# Patient Record
Sex: MALE | Race: WHITE | NOT HISPANIC OR LATINO | Employment: OTHER | URBAN - METROPOLITAN AREA
[De-identification: names, ages, dates, MRNs, and addresses within clinical notes are randomized per-mention and may not be internally consistent; named-entity substitution may affect disease eponyms.]

---

## 2017-01-04 ENCOUNTER — TRANSCRIBE ORDERS (OUTPATIENT)
Dept: RADIOLOGY | Facility: CLINIC | Age: 77
End: 2017-01-04

## 2017-01-04 ENCOUNTER — HOSPITAL ENCOUNTER (OUTPATIENT)
Dept: RADIOLOGY | Facility: CLINIC | Age: 77
Discharge: HOME | End: 2017-01-04
Payer: MEDICARE

## 2017-01-04 ENCOUNTER — GENERIC CONVERSION - ENCOUNTER (OUTPATIENT)
Dept: OTHER | Facility: OTHER | Age: 77
End: 2017-01-04

## 2017-01-04 DIAGNOSIS — M40.204 KYPHOSIS OF THORACIC REGION: ICD-10-CM

## 2017-01-04 PROCEDURE — 72072 X-RAY EXAM THORAC SPINE 3VWS: CPT

## 2017-01-05 ENCOUNTER — APPOINTMENT (EMERGENCY)
Dept: RADIOLOGY | Facility: HOSPITAL | Age: 77
End: 2017-01-05
Payer: MEDICARE

## 2017-01-05 ENCOUNTER — HOSPITAL ENCOUNTER (EMERGENCY)
Facility: HOSPITAL | Age: 77
Discharge: HOME | End: 2017-01-05
Attending: EMERGENCY MEDICINE | Admitting: EMERGENCY MEDICINE
Payer: MEDICARE

## 2017-01-05 VITALS
DIASTOLIC BLOOD PRESSURE: 84 MMHG | RESPIRATION RATE: 20 BRPM | HEART RATE: 85 BPM | SYSTOLIC BLOOD PRESSURE: 153 MMHG | OXYGEN SATURATION: 95 % | TEMPERATURE: 97.5 F

## 2017-01-05 DIAGNOSIS — M25.552 LEFT HIP PAIN: Primary | ICD-10-CM

## 2017-01-05 PROCEDURE — 99283 EMERGENCY DEPT VISIT LOW MDM: CPT

## 2017-01-05 PROCEDURE — 73502 X-RAY EXAM HIP UNI 2-3 VIEWS: CPT

## 2017-01-05 RX ORDER — TRAMADOL HYDROCHLORIDE 50 MG/1
50 TABLET ORAL EVERY 6 HOURS PRN
Qty: 15 TABLET | Refills: 0 | Status: SHIPPED | OUTPATIENT
Start: 2017-01-05 | End: 2017-01-15

## 2017-01-18 ENCOUNTER — ALLSCRIPTS OFFICE VISIT (OUTPATIENT)
Dept: OTHER | Facility: OTHER | Age: 77
End: 2017-01-18

## 2017-01-18 PROCEDURE — 88305 TISSUE EXAM BY PATHOLOGIST: CPT | Performed by: PHYSICIAN ASSISTANT

## 2017-01-20 ENCOUNTER — LAB REQUISITION (OUTPATIENT)
Dept: LAB | Facility: HOSPITAL | Age: 77
End: 2017-01-20
Payer: MEDICARE

## 2017-01-20 DIAGNOSIS — H61.899 OTHER SPECIFIED DISORDERS OF EXTERNAL EAR, UNSPECIFIED EAR: ICD-10-CM

## 2017-02-24 ENCOUNTER — GENERIC CONVERSION - ENCOUNTER (OUTPATIENT)
Dept: OTHER | Facility: OTHER | Age: 77
End: 2017-02-24

## 2017-03-03 ENCOUNTER — TRANSCRIBE ORDERS (OUTPATIENT)
Dept: LAB | Facility: CLINIC | Age: 77
End: 2017-03-03

## 2017-03-03 ENCOUNTER — APPOINTMENT (OUTPATIENT)
Dept: LAB | Facility: CLINIC | Age: 77
End: 2017-03-03
Payer: MEDICARE

## 2017-03-03 DIAGNOSIS — E11.65 TYPE 2 DIABETES MELLITUS WITH HYPERGLYCEMIA (HCC): ICD-10-CM

## 2017-03-03 LAB
ALBUMIN SERPL BCP-MCNC: 3.4 G/DL (ref 3.5–5)
ALP SERPL-CCNC: 75 U/L (ref 46–116)
ALT SERPL W P-5'-P-CCNC: 34 U/L (ref 12–78)
ANION GAP SERPL CALCULATED.3IONS-SCNC: 5 MMOL/L (ref 4–13)
AST SERPL W P-5'-P-CCNC: 16 U/L (ref 5–45)
BILIRUB SERPL-MCNC: 0.6 MG/DL (ref 0.2–1)
BUN SERPL-MCNC: 19 MG/DL (ref 5–25)
CALCIUM SERPL-MCNC: 9.5 MG/DL (ref 8.3–10.1)
CHLORIDE SERPL-SCNC: 98 MMOL/L (ref 100–108)
CHOLEST SERPL-MCNC: 267 MG/DL (ref 50–200)
CO2 SERPL-SCNC: 32 MMOL/L (ref 21–32)
CREAT SERPL-MCNC: 1.24 MG/DL (ref 0.6–1.3)
CREAT UR-MCNC: 196 MG/DL
EST. AVERAGE GLUCOSE BLD GHB EST-MCNC: 203 MG/DL
GFR SERPL CREATININE-BSD FRML MDRD: 56.4 ML/MIN/1.73SQ M
GLUCOSE SERPL-MCNC: 195 MG/DL (ref 65–140)
HBA1C MFR BLD: 8.7 % (ref 4.2–6.3)
HDLC SERPL-MCNC: 39 MG/DL (ref 40–60)
LDLC SERPL CALC-MCNC: 183 MG/DL (ref 0–100)
MICROALBUMIN UR-MCNC: 294 MG/L (ref 0–20)
MICROALBUMIN/CREAT 24H UR: 150 MG/G CREATININE (ref 0–30)
POTASSIUM SERPL-SCNC: 4.8 MMOL/L (ref 3.5–5.3)
PROT SERPL-MCNC: 8.2 G/DL (ref 6.4–8.2)
SODIUM SERPL-SCNC: 135 MMOL/L (ref 136–145)
TRIGL SERPL-MCNC: 226 MG/DL

## 2017-03-03 PROCEDURE — 80061 LIPID PANEL: CPT

## 2017-03-03 PROCEDURE — 82570 ASSAY OF URINE CREATININE: CPT

## 2017-03-03 PROCEDURE — 82043 UR ALBUMIN QUANTITATIVE: CPT

## 2017-03-03 PROCEDURE — 36415 COLL VENOUS BLD VENIPUNCTURE: CPT

## 2017-03-03 PROCEDURE — 83036 HEMOGLOBIN GLYCOSYLATED A1C: CPT

## 2017-03-03 PROCEDURE — 80053 COMPREHEN METABOLIC PANEL: CPT

## 2017-03-06 ENCOUNTER — ALLSCRIPTS OFFICE VISIT (OUTPATIENT)
Dept: OTHER | Facility: OTHER | Age: 77
End: 2017-03-06

## 2017-03-06 RX ORDER — METOPROLOL SUCCINATE 25 MG/1
25 TABLET, EXTENDED RELEASE ORAL DAILY
COMMUNITY
End: 2017-07-06 | Stop reason: HOSPADM

## 2017-03-06 RX ORDER — LISINOPRIL 10 MG/1
10 TABLET ORAL DAILY
COMMUNITY
End: 2018-01-26 | Stop reason: ALTCHOICE

## 2017-03-06 RX ORDER — LORAZEPAM 1 MG/1
1 TABLET ORAL EVERY 6 HOURS PRN
COMMUNITY
End: 2017-07-06 | Stop reason: HOSPADM

## 2017-03-06 RX ORDER — RANOLAZINE 500 MG/1
500 TABLET, EXTENDED RELEASE ORAL 2 TIMES DAILY
COMMUNITY
End: 2017-07-06 | Stop reason: HOSPADM

## 2017-03-08 ENCOUNTER — GENERIC CONVERSION - ENCOUNTER (OUTPATIENT)
Dept: OTHER | Facility: OTHER | Age: 77
End: 2017-03-08

## 2017-03-09 ENCOUNTER — ANESTHESIA EVENT (OUTPATIENT)
Dept: PERIOP | Facility: HOSPITAL | Age: 77
End: 2017-03-09
Payer: MEDICARE

## 2017-03-10 ENCOUNTER — ANESTHESIA (OUTPATIENT)
Dept: PERIOP | Facility: HOSPITAL | Age: 77
End: 2017-03-10
Payer: MEDICARE

## 2017-03-10 ENCOUNTER — HOSPITAL ENCOUNTER (OUTPATIENT)
Facility: HOSPITAL | Age: 77
Setting detail: OUTPATIENT SURGERY
Discharge: HOME | End: 2017-03-10
Attending: SURGERY | Admitting: SURGERY
Payer: MEDICARE

## 2017-03-10 VITALS
WEIGHT: 230 LBS | RESPIRATION RATE: 20 BRPM | HEIGHT: 70 IN | OXYGEN SATURATION: 94 % | HEART RATE: 79 BPM | BODY MASS INDEX: 32.93 KG/M2 | TEMPERATURE: 100.4 F | SYSTOLIC BLOOD PRESSURE: 134 MMHG | DIASTOLIC BLOOD PRESSURE: 74 MMHG

## 2017-03-10 LAB
BASOPHILS # BLD MANUAL: 0 THOUSAND/UL (ref 0–0.1)
BASOPHILS NFR MAR MANUAL: 0 % (ref 0–1)
EOSINOPHIL # BLD MANUAL: 0.56 THOUSAND/UL (ref 0–0.4)
EOSINOPHIL NFR BLD MANUAL: 7 % (ref 0–6)
ERYTHROCYTE [DISTWIDTH] IN BLOOD BY AUTOMATED COUNT: 13.9 % (ref 11.6–15.1)
GLUCOSE SERPL-MCNC: 173 MG/DL (ref 65–140)
GLUCOSE SERPL-MCNC: 208 MG/DL (ref 65–140)
GLUCOSE SERPL-MCNC: 209 MG/DL (ref 65–140)
HCT VFR BLD AUTO: 39.3 % (ref 36.5–49.3)
HGB BLD-MCNC: 13.1 G/DL (ref 12–17)
LYMPHOCYTES # BLD AUTO: 0.48 THOUSAND/UL (ref 0.6–4.47)
LYMPHOCYTES # BLD AUTO: 6 % (ref 14–44)
MCH RBC QN AUTO: 30.2 PG (ref 26.8–34.3)
MCHC RBC AUTO-ENTMCNC: 33.3 G/DL (ref 31.4–37.4)
MCV RBC AUTO: 91 FL (ref 82–98)
METAMYELOCYTES NFR BLD MANUAL: 1 % (ref 0–1)
MONOCYTES # BLD AUTO: 0.88 THOUSAND/UL (ref 0–1.22)
MONOCYTES NFR BLD: 11 % (ref 4–12)
NEUTROPHILS # BLD MANUAL: 5.91 THOUSAND/UL (ref 1.85–7.62)
NEUTS SEG NFR BLD AUTO: 74 % (ref 43–75)
NRBC BLD AUTO-RTO: 1 /100 WBCS
PLATELET # BLD AUTO: 354 THOUSANDS/UL (ref 149–390)
PLATELET BLD QL SMEAR: ADEQUATE
PMV BLD AUTO: 10.1 FL (ref 8.9–12.7)
RBC # BLD AUTO: 4.34 MILLION/UL (ref 3.88–5.62)
RBC MORPH BLD: NORMAL
VARIANT LYMPHS # BLD AUTO: 1 %
WBC # BLD AUTO: 7.99 THOUSAND/UL (ref 4.31–10.16)

## 2017-03-10 PROCEDURE — 82948 REAGENT STRIP/BLOOD GLUCOSE: CPT

## 2017-03-10 PROCEDURE — 85007 BL SMEAR W/DIFF WBC COUNT: CPT | Performed by: SURGERY

## 2017-03-10 PROCEDURE — C1781 MESH (IMPLANTABLE): HCPCS | Performed by: SURGERY

## 2017-03-10 PROCEDURE — 85027 COMPLETE CBC AUTOMATED: CPT | Performed by: SURGERY

## 2017-03-10 DEVICE — BARD MESH PERFIX PLUG, MEDIUM
Type: IMPLANTABLE DEVICE | Site: ABDOMEN | Status: FUNCTIONAL
Brand: BARD MESH PERFIX PLUG

## 2017-03-10 RX ORDER — FENTANYL CITRATE/PF 50 MCG/ML
25 SYRINGE (ML) INJECTION
Status: COMPLETED | OUTPATIENT
Start: 2017-03-10 | End: 2017-03-10

## 2017-03-10 RX ORDER — ONDANSETRON 2 MG/ML
INJECTION INTRAMUSCULAR; INTRAVENOUS AS NEEDED
Status: DISCONTINUED | OUTPATIENT
Start: 2017-03-10 | End: 2017-03-10 | Stop reason: SURG

## 2017-03-10 RX ORDER — BUPIVACAINE HYDROCHLORIDE AND EPINEPHRINE 2.5; 5 MG/ML; UG/ML
INJECTION, SOLUTION EPIDURAL; INFILTRATION; INTRACAUDAL; PERINEURAL AS NEEDED
Status: DISCONTINUED | OUTPATIENT
Start: 2017-03-10 | End: 2017-03-10 | Stop reason: HOSPADM

## 2017-03-10 RX ORDER — MAGNESIUM HYDROXIDE 1200 MG/15ML
LIQUID ORAL AS NEEDED
Status: DISCONTINUED | OUTPATIENT
Start: 2017-03-10 | End: 2017-03-10 | Stop reason: HOSPADM

## 2017-03-10 RX ORDER — LIDOCAINE HYDROCHLORIDE 10 MG/ML
INJECTION, SOLUTION INFILTRATION; PERINEURAL AS NEEDED
Status: DISCONTINUED | OUTPATIENT
Start: 2017-03-10 | End: 2017-03-10 | Stop reason: SURG

## 2017-03-10 RX ORDER — ROCURONIUM BROMIDE 10 MG/ML
INJECTION, SOLUTION INTRAVENOUS AS NEEDED
Status: DISCONTINUED | OUTPATIENT
Start: 2017-03-10 | End: 2017-03-10 | Stop reason: SURG

## 2017-03-10 RX ORDER — SUCCINYLCHOLINE CHLORIDE 20 MG/ML
INJECTION INTRAMUSCULAR; INTRAVENOUS AS NEEDED
Status: DISCONTINUED | OUTPATIENT
Start: 2017-03-10 | End: 2017-03-10 | Stop reason: SURG

## 2017-03-10 RX ORDER — SODIUM CHLORIDE, SODIUM LACTATE, POTASSIUM CHLORIDE, CALCIUM CHLORIDE 600; 310; 30; 20 MG/100ML; MG/100ML; MG/100ML; MG/100ML
20 INJECTION, SOLUTION INTRAVENOUS CONTINUOUS
Status: DISCONTINUED | OUTPATIENT
Start: 2017-03-10 | End: 2017-03-10 | Stop reason: HOSPADM

## 2017-03-10 RX ORDER — ONDANSETRON 2 MG/ML
4 INJECTION INTRAMUSCULAR; INTRAVENOUS EVERY 4 HOURS PRN
Status: DISCONTINUED | OUTPATIENT
Start: 2017-03-10 | End: 2017-03-10 | Stop reason: HOSPADM

## 2017-03-10 RX ORDER — FENTANYL CITRATE 50 UG/ML
INJECTION, SOLUTION INTRAMUSCULAR; INTRAVENOUS AS NEEDED
Status: DISCONTINUED | OUTPATIENT
Start: 2017-03-10 | End: 2017-03-10 | Stop reason: SURG

## 2017-03-10 RX ORDER — GLYCOPYRROLATE 0.2 MG/ML
INJECTION INTRAMUSCULAR; INTRAVENOUS AS NEEDED
Status: DISCONTINUED | OUTPATIENT
Start: 2017-03-10 | End: 2017-03-10 | Stop reason: SURG

## 2017-03-10 RX ORDER — OXYCODONE HYDROCHLORIDE AND ACETAMINOPHEN 5; 325 MG/1; MG/1
1 TABLET ORAL EVERY 4 HOURS PRN
Status: DISCONTINUED | OUTPATIENT
Start: 2017-03-10 | End: 2017-03-10 | Stop reason: HOSPADM

## 2017-03-10 RX ORDER — SODIUM CHLORIDE, SODIUM LACTATE, POTASSIUM CHLORIDE, CALCIUM CHLORIDE 600; 310; 30; 20 MG/100ML; MG/100ML; MG/100ML; MG/100ML
75 INJECTION, SOLUTION INTRAVENOUS CONTINUOUS
Status: DISCONTINUED | OUTPATIENT
Start: 2017-03-10 | End: 2017-03-10 | Stop reason: HOSPADM

## 2017-03-10 RX ORDER — KETOROLAC TROMETHAMINE 30 MG/ML
INJECTION, SOLUTION INTRAMUSCULAR; INTRAVENOUS AS NEEDED
Status: DISCONTINUED | OUTPATIENT
Start: 2017-03-10 | End: 2017-03-10 | Stop reason: SURG

## 2017-03-10 RX ORDER — PROPOFOL 10 MG/ML
INJECTION, EMULSION INTRAVENOUS AS NEEDED
Status: DISCONTINUED | OUTPATIENT
Start: 2017-03-10 | End: 2017-03-10 | Stop reason: SURG

## 2017-03-10 RX ADMIN — INSULIN HUMAN 4 UNITS: 100 INJECTION, SOLUTION PARENTERAL at 11:27

## 2017-03-10 RX ADMIN — GLYCOPYRROLATE 0.6 MG: 0.2 INJECTION INTRAMUSCULAR; INTRAVENOUS at 10:25

## 2017-03-10 RX ADMIN — FENTANYL CITRATE 25 MCG: 50 INJECTION, SOLUTION INTRAMUSCULAR; INTRAVENOUS at 10:30

## 2017-03-10 RX ADMIN — FENTANYL CITRATE 25 MCG: 50 INJECTION INTRAMUSCULAR; INTRAVENOUS at 11:18

## 2017-03-10 RX ADMIN — CEFAZOLIN SODIUM 2000 MG: 2 SOLUTION INTRAVENOUS at 10:09

## 2017-03-10 RX ADMIN — SODIUM CHLORIDE, SODIUM LACTATE, POTASSIUM CHLORIDE, AND CALCIUM CHLORIDE 20 ML/HR: .6; .31; .03; .02 INJECTION, SOLUTION INTRAVENOUS at 09:20

## 2017-03-10 RX ADMIN — FENTANYL CITRATE 25 MCG: 50 INJECTION, SOLUTION INTRAMUSCULAR; INTRAVENOUS at 10:36

## 2017-03-10 RX ADMIN — NEOSTIGMINE METHYLSULFATE 3 MG: 1 INJECTION INTRAMUSCULAR; INTRAVENOUS; SUBCUTANEOUS at 10:25

## 2017-03-10 RX ADMIN — ROCURONIUM BROMIDE 15 MG: 10 INJECTION, SOLUTION INTRAVENOUS at 10:10

## 2017-03-10 RX ADMIN — KETOROLAC TROMETHAMINE 15 MG: 30 INJECTION, SOLUTION INTRAMUSCULAR at 10:27

## 2017-03-10 RX ADMIN — METOPROLOL TARTRATE 2 MG: 5 INJECTION, SOLUTION INTRAVENOUS at 10:49

## 2017-03-10 RX ADMIN — SUCCINYLCHOLINE CHLORIDE 100 MG: 20 INJECTION, SOLUTION INTRAMUSCULAR; INTRAVENOUS at 10:01

## 2017-03-10 RX ADMIN — FENTANYL CITRATE 25 MCG: 50 INJECTION INTRAMUSCULAR; INTRAVENOUS at 11:24

## 2017-03-10 RX ADMIN — ONDANSETRON 4 MG: 2 INJECTION INTRAMUSCULAR; INTRAVENOUS at 10:25

## 2017-03-10 RX ADMIN — LIDOCAINE HYDROCHLORIDE 50 MG: 10 INJECTION, SOLUTION INFILTRATION; PERINEURAL at 10:01

## 2017-03-10 RX ADMIN — PROPOFOL 170 MG: 10 INJECTION, EMULSION INTRAVENOUS at 10:01

## 2017-03-10 RX ADMIN — INSULIN HUMAN 8 UNITS: 100 INJECTION, SOLUTION PARENTERAL at 12:25

## 2017-03-10 RX ADMIN — METOPROLOL TARTRATE 2 MG: 5 INJECTION, SOLUTION INTRAVENOUS at 10:40

## 2017-03-10 RX ADMIN — FENTANYL CITRATE 25 MCG: 50 INJECTION INTRAMUSCULAR; INTRAVENOUS at 11:13

## 2017-03-10 RX ADMIN — ROCURONIUM BROMIDE 5 MG: 10 INJECTION, SOLUTION INTRAVENOUS at 10:01

## 2017-03-10 RX ADMIN — SODIUM CHLORIDE, SODIUM LACTATE, POTASSIUM CHLORIDE, AND CALCIUM CHLORIDE 75 ML/HR: .6; .31; .03; .02 INJECTION, SOLUTION INTRAVENOUS at 11:25

## 2017-03-10 RX ADMIN — SODIUM CHLORIDE, SODIUM LACTATE, POTASSIUM CHLORIDE, AND CALCIUM CHLORIDE: .6; .31; .03; .02 INJECTION, SOLUTION INTRAVENOUS at 09:55

## 2017-03-26 ENCOUNTER — HOSPITAL ENCOUNTER (EMERGENCY)
Facility: HOSPITAL | Age: 77
Discharge: HOME | End: 2017-03-27
Attending: EMERGENCY MEDICINE
Payer: MEDICARE

## 2017-03-26 ENCOUNTER — APPOINTMENT (EMERGENCY)
Dept: RADIOLOGY | Facility: HOSPITAL | Age: 77
End: 2017-03-26
Payer: MEDICARE

## 2017-03-26 ENCOUNTER — APPOINTMENT (EMERGENCY)
Dept: CT IMAGING | Facility: HOSPITAL | Age: 77
End: 2017-03-26
Payer: MEDICARE

## 2017-03-26 VITALS
RESPIRATION RATE: 18 BRPM | SYSTOLIC BLOOD PRESSURE: 165 MMHG | BODY MASS INDEX: 33.69 KG/M2 | DIASTOLIC BLOOD PRESSURE: 82 MMHG | TEMPERATURE: 98.3 F | WEIGHT: 234.79 LBS | HEART RATE: 93 BPM | OXYGEN SATURATION: 95 %

## 2017-03-26 DIAGNOSIS — S00.81XA FACIAL ABRASION, INITIAL ENCOUNTER: ICD-10-CM

## 2017-03-26 DIAGNOSIS — S63.055A: ICD-10-CM

## 2017-03-26 DIAGNOSIS — S09.90XA HEAD INJURY, INITIAL ENCOUNTER: ICD-10-CM
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
## 2017-03-26 DIAGNOSIS — S61.419A HAND LACERATION: ICD-10-CM

## 2017-03-26 PROCEDURE — 70486 CT MAXILLOFACIAL W/O DYE: CPT

## 2017-03-26 PROCEDURE — 70450 CT HEAD/BRAIN W/O DYE: CPT

## 2017-03-26 PROCEDURE — 73120 X-RAY EXAM OF HAND: CPT

## 2017-03-26 PROCEDURE — 73130 X-RAY EXAM OF HAND: CPT

## 2017-03-26 RX ORDER — ACETAMINOPHEN 325 MG/1
975 TABLET ORAL ONCE
Status: COMPLETED | OUTPATIENT
Start: 2017-03-26 | End: 2017-03-26

## 2017-03-26 RX ORDER — LIDOCAINE HYDROCHLORIDE 20 MG/ML
10 INJECTION, SOLUTION EPIDURAL; INFILTRATION; INTRACAUDAL; PERINEURAL ONCE
Status: DISCONTINUED | OUTPATIENT
Start: 2017-03-26 | End: 2017-03-27 | Stop reason: HOSPADM

## 2017-03-26 RX ORDER — OXYCODONE HYDROCHLORIDE AND ACETAMINOPHEN 5; 325 MG/1; MG/1
1 TABLET ORAL ONCE
Status: COMPLETED | OUTPATIENT
Start: 2017-03-26 | End: 2017-03-26

## 2017-03-26 RX ORDER — OXYCODONE HYDROCHLORIDE AND ACETAMINOPHEN 5; 325 MG/1; MG/1
1 TABLET ORAL EVERY 4 HOURS PRN
Qty: 15 TABLET | Refills: 0 | Status: SHIPPED | OUTPATIENT
Start: 2017-03-26 | End: 2017-04-05

## 2017-03-26 RX ADMIN — ACETAMINOPHEN 975 MG: 325 TABLET ORAL at 21:41

## 2017-03-26 RX ADMIN — OXYCODONE HYDROCHLORIDE AND ACETAMINOPHEN 1 TABLET: 5; 325 TABLET ORAL at 23:03

## 2017-03-27 PROCEDURE — 99284 EMERGENCY DEPT VISIT MOD MDM: CPT

## 2017-03-31 ENCOUNTER — HOSPITAL ENCOUNTER (OUTPATIENT)
Dept: RADIOLOGY | Facility: HOSPITAL | Age: 77
Discharge: HOME | End: 2017-03-31
Payer: MEDICARE

## 2017-03-31 ENCOUNTER — ALLSCRIPTS OFFICE VISIT (OUTPATIENT)
Dept: OTHER | Facility: OTHER | Age: 77
End: 2017-03-31

## 2017-03-31 ENCOUNTER — TRANSCRIBE ORDERS (OUTPATIENT)
Dept: ADMINISTRATIVE | Facility: HOSPITAL | Age: 77
End: 2017-03-31

## 2017-03-31 DIAGNOSIS — S63.257A: ICD-10-CM

## 2017-03-31 DIAGNOSIS — R07.9 CHEST PAIN: ICD-10-CM

## 2017-03-31 PROCEDURE — 73130 X-RAY EXAM OF HAND: CPT

## 2017-03-31 PROCEDURE — 71101 X-RAY EXAM UNILAT RIBS/CHEST: CPT

## 2017-04-04 ENCOUNTER — GENERIC CONVERSION - ENCOUNTER (OUTPATIENT)
Dept: OTHER | Facility: OTHER | Age: 77
End: 2017-04-04

## 2017-04-05 ENCOUNTER — ALLSCRIPTS OFFICE VISIT (OUTPATIENT)
Dept: OTHER | Facility: OTHER | Age: 77
End: 2017-04-05

## 2017-04-20 ENCOUNTER — GENERIC CONVERSION - ENCOUNTER (OUTPATIENT)
Dept: OTHER | Facility: OTHER | Age: 77
End: 2017-04-20

## 2017-05-02 DIAGNOSIS — S62.657D: ICD-10-CM

## 2017-05-02 DIAGNOSIS — S62.629A CLOSED DISPLACED FRACTURE OF MIDDLE PHALANX OF FINGER: ICD-10-CM

## 2017-05-03 ENCOUNTER — HOSPITAL ENCOUNTER (OUTPATIENT)
Dept: RADIOLOGY | Facility: CLINIC | Age: 77
Discharge: HOME | End: 2017-05-03
Payer: MEDICARE

## 2017-05-03 ENCOUNTER — ALLSCRIPTS OFFICE VISIT (OUTPATIENT)
Dept: OTHER | Facility: OTHER | Age: 77
End: 2017-05-03

## 2017-05-03 DIAGNOSIS — S62.629A CLOSED DISPLACED FRACTURE OF MIDDLE PHALANX OF FINGER: ICD-10-CM

## 2017-05-03 PROCEDURE — 73140 X-RAY EXAM OF FINGER(S): CPT

## 2017-05-05 DIAGNOSIS — I95.1 ORTHOSTATIC HYPOTENSION: ICD-10-CM

## 2017-05-05 DIAGNOSIS — R00.2 PALPITATIONS: ICD-10-CM

## 2017-05-05 DIAGNOSIS — E78.5 HYPERLIPIDEMIA: ICD-10-CM

## 2017-05-25 ENCOUNTER — ALLSCRIPTS OFFICE VISIT (OUTPATIENT)
Dept: OTHER | Facility: OTHER | Age: 77
End: 2017-05-25

## 2017-06-02 ENCOUNTER — APPOINTMENT (OUTPATIENT)
Dept: PHYSICAL THERAPY | Facility: CLINIC | Age: 77
End: 2017-06-02
Payer: MEDICARE

## 2017-06-02 DIAGNOSIS — S62.657D: ICD-10-CM

## 2017-06-02 PROCEDURE — G8991 OTHER PT/OT GOAL STATUS: HCPCS

## 2017-06-02 PROCEDURE — 97110 THERAPEUTIC EXERCISES: CPT

## 2017-06-02 PROCEDURE — G8990 OTHER PT/OT CURRENT STATUS: HCPCS

## 2017-06-02 PROCEDURE — 97161 PT EVAL LOW COMPLEX 20 MIN: CPT

## 2017-06-05 ENCOUNTER — APPOINTMENT (OUTPATIENT)
Dept: PHYSICAL THERAPY | Facility: CLINIC | Age: 77
End: 2017-06-05
Payer: MEDICARE

## 2017-06-05 PROCEDURE — 97110 THERAPEUTIC EXERCISES: CPT

## 2017-06-05 PROCEDURE — 97140 MANUAL THERAPY 1/> REGIONS: CPT

## 2017-06-06 DIAGNOSIS — E11.9 TYPE 2 DIABETES MELLITUS WITHOUT COMPLICATIONS (HCC): ICD-10-CM

## 2017-06-07 ENCOUNTER — GENERIC CONVERSION - ENCOUNTER (OUTPATIENT)
Dept: OTHER | Facility: OTHER | Age: 77
End: 2017-06-07

## 2017-06-08 ENCOUNTER — APPOINTMENT (OUTPATIENT)
Dept: PHYSICAL THERAPY | Facility: CLINIC | Age: 77
End: 2017-06-08
Payer: MEDICARE

## 2017-06-12 ENCOUNTER — APPOINTMENT (OUTPATIENT)
Dept: PHYSICAL THERAPY | Facility: CLINIC | Age: 77
End: 2017-06-12
Payer: MEDICARE

## 2017-06-12 PROCEDURE — 97110 THERAPEUTIC EXERCISES: CPT

## 2017-06-12 PROCEDURE — 97140 MANUAL THERAPY 1/> REGIONS: CPT

## 2017-06-15 ENCOUNTER — APPOINTMENT (OUTPATIENT)
Dept: PHYSICAL THERAPY | Facility: CLINIC | Age: 77
End: 2017-06-15
Payer: MEDICARE

## 2017-06-15 ENCOUNTER — ALLSCRIPTS OFFICE VISIT (OUTPATIENT)
Dept: OTHER | Facility: OTHER | Age: 77
End: 2017-06-15

## 2017-06-15 PROCEDURE — 97110 THERAPEUTIC EXERCISES: CPT

## 2017-06-15 PROCEDURE — 97112 NEUROMUSCULAR REEDUCATION: CPT

## 2017-06-15 PROCEDURE — 97140 MANUAL THERAPY 1/> REGIONS: CPT

## 2017-06-19 ENCOUNTER — APPOINTMENT (OUTPATIENT)
Dept: PHYSICAL THERAPY | Facility: CLINIC | Age: 77
End: 2017-06-19
Payer: MEDICARE

## 2017-06-19 PROCEDURE — 97140 MANUAL THERAPY 1/> REGIONS: CPT

## 2017-06-19 PROCEDURE — 97112 NEUROMUSCULAR REEDUCATION: CPT

## 2017-06-21 ENCOUNTER — ALLSCRIPTS OFFICE VISIT (OUTPATIENT)
Dept: OTHER | Facility: OTHER | Age: 77
End: 2017-06-21

## 2017-06-22 ENCOUNTER — APPOINTMENT (OUTPATIENT)
Dept: PHYSICAL THERAPY | Facility: CLINIC | Age: 77
End: 2017-06-22
Payer: MEDICARE

## 2017-06-22 PROCEDURE — 97140 MANUAL THERAPY 1/> REGIONS: CPT

## 2017-06-22 PROCEDURE — 97112 NEUROMUSCULAR REEDUCATION: CPT

## 2017-06-26 ENCOUNTER — APPOINTMENT (OUTPATIENT)
Dept: PHYSICAL THERAPY | Facility: CLINIC | Age: 77
End: 2017-06-26
Payer: MEDICARE

## 2017-06-26 PROCEDURE — 97140 MANUAL THERAPY 1/> REGIONS: CPT

## 2017-06-26 PROCEDURE — 97112 NEUROMUSCULAR REEDUCATION: CPT

## 2017-06-26 PROCEDURE — 97110 THERAPEUTIC EXERCISES: CPT

## 2017-06-29 ENCOUNTER — APPOINTMENT (OUTPATIENT)
Dept: PHYSICAL THERAPY | Facility: CLINIC | Age: 77
End: 2017-06-29
Payer: MEDICARE

## 2017-06-29 PROCEDURE — 97110 THERAPEUTIC EXERCISES: CPT

## 2017-06-29 PROCEDURE — 97140 MANUAL THERAPY 1/> REGIONS: CPT

## 2017-06-29 PROCEDURE — 97112 NEUROMUSCULAR REEDUCATION: CPT

## 2017-07-03 ENCOUNTER — HOSPITAL ENCOUNTER (INPATIENT)
Facility: HOSPITAL | Age: 77
LOS: 3 days | Discharge: HOME | DRG: 281 | End: 2017-07-06
Attending: EMERGENCY MEDICINE | Admitting: INTERNAL MEDICINE
Payer: MEDICARE

## 2017-07-03 ENCOUNTER — GENERIC CONVERSION - ENCOUNTER (OUTPATIENT)
Dept: OTHER | Facility: OTHER | Age: 77
End: 2017-07-03

## 2017-07-03 ENCOUNTER — APPOINTMENT (EMERGENCY)
Dept: CT IMAGING | Facility: HOSPITAL | Age: 77
DRG: 281 | End: 2017-07-03
Payer: MEDICARE

## 2017-07-03 ENCOUNTER — APPOINTMENT (EMERGENCY)
Dept: RADIOLOGY | Facility: HOSPITAL | Age: 77
DRG: 281 | End: 2017-07-03
Payer: MEDICARE

## 2017-07-03 DIAGNOSIS — I16.0 HYPERTENSIVE URGENCY: ICD-10-CM

## 2017-07-03 DIAGNOSIS — R77.8 ELEVATED TROPONIN: ICD-10-CM

## 2017-07-03 DIAGNOSIS — E11.9 DIABETES MELLITUS (HCC): ICD-10-CM

## 2017-07-03 DIAGNOSIS — R07.9 CHEST PAIN: ICD-10-CM

## 2017-07-03 DIAGNOSIS — F32.A ANXIETY AND DEPRESSION: ICD-10-CM

## 2017-07-03 DIAGNOSIS — F41.9 ANXIETY AND DEPRESSION: ICD-10-CM

## 2017-07-03 DIAGNOSIS — I21.4 NON-STEMI (NON-ST ELEVATED MYOCARDIAL INFARCTION) (HCC): Primary | ICD-10-CM

## 2017-07-03 PROBLEM — Z95.1 HX OF CABG: Chronic | Status: ACTIVE | Noted: 2017-07-03

## 2017-07-03 PROBLEM — I71.4 ABDOMINAL ANEURYSM (HCC): Chronic | Status: ACTIVE | Noted: 2017-07-03

## 2017-07-03 PROBLEM — E78.5 HYPERLIPIDEMIA: Status: ACTIVE | Noted: 2017-07-03

## 2017-07-03 LAB
ANION GAP SERPL CALCULATED.3IONS-SCNC: 8 MMOL/L (ref 4–13)
APTT PPP: 32 SECONDS (ref 23–35)
APTT PPP: 65 SECONDS (ref 23–35)
ATRIAL RATE: 85 BPM
BASOPHILS # BLD AUTO: 0.05 THOUSANDS/ΜL (ref 0–0.1)
BASOPHILS NFR BLD AUTO: 1 % (ref 0–1)
BUN SERPL-MCNC: 29 MG/DL (ref 5–25)
CALCIUM SERPL-MCNC: 8.9 MG/DL (ref 8.3–10.1)
CHLORIDE SERPL-SCNC: 99 MMOL/L (ref 100–108)
CO2 SERPL-SCNC: 30 MMOL/L (ref 21–32)
CREAT SERPL-MCNC: 1.14 MG/DL (ref 0.6–1.3)
EOSINOPHIL # BLD AUTO: 0.39 THOUSAND/ΜL (ref 0–0.61)
EOSINOPHIL NFR BLD AUTO: 5 % (ref 0–6)
ERYTHROCYTE [DISTWIDTH] IN BLOOD BY AUTOMATED COUNT: 13.6 % (ref 11.6–15.1)
GFR SERPL CREATININE-BSD FRML MDRD: >60 ML/MIN/1.73SQ M
GLUCOSE SERPL-MCNC: 213 MG/DL (ref 65–140)
GLUCOSE SERPL-MCNC: 224 MG/DL (ref 65–140)
HCT VFR BLD AUTO: 40 % (ref 36.5–49.3)
HGB BLD-MCNC: 13.2 G/DL (ref 12–17)
INR PPP: 0.95 (ref 0.86–1.16)
LYMPHOCYTES # BLD AUTO: 1.2 THOUSANDS/ΜL (ref 0.6–4.47)
LYMPHOCYTES NFR BLD AUTO: 15 % (ref 14–44)
MAGNESIUM SERPL-MCNC: 1.7 MG/DL (ref 1.6–2.6)
MCH RBC QN AUTO: 29.5 PG (ref 26.8–34.3)
MCHC RBC AUTO-ENTMCNC: 33 G/DL (ref 31.4–37.4)
MCV RBC AUTO: 90 FL (ref 82–98)
MONOCYTES # BLD AUTO: 0.72 THOUSAND/ΜL (ref 0.17–1.22)
MONOCYTES NFR BLD AUTO: 9 % (ref 4–12)
NEUTROPHILS # BLD AUTO: 5.84 THOUSANDS/ΜL (ref 1.85–7.62)
NEUTS SEG NFR BLD AUTO: 70 % (ref 43–75)
P AXIS: 72 DEGREES
PLATELET # BLD AUTO: 265 THOUSANDS/UL (ref 149–390)
PLATELET # BLD AUTO: 281 THOUSANDS/UL (ref 149–390)
PMV BLD AUTO: 10.3 FL (ref 8.9–12.7)
PMV BLD AUTO: 10.3 FL (ref 8.9–12.7)
POTASSIUM SERPL-SCNC: 4.3 MMOL/L (ref 3.5–5.3)
PR INTERVAL: 162 MS
PROTHROMBIN TIME: 13 SECONDS (ref 12.1–14.4)
QRS AXIS: -27 DEGREES
QRSD INTERVAL: 154 MS
QT INTERVAL: 426 MS
QTC INTERVAL: 506 MS
RBC # BLD AUTO: 4.47 MILLION/UL (ref 3.88–5.62)
SODIUM SERPL-SCNC: 137 MMOL/L (ref 136–145)
T WAVE AXIS: 76 DEGREES
TROPONIN I SERPL-MCNC: 1.05 NG/ML
TROPONIN I SERPL-MCNC: 2.63 NG/ML
TROPONIN I SERPL-MCNC: 5.76 NG/ML
TSH SERPL DL<=0.05 MIU/L-ACNC: 1.34 UIU/ML (ref 0.36–3.74)
VENTRICULAR RATE: 85 BPM
WBC # BLD AUTO: 8.2 THOUSAND/UL (ref 4.31–10.16)

## 2017-07-03 PROCEDURE — 84484 ASSAY OF TROPONIN QUANT: CPT | Performed by: PHYSICIAN ASSISTANT

## 2017-07-03 PROCEDURE — 74175 CTA ABDOMEN W/CONTRAST: CPT

## 2017-07-03 PROCEDURE — 84484 ASSAY OF TROPONIN QUANT: CPT | Performed by: INTERNAL MEDICINE

## 2017-07-03 PROCEDURE — 36415 COLL VENOUS BLD VENIPUNCTURE: CPT | Performed by: PHYSICIAN ASSISTANT

## 2017-07-03 PROCEDURE — 82948 REAGENT STRIP/BLOOD GLUCOSE: CPT

## 2017-07-03 PROCEDURE — 85730 THROMBOPLASTIN TIME PARTIAL: CPT | Performed by: PHYSICIAN ASSISTANT

## 2017-07-03 PROCEDURE — 85730 THROMBOPLASTIN TIME PARTIAL: CPT | Performed by: INTERNAL MEDICINE

## 2017-07-03 PROCEDURE — 85610 PROTHROMBIN TIME: CPT | Performed by: PHYSICIAN ASSISTANT

## 2017-07-03 PROCEDURE — 71275 CT ANGIOGRAPHY CHEST: CPT

## 2017-07-03 PROCEDURE — 84443 ASSAY THYROID STIM HORMONE: CPT | Performed by: PHYSICIAN ASSISTANT

## 2017-07-03 PROCEDURE — 93005 ELECTROCARDIOGRAM TRACING: CPT

## 2017-07-03 PROCEDURE — 99285 EMERGENCY DEPT VISIT HI MDM: CPT

## 2017-07-03 PROCEDURE — 85049 AUTOMATED PLATELET COUNT: CPT | Performed by: INTERNAL MEDICINE

## 2017-07-03 PROCEDURE — 85025 COMPLETE CBC W/AUTO DIFF WBC: CPT | Performed by: PHYSICIAN ASSISTANT

## 2017-07-03 PROCEDURE — 71020 HB CHEST X-RAY 2VW FRONTAL&LATL: CPT

## 2017-07-03 PROCEDURE — 80048 BASIC METABOLIC PNL TOTAL CA: CPT | Performed by: PHYSICIAN ASSISTANT

## 2017-07-03 PROCEDURE — 83735 ASSAY OF MAGNESIUM: CPT | Performed by: PHYSICIAN ASSISTANT

## 2017-07-03 RX ORDER — MAGNESIUM 30 MG
250 TABLET ORAL DAILY
COMMUNITY
End: 2017-07-14 | Stop reason: HOSPADM

## 2017-07-03 RX ORDER — LISINOPRIL 10 MG/1
10 TABLET ORAL DAILY
Status: DISCONTINUED | OUTPATIENT
Start: 2017-07-04 | End: 2017-07-06 | Stop reason: HOSPADM

## 2017-07-03 RX ORDER — VILAZODONE HYDROCHLORIDE 10 MG/1
10 TABLET ORAL DAILY
Status: DISCONTINUED | OUTPATIENT
Start: 2017-07-04 | End: 2017-07-06 | Stop reason: HOSPADM

## 2017-07-03 RX ORDER — SIMETHICONE 80 MG
80 TABLET,CHEWABLE ORAL 4 TIMES DAILY PRN
Status: DISCONTINUED | OUTPATIENT
Start: 2017-07-03 | End: 2017-07-06 | Stop reason: HOSPADM

## 2017-07-03 RX ORDER — HEPARIN SODIUM 1000 [USP'U]/ML
4000 INJECTION, SOLUTION INTRAVENOUS; SUBCUTANEOUS ONCE
Status: COMPLETED | OUTPATIENT
Start: 2017-07-03 | End: 2017-07-03

## 2017-07-03 RX ORDER — LISINOPRIL 5 MG/1
10 TABLET ORAL ONCE
Status: COMPLETED | OUTPATIENT
Start: 2017-07-03 | End: 2017-07-03

## 2017-07-03 RX ORDER — ROSUVASTATIN CALCIUM 20 MG/1
40 TABLET, COATED ORAL DAILY
COMMUNITY
End: 2018-02-05 | Stop reason: CLARIF

## 2017-07-03 RX ORDER — ONDANSETRON 2 MG/ML
4 INJECTION INTRAMUSCULAR; INTRAVENOUS EVERY 6 HOURS PRN
Status: DISCONTINUED | OUTPATIENT
Start: 2017-07-03 | End: 2017-07-06 | Stop reason: HOSPADM

## 2017-07-03 RX ORDER — ASPIRIN 325 MG
325 TABLET ORAL DAILY
Status: DISCONTINUED | OUTPATIENT
Start: 2017-07-04 | End: 2017-07-06 | Stop reason: HOSPADM

## 2017-07-03 RX ORDER — HEPARIN SODIUM 1000 [USP'U]/ML
4000 INJECTION, SOLUTION INTRAVENOUS; SUBCUTANEOUS AS NEEDED
Status: DISCONTINUED | OUTPATIENT
Start: 2017-07-03 | End: 2017-07-06 | Stop reason: HOSPADM

## 2017-07-03 RX ORDER — HEPARIN SODIUM 10000 [USP'U]/100ML
3-20 INJECTION, SOLUTION INTRAVENOUS
Status: DISCONTINUED | OUTPATIENT
Start: 2017-07-03 | End: 2017-07-06 | Stop reason: HOSPADM

## 2017-07-03 RX ORDER — MELATONIN
2000 DAILY
Status: DISCONTINUED | OUTPATIENT
Start: 2017-07-04 | End: 2017-07-06 | Stop reason: HOSPADM

## 2017-07-03 RX ORDER — HEPARIN SODIUM 1000 [USP'U]/ML
2000 INJECTION, SOLUTION INTRAVENOUS; SUBCUTANEOUS AS NEEDED
Status: DISCONTINUED | OUTPATIENT
Start: 2017-07-03 | End: 2017-07-06 | Stop reason: HOSPADM

## 2017-07-03 RX ORDER — CALCIUM CARBONATE 200(500)MG
1000 TABLET,CHEWABLE ORAL DAILY PRN
Status: DISCONTINUED | OUTPATIENT
Start: 2017-07-03 | End: 2017-07-06 | Stop reason: HOSPADM

## 2017-07-03 RX ORDER — HYDRALAZINE HYDROCHLORIDE 20 MG/ML
10 INJECTION INTRAMUSCULAR; INTRAVENOUS EVERY 6 HOURS PRN
Status: DISCONTINUED | OUTPATIENT
Start: 2017-07-03 | End: 2017-07-06 | Stop reason: HOSPADM

## 2017-07-03 RX ORDER — INSULIN GLARGINE 100 [IU]/ML
50 INJECTION, SOLUTION SUBCUTANEOUS
Status: DISCONTINUED | OUTPATIENT
Start: 2017-07-03 | End: 2017-07-06 | Stop reason: HOSPADM

## 2017-07-03 RX ORDER — ACETAMINOPHEN 325 MG/1
650 TABLET ORAL ONCE
Status: COMPLETED | OUTPATIENT
Start: 2017-07-03 | End: 2017-07-03

## 2017-07-03 RX ORDER — CHOLECALCIFEROL (VITAMIN D3) 125 MCG
1000 CAPSULE ORAL DAILY
Status: DISCONTINUED | OUTPATIENT
Start: 2017-07-04 | End: 2017-07-06 | Stop reason: HOSPADM

## 2017-07-03 RX ORDER — METOPROLOL SUCCINATE 25 MG/1
25 TABLET, EXTENDED RELEASE ORAL DAILY
Status: DISCONTINUED | OUTPATIENT
Start: 2017-07-04 | End: 2017-07-03

## 2017-07-03 RX ORDER — UREA 10 %
250 LOTION (ML) TOPICAL
Status: DISCONTINUED | OUTPATIENT
Start: 2017-07-04 | End: 2017-07-06 | Stop reason: HOSPADM

## 2017-07-03 RX ORDER — CHOLECALCIFEROL (VITAMIN D3) 125 MCG
5000 CAPSULE ORAL DAILY
COMMUNITY
End: 2018-02-06

## 2017-07-03 RX ORDER — SENNOSIDES 8.6 MG
1 TABLET ORAL DAILY
Status: DISCONTINUED | OUTPATIENT
Start: 2017-07-04 | End: 2017-07-06 | Stop reason: HOSPADM

## 2017-07-03 RX ORDER — MAGNESIUM HYDROXIDE/ALUMINUM HYDROXICE/SIMETHICONE 120; 1200; 1200 MG/30ML; MG/30ML; MG/30ML
30 SUSPENSION ORAL EVERY 6 HOURS PRN
Status: DISCONTINUED | OUTPATIENT
Start: 2017-07-03 | End: 2017-07-06 | Stop reason: HOSPADM

## 2017-07-03 RX ORDER — METOPROLOL SUCCINATE 25 MG/1
25 TABLET, EXTENDED RELEASE ORAL DAILY
Status: DISCONTINUED | OUTPATIENT
Start: 2017-07-04 | End: 2017-07-06 | Stop reason: HOSPADM

## 2017-07-03 RX ORDER — LORAZEPAM 1 MG/1
1 TABLET ORAL EVERY 6 HOURS PRN
Status: DISCONTINUED | OUTPATIENT
Start: 2017-07-03 | End: 2017-07-06 | Stop reason: HOSPADM

## 2017-07-03 RX ORDER — METOPROLOL SUCCINATE 50 MG/1
50 TABLET, EXTENDED RELEASE ORAL DAILY
Status: DISCONTINUED | OUTPATIENT
Start: 2017-07-04 | End: 2017-07-06 | Stop reason: HOSPADM

## 2017-07-03 RX ORDER — PANTOPRAZOLE SODIUM 40 MG/1
40 TABLET, DELAYED RELEASE ORAL 2 TIMES DAILY
Status: DISCONTINUED | OUTPATIENT
Start: 2017-07-03 | End: 2017-07-06 | Stop reason: HOSPADM

## 2017-07-03 RX ORDER — PRAVASTATIN SODIUM 40 MG
40 TABLET ORAL
Status: DISCONTINUED | OUTPATIENT
Start: 2017-07-03 | End: 2017-07-06 | Stop reason: HOSPADM

## 2017-07-03 RX ORDER — DOCUSATE SODIUM 100 MG/1
100 CAPSULE, LIQUID FILLED ORAL 2 TIMES DAILY
Status: DISCONTINUED | OUTPATIENT
Start: 2017-07-03 | End: 2017-07-05 | Stop reason: SDUPTHER

## 2017-07-03 RX ORDER — DOCUSATE SODIUM 100 MG/1
100 CAPSULE, LIQUID FILLED ORAL 2 TIMES DAILY
Status: DISCONTINUED | OUTPATIENT
Start: 2017-07-03 | End: 2017-07-06 | Stop reason: HOSPADM

## 2017-07-03 RX ORDER — RANOLAZINE 500 MG/1
500 TABLET, EXTENDED RELEASE ORAL 2 TIMES DAILY
Status: DISCONTINUED | OUTPATIENT
Start: 2017-07-03 | End: 2017-07-06 | Stop reason: HOSPADM

## 2017-07-03 RX ADMIN — PRAVASTATIN SODIUM 40 MG: 40 TABLET ORAL at 21:40

## 2017-07-03 RX ADMIN — INSULIN LISPRO 20 UNITS: 100 INJECTION, SOLUTION INTRAVENOUS; SUBCUTANEOUS at 21:38

## 2017-07-03 RX ADMIN — DOCUSATE SODIUM 100 MG: 100 CAPSULE, LIQUID FILLED ORAL at 21:36

## 2017-07-03 RX ADMIN — DOCUSATE SODIUM 100 MG: 100 CAPSULE, LIQUID FILLED ORAL at 21:37

## 2017-07-03 RX ADMIN — HEPARIN SODIUM 4000 UNITS: 1000 INJECTION INTRAVENOUS; SUBCUTANEOUS at 16:56

## 2017-07-03 RX ADMIN — INSULIN GLARGINE 50 UNITS: 100 INJECTION, SOLUTION SUBCUTANEOUS at 21:40

## 2017-07-03 RX ADMIN — HEPARIN SODIUM AND DEXTROSE 11.1 UNITS/KG/HR: 10000; 5 INJECTION INTRAVENOUS at 16:57

## 2017-07-03 RX ADMIN — ACETAMINOPHEN 650 MG: 325 TABLET, FILM COATED ORAL at 16:22

## 2017-07-03 RX ADMIN — IOHEXOL 100 ML: 350 INJECTION, SOLUTION INTRAVENOUS at 14:15

## 2017-07-03 RX ADMIN — PANTOPRAZOLE SODIUM 40 MG: 40 TABLET, DELAYED RELEASE ORAL at 21:37

## 2017-07-03 RX ADMIN — RANOLAZINE 500 MG: 500 TABLET, FILM COATED, EXTENDED RELEASE ORAL at 21:37

## 2017-07-03 RX ADMIN — LISINOPRIL 10 MG: 5 TABLET ORAL at 17:18

## 2017-07-04 LAB
ALBUMIN SERPL BCP-MCNC: 3 G/DL (ref 3.5–5)
ALP SERPL-CCNC: 62 U/L (ref 46–116)
ALT SERPL W P-5'-P-CCNC: 33 U/L (ref 12–78)
ANION GAP SERPL CALCULATED.3IONS-SCNC: 9 MMOL/L (ref 4–13)
APTT PPP: 55 SECONDS (ref 23–35)
APTT PPP: 76 SECONDS (ref 23–35)
APTT PPP: 78 SECONDS (ref 23–35)
AST SERPL W P-5'-P-CCNC: 151 U/L (ref 5–45)
ATRIAL RATE: 79 BPM
BILIRUB SERPL-MCNC: 0.4 MG/DL (ref 0.2–1)
BUN SERPL-MCNC: 22 MG/DL (ref 5–25)
CALCIUM SERPL-MCNC: 9 MG/DL (ref 8.3–10.1)
CHLORIDE SERPL-SCNC: 99 MMOL/L (ref 100–108)
CHOLEST SERPL-MCNC: 116 MG/DL (ref 50–200)
CO2 SERPL-SCNC: 29 MMOL/L (ref 21–32)
CREAT SERPL-MCNC: 1.08 MG/DL (ref 0.6–1.3)
ERYTHROCYTE [DISTWIDTH] IN BLOOD BY AUTOMATED COUNT: 13.9 % (ref 11.6–15.1)
GFR SERPL CREATININE-BSD FRML MDRD: >60 ML/MIN/1.73SQ M
GLUCOSE SERPL-MCNC: 107 MG/DL (ref 65–140)
GLUCOSE SERPL-MCNC: 108 MG/DL (ref 65–140)
GLUCOSE SERPL-MCNC: 218 MG/DL (ref 65–140)
GLUCOSE SERPL-MCNC: 240 MG/DL (ref 65–140)
GLUCOSE SERPL-MCNC: 82 MG/DL (ref 65–140)
GLUCOSE SERPL-MCNC: 97 MG/DL (ref 65–140)
HCT VFR BLD AUTO: 40.3 % (ref 36.5–49.3)
HDLC SERPL-MCNC: 40 MG/DL (ref 40–60)
HGB BLD-MCNC: 13.1 G/DL (ref 12–17)
INR PPP: 1 (ref 0.86–1.16)
LDLC SERPL CALC-MCNC: 57 MG/DL (ref 0–100)
MCH RBC QN AUTO: 29.2 PG (ref 26.8–34.3)
MCHC RBC AUTO-ENTMCNC: 32.5 G/DL (ref 31.4–37.4)
MCV RBC AUTO: 90 FL (ref 82–98)
P AXIS: 63 DEGREES
PLATELET # BLD AUTO: 284 THOUSANDS/UL (ref 149–390)
PMV BLD AUTO: 10.7 FL (ref 8.9–12.7)
POTASSIUM SERPL-SCNC: 4.2 MMOL/L (ref 3.5–5.3)
PR INTERVAL: 166 MS
PROT SERPL-MCNC: 7.5 G/DL (ref 6.4–8.2)
PROTHROMBIN TIME: 13.5 SECONDS (ref 12.1–14.4)
QRS AXIS: 48 DEGREES
QRSD INTERVAL: 164 MS
QT INTERVAL: 448 MS
QTC INTERVAL: 513 MS
RBC # BLD AUTO: 4.49 MILLION/UL (ref 3.88–5.62)
SODIUM SERPL-SCNC: 137 MMOL/L (ref 136–145)
T WAVE AXIS: 53 DEGREES
TRIGL SERPL-MCNC: 95 MG/DL
TROPONIN I SERPL-MCNC: 20.12 NG/ML
TROPONIN I SERPL-MCNC: >40 NG/ML
TROPONIN I SERPL-MCNC: >40 NG/ML
VENTRICULAR RATE: 79 BPM
WBC # BLD AUTO: 9.08 THOUSAND/UL (ref 4.31–10.16)

## 2017-07-04 PROCEDURE — 84484 ASSAY OF TROPONIN QUANT: CPT | Performed by: PHYSICIAN ASSISTANT

## 2017-07-04 PROCEDURE — 85027 COMPLETE CBC AUTOMATED: CPT | Performed by: INTERNAL MEDICINE

## 2017-07-04 PROCEDURE — 80061 LIPID PANEL: CPT | Performed by: INTERNAL MEDICINE

## 2017-07-04 PROCEDURE — 85730 THROMBOPLASTIN TIME PARTIAL: CPT | Performed by: PHYSICIAN ASSISTANT

## 2017-07-04 PROCEDURE — 93005 ELECTROCARDIOGRAM TRACING: CPT | Performed by: PHYSICIAN ASSISTANT

## 2017-07-04 PROCEDURE — 82948 REAGENT STRIP/BLOOD GLUCOSE: CPT

## 2017-07-04 PROCEDURE — 85610 PROTHROMBIN TIME: CPT | Performed by: INTERNAL MEDICINE

## 2017-07-04 PROCEDURE — 85730 THROMBOPLASTIN TIME PARTIAL: CPT | Performed by: INTERNAL MEDICINE

## 2017-07-04 PROCEDURE — 80053 COMPREHEN METABOLIC PANEL: CPT | Performed by: INTERNAL MEDICINE

## 2017-07-04 RX ORDER — SODIUM CHLORIDE 9 MG/ML
75 INJECTION, SOLUTION INTRAVENOUS CONTINUOUS
Status: DISCONTINUED | OUTPATIENT
Start: 2017-07-04 | End: 2017-07-05

## 2017-07-04 RX ORDER — CLOPIDOGREL BISULFATE 75 MG/1
75 TABLET ORAL SEE ADMIN INSTRUCTIONS
Status: DISCONTINUED | OUTPATIENT
Start: 2017-07-04 | End: 2017-07-06 | Stop reason: HOSPADM

## 2017-07-04 RX ORDER — CLOPIDOGREL BISULFATE 75 MG/1
300 TABLET ORAL DAILY
Status: DISCONTINUED | OUTPATIENT
Start: 2017-07-04 | End: 2017-07-04

## 2017-07-04 RX ORDER — CLOPIDOGREL BISULFATE 75 MG/1
300 TABLET ORAL ONCE
Status: COMPLETED | OUTPATIENT
Start: 2017-07-04 | End: 2017-07-04

## 2017-07-04 RX ORDER — ACETAMINOPHEN 325 MG/1
650 TABLET ORAL EVERY 6 HOURS PRN
Status: DISCONTINUED | OUTPATIENT
Start: 2017-07-04 | End: 2017-07-06 | Stop reason: HOSPADM

## 2017-07-04 RX ORDER — ASPIRIN 81 MG/1
324 TABLET, CHEWABLE ORAL ONCE
Status: DISCONTINUED | OUTPATIENT
Start: 2017-07-04 | End: 2017-07-04

## 2017-07-04 RX ADMIN — RANOLAZINE 500 MG: 500 TABLET, FILM COATED, EXTENDED RELEASE ORAL at 07:56

## 2017-07-04 RX ADMIN — INSULIN LISPRO 20 UNITS: 100 INJECTION, SOLUTION INTRAVENOUS; SUBCUTANEOUS at 08:05

## 2017-07-04 RX ADMIN — HEPARIN SODIUM 2000 UNITS: 1000 INJECTION, SOLUTION INTRAVENOUS; SUBCUTANEOUS at 07:59

## 2017-07-04 RX ADMIN — CYANOCOBALAMIN TAB 500 MCG 1000 MCG: 500 TAB at 08:04

## 2017-07-04 RX ADMIN — METOPROLOL SUCCINATE 25 MG: 50 TABLET, EXTENDED RELEASE ORAL at 08:06

## 2017-07-04 RX ADMIN — LISINOPRIL 10 MG: 10 TABLET ORAL at 08:04

## 2017-07-04 RX ADMIN — DOCUSATE SODIUM 100 MG: 100 CAPSULE, LIQUID FILLED ORAL at 08:04

## 2017-07-04 RX ADMIN — INSULIN GLARGINE 25 UNITS: 100 INJECTION, SOLUTION SUBCUTANEOUS at 21:26

## 2017-07-04 RX ADMIN — ACETAMINOPHEN 650 MG: 325 TABLET, FILM COATED ORAL at 22:36

## 2017-07-04 RX ADMIN — ACETAMINOPHEN 650 MG: 325 TABLET, FILM COATED ORAL at 17:39

## 2017-07-04 RX ADMIN — PANTOPRAZOLE SODIUM 40 MG: 40 TABLET, DELAYED RELEASE ORAL at 08:04

## 2017-07-04 RX ADMIN — PRAVASTATIN SODIUM 40 MG: 40 TABLET ORAL at 17:40

## 2017-07-04 RX ADMIN — Medication 250 MG: at 06:02

## 2017-07-04 RX ADMIN — CHOLECALCIFEROL TAB 25 MCG (1000 UNIT) 2000 UNITS: 25 TAB at 08:04

## 2017-07-04 RX ADMIN — Medication 250 MG: at 17:39

## 2017-07-04 RX ADMIN — RANOLAZINE 500 MG: 500 TABLET, FILM COATED, EXTENDED RELEASE ORAL at 17:40

## 2017-07-04 RX ADMIN — VILAZODONE HYDROCHLORIDE 10 MG: 10 TABLET ORAL at 08:03

## 2017-07-04 RX ADMIN — PANTOPRAZOLE SODIUM 40 MG: 40 TABLET, DELAYED RELEASE ORAL at 17:40

## 2017-07-04 RX ADMIN — METOPROLOL SUCCINATE 50 MG: 50 TABLET, EXTENDED RELEASE ORAL at 08:04

## 2017-07-04 RX ADMIN — LORAZEPAM 1 MG: 1 TABLET ORAL at 00:31

## 2017-07-04 RX ADMIN — SENNOSIDES 8.6 MG: 8.6 TABLET, FILM COATED ORAL at 08:04

## 2017-07-04 RX ADMIN — INSULIN LISPRO 20 UNITS: 100 INJECTION, SOLUTION INTRAVENOUS; SUBCUTANEOUS at 12:45

## 2017-07-04 RX ADMIN — ASPIRIN 325 MG: 325 TABLET ORAL at 08:04

## 2017-07-04 RX ADMIN — HEPARIN SODIUM AND DEXTROSE 13.1 UNITS/KG/HR: 10000; 5 INJECTION INTRAVENOUS at 16:38

## 2017-07-04 RX ADMIN — DOCUSATE SODIUM 100 MG: 100 CAPSULE, LIQUID FILLED ORAL at 17:41

## 2017-07-04 RX ADMIN — LORAZEPAM 1 MG: 1 TABLET ORAL at 17:41

## 2017-07-04 RX ADMIN — ACETAMINOPHEN 650 MG: 325 TABLET, FILM COATED ORAL at 01:15

## 2017-07-04 RX ADMIN — CLOPIDOGREL BISULFATE 300 MG: 75 TABLET ORAL at 01:15

## 2017-07-05 ENCOUNTER — APPOINTMENT (INPATIENT)
Dept: NON INVASIVE DIAGNOSTICS | Facility: HOSPITAL | Age: 77
DRG: 281 | End: 2017-07-05
Payer: MEDICARE

## 2017-07-05 LAB
ANION GAP SERPL CALCULATED.3IONS-SCNC: 5 MMOL/L (ref 4–13)
APTT PPP: 104 SECONDS (ref 23–35)
BASOPHILS # BLD AUTO: 0.05 THOUSANDS/ΜL (ref 0–0.1)
BASOPHILS NFR BLD AUTO: 1 % (ref 0–1)
BUN SERPL-MCNC: 25 MG/DL (ref 5–25)
CALCIUM SERPL-MCNC: 9.4 MG/DL (ref 8.3–10.1)
CHLORIDE SERPL-SCNC: 100 MMOL/L (ref 100–108)
CO2 SERPL-SCNC: 32 MMOL/L (ref 21–32)
CREAT SERPL-MCNC: 1.04 MG/DL (ref 0.6–1.3)
EOSINOPHIL # BLD AUTO: 0.41 THOUSAND/ΜL (ref 0–0.61)
EOSINOPHIL NFR BLD AUTO: 5 % (ref 0–6)
ERYTHROCYTE [DISTWIDTH] IN BLOOD BY AUTOMATED COUNT: 13.9 % (ref 11.6–15.1)
EST. AVERAGE GLUCOSE BLD GHB EST-MCNC: 189 MG/DL
GFR SERPL CREATININE-BSD FRML MDRD: >60 ML/MIN/1.73SQ M
GLUCOSE SERPL-MCNC: 105 MG/DL (ref 65–140)
GLUCOSE SERPL-MCNC: 121 MG/DL (ref 65–140)
GLUCOSE SERPL-MCNC: 128 MG/DL (ref 65–140)
GLUCOSE SERPL-MCNC: 129 MG/DL (ref 65–140)
HBA1C MFR BLD: 8.2 % (ref 4.2–6.3)
HCT VFR BLD AUTO: 42.1 % (ref 36.5–49.3)
HGB BLD-MCNC: 13.8 G/DL (ref 12–17)
LYMPHOCYTES # BLD AUTO: 1.09 THOUSANDS/ΜL (ref 0.6–4.47)
LYMPHOCYTES NFR BLD AUTO: 14 % (ref 14–44)
MAGNESIUM SERPL-MCNC: 1.8 MG/DL (ref 1.6–2.6)
MCH RBC QN AUTO: 29.2 PG (ref 26.8–34.3)
MCHC RBC AUTO-ENTMCNC: 32.8 G/DL (ref 31.4–37.4)
MCV RBC AUTO: 89 FL (ref 82–98)
MONOCYTES # BLD AUTO: 0.75 THOUSAND/ΜL (ref 0.17–1.22)
MONOCYTES NFR BLD AUTO: 10 % (ref 4–12)
NEUTROPHILS # BLD AUTO: 5.58 THOUSANDS/ΜL (ref 1.85–7.62)
NEUTS SEG NFR BLD AUTO: 70 % (ref 43–75)
PLATELET # BLD AUTO: 276 THOUSANDS/UL (ref 149–390)
PMV BLD AUTO: 10.3 FL (ref 8.9–12.7)
POTASSIUM SERPL-SCNC: 4.3 MMOL/L (ref 3.5–5.3)
RBC # BLD AUTO: 4.73 MILLION/UL (ref 3.88–5.62)
SODIUM SERPL-SCNC: 137 MMOL/L (ref 136–145)
WBC # BLD AUTO: 7.88 THOUSAND/UL (ref 4.31–10.16)

## 2017-07-05 PROCEDURE — B2181ZZ FLUOROSCOPY OF LEFT INTERNAL MAMMARY BYPASS GRAFT USING LOW OSMOLAR CONTRAST: ICD-10-PCS | Performed by: INTERNAL MEDICINE

## 2017-07-05 PROCEDURE — 82948 REAGENT STRIP/BLOOD GLUCOSE: CPT

## 2017-07-05 PROCEDURE — 93455 CORONARY ART/GRFT ANGIO S&I: CPT | Performed by: PHYSICIAN ASSISTANT

## 2017-07-05 PROCEDURE — C1760 CLOSURE DEV, VASC: HCPCS | Performed by: PHYSICIAN ASSISTANT

## 2017-07-05 PROCEDURE — C1769 GUIDE WIRE: HCPCS | Performed by: PHYSICIAN ASSISTANT

## 2017-07-05 PROCEDURE — 85025 COMPLETE CBC W/AUTO DIFF WBC: CPT | Performed by: PHYSICIAN ASSISTANT

## 2017-07-05 PROCEDURE — 83036 HEMOGLOBIN GLYCOSYLATED A1C: CPT | Performed by: INTERNAL MEDICINE

## 2017-07-05 PROCEDURE — C1894 INTRO/SHEATH, NON-LASER: HCPCS | Performed by: PHYSICIAN ASSISTANT

## 2017-07-05 PROCEDURE — 80048 BASIC METABOLIC PNL TOTAL CA: CPT | Performed by: PHYSICIAN ASSISTANT

## 2017-07-05 PROCEDURE — 83735 ASSAY OF MAGNESIUM: CPT | Performed by: PHYSICIAN ASSISTANT

## 2017-07-05 PROCEDURE — 99152 MOD SED SAME PHYS/QHP 5/>YRS: CPT | Performed by: PHYSICIAN ASSISTANT

## 2017-07-05 PROCEDURE — 99153 MOD SED SAME PHYS/QHP EA: CPT | Performed by: PHYSICIAN ASSISTANT

## 2017-07-05 PROCEDURE — 4A023N6 MEASUREMENT OF CARDIAC SAMPLING AND PRESSURE, RIGHT HEART, PERCUTANEOUS APPROACH: ICD-10-PCS | Performed by: INTERNAL MEDICINE

## 2017-07-05 PROCEDURE — B2111ZZ FLUOROSCOPY OF MULTIPLE CORONARY ARTERIES USING LOW OSMOLAR CONTRAST: ICD-10-PCS | Performed by: INTERNAL MEDICINE

## 2017-07-05 PROCEDURE — B21F1ZZ FLUOROSCOPY OF OTHER BYPASS GRAFT USING LOW OSMOLAR CONTRAST: ICD-10-PCS | Performed by: INTERNAL MEDICINE

## 2017-07-05 PROCEDURE — 85730 THROMBOPLASTIN TIME PARTIAL: CPT | Performed by: INTERNAL MEDICINE

## 2017-07-05 RX ORDER — ISOSORBIDE MONONITRATE 30 MG/1
30 TABLET, EXTENDED RELEASE ORAL DAILY
Status: DISCONTINUED | OUTPATIENT
Start: 2017-07-05 | End: 2017-07-06 | Stop reason: HOSPADM

## 2017-07-05 RX ORDER — MIDAZOLAM HYDROCHLORIDE 1 MG/ML
INJECTION INTRAMUSCULAR; INTRAVENOUS CODE/TRAUMA/SEDATION MEDICATION
Status: COMPLETED | OUTPATIENT
Start: 2017-07-05 | End: 2017-07-05

## 2017-07-05 RX ORDER — FENTANYL CITRATE 50 UG/ML
INJECTION, SOLUTION INTRAMUSCULAR; INTRAVENOUS CODE/TRAUMA/SEDATION MEDICATION
Status: COMPLETED | OUTPATIENT
Start: 2017-07-05 | End: 2017-07-05

## 2017-07-05 RX ORDER — LIDOCAINE HYDROCHLORIDE 10 MG/ML
INJECTION, SOLUTION INFILTRATION; PERINEURAL CODE/TRAUMA/SEDATION MEDICATION
Status: COMPLETED | OUTPATIENT
Start: 2017-07-05 | End: 2017-07-05

## 2017-07-05 RX ORDER — SODIUM CHLORIDE 9 MG/ML
100 INJECTION, SOLUTION INTRAVENOUS CONTINUOUS
Status: DISPENSED | OUTPATIENT
Start: 2017-07-05 | End: 2017-07-05

## 2017-07-05 RX ADMIN — MIDAZOLAM HYDROCHLORIDE 2 MG: 1 INJECTION, SOLUTION INTRAMUSCULAR; INTRAVENOUS at 09:59

## 2017-07-05 RX ADMIN — RANOLAZINE 500 MG: 500 TABLET, FILM COATED, EXTENDED RELEASE ORAL at 18:23

## 2017-07-05 RX ADMIN — LIDOCAINE HYDROCHLORIDE 10 ML: 10 INJECTION, SOLUTION INFILTRATION; PERINEURAL at 10:03

## 2017-07-05 RX ADMIN — RANOLAZINE 500 MG: 500 TABLET, FILM COATED, EXTENDED RELEASE ORAL at 10:55

## 2017-07-05 RX ADMIN — LISINOPRIL 10 MG: 10 TABLET ORAL at 10:54

## 2017-07-05 RX ADMIN — IOHEXOL 10 ML: 350 INJECTION, SOLUTION INTRAVENOUS at 10:33

## 2017-07-05 RX ADMIN — CHOLECALCIFEROL TAB 25 MCG (1000 UNIT) 2000 UNITS: 25 TAB at 10:54

## 2017-07-05 RX ADMIN — INSULIN GLARGINE 50 UNITS: 100 INJECTION, SOLUTION SUBCUTANEOUS at 21:44

## 2017-07-05 RX ADMIN — ACETAMINOPHEN 650 MG: 325 TABLET, FILM COATED ORAL at 14:31

## 2017-07-05 RX ADMIN — ASPIRIN 325 MG: 325 TABLET ORAL at 09:15

## 2017-07-05 RX ADMIN — PANTOPRAZOLE SODIUM 40 MG: 40 TABLET, DELAYED RELEASE ORAL at 10:55

## 2017-07-05 RX ADMIN — METOPROLOL SUCCINATE 50 MG: 50 TABLET, EXTENDED RELEASE ORAL at 11:03

## 2017-07-05 RX ADMIN — PRAVASTATIN SODIUM 40 MG: 40 TABLET ORAL at 15:50

## 2017-07-05 RX ADMIN — FENTANYL CITRATE 25 MCG: 50 INJECTION, SOLUTION INTRAMUSCULAR; INTRAVENOUS at 10:29

## 2017-07-05 RX ADMIN — PANTOPRAZOLE SODIUM 40 MG: 40 TABLET, DELAYED RELEASE ORAL at 18:24

## 2017-07-05 RX ADMIN — METOPROLOL SUCCINATE 25 MG: 50 TABLET, EXTENDED RELEASE ORAL at 10:54

## 2017-07-05 RX ADMIN — DOCUSATE SODIUM 100 MG: 100 CAPSULE, LIQUID FILLED ORAL at 10:55

## 2017-07-05 RX ADMIN — VILAZODONE HYDROCHLORIDE 10 MG: 10 TABLET ORAL at 10:57

## 2017-07-05 RX ADMIN — CYANOCOBALAMIN TAB 500 MCG 1000 MCG: 500 TAB at 10:55

## 2017-07-05 RX ADMIN — LORAZEPAM 1 MG: 1 TABLET ORAL at 08:45

## 2017-07-05 RX ADMIN — INSULIN LISPRO 20 UNITS: 100 INJECTION, SOLUTION INTRAVENOUS; SUBCUTANEOUS at 12:45

## 2017-07-05 RX ADMIN — IOHEXOL 140 ML: 350 INJECTION, SOLUTION INTRAVENOUS at 10:25

## 2017-07-05 RX ADMIN — SENNOSIDES 8.6 MG: 8.6 TABLET, FILM COATED ORAL at 10:54

## 2017-07-05 RX ADMIN — Medication 250 MG: at 15:50

## 2017-07-05 RX ADMIN — LORAZEPAM 1 MG: 1 TABLET ORAL at 22:41

## 2017-07-05 RX ADMIN — SODIUM CHLORIDE 100 ML/HR: 0.9 INJECTION, SOLUTION INTRAVENOUS at 11:56

## 2017-07-05 RX ADMIN — DOCUSATE SODIUM 100 MG: 100 CAPSULE, LIQUID FILLED ORAL at 18:24

## 2017-07-05 RX ADMIN — FENTANYL CITRATE 25 MCG: 50 INJECTION, SOLUTION INTRAMUSCULAR; INTRAVENOUS at 09:58

## 2017-07-05 RX ADMIN — INSULIN LISPRO 20 UNITS: 100 INJECTION, SOLUTION INTRAVENOUS; SUBCUTANEOUS at 16:49

## 2017-07-05 RX ADMIN — SODIUM CHLORIDE 100 ML/HR: 0.9 INJECTION, SOLUTION INTRAVENOUS at 18:49

## 2017-07-05 RX ADMIN — ISOSORBIDE MONONITRATE 30 MG: 30 TABLET, EXTENDED RELEASE ORAL at 15:50

## 2017-07-06 VITALS
TEMPERATURE: 97.5 F | BODY MASS INDEX: 33.11 KG/M2 | SYSTOLIC BLOOD PRESSURE: 116 MMHG | OXYGEN SATURATION: 94 % | HEIGHT: 70 IN | HEART RATE: 73 BPM | RESPIRATION RATE: 18 BRPM | WEIGHT: 231.26 LBS | DIASTOLIC BLOOD PRESSURE: 57 MMHG

## 2017-07-06 LAB
ANION GAP SERPL CALCULATED.3IONS-SCNC: 7 MMOL/L (ref 4–13)
BUN SERPL-MCNC: 20 MG/DL (ref 5–25)
CALCIUM SERPL-MCNC: 8.9 MG/DL (ref 8.3–10.1)
CHLORIDE SERPL-SCNC: 100 MMOL/L (ref 100–108)
CO2 SERPL-SCNC: 30 MMOL/L (ref 21–32)
CREAT SERPL-MCNC: 1.14 MG/DL (ref 0.6–1.3)
GFR SERPL CREATININE-BSD FRML MDRD: >60 ML/MIN/1.73SQ M
GLUCOSE SERPL-MCNC: 117 MG/DL (ref 65–140)
GLUCOSE SERPL-MCNC: 81 MG/DL (ref 65–140)
GLUCOSE SERPL-MCNC: 94 MG/DL (ref 65–140)
POTASSIUM SERPL-SCNC: 4.1 MMOL/L (ref 3.5–5.3)
SODIUM SERPL-SCNC: 137 MMOL/L (ref 136–145)

## 2017-07-06 PROCEDURE — 80048 BASIC METABOLIC PNL TOTAL CA: CPT | Performed by: INTERNAL MEDICINE

## 2017-07-06 PROCEDURE — G8989 SELF CARE D/C STATUS: HCPCS

## 2017-07-06 PROCEDURE — 82948 REAGENT STRIP/BLOOD GLUCOSE: CPT

## 2017-07-06 PROCEDURE — 97165 OT EVAL LOW COMPLEX 30 MIN: CPT

## 2017-07-06 PROCEDURE — G8988 SELF CARE GOAL STATUS: HCPCS

## 2017-07-06 PROCEDURE — G8987 SELF CARE CURRENT STATUS: HCPCS

## 2017-07-06 RX ORDER — LORAZEPAM 1 MG/1
0.5 TABLET ORAL EVERY 8 HOURS PRN
Qty: 30 TABLET | Refills: 0 | Status: SHIPPED | OUTPATIENT
Start: 2017-07-06 | End: 2018-02-06

## 2017-07-06 RX ORDER — METOPROLOL SUCCINATE 50 MG/1
50 TABLET, EXTENDED RELEASE ORAL DAILY
Qty: 30 TABLET | Refills: 0 | Status: SHIPPED | OUTPATIENT
Start: 2017-07-06 | End: 2017-07-14 | Stop reason: HOSPADM

## 2017-07-06 RX ORDER — SIMETHICONE 80 MG
80 TABLET,CHEWABLE ORAL 4 TIMES DAILY PRN
Qty: 30 TABLET | Refills: 0 | Status: SHIPPED | OUTPATIENT
Start: 2017-07-06 | End: 2018-02-06

## 2017-07-06 RX ORDER — RANOLAZINE 500 MG/1
500 TABLET, EXTENDED RELEASE ORAL 2 TIMES DAILY
Qty: 30 TABLET | Refills: 0 | Status: ON HOLD | OUTPATIENT
Start: 2017-07-06 | End: 2018-01-28

## 2017-07-06 RX ORDER — SENNOSIDES 8.6 MG
1 TABLET ORAL DAILY
Qty: 120 EACH | Refills: 0 | Status: SHIPPED | OUTPATIENT
Start: 2017-07-06 | End: 2018-01-28 | Stop reason: HOSPADM

## 2017-07-06 RX ORDER — CLOPIDOGREL BISULFATE 75 MG/1
75 TABLET ORAL SEE ADMIN INSTRUCTIONS
Qty: 30 TABLET | Refills: 0 | Status: SHIPPED | OUTPATIENT
Start: 2017-07-06 | End: 2018-05-30 | Stop reason: SDUPTHER

## 2017-07-06 RX ORDER — METOPROLOL SUCCINATE 25 MG/1
25 TABLET, EXTENDED RELEASE ORAL DAILY
Qty: 30 TABLET | Refills: 0 | Status: ON HOLD | OUTPATIENT
Start: 2017-07-06 | End: 2018-01-28

## 2017-07-06 RX ORDER — ISOSORBIDE MONONITRATE 30 MG/1
30 TABLET, EXTENDED RELEASE ORAL DAILY
Qty: 30 TABLET | Refills: 0 | Status: SHIPPED | OUTPATIENT
Start: 2017-07-06 | End: 2018-01-26 | Stop reason: ALTCHOICE

## 2017-07-06 RX ADMIN — ACETAMINOPHEN 650 MG: 325 TABLET, FILM COATED ORAL at 05:01

## 2017-07-06 RX ADMIN — PANTOPRAZOLE SODIUM 40 MG: 40 TABLET, DELAYED RELEASE ORAL at 08:11

## 2017-07-06 RX ADMIN — METOPROLOL SUCCINATE 50 MG: 50 TABLET, EXTENDED RELEASE ORAL at 08:12

## 2017-07-06 RX ADMIN — Medication 250 MG: at 08:12

## 2017-07-06 RX ADMIN — SENNOSIDES 8.6 MG: 8.6 TABLET, FILM COATED ORAL at 08:11

## 2017-07-06 RX ADMIN — VILAZODONE HYDROCHLORIDE 10 MG: 10 TABLET ORAL at 08:13

## 2017-07-06 RX ADMIN — RANOLAZINE 500 MG: 500 TABLET, FILM COATED, EXTENDED RELEASE ORAL at 08:11

## 2017-07-06 RX ADMIN — ASPIRIN 325 MG: 325 TABLET ORAL at 08:12

## 2017-07-06 RX ADMIN — METOPROLOL SUCCINATE 25 MG: 50 TABLET, EXTENDED RELEASE ORAL at 08:12

## 2017-07-06 RX ADMIN — CYANOCOBALAMIN TAB 500 MCG 1000 MCG: 500 TAB at 08:12

## 2017-07-06 RX ADMIN — LISINOPRIL 10 MG: 10 TABLET ORAL at 08:12

## 2017-07-06 RX ADMIN — CHOLECALCIFEROL TAB 25 MCG (1000 UNIT) 2000 UNITS: 25 TAB at 08:12

## 2017-07-06 RX ADMIN — ISOSORBIDE MONONITRATE 30 MG: 30 TABLET, EXTENDED RELEASE ORAL at 08:12

## 2017-07-06 RX ADMIN — DOCUSATE SODIUM 100 MG: 100 CAPSULE, LIQUID FILLED ORAL at 08:11

## 2017-07-06 RX ADMIN — LORAZEPAM 1 MG: 1 TABLET ORAL at 08:15

## 2017-07-10 ENCOUNTER — ALLSCRIPTS OFFICE VISIT (OUTPATIENT)
Dept: OTHER | Facility: OTHER | Age: 77
End: 2017-07-10

## 2017-07-13 ENCOUNTER — GENERIC CONVERSION - ENCOUNTER (OUTPATIENT)
Dept: OTHER | Facility: OTHER | Age: 77
End: 2017-07-13

## 2017-07-13 ENCOUNTER — APPOINTMENT (EMERGENCY)
Dept: RADIOLOGY | Facility: HOSPITAL | Age: 77
End: 2017-07-13
Payer: MEDICARE

## 2017-07-13 ENCOUNTER — HOSPITAL ENCOUNTER (OUTPATIENT)
Facility: HOSPITAL | Age: 77
Setting detail: OBSERVATION
Discharge: HOME | End: 2017-07-14
Attending: EMERGENCY MEDICINE | Admitting: HOSPITALIST
Payer: MEDICARE

## 2017-07-13 DIAGNOSIS — I95.1 ORTHOSTATIC HYPOTENSION: Primary | ICD-10-CM

## 2017-07-13 DIAGNOSIS — R79.89 PRERENAL AZOTEMIA: ICD-10-CM

## 2017-07-13 DIAGNOSIS — E87.1 HYPONATREMIA: ICD-10-CM

## 2017-07-13 PROBLEM — E78.5 HYPERLIPIDEMIA: Chronic | Status: ACTIVE | Noted: 2017-07-03

## 2017-07-13 PROBLEM — E11.9 DIABETES MELLITUS (HCC): Chronic | Status: ACTIVE | Noted: 2017-07-03

## 2017-07-13 PROBLEM — I10 HYPERTENSION: Status: ACTIVE | Noted: 2017-07-13

## 2017-07-13 PROBLEM — E66.9 OBESITY: Status: ACTIVE | Noted: 2017-07-13

## 2017-07-13 PROBLEM — R42 DIZZINESS: Status: ACTIVE | Noted: 2017-07-13

## 2017-07-13 PROBLEM — I10 HYPERTENSION: Chronic | Status: ACTIVE | Noted: 2017-07-13

## 2017-07-13 LAB
ALBUMIN SERPL BCP-MCNC: 2.9 G/DL (ref 3.5–5)
ALP SERPL-CCNC: 74 U/L (ref 46–116)
ALT SERPL W P-5'-P-CCNC: 30 U/L (ref 12–78)
ANION GAP SERPL CALCULATED.3IONS-SCNC: 4 MMOL/L (ref 4–13)
AST SERPL W P-5'-P-CCNC: 18 U/L (ref 5–45)
BASOPHILS # BLD AUTO: 0.05 THOUSANDS/ΜL (ref 0–0.1)
BASOPHILS NFR BLD AUTO: 1 % (ref 0–1)
BILIRUB SERPL-MCNC: 0.36 MG/DL (ref 0.2–1)
BUN SERPL-MCNC: 36 MG/DL (ref 5–25)
CALCIUM SERPL-MCNC: 8.5 MG/DL (ref 8.3–10.1)
CHLORIDE SERPL-SCNC: 101 MMOL/L (ref 100–108)
CO2 SERPL-SCNC: 24 MMOL/L (ref 21–32)
CREAT SERPL-MCNC: 1.38 MG/DL (ref 0.6–1.3)
EOSINOPHIL # BLD AUTO: 0.39 THOUSAND/ΜL (ref 0–0.61)
EOSINOPHIL NFR BLD AUTO: 4 % (ref 0–6)
ERYTHROCYTE [DISTWIDTH] IN BLOOD BY AUTOMATED COUNT: 13.7 % (ref 11.6–15.1)
GFR SERPL CREATININE-BSD FRML MDRD: 49.8 ML/MIN/1.73SQ M
GLUCOSE SERPL-MCNC: 173 MG/DL (ref 65–140)
HCT VFR BLD AUTO: 40.4 % (ref 36.5–49.3)
HGB BLD-MCNC: 13.5 G/DL (ref 12–17)
LYMPHOCYTES # BLD AUTO: 1.31 THOUSANDS/ΜL (ref 0.6–4.47)
LYMPHOCYTES NFR BLD AUTO: 13 % (ref 14–44)
MCH RBC QN AUTO: 30.3 PG (ref 26.8–34.3)
MCHC RBC AUTO-ENTMCNC: 33.4 G/DL (ref 31.4–37.4)
MCV RBC AUTO: 91 FL (ref 82–98)
MONOCYTES # BLD AUTO: 0.96 THOUSAND/ΜL (ref 0.17–1.22)
MONOCYTES NFR BLD AUTO: 9 % (ref 4–12)
NEUTROPHILS # BLD AUTO: 7.62 THOUSANDS/ΜL (ref 1.85–7.62)
NEUTS SEG NFR BLD AUTO: 73 % (ref 43–75)
NRBC BLD AUTO-RTO: 0 /100 WBCS
PLATELET # BLD AUTO: 354 THOUSANDS/UL (ref 149–390)
PMV BLD AUTO: 10.3 FL (ref 8.9–12.7)
POTASSIUM SERPL-SCNC: 4.7 MMOL/L (ref 3.5–5.3)
PROT SERPL-MCNC: 8 G/DL (ref 6.4–8.2)
RBC # BLD AUTO: 4.45 MILLION/UL (ref 3.88–5.62)
SODIUM SERPL-SCNC: 129 MMOL/L (ref 136–145)
SPECIMEN SOURCE: NORMAL
TROPONIN I BLD-MCNC: 0.08 NG/ML (ref 0–0.08)
WBC # BLD AUTO: 10.36 THOUSAND/UL (ref 4.31–10.16)

## 2017-07-13 PROCEDURE — 36415 COLL VENOUS BLD VENIPUNCTURE: CPT

## 2017-07-13 PROCEDURE — 84484 ASSAY OF TROPONIN QUANT: CPT

## 2017-07-13 PROCEDURE — 93005 ELECTROCARDIOGRAM TRACING: CPT

## 2017-07-13 PROCEDURE — 71020 HB CHEST X-RAY 2VW FRONTAL&LATL: CPT

## 2017-07-13 PROCEDURE — 96361 HYDRATE IV INFUSION ADD-ON: CPT

## 2017-07-13 PROCEDURE — 96360 HYDRATION IV INFUSION INIT: CPT

## 2017-07-13 PROCEDURE — 80053 COMPREHEN METABOLIC PANEL: CPT

## 2017-07-13 PROCEDURE — 85025 COMPLETE CBC W/AUTO DIFF WBC: CPT

## 2017-07-13 RX ORDER — LISINOPRIL 10 MG/1
10 TABLET ORAL DAILY
Status: DISCONTINUED | OUTPATIENT
Start: 2017-07-14 | End: 2017-07-14 | Stop reason: HOSPADM

## 2017-07-13 RX ORDER — ONDANSETRON 2 MG/ML
4 INJECTION INTRAMUSCULAR; INTRAVENOUS EVERY 6 HOURS PRN
Status: DISCONTINUED | OUTPATIENT
Start: 2017-07-13 | End: 2017-07-14 | Stop reason: HOSPADM

## 2017-07-13 RX ORDER — DOCUSATE SODIUM 100 MG/1
100 CAPSULE, LIQUID FILLED ORAL 2 TIMES DAILY
Status: DISCONTINUED | OUTPATIENT
Start: 2017-07-14 | End: 2017-07-14 | Stop reason: HOSPADM

## 2017-07-13 RX ORDER — MAGNESIUM 30 MG
30 TABLET ORAL DAILY
Status: DISCONTINUED | OUTPATIENT
Start: 2017-07-14 | End: 2017-07-14 | Stop reason: CLARIF

## 2017-07-13 RX ORDER — MELATONIN
2000 DAILY
Status: DISCONTINUED | OUTPATIENT
Start: 2017-07-14 | End: 2017-07-14 | Stop reason: HOSPADM

## 2017-07-13 RX ORDER — INSULIN GLARGINE 100 [IU]/ML
50 INJECTION, SOLUTION SUBCUTANEOUS
Status: DISCONTINUED | OUTPATIENT
Start: 2017-07-13 | End: 2017-07-14 | Stop reason: HOSPADM

## 2017-07-13 RX ORDER — CLOPIDOGREL BISULFATE 75 MG/1
75 TABLET ORAL SEE ADMIN INSTRUCTIONS
Status: DISCONTINUED | OUTPATIENT
Start: 2017-07-13 | End: 2017-07-14 | Stop reason: HOSPADM

## 2017-07-13 RX ORDER — SODIUM CHLORIDE 9 MG/ML
90 INJECTION, SOLUTION INTRAVENOUS CONTINUOUS
Status: DISPENSED | OUTPATIENT
Start: 2017-07-13 | End: 2017-07-14

## 2017-07-13 RX ORDER — ATORVASTATIN CALCIUM 40 MG/1
40 TABLET, FILM COATED ORAL
Status: DISCONTINUED | OUTPATIENT
Start: 2017-07-14 | End: 2017-07-14 | Stop reason: HOSPADM

## 2017-07-13 RX ORDER — ASPIRIN 325 MG
325 TABLET ORAL DAILY
Status: DISCONTINUED | OUTPATIENT
Start: 2017-07-14 | End: 2017-07-14 | Stop reason: HOSPADM

## 2017-07-13 RX ORDER — SIMETHICONE 80 MG
80 TABLET,CHEWABLE ORAL 4 TIMES DAILY PRN
Status: DISCONTINUED | OUTPATIENT
Start: 2017-07-13 | End: 2017-07-14 | Stop reason: HOSPADM

## 2017-07-13 RX ORDER — ISOSORBIDE MONONITRATE 30 MG/1
30 TABLET, EXTENDED RELEASE ORAL DAILY
Status: DISCONTINUED | OUTPATIENT
Start: 2017-07-14 | End: 2017-07-13

## 2017-07-13 RX ORDER — ACETAMINOPHEN 325 MG/1
650 TABLET ORAL EVERY 6 HOURS PRN
Status: DISCONTINUED | OUTPATIENT
Start: 2017-07-13 | End: 2017-07-14 | Stop reason: HOSPADM

## 2017-07-13 RX ORDER — RANOLAZINE 500 MG/1
500 TABLET, EXTENDED RELEASE ORAL 2 TIMES DAILY
Status: DISCONTINUED | OUTPATIENT
Start: 2017-07-14 | End: 2017-07-14 | Stop reason: HOSPADM

## 2017-07-13 RX ORDER — METOPROLOL SUCCINATE 25 MG/1
25 TABLET, EXTENDED RELEASE ORAL DAILY
Status: DISCONTINUED | OUTPATIENT
Start: 2017-07-14 | End: 2017-07-14 | Stop reason: HOSPADM

## 2017-07-13 RX ORDER — SENNOSIDES 8.6 MG
1 TABLET ORAL DAILY
Status: DISCONTINUED | OUTPATIENT
Start: 2017-07-14 | End: 2017-07-14 | Stop reason: HOSPADM

## 2017-07-13 RX ORDER — PANTOPRAZOLE SODIUM 40 MG/1
40 TABLET, DELAYED RELEASE ORAL 2 TIMES DAILY
Status: DISCONTINUED | OUTPATIENT
Start: 2017-07-14 | End: 2017-07-14 | Stop reason: HOSPADM

## 2017-07-13 RX ORDER — CHOLECALCIFEROL (VITAMIN D3) 125 MCG
1000 CAPSULE ORAL DAILY
Status: DISCONTINUED | OUTPATIENT
Start: 2017-07-14 | End: 2017-07-14 | Stop reason: HOSPADM

## 2017-07-13 RX ADMIN — SODIUM CHLORIDE 1000 ML: 0.9 INJECTION, SOLUTION INTRAVENOUS at 19:27

## 2017-07-13 RX ADMIN — SODIUM CHLORIDE 1000 ML: 0.9 INJECTION, SOLUTION INTRAVENOUS at 22:47

## 2017-07-14 ENCOUNTER — GENERIC CONVERSION - ENCOUNTER (OUTPATIENT)
Dept: OTHER | Facility: OTHER | Age: 77
End: 2017-07-14

## 2017-07-14 ENCOUNTER — APPOINTMENT (OUTPATIENT)
Dept: NON INVASIVE DIAGNOSTICS | Facility: HOSPITAL | Age: 77
End: 2017-07-14
Payer: MEDICARE

## 2017-07-14 VITALS
TEMPERATURE: 97.7 F | RESPIRATION RATE: 18 BRPM | BODY MASS INDEX: 32.1 KG/M2 | HEIGHT: 70 IN | OXYGEN SATURATION: 98 % | SYSTOLIC BLOOD PRESSURE: 158 MMHG | DIASTOLIC BLOOD PRESSURE: 72 MMHG | HEART RATE: 68 BPM | WEIGHT: 224.21 LBS

## 2017-07-14 LAB
ANION GAP SERPL CALCULATED.3IONS-SCNC: 5 MMOL/L (ref 4–13)
ATRIAL RATE: 64 BPM
ATRIAL RATE: 70 BPM
ATRIAL RATE: 70 BPM
BUN SERPL-MCNC: 28 MG/DL (ref 5–25)
CALCIUM SERPL-MCNC: 8.4 MG/DL (ref 8.3–10.1)
CHLORIDE SERPL-SCNC: 106 MMOL/L (ref 100–108)
CO2 SERPL-SCNC: 26 MMOL/L (ref 21–32)
CREAT SERPL-MCNC: 1.17 MG/DL (ref 0.6–1.3)
GFR SERPL CREATININE-BSD FRML MDRD: >60 ML/MIN/1.73SQ M
GLUCOSE SERPL-MCNC: 119 MG/DL (ref 65–140)
GLUCOSE SERPL-MCNC: 153 MG/DL (ref 65–140)
GLUCOSE SERPL-MCNC: 177 MG/DL (ref 65–140)
GLUCOSE SERPL-MCNC: 185 MG/DL (ref 65–140)
MAGNESIUM SERPL-MCNC: 2.2 MG/DL (ref 1.6–2.6)
P AXIS: 54 DEGREES
P AXIS: 66 DEGREES
P AXIS: 68 DEGREES
PLATELET # BLD AUTO: 325 THOUSANDS/UL (ref 149–390)
PMV BLD AUTO: 10.6 FL (ref 8.9–12.7)
POTASSIUM SERPL-SCNC: 4 MMOL/L (ref 3.5–5.3)
PR INTERVAL: 158 MS
PR INTERVAL: 164 MS
PR INTERVAL: 174 MS
QRS AXIS: 117 DEGREES
QRS AXIS: 129 DEGREES
QRS AXIS: 46 DEGREES
QRSD INTERVAL: 152 MS
QRSD INTERVAL: 156 MS
QRSD INTERVAL: 160 MS
QT INTERVAL: 412 MS
QT INTERVAL: 416 MS
QT INTERVAL: 458 MS
QTC INTERVAL: 444 MS
QTC INTERVAL: 449 MS
QTC INTERVAL: 472 MS
SODIUM SERPL-SCNC: 137 MMOL/L (ref 136–145)
T WAVE AXIS: 63 DEGREES
T WAVE AXIS: 70 DEGREES
T WAVE AXIS: 74 DEGREES
TROPONIN I SERPL-MCNC: 0.08 NG/ML
TROPONIN I SERPL-MCNC: 0.1 NG/ML
VENTRICULAR RATE: 64 BPM
VENTRICULAR RATE: 70 BPM
VENTRICULAR RATE: 70 BPM

## 2017-07-14 PROCEDURE — G8979 MOBILITY GOAL STATUS: HCPCS

## 2017-07-14 PROCEDURE — 97162 PT EVAL MOD COMPLEX 30 MIN: CPT

## 2017-07-14 PROCEDURE — G8978 MOBILITY CURRENT STATUS: HCPCS

## 2017-07-14 PROCEDURE — 83735 ASSAY OF MAGNESIUM: CPT | Performed by: HOSPITALIST

## 2017-07-14 PROCEDURE — 85049 AUTOMATED PLATELET COUNT: CPT | Performed by: HOSPITALIST

## 2017-07-14 PROCEDURE — 82948 REAGENT STRIP/BLOOD GLUCOSE: CPT

## 2017-07-14 PROCEDURE — 84484 ASSAY OF TROPONIN QUANT: CPT | Performed by: HOSPITALIST

## 2017-07-14 PROCEDURE — 80048 BASIC METABOLIC PNL TOTAL CA: CPT | Performed by: HOSPITALIST

## 2017-07-14 PROCEDURE — 99285 EMERGENCY DEPT VISIT HI MDM: CPT

## 2017-07-14 PROCEDURE — 93306 TTE W/DOPPLER COMPLETE: CPT

## 2017-07-14 RX ORDER — ISOSORBIDE MONONITRATE 30 MG/1
30 TABLET, EXTENDED RELEASE ORAL DAILY
Status: DISCONTINUED | OUTPATIENT
Start: 2017-07-14 | End: 2017-07-14 | Stop reason: HOSPADM

## 2017-07-14 RX ORDER — DIMENHYDRINATE 50 MG
100 TABLET ORAL DAILY
Qty: 1 CAPSULE | Refills: 0
Start: 2017-07-14 | End: 2018-08-17

## 2017-07-14 RX ADMIN — LISINOPRIL 10 MG: 10 TABLET ORAL at 08:12

## 2017-07-14 RX ADMIN — ACETAMINOPHEN 650 MG: 325 TABLET, FILM COATED ORAL at 05:50

## 2017-07-14 RX ADMIN — ASPIRIN 325 MG: 325 TABLET ORAL at 08:16

## 2017-07-14 RX ADMIN — INSULIN LISPRO 1 UNITS: 100 INJECTION, SOLUTION INTRAVENOUS; SUBCUTANEOUS at 06:00

## 2017-07-14 RX ADMIN — PANTOPRAZOLE SODIUM 40 MG: 40 TABLET, DELAYED RELEASE ORAL at 08:13

## 2017-07-14 RX ADMIN — VITAMIN D, TAB 1000IU (100/BT) 2000 UNITS: 25 TAB at 08:14

## 2017-07-14 RX ADMIN — SENNOSIDES 8.6 MG: 8.6 TABLET, FILM COATED ORAL at 08:13

## 2017-07-14 RX ADMIN — INSULIN LISPRO 2 UNITS: 100 INJECTION, SOLUTION INTRAVENOUS; SUBCUTANEOUS at 01:57

## 2017-07-14 RX ADMIN — CYANOCOBALAMIN TAB 500 MCG 1000 MCG: 500 TAB at 08:15

## 2017-07-14 RX ADMIN — ENOXAPARIN SODIUM 40 MG: 40 INJECTION SUBCUTANEOUS at 08:16

## 2017-07-14 RX ADMIN — RANOLAZINE 500 MG: 500 TABLET, FILM COATED, EXTENDED RELEASE ORAL at 08:15

## 2017-07-14 RX ADMIN — ISOSORBIDE MONONITRATE 30 MG: 30 TABLET, EXTENDED RELEASE ORAL at 14:52

## 2017-07-14 RX ADMIN — INSULIN GLARGINE 50 UNITS: 100 INJECTION, SOLUTION SUBCUTANEOUS at 01:58

## 2017-07-14 RX ADMIN — SODIUM CHLORIDE 90 ML/HR: 0.9 INJECTION, SOLUTION INTRAVENOUS at 02:10

## 2017-07-14 RX ADMIN — DOCUSATE SODIUM 100 MG: 100 CAPSULE, LIQUID FILLED ORAL at 08:14

## 2017-07-14 RX ADMIN — ACETAMINOPHEN 650 MG: 325 TABLET, FILM COATED ORAL at 00:27

## 2017-07-14 RX ADMIN — METOPROLOL SUCCINATE 25 MG: 25 TABLET, EXTENDED RELEASE ORAL at 08:12

## 2017-07-17 ENCOUNTER — ALLSCRIPTS OFFICE VISIT (OUTPATIENT)
Dept: OTHER | Facility: OTHER | Age: 77
End: 2017-07-17

## 2017-07-17 DIAGNOSIS — I25.10 ATHEROSCLEROTIC HEART DISEASE OF NATIVE CORONARY ARTERY WITHOUT ANGINA PECTORIS: ICD-10-CM
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
## 2017-07-25 DIAGNOSIS — E11.65 TYPE 2 DIABETES MELLITUS WITH HYPERGLYCEMIA (HCC): ICD-10-CM

## 2017-08-07 ENCOUNTER — APPOINTMENT (OUTPATIENT)
Dept: LAB | Facility: HOSPITAL | Age: 77
End: 2017-08-07
Payer: MEDICARE

## 2017-08-07 ENCOUNTER — TRANSCRIBE ORDERS (OUTPATIENT)
Dept: ADMINISTRATIVE | Facility: HOSPITAL | Age: 77
End: 2017-08-07

## 2017-08-07 DIAGNOSIS — E11.65 TYPE 2 DIABETES MELLITUS WITH HYPERGLYCEMIA (HCC): ICD-10-CM

## 2017-08-07 LAB
ALBUMIN SERPL BCP-MCNC: 3.4 G/DL (ref 3.5–5)
ALP SERPL-CCNC: 66 U/L (ref 46–116)
ALT SERPL W P-5'-P-CCNC: 21 U/L (ref 12–78)
ANION GAP SERPL CALCULATED.3IONS-SCNC: 7 MMOL/L (ref 4–13)
AST SERPL W P-5'-P-CCNC: 16 U/L (ref 5–45)
BILIRUB SERPL-MCNC: 0.4 MG/DL (ref 0.2–1)
BUN SERPL-MCNC: 26 MG/DL (ref 5–25)
CALCIUM SERPL-MCNC: 9.2 MG/DL (ref 8.3–10.1)
CHLORIDE SERPL-SCNC: 104 MMOL/L (ref 100–108)
CHOLEST SERPL-MCNC: 129 MG/DL (ref 50–200)
CO2 SERPL-SCNC: 30 MMOL/L (ref 21–32)
CREAT SERPL-MCNC: 1.11 MG/DL (ref 0.6–1.3)
CREAT UR-MCNC: 184 MG/DL
EST. AVERAGE GLUCOSE BLD GHB EST-MCNC: 203 MG/DL
GFR SERPL CREATININE-BSD FRML MDRD: 63 ML/MIN/1.73SQ M
GLUCOSE P FAST SERPL-MCNC: 138 MG/DL (ref 65–99)
HBA1C MFR BLD: 8.7 % (ref 4.2–6.3)
HDLC SERPL-MCNC: 30 MG/DL (ref 40–60)
LDLC SERPL CALC-MCNC: 76 MG/DL (ref 0–100)
MICROALBUMIN UR-MCNC: 126 MG/L (ref 0–20)
MICROALBUMIN/CREAT 24H UR: 68 MG/G CREATININE (ref 0–30)
POTASSIUM SERPL-SCNC: 4.7 MMOL/L (ref 3.5–5.3)
PROT SERPL-MCNC: 7.7 G/DL (ref 6.4–8.2)
SODIUM SERPL-SCNC: 141 MMOL/L (ref 136–145)
TRIGL SERPL-MCNC: 113 MG/DL

## 2017-08-07 PROCEDURE — 83036 HEMOGLOBIN GLYCOSYLATED A1C: CPT

## 2017-08-07 PROCEDURE — 82570 ASSAY OF URINE CREATININE: CPT

## 2017-08-07 PROCEDURE — 80053 COMPREHEN METABOLIC PANEL: CPT

## 2017-08-07 PROCEDURE — 36415 COLL VENOUS BLD VENIPUNCTURE: CPT

## 2017-08-07 PROCEDURE — 82043 UR ALBUMIN QUANTITATIVE: CPT

## 2017-08-07 PROCEDURE — 80061 LIPID PANEL: CPT

## 2017-08-08 ENCOUNTER — ALLSCRIPTS OFFICE VISIT (OUTPATIENT)
Dept: OTHER | Facility: OTHER | Age: 77
End: 2017-08-08

## 2017-08-09 ENCOUNTER — ALLSCRIPTS OFFICE VISIT (OUTPATIENT)
Dept: OTHER | Facility: OTHER | Age: 77
End: 2017-08-09

## 2017-08-09 ENCOUNTER — HOSPITAL ENCOUNTER (OUTPATIENT)
Dept: CT IMAGING | Facility: HOSPITAL | Age: 77
Discharge: HOME | End: 2017-08-09
Attending: INTERNAL MEDICINE
Payer: MEDICARE
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
## 2017-08-09 PROCEDURE — 70450 CT HEAD/BRAIN W/O DYE: CPT

## 2017-08-15 ENCOUNTER — HOSPITAL ENCOUNTER (OUTPATIENT)
Dept: NON INVASIVE DIAGNOSTICS | Facility: CLINIC | Age: 77
Discharge: HOME | End: 2017-08-15
Payer: MEDICARE

## 2017-08-15 DIAGNOSIS — I25.10 ATHEROSCLEROTIC HEART DISEASE OF NATIVE CORONARY ARTERY WITHOUT ANGINA PECTORIS: ICD-10-CM

## 2017-08-15 PROCEDURE — 78452 HT MUSCLE IMAGE SPECT MULT: CPT

## 2017-08-15 PROCEDURE — 93017 CV STRESS TEST TRACING ONLY: CPT

## 2017-08-15 PROCEDURE — A9502 TC99M TETROFOSMIN: HCPCS

## 2017-08-15 RX ADMIN — REGADENOSON 0.4 MG: 0.08 INJECTION, SOLUTION INTRAVENOUS at 13:32

## 2017-08-24 LAB
CHEST PAIN STATEMENT: NORMAL
CHEST PAIN STATEMENT: NORMAL
MAX DIASTOLIC BP: 60 MMHG
MAX DIASTOLIC BP: 60 MMHG
MAX HEART RATE: 93 BPM
MAX HEART RATE: 93 BPM
MAX PREDICTED HEART RATE: 142 BPM
MAX PREDICTED HEART RATE: 142 BPM
MAX. SYSTOLIC BP: 110 MMHG
MAX. SYSTOLIC BP: 110 MMHG
PROTOCOL NAME: NORMAL
PROTOCOL NAME: NORMAL
REASON FOR TERMINATION: NORMAL
REASON FOR TERMINATION: NORMAL
TARGET HR FORMULA: NORMAL
TARGET HR FORMULA: NORMAL
TEST INDICATION: NORMAL
TEST INDICATION: NORMAL
TIME IN EXERCISE PHASE: 180 S
TIME IN EXERCISE PHASE: 180 S

## 2017-09-27 ENCOUNTER — ALLSCRIPTS OFFICE VISIT (OUTPATIENT)
Dept: OTHER | Facility: OTHER | Age: 77
End: 2017-09-27

## 2017-09-27 DIAGNOSIS — I25.10 ATHEROSCLEROTIC HEART DISEASE OF NATIVE CORONARY ARTERY WITHOUT ANGINA PECTORIS: ICD-10-CM

## 2017-09-27 DIAGNOSIS — M25.511 PAIN IN RIGHT SHOULDER: ICD-10-CM

## 2017-09-27 DIAGNOSIS — E78.5 HYPERLIPIDEMIA: ICD-10-CM

## 2017-09-27 DIAGNOSIS — Z72.0 TOBACCO USE: ICD-10-CM

## 2017-09-27 DIAGNOSIS — M75.41 IMPINGEMENT SYNDROME OF RIGHT SHOULDER: ICD-10-CM

## 2017-09-27 DIAGNOSIS — R29.6 REPEATED FALLS: ICD-10-CM

## 2017-09-27 DIAGNOSIS — I71.4 ABDOMINAL AORTIC ANEURYSM WITHOUT RUPTURE (HCC): ICD-10-CM

## 2017-10-03 ENCOUNTER — APPOINTMENT (OUTPATIENT)
Dept: LAB | Facility: CLINIC | Age: 77
End: 2017-10-03
Payer: MEDICARE

## 2017-10-03 DIAGNOSIS — E78.5 HYPERLIPIDEMIA: ICD-10-CM

## 2017-10-03 DIAGNOSIS — I25.10 ATHEROSCLEROTIC HEART DISEASE OF NATIVE CORONARY ARTERY WITHOUT ANGINA PECTORIS: ICD-10-CM

## 2017-10-03 DIAGNOSIS — E11.65 TYPE 2 DIABETES MELLITUS WITH HYPERGLYCEMIA (HCC): ICD-10-CM

## 2017-10-03 LAB
ALBUMIN SERPL BCP-MCNC: 3.5 G/DL (ref 3.5–5)
ALP SERPL-CCNC: 66 U/L (ref 46–116)
ALT SERPL W P-5'-P-CCNC: 29 U/L (ref 12–78)
ANION GAP SERPL CALCULATED.3IONS-SCNC: 7 MMOL/L (ref 4–13)
AST SERPL W P-5'-P-CCNC: 21 U/L (ref 5–45)
BILIRUB SERPL-MCNC: 0.4 MG/DL (ref 0.2–1)
BUN SERPL-MCNC: 16 MG/DL (ref 5–25)
CALCIUM SERPL-MCNC: 9.2 MG/DL (ref 8.3–10.1)
CHLORIDE SERPL-SCNC: 100 MMOL/L (ref 100–108)
CHOLEST SERPL-MCNC: 120 MG/DL (ref 50–200)
CO2 SERPL-SCNC: 31 MMOL/L (ref 21–32)
CREAT SERPL-MCNC: 1.2 MG/DL (ref 0.6–1.3)
CREAT UR-MCNC: 162 MG/DL
EST. AVERAGE GLUCOSE BLD GHB EST-MCNC: 160 MG/DL
GFR SERPL CREATININE-BSD FRML MDRD: 58 ML/MIN/1.73SQ M
GLUCOSE P FAST SERPL-MCNC: 132 MG/DL (ref 65–99)
HBA1C MFR BLD: 7.2 % (ref 4.2–6.3)
HDLC SERPL-MCNC: 34 MG/DL (ref 40–60)
LDLC SERPL CALC-MCNC: 62 MG/DL (ref 0–100)
MICROALBUMIN UR-MCNC: 516 MG/L (ref 0–20)
MICROALBUMIN/CREAT 24H UR: 319 MG/G CREATININE (ref 0–30)
POTASSIUM SERPL-SCNC: 4.5 MMOL/L (ref 3.5–5.3)
PROT SERPL-MCNC: 8.1 G/DL (ref 6.4–8.2)
SODIUM SERPL-SCNC: 138 MMOL/L (ref 136–145)
TRIGL SERPL-MCNC: 118 MG/DL

## 2017-10-03 PROCEDURE — 80053 COMPREHEN METABOLIC PANEL: CPT

## 2017-10-03 PROCEDURE — 82043 UR ALBUMIN QUANTITATIVE: CPT

## 2017-10-03 PROCEDURE — 82570 ASSAY OF URINE CREATININE: CPT

## 2017-10-03 PROCEDURE — 80061 LIPID PANEL: CPT

## 2017-10-03 PROCEDURE — 36415 COLL VENOUS BLD VENIPUNCTURE: CPT

## 2017-10-03 PROCEDURE — 83036 HEMOGLOBIN GLYCOSYLATED A1C: CPT

## 2017-10-05 ENCOUNTER — ALLSCRIPTS OFFICE VISIT (OUTPATIENT)
Dept: OTHER | Facility: OTHER | Age: 77
End: 2017-10-05

## 2017-10-19 ENCOUNTER — ALLSCRIPTS OFFICE VISIT (OUTPATIENT)
Dept: OTHER | Facility: OTHER | Age: 77
End: 2017-10-19

## 2017-10-19 ENCOUNTER — APPOINTMENT (OUTPATIENT)
Dept: RADIOLOGY | Facility: OTHER | Age: 77
End: 2017-10-19
Payer: MEDICARE

## 2017-10-19 DIAGNOSIS — M25.511 PAIN IN RIGHT SHOULDER: ICD-10-CM

## 2017-10-19 PROCEDURE — 73030 X-RAY EXAM OF SHOULDER: CPT

## 2017-10-20 NOTE — PROGRESS NOTES
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  I have discussed with the patient the pathophysiology of this diagnosis and reviewed how the examination and imaging correlates with this diagnosis  Treatment options were discussed at length and after discussing these treatment options, the patient elected for and received a subacromial injection with referral to physical therapy  We will reevaluate in 6-8 weeks, failure to improve may indicate the need for further imaging in the form of an MRI scan patient does have diabetes and is on a blood thinner, he understands the injection will raises blood sugar and there is a small risk of bleeding from the injection, he was willing to accept these risks and the injection was performed as detailed above  Kyle Burrows He may have to delay physical therapy for his shoulder as he is to start cardiac rehabilitation soon  History of Present Illness  the patient presents with a chief complaint of right shoulder pain  He is been referred by his primary physician Dr Saavedra Even for treatment and evaluation  He injured the shoulder in a fall and has been unable to raise the arm overhead without pain since   He describes the pain as sharp, localized to the right proximal humerus, worse activity relieved by rest  no numbness or tingling or fevers and chills associated with this chief complaint      Review of Systems    Constitutional: No fever or chills, feels well, no tiredness, no recent weight loss or weight gain  Eyes: No complaints of red eyes, no eyesight problems  ENT: no complaints of loss of hearing, no nosebleeds, no sore throat  Cardiovascular: No complaints of chest pain, no palpitations, no leg claudication or lower extremity edema  Respiratory: No complaints of shortness of breath, no wheezing, no cough  Gastrointestinal: No complaints of abdominal pain, no constipation, no nausea or vomiting, no diarrhea or bloody stools  Genitourinary: No complaints of dysuria or incontinence, no hesitancy, no nocturia  Musculoskeletal: as noted in HPI  Integumentary: No complaints of skin rash or lesion, no itching or dry skin, no skin wounds  Neurological: No complaints of headache, no confusion, no numbness or tingling, no dizziness  Psychiatric: No suicidal thoughts, no anxiety, no depression  Endocrine: No muscle weakness, no frequent urination, no excessive thirst, no feelings of weakness  ROS reviewed  Active Problems  1  Abdominal pain (789 00) (R10 9)   2  Accidental fall, sequela (E929 3) (W19 XXXS)   3  Actinic keratosis (702 0) (L57 0)   4  Advanced directives, counseling/discussion (V65 49) (Z71 89)   5  Anemia (285 9) (D64 9)   6  Aneurysm of infrarenal abdominal aorta (441 4) (I71 4)   7  Anxiety (300 00) (F41 9)   8  Anxiety associated with depression (300 4) (F41 8)   9  Anxiety disorder (300 00) (F41 9)   10  Arteriosclerosis of coronary artery (414 00) (I25 10)   11  Arthritis (716 90) (M19 90)   12  Back pain, chronic (724 5,338 29) (M54 9,G89 29)   13  Basal cell carcinoma of skin (173 91) (C44 91)   14  Change in stool habits (787 99) (R19 4)   15   Chest pain (786 50) (R07 9)   16  Closed nondisplaced fracture of middle phalanx of left little finger with routine healing    (V54 19) (S62 657D)   17  Closed nondisplaced fracture of middle phalanx of left little finger, initial encounter    (816 01) (S62 657A)   18  Constipation (564 00) (K59 00)   19  CVD (cardiovascular disease) (429 2) (I25 10)   20  Dandruff (690 18) (L21 0)   21  Depression (311) (F32 9)   22  Diabetes type 2, uncontrolled (250 02) (E11 65)   23  Diabetic nephropathy (250 40,583 81) (E11 21)   24  Dislocation of left little finger (834 00) (S63 257A)   25  DM2 (diabetes mellitus, type 2) (250 00) (E11 9)   26  Dyslipidemia (272 4) (E78 5)   27  Dysphagia, unspecified type (787 20) (R13 10)   28  Encounter for prostate cancer screening (V76 44) (Z12 5)   29  Esophageal reflux (530 81) (K21 9)   30  Fall, accidental (E888 9) (W19 XXXA)   31  Finger pain, left (729 5) (M79 645)   32  Fracture of middle phalanx of finger (816 01) (S62 629A)   33  Generalized pain (780 96) (R52)   34  GERD without esophagitis (530 81) (K21 9)   35  Headache (784 0) (R51)   36  Hip pain, right (719 45) (M25 551)   37  Hip Sprain (843 9)   38  History of colon polyps (V12 72) (Z86 010)   39  Hypercholesteremia (272 0) (E78 00)   40  Hypertension (401 9) (I10)   41  Hyponatremia (276 1) (E87 1)   42  Ingrown nail (703 0) (L60 0)   43  Kyphosis of thoracic region (737 10) (M40 204)   44  Laceration of finger, initial encounter (883 0) (S61 219A)   45  Laceration of left hand with foreign body, sequela (906 1) (S61 422S)   46  Leg swelling (729 81) (M79 89)   47  Lesion of ear (380 89) (H61 899)   48  Lightheadedness (780 4) (R42)   49  Microalbuminuria (791 0) (R80 9)   50  Mild vitamin D deficiency (268 9) (E55 9)   51  Need for influenza vaccination (V04 81) (Z23)   52  Need for prophylactic vaccination and inoculation against influenza (V04 81) (Z23)   53  Need for Tdap vaccination (V06 1) (Z23)   54  Need for vaccination with 13-polyvalent pneumococcal conjugate vaccine (V03 82) (Z23)   55  NSTEMI (non-ST elevated myocardial infarction) (410 70) (I21 4)   56  Obesity (278 00) (E66 9)   57   Orthostatic hypotension (458 0) (I95 1) 58  Pain in joint of right shoulder (719 41) (M25 511)   59  Pain of left lower extremity (729 5) (M79 605)   60  Palpitations (785 1) (R00 2)   61  Papule (709 8) (R23 8)   62  Postural hypotension (458 0) (I95 1)   63  Prostate cancer (185) (C61)   64  Recurrent falls (V15 88) (R29 6)   65  Right bundle-branch block (426 4) (I45 10)   66  Right rotator cuff tendinitis (726 10) (M75 81)   67  Screening for genitourinary condition (V81 6) (Z13 89)   68  Screening for neurological condition (V80 09) (Z13 89)   69  Shoulder pain, right (719 41) (M25 511)   70  Tinea pedis (110 4) (B35 3)   71  Tinea pedis of right foot (110 4) (B35 3)   72  Tobacco abuse (305 1) (Z72 0)   73  Type 2 diabetes mellitus with hyperglycemia (250 00) (E11 65)   74  Umbilical hernia (561 1) (K42 9)   75  Viral URI with cough (465 9) (J06 9,B97 89)    Past Medical History   · History of Acute laryngitis (464 00) (J04 0)   · History of Atypical chest pain (786 59) (R07 89)   · History of abdominal pain (V13 89) (Z05 843)   · History of contact dermatitis (V13 3) (Z87 2)   · History of non-ST elevation myocardial infarction (NSTEMI) (412) (I25 2)   · History of sinusitis (V12 69) (Z87 09)   · Need for prophylactic vaccination and inoculation against influenza (V04 81) (Z23)   · History of Post-nasal drip (784 91) (R09 82)   · 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 1)    The active problems and past medical history were reviewed and updated today  Surgical History   · History of Ankle Repair   · History of CABG   · History of Prostate Surgery    The surgical history was reviewed and updated today         Family History  Mother    · Denied: Family history of Colon cancer   · Denied: Family history of Crohn's disease   · Denied: Family history of liver disease  Father    · Denied: Family history of Colon cancer   · Denied: Family history of Crohn's disease   · Denied: Family history of liver disease  Brother    · Family history of Colon cancer   · Family history of abdominal aortic aneurysm (V17 49) (Z82 49)  Family History    · Family history of colonic polyps (V18 51) (Z83 71)   · Family history of Gastrointestinal Cancer    The family history was reviewed and updated today  Social History   · Denied: History of Alcohol use   · Being A Social Drinker   · Denied: History of Drug Use   · Former smoker (V15 82) (Z22 165)   · Since 1967  The social history was reviewed and updated today  Current Meds   1  Accu-Chek Diana Plus In Vitro Strip; Check 4 times daily as directed; Therapy: 17Gtg2342 to (Evaluate:29Apr2017)  Requested for: 31Oct2016; Last   Rx:31Oct2016 Ordered   2  Accu-Chek FastClix Lancets Miscellaneous; Test 4 x a day; Therapy: 13TYZ2173 to (Florentin Issa)  Requested for: 16EVK7139; Last   Rx:36Hmg7945 Ordered   3  Aspirin 325 MG Oral Tablet; TAKE 1 TABLET DAILY; Therapy: 94QRH6660 to (Bard Gaxiola)  Requested for: 48YDD5192; Last   Rx:03Jan2014 Ordered   4  Centrum Silver Oral Tablet; TAKE 1 TABLET DAILY; Therapy: 95UUO0742 to Recorded   5  Clopidogrel Bisulfate 75 MG Oral Tablet; TAKE 1 TABLET DAILY; Therapy: 99IDG2888 to (Evaluate:31Oct2017)  Requested for: 12Dph1076; Last   Rx:79Yit1789 Ordered   6  Co Q 10 100 MG Oral Capsule; TAKE 1 CAPSULE  DAILY; Therapy: (Recorded:80Qtz9216) to Recorded   7  Colace 100 MG Oral Capsule; TAKE 1 CAPSULE TWICE DAILY; Therapy: (Recorded:17Ems1983) to Recorded   8  Econazole Nitrate 1 % External Cream; APPLY & GENTLY MASSAGE INTO AFFECTED   AREA(S) TWICE DAILY; Therapy: 71ZYJ8564 to (Evaluate:64Qmx8949)  Requested for: 43IPP9597; Last   Rx:87Vnb5531 Ordered   9  LORazepam 1 MG Oral Tablet; TAKE 1 TABLET Twice daily PRN; Therapy: 13KVF1736 to (Evaluate:33Pnx5469); Last Rx:39Jzp5163 Ordered   10  MetFORMIN HCl  MG Oral Tablet Extended Release 24 Hour; TAKE 2 TABLET    DAILY twice daily with meals;     Therapy: 63YOG6347 to [phone])  Requested for: 87YRC7193; Last    Rx:46Bmg7539 Ordered   11  Metoprolol Succinate ER 25 MG Oral Tablet Extended Release 24 Hour; take 1 tablet by    mouth every day; Therapy: 57CXD2049 to (Evaluate:23Mar2018)  Requested for: 16IHY2216; Last    Rx:00Nmk2110 Ordered   12  Nitroglycerin 0 4 MG Sublingual Tablet Sublingual (Nitrostat); DISSOLVE 1 TABLET    UNDER THE TONGUE AS NEEDED FOR CHEST PAIN;    Therapy: 85WBN2698 to (Evaluate:05Jun2017)  Requested for: 75JHV9676; Last    Rx:07Nov2016 Ordered   13  NovoFine Autocover 30G X 8 MM Miscellaneous; use as directed; Therapy: 68ZFP2969 to (Roland Lr)  Requested for: 31Oct2016; Last    Rx:31Oct2016 Ordered   14  NovoLOG FlexPen 100 UNIT/ML Subcutaneous Solution Pen-injector; INJECT 20 UNITS    BEFORE MEALS  (ICR 1:4, ISF 1:15); Therapy: 27PTS3312 to (Evaluate:42Eih5516)  Requested for: 67Hzn1270; Last    Rx:15Aug2017 Ordered   15  NovoLOG FlexPen 100 UNIT/ML Subcutaneous Solution Pen-injector; USE AS    DIRECTED; Therapy: 10BBT4317 to (Last Rx:11Aug2017)  Requested for: 11Aug2017 Ordered   16  Pantoprazole Sodium 40 MG Oral Tablet Delayed Release; TAKE 1 TABLET TWICE    DAILY 30 MINUTES BEFORE BREAKFAST AND DINNER; Therapy: 63WFQ1652 to (Evaluate:18Feb2018)  Requested for: 77Ojg4408; Last    Rx:23Jbi6173 Ordered   17  Ranexa 500 MG Oral Tablet Extended Release 12 Hour; TAKE 1 TABLET EVERY 12    HOURS; Therapy: 50IHT4074 to (454 5656)  Requested for: 320 2035; Last    Rx:72Hsw5267 Ordered   18  Rosuvastatin Calcium 40 MG Oral Tablet; TAKE 1 TABLET DAILY; Therapy: 64DRW5603 to (Evaluate:58Fzg6348)  Requested for: 57Mck7371; Last    Rx:04Bmd1430 Ordered   19  Senna 8 6 MG Oral Tablet; TAKE AS DIRECTED; Therapy: 15AYX5517 to Recorded   20  Simethicone 80 MG Oral Tablet Chewable; CHEW AND SWALLOW 1 TABLET 4 TIMES    DAILY, AFTER MEALS AND AT BEDTIME; Therapy: 99HJU6494 to Recorded   21   Toujeo SoloStar 300 UNIT/ML Subcutaneous Solution Pen-injector; 53 units SC at    bedtime; Therapy: 24HFK5358 to (Evaluate:03Hqs3101)  Requested for: 72Duz9519 Recorded   22  Viibryd 40 MG Oral Tablet; take 1 tablet by mouth every day; Therapy: 79RBH1271 to ([phone])  Requested for: 28Tuc8939; Last    Rx:51Ctq4451 Ordered   23  Vitamin B-12 500 MCG Oral Tablet; TAKE 1 TABLET DAILY; Therapy: 64UNE3024 to Recorded   24  Vitamin D3 2000 UNIT Oral Capsule; take 1 capsule daily; Therapy: (Recorded:74Jwi7533) to Recorded    The medication list was reviewed and updated today  Allergies  1  Sulfa Drugs   2  Adhesive Tape TAPE  3  No Known Environmental Allergies   4  No Known Food Allergies    Vitals   Recorded: 03KGB5013 01:09PM   Heart Rate 79   Systolic 657, Sitting   Diastolic 70, Sitting   Weight 215 lb 6 oz   BMI Calculated 30 04   BSA Calculated 2 18     Physical Exam    Right Shoulder: Appearance: no AC joint hypertrophy,-- no AC joint step-off,-- no deltoid atrophy,-- no trapezius atrophy-- and-- no belly of the biceps ehsan deformity  Tenderness: deltoid, but-- not the [facility] [place] joint,-- not the axillary,-- not the bicipital groove-- and-- not the coracoid  Motor: 3/5 abduction-- and-- 3/5 external rotation  Forward flexion: painless restricted AROM 100 degrees and restricted PROM 180 degrees which was painless  Special Tests: positive Painful Arc,-- positive Allred test,-- positive Neer test,-- positive Drop Arm test-- and-- positive Empty Can test, but-- negative Lift Off test,-- negative Belly Press test,-- negative Bear Hug test-- and-- negative Hornblowers' test    Constitutional - General appearance: Normal    Musculoskeletal - Gait and station: Normal    Skin - Skin and subcutaneous tissue: Normal    Neurologic - Sensation: Normal    Psychiatric - Orientation to person, place, and time: Normal       Results/Data  I personally reviewed the films/images/results in the office today  My interpretation follows     X-ray Review 5 views right shoulder show minimal glenohumeral degenerative change  Procedure  The patient was indicated for a subacromial corticosteroid injection of the right shoulder as detailed in the office note  This was performed at the same time as the office visit for the patient's convenience  A thorough discussion regarding the risks and benefits of the injection as well as alternatives to the injection was performed with the patient  Specific risks discussed include but are not limited to infection, flare reaction, continued symptoms, need for further intervention, temporary elevation of blood glucose, as well as skin color changes at the site of injection  After this discussion the patient provided verbal consent to proceed forward with the injection  right shoulder was anesthetized with ethyl chloride spray, prepped with alcohol in routine fashion and using a lateral approach, a 22-gauge needle was used to inject a combination of 1 mL of 6mg/mL betamethasone and 2 ml of 0 25% bupivicane into the subacromial space  The patient tolerated the procedure well without complication and a Band-Aid was placed  Avoidance of strenuous activity for 24-48 hours was recommended and a detailed handout regarding the medication utilized and the possible side effects as well as activity restriction was provided to the patient  Therapy  Rehabilitation Services Referral:   Patient Status: routine  Diagnosis: right subacromial impingement syndrome, possible rotator cuff tear  Rehabilitation Services: evaluate and treat patient as needed-- and-- initiate a home exercise program  Exercise/Treatment: AROM/PROM,-- stretching,-- shoulder,-- stabilization-- and-- strengthening/PRE  Program: home program    Frequency: 1-3 times per week, for 12 weeks  Please send progress report  I hereby certify that the services indicated above are medically necessary        Future Appointments    Date/Time Provider Specialty Site   12/06/2017 01:40 PM TEDDY Rushing   Endocrinology [facility]   02/15/2018 10:30 AM Elvis Pop DO Internal Medicine [facility]   12/05/2017 03:00 PM Charley Bonner DO Cardiology [facility]     Signatures   Electronically signed by : TEDDY Zuleta ; Oct 19 2017  1:42PM EST                       (Author)

## 2017-10-23 ENCOUNTER — GENERIC CONVERSION - ENCOUNTER (OUTPATIENT)
Dept: OTHER | Facility: OTHER | Age: 77
End: 2017-10-23

## 2017-10-23 ENCOUNTER — APPOINTMENT (OUTPATIENT)
Dept: PULMONOLOGY | Facility: CLINIC | Age: 77
End: 2017-10-23
Payer: MEDICARE

## 2017-10-23 DIAGNOSIS — I21.4 NON-STEMI (NON-ST ELEVATED MYOCARDIAL INFARCTION) (HCC): ICD-10-CM

## 2017-10-23 PROCEDURE — 93797 PHYS/QHP OP CAR RHAB WO ECG: CPT

## 2017-10-27 ENCOUNTER — GENERIC CONVERSION - ENCOUNTER (OUTPATIENT)
Dept: OTHER | Facility: OTHER | Age: 77
End: 2017-10-27

## 2017-10-27 ENCOUNTER — APPOINTMENT (OUTPATIENT)
Dept: PULMONOLOGY | Facility: CLINIC | Age: 77
End: 2017-10-27
Payer: MEDICARE

## 2017-10-27 DIAGNOSIS — I21.4 NON-STEMI (NON-ST ELEVATED MYOCARDIAL INFARCTION) (HCC): ICD-10-CM

## 2017-10-27 PROCEDURE — 93798 PHYS/QHP OP CAR RHAB W/ECG: CPT

## 2017-10-30 ENCOUNTER — APPOINTMENT (OUTPATIENT)
Dept: PULMONOLOGY | Facility: CLINIC | Age: 77
End: 2017-10-30
Payer: MEDICARE

## 2017-10-30 DIAGNOSIS — Z95.1 HX OF CABG: Chronic | ICD-10-CM

## 2017-10-30 PROCEDURE — 93798 PHYS/QHP OP CAR RHAB W/ECG: CPT

## 2017-10-31 ENCOUNTER — APPOINTMENT (OUTPATIENT)
Dept: PULMONOLOGY | Facility: CLINIC | Age: 77
End: 2017-10-31
Payer: MEDICARE

## 2017-11-02 ENCOUNTER — APPOINTMENT (OUTPATIENT)
Dept: PULMONOLOGY | Facility: CLINIC | Age: 77
End: 2017-11-02
Payer: MEDICARE

## 2017-11-02 DIAGNOSIS — Z95.1 HX OF CABG: Chronic | ICD-10-CM

## 2017-11-02 PROCEDURE — 93798 PHYS/QHP OP CAR RHAB W/ECG: CPT

## 2017-11-03 ENCOUNTER — APPOINTMENT (OUTPATIENT)
Dept: PULMONOLOGY | Facility: CLINIC | Age: 77
End: 2017-11-03
Payer: MEDICARE

## 2017-11-07 ENCOUNTER — APPOINTMENT (OUTPATIENT)
Dept: PULMONOLOGY | Facility: CLINIC | Age: 77
End: 2017-11-07
Payer: MEDICARE

## 2017-11-08 DIAGNOSIS — E11.65 TYPE 2 DIABETES MELLITUS WITH HYPERGLYCEMIA (HCC): ICD-10-CM

## 2017-11-09 ENCOUNTER — APPOINTMENT (OUTPATIENT)
Dept: PULMONOLOGY | Facility: CLINIC | Age: 77
End: 2017-11-09
Payer: MEDICARE

## 2017-11-09 DIAGNOSIS — I21.4 NON-STEMI (NON-ST ELEVATED MYOCARDIAL INFARCTION) (HCC): ICD-10-CM

## 2017-11-09 PROCEDURE — 93798 PHYS/QHP OP CAR RHAB W/ECG: CPT

## 2017-11-10 ENCOUNTER — APPOINTMENT (OUTPATIENT)
Dept: PULMONOLOGY | Facility: CLINIC | Age: 77
End: 2017-11-10
Payer: MEDICARE

## 2017-11-10 DIAGNOSIS — I21.4 NON-STEMI (NON-ST ELEVATED MYOCARDIAL INFARCTION) (HCC): ICD-10-CM

## 2017-11-10 PROCEDURE — 93798 PHYS/QHP OP CAR RHAB W/ECG: CPT

## 2017-11-14 ENCOUNTER — APPOINTMENT (OUTPATIENT)
Dept: PULMONOLOGY | Facility: CLINIC | Age: 77
End: 2017-11-14
Payer: MEDICARE

## 2017-11-16 ENCOUNTER — APPOINTMENT (OUTPATIENT)
Dept: PULMONOLOGY | Facility: CLINIC | Age: 77
End: 2017-11-16
Payer: MEDICARE

## 2017-11-16 DIAGNOSIS — I21.4 NON-STEMI (NON-ST ELEVATED MYOCARDIAL INFARCTION) (HCC): ICD-10-CM

## 2017-11-16 PROCEDURE — 93798 PHYS/QHP OP CAR RHAB W/ECG: CPT

## 2017-11-17 ENCOUNTER — APPOINTMENT (OUTPATIENT)
Dept: PULMONOLOGY | Facility: CLINIC | Age: 77
End: 2017-11-17
Payer: MEDICARE

## 2017-11-17 DIAGNOSIS — I21.4 NON-STEMI (NON-ST ELEVATED MYOCARDIAL INFARCTION) (HCC): ICD-10-CM

## 2017-11-17 PROCEDURE — 93798 PHYS/QHP OP CAR RHAB W/ECG: CPT

## 2017-11-20 ENCOUNTER — APPOINTMENT (OUTPATIENT)
Dept: PULMONOLOGY | Facility: CLINIC | Age: 77
End: 2017-11-20
Payer: MEDICARE

## 2017-11-20 DIAGNOSIS — I21.4 NON-STEMI (NON-ST ELEVATED MYOCARDIAL INFARCTION) (HCC): ICD-10-CM

## 2017-11-20 PROCEDURE — 93798 PHYS/QHP OP CAR RHAB W/ECG: CPT

## 2017-11-21 ENCOUNTER — APPOINTMENT (OUTPATIENT)
Dept: PULMONOLOGY | Facility: CLINIC | Age: 77
End: 2017-11-21
Payer: MEDICARE

## 2017-11-21 DIAGNOSIS — I21.4 NON-STEMI (NON-ST ELEVATED MYOCARDIAL INFARCTION) (HCC): ICD-10-CM

## 2017-11-21 PROCEDURE — 93798 PHYS/QHP OP CAR RHAB W/ECG: CPT

## 2017-11-22 ENCOUNTER — APPOINTMENT (OUTPATIENT)
Dept: PULMONOLOGY | Facility: CLINIC | Age: 77
End: 2017-11-22
Payer: MEDICARE

## 2017-11-22 DIAGNOSIS — I21.4 NON-STEMI (NON-ST ELEVATED MYOCARDIAL INFARCTION) (HCC): ICD-10-CM

## 2017-11-22 PROCEDURE — 93798 PHYS/QHP OP CAR RHAB W/ECG: CPT

## 2017-11-23 ENCOUNTER — APPOINTMENT (OUTPATIENT)
Dept: PULMONOLOGY | Facility: CLINIC | Age: 77
End: 2017-11-23
Payer: MEDICARE

## 2017-11-24 ENCOUNTER — APPOINTMENT (OUTPATIENT)
Dept: PULMONOLOGY | Facility: CLINIC | Age: 77
End: 2017-11-24
Payer: MEDICARE

## 2017-11-28 ENCOUNTER — APPOINTMENT (OUTPATIENT)
Dept: PULMONOLOGY | Facility: CLINIC | Age: 77
End: 2017-11-28
Payer: MEDICARE

## 2017-11-30 ENCOUNTER — APPOINTMENT (OUTPATIENT)
Dept: PULMONOLOGY | Facility: CLINIC | Age: 77
End: 2017-11-30
Payer: MEDICARE

## 2017-11-30 ENCOUNTER — GENERIC CONVERSION - ENCOUNTER (OUTPATIENT)
Dept: INTERNAL MEDICINE CLINIC | Facility: CLINIC | Age: 77
End: 2017-11-30

## 2017-11-30 DIAGNOSIS — I21.4 NON-STEMI (NON-ST ELEVATED MYOCARDIAL INFARCTION) (HCC): ICD-10-CM

## 2017-11-30 PROCEDURE — 93798 PHYS/QHP OP CAR RHAB W/ECG: CPT

## 2017-12-01 ENCOUNTER — APPOINTMENT (OUTPATIENT)
Dept: PULMONOLOGY | Facility: CLINIC | Age: 77
End: 2017-12-01
Payer: MEDICARE

## 2017-12-01 DIAGNOSIS — I21.4 NON-STEMI (NON-ST ELEVATED MYOCARDIAL INFARCTION) (HCC): ICD-10-CM

## 2017-12-01 PROCEDURE — 93798 PHYS/QHP OP CAR RHAB W/ECG: CPT

## 2017-12-04 ENCOUNTER — APPOINTMENT (OUTPATIENT)
Dept: LAB | Facility: CLINIC | Age: 77
End: 2017-12-04
Payer: MEDICARE

## 2017-12-04 ENCOUNTER — TRANSCRIBE ORDERS (OUTPATIENT)
Dept: LAB | Facility: CLINIC | Age: 77
End: 2017-12-04

## 2017-12-04 DIAGNOSIS — E11.65 TYPE 2 DIABETES MELLITUS WITH HYPERGLYCEMIA (HCC): ICD-10-CM

## 2017-12-04 LAB
ALBUMIN SERPL BCP-MCNC: 3.2 G/DL (ref 3.5–5)
ALP SERPL-CCNC: 62 U/L (ref 46–116)
ALT SERPL W P-5'-P-CCNC: 28 U/L (ref 12–78)
ANION GAP SERPL CALCULATED.3IONS-SCNC: 6 MMOL/L (ref 4–13)
AST SERPL W P-5'-P-CCNC: 17 U/L (ref 5–45)
BILIRUB SERPL-MCNC: 0.62 MG/DL (ref 0.2–1)
BUN SERPL-MCNC: 26 MG/DL (ref 5–25)
CALCIUM SERPL-MCNC: 9.1 MG/DL (ref 8.3–10.1)
CHLORIDE SERPL-SCNC: 101 MMOL/L (ref 100–108)
CHOLEST SERPL-MCNC: 124 MG/DL (ref 50–200)
CO2 SERPL-SCNC: 30 MMOL/L (ref 21–32)
CREAT SERPL-MCNC: 1.13 MG/DL (ref 0.6–1.3)
EST. AVERAGE GLUCOSE BLD GHB EST-MCNC: 160 MG/DL
GFR SERPL CREATININE-BSD FRML MDRD: 62 ML/MIN/1.73SQ M
GLUCOSE P FAST SERPL-MCNC: 127 MG/DL (ref 65–99)
HBA1C MFR BLD: 7.2 % (ref 4.2–6.3)
HDLC SERPL-MCNC: 36 MG/DL (ref 40–60)
LDLC SERPL CALC-MCNC: 61 MG/DL (ref 0–100)
POTASSIUM SERPL-SCNC: 4.2 MMOL/L (ref 3.5–5.3)
PROT SERPL-MCNC: 7.6 G/DL (ref 6.4–8.2)
SODIUM SERPL-SCNC: 137 MMOL/L (ref 136–145)
TRIGL SERPL-MCNC: 135 MG/DL

## 2017-12-04 PROCEDURE — 80053 COMPREHEN METABOLIC PANEL: CPT

## 2017-12-04 PROCEDURE — 36415 COLL VENOUS BLD VENIPUNCTURE: CPT

## 2017-12-04 PROCEDURE — 80061 LIPID PANEL: CPT

## 2017-12-04 PROCEDURE — 83036 HEMOGLOBIN GLYCOSYLATED A1C: CPT

## 2017-12-05 ENCOUNTER — APPOINTMENT (OUTPATIENT)
Dept: PULMONOLOGY | Facility: CLINIC | Age: 77
End: 2017-12-05
Payer: MEDICARE

## 2017-12-05 ENCOUNTER — APPOINTMENT (OUTPATIENT)
Dept: LAB | Facility: CLINIC | Age: 77
End: 2017-12-05
Payer: MEDICARE

## 2017-12-05 ENCOUNTER — GENERIC CONVERSION - ENCOUNTER (OUTPATIENT)
Dept: OTHER | Facility: OTHER | Age: 77
End: 2017-12-05

## 2017-12-05 ENCOUNTER — ALLSCRIPTS OFFICE VISIT (OUTPATIENT)
Dept: OTHER | Facility: OTHER | Age: 77
End: 2017-12-05

## 2017-12-05 DIAGNOSIS — I21.4 NON-STEMI (NON-ST ELEVATED MYOCARDIAL INFARCTION) (HCC): ICD-10-CM

## 2017-12-05 LAB
CREAT UR-MCNC: 122 MG/DL
MICROALBUMIN UR-MCNC: 122 MG/L (ref 0–20)
MICROALBUMIN/CREAT 24H UR: 100 MG/G CREATININE (ref 0–30)

## 2017-12-05 PROCEDURE — 93798 PHYS/QHP OP CAR RHAB W/ECG: CPT

## 2017-12-05 PROCEDURE — 82570 ASSAY OF URINE CREATININE: CPT

## 2017-12-05 PROCEDURE — 82043 UR ALBUMIN QUANTITATIVE: CPT

## 2017-12-06 ENCOUNTER — ALLSCRIPTS OFFICE VISIT (OUTPATIENT)
Dept: OTHER | Facility: OTHER | Age: 77
End: 2017-12-06

## 2017-12-06 NOTE — PROGRESS NOTES
Assessment  Assessed    1  Arteriosclerosis of coronary artery (414 00) (I25 10)   2  History of CABG   3  Dyslipidemia (272 4) (E78 5)   4  Aneurysm of infrarenal abdominal aorta (441 4) (I71 4)   5  Orthostatic hypotension (458 0) (I95 1)   6  Right bundle-branch block (426 4) (I45 10)    Plan  Aneurysm of infrarenal abdominal aorta, Arteriosclerosis of coronary artery, Dyslipidemia    · Follow-up visit in 4 Months Evaluation and Treatment  Follow-up  Status: Hold For -Scheduling  Requested for: 81ZYX8561   Ordered; Aneurysm of infrarenal abdominal aorta, Arteriosclerosis of coronary artery, Dyslipidemia; Ordered By: Abner Ritter Performed:  Due: 57STG4099    Discussion/Summary  Discussion Summary:   Mr Danya Langley is a pleasant 77-year-old gentleman who presents to the office today for routine follow-up  has been participating in cardiac rehabilitation  He denies any anginal-like chest discomfort or shortness of breath with exertion  Therefore medical management will be pursued  He will remain on his beta-blocker and Ranexa as prescribed  his last visit I did increase his rosuvastatin  His most recent lipids are acceptable with the exception of a slightly low HDL for which therapeutic lifestyle modifications were recommended  blood pressure is well controlled in the office today  His symptoms of orthostatic hypotension have resolved  will follow-up in the office in four months  History of Present Illness  HPI: Mr Danya Langley is a pleasant 77-year-old male who presents to the office for follow-up  his last visit he has been attending cardiac rehabilitation  He denies any exertional chest pain or shortness of breath when participating in his exercise regimen  He denies symptoms of heart failure  He denies symptoms of claudication  His lightheadedness with standing has resolved  He denies any further near syncope, syncope or falls  Review of Systems  Cardiology Male ROS:    Cardiac: as noted in HPI    Skin: No complaints of nonhealing sores or skin rash  Genitourinary: No complaints of recurrent urinary tract infections, frequent urination at night, difficult urination, blood in urine, kidney stones, loss of bladder control, no kidney or prostate problems, no erectile dysfunction  Psychological: No complaints of feeling depressed, anxiety, panic attacks, or difficulty concentrating  General: as noted in HPI  Respiratory: No complaints of shortness of breath, cough with sputum, or wheezing  HEENT: No complaints of serious problems, hearing problems, nose problems, throat problems, or snoring  Gastrointestinal: No complaints of liver problems, nausea, vomiting, heartburn, constipation, bloody stools, diarrhea, problems swallowing, adbominal pain, or rectal bleeding  Hematologic: No complaints of bleeding disorders, anemia, blood clots, or excessive brusing  Musculoskeletal: as noted in HPI      Active Problems  Problems    1  Abdominal pain (789 00) (R10 9)   2  Accidental fall, sequela (E929 3) (W19 XXXS)   3  Actinic keratosis (702 0) (L57 0)   4  Advanced directives, counseling/discussion (V65 49) (Z71 89)   5  Anemia (285 9) (D64 9)   6  Aneurysm of infrarenal abdominal aorta (441 4) (I71 4)   7  Anxiety (300 00) (F41 9)   8  Anxiety associated with depression (300 4) (F41 8)   9  Anxiety disorder (300 00) (F41 9)   10  Arteriosclerosis of coronary artery (414 00) (I25 10)   11  Arthritis (716 90) (M19 90)   12  Back pain, chronic (724 5,338 29) (M54 9,G89 29)   13  Basal cell carcinoma of skin (173 91) (C44 91)   14  Change in stool habits (787 99) (R19 4)   15  Chest pain (786 50) (R07 9)   16  Closed nondisplaced fracture of middle phalanx of left little finger with routine healing  (V54 19) (S62 377D)   17  Closed nondisplaced fracture of middle phalanx of left little finger, initial encounter  (816 01) (S62 597A)   18  Constipation (564 00) (K59 00)   19  CVD (cardiovascular disease) (429 2) (I25 10)   20  Dandruff (690 18) (L21 0)   21  Depression (311) (F32 9)   22  Diabetes type 2, uncontrolled (250 02) (E11 65)   23  Diabetic nephropathy (250 40,583 81) (E11 21)   24  Dislocation of left little finger (834 00) (S63 257A)   25  DM2 (diabetes mellitus, type 2) (250 00) (E11 9)   26  Dyslipidemia (272 4) (E78 5)   27  Dysphagia, unspecified type (787 20) (R13 10)   28  Encounter for prostate cancer screening (V76 44) (Z12 5)   29  Esophageal reflux (530 81) (K21 9)   30  Fall, accidental (E888 9) (W19 XXXA)   31  Finger pain, left (729 5) (M79 645)   32  Fracture of middle phalanx of finger (816 01) (S62 629A)   33  Generalized pain (780 96) (R52)   34  GERD without esophagitis (530 81) (K21 9)   35  Headache (784 0) (R51)   36  Hip pain, right (719 45) (M25 551)   37  Hip Sprain (843 9)   38  History of colon polyps (V12 72) (Z86 010)   39  Hypercholesteremia (272 0) (E78 00)   40  Hypertension (401 9) (I10)   41  Hyponatremia (276 1) (E87 1)   42  Ingrown nail (703 0) (L60 0)   43  Kyphosis of thoracic region (737 10) (M40 204)   44  Laceration of finger, initial encounter (883 0) (S61 219A)   45  Laceration of left hand with foreign body, sequela (906 1) (S61 422S)   46  Leg swelling (729 81) (M79 89)   47  Lesion of ear (380 89) (H61 899)   48  Lightheadedness (780 4) (R42)   49  Microalbuminuria (791 0) (R80 9)   50  Mild vitamin D deficiency (268 9) (E55 9)   51  Need for influenza vaccination (V04 81) (Z23)   52  Need for prophylactic vaccination and inoculation against influenza (V04 81) (Z23)   53  Need for Tdap vaccination (V06 1) (Z23)   54  Need for vaccination with 13-polyvalent pneumococcal conjugate vaccine (V03 82) (Z23)   55  NSTEMI (non-ST elevated myocardial infarction) (410 70) (I21 4)   56  Obesity (278 00) (E66 9)   57  Orthostatic hypotension (458 0) (I95 1)   58  Pain in joint of right shoulder (719 41) (M25 511)   59  Pain of left lower extremity (729 5) (M79 605)   60   Palpitations (785 1) (R00 2)   61  Papule (709 8) (R23 8)   62  Postural hypotension (458 0) (I95 1)   63  Prostate cancer (185) (C61)   64  Recurrent falls (V15 88) (R29 6)   65  Right bundle-branch block (426 4) (I45 10)   66  Right rotator cuff tendinitis (726 10) (M75 81)   67  Screening for genitourinary condition (V81 6) (Z13 89)   68  Screening for neurological condition (V80 09) (Z13 89)   69  Shoulder pain, right (719 41) (M25 511)   70  Subacromial impingement of right shoulder (726 19) (M75 41)   71  Tinea pedis (110 4) (B35 3)   72  Tinea pedis of right foot (110 4) (B35 3)   73  Tobacco abuse (305 1) (Z72 0)   74  Type 2 diabetes mellitus with hyperglycemia (250 00) (E11 65)   75  Umbilical hernia (967 2) (K42 9)   76  Viral URI with cough (465 9) (J06 9,B97 89)    Past Medical History  Problems    1  History of Acute laryngitis (464 00) (J04 0)   2  History of Atypical chest pain (786 59) (R07 89)   3  History of abdominal pain (V13 89) (Z87 898)   4  History of contact dermatitis (V13 3) (Z87 2)   5  History of non-ST elevation myocardial infarction (NSTEMI) (412) (I25 2)   6  History of sinusitis (V12 69) (Z87 09)   7  Need for prophylactic vaccination and inoculation against influenza (V04 81) (Z23)   8  History of Post-nasal drip (784 91) (R09 82)   9  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 1)  Active Problems And Past Medical History Reviewed: The active problems and past medical history were reviewed and updated today  Surgical History  Problems    1  History of Ankle Repair   2  History of CABG   3  History of Prostate Surgery  Surgical History Reviewed: The surgical history was reviewed and updated today  Family History  Mother    1  Denied: Family history of Colon cancer   2  Denied: Family history of Crohn's disease   3  Denied: Family history of liver disease  Father    4  Denied: Family history of Colon cancer   5  Denied: Family history of Crohn's disease   6   Denied: Family history of liver disease  Brother    7  Family history of Colon cancer   8  Family history of abdominal aortic aneurysm (V17 49) (Z82 49)  Family History    9  Family history of colonic polyps (V18 51) (Z83 71)   10  Family history of Gastrointestinal Cancer  Family History Reviewed: The family history was reviewed and updated today  Social History  Problems    · Denied: History of Alcohol use   · Being A Social Drinker   · Denied: History of Drug Use   · Former smoker (X58 18) (O95 262)  Social History Reviewed: The social history was reviewed and updated today  Current Meds   1  Accu-Chek Diana Plus In Vitro Strip; Check 4 times daily as directed; Therapy: 70Yop1614 to (Evaluate:29Apr2017)  Requested for: 31Oct2016; Last Rx:31Oct2016 Ordered   2  Accu-Chek FastClix Lancets Miscellaneous; Test 4 x a day; Therapy: 69WSC4530 to (Lauren Aguiar)  Requested for: 24YZW7131; Last Rx:40Lym6734 Ordered   3  Aspirin 325 MG Oral Tablet; TAKE 1 TABLET DAILY; Therapy: 06SXW4640 to ( [place])  Requested for: 91WYQ3901; Last Rx:03Jan2014 Ordered   4  Centrum Silver Oral Tablet; TAKE 1 TABLET DAILY; Therapy: 02OUM3006 to Recorded   5  Clopidogrel Bisulfate 75 MG Oral Tablet; take 1 tablet by mouth every day; Therapy: 65JLI2654 to (Evaluate:29Jan2018)  Requested for: 31Oct2017; Last Rx:31Oct2017 Ordered   6  Co Q 10 100 MG Oral Capsule; TAKE 1 CAPSULE  DAILY; Therapy: (Recorded:64Gud1069) to Recorded   7  Colace 100 MG Oral Capsule; TAKE 1 CAPSULE TWICE DAILY; Therapy: (Recorded:84Tfb1159) to Recorded   8  Econazole Nitrate 1 % External Cream; APPLY & GENTLY MASSAGE INTO AFFECTED AREA(S) TWICE DAILY; Therapy: 11LPZ8457 to (Evaluate:46Ehr1167)  Requested for: 96FXW7435; Last Rx:73Hyw6532 Ordered   9  LORazepam 1 MG Oral Tablet; TAKE 1 TABLET Twice daily PRN; Therapy: 30CKJ0962 to (Evaluate:12Jan2018); Last Rx:13Nov2017 Ordered   10   MetFORMIN HCl  MG Oral Tablet Extended Release 24 Hour; TAKE 2 TABLET  DAILY twice daily with meals; Therapy: 91WAZ3080 to (GZNKNHOH:16TDW5408)  Requested for: 82Lgw1469; Last  Rx:94Lld4821 Ordered   11  Metoprolol Succinate ER 25 MG Oral Tablet Extended Release 24 Hour; take 1 tablet by  mouth every day; Therapy: 35OGT2519 to (Evaluate:23Mar2018)  Requested for: 08YDX7294; Last  Rx:04Qnc9401 Ordered   12  Nitroglycerin 0 4 MG Sublingual Tablet Sublingual; DISSOLVE 1 TABLET UNDER THE  TONGUE AS NEEDED FOR CHEST PAIN;  Therapy: 60VCC9638 to (Evaluate:05Jun2017)  Requested for: 18NQF4334; Last  Rx:07Nov2016 Ordered   13  NovoFine Autocover 30G X 8 MM Miscellaneous; USE UP TO 4 TIMES DAILY; Therapy: 30CCJ8971 to (Ermelinda Fay)  Requested for: 99OYF4303; Last  Rx:08Nov2017 Ordered   14  NovoLOG FlexPen 100 UNIT/ML Subcutaneous Solution Pen-injector; INJECT 20 UNITS  BEFORE MEALS  (ICR 1:4, ISF 1:15); Therapy: 10AFR9656 to (Evaluate:51Wgx3447)  Requested for: 76Qwu5651; Last  Rx:94Rqt0029 Ordered   15  NovoLOG FlexPen 100 UNIT/ML Subcutaneous Solution Pen-injector; USE AS  DIRECTED; Therapy: 18GDC7175 to (Last Rx:11Aug2017)  Requested for: 11Aug2017 Ordered   16  Pantoprazole Sodium 40 MG Oral Tablet Delayed Release; TAKE 1 TABLET TWICE DAILY  30 MINUTES BEFORE BREAKFAST AND DINNER; Therapy: 13ZKS0942 to (Evaluate:18Feb2018)  Requested for: 44Ilm5964; Last  Rx:88Ava0390 Ordered   17  Ranexa 500 MG Oral Tablet Extended Release 12 Hour; TAKE 1 TABLET EVERY 12  HOURS; Therapy: 74ICD0602 to (Bala Carey)  Requested for: 320 2035; Last  Rx:05Apr2017 Ordered   18  Rosuvastatin Calcium 40 MG Oral Tablet; TAKE 1 TABLET DAILY; Therapy: 01SGG2965 to (Evaluate:27Agk5259)  Requested for: 49Fwd6294; Last  Rx:85Doc4947 Ordered   19  Senna 8 6 MG Oral Tablet; TAKE AS DIRECTED; Therapy: 41RXA3769 to Recorded   20  Simethicone 80 MG Oral Tablet Chewable; CHEW AND SWALLOW 1 TABLET 4 TIMES  DAILY, AFTER MEALS AND AT BEDTIME; Therapy: 06CYA5910 to Recorded   21   Toujeo SoloStar 300 UNIT/ML Subcutaneous Solution Pen-injector; INJECT 50 UNITS             SUBCUTANEOUSLY AT BEDTIME; Therapy: 00WFN0670 to (Parviz Stearns)  Requested for: 80HZY3031; Last  Rx:02Nov2017 Ordered   22  Viibryd 40 MG Oral Tablet; take 1 tablet by mouth every day; Therapy: 36LKA7529 to ([phone])  Requested for: 76Nvq1707; Last  Rx:20Xns8556 Ordered   23  Vitamin B-12 500 MCG Oral Tablet; TAKE 1 TABLET DAILY; Therapy: 12NYJ8296 to Recorded   24  Vitamin D3 2000 UNIT Oral Capsule; take 1 capsule daily; Therapy: (Recorded:81Vvh9288) to Recorded  Medication List Reviewed: The medication list was reviewed and updated today  Allergies  Medication    1  Sulfa Drugs   2  Adhesive Tape TAPE  Non-Medication    3  No Known Environmental Allergies   4  No Known Food Allergies    Vitals  Vital Signs    Recorded: 87UWC5168 03:26PM Recorded: 22TFE3459 03:16PM   Heart Rate  74, R Radial   Pulse Quality  Normal, R Radial   Systolic 254 291, RUE, Sitting   Diastolic 74 62, RUE, Sitting   Height  5 ft 11 in   Weight  217 lb    BMI Calculated  30 27   BSA Calculated  2 18       Physical Exam   Constitutional  General appearance: No acute distress, well appearing and well nourished  Eyes  Conjunctiva and Sclera examination: Conjunctiva pink, sclera anicteric  Ears, Nose, Mouth, and Throat - Oropharynx: Clear, nares are clear, mucous membranes are moist   Neck  Neck and thyroid: Normal, supple, trachea midline, no thyromegaly  Pulmonary  Respiratory effort: No increased work of breathing or signs of respiratory distress  Auscultation of lungs: Clear to auscultation, no rales, no rhonchi, no wheezing, good air movement  Cardiovascular  Auscultation of heart: Normal rate and rhythm, normal S1 and S2, no murmurs  Carotid pulses: Normal, 2+ bilaterally  Peripheral vascular exam: Normal pulses throughout, no tenderness, erythema or swelling  Pedal pulses: Normal, 2+ bilaterally     Examination of extremities for edema and/or varicosities: Abnormal   nonpitting edema present  left lower extremity - negative Homans sign  Abdomen  Abdomen: Non-tender and no distention  Liver and spleen: No hepatomegaly or splenomegaly  Musculoskeletal Gait and station: Normal gait  -- Digits and nails: Normal without clubbing or cyanosis  -- Inspection/palpation of joints, bones, and muscles: Normal, ROM normal    Skin - Skin and subcutaneous tissue: Normal without rashes or lesions  Skin is warm and well perfused, normal turgor  Neurologic - Cranial nerves: II - XII intact  Psychiatric - Orientation to person, place, and time: Normal -- Mood and affect: Normal       Results/Data  ECG Report:  Rhythm and rate:  normal sinus rhythm  P-waves: left atrial hypertrophy ([address])  QRS: right bundle branch block      Health Management  GERD without esophagitis   EGD; every 3 years; Last 43UAU0132; Next Due: 48TGW6105; Active  History of colon polyps   COLONOSCOPY; every 3 years; Last 51QDK1939; Next Due: 21QMH3390; Active    Future Appointments    Date/Time Provider Specialty Site   12/19/2017 03:00 PM TEDDY La  Orthopedic Surgery [facility]   12/06/2017 01:40 PM TEDDY Burgos   Endocrinology [facility]IN[facility]   02/15/2018 10:30 AM Laine Ragsdale DO Internal Medicine [facility]       Signatures   Electronically signed by : Ivana Galeas DO; Dec  5 2017  3:40PM EST                       (Author)

## 2017-12-07 ENCOUNTER — APPOINTMENT (OUTPATIENT)
Dept: PULMONOLOGY | Facility: CLINIC | Age: 77
End: 2017-12-07
Payer: MEDICARE

## 2017-12-07 ENCOUNTER — GENERIC CONVERSION - ENCOUNTER (OUTPATIENT)
Dept: CARDIAC SURGERY | Facility: CLINIC | Age: 77
End: 2017-12-07

## 2017-12-07 DIAGNOSIS — I21.4 NON-STEMI (NON-ST ELEVATED MYOCARDIAL INFARCTION) (HCC): ICD-10-CM

## 2017-12-07 PROCEDURE — 93798 PHYS/QHP OP CAR RHAB W/ECG: CPT

## 2017-12-08 ENCOUNTER — APPOINTMENT (OUTPATIENT)
Dept: PULMONOLOGY | Facility: CLINIC | Age: 77
End: 2017-12-08
Payer: MEDICARE

## 2017-12-08 DIAGNOSIS — I21.4 NON-STEMI (NON-ST ELEVATED MYOCARDIAL INFARCTION) (HCC): ICD-10-CM

## 2017-12-08 PROCEDURE — 93798 PHYS/QHP OP CAR RHAB W/ECG: CPT

## 2017-12-12 ENCOUNTER — APPOINTMENT (OUTPATIENT)
Dept: PULMONOLOGY | Facility: CLINIC | Age: 77
End: 2017-12-12
Payer: MEDICARE

## 2017-12-12 DIAGNOSIS — I21.4 NON-STEMI (NON-ST ELEVATED MYOCARDIAL INFARCTION) (HCC): ICD-10-CM

## 2017-12-12 PROCEDURE — 93798 PHYS/QHP OP CAR RHAB W/ECG: CPT

## 2017-12-14 ENCOUNTER — APPOINTMENT (OUTPATIENT)
Dept: PULMONOLOGY | Facility: CLINIC | Age: 77
End: 2017-12-14
Payer: MEDICARE

## 2017-12-15 ENCOUNTER — APPOINTMENT (OUTPATIENT)
Dept: PULMONOLOGY | Facility: CLINIC | Age: 77
End: 2017-12-15
Payer: MEDICARE

## 2017-12-19 ENCOUNTER — APPOINTMENT (OUTPATIENT)
Dept: PULMONOLOGY | Facility: CLINIC | Age: 77
End: 2017-12-19
Payer: MEDICARE

## 2017-12-19 ENCOUNTER — ALLSCRIPTS OFFICE VISIT (OUTPATIENT)
Dept: OTHER | Facility: OTHER | Age: 77
End: 2017-12-19

## 2017-12-19 DIAGNOSIS — M75.41 IMPINGEMENT SYNDROME OF RIGHT SHOULDER: ICD-10-CM

## 2017-12-19 DIAGNOSIS — I21.4 NON-STEMI (NON-ST ELEVATED MYOCARDIAL INFARCTION) (HCC): ICD-10-CM

## 2017-12-19 PROCEDURE — 93798 PHYS/QHP OP CAR RHAB W/ECG: CPT

## 2017-12-20 NOTE — PROGRESS NOTES
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)    Plan  Subacromial impingement of right shoulder    · Follow-up visit in 3 months Evaluation and Treatment  Follow-up  Status: Hold For -Scheduling  Requested for: 17QBE4635   · *1 - [facility]  *  Status: Active  Requested for: 39PGV8506  Care Summary provided  : Yes    Chief Complaint  1  Shoulder Problem    Discussion/Summary    The patient has made some improvement with his right shoulder symptoms but still demonstrates weakness concerning for rotator cuff pathology  We did discuss the possibility of obtaining an MRI scan but given the fact that he has not feel he is a good surgical candidate he wishes to instead try formal physical therapy so a referral to Physical therapy was provided today with follow-up in 2 to 3 months at which time we could repeat the injection  He had no complications from the previous injection but he is a diabetic on a blood thinner so has risk of increased blood sugar as well as bleeding from the injection, he is willing to accept that risk in the future if necessary  We will re-evaluate him at that time or sooner for any problems  History of Present Illness  Patient returns for follow-up of his right shoulder subacromial impingement syndrome  He states the injection worked very well, it has somewhat worn off and he did not attend formal physical therapy instead did attend cardiac therapy and they worked a little bit on his shoulder  He still has pain that is limiting him but does feel he is better than last visit first      Review of Systems   Constitutional: No fever or chills, feels well, no tiredness, no recent weight loss or weight gain  Eyes: No complaints of red eyes, no eyesight problems  ENT: no complaints of loss of hearing, no nosebleeds, no sore throat  Cardiovascular: No complaints of chest pain, no palpitations, no leg claudication or lower extremity edema  Respiratory: No complaints of shortness of breath, no wheezing, no cough  Gastrointestinal: No complaints of abdominal pain, no constipation, no nausea or vomiting, no diarrhea or bloody stools  Genitourinary: No complaints of dysuria or incontinence, no hesitancy, no nocturia  Musculoskeletal: as noted in HPI  Integumentary: No complaints of skin rash or lesion, no itching or dry skin, no skin wounds  Neurological: No complaints of headache, no confusion, no numbness or tingling, no dizziness  Psychiatric: No suicidal thoughts, no anxiety, no depression  Endocrine: No muscle weakness, no frequent urination, no excessive thirst, no feelings of weakness  ROS reviewed  Active Problems  1  Abdominal pain (789 00) (R10 9)   2  Accidental fall, sequela (E929 3) (W19 XXXS)   3  Actinic keratosis (702 0) (L57 0)   4  Advanced directives, counseling/discussion (V65 49) (Z71 89)   5  Anemia (285 9) (D64 9)   6  Aneurysm of infrarenal abdominal aorta (441 4) (I71 4)   7  Anxiety (300 00) (F41 9)   8  Anxiety associated with depression (300 4) (F41 8)   9  Anxiety disorder (300 00) (F41 9)   10  Arteriosclerosis of coronary artery (414 00) (I25 10)   11  Arthritis (716 90) (M19 90)   12  Back pain, chronic (724 5,338 29) (M54 9,G89 29)   13  Basal cell carcinoma of skin (173 91) (C44 91)   14  Change in stool habits (787 99) (R19 4)   15  Chest pain (786 50) (R07 9)   16  Closed nondisplaced fracture of middle phalanx of left little finger with routine healing  (V54 19) (S62 657D)   17  Closed nondisplaced fracture of middle phalanx of left little finger, initial encounter  (816 01) (S62 657A)   18  Constipation (564 00) (K59 00)   19  CVD (cardiovascular disease) (429 2) (I25 10)   20  Dandruff (690 18) (L21 0)   21  Depression (311) (F32 9)   22  Diabetes type 2, uncontrolled (250 02) (E11 65)   23  Diabetic nephropathy (250 40,583 81) (E11 21)   24  Dislocation of left little finger (834 00) (D63 340A)   25  DM2 (diabetes mellitus, type 2) (250 00) (E11 9)   26  Dyslipidemia (272 4) (E78 5)   27  Dysphagia, unspecified type (787 20) (R13 10)   28  Encounter for prostate cancer screening (V76 44) (Z12 5)   29  Esophageal reflux (530 81) (K21 9)   30  Fall, accidental (E888 9) (W19 XXXA)   31  Finger pain, left (729 5) (M79 645)   32  Fracture of middle phalanx of finger (816 01) (S62 629A)   33  Generalized pain (780 96) (R52)   34  GERD without esophagitis (530 81) (K21 9)   35  Headache (784 0) (R51)   36  Hip pain, right (719 45) (M25 551)   37  Hip Sprain (843 9)   38  History of colon polyps (V12 72) (Z86 010)   39  Hypercholesteremia (272 0) (E78 00)   40  Hypertension (401 9) (I10)   41  Hyponatremia (276 1) (E87 1)   42  Ingrown nail (703 0) (L60 0)   43  Kyphosis of thoracic region (737 10) (M40 204)   44  Laceration of finger, initial encounter (883 0) (S61 219A)   45  Laceration of left hand with foreign body, sequela (906 1) (S61 422S)   46  Leg swelling (729 81) (M79 89)   47  Lesion of ear (380 89) (H61 899)   48  Lightheadedness (780 4) (R42)   49  Microalbuminuria (791 0) (R80 9)   50  Mild vitamin D deficiency (268 9) (E55 9)   51  Need for influenza vaccination (V04 81) (Z23)   52  Need for prophylactic vaccination and inoculation against influenza (V04 81) (Z23)   53  Need for Tdap vaccination (V06 1) (Z23)   54  Need for vaccination with 13-polyvalent pneumococcal conjugate vaccine (V03 82) (Z23)   55  NSTEMI (non-ST elevated myocardial infarction) (410 70) (I21 4)   56  Obesity (278 00) (E66 9)   57  Orthostatic hypotension (458 0) (I95 1)   58  Pain in joint of right shoulder (719 41) (M25 511)   59  Pain of left lower extremity (729 5) (M79 605)   60  Palpitations (785 1) (R00 2)   61  Papule (709 8) (R23 8)   62  Postural hypotension (458 0) (I95 1)   63  Prostate cancer (185) (C61)   64  Recurrent falls (V15 88) (R29 6)   65  Right bundle-branch block (426 4) (I45 10)   66   Right rotator cuff tendinitis (726 10) (M75 81)   67  Screening for genitourinary condition (V81 6) (Z13 89)   68  Screening for neurological condition (V80 09) (Z13 89)   69  Shoulder pain, right (719 41) (M25 511)   70  Subacromial impingement of right shoulder (726 19) (M75 41)   71  Tinea pedis (110 4) (B35 3)   72  Tinea pedis of right foot (110 4) (B35 3)   73  Tobacco abuse (305 1) (Z72 0)   74  Type 2 diabetes mellitus with hyperglycemia (250 00) (E11 65)   75  Umbilical hernia (313 9) (K42 9)   76  Viral URI with cough (465 9) (J06 9,B97 89)    Past Medical History   · History of Acute laryngitis (464 00) (J04 0)   · History of Atypical chest pain (786 59) (R07 89)   · History of abdominal pain (V13 89) (Q89 144)   · History of contact dermatitis (V13 3) (Z87 2)   · History of non-ST elevation myocardial infarction (NSTEMI) (412) (I25 2)   · History of sinusitis (V12 69) (Z87 09)   · Need for prophylactic vaccination and inoculation against influenza (V04 81) (Z23)   · History of Post-nasal drip (784 91) (R09 82)   · 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 1)    The active problems and past medical history were reviewed and updated today  Surgical History   · History of Ankle Repair   · History of CABG   · History of Prostate Surgery    The surgical history was reviewed and updated today  Family History  Mother    · Denied: Family history of Colon cancer   · Denied: Family history of Crohn's disease   · Denied: Family history of liver disease  Father    · Denied: Family history of Colon cancer   · Denied: Family history of Crohn's disease   · Denied: Family history of liver disease  Brother    · Family history of Colon cancer   · Family history of abdominal aortic aneurysm (V17 49) (Z82 49)  Family History    · Family history of colonic polyps (V18 51) (Z83 71)   · Family history of Gastrointestinal Cancer    The family history was reviewed and updated today         Social History   · Denied: History of Alcohol use · Being A Social Drinker   · Denied: History of Drug Use   · Former smoker (V89 68) (S02 488)   · Since 1967  The social history was reviewed and updated today  Current Meds   1  Accu-Chek Diana Plus In Vitro Strip; Check 4 times daily as directed; Therapy: 82Xcw1674 to (Evaluate:29Apr2017)  Requested for: 31Oct2016; Last Rx:31Oct2016 Ordered   2  Accu-Chek FastClix Lancets Miscellaneous; Test 4 x a day; Therapy: 95NQK8704 to (Deedee Aguilar)  Requested for: 67Sls0411; Last Rx:75Nct2601 Ordered   3  Aspirin 325 MG Oral Tablet; TAKE 1 TABLET DAILY; Therapy: 69ZUI5107 to (Sherwin Pickvirgilio)  Requested for: 03KRK7399; Last Rx:03Jan2014 Ordered   4  Centrum Silver Oral Tablet; TAKE 1 TABLET DAILY; Therapy: 05AMX1571 to Recorded   5  Clopidogrel Bisulfate 75 MG Oral Tablet; take 1 tablet by mouth every day; Therapy: 89CRO0189 to (Evaluate:09Jun2018)  Requested for: 25LDS9357; Last Rx:13Tke0849 Ordered   6  Co Q 10 100 MG Oral Capsule; TAKE 1 CAPSULE  DAILY; Therapy: (Recorded:14Apr2015) to Recorded   7  Colace 100 MG Oral Capsule; TAKE 1 CAPSULE TWICE DAILY; Therapy: (Recorded:34Mfq0440) to Recorded   8  Econazole Nitrate 1 % External Cream; APPLY & GENTLY MASSAGE INTO AFFECTED AREA(S) TWICE DAILY; Therapy: 31NXT2001 to (Evaluate:56Lhd0016)  Requested for: 54GGK7693; Last Rx:32Bjq5444 Ordered   9  LORazepam 1 MG Oral Tablet; TAKE 1 TABLET Twice daily PRN; Therapy: 04QHB0361 to (Evaluate:12Jan2018); Last Rx:13Nov2017 Ordered   10  MetFORMIN HCl  MG Oral Tablet Extended Release 24 Hour; TAKE 2 TABLET  DAILY twice daily with meals; Therapy: 41VUI2979 to (BBFSXRMR:58OJZ3036)  Requested for: 79Fhj8036; Last  Rx:03Reo9691 Ordered   11  Metoprolol Succinate ER 25 MG Oral Tablet Extended Release 24 Hour; take 1 tablet by  mouth every day; Therapy: 28SNV8558 to (Evaluate:09Jun2018)  Requested for: 87AIT9585; Last  Rx:13Xqr0274 Ordered   12   Nitroglycerin 0 4 MG Sublingual Tablet Sublingual (Nitrostat); DISSOLVE 1 TABLET  UNDER THE TONGUE AS NEEDED FOR CHEST PAIN;  Therapy: 13HZG7490 to (Evaluate:05Jun2017)  Requested for: 24LIZ8677; Last  Rx:07Nov2016 Ordered   13  NovoFine Autocover 30G X 8 MM Miscellaneous; USE UP TO 4 TIMES DAILY; Therapy: 84QSX5536 to (Last Rx:46Rma2182)  Requested for: 42Zmp0115 Ordered   14  NovoLOG FlexPen 100 UNIT/ML Subcutaneous Solution Pen-injector; INJECT 20 UNITS  BEFORE MEALS  (ICR 1:4, ISF 1:15); Therapy: 24NKF8623 to (Evaluate:11Feb2018)  Requested for: 08Dsc4783; Last  Rx:15Aug2017 Ordered   15  NovoLOG FlexPen 100 UNIT/ML Subcutaneous Solution Pen-injector; USE AS  DIRECTED; Therapy: 39JBE4740 to (Last Rx:11Aug2017)  Requested for: 11Aug2017 Ordered   16  Pantoprazole Sodium 40 MG Oral Tablet Delayed Release; TAKE 1 TABLET TWICE  DAILY 30 MINUTES BEFORE BREAKFAST AND DINNER; Therapy: 88YAO4703 to (Evaluate:10Jun2018)  Requested for: 28Njr6488; Last  Rx:43Xrg8159 Ordered   17  Ranexa 500 MG Oral Tablet Extended Release 12 Hour; TAKE 1 TABLET EVERY 12  HOURS; Therapy: 18HTK6350 to (Evaluate:09Jun2018)  Requested for: 66Hxe7156; Last  Rx:21Asx7246; Status: ACTIVE - Transmit to [facility] Ordered   18  Rosuvastatin Calcium 40 MG Oral Tablet; TAKE 1 TABLET DAILY; Therapy: 14GZB5307 to (Evaluate:81Fym8302)  Requested for: 68Zja7721; Last  Rx:09Aug2017 Ordered   19  Senna 8 6 MG Oral Tablet; TAKE AS DIRECTED; Therapy: 83TSL3417 to Recorded   20  Simethicone 80 MG Oral Tablet Chewable; CHEW AND SWALLOW 1 TABLET 4 TIMES  DAILY, AFTER MEALS AND AT BEDTIME; Therapy: 44ZPG9139 to Recorded   21  Toujeo SoloStar 300 UNIT/ML Subcutaneous Solution Pen-injector; INJECT 50 UNITS             SUBCUTANEOUSLY AT BEDTIME; Therapy: 48SAK4919 to (Kleber Apple)  Requested for: 12BQX8829; Last  Rx:02Nov2017 Ordered   22  Viibryd 40 MG Oral Tablet; take 1 tablet by mouth every day;   Therapy: 33HDS7057 to ([phone])  Requested for: 96Ryl8368; Last  Rx:26Bmx1561 Ordered 23  Vitamin B-12 500 MCG Oral Tablet; TAKE 1 TABLET DAILY; Therapy: 65ZXJ4168 to Recorded   24  Vitamin D3 2000 UNIT Oral Capsule; take 1 capsule daily; Therapy: (Recorded:22Puc3099) to Recorded    The medication list was reviewed and updated today  Allergies  1  Sulfa Drugs   2  Adhesive Tape TAPE  3  No Known Environmental Allergies   4  No Known Food Allergies    Vitals   Recorded: 45PPV0728 02:07PM   Heart Rate 69   Systolic 109   Diastolic 72   Height 5 ft 10 98 in   Weight 222 lb 0 03 oz   BMI Calculated 30 98   BSA Calculated 2 2     Physical Exam    Right Shoulder: Appearance: no AC joint hypertrophy,-- no AC joint step-off,-- no deltoid atrophy,-- no trapezius atrophy-- and-- no belly of the biceps ehsan deformity  Tenderness: deltoid, but-- not the [facility] joint,-- not the axillary,-- not the bicipital groove-- and-- not the coracoid  Motor: 3/5 abduction-- and-- 3/5 external rotation  Forward flexion: painless restricted AROM 100 degrees and restricted PROM 180 degrees which was painless  Special Tests: positive Painful Arc,-- positive Allred test,-- positive Neer test,-- positive Drop Arm test-- and-- positive Empty Can test, but-- negative Lift Off test,-- negative Belly Press test,-- negative Bear Hug test-- and-- negative Hornblowers' test   Constitutional - General appearance: Normal   Musculoskeletal - Gait and station: Normal   Skin - Skin and subcutaneous tissue: Normal   Neurologic - Sensation: Normal   Psychiatric - Orientation to person, place, and time: Normal       Therapy  Rehabilitation Services Referral:  Patient Status: routine  Diagnosis: right shoulder subacromial impingement syndrome  Rehabilitation Services: evaluate and treat patient as needed-- and-- initiate a home exercise program  Exercise/Treatment: AROM/PROM,-- stretching,-- shoulder,-- stabilization-- and-- strengthening/PRE  Program: home program   Frequency: 1-2 times per week, for 12 weeks   Please send progress report  I hereby certify that the services indicated above are medically necessary        Future Appointments    Date/Time Provider Specialty Site   02/15/2018 10:30 AM Ame Morfin,  [facility]     Signatures   Electronically signed by : TEDDY Smith ; Dec 19 2017  2:23PM EST                       (Author)

## 2017-12-21 ENCOUNTER — APPOINTMENT (OUTPATIENT)
Dept: PULMONOLOGY | Facility: CLINIC | Age: 77
End: 2017-12-21
Payer: MEDICARE

## 2017-12-22 ENCOUNTER — APPOINTMENT (OUTPATIENT)
Dept: PULMONOLOGY | Facility: CLINIC | Age: 77
End: 2017-12-22
Payer: MEDICARE

## 2017-12-26 ENCOUNTER — APPOINTMENT (OUTPATIENT)
Dept: PULMONOLOGY | Facility: CLINIC | Age: 77
End: 2017-12-26
Payer: MEDICARE

## 2017-12-28 ENCOUNTER — APPOINTMENT (OUTPATIENT)
Dept: PHYSICAL THERAPY | Facility: CLINIC | Age: 77
End: 2017-12-28
Payer: MEDICARE

## 2017-12-28 ENCOUNTER — APPOINTMENT (OUTPATIENT)
Dept: PULMONOLOGY | Facility: CLINIC | Age: 77
End: 2017-12-28
Payer: MEDICARE

## 2017-12-28 ENCOUNTER — GENERIC CONVERSION - ENCOUNTER (OUTPATIENT)
Dept: OTHER | Facility: OTHER | Age: 77
End: 2017-12-28

## 2017-12-28 DIAGNOSIS — I21.4 NON-STEMI (NON-ST ELEVATED MYOCARDIAL INFARCTION) (HCC): ICD-10-CM

## 2017-12-28 DIAGNOSIS — M75.41 IMPINGEMENT SYNDROME OF RIGHT SHOULDER: ICD-10-CM

## 2017-12-28 PROCEDURE — 93798 PHYS/QHP OP CAR RHAB W/ECG: CPT

## 2017-12-28 PROCEDURE — G8991 OTHER PT/OT GOAL STATUS: HCPCS

## 2017-12-28 PROCEDURE — 97161 PT EVAL LOW COMPLEX 20 MIN: CPT

## 2017-12-28 PROCEDURE — G8990 OTHER PT/OT CURRENT STATUS: HCPCS

## 2017-12-29 ENCOUNTER — APPOINTMENT (OUTPATIENT)
Dept: PULMONOLOGY | Facility: CLINIC | Age: 77
End: 2017-12-29
Payer: MEDICARE

## 2017-12-29 ENCOUNTER — GENERIC CONVERSION - ENCOUNTER (OUTPATIENT)
Dept: INTERNAL MEDICINE CLINIC | Facility: CLINIC | Age: 77
End: 2017-12-29

## 2017-12-29 DIAGNOSIS — I21.4 NON-STEMI (NON-ST ELEVATED MYOCARDIAL INFARCTION) (HCC): ICD-10-CM

## 2017-12-29 PROCEDURE — 93798 PHYS/QHP OP CAR RHAB W/ECG: CPT

## 2018-01-02 ENCOUNTER — APPOINTMENT (OUTPATIENT)
Dept: CARDIAC REHAB | Facility: CLINIC | Age: 78
End: 2018-01-02
Payer: MEDICARE

## 2018-01-02 DIAGNOSIS — I21.4 NON-STEMI (NON-ST ELEVATED MYOCARDIAL INFARCTION) (HCC): ICD-10-CM

## 2018-01-02 PROCEDURE — 93798 PHYS/QHP OP CAR RHAB W/ECG: CPT

## 2018-01-04 ENCOUNTER — APPOINTMENT (OUTPATIENT)
Dept: CARDIAC REHAB | Facility: CLINIC | Age: 78
End: 2018-01-04
Payer: MEDICARE

## 2018-01-04 ENCOUNTER — APPOINTMENT (OUTPATIENT)
Dept: PHYSICAL THERAPY | Facility: CLINIC | Age: 78
End: 2018-01-04
Payer: MEDICARE

## 2018-01-04 ENCOUNTER — GENERIC CONVERSION - ENCOUNTER (OUTPATIENT)
Dept: CARDIAC SURGERY | Facility: CLINIC | Age: 78
End: 2018-01-04

## 2018-01-04 DIAGNOSIS — I21.4 NON-STEMI (NON-ST ELEVATED MYOCARDIAL INFARCTION) (HCC): ICD-10-CM

## 2018-01-04 PROCEDURE — 97112 NEUROMUSCULAR REEDUCATION: CPT

## 2018-01-04 PROCEDURE — 93798 PHYS/QHP OP CAR RHAB W/ECG: CPT

## 2018-01-04 PROCEDURE — 97110 THERAPEUTIC EXERCISES: CPT

## 2018-01-04 PROCEDURE — 97140 MANUAL THERAPY 1/> REGIONS: CPT

## 2018-01-09 ENCOUNTER — APPOINTMENT (OUTPATIENT)
Dept: CARDIAC REHAB | Facility: CLINIC | Age: 78
End: 2018-01-09
Payer: MEDICARE

## 2018-01-09 ENCOUNTER — APPOINTMENT (OUTPATIENT)
Dept: PHYSICAL THERAPY | Facility: CLINIC | Age: 78
End: 2018-01-09
Payer: MEDICARE

## 2018-01-09 DIAGNOSIS — I21.4 NON-STEMI (NON-ST ELEVATED MYOCARDIAL INFARCTION) (HCC): ICD-10-CM

## 2018-01-09 PROCEDURE — 93798 PHYS/QHP OP CAR RHAB W/ECG: CPT

## 2018-01-09 NOTE — MISCELLANEOUS
Message  GI Reminder Recall [facility]:   Date: 12/09/2016   Dear Franny Culp:     Review of our records shows you are due for the following: Follow Up Visit  Please call the following [facility] to schedule your appointment:   [facility] [address], [address] [place] [address]  [phone]  We look forward to hearing from you!      Sincerely,     Farshad Malone MA      Signatures   Electronically signed by : Farshad Malone, ; Dec  9 2016  9:31AM EST                       (Author)

## 2018-01-10 NOTE — PROGRESS NOTES
Assessment   1  Encounter for preventive health examination (V70 0) (Z00 00)  2  Aneurysm of infrarenal abdominal aorta (441 4) (I71 4)  3  Recurrent falls (V15 88) (R29 6)  4  Diabetic nephropathy (250 40,583 81) (E11 21)  5  Right rotator cuff tendinitis (726 10) (M75 81)  6  Tinea pedis of right foot (110 4) (B35 3)  7  Obesity (278 00) (E66 9)  8  Tobacco abuse (305 1) (Z72 0)    Plan  Aneurysm of infrarenal abdominal aorta    · US ABDOMINAL AORTA; Status:Hold For - Scheduling; Requested for:05Oct2017;   Diabetic nephropathy    · (1) COMPREHENSIVE METABOLIC PANEL; Status:Active; Requested for:30Tdn0677;    · (1) HEMOGLOBIN A1C; Status:Active; Requested for:95Vgj4690;    · (1) LIPID PANEL, FASTING; Status:Active; Requested for:15Xrk7265;    · (1) MICROALBUMIN CREATININE RATIO, RANDOM URINE; Status:Active; Requested  for:05Feb2018; Laceration of finger, initial encounter, Need for influenza vaccination    · Administered: Fluzone High-Dose 0 5 ML Intramuscular Suspension Prefilled Syringe  Recurrent falls    · *1 - [facility] Co-Management  *  Status: Active  Requested for: 57OIQ3968  () Care Summary provided  : Yes  Right rotator cuff tendinitis    · 1 - Michael [place], Jolly Saab  (Orthopedic Surgery) Co-Management  *  Status: Active   Requested for: 73NLJ8095  () Care Summary provided  : Yes  Screening for genitourinary condition    · *VB - Urinary Incontinence Screen (Dx Z13 89 Screen for UI);  Status:Complete -  Retrospective By Protocol Authorization;   Done: 97IYY2385 09:48AM  Tobacco abuse    · * CT LUNG SCREENING PROGRAM; Status:Hold For - Scheduling; Requested  for:05Oct2017;     Assessment and plan #1 annual wellness examination completed per patient overall the patient is clinically stable and doing well his diabetes has improved since last time he is following a healthier and more balanced diet he has been sedentary but will be starting cardiac rehabilitation in the near future I will be checking comprehensive metabolic panel, hemoglobin A1c, lipid panel and urine microalbumin  #2 history of infrarenal abdominal aorta aneurysm check ultrasound of the aorta and #3 patient to receive high-dose flu vaccine today #4 type 2 diabetes improving all healthy balanced diet or weight loss I'll be checking a comprehensive metabolic panel, hemoglobin A1c, lipid panel and urine microalbumin for examination today did reveal mild tinea pedis right foot fourth fifth interspace use econazole 1% cream apply to affected area twice a day Ã10 days I did ask his white monitor the area if it does not he'll please notify me #5 recurrent falls start physical therapy #6 rotator cuff tendinitis see orthopedics Dr Neris Ramos #7 history of smoking he does have a history of 40 pack years check CAT scan of the lung screening for lung cancer RTO in 4 months call if any problems  Chief Complaint  Patient is here for a Medicare Wellness exam       History of Present Illness  Welcome to Medicare and Wellness Visits: The patient is being seen for the subsequent annual wellness visit  Medicare Screening and Risk Factors   Hospitalizations: he has been previously hospitalizied  Medicare Screening Tests Risk Questions   Abdominal aortic aneurysm risk assessment: family history of AAA  Osteoporosis risk assessment: none indicated  HIV risk assessment: none indicated  (2 hospitalizqation)   Drug and Alcohol Use: The patient is a former cigarette smoker, quit smoking 1967 and 7y/o (40 pack yrs )  The patient reports rare alcohol use  He has never used illicit drugs  Diet and Physical Activity: Current diet includes low fat food choices, low carbohydrate food choices and low salt food choices  He exercises infrequently  Exercise: walking pt going for cardiac rehab  Mood Disorder and Cognitive Impairment Screening:   Depression screening was done using  He denies feeling down, depressed, or hopeless over the past two weeks   He denies feeling little interest or pleasure in doing things over the past two weeks  Cognitive impairment screening: denies difficulty learning/retaining new information, denies difficulty handling complex tasks, denies difficulty with reasoning, denies difficulty with spatial ability and orientation, denies difficulty with language and denies difficulty with behavior  Functional Ability/Level of Safety: Hearing is normal bilaterally  He denies hearing difficulties  He does not use a hearing aid  Activities of daily living details: does not need help using the phone, no transportation help needed, does not need help shopping, no meal preparation help needed, does not need help doing housework, does not need help doing laundry, does not need help managing medications and does not need help managing money  Fall risk factors: The patient fell 2-3 times in the past 12 months  3    Home safety risk factors:  no unfamiliar surroundings, no loose rugs, no poor household lighting, no uneven floors, no household clutter, grab bars in the bathroom and handrails on the stairs  Advance Directives: Advance directives: no living will, no durable power of  for health care directives and no advance directives  Co-Managers and Medical Equipment/Suppliers: See Patient Care Team   Preventive Quality Program 65 and Older: Falls Risk: The patient fell 2-3 times in the past 12 months  The patient is currently experiencing urinary symptoms  Urinary Incontinence Symptoms includes:        Patient Care Team    Care Team Member Role Specialty Office Number   Saida ENCARNACION  Cardiac Surgery [phone]   Nik Marlow DO Specialist Cardiology [phone]   Aure ENCARNACION  Endocrinology [phone]   Jordan ENCARNACION  Gastroenterology Adult [phone]   [place] ENCARNACION  Endocrinology [phone]   [place] ENCARNACION  Endocrinology [phone]   Jeremie ENCARNACION   Specialist Plastic Surgery [phone]     Active Problems   1  Abdominal pain (789 00) (R10 9)  2  Accidental fall, sequela (E929 3) (W19 XXXS)  3  Actinic keratosis (702 0) (L57 0)  4  Advanced directives, counseling/discussion (V65 49) (Z71 89)  5  Anemia (285 9) (D64 9)  6  Aneurysm of infrarenal abdominal aorta (441 4) (I71 4)  7  Anxiety (300 00) (F41 9)  8  Anxiety associated with depression (300 4) (F41 8)  9  Anxiety disorder (300 00) (F41 9)  10  Arteriosclerosis of coronary artery (414 00) (I25 10)  11  Arthritis (716 90) (M19 90)  12  Back pain, chronic (724 5,338 29) (M54 9,G89 29)  13  Basal cell carcinoma of skin (173 91) (C44 91)  14  Change in stool habits (787 99) (R19 4)  15  Chest pain (786 50) (R07 9)  16  Closed nondisplaced fracture of middle phalanx of left little finger with routine healing    (V54 19) (S62 657D)  17  Closed nondisplaced fracture of middle phalanx of left little finger, initial encounter    (816 01) (S62 657A)  18  Constipation (564 00) (K59 00)  19  CVD (cardiovascular disease) (429 2) (I25 10)  20  Dandruff (690 18) (L21 0)  21  Depression (311) (F32 9)  22  Diabetes type 2, uncontrolled (250 02) (E11 65)  23  Diabetic nephropathy (250 40,583 81) (E11 21)  24  Dislocation of left little finger (834 00) (S63 257A)  25  DM2 (diabetes mellitus, type 2) (250 00) (E11 9)  26  Dyslipidemia (272 4) (E78 5)  27  Dysphagia, unspecified type (787 20) (R13 10)  28  Encounter for prostate cancer screening (V76 44) (Z12 5)  29  Esophageal reflux (530 81) (K21 9)  30  Fall, accidental (E888 9) (W19 XXXA)  31  Finger pain, left (729 5) (M79 645)  32  Fracture of middle phalanx of finger (816 01) (S62 629A)  33  Generalized pain (780 96) (R52)  34  GERD without esophagitis (530 81) (K21 9)  35  Headache (784 0) (R51)  36  Hip pain, right (719 45) (M25 551)  37  Hip Sprain (843 9)  38  History of colon polyps (V12 72) (Z86 010)  39  Hypercholesteremia (272 0) (E78 00)  40  Hypertension (401 9) (I10)  41  Hyponatremia (276 1) (E87 1)  42  Ingrown nail (703 0) (L60 0)  43  Kyphosis of thoracic region (737 10) (M40 204)  44  Laceration of finger, initial encounter (883 0) (S61 219A)  45  Laceration of left hand with foreign body, sequela (906 1) (S61 422S)  46  Leg swelling (729 81) (M79 89)  47  Lesion of ear (380 89) (H61 899)  48  Lightheadedness (780 4) (R42)  49  Microalbuminuria (791 0) (R80 9)  50  Mild vitamin D deficiency (268 9) (E55 9)  51  Need for influenza vaccination (V04 81) (Z23)  52  Need for prophylactic vaccination and inoculation against influenza (V04 81) (Z23)  53  Need for Tdap vaccination (V06 1) (Z23)  54  Need for vaccination with 13-polyvalent pneumococcal conjugate vaccine (V03 82) (Z23)  55  NSTEMI (non-ST elevated myocardial infarction) (410 70) (I21 4)  56  Obesity (278 00) (E66 9)  57  Orthostatic hypotension (458 0) (I95 1)  58  Pain in joint of right shoulder (719 41) (M25 511)  59  Pain of left lower extremity (729 5) (M79 605)  60  Palpitations (785 1) (R00 2)  61  Papule (709 8) (R23 8)  62  Postural hypotension (458 0) (I95 1)  63  Prostate cancer (185) (C61)  64  Right bundle-branch block (426 4) (I45 10)  65  Screening for genitourinary condition (V81 6) (Z13 89)  66  Screening for neurological condition (V80 09) (Z13 89)  67  Tinea pedis (110 4) (B35 3)  68  Tinea pedis of right foot (110 4) (B35 3)  69  Type 2 diabetes mellitus with hyperglycemia (250 00) (E11 65)  70  Umbilical hernia (215 1) (K42 9)  71   Viral URI with cough (465 9) (J06 9,B97 89)    Past Medical History    · History of Acute laryngitis (464 00) (J04 0)   · History of Atypical chest pain (786 59) (R07 89)   · History of abdominal pain (V13 89) (R28 454)   · History of contact dermatitis (V13 3) (Z87 2)   · History of non-ST elevation myocardial infarction (NSTEMI) (412) (I25 2)   · History of sinusitis (V12 69) (Z87 09)   · Need for prophylactic vaccination and inoculation against influenza (V04 81) (Z23) · History of Post-nasal drip (784 91) (R09 82)   · 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 1)    The active problems and past medical history were reviewed and updated today  Surgical History    · History of Ankle Repair   · History of CABG   · History of Prostate Surgery    The surgical history was reviewed and updated today  Family History  Mother    · Denied: Family history of Colon cancer   · Denied: Family history of Crohn's disease   · Denied: Family history of liver disease  Father    · Denied: Family history of Colon cancer   · Denied: Family history of Crohn's disease   · Denied: Family history of liver disease  Brother    · Family history of Colon cancer   · Family history of abdominal aortic aneurysm (V17 49) (Z82 49)  Family History    · Family history of colonic polyps (V18 51) (Z83 71)   · Family history of Gastrointestinal Cancer    The family history was reviewed and updated today  Social History    · Denied: History of Alcohol use   · Being A Social Drinker   · Denied: History of Drug Use   · Former smoker (V15 82) (M52 440)   · Since 1967  The social history was reviewed and updated today  The social history was reviewed and is unchanged  Current Meds  1  Accu-Chek Diana Plus In Vitro Strip; Check 4 times daily as directed; Therapy: 45Ite5274 to (Evaluate:29Apr2017)  Requested for: 31Oct2016; Last   Rx:31Oct2016 Ordered  2  Accu-Chek FastClix Lancets Miscellaneous; Test 4 x a day; Therapy: 53ONM6907 to (Esteban Durant)  Requested for: 61UFQ0459; Last   Rx:96Yqy7290 Ordered  3  Aspirin 325 MG Oral Tablet; TAKE 1 TABLET DAILY; Therapy: 96BHR1087 to (Vertie Barthel)  Requested for: 59MRK6994; Last   Rx:03Jan2014 Ordered  4  Centrum Silver Oral Tablet; TAKE 1 TABLET DAILY; Therapy: 55NTS0882 to Recorded  5  Clopidogrel Bisulfate 75 MG Oral Tablet; TAKE 1 TABLET DAILY;    Therapy: 80QEY7293 to (Evaluate:31Oct2017)  Requested for: 41Yok8007; Last   Rx:00Gop0457 Ordered  6  Co Q 10 100 MG Oral Capsule; TAKE 1 CAPSULE  DAILY; Therapy: (Recorded:05Osc3456) to Recorded  7  Colace 100 MG Oral Capsule; TAKE 1 CAPSULE TWICE DAILY; Therapy: (Recorded:30Jsj2389) to Recorded  8  Econazole Nitrate 1 % External Cream; APPLY & GENTLY MASSAGE INTO AFFECTED   AREA(S) TWICE DAILY; Therapy: 35XFO3046 to (Evaluate:79Zwp4929)  Requested for: 38URZ9545; Last   Rx:96Dza2721 Ordered  9  LORazepam 1 MG Oral Tablet; TAKE 1 TABLET Twice daily PRN; Therapy: 15DHN0837 to (Evaluate:01Oii1715); Last Rx:72Gha1882 Ordered  10  MetFORMIN HCl  MG Oral Tablet Extended Release 24 Hour; TAKE 2 TABLET    DAILY twice daily with meals; Therapy: 90IJW4175 to (RNVGUNCM:34WSW9913)  Requested for: 10Teo8967; Last    Rx:41Chw6823 Ordered  11  Metoprolol Succinate ER 25 MG Oral Tablet Extended Release 24 Hour; take 1 tablet by    mouth every day; Therapy: 78ZBX1295 to (Evaluate:23Mar2018)  Requested for: 63HHB6657; Last    Rx:81Ihp1616 Ordered  12  Nitroglycerin 0 4 MG Sublingual Tablet Sublingual; DISSOLVE 1 TABLET UNDER THE    TONGUE AS NEEDED FOR CHEST PAIN;    Therapy: 65UJN9446 to (Evaluate:05Jun2017)  Requested for: 26IKV5144; Last    Rx:07Nov2016 Ordered  13  NovoFine Autocover 30G X 8 MM Miscellaneous; use as directed; Therapy: 78DTU0820 to ([phone])  Requested for: 31Oct2016; Last    Rx:31Oct2016 Ordered  14  NovoLOG FlexPen 100 UNIT/ML Subcutaneous Solution Pen-injector; INJECT 20 UNITS    BEFORE MEALS  (ICR 1:4, ISF 1:15); Therapy: 29INB8502 to (Evaluate:88Kzf7578)  Requested for: 03Ysw8870; Last    Rx:58Veu8629 Ordered  15  NovoLOG FlexPen 100 UNIT/ML Subcutaneous Solution Pen-injector; USE AS    DIRECTED; Therapy: 84EUQ5527 to (Last Rx:11Aug2017)  Requested for: 11Aug2017 Ordered  16  Pantoprazole Sodium 40 MG Oral Tablet Delayed Release; TAKE 1 TABLET TWICE    DAILY 30 MINUTES BEFORE BREAKFAST AND DINNER;     Therapy: 85NBW0739 to (Evaluate:18Feb2018)  Requested for: 46Dud1179; Last    Rx:66Caj2930 Ordered  17  Ranexa 500 MG Oral Tablet Extended Release 12 Hour; TAKE 1 TABLET EVERY 12    HOURS; Therapy: 34JKD4766 to (([phone])  Requested for: 320 2035; Last    Rx:79Trv3357 Ordered  18  Rosuvastatin Calcium 40 MG Oral Tablet; TAKE 1 TABLET DAILY; Therapy: 34IUT7722 to (Evaluate:82Fyg2310)  Requested for: 81Ajz1242; Last    Rx:80Erp0510 Ordered  19  Senna 8 6 MG Oral Tablet; TAKE AS DIRECTED; Therapy: 09HVP6367 to Recorded  20  Simethicone 80 MG Oral Tablet Chewable; CHEW AND SWALLOW 1 TABLET 4 TIMES    DAILY, AFTER MEALS AND AT BEDTIME; Therapy: 78QZG9270 to Recorded  21  Toujeo SoloStar 300 UNIT/ML Subcutaneous Solution Pen-injector; 53 units SC at    bedtime; Therapy: 75ERG2879 to (Evaluate:07Tkv6579)  Requested for: 71Irs1602 Recorded  22  Viibryd 40 MG Oral Tablet; take 1 tablet by mouth every day; Therapy: 69PBX9872 to (Lenny Collado)  Requested for: 54Deb0196; Last    Rx:31Oei3724 Ordered  23  Vitamin B-12 500 MCG Oral Tablet; TAKE 1 TABLET DAILY; Therapy: 96ROQ7360 to Recorded  24  Vitamin D3 2000 UNIT Oral Capsule; take 1 capsule daily; Therapy: (Recorded:51Seb8937) to Recorded    The medication list was reviewed and updated today  Allergies   1  Sulfa Drugs  2  Adhesive Tape TAPE   3  No Known Environmental Allergies  4  No Known Food Allergies    Immunizations   ** Printed in Appendix #1 below  Vitals  Signs    Heart Rate: 61  Respiration: 16  Systolic: 301, LUE, Sitting  Diastolic: 62, LUE, Sitting  Height: 5 ft 11 in  Weight: 219 lb 0 4 oz  BMI Calculated: 30 55  BSA Calculated: 2 19  O2 Saturation: 96    Physical Exam    Constitutional   General appearance: No acute distress, well appearing and well nourished  Head and Face   Head and face: Normal     Eyes   Conjunctiva and lids: No erythema, swelling or discharge  Pupils and irises: Equal, round, reactive to light      Ears, Nose, Mouth, and Throat External inspection of ears and nose: Normal     Otoscopic examination: Tympanic membranes translucent with normal light reflex  Canals patent without erythema  Hearing: Normal     Nasal mucosa, septum, and turbinates: Normal without edema or erythema  Lips, teeth, and gums: Normal, good dentition  Oropharynx: Normal with no erythema, edema, exudate or lesions  Neck   Neck: Supple, symmetric, trachea midline, no masses  Pulmonary   Respiratory effort: No increased work of breathing or signs of respiratory distress  Auscultation of lungs: Clear to auscultation  Cardiovascular   Auscultation of heart: Normal rate and rhythm, normal S1 and S2, no murmurs  Examination of extremities for edema and/or varicosities: Normal     Abdomen   Abdomen: Non-tender, no masses  Liver and spleen: No hepatomegaly or splenomegaly  Lymphatic   Palpation of lymph nodes in neck: No lymphadenopathy  Psychiatric   Mood and affect: Normal        Results/Data  PHQ-9 Adult Depression Screening 05Oct2017 09:49AM User, [employer]     Test Name Result Flag Reference   PHQ-9 Adult Depression Score 0     Over the last two weeks, how often have you been bothered by any of the following problems? Little interest or pleasure in doing things: Not at all - 0  Feeling down, depressed, or hopeless: Not at all - 0  Trouble falling or staying asleep, or sleeping too much: Not at all - 0  Feeling tired or having little energy: Not at all - 0  Poor appetite or over eating: Not at all - 0  Feeling bad about yourself - or that you are a failure or have let yourself or your family down: Not at all - 0  Trouble concentrating on things, such as reading the newspaper or watching television: Not at all - 0  Moving or speaking so slowly that other people could have noticed   Or the opposite -  being so fidgety or restless that you have been moving around a lot more than usual: Not at all - 0  Thoughts that you would be better off dead, or of hurting yourself in some way: Not at all - 0   PHQ-9 Adult Depression Screening Negative     PHQ-9 Difficulty Level Not difficult at all     PHQ-9 Severity No Depression       Falls Risk Assessment (Dx Z13 89 Screen for Neurologic Disorder) 95UIJ6187 09:49AM User, [place]     Test Name Result Flag Reference   Falls Risk      No falls in the past year     *VB - Urinary Incontinence Screen (Dx Z13 89 Screen for UI) 27ORA9108 09:48AM Gia Mora     Test Name Result Flag Reference   Urinary Incontinence Assessment 25ZTE1335       (1) COMPREHENSIVE METABOLIC PANEL 15VKA5149 08:53QS Gia Banco     Test Name Result Flag Reference   SODIUM 138 mmol/L  136-145   POTASSIUM 4 5 mmol/L  3 5-5 3   CHLORIDE 100 mmol/L  100-108   CARBON DIOXIDE 31 mmol/L  21-32   ANION GAP (CALC) 7 mmol/L  4-13   BLOOD UREA NITROGEN 16 mg/dL  5-25   CREATININE 1 20 mg/dL  0 60-1 30   Standardized to IDMS reference method   CALCIUM 9 2 mg/dL  8 3-10 1   BILI, TOTAL 0 40 mg/dL  0 20-1 00   ALK PHOSPHATAS 66 U/L     ALT (SGPT) 29 U/L  12-78   Specimen collection should occur prior to Sulfasalazine administration due to the potential for falsely depressed results  AST(SGOT) 21 U/L  5-45   Specimen collection should occur prior to Sulfasalazine administration due to the potential for falsely depressed results  ALBUMIN 3 5 g/dL  3 5-5 0   TOTAL PROTEIN 8 1 g/dL  6 4-8 2   eGFR 58 ml/min/1 73sq m     National Kidney Disease Education Program recommendations are as follows:  GFR calculation is accurate only with a steady state creatinine  Chronic Kidney disease less than 60 ml/min/1 73 sq  meters  Kidney failure less than 15 ml/min/1 73 sq  meters  GLUCOSE FASTING 132 mg/dL H 65-99   Specimen collection should occur prior to Sulfasalazine administration due to the potential for falsely depressed results  Specimen collection should occur prior to Sulfapyridine administration due to the potential for falsely elevated results  (1) HEMOGLOBIN A1C 03Oct2017 12:46PM David Ching     Test Name Result Flag Reference   HEMOGLOBIN A1C 7 2 % H 4 2-6 3   EST  AVG  GLUCOSE 160 mg/dl       (1) MICROALBUMIN CREATININE RATIO, RANDOM URINE 68VQC1213 12:46PM David Ching     Test Name Result Flag Reference   MICROALBUMIN/ CREAT R 319 mg/g creatinine H 0-30   MICROALBUMIN,URINE 516 0 mg/L H 0 0-20 0   CREATININE URINE 162 0 mg/dL       (1) LIPID PANEL FASTING W DIRECT LDL REFLEX 03Oct2017 12:46PM Joseph Kruse     Test Name Result Flag Reference   CHOLESTEROL 120 mg/dL     LDL CHOLESTEROL CALCULATED 62 mg/dL  0-100   Triglyceride:        Normal <150 mg/dl   Borderline High 150-199 mg/dl   High 200-499 mg/dl   Very High >499 mg/dl      Cholesterol:       Desirable <200 mg/dl    Borderline High 200-239 mg/dl    High >239 mg/dl      HDL Cholesterol:       High>59 mg/dL    Low <41 mg/dL      HDL Cholesterol:       High>59 mg/dL    Low <41 mg/dL      This screening LDL is a calculated result  It does not have the accuracy of the Direct Measured LDL in the monitoring of patients with hyperlipidemia and/or statin therapy  Direct Measure LDL (JIG658) must be ordered separately in these patients  TRIGLYCERIDES 118 mg/dL  <=150   Specimen collection should occur prior to N-Acetylcysteine or Metamizole administration due to the potential for falsely depressed results  HDL,DIRECT 34 mg/dL L 40-60   Specimen collection should occur prior to Metamizole administration due to the potential for falsley depressed results  Health Management  GERD without esophagitis   EGD; every 3 years; Last 47MNZ0431; Next Due: 12LAB7090; Active  History of colon polyps   COLONOSCOPY; every 3 years; Last 30EUN7914; Next Due: 58NTX4705; Active    Future Appointments    Date/Time Provider Specialty Site   12/06/2017 01:40 PM TEDDY Jensen   [address]     Signatures   Electronically signed by : Qi Pugh DO [place]; Oct  8 2017 12:14PM EST                       (Author)    Appendix #1     Patient: Emely Stone ; : 1940; MRN: [id number]      1 2 3 4 5    Influenza  approx 2011 23-Oct-2012 05-Oct-2014 13-Oct-2015 31-Oct-2016    PCV  05-Dec-2014        PPSV  2009        Tdap  06-Mar-2017        Tetanus  unknown-within 10 years

## 2018-01-10 NOTE — MISCELLANEOUS
Provider Comments  Provider Comments:    NNXH      [id number]   Electronically signed by : TEDDY Green ; Sep 13 2016  1:31PM EST                       (Review)

## 2018-01-11 ENCOUNTER — APPOINTMENT (OUTPATIENT)
Dept: PHYSICAL THERAPY | Facility: CLINIC | Age: 78
End: 2018-01-11
Payer: MEDICARE

## 2018-01-11 ENCOUNTER — APPOINTMENT (OUTPATIENT)
Dept: CARDIAC REHAB | Facility: CLINIC | Age: 78
End: 2018-01-11
Payer: MEDICARE

## 2018-01-11 DIAGNOSIS — I21.4 NON-STEMI (NON-ST ELEVATED MYOCARDIAL INFARCTION) (HCC): ICD-10-CM

## 2018-01-11 PROCEDURE — 97140 MANUAL THERAPY 1/> REGIONS: CPT

## 2018-01-11 PROCEDURE — 97110 THERAPEUTIC EXERCISES: CPT

## 2018-01-11 PROCEDURE — 93798 PHYS/QHP OP CAR RHAB W/ECG: CPT

## 2018-01-11 NOTE — RESULT NOTES
Verified Results  * XR RIBS LEFT W PA CHEST MIN 3 VIEWS 84RPM0994 02:03PM [employer]    Order Number: [id number]     Test Name Result Flag Reference   XR RIBS LEFT W PA CHEST MIN 3 VIEWS (Report)     LEFT RIBS AND CHEST     INDICATION: Patient states he fell saturday night and dislocated his left pinkie, and the left lower side of his chest is tender and it hurts to cough or sneeze  COMPARISON: None     VIEWS: Frontal chest and 3 views left hemithorax     IMAGES: 6     FINDINGS:     Median sternotomy wires  The cardiomediastinal silhouette is unremarkable  Lungs are clear  No pleural effusions  There is no pneumothorax  Nondisplaced fracture of the distal aspect of left 10th rib  IMPRESSION:     1  No active pulmonary disease  2  Nondisplaced fracture of the distal left 10th rib  The site of fracture is marked on the PACS  Workstation performed: [id number]     Signed by:   Neelam Panchal MD   4/3/17     * XR HAND 3+ VIEW LEFT 40OBU8625 02:03PM [employer]    Order Number: [id number]     Test Name Result Flag Reference   XR HAND 3+ VW LEFT (Report)     LEFT HAND     INDICATION:  Patient states he fell saturday night and dislocated his left pinkie, and the left lower side of his chest is tender and it hurts to cough or sneeze  COMPARISON: None     VIEWS: 3     IMAGES: 3     For the purposes of institution wide universal language the following terms will apply: (thumb=1st digit/finger, index finger=2nd digit/finger, long finger=3rd digit/finger, ring=4th digit/finger and small finger=5th digit/finger)     FINDINGS:     There is no acute fracture or dislocation  Moderate to severe degenerative changes at the 5th proximal interphalangeal joint space  No lytic or blastic lesions are seen  Soft tissues are unremarkable  IMPRESSION:     Moderate to severe degenerative changes at the 5th proximal interphalangeal joint space  No acute fracture  Workstation performed: [id number]     Signed by:   Tony Mancia MD   4/3/17

## 2018-01-11 NOTE — PSYCH
History of Present Illness  Psychotherapy Provided [facility]: Individual Psychotherapy 55 minutes minutes provided today  Goals addressed in session:   Met with patient and his wife to discuss ongoing issues with their adult daughter and her dual diagnosis issues  She is now incarcerated and they are hopeful dual diagnosis treatment will be mandated  While this is primary stressor, there are other family stressors they are dealing with  Patient verbal and cooperative  HPI - Psych: Patient continues to presents as anxious and overwhelmed  Feeling some hope now that daughter is off street and this has provided some reassurance  He has had conversation with her  who has been reassuring as well  Denies any depressive symptoms   Note   Note:   Provided supportive Therapy  Reviewed stress mgmt strategies to continue to utilize  Offered additional sessions if needed moving forward  Provided both my contact information  Assessment    1   Anxiety (300 00) (F41 9)    Signatures   Electronically signed by : Olivia Phillips LCSW; Jun 21 2017  6:47PM EST                       (Author)

## 2018-01-12 ENCOUNTER — APPOINTMENT (OUTPATIENT)
Dept: CARDIAC REHAB | Facility: CLINIC | Age: 78
End: 2018-01-12
Payer: MEDICARE

## 2018-01-12 DIAGNOSIS — I21.4 NON-STEMI (NON-ST ELEVATED MYOCARDIAL INFARCTION) (HCC): ICD-10-CM

## 2018-01-12 PROCEDURE — 93798 PHYS/QHP OP CAR RHAB W/ECG: CPT

## 2018-01-12 NOTE — PROGRESS NOTES
Plan    1  DSMT/MNT Time Record; Status:Complete;   Done: 74Jvd5950 04:28PM    Discussion/Summary    PATIENT EDUCATION RECORD   Indication for Services: hypertension, type 2 Diabetes Mellitus, Kidney problems, hyperlipidemia and obesity  He is ready to learn  He has no barriers to learning  Healthy Eating:   Discussed importance of meal timing/consistency: Method: Instruction and Handout  Response: Verbalizes Understanding   Discussed nutrient types ( Cho/Fat/Protein): Method: Instruction and Handout  Discussed portion sizes: Method: Instruction and Handout  Response: Verbalizes Understanding   Discussed alcohol consumption: Method: Instruction  Response: Verbalizes Understanding   Discussed Eating Out: Method: Instruction  Response: Verbalizes Understanding   Discussed food label reading: Method: Instruction and Handout  Response: Verbalizes Understanding   Provided meal planning: Method: Instruction and Handout  Response: Verbalizes Understanding His current weight is 235 6  His keal needs are   His CHO's per meal are 125 g day (30% carb)  He/She was provided a meal plan for: fixed carbohydrates and weight loss  Discussed weight management/weight loss: Method: Instruction and Handout  Response:   His current BMI 34  Discussed benefits of heart healthy meal choices: Method: Instruction  Response: Verbalizes Understanding   Discussed basic carbohydrate counting: Method: Instruction and Handout  Response: Verbalizes Understanding   Being Active:   Discussed proper exercise program: Method: Instruction  Response: Verbalizes Understanding   Healthy Coping Class:   Identified lifestyle behaviors that need to change: Method: Instruction  Response: Verbalizes Understanding   Discussed motivation to change: Method: Instruction  Response: Verbalizes Understanding   Identified goals for behavior change: Method: Instruction  Response: Verbalizes Understanding   Discussed strategies for change: Method: Instruction  Response: Verbalizes Understanding   He participated in goal setting  Will be available for follow up as needed He was given the following educational materials: portion book, Personal Meal Plan 1663 calories, Planning Healthy Meals and Calorie and Carbohydrate Tracking Books/Websites/Phone Apps   Chief Complaint  Patient is here today for Medical Nutrition Therapy for T2DM      History of Present Illness  Patient is a 65 y/o male with pmhx T2DM and Obesity  Last A1c was 9 3  Patient is SMBG primarily fasting, before meals and bedtime  Readings are variable and mostly above target  Patient is interested in flexible insulin so he can vary his diet  He has researched various diets and will be deciding on vegan diet  He was educated on meal planning (low carb 30%) and carb counting  Stressed the importance of balance and consistency of intake due to set doses of insulin  This is his initial assessment    Present at session: patient  and spouse    He has no special learning needs  His caloric needs are 1663  He has had no recent weight change  Spouse  shops for food  Spouse  cooks the food  Exercise routine:  None   He eats breakfast at  AM 1 c  oatmeal with nuts, coffee with creamer and splenda OR egg whites, cheese and rye toast    He eats lunch at  [employer] chopped salad, water   He snacks at PM Hummus with rye bread   He eats dinner at  [employer], salad, 4 oz wine   He snacks at PM Peanut or almond butter on rye bread or [place] soda bread     NUTRITION DIAGNOSES   Food And Nutrition Related Knowledge Defici   Food and nutrition related knowledge deficit related to  lack of prior exposure to accurate nutrition related information  As evidenced by  no prior knowledge of need for food and nutrition related recommendations      Medical Nutrition Therapy Intervention: Carbohydrate counting, Meal planning, Individualized meal plan, Strategies to monitor portion control, Label reading, Exercise Guidelines, Behavior modification strategies and Weight/BMI Goals  His comprehension was very good    His motivation was very good    His compliance was very good    Goals:  1  Follow meal plan/count carbs; balance and consistency  2  Start exercise program      Active Problems    1  Abdominal pain (789 00) (R10 9)   2  Anemia (285 9) (D64 9)   3  Aneurysm of infrarenal abdominal aorta (441 4) (I71 4)   4  Anxiety (300 00) (F41 9)   5  Anxiety associated with depression (300 4) (F41 8)   6  Anxiety disorder (300 00) (F41 9)   7  Arthritis (716 90) (M19 90)   8  Atypical chest pain (786 59) (R07 89)   9  Back pain, chronic (724 5,338 29) (M54 9,G89 29)   10  Basal cell carcinoma of skin (173 91) (C44 91)   11  CAD (coronary artery disease) (414 00) (I25 10)   12  Change in stool habits (787 99) (R19 4)   13  Constipation (564 00) (K59 00)   14  Dandruff (690 18) (L21 0)   15  Depression (311) (F32 9)   16  Diabetes type 2, uncontrolled (250 02) (E11 65)   17  Diabetic nephropathy (250 40,583 81) (E11 21)   18  Dizziness (780 4) (R42)   19  DM2 (diabetes mellitus, type 2) (250 00) (E11 9)   20  Dyslipidemia (272 4) (E78 5)   21  Esophageal reflux (530 81) (K21 9)   22  Generalized pain (780 96) (R52)   23  GERD without esophagitis (530 81) (K21 9)   24  Headache (784 0) (R51)   25  Hip pain, right (719 45) (M25 551)   26  Hip Sprain (843 9)   27  History of colon polyps (V12 72) (Z86 010)   28  Hypercholesteremia (272 0) (E78 0)   29  Hypertension (401 9) (I10)   30  Hyponatremia (276 1) (E87 1)   31  Leg swelling (729 81) (M79 89)   32  Microalbuminuria (791 0) (R80 9)   33  Need for prophylactic vaccination and inoculation against influenza (V04 81) (Z23)   34  Need for vaccination with 13-polyvalent pneumococcal conjugate vaccine (V03 82) (Z23)   35  Non-ST elevation myocardial infarction (NSTEMI) (410 70) (I21 4)   36  Obesity (278 00) (E66 9)   37   Pain of left lower extremity (729 5) (M79 605) 38  Palpitations (785 1) (R00 2)   39  Right bundle-branch block (426 4) (I45 10)   40  Screening for neurological condition (V80 09) (Z13 89)   41  Tinea pedis (110 4) (B35 3)   42  Type 2 diabetes mellitus with hyperglycemia (250 00) (E11 65)   43  Viral URI with cough (465 9) (J06 9,B97 89)    Past Medical History    1  History of Acute laryngitis (464 00) (J04 0)   2  History of abdominal pain (V13 89) (Z87 898)   3  History of contact dermatitis (V13 3) (Z87 2)   4  History of sinusitis (V12 69) (Z87 09)   5  Need for prophylactic vaccination and inoculation against influenza (V04 81) (Z23)   6  History of Post-nasal drip (784 91) (R09 82)   7  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 1)    Surgical History    1  History of Ankle Repair   2  History of CABG   3  History of Prostate Surgery    Family History  Mother    1  Denied: Family history of Colon cancer   2  Denied: Family history of Crohn's disease   3  Denied: Family history of liver disease  Father    4  Denied: Family history of Colon cancer   5  Denied: Family history of Crohn's disease   6  Denied: Family history of liver disease  Brother    7  Family history of Colon cancer   8  Family history of abdominal aortic aneurysm (V17 49) (Z82 49)  Family History    9  Family history of colonic polyps (V18 51) (Z83 71)   10  Family history of Gastrointestinal Cancer    Social History    · Being A Social Drinker   · Denied: History of Drug Use   · Former smoker (V15 82) (I04 979)    Current Meds   1  Accu-Chek FastClix Lancets Miscellaneous; Test 4 x a day; Therapy: 78JQI3509 to (Cathlene Niraj)  Requested for: 47EXQ6045; Last   Rx:80Ekw5929 Ordered   2  Accu-Chek Gina SmartView w/Device Kit; TEST four times  a day as directed; Therapy: 59RQS1838 to (Last Rx:39Zjn9463) Ordered   3  Accu-Chek SmartView In Vitro Strip; TEST FOUR TIMES DAILY; Therapy: 57JSH2323 to (Cathlene Niraj)  Requested for: 51YWK2704; Last   Rx:17Rtp1404 Ordered   4   Aspirin 325 MG Oral Tablet; TAKE 1 TABLET DAILY; Therapy: 25ASB0776 to (Bebeto Jacome)  Requested for: 18LWE1589; Last   Rx:03Jan2014 Ordered   5  Co Q 10 100 MG Oral Capsule; TAKE 1 CAPSULE  DAILY; Therapy: (Recorded:21Ywy1541) to Recorded   6  Colace 100 MG Oral Capsule; take 1 capsule once daily; Therapy: (Recorded:82Tag4899) to Recorded   7  Crestor 40 MG Oral Tablet; TAKE 1 TABLET DAILY; Therapy: 57HBD9844 to (Meredith Hull)  Requested for: 65MTD5248; Last   Rx:72Jsp8059 Ordered   8  Ketoconazole 2 % External Shampoo; APPLY AS DIRECTED; Therapy: 64KJK7874 to (Last Rx:97Wlg9630)  Requested for: 01KVN4795 Ordered   9  Lantus SoloStar 100 UNIT/ML Subcutaneous Solution Pen-injector; 55 UNITS SQ QHS; Therapy: 36SAK1614 to (Last Rx:69Zbf9786)  Requested for: 91YLO3492 Ordered   10  Lisinopril 10 MG Oral Tablet; TAKE 1 TABLET DAILY FOR BLOOD PRESSURE; Therapy: 44YPE1375 to (Evaluate:46Qgt4744)  Requested for: 40Luk7371; Last    Rx:70Eti3837 Ordered   11  LORazepam 1 MG Oral Tablet; TAKE 1 TABLET Twice daily PRN; Therapy: 87PZA0394 to (Evaluate:26Gqx1845); Last Rx:08Mar2016; Status: ACTIVE -    Renewal Voided Ordered   12  MetFORMIN HCl ER (OSM) 500 MG Oral Tablet Extended Release 24 Hour; take 2 tablets    twice a day; Therapy: 47KHN6136 to (Last YC:71BBD0078)  Requested for: 70UWR1346 Ordered   13  Metoprolol Succinate ER 25 MG Oral Tablet Extended Release 24 Hour; take 1 tablet by    mouth every day; Therapy: 83SIW0498 to (Evaluate:02Qzx6750)  Requested for: 86STB6632; Last    Rx:18Mar2016 Ordered   14  NovoFine Autocover 30G X 8 MM Miscellaneous; use as directed; Therapy: 35MIX7648 to (Evaluate:25Aot8774)  Requested for: 08DQO7031; Last    Rx:96Rfj1187 Ordered   15  NovoLOG FlexPen 100 UNIT/ML Subcutaneous Solution Pen-injector; INJECT 16 UNIT    Before meals; Therapy: 36VQV3997 to (Last Rx:17Unb9342)  Requested for: 05AQM7658 Ordered   16   Pantoprazole Sodium 40 MG Oral Tablet Delayed Release; take 1 tablet by mouth one    time daily; Therapy: 98XZH9774 to (Evaluate:25Jan2016)  Requested for: 51Nle3414; Last    Rx:03Ynt0483 Ordered   17  Viibryd 40 MG Oral Tablet; take 1 tablet by mouth every day; Therapy: 97GNB0491 to (Evaluate:17Xjn8022)  Requested for: 97DUN2007; Last    Rx:18Mar2016 Ordered   18  Vitamin D3 2000 UNIT Oral Capsule; take 1 capsule daily; Therapy: (Recorded:20Rif1039) to Recorded    Allergies    1  Sulfa Drugs    2  No Known Environmental Allergies   3  No Known Food Allergies    Vitals  Signs [Data Includes: Current Encounter]   Recorded: 20Apr2016 04:28PM   Height: 5 ft 10 in  Weight: 235 lb 6 oz  BMI Calculated: 33 77  BSA Calculated: 2 24    Future Appointments    Date/Time Provider Specialty Site   04/29/2016 11:30 AM TEDDY Flores   [facility]   05/12/2016 01:00 PM Gio Bell, [address] Diabetes Educator [facility] ENDOCRINOLOGY [facility]   05/12/2016 02:00 PM Ignacio Bustos DO Internal Medicine [facility]     Signatures   Electronically signed by : Girma Seaman RD; Apr 20 2016  4:54PM EST                       (Author)    Electronically signed by : Beryle Cross, M D ; Apr 21 2016  9:06AM EST

## 2018-01-12 NOTE — RESULT NOTES
Message   A1c 8 7, send in a log  TSH normal  Urine has improved  Vitamin D normal, continue D supplements  Labs were done too soon, date on labslip was for 9/7/16  Verified Results  (1) HEMOGLOBIN A1C 16Aug2016 01:45PM [facility] Order Number: [id number]     Test Name Result Flag Reference   HEMOGLOBIN A1C 8 7 % H 4 2-6 3   EST  AVG  GLUCOSE 203 mg/dl       (1) T4, FREE 16Aug2016 01:45PM [facility] Order Number: [id number]     Test Name Result Flag Reference   T4,FREE 0 99 ng/dL  0 76-1 46     (1) TSH 18FBZ4629 01:45PM [facility] Order Number: [id number]     Test Name Result Flag Reference   TSH 1 412 uIU/mL  0 358-3 740   Patients undergoing fluorescein dye angiography may retain small amounts of fluorescein in the body for 48-72 hours post procedure  Samples containing fluorescein can produce falsely depressed TSH values  If the patient had this procedure,a specimen should be resubmitted post fluorescein clearance  (1) MICROALBUMIN CREATININE RATIO, RANDOM URINE 16Aug2016 01:45PM [facility] Order Number: [id number]     Test Name Result Flag Reference   MICROALBUMIN/ CREAT R 81 mg/g creatinine H 0-30   MICROALBUMIN,URINE 199 0 mg/L H 0 0-20 0   CREATININE URINE 245 0 mg/dL       (1) PTH N-TERMINAL (INTACT) 22QXO3722 01:45PM [facility] Order Number: [id number]     Test Name Result Flag Reference   PARATHYROID HORMONE INTACT 44 2 pg/mL  14 0-72 0     (1) RENAL FUNCTION PANEL 16Aug2016 01:45PM Sampson Galdamez Order Number: [id number]     Test Name Result Flag Reference   ANION GAP (CALC) 7 mmol/L  4-13   ALBUMIN 3 3 g/dL L 3 5-5 0   BLOOD UREA NITROGEN 20 mg/dL  5-25   National Kidney Disease Education Program recommendations are as follows:  GFR calculation is accurate only with a steady state creatinine  Chronic Kidney disease less than 60 ml/min/1 73 sq  meters  Kidney failure less than 15 ml/min/1 73 sq  meters  CALCIUM 8 6 mg/dL  8 3-10 1   CHLORIDE 102 mmol/L  100-108   CARBON DIOXIDE 30 mmol/L  21-32   CREATININE 0 99 mg/dL  0 60-1 30   Standardized to IDMS reference method   GLUCOSE,RANDM 170 mg/dL H    POTASSIUM 4 4 mmol/L  3 5-5 3   eGFR Non-African American      >60 0 ml/min/1 73sq m   SODIUM 139 mmol/L  136-145   PHOSPHORUS 3 7 mg/dL  2 3-4 1     (1) VITAMIN D 25-HYDROXY 36Apa1296 01:45PM Linda Sims    Order Number: [id number]     Test Name Result Flag Reference   VIT D 25-HYDROX 38 8 ng/mL  30 0-100 0   This assay is a certified procedure of the CDC Vitamin D Standardization Certification Program (VDSCP)     Deficiency <20ng/ml   Insufficiency 20-30ng/ml   Sufficient  ng/ml     *Patients undergoing fluorescein dye angiography may retain small amounts of fluorescein in the body for 48-72 hours post procedure  Samples containing fluorescein can produce falsely elevated Vitamin D values  If the patient had this procedure, a specimen should be resubmitted post fluorescein clearance

## 2018-01-13 VITALS
RESPIRATION RATE: 16 BRPM | HEIGHT: 71 IN | WEIGHT: 227.05 LBS | SYSTOLIC BLOOD PRESSURE: 118 MMHG | BODY MASS INDEX: 31.79 KG/M2 | DIASTOLIC BLOOD PRESSURE: 64 MMHG | HEART RATE: 75 BPM

## 2018-01-13 VITALS
SYSTOLIC BLOOD PRESSURE: 142 MMHG | HEART RATE: 81 BPM | HEIGHT: 71 IN | BODY MASS INDEX: 32.53 KG/M2 | WEIGHT: 232.38 LBS | DIASTOLIC BLOOD PRESSURE: 58 MMHG

## 2018-01-13 VITALS
HEART RATE: 79 BPM | HEIGHT: 71 IN | BODY MASS INDEX: 32.48 KG/M2 | WEIGHT: 232 LBS | SYSTOLIC BLOOD PRESSURE: 108 MMHG | DIASTOLIC BLOOD PRESSURE: 70 MMHG

## 2018-01-13 VITALS
WEIGHT: 215.38 LBS | HEART RATE: 79 BPM | BODY MASS INDEX: 30.9 KG/M2 | DIASTOLIC BLOOD PRESSURE: 70 MMHG | SYSTOLIC BLOOD PRESSURE: 108 MMHG

## 2018-01-13 VITALS
OXYGEN SATURATION: 96 % | SYSTOLIC BLOOD PRESSURE: 122 MMHG | HEIGHT: 71 IN | RESPIRATION RATE: 18 BRPM | WEIGHT: 232 LBS | HEART RATE: 85 BPM | DIASTOLIC BLOOD PRESSURE: 60 MMHG | TEMPERATURE: 98.6 F | BODY MASS INDEX: 32.48 KG/M2

## 2018-01-13 VITALS
HEIGHT: 71 IN | WEIGHT: 219.03 LBS | DIASTOLIC BLOOD PRESSURE: 62 MMHG | HEART RATE: 61 BPM | SYSTOLIC BLOOD PRESSURE: 128 MMHG | OXYGEN SATURATION: 96 % | RESPIRATION RATE: 16 BRPM | BODY MASS INDEX: 30.66 KG/M2

## 2018-01-13 NOTE — CONSULTS
Therapy  Rehabilitation Services Referral:   Patient Status: routine  Diagnosis: right subacromial impingement syndrome, possible rotator cuff tear  Rehabilitation Services: evaluate and treat patient as needed and initiate a home exercise program  Exercise/Treatment: AROM/PROM, stretching, shoulder, stabilization and strengthening/PRE  Program: home program    Frequency: 1-3 times per week, for 12 weeks  Please send progress report  I hereby certify that the services indicated above are medically necessary        Future Appointments    Signatures   Electronically signed by : TEDDY Fabian ; Oct 19 2017  1:42PM EST                       (Author)

## 2018-01-13 NOTE — PROGRESS NOTES
Plan    1  DSMT/MNT Time Record; Status:Complete;   Done: 15XNO7311 10:45AM    Discussion/Summary    PATIENT EDUCATION RECORD   Indication for Services: type 2 Diabetes Mellitus and obesity  He is ready to learn  He has no barriers to learning  Healthy Eating:   Discussed advanced carb counting/flexible insulin: Method: Instruction and Handout  Response: Verbalizes Understanding Wll be available for F/U as needed He was given the following educational materials: The 15/15 Rule for Treating Low Blood Sugar, Flexible Insulin Workbook and Carbohydrate Counting   Chief Complaint  Patient is here today for education on carb counting/flexible insulin therapy for T2DM      History of Present Illness  Patient has uncontrolled T2DM  BG varies due to changes in daily dietary intake with set doses of bolus insulin  He stated he gives himself additional bolus insulin when he eats more but he does not count carbs  FIT will assist in BG control and allow for flexiblity with eating  Patient was given [employer] meter and educated on FIT and use of meter ( ICR: 1:4 and ISF 1:15)  Education was provided on carb counting, stressing the importance of putting total carb grams in meter  He was given a carb counting book and shown [facility] tracie on phone to use when eating out  Label reading was also reviewed  Patient was able to return demonstrate use of meter and accurately count counts  He was instructed to call next week so that adjustments can be made if necessary  Also reviewed hypoglycemia and treatment  Active Problems    1  Abdominal pain (789 00) (R10 9)   2  Anemia (285 9) (D64 9)   3  Aneurysm of infrarenal abdominal aorta (441 4) (I71 4)   4  Anxiety (300 00) (F41 9)   5  Anxiety associated with depression (300 4) (F41 8)   6  Anxiety disorder (300 00) (F41 9)   7  Arteriosclerosis of coronary artery (414 00) (I25 10)   8  Arthritis (716 90) (M19 90)   9  Back pain, chronic (724 5,338 29) (M54 9,G89 29)   10  Basal cell carcinoma of skin (173 91) (C44 91)   11  Change in stool habits (787 99) (R19 4)   12  Constipation (564 00) (K59 00)   13  CVD (cardiovascular disease) (429 2) (I25 10)   14  Dandruff (690 18) (L21 0)   15  Depression (311) (F32 9)   16  Diabetes type 2, uncontrolled (250 02) (E11 65)   17  Diabetic nephropathy (250 40,583 81) (E11 21)   18  DM2 (diabetes mellitus, type 2) (250 00) (E11 9)   19  Dyslipidemia (272 4) (E78 5)   20  Encounter for prostate cancer screening (V76 44) (Z12 5)   21  Esophageal reflux (530 81) (K21 9)   22  Generalized pain (780 96) (R52)   23  GERD without esophagitis (530 81) (K21 9)   24  Headache (784 0) (R51)   25  Hip pain, right (719 45) (M25 551)   26  Hip Sprain (843 9)   27  History of colon polyps (V12 72) (Z86 010)   28  Hypercholesteremia (272 0) (E78 0)   29  Hypertension (401 9) (I10)   30  Hyponatremia (276 1) (E87 1)   31  Ingrown nail (703 0) (L60 0)   32  Leg swelling (729 81) (M79 89)   33  Microalbuminuria (791 0) (R80 9)   34  Mild vitamin D deficiency (268 9) (E55 9)   35  Need for prophylactic vaccination and inoculation against influenza (V04 81) (Z23)   36  Need for vaccination with 13-polyvalent pneumococcal conjugate vaccine (V03 82) (Z23)   37  Obesity (278 00) (E66 9)   38  Pain of left lower extremity (729 5) (M79 605)   39  Palpitations (785 1) (R00 2)   40  Prostate cancer (185) (C61)   41  Right bundle-branch block (426 4) (I45 10)   42  Screening for neurological condition (V80 09) (Z13 89)   43  Tinea pedis (110 4) (B35 3)   44  Type 2 diabetes mellitus with hyperglycemia (250 00) (E11 65)   45  Viral URI with cough (465 9) (J06 9,B97 89)    Past Medical History    1  History of Acute laryngitis (464 00) (J04 0)   2  History of Atypical chest pain (786 59) (R07 89)   3  History of abdominal pain (V13 89) (Z87 898)   4  History of contact dermatitis (V13 3) (Z87 2)   5   History of non-ST elevation myocardial infarction (NSTEMI) (412) (I25 2) 6  History of sinusitis (V12 69) (Z87 09)   7  Need for prophylactic vaccination and inoculation against influenza (V04 81) (Z23)   8  History of Post-nasal drip (784 91) (R09 82)   9  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 1)    Surgical History    1  History of Ankle Repair   2  History of CABG   3  History of Prostate Surgery    Family History  Mother    1  Denied: Family history of Colon cancer   2  Denied: Family history of Crohn's disease   3  Denied: Family history of liver disease  Father    4  Denied: Family history of Colon cancer   5  Denied: Family history of Crohn's disease   6  Denied: Family history of liver disease  Brother    7  Family history of Colon cancer   8  Family history of abdominal aortic aneurysm (V17 49) (Z82 49)  Family History    9  Family history of colonic polyps (V18 51) (Z83 71)   10  Family history of Gastrointestinal Cancer    Social History    · Denied: History of Alcohol use   · Being A Social Drinker   · Denied: History of Drug Use   · Former smoker (V15 82) (E52 456)    Current Meds   1  Accu-Chek FastClix Lancets Miscellaneous; Test 4 x a day; Therapy: 94TEQ6416 to (Horace Cassidy)  Requested for: 22IEP2222; Last   Rx:50Srs5733 Ordered   2  Accu-Chek Gina SmartView w/Device Kit; TEST four times  a day as directed; Therapy: 72HOT9130 to (Last Rx:11Feb2016) Ordered   3  Accu-Chek SmartView In Vitro Strip; TEST FOUR TIMES DAILY; Therapy: 39XFU5966 to (Horace Cassidy)  Requested for: 78NKZ7810; Last   Rx:68Zcj6810 Ordered   4  Aspirin 325 MG Oral Tablet; TAKE 1 TABLET DAILY; Therapy: 50TRD3383 to (464 22 191)  Requested for: 97JNK1269; Last   Rx:03Jan2014 Ordered   5  Co Q 10 100 MG Oral Capsule; TAKE 1 CAPSULE  DAILY; Therapy: (Recorded:64Fmz1392) to Recorded   6  Colace 100 MG Oral Capsule; take 1 capsule once daily; Therapy: (Recorded:81Ukf2877) to Recorded   7  Ketoconazole 2 % External Shampoo; APPLY AS DIRECTED;    Therapy: 52FIW5138 to (Last Rx:15Atf7376)  Requested for: 45YKY2681 Ordered   8  Lisinopril 10 MG Oral Tablet; TAKE 1 TABLET BY MOUTH EVERY DAY FOR BLOOD   PRESSURE; Therapy: 87XNU2446 to (Evaluate:60Opz5289)  Requested for: 97Eui6922; Last   Rx:44Pxl7009 Ordered   9  LORazepam 1 MG Oral Tablet; TAKE 1 TABLET Twice daily PRN; Therapy: 09IED9358 to (Evaluate:95Cdl6907); Last Rx:44Pxe2767 Ordered   10  MetFORMIN HCl ER (OSM) 500 MG Oral Tablet Extended Release 24 Hour; TAKE 2    TABLETS BY MOUTH TWO TIMES DAILY; Therapy: 53Ona9568 to (Evaluate:19Nov2016)  Requested for: 00Qmv1048; Last    Rx:87Dvi7597 Ordered   11  MetFORMIN HCl  MG Oral Tablet Extended Release 24 Hour; TAKE 2 TABLET    DAILY twice daily with meals; Therapy: 72UZP2586 to (Evaluate:15Jan2017)  Requested for: 77Vbt5530; Last    Rx:38Twa3040 Ordered   12  Metoprolol Succinate ER 25 MG Oral Tablet Extended Release 24 Hour; take 1 tablet by    mouth every day; Therapy: 19FUH8576 to (Evaluate:63Zkq9876)  Requested for: 59JQB4615; Last    Rx:14Sif8052 Ordered   13  NovoFine Autocover 30G X 8 MM Miscellaneous; use as directed; Therapy: 47JBF0325 to (Evaluate:18Omw7092)  Requested for: 00EYY1842; Last    Rx:62Erj8394 Ordered   14  NovoLOG FlexPen 100 UNIT/ML Subcutaneous Solution Pen-injector; INJECT 21 UNIT    Before meals; Therapy: 81ICW5630 to (Last Rx:29Apr2016)  Requested for: 40Qvq0555 Ordered   15  Pantoprazole Sodium 40 MG Oral Tablet Delayed Release; take 1 tablet by mouth every    day; Therapy: 14MVW6702 to (Evaluate:10Yfv5359)  Requested for: 55Pff1020; Last    Rx:82Ciq0452 Ordered   16  Rosuvastatin Calcium 40 MG Oral Tablet (Crestor); take 1 tablet by mouth every day; Therapy: 75BPI2432 to (Evaluate:04Oct2016)  Requested for: 71IWC3959; Last    Rx:06Jun2016 Ordered   17  Toujeo SoloStar 300 UNIT/ML Subcutaneous Solution Pen-injector; 50 units SC at    bedtime; Therapy: 42EPP7480 to (Last Rx:13Jun2016) Ordered   18   Toujeo SoloStar 300 UNIT/ML Subcutaneous Solution Pen-injector; TAKE 50 UNITS AT    BEDTIME; Therapy: 62XUX3787 to (Last Rx:29Apr2016)  Requested for: 29Apr2016 Ordered   19  Viibryd 40 MG Oral Tablet; take 1 tablet by mouth every day; Therapy: 50VMU4940 to ([phone])  Requested for: 93Ktm1494; Last    Rx:51Ifg0063 Ordered   20  Vitamin B12 100 MCG Oral Tablet; Therapy: (Recorded:13Jun2016) to Recorded   21  Vitamin D3 2000 UNIT Oral Capsule; TAKE 2 CAPSULE Daily; Last Rx:13Jun2016    Ordered    Allergies    1  Sulfa Drugs    2  No Known Environmental Allergies   3  No Known Food Allergies    End of Encounter Meds    1  LORazepam 1 MG Oral Tablet; TAKE 1 TABLET Twice daily PRN; Therapy: 85AZN2943 to (Evaluate:57Dfc8241); Last Rx:64Qxq9227 Ordered    2  Viibryd 40 MG Oral Tablet; take 1 tablet by mouth every day; Therapy: 60PUW7332 to ([phone])  Requested for: 06Hph7505; Last   Rx:85Rng5675 Ordered    3  Colace 100 MG Oral Capsule; take 1 capsule once daily; Therapy: (Recorded:18Quo4608) to Recorded    4  Ketoconazole 2 % External Shampoo; APPLY AS DIRECTED; Therapy: 04JDJ6087 to (Last Rx:11Feb2016)  Requested for: 94RJM3915 Ordered    5  [employer] SmartView w/Device Kit; TEST four times  a day as directed; Therapy: 33EDC9529 to (Last Rx:67Yfu0460) Ordered   6  MetFORMIN HCl ER (OSM) 500 MG Oral Tablet Extended Release 24 Hour; TAKE 2   TABLETS BY MOUTH TWO TIMES DAILY; Therapy: 89Vnr2739 to (Evaluate:19Nov2016)  Requested for: 71Fxa9084; Last   Rx:22Mro9944 Ordered   7  Toujeo SoloStar 300 UNIT/ML Subcutaneous Solution Pen-injector; 50 units SC at   bedtime; Therapy: 86OKS6607 to (Last Rx:13Jun2016) Ordered    8  Lisinopril 10 MG Oral Tablet; TAKE 1 TABLET BY MOUTH EVERY DAY FOR BLOOD   PRESSURE; Therapy: 86EKV0700 to (Evaluate:75Hhl2724)  Requested for: 90Qzg1505; Last   Rx:55Mcf0314 Ordered    9  Accu-Chek FastClix Lancets Miscellaneous; Test 4 x a day;    Therapy: 00XUC0271 to ([facility])  Requested for: 31VAV5201; Last   Rx:50Syj0798 Ordered   10  Accu-Chek SmartView In Vitro Strip; TEST FOUR TIMES DAILY; Therapy: 34JHA5202 to ([facility])  Requested for: 15SZG0604; Last    Rx:13Utp1969 Ordered   11  MetFORMIN HCl  MG Oral Tablet Extended Release 24 Hour; TAKE 2 TABLET    DAILY twice daily with meals; Therapy: 33QIY1546 to (Evaluate:15Jan2017)  Requested for: 58Nfl0265; Last    Rx:73Ltr6925 Ordered   12  NovoFine Autocover 30G X 8 MM Miscellaneous; use as directed; Therapy: 93MDF7873 to (Evaluate:40Ciw0351)  Requested for: 62JWR6769; Last    Rx:13Oct2015 Ordered   13  NovoLOG FlexPen 100 UNIT/ML Subcutaneous Solution Pen-injector; INJECT 21 UNIT    Before meals; Therapy: 71MOW5451 to (Last Rx:29Apr2016)  Requested for: 42Hre5271 Ordered   14  Toujeo SoloStar 300 UNIT/ML Subcutaneous Solution Pen-injector; TAKE 50 UNITS AT    BEDTIME; Therapy: 94ABK5979 to (Last Rx:29Apr2016)  Requested for: 71Pcv7534 Ordered    15  Rosuvastatin Calcium 40 MG Oral Tablet (Crestor); take 1 tablet by mouth every day; Therapy: 84VHH1086 to (Evaluate:04Oct2016)  Requested for: 22GUJ8352; Last    Rx:43Bkk5316 Ordered    16  Pantoprazole Sodium 40 MG Oral Tablet Delayed Release; take 1 tablet by mouth every    day; Therapy: 35VSM6828 to (Evaluate:99Gtf0779)  Requested for: 05Uau9152; Last    Rx:00Bfk5626 Ordered    17  Co Q 10 100 MG Oral Capsule; TAKE 1 CAPSULE  DAILY; Therapy: (Recorded:79Kqm0803) to Recorded   18  Vitamin D3 2000 UNIT Oral Capsule; TAKE 2 CAPSULE Daily; Last Rx:26Efh3722    Ordered    19  Metoprolol Succinate ER 25 MG Oral Tablet Extended Release 24 Hour; take 1 tablet by    mouth every day; Therapy: 31OIO5705 to (Evaluate:62Xxw1991)  Requested for: 48IVT0936; Last    Rx:38Ggu9762 Ordered    20  Aspirin 325 MG Oral Tablet; TAKE 1 TABLET DAILY; Therapy: 44ILF6832 to (Milad Turner)  Requested for: 21YZH4694;  Last    KW:33CJB0120 Ordered    21  Vitamin B12 100 MCG Oral Tablet; Therapy: (Recorded:32Jlv1496) to Recorded    Future Appointments    Date/Time Provider Specialty Site   09/13/2016 08:50 AM TEDDY Sanchez   Endocrinology [facility] ENDO[facility]INOLOG[facility]   10/31/2016 02:30 PM Grazyna Mooney DO Internal Medicine [facility]     Signatures   Electronically signed by : Elvira Flores RD; Aug 24 2016 10:56AM EST                       (Author)    Electronically signed by : TEDDY Manning ; Aug 30 2016  7:37AM EST

## 2018-01-14 VITALS
BODY MASS INDEX: 31.64 KG/M2 | HEART RATE: 42 BPM | OXYGEN SATURATION: 97 % | DIASTOLIC BLOOD PRESSURE: 70 MMHG | SYSTOLIC BLOOD PRESSURE: 128 MMHG | RESPIRATION RATE: 16 BRPM | HEIGHT: 71 IN | WEIGHT: 226 LBS

## 2018-01-14 VITALS
WEIGHT: 227.56 LBS | BODY MASS INDEX: 31.86 KG/M2 | HEIGHT: 71 IN | SYSTOLIC BLOOD PRESSURE: 88 MMHG | OXYGEN SATURATION: 96 % | HEART RATE: 82 BPM | DIASTOLIC BLOOD PRESSURE: 55 MMHG

## 2018-01-14 NOTE — PROGRESS NOTES
Surgery Scheduling Form  Pre-Operative Risk Assessment:   Date Last Seen: 12/19/16   Date of Surgery: 3/10/17   Type of Surgery: umbilical hernia repair   Surgeon Name: Dr Ramírez Stain Number: [phone]  Fax Number: [phone]  Cardiac: No Cardiac Contraindication for planned surgical procedure   Further Testing Required: No   Anticoagulation: Patient can hold aspirin for 7 days prior to surgery if necessary     Anesthesia: General   Patient Approved for Surgery: Yes   Clearing Doctor: Sean Wallace DO      Signatures   Electronically signed by : Sean Wallace DO; Mar  9 2017  7:55AM EST                       (Author)

## 2018-01-14 NOTE — RESULT NOTES
Message   Notify the patient blood work does show elevation of the hemoglobin A1c 8 1 -diabetes is not controlled also elevation of the urine microalbumin; please have the patient reduce carbohydrates and sweets, follow up with endocrinology and follow-up with myself as scheduled        Verified Results  (1) COMPREHENSIVE METABOLIC PANEL 82DFR4557 57:78CG Faisal Thakur    Order Number: [id number]     Test Name Result Flag Reference   GLUCOSE,RANDM 183 mg/dL H    If the patient is fasting, the ADA then defines impaired fasting glucose as > 100 mg/dL and diabetes as > or equal to 123 mg/dL  SODIUM 137 mmol/L  136-145   POTASSIUM 4 6 mmol/L  3 5-5 3   CHLORIDE 102 mmol/L  100-108   CARBON DIOXIDE 29 mmol/L  21-32   ANION GAP (CALC) 6 mmol/L  4-13   BLOOD UREA NITROGEN 21 mg/dL  5-25   CREATININE 1 00 mg/dL  0 60-1 30   Standardized to IDMS reference method   CALCIUM 8 6 mg/dL  8 3-10 1   BILI, TOTAL 0 30 mg/dL  0 20-1 00   ALK PHOSPHATAS 65 U/L     ALT (SGPT) 22 U/L  12-78   AST(SGOT) 20 U/L  5-45   ALBUMIN 3 5 g/dL  3 5-5 0   TOTAL PROTEIN 7 4 g/dL  6 4-8 2   eGFR Non-African American      >60 0 ml/min/1 73[address] [facility] Disease Education Program recommendations are as follows:  GFR calculation is accurate only with a steady state creatinine  Chronic Kidney disease less than 60 ml/min/1 73 sq  meters  Kidney failure less than 15 ml/min/1 73 sq  meters  (1) LIPID PANEL, FASTING 06Jun2016 11:21AM Parish Benson Order Number: [id number]     Test Name Result Flag Reference   CHOLESTEROL 106 mg/dL     HDL,DIRECT 32 mg/dL L 40-60   Specimen collection should occur prior to Metamizole administration due to the potential for falsely depressed results     LDL CHOLESTEROL CALCULATED 50 mg/dL  0-100   Triglyceride:         Normal              <150 mg/dl       Borderline High    150-199 mg/dl       High               200-499 mg/dl       Very High          >499 mg/dl  Cholesterol:         Desirable        <200 mg/dl      Borderline High  200-239 mg/dl      High             >239 mg/dl  HDL Cholesterol:        High    >59 mg/dL      Low     <41 mg/dL  LDL CALCULATED:    This screening LDL is a calculated result  It does not have the accuracy of the Direct Measured LDL in the monitoring of patients with hyperlipidemia and/or statin therapy  Direct Measure LDL (ODT663) must be ordered separately in these patients  TRIGLYCERIDES 121 mg/dL  <=150   Specimen collection should occur prior to N-Acetylcysteine or Metamizole administration due to the potential for falsely depressed results  (1) HEMOGLOBIN A1C 15TLJ9478 11:21AM Sheliah Ham     Test Name Result Flag Reference   HEMOGLOBIN A1C 8 1 % H 4 2-6 3   EST  AVG   GLUCOSE 186 mg/dl       (1) MICROALBUMIN CREATININE RATIO, RANDOM URINE 14PHM2804 11:21AM Sheliah Ham     Test Name Result Flag Reference   MICROALBUMIN/ CREAT R 163 mg/g creatinine H 0-30   MICROALBUMIN,URINE 546 0 mg/L H 0 0-20 0   CREATININE URINE 334 0 mg/dL         Signatures   Electronically signed by : Trudi Prader, DO; Jun 8 2016  9:00PM EST                       (Author)

## 2018-01-14 NOTE — MISCELLANEOUS
Provider Comments  Provider Comments:   Patient did not show for 3/10/16 appointment        Signatures   Electronically signed by : TEDDY Simon ; Mar 10 2016  5:10PM EST                       (Author)

## 2018-01-15 VITALS
HEIGHT: 71 IN | WEIGHT: 232 LBS | HEART RATE: 80 BPM | SYSTOLIC BLOOD PRESSURE: 103 MMHG | DIASTOLIC BLOOD PRESSURE: 55 MMHG | BODY MASS INDEX: 32.48 KG/M2

## 2018-01-15 VITALS
DIASTOLIC BLOOD PRESSURE: 52 MMHG | SYSTOLIC BLOOD PRESSURE: 110 MMHG | HEART RATE: 80 BPM | WEIGHT: 219.44 LBS | BODY MASS INDEX: 30.72 KG/M2 | HEIGHT: 71 IN

## 2018-01-15 VITALS
WEIGHT: 230.03 LBS | DIASTOLIC BLOOD PRESSURE: 84 MMHG | BODY MASS INDEX: 32.2 KG/M2 | HEART RATE: 80 BPM | SYSTOLIC BLOOD PRESSURE: 170 MMHG | HEIGHT: 71 IN | RESPIRATION RATE: 18 BRPM

## 2018-01-15 VITALS
HEART RATE: 72 BPM | SYSTOLIC BLOOD PRESSURE: 90 MMHG | HEIGHT: 71 IN | OXYGEN SATURATION: 97 % | DIASTOLIC BLOOD PRESSURE: 54 MMHG

## 2018-01-15 NOTE — CONSULTS
I am referring Kerry Israel to you for further evaluation  My most recent evaluation follows:      History of Present Illness  Sudhir Oviedo is a 65 y/o M who presents today in referral from his primary care doctor, Dr Noemy Crawford, for evaluation of "flaking" of the ear  He also wanted me to take a look at lesions on the back and states he has an itchy scalp  Discussion/Summary    Please see history of present illness  2 x 2 mm punch biopsies were done, one of the left helical rim and one of the right helical rim, easily set off to the lab  These were closed with chromic sutures, I will review the pathology with the patient by phone to see if we need to address these  I did recommend that he follow the dermatologist, he states he is seen Dr Jasmin Jacobs in the past and I've asked him to contact this office to set up an appointment  All questions were answered  The patient was counseled regarding diagnostic results, prognosis, risks and benefits of treatment options  total time of encounter was 30 minutes and 20 minutes was spent counseling  Thank you very much for seeing this patient  If you have any questions, please do not hesitate to contact me        Signatures   Electronically signed by : Johnny Alcala, [facility]; Jan 18 2017  9:43AM EST                       (Author)    Electronically signed by : TEDDY Noble ; Jan 18 2017 11:56AM EST

## 2018-01-15 NOTE — MISCELLANEOUS
Assessment    1  Diabetes type 2, uncontrolled (250 02) (E11 65)   2  CVD (cardiovascular disease) (429 2) (I25 10)   3  NSTEMI (non-ST elevated myocardial infarction) (410 70) (I21 4)   4  Obesity (278 00) (E66 9)   5  Tinea pedis of right foot (110 4) (B35 3)   6  Anxiety (300 00) (F41 9)    Assessment and plan #1 transition care management visit Regarding recent myocardial infarction/non-ST elevation myocardial infarction patient presented with atypical chest pain he was found to have positive troponins catheterization did show multivessel disease please see catheterization report the patient was a high-risk patient for stenting and was treated medically the patient will be undergoing a stress test and follow-up with cardiology as not showing  We have already discussing with the cardiologist in the near future  Currently he is asymptomatic and doing well is going on stool platelet inhibition and also on a moderatestatin/beta-blockade #2 anxiety secondary to situation with daughter as described in the history of present illness I will have counsellor Gary Bachelor contact the patient, no SI he will continue with the current dose of SSRI and is currently on a standing dose of Ativan 0 5 mg 3 times a day no alcohol and no driving after taking the medication, this was recommended by psychiatry while he was seen in the hospital  #3 type 2 diabetes recommended healthy and balanced diet reducing carbohydrates and sweets is already started making improvements in his diet and his wife is very motivated to help with this changes we'll be checking comprehensive metabolic panel, hemoglobin A1c, lipid panel and urine microalbumin #4 Tinea pedis right foot will prescribe econazole A1 percent cream apply to affected area twice a day Ã10 days #5 obesity we do recommend the patient follow a healthy balanced diet weight loss RTO in 3 months call with any problems       Plan  Anxiety    · LORazepam 1 MG Oral Tablet; TAKE 1 TABLET Twice daily PRN   Rx By: Alex Rothman; Dispense: 30 Days ; #:60 Tablet; Refill: 1; For: Anxiety; BLOSSOM = N; Call Rx; Msg to Pharmacy: Fax to [phone]  DM2 (diabetes mellitus, type 2)    · NovoLOG FlexPen 100 UNIT/ML Subcutaneous Solution Pen-injector; INJECT  20 UNITS BEFORE MEALS  (ICR 1:4, ISF 1:15)   Rx By: Alex Rothman; Dispense: 0 Days ; #:3 X 3 ML Pen (5 Pens); Refill: 3; For: DM2 (diabetes mellitus, type 2); BLOSSOM = N; Record  Tinea pedis of right foot    · Econazole Nitrate 1 % External Cream; APPLY & GENTLY MASSAGE INTO  AFFECTED AREA(S) TWICE DAILY   Rx By: Alex Rothman; Dispense: 10 Days ; #:1 X 30 GM Tube; Refill: 0; For: Tinea pedis of right foot; BLOSSOM = N; Verified Transmission to [address] [place] [address]; Last Updated By: [employer]; 7/10/2017 9:14:48 AM  Type 2 diabetes mellitus with hyperglycemia    · (1) COMPREHENSIVE METABOLIC PANEL; Status:Active; Requested for:10Aug2017;    Perform:[facility]; Due:10Aug2018; Ordered; For:Type 2 diabetes mellitus with hyperglycemia; Ordered By:Jorge Melendez;   · (1) HEMOGLOBIN A1C; Status:Active; Requested for:10Aug2017;    Perform:[facility]; Due:10Aug2018; Ordered; For:Type 2 diabetes mellitus with hyperglycemia; Ordered By:Phyllis Melendez;   · (1) LIPID PANEL, FASTING; Status:Active; Requested for:10Aug2017;    Perform:[facility]; Due:10Aug2018; Ordered; For:Type 2 diabetes mellitus with hyperglycemia; Ordered By:Jorge Melendez;   · (1) MICROALBUMIN CREATININE RATIO, RANDOM URINE; Status:Active; Requested  for:10Aug2017;    Perform:[facility]; Due:10Aug2018; Ordered; For:Type 2 diabetes mellitus with hyperglycemia; Ordered By:Phyllis Melendez;    Chief Complaint  Chief Complaint Free Text Note Form: TCM      History of Present Illness  TCM Communication [facility]: The patient is being contacted for follow-up after hospitalization   Hospital records were reviewed  He was hospitalized at [facility]  The date of admission: 7/3/17, date of discharge: 7/6/17, ;burning in the chest and back and got better with ntg went on for 2 days went to the er The patient is reportedly on arrival to ER cardiac testing including increased troponins showed that he was having a myocardial infarction troponins peaked at greater than 40 within 24 hours of the admission  The patient reports me they started him on IV heparin drip and upon starting this medication his symptoms improved  The patient reports me he has been under a tremendous amount of stress prior to the heart attack  The patient's daughter was recently arrested for prostitution and also crack cocaine  She is currently in intermediate, this led the patient to becoming very stressed  The pt developed tape allergy during the hospitalization  Diagnosis: STEMI  He was discharged to home  Medications were not reviewed today  He scheduled a follow up appointment  Follow-up appointments with other specialists: jak Webster (pt to go for stress test and discuss cardiac rehab)  Symptoms: rash:, but no fever, no weakness, no dizziness, no headache, no fatigue, no cough, no shortness of breath, no chest pain, no upper abdominal pain, no middle abdominal pain, no lower abdominal pain, no anorexia, no nausea, no vomiting, no loose stools and no constipation  The patient is currently asymptomatic  Referrals Needed:  sees Dr Crystal Redd  Counseling was provided to the patient  Communication performed and completed by Aurelio Noriega   HPI: the blood sugars have been doing ok 140-126 blood sugars ,      Active Problems    1  Abdominal pain (789 00) (R10 9)   2  Accidental fall, sequela (E929 3) (W19 XXXS)   3  Actinic keratosis (702 0) (L57 0)   4  Advanced directives, counseling/discussion (V65 49) (Z71 89)   5  Anemia (285 9) (D64 9)   6  Aneurysm of infrarenal abdominal aorta (441 4) (I71 4)   7  Anxiety (300 00) (F41 9)   8   Anxiety associated with depression (300 4) (F41 8)   9  Anxiety disorder (300 00) (F41 9)   10  Arteriosclerosis of coronary artery (414 00) (I25 10)   11  Arthritis (716 90) (M19 90)   12  Back pain, chronic (724 5,338 29) (M54 9,G89 29)   13  Basal cell carcinoma of skin (173 91) (C44 91)   14  Change in stool habits (787 99) (R19 4)   15  Chest pain (786 50) (R07 9)   16  Closed nondisplaced fracture of middle phalanx of left little finger with routine healing    (V54 19) (S62 657D)   17  Closed nondisplaced fracture of middle phalanx of left little finger, initial encounter    (816 01) (S62 657A)   18  Constipation (564 00) (K59 00)   19  CVD (cardiovascular disease) (429 2) (I25 10)   20  Dandruff (690 18) (L21 0)   21  Depression (311) (F32 9)   22  Diabetes type 2, uncontrolled (250 02) (E11 65)   23  Diabetic nephropathy (250 40,583 81) (E11 21)   24  Dislocation of left little finger (834 00) (S63 257A)   25  DM2 (diabetes mellitus, type 2) (250 00) (E11 9)   26  Dyslipidemia (272 4) (E78 5)   27  Dysphagia, unspecified type (787 20) (R13 10)   28  Encounter for prostate cancer screening (V76 44) (Z12 5)   29  Esophageal reflux (530 81) (K21 9)   30  Finger pain, left (729 5) (M79 645)   31  Fracture of middle phalanx of finger (816 01) (S62 629A)   32  Generalized pain (780 96) (R52)   33  GERD without esophagitis (530 81) (K21 9)   34  Headache (784 0) (R51)   35  Hip pain, right (719 45) (M25 551)   36  Hip Sprain (843 9)   37  History of colon polyps (V12 72) (Z86 010)   38  Hypercholesteremia (272 0) (E78 00)   39  Hypertension (401 9) (I10)   40  Hyponatremia (276 1) (E87 1)   41  Ingrown nail (703 0) (L60 0)   42  Kyphosis of thoracic region (737 10) (M40 204)   43  Laceration of finger, initial encounter   44  Laceration of left hand with foreign body, sequela (882 1) (S61 422S)   45  Leg swelling (729 81) (M79 89)   46  Lesion of ear (380 89) (H61 899)   47  Microalbuminuria (791 0) (R80 9)   48   Mild vitamin D deficiency (268 9) (E55 9)   49  Need for influenza vaccination (V04 81) (Z23)   50  Need for prophylactic vaccination and inoculation against influenza (V04 81) (Z23)   51  Need for Tdap vaccination (V06 1) (Z23)   52  Need for vaccination with 13-polyvalent pneumococcal conjugate vaccine (V03 82) (Z23)   53  Obesity (278 00) (E66 9)   54  Pain of left lower extremity (729 5) (M79 605)   55  Palpitations (785 1) (R00 2)   56  Papule (709 8) (R23 8)   57  Postural hypotension (458 0) (I95 1)   58  Prostate cancer (185) (C61)   59  Right bundle-branch block (426 4) (I45 10)   60  Screening for genitourinary condition (V81 6) (Z13 89)   61  Screening for neurological condition (V80 09) (Z13 89)   62  Tinea pedis (110 4) (B35 3)   63  Type 2 diabetes mellitus with hyperglycemia (250 00) (E11 65)   64  Umbilical hernia (993 4) (K42 9)   65  Viral URI with cough (465 9) (J06 9,B97 89)    Past Medical History    1  History of Acute laryngitis (464 00) (J04 0)   2  History of Atypical chest pain (786 59) (R07 89)   3  History of abdominal pain (V13 89) (Z87 898)   4  History of contact dermatitis (V13 3) (Z87 2)   5  History of non-ST elevation myocardial infarction (NSTEMI) (412) (I25 2)   6  History of sinusitis (V12 69) (Z87 09)   7  Need for prophylactic vaccination and inoculation against influenza (V04 81) (Z23)   8  History of Post-nasal drip (784 91) (R09 82)   9  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 1)    Surgical History    1  History of Ankle Repair   2  History of CABG   3  History of Prostate Surgery  Surgical History Reviewed: The surgical history was reviewed and updated today  Family History  Mother    1  Denied: Family history of Colon cancer   2  Denied: Family history of Crohn's disease   3  Denied: Family history of liver disease  Father    4  Denied: Family history of Colon cancer   5  Denied: Family history of Crohn's disease   6  Denied: Family history of liver disease  Brother    7   Family history of Colon cancer   8  Family history of abdominal aortic aneurysm (V17 49) (Z82 49)  Family History    9  Family history of colonic polyps (V18 51) (Z83 71)   10  Family history of Gastrointestinal Cancer  Family History Reviewed: The family history was reviewed and updated today  Social History    · Denied: History of Alcohol use   · Being A Social Drinker   · Denied: History of Drug Use   · Former smoker (I42 92) (L46 962)  Social History Reviewed: The social history was reviewed and updated today  The social history was reviewed and is unchanged  Current Meds   1  Accu-Chek Diana Plus In Vitro Strip; Check 4 times daily as directed; Therapy: 19Lnm8508 to (Evaluate:29Apr2017)  Requested for: 31Oct2016; Last   Rx:31Oct2016 Ordered   2  Accu-Chek FastClix Lancets Miscellaneous; Test 4 x a day; Therapy: 28ZCU2701 to (67 [phone])  Requested for: 63QKM0544; Last   Rx:11Feb2016 Ordered   3  Aspirin 325 MG Oral Tablet; TAKE 1 TABLET DAILY; Therapy: 72HKA2773 to (Caleb Rhoades)  Requested for: 70NQI8962; Last   Rx:03Jan2014 Ordered   4  Co Q 10 100 MG Oral Capsule; TAKE 1 CAPSULE  DAILY; Therapy: (Recorded:14Apr2015) to Recorded   5  Colace 100 MG Oral Capsule; take 1 capsule once daily; Therapy: (Recorded:75Hvy2157) to Recorded   6  Fluorouracil 5 % External Cream; APPLY A THIN LAYER TO AFFECTED AREA(S) TWICE   DAILY; Therapy: 70HJQ7541 to (Evaluate:17Mar2017)  Requested for: 38HXI3507; Last   Rx:97Lyg7355 Ordered   7  Isosorbide Mononitrate ER 30 MG Oral Tablet Extended Release 24 Hour; TAKE 1   TABLET BY MOUTH ONCE DAILY; Therapy: 99EOD9625 to (Caleb Rhoades) Recorded   8  Ketoconazole 2 % External Shampoo; APPLY AS DIRECTED; Therapy: 70TLV0311 to (Last Rx:92Frg9342)  Requested for: 13IBE2431 Ordered   9  Lisinopril 10 MG Oral Tablet; Take 1 tablet daily; Therapy: 03KJO0308 to (Last Rx:15Jun2017)  Requested for: 23Jun2017 Ordered   10   Lisinopril 5 MG Oral Tablet; TAKE 1 TABLET DAILY; Therapy: 22AWG0993 to (Evaluate:39Zda1174)  Requested for: 19INS2707; Last    Rx:25May2017 Ordered   11  LORazepam 1 MG Oral Tablet; TAKE 1 TABLET Twice daily PRN; Therapy: 12XGS6822 to (Evaluate:90Svo4016); Last Rx:08Jun2017 Ordered   12  MetFORMIN HCl ER (OSM) 500 MG Oral Tablet Extended Release 24 Hour; TAKE 2    TABLETS BY MOUTH TWO TIMES DAILY; Therapy: 93Jjl0802 to ([phone])  Requested for: 07JWF9550; Last    Rx:06Mar2017 Ordered   13  Metoprolol Succinate ER 25 MG Oral Tablet Extended Release 24 Hour; take 1 tablet by    mouth every day; Therapy: 26BBZ5386 to (Evaluate:20Sep2017)  Requested for: 36DGA9524; Last    Rx:24Mar2017 Ordered   14  Nitroglycerin 0 4 MG Sublingual Tablet Sublingual; DISSOLVE 1 TABLET UNDER THE    TONGUE AS NEEDED FOR CHEST PAIN;    Therapy: 95MFQ3230 to (Evaluate:05Jun2017)  Requested for: 99GWB3455; Last    Rx:07Nov2016 Ordered   15  NovoFine Autocover 30G X 8 MM Miscellaneous; use as directed; Therapy: 75KQT6023 to (56 420203)  Requested for: 31Oct2016; Last    Rx:31Oct2016 Ordered   16  NovoLOG FlexPen 100 UNIT/ML Subcutaneous Solution Pen-injector; INJECT 21 UNITS    BEFORE MEALS  (ICR 1:4, ISF 1:15); Therapy: 98EBM0695 to (Last Rx:75Jqc8197)  Requested for: 15Kfw0473 Ordered   17  Pantoprazole Sodium 40 MG Oral Tablet Delayed Release; take 1 tablet by mouth every    day; Therapy: 52OZE7954 to (Evaluate:31May2017)  Requested for: 17FJF1356; Last    Rx:01May2017 Ordered   18  Ranexa 500 MG Oral Tablet Extended Release 12 Hour; TAKE 1 TABLET EVERY 12    HOURS; Therapy: 23SIM2085 to (454 5656)  Requested for: 320 2035; Last    Rx:05Apr2017 Ordered   19  Rosuvastatin Calcium 20 MG Oral Tablet; Take 1 tablet daily; Therapy: 07OOK8891 to (Evaluate:02Oct2017)  Requested for: 52KYN7163; Last    Rx:06Mar2017 Ordered   20  Rosuvastatin Calcium 20 MG Oral Tablet; TAKE 1 TABLET DAILY;     Therapy: 79ZXZ1607 to Requested for: 22NTG6784 Recorded   21  Rachid SoloStar 300 UNIT/ML Subcutaneous Solution Pen-injector; 50 units SC at    bedtime; Therapy: 88ZSK4352 to (Ressie Sagar)  Requested for: 34NEP5228; Last    Rx:87Rrw0288 Ordered   22  Viibryd 40 MG Oral Tablet; take 1 tablet by mouth every day; Therapy: 04ZDX9826 to (Evaluate:88Nlw0280)  Requested for: 49VNE5738; Last    Rx:49Qbx3918 Ordered   23  Vitamin B12 100 MCG Oral Tablet; Therapy: (Recorded:13Jun2016) to Recorded   24  Vitamin D3 2000 UNIT Oral Capsule; TAKE 2 CAPSULE Daily; Last Rx:13Jun2016    Ordered  Medication List Reviewed: The medication list was reviewed and updated today  Allergies    1  Sulfa Drugs    2  No Known Environmental Allergies   3  No Known Food Allergies    Physical Exam    Constitutional   General appearance: No acute distress, well appearing and well nourished  Eyes   Conjunctiva and lids: No swelling, erythema, or discharge  Pupils and irises: Equal, round and reactive to light  Ears, Nose, Mouth, and Throat   External inspection of ears and nose: Normal     Otoscopic examination: Tympanic membrance translucent with normal light reflex  Canals patent without erythema  Nasal mucosa, septum, and turbinates: Normal without edema or erythema  Oropharynx: Normal with no erythema, edema, exudate or lesions  Pulmonary   Respiratory effort: No increased work of breathing or signs of respiratory distress  Auscultation of lungs: Clear to auscultation, equal breath sounds bilaterally, no wheezes, no rales, no rhonci  Cardiovascular   Auscultation of heart: Normal rate and rhythm, normal S1 and S2, without murmurs  Examination of extremities for edema and/or varicosities: Normal     Carotid pulses: Normal     Abdomen   Abdomen: Non-tender, no masses  Liver and spleen: No hepatomegaly or splenomegaly  Lymphatic   Palpation of lymph nodes in neck: No lymphadenopathy      Neurologic   Cranial nerves: Cranial nerves 2-12 intact  Psychiatric   Mood and affect: Normal   Mood and Affect: appropriate mood, not anxious and not depressed  Diabetic Foot Screen: Normal   (right 4th/5th toe [employer] -maceration c/w tinea)   Socks and shoes removed, the Right Foot: the foot was normal, no swelling, no erythema, not swollen, not erythematous and not dry  The toes on the right were normal, not swollen and not erythematous  Socks and shoes removed, Left Foot: the foot was normal, no swelling, no erythema, not swollen, not erythematous and not dry  The toes on the left were normal, not swollen and not erythematous  Health Management  GERD without esophagitis   EGD; every 3 years; Last 73ROM2886; Next Due: 69KBB9412; Active  History of colon polyps   COLONOSCOPY; every 3 years; Last 14YIT8357; Next Due: 22BSH0808; Active    Future Appointments    Date/Time Provider Specialty Site   08/04/2017 09:00 AM TEDDY Jennings   Gastroenterology Adult [facility] GASTROENTEROLOG[facility]   08/09/2017 01:40 PM Andre Marcano DO Cardiology [address]     Signatures   Electronically signed by : Bart Alanis, ; Jul 7 2017  9:03AM EST                       (Author)    Electronically signed by : Reji Caro DO; Jul 10 2017 10:36PM EST                       (Author)

## 2018-01-15 NOTE — RESULT NOTES
Message   notify the patient venous Doppler was negative for acute deep vein thrombosis but it did show an old chronic nonocclusive  deep vein thrombosis; continue with aspirin 325 mg once a day; follow up as scheduled        Verified Results  VAS LOWER LIMB VENOUS DUPLEX STUDY, UNILATERAL/LIMITED [id number] 04:44PM Michaela Aponte Order Number: [id number]     Test Name Result Flag Reference   VAS LOWER LIMB VENOUS DUPLEX STUDY, UNILATERAL/LIMITED (Report)     [facility] REPORT   CLINICAL:   Indications: Per physician order, Left lower Limb Pain [M79 609]  Per patient,   he has had swelling and numbness of the left distal calf and ankle for about 2   months  Operative History:   Angioplasty   CABG   Cardiac stenting   Prostate removal   Risk Factors: The patient has history of CAD, obesity, DVT and malignancy  FINDINGS:      Segment  Right      Left           Impression    Impression                 CFV    Normal (Patent) Normal (Patent)              Peroneal          E1  Non Occlusive Thrombus (Chronic)             CONCLUSION:      Impression:   RIGHT LOWER LIMB LIMITED: NORMAL   Evaluation shows no evidence of thrombus in the common femoral vein  Doppler evaluation shows a normal response to augmentation maneuvers  LEFT LOWER LIMB: ABNORMAL   No evidence of acute deep vein thrombosis   Chronic non-occlusive deep vein thrombosis is noted in one of the paired   peroneal veins  No evidence of superficial thrombophlebitis noted  Doppler evaluation shows a normal response to augmentation maneuvers  Popliteal arterial Doppler waveforms are triphasic; the posterior tibial and   anterior tibial arteries appear biphasic  Tech Note: Preliminary report faxed to Dr Mely Brown office at 17:33        SIGNATURE:   Electronically Signed by: Rebeca Poon MD on 2016-02-11 10:37:35 PM       Signatures   Electronically signed by : Lawrence Ragsdale DO; Feb 12 2016  4:27PM EST (Author)

## 2018-01-16 ENCOUNTER — APPOINTMENT (OUTPATIENT)
Dept: PHYSICAL THERAPY | Facility: CLINIC | Age: 78
End: 2018-01-16
Payer: MEDICARE

## 2018-01-16 ENCOUNTER — APPOINTMENT (OUTPATIENT)
Dept: CARDIAC REHAB | Facility: CLINIC | Age: 78
End: 2018-01-16
Payer: MEDICARE

## 2018-01-16 DIAGNOSIS — I21.4 NON-STEMI (NON-ST ELEVATED MYOCARDIAL INFARCTION) (HCC): ICD-10-CM

## 2018-01-16 PROCEDURE — 93798 PHYS/QHP OP CAR RHAB W/ECG: CPT

## 2018-01-16 PROCEDURE — 97140 MANUAL THERAPY 1/> REGIONS: CPT

## 2018-01-16 PROCEDURE — 97112 NEUROMUSCULAR REEDUCATION: CPT

## 2018-01-16 PROCEDURE — 97014 ELECTRIC STIMULATION THERAPY: CPT

## 2018-01-16 NOTE — PROGRESS NOTES
Chief Complaint  Patient presents to office today for a BP check  Patient has been taking Lisinopril 10 mg daily  Today's BP was 138/68 Pulse 91  Active Problems    1  Abdominal pain (789 00) (R10 9)   2  Accidental fall, sequela (E929 3) (W19 XXXS)   3  Actinic keratosis (702 0) (L57 0)   4  Advanced directives, counseling/discussion (V65 49) (Z71 89)   5  Anemia (285 9) (D64 9)   6  Aneurysm of infrarenal abdominal aorta (441 4) (I71 4)   7  Anxiety (300 00) (F41 9)   8  Anxiety associated with depression (300 4) (F41 8)   9  Anxiety disorder (300 00) (F41 9)   10  Arteriosclerosis of coronary artery (414 00) (I25 10)   11  Arthritis (716 90) (M19 90)   12  Back pain, chronic (724 5,338 29) (M54 9,G89 29)   13  Basal cell carcinoma of skin (173 91) (C44 91)   14  Change in stool habits (787 99) (R19 4)   15  Chest pain (786 50) (R07 9)   16  Closed nondisplaced fracture of middle phalanx of left little finger with routine healing    (V54 19) (S62 657D)   17  Closed nondisplaced fracture of middle phalanx of left little finger, initial encounter    (816 01) (S62 657A)   18  Constipation (564 00) (K59 00)   19  CVD (cardiovascular disease) (429 2) (I25 10)   20  Dandruff (690 18) (L21 0)   21  Depression (311) (F32 9)   22  Diabetes type 2, uncontrolled (250 02) (E11 65)   23  Diabetic nephropathy (250 40,583 81) (E11 21)   24  Dislocation of left little finger (834 00) (S63 257A)   25  DM2 (diabetes mellitus, type 2) (250 00) (E11 9)   26  Dyslipidemia (272 4) (E78 5)   27  Dysphagia, unspecified type (787 20) (R13 10)   28  Encounter for prostate cancer screening (V76 44) (Z12 5)   29  Esophageal reflux (530 81) (K21 9)   30  Finger pain, left (729 5) (M79 645)   31  Fracture of middle phalanx of finger (816 01) (S65 869A)   32  Generalized pain (780 96) (R52)   33  GERD without esophagitis (530 81) (K21 9)   34  Headache (784 0) (R51)   35  Hip pain, right (719 45) (M25 551)   36  Hip Sprain (843 9)   37  History of colon polyps (V12 72) (Z86 010)   38  Hypercholesteremia (272 0) (E78 00)   39  Hypertension (401 9) (I10)   40  Hyponatremia (276 1) (E87 1)   41  Ingrown nail (703 0) (L60 0)   42  Kyphosis of thoracic region (737 10) (M40 204)   43  Laceration of finger, initial encounter (883 0) (S61 219A)   44  Laceration of left hand with foreign body, sequela (882 1) (S61 422S)   45  Leg swelling (729 81) (M79 89)   46  Lesion of ear (380 89) (H61 899)   47  Microalbuminuria (791 0) (R80 9)   48  Mild vitamin D deficiency (268 9) (E55 9)   49  Need for influenza vaccination (V04 81) (Z23)   50  Need for prophylactic vaccination and inoculation against influenza (V04 81) (Z23)   51  Need for Tdap vaccination (V06 1) (Z23)   52  Need for vaccination with 13-polyvalent pneumococcal conjugate vaccine (V03 82) (Z23)   53  Obesity (278 00) (E66 9)   54  Pain of left lower extremity (729 5) (M79 605)   55  Palpitations (785 1) (R00 2)   56  Papule (709 8) (R23 8)   57  Postural hypotension (458 0) (I95 1)   58  Prostate cancer (185) (C61)   59  Right bundle-branch block (426 4) (I45 10)   60  Screening for genitourinary condition (V81 6) (Z13 89)   61  Screening for neurological condition (V80 09) (Z13 89)   62  Tinea pedis (110 4) (B35 3)   63  Type 2 diabetes mellitus with hyperglycemia (250 00) (E11 65)   64  Umbilical hernia (032 8) (K42 9)   65  Viral URI with cough (465 9) (J06 9,B97 89)    Current Meds   1  Accu-Chek Diana Plus In Vitro Strip; Check 4 times daily as directed; Therapy: 75Jqg8963 to (Evaluate:29Apr2017)  Requested for: 31Oct2016; Last   Rx:31Oct2016 Ordered   2  Accu-Chek FastClix Lancets Miscellaneous; Test 4 x a day; Therapy: 03VII9059 to (Delon Huston)  Requested for: 33XNR8604; Last   Rx:58Gne0028 Ordered   3  Aspirin 325 MG Oral Tablet; TAKE 1 TABLET DAILY; Therapy: 84ELS5369 to (Ana Ramirez)  Requested for: 64JNK6480; Last   Rx:03Jan2014 Ordered   4   Co Q 10 100 MG Oral Capsule; TAKE 1 CAPSULE  DAILY; Therapy: (Recorded:46Tdz8322) to Recorded   5  Colace 100 MG Oral Capsule; take 1 capsule once daily; Therapy: (Recorded:30Krr2506) to Recorded   6  Fluorouracil 5 % External Cream; APPLY A THIN LAYER TO AFFECTED AREA(S)   TWICE DAILY; Therapy: 17LZX0077 to (Evaluate:17Mar2017)  Requested for: 11RFA0783; Last   Rx:02Pnp3586 Ordered   7  Ketoconazole 2 % External Shampoo; APPLY AS DIRECTED; Therapy: 87XSO9070 to (Last Rx:11Feb2016)  Requested for: 19NAM9172 Ordered   8  Lisinopril 5 MG Oral Tablet; TAKE 1 TABLET DAILY; Therapy: 98HIQ3947 to (Evaluate:21Dec2017)  Requested for: 97LRN5070; Last   Rx:92Rkm6915 Ordered   9  LORazepam 1 MG Oral Tablet; TAKE 1 TABLET Twice daily PRN; Therapy: 57YLJ0386 to (Evaluate:07Aug2017); Last Rx:08Jun2017 Ordered   10  MetFORMIN HCl ER (OSM) 500 MG Oral Tablet Extended Release 24 Hour; TAKE 2    TABLETS BY MOUTH TWO TIMES DAILY; Therapy: 20Gcd9082 to (Sidewalk)  Requested for: 38SRH5563; Last    Rx:06Mar2017 Ordered   11  Metoprolol Succinate ER 25 MG Oral Tablet Extended Release 24 Hour; take 1 tablet by    mouth every day; Therapy: 19GVH7179 to (Evaluate:20Sep2017)  Requested for: 98XCD1701; Last    Rx:24Mar2017 Ordered   12  Nitroglycerin 0 4 MG Sublingual Tablet Sublingual; DISSOLVE 1 TABLET UNDER THE    TONGUE AS NEEDED FOR CHEST PAIN;    Therapy: 69EFP1433 to (Evaluate:05Jun2017)  Requested for: 08WAZ3823; Last    Rx:07Nov2016 Ordered   13  NovoFine Autocover 30G X 8 MM Miscellaneous; use as directed; Therapy: 24PTC8153 to (Vane Blank)  Requested for: 31Oct2016; Last    Rx:31Oct2016 Ordered   14  NovoLOG FlexPen 100 UNIT/ML Subcutaneous Solution Pen-injector; INJECT 21 UNITS    BEFORE MEALS  (ICR 1:4, ISF 1:15); Therapy: 07OEN9607 to (Last  Moritz)  Requested for: 23Hae3239 Ordered   15  Pantoprazole Sodium 40 MG Oral Tablet Delayed Release; take 1 tablet by mouth every    day;     Therapy: 59QZY4743 to (Evaluate:19Kad1359)  Requested for: 11KNV0612; Last    Rx:78Vaa2995 Ordered   16  Ranexa 500 MG Oral Tablet Extended Release 12 Hour; TAKE 1 TABLET EVERY 12    HOURS; Therapy: 64DOL3859 to [phone])  Requested for: 320 2035; Last    Rx:83Tfg1910 Ordered   17  Rosuvastatin Calcium 20 MG Oral Tablet; Take 1 tablet daily; Therapy: 67RGC7515 to (Evaluate:02Oct2017)  Requested for: 47QAK9058; Last    Rx:06Mar2017 Ordered   18  Toujeo SoloStar 300 UNIT/ML Subcutaneous Solution Pen-injector; 50 units SC at    bedtime; Therapy: 72QMZ6369 to [phone])  Requested for: 32YZI5910; Last    Rx:07Oct2016 Ordered   19  Viibryd 40 MG Oral Tablet; take 1 tablet by mouth every day; Therapy: 51IEF7139 to (Evaluate:25Zwt1168)  Requested for: 28VNC9065; Last    Rx:70Rgv9289 Ordered   20  Vitamin B12 100 MCG Oral Tablet; Therapy: (Recorded:13Jun2016) to Recorded   21  Vitamin D3 2000 UNIT Oral Capsule; TAKE 2 CAPSULE Daily; Last Rx:13Jun2016    Ordered    Allergies    1  Sulfa Drugs    2  No Known Environmental Allergies   3  No Known Food Allergies    Plan  Hypertension    · Lisinopril 10 MG Oral Tablet; Take 1 tablet daily    Future Appointments    Date/Time Provider Specialty Site   [facility]2017 [facility] [address] [facility]   08/04/2017 09:00 AM TEDDY Huffman   Gastroenterology Adult [facility] [place]   07/10/2017 08:00 AM Cari Billings DO Internal Medicine [facility]     Signatures   Electronically signed by : Margareth Ward DO; Jun 20 2017  6:43PM EST                       (Author)

## 2018-01-16 NOTE — MISCELLANEOUS
Message  Reviewed path with Mr Naman Fontaine (actinic keratosis of helical rim)  We discussed the use of Efudex, the patient has used this in the past is comfortable with doing so  I'm sending a prescription into his [facility]  I have encouraged him to make an appointment with Dr Steven Hinton as well as it is important that he has dermatological care established  All questions were answered, he will call us after he concludes his treatment with Efudex and we will follow-up with him as needed at that time        Plan  Actinic keratosis    · Fluorouracil 5 % External Cream (Efudex); APPLY A THIN LAYER TO AFFECTED  AREA(S) TWICE DAILY    Signatures   Electronically signed by : FERNANDO Rodriguez; Feb 24 2017 11:15AM EST                       (Author)

## 2018-01-17 NOTE — RESULT NOTES
Message   Notify the patient the x-ray of the thoracic spine does show degenerative disc disease, Superior compression deformity of the T12 please have the patient see orthopedics Dr Cosmo Up follow up as scheduled        Verified Results  * XR SPINE THORACIC 3 VIEW 99PCS8379 02:39PM Allen Mckeon Order Number: [id number]     Test Name Result Flag Reference   XR SPINE THORACIC 3 VW (Report)     THORACIC SPINE     INDICATION: Kyphosis      COMPARISON: Chest radiograph 12/12/2013  VIEWS: AP, lateral and coned-down projections; 5 images     FINDINGS:     Median sternotomy wires and surgical clips are noted  Mild cardiomegaly is present  Thoracic vertebrae demonstrate normal stature and alignment  There is no fracture or pathologic bone lesion  There is no displacement of the paraspinal line  The pedicles are intact  There is moderate degenerative disc disease at T11-T12 with superior compression deformity at T12  And mild disease at T10-T11  IMPRESSION:     Mild degenerative disc disease at T10-T11 and moderate degenerative disc disease at T11-T12 with superior compression deformity at T12 likely related to degenerative disease         Workstation performed: [id number]     Signed by:   Amaya Pugh MD   1/4/17       Signatures   Electronically signed by : Libertad Galo DO; Jan 4 2017  8:01PM EST                       (Author)

## 2018-01-18 ENCOUNTER — APPOINTMENT (OUTPATIENT)
Dept: PHYSICAL THERAPY | Facility: CLINIC | Age: 78
End: 2018-01-18
Payer: MEDICARE

## 2018-01-18 ENCOUNTER — APPOINTMENT (OUTPATIENT)
Dept: CARDIAC REHAB | Facility: CLINIC | Age: 78
End: 2018-01-18
Payer: MEDICARE

## 2018-01-19 ENCOUNTER — APPOINTMENT (OUTPATIENT)
Dept: CARDIAC REHAB | Facility: CLINIC | Age: 78
End: 2018-01-19
Payer: MEDICARE

## 2018-01-22 VITALS
DIASTOLIC BLOOD PRESSURE: 62 MMHG | OXYGEN SATURATION: 96 % | TEMPERATURE: 98.5 F | WEIGHT: 226.03 LBS | HEART RATE: 100 BPM | SYSTOLIC BLOOD PRESSURE: 118 MMHG | RESPIRATION RATE: 16 BRPM | HEIGHT: 71 IN | BODY MASS INDEX: 31.65 KG/M2

## 2018-01-22 VITALS — HEIGHT: 71 IN | WEIGHT: 225 LBS | BODY MASS INDEX: 31.5 KG/M2

## 2018-01-23 ENCOUNTER — CLINICAL SUPPORT (OUTPATIENT)
Dept: CARDIAC REHAB | Facility: CLINIC | Age: 78
End: 2018-01-23
Payer: MEDICARE

## 2018-01-23 ENCOUNTER — APPOINTMENT (OUTPATIENT)
Dept: PHYSICAL THERAPY | Facility: CLINIC | Age: 78
End: 2018-01-23
Payer: MEDICARE

## 2018-01-23 VITALS
DIASTOLIC BLOOD PRESSURE: 72 MMHG | WEIGHT: 222 LBS | HEIGHT: 71 IN | SYSTOLIC BLOOD PRESSURE: 122 MMHG | BODY MASS INDEX: 31.08 KG/M2 | HEART RATE: 69 BPM

## 2018-01-23 VITALS
DIASTOLIC BLOOD PRESSURE: 74 MMHG | HEART RATE: 74 BPM | SYSTOLIC BLOOD PRESSURE: 118 MMHG | HEIGHT: 71 IN | BODY MASS INDEX: 30.38 KG/M2 | WEIGHT: 217 LBS

## 2018-01-23 DIAGNOSIS — I21.4 NON-STEMI (NON-ST ELEVATED MYOCARDIAL INFARCTION) (HCC): ICD-10-CM

## 2018-01-23 PROCEDURE — G8990 OTHER PT/OT CURRENT STATUS: HCPCS

## 2018-01-23 PROCEDURE — G8991 OTHER PT/OT GOAL STATUS: HCPCS

## 2018-01-23 PROCEDURE — 93798 PHYS/QHP OP CAR RHAB W/ECG: CPT

## 2018-01-23 PROCEDURE — 97112 NEUROMUSCULAR REEDUCATION: CPT

## 2018-01-23 PROCEDURE — 97140 MANUAL THERAPY 1/> REGIONS: CPT

## 2018-01-23 NOTE — RESULT NOTES
Message   Notify the patient normal comprehensive metabolic panel except a slightly elevated blood sugar 127 and slightly low albumin level; please have the patient reduce carbohydrates and sweets in the diet and please have the pt follow up with me to discuss as scheduled   Notify the patient mild elevation of the hemoglobin A1c 7 2 currently stable please reduce carbohydrates and sweets in the diet and please have the pt follow up with me to discuss as scheduled   Notify the patient normal lipid level except a slightly low HDL cholesterol please have the pt follow up with me to discuss as scheduled   Notify the patient mild elevation of the urine microalbumin to creatinine ratio which has improved compared to the prior please have the pt follow up with me to discuss as scheduled        Verified Results  (1) MICROALBUMIN CREATININE RATIO, RANDOM URINE 47Zxt9183 01:41PM [facility] Order Number: [id number]     Test Name Result Flag Reference   MICROALBUMIN/ CREAT R 100 mg/g creatinine H 0-30   MICROALBUMIN,URINE 122 0 mg/L H 0 0-20 0   CREATININE URINE 122 0 mg/dL       (1) COMPREHENSIVE METABOLIC PANEL 17HBM7369 18:31AP [facility] Order Number: [id number]     Test Name Result Flag Reference   SODIUM 137 mmol/L  136-145   POTASSIUM 4 2 mmol/L  3 5-5 3   CHLORIDE 101 mmol/L  100-108   CARBON DIOXIDE 30 mmol/L  21-32   ANION GAP (CALC) 6 mmol/L  4-13   BLOOD UREA NITROGEN 26 mg/dL H 5-25   CREATININE 1 13 mg/dL  0 60-1 30   Standardized to IDMS reference method   CALCIUM 9 1 mg/dL  8 3-10 1   BILI, TOTAL 0 62 mg/dL  0 20-1 00   ALK PHOSPHATAS 62 U/L     ALT (SGPT) 28 U/L  12-78   Specimen collection should occur prior to Sulfasalazine and/or Sulfapyridine administration due to the potential for falsely depressed results     AST(SGOT) 17 U/L  5-45   Specimen collection should occur prior to Sulfasalazine administration due to the potential for falsely depressed results  ALBUMIN 3 2 g/dL L 3 5-5 0   TOTAL PROTEIN 7 6 g/dL  6 4-8 2   eGFR 62 ml/min/1 73sq m     National Kidney Disease Education Program recommendations are as follows:  GFR calculation is accurate only with a steady state creatinine  Chronic Kidney disease less than 60 ml/min/1 73 sq  meters  Kidney failure less than 15 ml/min/1 73 sq  meters  GLUCOSE FASTING 127 mg/dL H 65-99   Specimen collection should occur prior to Sulfasalazine administration due to the potential for falsely depressed results  Specimen collection should occur prior to Sulfapyridine administration due to the potential for falsely elevated results  (1) HEMOGLOBIN A1C 68AHF7297 09:11AM Jaskaran Killer   TW Order Number: [id number]     Test Name Result Flag Reference   HEMOGLOBIN A1C 7 2 % H 4 2-6 3   EST  AVG  GLUCOSE 160 mg/dl       (1) LIPID PANEL, FASTING 55ZVJ5587 09:11AM Jaskaran Killer   TW Order Number: [id number]     Test Name Result Flag Reference   CHOLESTEROL 124 mg/dL     HDL,DIRECT 36 mg/dL L 40-60   Specimen collection should occur prior to Metamizole administration due to the potential for falsley depressed results  LDL CHOLESTEROL CALCULATED 61 mg/dL  0-100   Triglyceride:        Normal <150 mg/dl   Borderline High 150-199 mg/dl   High 200-499 mg/dl   Very High >499 mg/dl      Cholesterol:       Desirable <200 mg/dl    Borderline High 200-239 mg/dl    High >239 mg/dl      HDL Cholesterol:       High>59 mg/dL    Low <41 mg/dL      This screening LDL is a calculated result  It does not have the accuracy of the Direct Measured LDL in the monitoring of patients with hyperlipidemia and/or statin therapy  Direct Measure LDL (MFB121) must be ordered separately in these patients  TRIGLYCERIDES 135 mg/dL  <=150   Specimen collection should occur prior to N-Acetylcysteine or Metamizole administration due to the potential for falsely depressed results

## 2018-01-23 NOTE — CONSULTS
I had the pleasure of evaluating your patient, Cheryl Salas  My full evaluation follows:      History of Present Illness  Mr  Talat Gomez is a pleasant 66-year-old male who presents to the office for follow-up  Since his last visit he has been attending cardiac rehabilitation  He denies any exertional chest pain or shortness of breath when participating in his exercise regimen  He denies symptoms of heart failure  He denies symptoms of claudication  His lightheadedness with standing has resolved  He denies any further near syncope, syncope or falls  Review of Systems      Cardiac: as noted in HPI  Skin: No complaints of nonhealing sores or skin rash  Genitourinary: No complaints of recurrent urinary tract infections, frequent urination at night, difficult urination, blood in urine, kidney stones, loss of bladder control, no kidney or prostate problems, no erectile dysfunction  Psychological: No complaints of feeling depressed, anxiety, panic attacks, or difficulty concentrating  General: as noted in HPI  Respiratory: No complaints of shortness of breath, cough with sputum, or wheezing  HEENT: No complaints of serious problems, hearing problems, nose problems, throat problems, or snoring  Gastrointestinal: No complaints of liver problems, nausea, vomiting, heartburn, constipation, bloody stools, diarrhea, problems swallowing, adbominal pain, or rectal bleeding  Hematologic: No complaints of bleeding disorders, anemia, blood clots, or excessive brusing  Musculoskeletal: as noted in HPI      Active Problems    1  Abdominal pain (789 00) (R10 9)   2  Accidental fall, sequela (E929 3) (W19 XXXS)   3  Actinic keratosis (702 0) (L57 0)   4  Advanced directives, counseling/discussion (V65 49) (Z71 89)   5  Anemia (285 9) (D64 9)   6  Aneurysm of infrarenal abdominal aorta (441 4) (I71 4)   7  Anxiety (300 00) (F41 9)   8  Anxiety associated with depression (300 4) (F41 8)   9   Anxiety disorder (300 00) (F41 9)   10  Arteriosclerosis of coronary artery (414 00) (I25 10)   11  Arthritis (716 90) (M19 90)   12  Back pain, chronic (724 5,338 29) (M54 9,G89 29)   13  Basal cell carcinoma of skin (173 91) (C44 91)   14  Change in stool habits (787 99) (R19 4)   15  Chest pain (786 50) (R07 9)   16  Closed nondisplaced fracture of middle phalanx of left little finger with routine healing    (V54 19) (S62 657D)   17  Closed nondisplaced fracture of middle phalanx of left little finger, initial encounter    (816 01) (S62 657A)   18  Constipation (564 00) (K59 00)   19  CVD (cardiovascular disease) (429 2) (I25 10)   20  Dandruff (690 18) (L21 0)   21  Depression (311) (F32 9)   22  Diabetes type 2, uncontrolled (250 02) (E11 65)   23  Diabetic nephropathy (250 40,583 81) (E11 21)   24  Dislocation of left little finger (834 00) (S63 257A)   25  DM2 (diabetes mellitus, type 2) (250 00) (E11 9)   26  Dyslipidemia (272 4) (E78 5)   27  Dysphagia, unspecified type (787 20) (R13 10)   28  Encounter for prostate cancer screening (V76 44) (Z12 5)   29  Esophageal reflux (530 81) (K21 9)   30  Fall, accidental (E888 9) (W19 XXXA)   31  Finger pain, left (729 5) (M79 645)   32  Fracture of middle phalanx of finger (816 01) (S62 629A)   33  Generalized pain (780 96) (R52)   34  GERD without esophagitis (530 81) (K21 9)   35  Headache (784 0) (R51)   36  Hip pain, right (719 45) (M25 551)   37  Hip Sprain (843 9)   38  History of colon polyps (V12 72) (Z86 010)   39  Hypercholesteremia (272 0) (E78 00)   40  Hypertension (401 9) (I10)   41  Hyponatremia (276 1) (E87 1)   42  Ingrown nail (703 0) (L60 0)   43  Kyphosis of thoracic region (737 10) (M40 204)   44  Laceration of finger, initial encounter (883 0) (S61 219A)   45  Laceration of left hand with foreign body, sequela (906 1) (S61 422S)   46  Leg swelling (729 81) (M79 89)   47  Lesion of ear (380 89) (H61 899)   48  Lightheadedness (780 4) (R42)   49  Microalbuminuria (791 0) (R80 9)   50  Mild vitamin D deficiency (268 9) (E55 9)   51  Need for influenza vaccination (V04 81) (Z23)   52  Need for prophylactic vaccination and inoculation against influenza (V04 81) (Z23)   53  Need for Tdap vaccination (V06 1) (Z23)   54  Need for vaccination with 13-polyvalent pneumococcal conjugate vaccine (V03 82) (Z23)   55  NSTEMI (non-ST elevated myocardial infarction) (410 70) (I21 4)   56  Obesity (278 00) (E66 9)   57  Orthostatic hypotension (458 0) (I95 1)   58  Pain in joint of right shoulder (719 41) (M25 511)   59  Pain of left lower extremity (729 5) (M79 605)   60  Palpitations (785 1) (R00 2)   61  Papule (709 8) (R23 8)   62  Postural hypotension (458 0) (I95 1)   63  Prostate cancer (185) (C61)   64  Recurrent falls (V15 88) (R29 6)   65  Right bundle-branch block (426 4) (I45 10)   66  Right rotator cuff tendinitis (726 10) (M75 81)   67  Screening for genitourinary condition (V81 6) (Z13 89)   68  Screening for neurological condition (V80 09) (Z13 89)   69  Shoulder pain, right (719 41) (M25 511)   70  Subacromial impingement of right shoulder (726 19) (M75 41)   71  Tinea pedis (110 4) (B35 3)   72  Tinea pedis of right foot (110 4) (B35 3)   73  Tobacco abuse (305 1) (Z72 0)   74  Type 2 diabetes mellitus with hyperglycemia (250 00) (E11 65)   75  Umbilical hernia (568 5) (K42 9)   76   Viral URI with cough (465 9) (J06 9,B97 89)    Past Medical History    · History of Acute laryngitis (464 00) (J04 0)   · History of Atypical chest pain (786 59) (R07 89)   · History of abdominal pain (V13 89) (Q31 611)   · History of contact dermatitis (V13 3) (Z87 2)   · History of non-ST elevation myocardial infarction (NSTEMI) (412) (I25 2)   · History of sinusitis (V12 69) (Z87 09)   · Need for prophylactic vaccination and inoculation against influenza (V04 81) (Z23)   · History of Post-nasal drip (784 91) (R09 82)   · 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 1)    The active problems and past medical history were reviewed and updated today  Surgical History    · History of Ankle Repair   · History of CABG   · History of Prostate Surgery    The surgical history was reviewed and updated today  Family History    · Denied: Family history of Colon cancer   · Denied: Family history of Crohn's disease   · Denied: Family history of liver disease    · Denied: Family history of Colon cancer   · Denied: Family history of Crohn's disease   · Denied: Family history of liver disease    · Family history of Colon cancer   · Family history of abdominal aortic aneurysm (V17 49) (Z82 49)    · Family history of colonic polyps (V18 51) (Z83 71)   · Family history of Gastrointestinal Cancer    The family history was reviewed and updated today  Social History    · Denied: History of Alcohol use   · Being A Social Drinker   · Denied: History of Drug Use   · Former smoker (O46 93) (D37 839)  The social history was reviewed and updated today  Current Meds   1  Accu-Chek Diana Plus In Vitro Strip; Check 4 times daily as directed; Therapy: 92Keq6134 to (Evaluate:29Apr2017)  Requested for: 31Oct2016; Last   Rx:31Oct2016 Ordered   2  Accu-Chek FastClix Lancets Miscellaneous; Test 4 x a day; Therapy: 17WKW3122 to (Evaline [place])  Requested for: 78CUH2980; Last   Rx:31Fkd7905 Ordered   3  Aspirin 325 MG Oral Tablet; TAKE 1 TABLET DAILY; Therapy: 56YIP3550 to (Vonna Handsome)  Requested for: 04BHB2099; Last   Rx:03Jan2014 Ordered   4  Centrum Silver Oral Tablet; TAKE 1 TABLET DAILY; Therapy: 85GFN8648 to Recorded   5  Clopidogrel Bisulfate 75 MG Oral Tablet; take 1 tablet by mouth every day; Therapy: 98WAA8325 to (Evaluate:29Jan2018)  Requested for: 31Oct2017; Last   Rx:31Oct2017 Ordered   6  Co Q 10 100 MG Oral Capsule; TAKE 1 CAPSULE  DAILY; Therapy: (Recorded:90Wlz3929) to Recorded   7  Colace 100 MG Oral Capsule; TAKE 1 CAPSULE TWICE DAILY;    Therapy: (Recorded:79Oap5704) to Recorded   8  Econazole Nitrate 1 % External Cream; APPLY & GENTLY MASSAGE INTO AFFECTED   AREA(S) TWICE DAILY; Therapy: 60KGC7463 to (Evaluate:67Bug5006)  Requested for: 34MEY9902; Last   Rx:10Jul2017 Ordered   9  LORazepam 1 MG Oral Tablet; TAKE 1 TABLET Twice daily PRN; Therapy: 15VMX3702 to (Evaluate:12Jan2018); Last Rx:13Nov2017 Ordered   10  MetFORMIN HCl  MG Oral Tablet Extended Release 24 Hour; TAKE 2 TABLET    DAILY twice daily with meals; Therapy: 68HWM5005 to (SCJUAZRW:96ZQS7660)  Requested for: 51Kxp3294; Last    Rx:25Srk0531 Ordered   11  Metoprolol Succinate ER 25 MG Oral Tablet Extended Release 24 Hour; take 1 tablet by    mouth every day; Therapy: 48ZIK4713 to (Evaluate:23Mar2018)  Requested for: 06KHV0586; Last    Rx:50Suo6120 Ordered   12  Nitroglycerin 0 4 MG Sublingual Tablet Sublingual; DISSOLVE 1 TABLET UNDER THE    TONGUE AS NEEDED FOR CHEST PAIN;    Therapy: 26HJC3693 to (Evaluate:05Jun2017)  Requested for: 00MKJ1240; Last    Rx:07Nov2016 Ordered   13  NovoFine Autocover 30G X 8 MM Miscellaneous; USE UP TO 4 TIMES DAILY; Therapy: 37TGV3473 to (Minus Latus)  Requested for: 55DTP6968; Last    Rx:08Nov2017 Ordered   14  NovoLOG FlexPen 100 UNIT/ML Subcutaneous Solution Pen-injector; INJECT 20 UNITS    BEFORE MEALS  (ICR 1:4, ISF 1:15); Therapy: 93ZPO8402 to (Evaluate:50Tlx8745)  Requested for: 80Fii8804; Last    Rx:89Gns4455 Ordered   15  NovoLOG FlexPen 100 UNIT/ML Subcutaneous Solution Pen-injector; USE AS    DIRECTED; Therapy: 96ZPW0939 to (Last Rx:11Aug2017)  Requested for: 11Aug2017 Ordered   16  Pantoprazole Sodium 40 MG Oral Tablet Delayed Release; TAKE 1 TABLET TWICE DAILY    30 MINUTES BEFORE BREAKFAST AND DINNER; Therapy: 77SVH8457 to (Evaluate:18Feb2018)  Requested for: 39Zbp2087; Last    Rx:31Rhr2512 Ordered   17  Ranexa 500 MG Oral Tablet Extended Release 12 Hour; TAKE 1 TABLET EVERY 12    HOURS;     Therapy: 18LSY2072 to (Evaluate:31Mar2018) Requested for: 99EZR5314; Last    Rx:05Apr2017 Ordered   18  Rosuvastatin Calcium 40 MG Oral Tablet; TAKE 1 TABLET DAILY; Therapy: 48ABO1488 to (Evaluate:17Qot9957)  Requested for: 80Pnx3020; Last    Rx:56Vca0941 Ordered   19  Senna 8 6 MG Oral Tablet; TAKE AS DIRECTED; Therapy: 40RFV7507 to Recorded   20  Simethicone 80 MG Oral Tablet Chewable; CHEW AND SWALLOW 1 TABLET 4 TIMES    DAILY, AFTER MEALS AND AT BEDTIME; Therapy: 98MXV8634 to Recorded   21  Toujeo SoloStar 300 UNIT/ML Subcutaneous Solution Pen-injector; INJECT 50 UNITS               SUBCUTANEOUSLY AT BEDTIME; Therapy: 17SFT5379 to (Carson Fabry)  Requested for: 57EYV1714; Last    Rx:02Nov2017 Ordered   22  Viibryd 40 MG Oral Tablet; take 1 tablet by mouth every day; Therapy: 80YLC2657 to ()  Requested for: 26Qhi8443; Last    Rx:03Dpj8913 Ordered   23  Vitamin B-12 500 MCG Oral Tablet; TAKE 1 TABLET DAILY; Therapy: 92BMR2639 to Recorded   24  Vitamin D3 2000 UNIT Oral Capsule; take 1 capsule daily; Therapy: (Recorded:49Rds0842) to Recorded    The medication list was reviewed and updated today  Allergies    1  Sulfa Drugs   2  Adhesive Tape TAPE    3  No Known Environmental Allergies   4  No Known Food Allergies    Vitals   Recorded: 53JDH7820 03:26PM Recorded: 72CQV0408 03:16PM   Heart Rate  74, R Radial   Pulse Quality  Normal, R Radial   Systolic 131 175, RUE, Sitting   Diastolic 74 62, RUE, Sitting   Height  5 ft 11 in   Weight  217 lb    BMI Calculated  30 27   BSA Calculated  2 18     Physical Exam    Constitutional   General appearance: No acute distress, well appearing and well nourished  Eyes   Conjunctiva and Sclera examination: Conjunctiva pink, sclera anicteric  Ears, Nose, Mouth, and Throat - Oropharynx: Clear, nares are clear, mucous membranes are moist    Neck   Neck and thyroid: Normal, supple, trachea midline, no thyromegaly      Pulmonary   Respiratory effort: No increased work of breathing or signs of respiratory distress  Auscultation of lungs: Clear to auscultation, no rales, no rhonchi, no wheezing, good air movement  Cardiovascular   Auscultation of heart: Normal rate and rhythm, normal S1 and S2, no murmurs  Carotid pulses: Normal, 2+ bilaterally  Peripheral vascular exam: Normal pulses throughout, no tenderness, erythema or swelling  Pedal pulses: Normal, 2+ bilaterally  Examination of extremities for edema and/or varicosities: Abnormal   nonpitting edema present  left lower extremity - negative Homans sign  Abdomen   Abdomen: Non-tender and no distention  Liver and spleen: No hepatomegaly or splenomegaly  Musculoskeletal Gait and station: Normal gait  Digits and nails: Normal without clubbing or cyanosis  Inspection/palpation of joints, bones, and muscles: Normal, ROM normal     Skin - Skin and subcutaneous tissue: Normal without rashes or lesions  Skin is warm and well perfused, normal turgor  Neurologic - Cranial nerves: II - XII intact  Psychiatric - Orientation to person, place, and time: Normal  Mood and affect: Normal       Results/Data    Rhythm and rate:  normal sinus rhythm  P-waves: left atrial hypertrophy ([address])  QRS: right bundle branch block      Assessment    1  Arteriosclerosis of coronary artery (414 00) (I25 10)   · 50% distal left main, 75% proximal LAD, 90% 1st diagonal, 50% ostial circumflex, 99%      proximal circumflex, 90% 2nd OM, 30 mid RCA , 90% right posterolateral - left heart      catheterization December 2013  2  History of CABG   · CABG x 3 with LIMA to LAD, SVG to OM-1, SVG to posterior lateral, lysis of pericardial      adhesions   3  Dyslipidemia (272 4) (E78 5)   4  Aneurysm of infrarenal abdominal aorta (441 4) (I71 4)   5  Orthostatic hypotension (458 0) (I95 1)   6   Right bundle-branch block (426 4) (I45 10)    Plan  Aneurysm of infrarenal abdominal aorta, Arteriosclerosis of coronary artery, Dyslipidemia    · Follow-up visit in 4 Months Evaluation and Treatment  Follow-up  Status: Hold For -  Scheduling  Requested for: 58SXL3123   Ordered; For: Aneurysm of infrarenal abdominal aorta, Arteriosclerosis of coronary artery, Dyslipidemia; Ordered By: Marzena Pineda Performed:  Due: 49TWN6874    Discussion/Summary    Mr Rasheeda Glaser is a pleasant 57-year-old gentleman who presents to the office today for routine follow-up  He has been participating in cardiac rehabilitation  He denies any anginal-like chest discomfort or shortness of breath with exertion  Therefore medical management will be pursued  He will remain on his beta-blocker and Ranexa as prescribed  After his last visit I did increase his rosuvastatin  His most recent lipids are acceptable with the exception of a slightly low HDL for which therapeutic lifestyle modifications were recommended  His blood pressure is well controlled in the office today  His symptoms of orthostatic hypotension have resolved  He will follow-up in the office in four months  Thank you very much for allowing me to participate in the care of this patient  If you have any questions, please do not hesitate to contact me        Future Appointments    Signatures   Electronically signed by : Mariaelena Ivy DO; Dec  5 2017  3:40PM EST                       (Author)

## 2018-01-23 NOTE — MISCELLANEOUS
Provider Comments  Provider Comments:   Patient was lost, and was over 10 minutes late to appointment today  He will call back to reschedule        Signatures   Electronically signed by : TEDDY Lancaster ; Dec  6 2017  2:39PM EST                       (Author)

## 2018-01-23 NOTE — CONSULTS
Therapy  Rehabilitation Services Referral:   Patient Status: routine  Diagnosis: right shoulder subacromial impingement syndrome  Rehabilitation Services: evaluate and treat patient as needed and initiate a home exercise program  Exercise/Treatment: AROM/PROM, stretching, shoulder, stabilization and strengthening/PRE  Program: home program    Frequency: 1-2 times per week, for 12 weeks  Please send progress report  I hereby certify that the services indicated above are medically necessary        Future Appointments    Signatures   Electronically signed by : TEDDY Francis ; Dec 19 2017  2:23PM EST                       (Author)

## 2018-01-23 NOTE — CONSULTS
Assessment    1  Lesion of ear (073 28) (W84 [id number])    Discussion/Summary  Discussion Summary:   Please see history of present illness  2 x 2 mm punch biopsies were done, one of the left helical rim and one of the right helical rim, easily set off to the lab  These were closed with chromic sutures, I will review the pathology with the patient by phone to see if we need to address these  I did recommend that he follow the dermatologist, he states he is seen Dr Saskia Jimenes in the past and I've asked him to contact this office to set up an appointment  All questions were answered  Counseling Documentation With Imm: The patient was counseled regarding diagnostic results, prognosis, risks and benefits of treatment options  total time of encounter was 30 minutes and 20 minutes was spent counseling  History of Present Illness  HPI: Elva Weber is a 65 y/o M who presents today in referral from his primary care doctor, Dr Prince Simeon, for evaluation of "flaking" of the ear  He also wanted me to take a look at lesions on the back and states he has an itchy scalp  Review of Systems  Complete-Male:   Constitutional: no fever and no chills  Eyes: no eyesight problems  ENT: no hearing loss  Cardiovascular: no chest pain  Respiratory: no shortness of breath  Gastrointestinal: no nausea and no diarrhea  Genitourinary: no urinary hesitancy  Musculoskeletal: no joint swelling and no limb swelling  Integumentary: no skin wound  Neurological: no headache  Hematologic/Lymphatic: no tendency for easy bleeding and no tendency for easy bruising  ROS Reviewed:   ROS reviewed  Active Problems    1  Abdominal pain (789 00) (R10 9)   2  Anemia (285 9) (D64 9)   3  Aneurysm of infrarenal abdominal aorta (441 4) (I71 4)   4  Anxiety (300 00) (F41 9)   5  Anxiety associated with depression (300 4) (F41 8)   6  Anxiety disorder (300 00) (F41 9)   7  Arteriosclerosis of coronary artery (414 00) (I25 10)   8  Arthritis (716 90) (M19 90)   9  Back pain, chronic (724 5,338 29) (M54 9,G89 29)   10  Basal cell carcinoma of skin (173 91) (C44 91)   11  Change in stool habits (787 99) (R19 4)   12  Chest pain (786 50) (R07 9)   13  Constipation (564 00) (K59 00)   14  CVD (cardiovascular disease) (429 2) (I25 10)   15  Dandruff (690 18) (L21 0)   16  Depression (311) (F32 9)   17  Diabetes type 2, uncontrolled (250 02) (E11 65)   18  Diabetic nephropathy (250 40,583 81) (E11 21)   19  DM2 (diabetes mellitus, type 2) (250 00) (E11 9)   20  Dyslipidemia (272 4) (E78 5)   21  Dysphagia, unspecified type (787 20) (R13 10)   22  Encounter for prostate cancer screening (V76 44) (Z12 5)   23  Esophageal reflux (530 81) (K21 9)   24  Generalized pain (780 96) (R52)   25  GERD without esophagitis (530 81) (K21 9)   26  Headache (784 0) (R51)   27  Hip pain, right (719 45) (M25 551)   28  Hip Sprain (843 9)   29  History of colon polyps (V12 72) (Z86 010)   30  Hypercholesteremia (272 0) (E78 00)   31  Hypertension (401 9) (I10)   32  Hyponatremia (276 1) (E87 1)   33  Ingrown nail (703 0) (L60 0)   34  Kyphosis of thoracic region (737 10) (M40 204)   35  Leg swelling (729 81) (M79 89)   36  Microalbuminuria (791 0) (R80 9)   37  Mild vitamin D deficiency (268 9) (E55 9)   38  Need for influenza vaccination (V04 81) (Z23)   39  Need for prophylactic vaccination and inoculation against influenza (V04 81) (Z23)   40  Need for vaccination with 13-polyvalent pneumococcal conjugate vaccine (V03 82) (Z23)   41  Obesity (278 00) (E66 9)   42  Pain of left lower extremity (729 5) (M79 605)   43  Palpitations (785 1) (R00 2)   44  Papule (709 8) (R23 8)   45  Prostate cancer (185) (C61)   46  Right bundle-branch block (426 4) (I45 10)   47  Screening for genitourinary condition (V81 6) (Z13 89)   48  Screening for neurological condition (V80 09) (Z13 89)   49  Tinea pedis (110 4) (B35 3)   50   Type 2 diabetes mellitus with hyperglycemia (250 00) (E11 65)   51  Umbilical hernia (879 5) (K42 9)   52  Viral URI with cough (465 9) (J06 9,B97 89)    Past Medical History    1  History of Acute laryngitis (464 00) (J04 0)   2  History of Atypical chest pain (786 59) (R07 89)   3  History of abdominal pain (V13 89) (Z87 898)   4  History of contact dermatitis (V13 3) (Z87 2)   5  History of non-ST elevation myocardial infarction (NSTEMI) (412) (I25 2)   6  History of sinusitis (V12 69) (Z87 09)   7  Need for prophylactic vaccination and inoculation against influenza (V04 81) (Z23)   8  History of Post-nasal drip (784 91) (R09 82)   9  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 1)  Active Problems And Past Medical History Reviewed: The active problems and past medical history were reviewed and updated today  Surgical History    1  History of Ankle Repair   2  History of CABG   3  History of Prostate Surgery  Surgical History Reviewed: The surgical history was reviewed and updated today  Family History  Mother    1  Denied: Family history of Colon cancer   2  Denied: Family history of Crohn's disease   3  Denied: Family history of liver disease  Father    4  Denied: Family history of Colon cancer   5  Denied: Family history of Crohn's disease   6  Denied: Family history of liver disease  Brother    7  Family history of Colon cancer   8  Family history of abdominal aortic aneurysm (V17 49) (Z82 49)  Family History    9  Family history of colonic polyps (V18 51) (Z83 71)   10  Family history of Gastrointestinal Cancer  Family History Reviewed: The family history was reviewed and updated today  Social History    · Denied: History of Alcohol use   · Being A Social Drinker   · Denied: History of Drug Use   · Former smoker (H06 14) (K98 937)  Social History Reviewed: The social history was reviewed and updated today  The social history was reviewed and is unchanged  Current Meds   1  Accu-Chek Diana Plus In Vitro Strip;  Check 4 times daily as directed; Therapy: 24Ohu0613 to (Evaluate:29Apr2017)  Requested for: 31Oct2016; Last   Rx:53Bvr3865 Ordered   2  Accu-Chek FastClix Lancets Miscellaneous; Test 4 x a day; Therapy: 27MQD9146 to ([phone])  Requested for: 33SOA2332; Last   Rx:19Aul7100 Ordered   3  Aspirin 325 MG Oral Tablet; TAKE 1 TABLET DAILY; Therapy: 29IBY0050 to (24-20-52-61)  Requested for: 49CIE0218; Last   Rx:03Jan2014 Ordered   4  Co Q 10 100 MG Oral Capsule; TAKE 1 CAPSULE  DAILY; Therapy: (Recorded:16Gid4408) to Recorded   5  Colace 100 MG Oral Capsule; take 1 capsule once daily; Therapy: (Recorded:86Nfc6374) to Recorded   6  Ketoconazole 2 % External Shampoo; APPLY AS DIRECTED; Therapy: 08IHP1822 to (Last Rx:28Wrw9536)  Requested for: 41KQG2295 Ordered   7  Lisinopril 10 MG Oral Tablet; TAKE 1 TABLET BY MOUTH EVERY DAY FOR BLOOD   PRESSURE; Therapy: 02QLE0302 to (Jose Stearns)  Requested for: 30IBL2558; Last   Rx:53Kxy0109 Ordered   8  LORazepam 1 MG Oral Tablet; TAKE 1 TABLET Twice daily PRN; Therapy: 94PEU3278 to (Evaluate:84Dtn8645); Last Rx:11Oct2016 Ordered   9  MetFORMIN HCl ER (OSM) 500 MG Oral Tablet Extended Release 24 Hour; TAKE 2   TABLETS BY MOUTH TWO TIMES DAILY; Therapy: 18Eju5486 to (Evaluate:19Nov2016)  Requested for: 45Cgi5703; Last   Rx:29Thy5868 Ordered   10  MetFORMIN HCl  MG Oral Tablet Extended Release 24 Hour; TAKE 2 TABLET    DAILY twice daily with meals; Therapy: 03TFO0152 to (Evaluate:15Jan2017)  Requested for: 99Ntd9436; Last    Rx:42Hqc4875 Ordered   11  Metoprolol Succinate ER 25 MG Oral Tablet Extended Release 24 Hour; take 1 tablet by    mouth every day; Therapy: 51OZL1094 to (Evaluate:18Nov2016)  Requested for: 82Fgh7850; Last    Rx:66Dxt7183 Ordered   12   Nitroglycerin 0 4 MG Sublingual Tablet Sublingual; DISSOLVE 1 TABLET UNDER THE    TONGUE AS NEEDED FOR CHEST PAIN;    Therapy: 01ZBG9892 to (Evaluate:05Jun2017)  Requested for: 27IZC7917; Last Rx:07Nov2016 Ordered   13  NovoFine Autocover 30G X 8 MM Miscellaneous; use as directed; Therapy: 85PCF1965 to ([phone] 07)  Requested for: 31Oct2016; Last    Rx:43Uqd4668 Ordered   14  NovoLOG FlexPen 100 UNIT/ML Subcutaneous Solution Pen-injector; INJECT 21 UNITS    BEFORE MEALS  (ICR 1:4, ISF 1:15); Therapy: 53QSK1030 to (Last [facility])  Requested for: 37Jij8006 Ordered   15  Pantoprazole Sodium 40 MG Oral Tablet Delayed Release; take 1 tablet by mouth every    day; Therapy: 69XZT9751 to (Evaluate:21Jan2017)  Requested for: 23Oct2016; Last    Rx:94Mhi6801 Ordered   16  Toujeo SoloStar 300 UNIT/ML Subcutaneous Solution Pen-injector; 50 units SC at    bedtime; Therapy: 72LVH0394 to (Christel Tyler)  Requested for: 20BPG0749; Last    Rx:54Nku0777 Ordered   17  Toujeo SoloStar 300 UNIT/ML Subcutaneous Solution Pen-injector; TAKE 50 UNITS AT    BEDTIME; Therapy: 18QVF3958 to (Evaluate:18Apr2017)  Requested for: 19ITZ8352; Last    Rx:27Uaj7635 Ordered   18  Viibryd 40 MG Oral Tablet; take 1 tablet by mouth every day; Therapy: 26RRP5017 to (Madison Dietrich)  Requested for: 78Duf9205; Last    Rx:48Hnw2627 Ordered   19  Vitamin B12 100 MCG Oral Tablet; Therapy: (Recorded:13Jun2016) to Recorded   20  Vitamin D3 2000 UNIT Oral Capsule; TAKE 2 CAPSULE Daily; Last Rx:13Jun2016    Ordered  Medication List Reviewed: The medication list was reviewed and updated today  Allergies    1  Sulfa Drugs    2  No Known Environmental Allergies   3  No Known Food Allergies    Physical Exam  General Complete Exam (Brief) Male:   Constitutional   General appearance: No acute distress, well appearing and well nourished  Eyes   Conjunctiva and lids: No swelling, erythema, or discharge  Ears, Nose, Mouth, and Throat   External inspection of ears and nose: Normal     Pulmonary   Respiratory effort: No increased work of breathing or signs of respiratory distress      Cardiovascular Auscultation of heart: Normal rate and rhythm, normal S1 and S2, without murmurs  Skin Dry flakes of the left and right helical rim without any distinct lesion  On the back, there are multiple seborrheic keratoses  Procedure: skin biopsy  Indications for the procedure include the lesion has changed  Risks and infection risk were discussed with the patient  Procedure Note:   Anesthesia:  5 ml of lidocaine 1% with epinephrine  The lesion was located on the L helical rim, R helical rim  a 2 mm punch biopsy of the lesion was taken  The cutaneous layer was closed with 3 6-0 chromic suture(s)  Specimen: the excised lesion was place in buffered formalin and sent for pathology     Post-Procedure:      Future Appointments    Date/Time Provider Specialty Site   03/06/2017 02:30 PM Angel Alanis DO [facility]     Signatures   Electronically signed by : Kaitlynn Adame, Brittany Holloway; Jan 18 2017  9:43AM EST                       (Author)    Electronically signed by : TEDDY Zamudio ; Jan 18 2017 11:56AM EST

## 2018-01-24 NOTE — PROGRESS NOTES
Assessment    1  Diabetes type 2, uncontrolled (250 02) (E11 65)   2  DM2 (diabetes mellitus, type 2) (250 00) (E11 9)   3  Hypertension (401 9) (I10)   4  Microalbuminuria (791 0) (R80 9)   5  Type 2 diabetes mellitus with hyperglycemia (250 00) (E11 65)   6  Hypercholesteremia (272 0) (E78 0)   7  Mild vitamin D deficiency (268 9) (E55 9)   8  Obesity (278 00) (E66 9)   9  CVD (cardiovascular disease) (429 2) (I25 10)     1  Obese type 2 diabetes mellitus With CVD: Last A1c 9 3%   -his blood sugar logs are reassuring and are between 100-158, no hypoglycemia  -Continue Lantus 50 units at bedtime, NovoLog 21 units with each meal  -Continue to follow with diabetes educator, Hemoglobin A1c before next visit  -Continued metformin 1000 mg twice a day, renal function is normal  -Stay up to date on eye exams and podiatry visits  -Send a blood sugar logs every 2 weeks    #2 hypertension and hyperlipidemia coronary  -Well managed on current medication  -Check microalbumin with the next labs    #3 abdominal aortic aneurysm:  -He is Watchful waiting    4  vitamin D deficiency:  -Check and replace as indicated       Plan   Diabetes type 2, uncontrolled, DM2 (diabetes mellitus, type 2), Hypertension,  Microalbuminuria, Mild vitamin D deficiency, Type 2 diabetes mellitus with hyperglycemia    · (1) VITAMIN D 25-HYDROXY; Status:Active; Requested IWP:[id number];    Perform:[facility]; CHENG:[id number];[id number]; For:Diabetes type 2, uncontrolled, DM2 (diabetes mellitus, type 2), Hypertension, Microalbuminuria, Mild vitamin D deficiency, Type 2 diabetes mellitus with hyperglycemia; Ordered By:Lex Gutierrez;  Diabetes type 2, uncontrolled, DM2 (diabetes mellitus, type 2), Hypertension,  Microalbuminuria, Type 2 diabetes mellitus with hyperglycemia    · (1) PTH N-TERMINAL (INTACT); Status:Active; Requested ZVP:[id number];    Perform:[facility]; FNI:[id number];YFZCNJK;   For:Diabetes type 2, uncontrolled, DM2 (diabetes mellitus, type 2), Hypertension, Microalbuminuria, Type 2 diabetes mellitus with hyperglycemia; Ordered By:Lex Gutierrez;   · (1) RENAL FUNCTION PANEL; Status:Active; Requested RYB:[id number];    Perform:[facility]; DAREK:[id number];WDMKWAP; For:Diabetes type 2, uncontrolled, DM2 (diabetes mellitus, type 2), Hypertension, Microalbuminuria, Type 2 diabetes mellitus with hyperglycemia; Ordered By:Lex Gutierrez;  Diabetes type 2, uncontrolled, DM2 (diabetes mellitus, type 2), Hypertension, Mild vitamin  D deficiency, Type 2 diabetes mellitus with hyperglycemia    · Begin a limited exercise program ; Status:Complete;   Done: 29Apr2016   Ordered; For:Diabetes type 2, uncontrolled, DM2 (diabetes mellitus, type 2), Hypertension, Mild vitamin D deficiency, Type 2 diabetes mellitus with hyperglycemia; Ordered By:Lex Gutierrez;   · Eat a low fat and low cholesterol diet ; Status:Complete;   Done: 01RMY9987   Ordered; For:Diabetes type 2, uncontrolled, DM2 (diabetes mellitus, type 2), Hypertension, Mild vitamin D deficiency, Type 2 diabetes mellitus with hyperglycemia; Ordered By:Lex Gutierrez;   · (1) HEMOGLOBIN A1C; Status:Active; Requested NQR:[id number];    Perform:[facility]; TBY:[id number];QZKHJKI; For:Diabetes type 2, uncontrolled, DM2 (diabetes mellitus, type 2), Hypertension, Mild vitamin D deficiency, Type 2 diabetes mellitus with hyperglycemia; Ordered By:Lex Gutierrez;   · Follow-up visit in 6 weeks Evaluation and Treatment  Follow-up  Status: Hold For -  Scheduling  Requested for: 29Apr2016   Ordered; For: Diabetes type 2, uncontrolled, DM2 (diabetes mellitus, type 2), Hypertension, Mild vitamin D deficiency, Type 2 diabetes mellitus with hyperglycemia; Ordered By: Josefina Gonzáles Performed:  Due: 11RTH9891   · Follow-Up With Advanced Practitioner Evaluation and Treatment  Follow-up  Status: Hold  For - Scheduling  Requested for: 29Apr2016   Ordered;  For: Diabetes type 2, uncontrolled, DM2 (diabetes mellitus, type 2), Hypertension, Mild vitamin D deficiency, Type 2 diabetes mellitus with hyperglycemia; Ordered By: Maxwell Boykin Performed:  Due: 29Apr2017  DM2 (diabetes mellitus, type 2)    · NovoLOG FlexPen 100 UNIT/ML Subcutaneous Solution Pen-injector; INJECT  21 UNIT Before meals   Rx By: Maxwell Boykin; Dispense: 0 Days ; #:3 X 3 ML Pen (5 Pens); Refill: 3; For: DM2 (diabetes mellitus, type 2); BLOSSOM = N; Verified Transmission to [employer]); Last Updated By: System, [employer]; 4/29/2016 12:06:20 PM   · Toujeo SoloStar 300 UNIT/ML Subcutaneous Solution Pen-injector; TAKE 50  UNITS AT BEDTIME   Rx By: Maxwell Boykin; Dispense: 0 Days ; #:3 X 1 5 ML Pen (3 Pens); Refill: 1; For: DM2 (diabetes mellitus, type 2); BLOSSOM = N; Verified Transmission to [employer]); Last Updated By: System, [employer]; 4/29/2016 12:06:20 PM    Fingerstick - POC; Status:Resulted - Requires Verification,Retrospective By Protocol Authorization;   Done: 55XEM8949 12:00AM  CVN:[id number]; Last Updated By:Lamine Jacobson; 4/29/2016 12:33:16 PM;Ordered; For:DM2 (diabetes mellitus, type 2); Ordered By:Lex Gutierrez; Discussion/Summary  Discussion Summary:   Decrease lantus to 50 units and use novolog 21 units with meals  test in 6 weeks before next visit  Counseling Documentation With Imm: The patient was counseled regarding diagnostic results, instructions for management, risk factor reductions, risks and benefits of treatment options, importance of compliance with treatment  total time of encounter was 45 minutes and 34 minutes was spent counseling  Medication SE Review and Pt Understands Tx: Possible side effects of new medications were reviewed with the patient/guardian today  The treatment plan was reviewed with the patient/guardian   The patient/guardian understands and agrees with the treatment plan      Chief Complaint  Chief Complaint Free Text Note Form: follow up   Chief Complaint [address]: Patient is here today for follow up of chronic conditions described in HPI  History of Present Illness  HPI: 66 old obese male with a past medical history of Obesity and Type 2 diabetes mellitus diagnosed 2 years ago when he had CABG comes for follow-up visit  On March 16, 2016 he had an episode of hypotension and dizziness when he was sent to the ER  No hypoglycemia  The blood work did not reveal any new diagnoses  He was given IV fluids and he improved  His troponins were negative, CBC and CMB was normal  Thyroid function was normal  He is currently taking Lantus 55 units at bedtime and NovoLog 21 units with each meal  Denies any hypoglycemia  He is met T in the past for diabetes education  He checks his blood sugar once or twice a day  He has seen ophthalmologist and a podiatrist in the past    Diabetes: The patient is being seen for routine follow-up of Diabetes Mellitus 2  The HbA1c was   Current treatment includes Metformin HCl  See Medication List for current medication(s)  Source of information reported and indicates that the patient checks his blood sugar two times per day  No particular diet followed  His exercise regimen is composed of fewer than three times a week  Diabetes education performed   By report, there is fair compliance with treatment, fair tolerance of treatment and fair symptom control  Current pertinent lifestyle factors include obesity, disordered eating and inactivity, but no frequent corticosteroid use  The patient is currently asymptomatic   Disease Course and Complications:  there have been no previous episodes of diabetic ketoacidosis  there have been no previous hospitalizations  The last dilated fundus exam showed no evidence of retinopathy  Cardiovascular: coronary artery bypass graft and coronary artery disease  Renal: microalbuminuria, but no nephropathy  Neurologic: no peripheral neuropathy   (no)      Review of Systems  Endo Adult ROS Male Established v2 - [facility]:   Constitutional/General: no recent weight gain, recent weight loss, poor energy/fatigue, no increased energy level, no insomnia/sleep problems, no fever and no feeling weak  Heart: high blood pressure, but no chest pain/tightness, no rapid/racing heart rate and no palpitations  Genitourinary - Urinary frequent urination, excess urination and urinating during the night  Eyes: no blurred vision, no double vision, no bulging eyes, no gritty/scratchy eyes and no excessive tearing  Mouth / Throat: no hoarseness and no difficulty swallowing  Neck: no lumps, no swollen glands, neck pain, no neck stiffness and no enlarged thyroid  Respiratory: no wheezing, no asthma and no persistent cough  Musculoskeletal: no muscle aches/pain, no joint aches/pain and no muscle weakness  Skin & Hair: no dry skin, no acne, the hair texture was not oily, no hair loss and no excessive hair growth  Gastrointestinal: constipation, no diarrhea, wakes at night to drink and no stomach ache  Neurological: no blackouts, no weakness and no tremors  Genital: no testicular pain, no testicular lumps/bumps/mass and does not perform monthly testicular exam    Endocrine: feeling hot frequently, no feeling cold frequently, no shifts between feeling hot and cold, no cold hands or feet, no excessive sweating, no thyroid problems, blood sugar problems, excessive thirst, no excessive hunger, no change in shoe size, no nausea or vomiting and no shaky hands  ROS Reviewed:   ROS reviewed  Active Problems    1  Abdominal pain (789 00) (R10 9)   2  Anemia (285 9) (D64 9)   3  Aneurysm of infrarenal abdominal aorta (441 4) (I71 4)   4  Anxiety (300 00) (F41 9)   5  Anxiety associated with depression (300 4) (F41 8)   6  Anxiety disorder (300 00) (F41 9)   7  Arthritis (716 90) (M19 90)   8  Atypical chest pain (786 59) (R07 89)   9   Back pain, chronic (760 4,973 65) (M54 9,G89 29)   10  Basal cell carcinoma of skin (173 91) (C44 91)   11  CAD (coronary artery disease) (414 00) (I25 10)   12  Change in stool habits (787 99) (R19 4)   13  Constipation (564 00) (K59 00)   14  Dandruff (690 18) (L21 0)   15  Depression (311) (F32 9)   16  Diabetes type 2, uncontrolled (250 02) (E11 65)   17  Diabetic nephropathy (250 40,583 81) (E11 21)   18  Dizziness (780 4) (R42)   19  DM2 (diabetes mellitus, type 2) (250 00) (E11 9)   20  Dyslipidemia (272 4) (E78 5)   21  Esophageal reflux (530 81) (K21 9)   22  Generalized pain (780 96) (R52)   23  GERD without esophagitis (530 81) (K21 9)   24  Headache (784 0) (R51)   25  Hip pain, right (719 45) (M25 551)   26  Hip Sprain (843 9)   27  History of colon polyps (V12 72) (Z86 010)   28  Hypercholesteremia (272 0) (E78 0)   29  Hypertension (401 9) (I10)   30  Hyponatremia (276 1) (E87 1)   31  Leg swelling (729 81) (M79 89)   32  Microalbuminuria (791 0) (R80 9)   33  Need for prophylactic vaccination and inoculation against influenza (V04 81) (Z23)   34  Need for vaccination with 13-polyvalent pneumococcal conjugate vaccine (V03 82) (Z23)   35  Obesity (278 00) (E66 9)   36  Pain of left lower extremity (729 5) (M79 605)   37  Palpitations (785 1) (R00 2)   38  Right bundle-branch block (426 4) (I45 10)   39  Screening for neurological condition (V80 09) (Z13 89)   40  Tinea pedis (110 4) (B35 3)   41  Type 2 diabetes mellitus with hyperglycemia (250 00) (E11 65)   42  Viral URI with cough (465 9) (J06 9,B97 89)    Past Medical History    1  History of Acute laryngitis (464 00) (J04 0)   2  History of abdominal pain (V13 89) (Z87 898)   3  History of contact dermatitis (V13 3) (Z87 2)   4  History of non-ST elevation myocardial infarction (NSTEMI) (412) (I25 2)   5  History of sinusitis (V12 69) (Z87 09)   6  Need for prophylactic vaccination and inoculation against influenza (V04 81) (Z23)   7   History of Post-nasal drip (784 91) (R09 82) 8  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 1)  Active Problems And Past Medical History Reviewed: The active problems and past medical history were reviewed and updated today  Surgical History    1  History of Ankle Repair   2  History of CABG   3  History of Prostate Surgery  Surgical History Reviewed: The surgical history was reviewed and updated today  Family History  Mother    1  Denied: Family history of Colon cancer   2  Denied: Family history of Crohn's disease   3  Denied: Family history of liver disease  Father    4  Denied: Family history of Colon cancer   5  Denied: Family history of Crohn's disease   6  Denied: Family history of liver disease  Brother    7  Family history of Colon cancer   8  Family history of abdominal aortic aneurysm (V17 49) (Z82 49)  Family History    9  Family history of colonic polyps (V18 51) (Z83 71)   10  Family history of Gastrointestinal Cancer  Family History Reviewed: The family history was reviewed and updated today  Social History    · Being A Social Drinker   · Denied: History of Drug Use   · Former smoker (T41 30) (G59 604)  Social History Reviewed: The social history was reviewed and updated today  Current Meds   1  Accu-Chek FastClix Lancets Miscellaneous; Test 4 x a day; Therapy: 22IHN8284 to (Al Brain)  Requested for: 07TZY8841; Last   Rx:78Hhy8452 Ordered   2  Accu-Chek Gina SmartView w/Device Kit; TEST four times  a day as directed; Therapy: 67DGV4896 to (Last Rx:32Fuy0043) Ordered   3  Accu-Chek SmartView In Vitro Strip; TEST FOUR TIMES DAILY; Therapy: 56SPT2620 to (Al Brain)  Requested for: 32AMD2716; Last   Rx:32Okg2069 Ordered   4  Aspirin 325 MG Oral Tablet; TAKE 1 TABLET DAILY; Therapy: 06QKP5491 to (Fredrich Flash)  Requested for: 57ISS2993; Last   Rx:03Jan2014 Ordered   5  Co Q 10 100 MG Oral Capsule; TAKE 1 CAPSULE  DAILY; Therapy: (Recorded:14Apr2015) to Recorded   6   Colace 100 MG Oral Capsule; take 1 capsule once daily; Therapy: (Recorded:07Nhn5255) to Recorded   7  Crestor 40 MG Oral Tablet; TAKE 1 TABLET DAILY; Therapy: 25JKL7501 to ([facility])  Requested for: 79TFF1858; Last   Rx:09Cov3806 Ordered   8  Ketoconazole 2 % External Shampoo; APPLY AS DIRECTED; Therapy: 04LCN2219 to (Last Rx:40Qlq8493)  Requested for: 50JLK7870 Ordered   9  Lantus SoloStar 100 UNIT/ML Subcutaneous Solution Pen-injector; 55 UNITS SQ QHS; Therapy: 71RXE1683 to (Last Rx:11Feb2016)  Requested for: 84IOJ3057 Ordered   10  Lisinopril 10 MG Oral Tablet; TAKE 1 TABLET DAILY FOR BLOOD PRESSURE; Therapy: 80AAX2179 to (Evaluate:05Ozz4378)  Requested for: 80Zpy1549; Last    Rx:09Rys8035 Ordered   11  LORazepam 1 MG Oral Tablet; TAKE 1 TABLET Twice daily PRN; Therapy: 89NSP5203 to (Evaluate:07May2016); Last Rx:08Mar2016; Status: ACTIVE -    Renewal Voided Ordered   12  MetFORMIN HCl ER (OSM) 500 MG Oral Tablet Extended Release 24 Hour; take 2 tablets    twice a day; Therapy: 94ZFT0516 to (Last AK:69YEG0250)  Requested for: 83HYH1817 Ordered   13  Metoprolol Succinate ER 25 MG Oral Tablet Extended Release 24 Hour; take 1 tablet by    mouth every day; Therapy: 20EEA1413 to (Evaluate:11Hck8268)  Requested for: 80QQD6412; Last    Rx:18Mar2016 Ordered   14  NovoFine Autocover 30G X 8 MM Miscellaneous; use as directed; Therapy: 84GFX5595 to (Evaluate:45Zeg2121)  Requested for: 73JKZ9664; Last    Rx:69Yzr3341 Ordered   15  NovoLOG FlexPen 100 UNIT/ML Subcutaneous Solution Pen-injector; INJECT 16 UNIT    Before meals; Therapy: 44RNA9798 to (Last Rx:11Feb2016)  Requested for: 78YCX9684 Ordered   16  Pantoprazole Sodium 40 MG Oral Tablet Delayed Release; take 1 tablet by mouth one    time daily; Therapy: 49ZEA6230 to (Evaluate:25Jan2016)  Requested for: 25Jbx6521; Last    Rx:31Iws4560 Ordered   17  Viibryd 40 MG Oral Tablet; take 1 tablet by mouth every day;     Therapy: 97FWX3266 to (Evaluate:94Ble9187) Requested for: 19KTA7528; Last    Rx:18Mar2016 Ordered   18  Vitamin D3 2000 UNIT Oral Capsule; take 1 capsule daily; Therapy: (Recorded:95Xbv5230) to Recorded  Medication List Reviewed: The medication list was reviewed and updated today  Allergies    1  Sulfa Drugs    2  No Known Environmental Allergies   3  No Known Food Allergies    Vitals  Vital Signs [Data Includes: Current Encounter]    Recorded: 29Apr2016 11:18AM   Heart Rate 78   Systolic 195   Diastolic 68   Height 5 ft 10 in   Weight 232 lb 8 0 oz   BMI Calculated 33 36   BSA Calculated 2 23     Physical Exam    Constitutional   General appearance: No acute distress, well appearing and well nourished  Obese but not cushingoid  Eyes   Conjunctiva and lids: No swelling, erythema, or discharge  Pupils: Equal, round and reactive to light  The sclera are anicteric  Extraocular movements are intact  Ears, Nose, Mouth, and Throat   External inspection of ears, nose and lips: Normal     Oropharynx: Normal with no erythema, edema, exudate or lesions  Exam of Head: The head is atraumatic and normocephalic  Neck: The neck is supple  The thyroid is normal in size with no palpable nodules  Pulmonary   Auscultation of lungs: Clear to auscultation bilaterally with normal chest expansion  Cardiovascular   Auscultation of heart: Normal rate and rhythm with no murmurs, gallops or rubs  Examination of extremities for edema and/or varicosities: Abnormal     Abdomen   Abdomen: Abdomen is soft, non-tender with normal bowel sounds  Lymphatic   Palpation of lymph nodes: No supraclavicular or suboccipital lymphadenopathy  Musculoskeletal   Inspection/palpation of joints, bones, and muscles: Muscle bulk and tone is normal     Skin   Skin and subcutaneous tissue: Normal skin temperature and color  Neurologic   Reflexes: 2+ and symmetric  Motor Strength: Strength is 5/5 bilaterally      Psychiatric   Orientation to person, place and time: Normal     Mood and affect: Affect and attention span are normal        Results/Data  Diagnostic Studies Reviewed: I personally reviewed the films/images/results in the office today  My interpretation follows  Results   (1) HEMOGLOBIN A1C 27YMX1344 12:49PM William Zapien   5 7-6 4% impaired fasting glucose  >=6 5% diagnosis of diabetes    Falsely low levels are seen in conditions linked to short RBC life span-  hemolytic anemia, and splenomegaly  Falsely elevated levels are seen in situations where there is an increased production of RBC- receipt of erythropoietin or blood transfusions  Adopted from ADA-Clinical Practice Recommendations     Test Name Result Flag Reference   HEMOGLOBIN A1C 9 3 % H 4 0-5 6   EST  AVG  GLUCOSE 220 mg/dl       (1) LIPID PANEL, FASTING 58CPB6296 12:49PM William Zapien   Triglyceride:         Normal              <150 mg/dl       Borderline High    150-199 mg/dl       High               200-499 mg/dl       Very High          >499 mg/dl  Cholesterol:         Desirable        <200 mg/dl      Borderline High  200-239 mg/dl      High             >239 mg/dl  HDL Cholesterol:        High    >59 mg/dL      Low     <41 mg/dL  LDL CALCULATED:    This screening LDL is a calculated result  It does not have the accuracy of the Direct Measured LDL in the monitoring of patients with hyperlipidemia and/or statin therapy  Direct Measure LDL (CKC972) must be ordered separately in these patients  Test Name Result Flag Reference   CHOLESTEROL 131 mg/dL     HDL,DIRECT 32 mg/dL L 40-60   LDL CHOLESTEROL CALCULATED 62 mg/dL  0-100   TRIGLYCERIDES 187 mg/dL H <=150     (1) COMPREHENSIVE METABOLIC PANEL 38WMJ5519 72:14PE Milagro Melendez 296 Kidney Disease Education Program recommendations are as follows:  GFR calculation is accurate only with a steady state creatinine  Chronic Kidney disease less than 60 ml/min/1 73 sq  meters  Kidney failure less than 15 ml/min/1 73 sq  meters  Test Name Result Flag Reference   GLUCOSE,RANDM 175 mg/dL H    If the patient is fasting, the ADA then defines impaired fasting glucose as > 100 mg/dL and diabetes as > or equal to 123 mg/dL  SODIUM 138 mmol/L  136-145   Slightly hemolyzed   POTASSIUM 4 8 mmol/L  3 5-5 3   Slightly hemolyzed   CHLORIDE 103 mmol/L  100-108   Slightly hemolyzed   CARBON DIOXIDE 27 mmol/L  21-32   Slightly hemolyzed   ANION GAP (CALC) 8 mmol/L  4-13   BLOOD UREA NITROGEN 19 mg/dL  5-25   Slightly hemolyzed   CREATININE 0 95 mg/dL  0 60-1 30   Standardized to IDMS reference method   CALCIUM 8 8 mg/dL  8 3-10 1   Slightly hemolyzed   BILI, TOTAL 0 38 mg/dL  0 20-1 00   Slightly hemolyzed   ALK PHOSPHATAS 72 U/L     Slightly hemolyzed   ALT (SGPT) 24 U/L  12-78   Slightly hemolyzed   AST(SGOT) 20 U/L  5-45   Slightly hemolyzed   ALBUMIN 3 4 g/dL L 3 5-5 0   Slightly hemolyzed   TOTAL PROTEIN 7 7 g/dL  6 4-8 2   Slightly hemolyzed   eGFR Non-African American      >60 0 ml/min/1 73sq m     Health Management  GERD without esophagitis   EGD; every 3 years; Last 63BQL5167; Next Due: 94EGT1766; Active  History of colon polyps   COLONOSCOPY; every 3 years; Last 54TLA3097; Next Due: 00FEL4182;  Active    Future Appointments    Date/Time Provider Specialty Site   05/12/2016 01:00 PM Vera Jin, [address] [place] [address] Diabetes Educator [facility]   05/12/2016 02:00 PM Rubens Perla DO Internal Medicine [facility]   06/13/2016 08:15 AM Burton Nelson Endocrinology [facility] END[facility] [address] [place] [address]     Signatures   Electronically signed by : TEDDY Gates ; Apr 29 2016 12:43PM EST                       (Author)

## 2018-01-25 ENCOUNTER — APPOINTMENT (OUTPATIENT)
Dept: PHYSICAL THERAPY | Facility: CLINIC | Age: 78
End: 2018-01-25
Payer: MEDICARE

## 2018-01-25 ENCOUNTER — APPOINTMENT (OUTPATIENT)
Dept: CARDIAC REHAB | Facility: CLINIC | Age: 78
End: 2018-01-25
Payer: MEDICARE

## 2018-01-26 ENCOUNTER — APPOINTMENT (EMERGENCY)
Dept: RADIOLOGY | Facility: HOSPITAL | Age: 78
DRG: 282 | End: 2018-01-26
Payer: MEDICARE

## 2018-01-26 ENCOUNTER — CLINICAL SUPPORT (OUTPATIENT)
Dept: CARDIAC REHAB | Facility: CLINIC | Age: 78
End: 2018-01-26
Payer: MEDICARE

## 2018-01-26 ENCOUNTER — HOSPITAL ENCOUNTER (INPATIENT)
Facility: HOSPITAL | Age: 78
LOS: 1 days | Discharge: HOME | DRG: 282 | End: 2018-01-28
Attending: EMERGENCY MEDICINE | Admitting: INTERNAL MEDICINE
Payer: MEDICARE

## 2018-01-26 VITALS — BODY MASS INDEX: 30.89 KG/M2 | WEIGHT: 221.4 LBS

## 2018-01-26 DIAGNOSIS — I21.4 NSTEMI (NON-ST ELEVATED MYOCARDIAL INFARCTION) (HCC): Primary | ICD-10-CM

## 2018-01-26 DIAGNOSIS — I21.4 NON-STEMI (NON-ST ELEVATED MYOCARDIAL INFARCTION) (HCC): ICD-10-CM

## 2018-01-26 DIAGNOSIS — E11.59 TYPE 2 DIABETES MELLITUS WITH OTHER CIRCULATORY COMPLICATION, WITH LONG-TERM CURRENT USE OF INSULIN (HCC): Chronic | ICD-10-CM

## 2018-01-26 DIAGNOSIS — Z79.4 TYPE 2 DIABETES MELLITUS WITH OTHER CIRCULATORY COMPLICATION, WITH LONG-TERM CURRENT USE OF INSULIN (HCC): Chronic | ICD-10-CM

## 2018-01-26 LAB
ALBUMIN SERPL BCP-MCNC: 3.3 G/DL (ref 3.5–5)
ALP SERPL-CCNC: 99 U/L (ref 46–116)
ALT SERPL W P-5'-P-CCNC: 25 U/L (ref 12–78)
ANION GAP SERPL CALCULATED.3IONS-SCNC: 7 MMOL/L (ref 4–13)
AST SERPL W P-5'-P-CCNC: 19 U/L (ref 5–45)
BASOPHILS # BLD AUTO: 0.03 THOUSANDS/ΜL (ref 0–0.1)
BASOPHILS NFR BLD AUTO: 0 % (ref 0–1)
BILIRUB SERPL-MCNC: 0.29 MG/DL (ref 0.2–1)
BUN SERPL-MCNC: 33 MG/DL (ref 5–25)
CALCIUM SERPL-MCNC: 8.6 MG/DL (ref 8.3–10.1)
CHLORIDE SERPL-SCNC: 101 MMOL/L (ref 100–108)
CO2 SERPL-SCNC: 28 MMOL/L (ref 21–32)
CREAT SERPL-MCNC: 1.09 MG/DL (ref 0.6–1.3)
EOSINOPHIL # BLD AUTO: 0.39 THOUSAND/ΜL (ref 0–0.61)
EOSINOPHIL NFR BLD AUTO: 5 % (ref 0–6)
ERYTHROCYTE [DISTWIDTH] IN BLOOD BY AUTOMATED COUNT: 14.2 % (ref 11.6–15.1)
GFR SERPL CREATININE-BSD FRML MDRD: 64 ML/MIN/1.73SQ M
GLUCOSE SERPL-MCNC: 263 MG/DL (ref 65–140)
HCT VFR BLD AUTO: 38.6 % (ref 36.5–49.3)
HGB BLD-MCNC: 12.9 G/DL (ref 12–17)
LYMPHOCYTES # BLD AUTO: 1 THOUSANDS/ΜL (ref 0.6–4.47)
LYMPHOCYTES NFR BLD AUTO: 13 % (ref 14–44)
MCH RBC QN AUTO: 30.2 PG (ref 26.8–34.3)
MCHC RBC AUTO-ENTMCNC: 33.4 G/DL (ref 31.4–37.4)
MCV RBC AUTO: 90 FL (ref 82–98)
MONOCYTES # BLD AUTO: 0.46 THOUSAND/ΜL (ref 0.17–1.22)
MONOCYTES NFR BLD AUTO: 6 % (ref 4–12)
NEUTROPHILS # BLD AUTO: 5.63 THOUSANDS/ΜL (ref 1.85–7.62)
NEUTS SEG NFR BLD AUTO: 76 % (ref 43–75)
NRBC BLD AUTO-RTO: 0 /100 WBCS
PLATELET # BLD AUTO: 262 THOUSANDS/UL (ref 149–390)
PMV BLD AUTO: 10.5 FL (ref 8.9–12.7)
POTASSIUM SERPL-SCNC: 4.1 MMOL/L (ref 3.5–5.3)
PROT SERPL-MCNC: 7.7 G/DL (ref 6.4–8.2)
RBC # BLD AUTO: 4.27 MILLION/UL (ref 3.88–5.62)
SODIUM SERPL-SCNC: 136 MMOL/L (ref 136–145)
TROPONIN I SERPL-MCNC: 0.52 NG/ML
WBC # BLD AUTO: 7.54 THOUSAND/UL (ref 4.31–10.16)

## 2018-01-26 PROCEDURE — 93005 ELECTROCARDIOGRAM TRACING: CPT | Performed by: EMERGENCY MEDICINE

## 2018-01-26 PROCEDURE — 36415 COLL VENOUS BLD VENIPUNCTURE: CPT

## 2018-01-26 PROCEDURE — 84484 ASSAY OF TROPONIN QUANT: CPT | Performed by: EMERGENCY MEDICINE

## 2018-01-26 PROCEDURE — 85025 COMPLETE CBC W/AUTO DIFF WBC: CPT | Performed by: EMERGENCY MEDICINE

## 2018-01-26 PROCEDURE — 71046 X-RAY EXAM CHEST 2 VIEWS: CPT

## 2018-01-26 PROCEDURE — 93798 PHYS/QHP OP CAR RHAB W/ECG: CPT

## 2018-01-26 PROCEDURE — 96365 THER/PROPH/DIAG IV INF INIT: CPT

## 2018-01-26 PROCEDURE — 80053 COMPREHEN METABOLIC PANEL: CPT | Performed by: EMERGENCY MEDICINE

## 2018-01-26 PROCEDURE — 96375 TX/PRO/DX INJ NEW DRUG ADDON: CPT

## 2018-01-26 RX ORDER — HEPARIN SODIUM 1000 [USP'U]/ML
4000 INJECTION, SOLUTION INTRAVENOUS; SUBCUTANEOUS AS NEEDED
Status: DISCONTINUED | OUTPATIENT
Start: 2018-01-26 | End: 2018-01-28

## 2018-01-26 RX ORDER — HEPARIN SODIUM 1000 [USP'U]/ML
2000 INJECTION, SOLUTION INTRAVENOUS; SUBCUTANEOUS AS NEEDED
Status: DISCONTINUED | OUTPATIENT
Start: 2018-01-26 | End: 2018-01-28

## 2018-01-26 RX ORDER — HEPARIN SODIUM 10000 [USP'U]/100ML
3-20 INJECTION, SOLUTION INTRAVENOUS
Status: DISCONTINUED | OUTPATIENT
Start: 2018-01-26 | End: 2018-01-28

## 2018-01-26 RX ORDER — ASCORBIC ACID 500 MG
500 TABLET ORAL DAILY
COMMUNITY

## 2018-01-26 RX ORDER — HEPARIN SODIUM 1000 [USP'U]/ML
4000 INJECTION, SOLUTION INTRAVENOUS; SUBCUTANEOUS ONCE
Status: COMPLETED | OUTPATIENT
Start: 2018-01-26 | End: 2018-01-26

## 2018-01-26 RX ADMIN — HEPARIN SODIUM AND DEXTROSE 11.1 UNITS/KG/HR: 10000; 5 INJECTION INTRAVENOUS at 21:21

## 2018-01-26 RX ADMIN — HEPARIN SODIUM 4000 UNITS: 1000 INJECTION, SOLUTION INTRAVENOUS; SUBCUTANEOUS at 21:21

## 2018-01-27 ENCOUNTER — APPOINTMENT (OUTPATIENT)
Dept: RADIOLOGY | Facility: HOSPITAL | Age: 78
DRG: 282 | End: 2018-01-27
Payer: MEDICARE

## 2018-01-27 LAB
ANION GAP SERPL CALCULATED.3IONS-SCNC: 5 MMOL/L (ref 4–13)
APTT PPP: 48 SECONDS (ref 23–35)
APTT PPP: 70 SECONDS (ref 23–35)
APTT PPP: 84 SECONDS (ref 23–35)
BUN SERPL-MCNC: 25 MG/DL (ref 5–25)
CALCIUM SERPL-MCNC: 8.6 MG/DL (ref 8.3–10.1)
CHLORIDE SERPL-SCNC: 104 MMOL/L (ref 100–108)
CHOLEST SERPL-MCNC: 104 MG/DL (ref 50–200)
CO2 SERPL-SCNC: 31 MMOL/L (ref 21–32)
CREAT SERPL-MCNC: 0.92 MG/DL (ref 0.6–1.3)
ERYTHROCYTE [DISTWIDTH] IN BLOOD BY AUTOMATED COUNT: 14.1 % (ref 11.6–15.1)
EST. AVERAGE GLUCOSE BLD GHB EST-MCNC: 169 MG/DL
GFR SERPL CREATININE-BSD FRML MDRD: 79 ML/MIN/1.73SQ M
GLUCOSE SERPL-MCNC: 136 MG/DL (ref 65–140)
GLUCOSE SERPL-MCNC: 140 MG/DL (ref 65–140)
GLUCOSE SERPL-MCNC: 147 MG/DL (ref 65–140)
GLUCOSE SERPL-MCNC: 150 MG/DL (ref 65–140)
GLUCOSE SERPL-MCNC: 265 MG/DL (ref 65–140)
GLUCOSE SERPL-MCNC: 48 MG/DL (ref 65–140)
GLUCOSE SERPL-MCNC: 98 MG/DL (ref 65–140)
HBA1C MFR BLD: 7.5 % (ref 4.2–6.3)
HCT VFR BLD AUTO: 37.7 % (ref 36.5–49.3)
HDLC SERPL-MCNC: 32 MG/DL (ref 40–60)
HGB BLD-MCNC: 12.8 G/DL (ref 12–17)
LDLC SERPL CALC-MCNC: 47 MG/DL (ref 0–100)
MAGNESIUM SERPL-MCNC: 2.3 MG/DL (ref 1.6–2.6)
MCH RBC QN AUTO: 30.3 PG (ref 26.8–34.3)
MCHC RBC AUTO-ENTMCNC: 34 G/DL (ref 31.4–37.4)
MCV RBC AUTO: 89 FL (ref 82–98)
PHOSPHATE SERPL-MCNC: 3 MG/DL (ref 2.3–4.1)
PLATELET # BLD AUTO: 250 THOUSANDS/UL (ref 149–390)
PMV BLD AUTO: 10.4 FL (ref 8.9–12.7)
POTASSIUM SERPL-SCNC: 4 MMOL/L (ref 3.5–5.3)
RBC # BLD AUTO: 4.22 MILLION/UL (ref 3.88–5.62)
SODIUM SERPL-SCNC: 140 MMOL/L (ref 136–145)
TRIGL SERPL-MCNC: 124 MG/DL
TROPONIN I SERPL-MCNC: 3.37 NG/ML
TROPONIN I SERPL-MCNC: 3.92 NG/ML
TSH SERPL DL<=0.05 MIU/L-ACNC: 2.41 UIU/ML (ref 0.36–3.74)
WBC # BLD AUTO: 8.56 THOUSAND/UL (ref 4.31–10.16)

## 2018-01-27 PROCEDURE — 93005 ELECTROCARDIOGRAM TRACING: CPT | Performed by: INTERNAL MEDICINE

## 2018-01-27 PROCEDURE — 85730 THROMBOPLASTIN TIME PARTIAL: CPT

## 2018-01-27 PROCEDURE — 80048 BASIC METABOLIC PNL TOTAL CA: CPT | Performed by: INTERNAL MEDICINE

## 2018-01-27 PROCEDURE — 83735 ASSAY OF MAGNESIUM: CPT | Performed by: INTERNAL MEDICINE

## 2018-01-27 PROCEDURE — 76775 US EXAM ABDO BACK WALL LIM: CPT

## 2018-01-27 PROCEDURE — 84484 ASSAY OF TROPONIN QUANT: CPT | Performed by: INTERNAL MEDICINE

## 2018-01-27 PROCEDURE — 99223 1ST HOSP IP/OBS HIGH 75: CPT | Performed by: INTERNAL MEDICINE

## 2018-01-27 PROCEDURE — 85027 COMPLETE CBC AUTOMATED: CPT | Performed by: INTERNAL MEDICINE

## 2018-01-27 PROCEDURE — 99285 EMERGENCY DEPT VISIT HI MDM: CPT

## 2018-01-27 PROCEDURE — 36415 COLL VENOUS BLD VENIPUNCTURE: CPT

## 2018-01-27 PROCEDURE — 93005 ELECTROCARDIOGRAM TRACING: CPT

## 2018-01-27 PROCEDURE — 84443 ASSAY THYROID STIM HORMONE: CPT | Performed by: INTERNAL MEDICINE

## 2018-01-27 PROCEDURE — 84100 ASSAY OF PHOSPHORUS: CPT | Performed by: INTERNAL MEDICINE

## 2018-01-27 PROCEDURE — 83036 HEMOGLOBIN GLYCOSYLATED A1C: CPT | Performed by: INTERNAL MEDICINE

## 2018-01-27 PROCEDURE — 99222 1ST HOSP IP/OBS MODERATE 55: CPT | Performed by: INTERNAL MEDICINE

## 2018-01-27 PROCEDURE — 80061 LIPID PANEL: CPT | Performed by: INTERNAL MEDICINE

## 2018-01-27 PROCEDURE — 85730 THROMBOPLASTIN TIME PARTIAL: CPT | Performed by: INTERNAL MEDICINE

## 2018-01-27 PROCEDURE — 82948 REAGENT STRIP/BLOOD GLUCOSE: CPT

## 2018-01-27 RX ORDER — VILAZODONE HYDROCHLORIDE 40 MG/1
40 TABLET ORAL DAILY
Status: DISCONTINUED | OUTPATIENT
Start: 2018-01-27 | End: 2018-01-28 | Stop reason: HOSPADM

## 2018-01-27 RX ORDER — RANOLAZINE 500 MG/1
500 TABLET, EXTENDED RELEASE ORAL 2 TIMES DAILY
Status: DISCONTINUED | OUTPATIENT
Start: 2018-01-27 | End: 2018-01-28

## 2018-01-27 RX ORDER — ASCORBIC ACID 500 MG
500 TABLET ORAL DAILY
Status: DISCONTINUED | OUTPATIENT
Start: 2018-01-27 | End: 2018-01-28 | Stop reason: HOSPADM

## 2018-01-27 RX ORDER — CLOPIDOGREL BISULFATE 75 MG/1
75 TABLET ORAL DAILY
Status: DISCONTINUED | OUTPATIENT
Start: 2018-01-27 | End: 2018-01-28 | Stop reason: HOSPADM

## 2018-01-27 RX ORDER — ONDANSETRON 2 MG/ML
4 INJECTION INTRAMUSCULAR; INTRAVENOUS EVERY 6 HOURS PRN
Status: DISCONTINUED | OUTPATIENT
Start: 2018-01-27 | End: 2018-01-28 | Stop reason: HOSPADM

## 2018-01-27 RX ORDER — SIMETHICONE 80 MG
80 TABLET,CHEWABLE ORAL 4 TIMES DAILY PRN
Status: DISCONTINUED | OUTPATIENT
Start: 2018-01-27 | End: 2018-01-28 | Stop reason: HOSPADM

## 2018-01-27 RX ORDER — ACETAMINOPHEN 325 MG/1
650 TABLET ORAL EVERY 4 HOURS PRN
Status: DISCONTINUED | OUTPATIENT
Start: 2018-01-27 | End: 2018-01-28 | Stop reason: HOSPADM

## 2018-01-27 RX ORDER — DOCUSATE SODIUM 100 MG/1
100 CAPSULE, LIQUID FILLED ORAL 2 TIMES DAILY
Status: DISCONTINUED | OUTPATIENT
Start: 2018-01-27 | End: 2018-01-28 | Stop reason: HOSPADM

## 2018-01-27 RX ORDER — INSULIN GLARGINE 100 [IU]/ML
40 INJECTION, SOLUTION SUBCUTANEOUS
Status: DISCONTINUED | OUTPATIENT
Start: 2018-01-27 | End: 2018-01-28 | Stop reason: HOSPADM

## 2018-01-27 RX ORDER — PANTOPRAZOLE SODIUM 40 MG/1
40 TABLET, DELAYED RELEASE ORAL 2 TIMES DAILY
Status: DISCONTINUED | OUTPATIENT
Start: 2018-01-27 | End: 2018-01-28 | Stop reason: HOSPADM

## 2018-01-27 RX ORDER — ASPIRIN 325 MG
325 TABLET ORAL DAILY
Status: DISCONTINUED | OUTPATIENT
Start: 2018-01-27 | End: 2018-01-28 | Stop reason: HOSPADM

## 2018-01-27 RX ORDER — CHOLECALCIFEROL (VITAMIN D3) 125 MCG
500 CAPSULE ORAL DAILY
Status: DISCONTINUED | OUTPATIENT
Start: 2018-01-27 | End: 2018-01-28 | Stop reason: HOSPADM

## 2018-01-27 RX ORDER — METOPROLOL SUCCINATE 25 MG/1
25 TABLET, EXTENDED RELEASE ORAL DAILY
Status: DISCONTINUED | OUTPATIENT
Start: 2018-01-27 | End: 2018-01-28

## 2018-01-27 RX ORDER — MELATONIN
2000 DAILY
Status: DISCONTINUED | OUTPATIENT
Start: 2018-01-27 | End: 2018-01-28 | Stop reason: HOSPADM

## 2018-01-27 RX ORDER — ATORVASTATIN CALCIUM 80 MG/1
80 TABLET, FILM COATED ORAL
Status: DISCONTINUED | OUTPATIENT
Start: 2018-01-27 | End: 2018-01-28 | Stop reason: HOSPADM

## 2018-01-27 RX ORDER — LORAZEPAM 0.5 MG/1
0.5 TABLET ORAL EVERY 8 HOURS PRN
Status: DISCONTINUED | OUTPATIENT
Start: 2018-01-27 | End: 2018-01-28 | Stop reason: HOSPADM

## 2018-01-27 RX ORDER — MAGNESIUM HYDROXIDE/ALUMINUM HYDROXICE/SIMETHICONE 120; 1200; 1200 MG/30ML; MG/30ML; MG/30ML
15 SUSPENSION ORAL EVERY 6 HOURS PRN
Status: DISCONTINUED | OUTPATIENT
Start: 2018-01-27 | End: 2018-01-28 | Stop reason: HOSPADM

## 2018-01-27 RX ORDER — INSULIN GLARGINE 100 [IU]/ML
50 INJECTION, SOLUTION SUBCUTANEOUS
Status: DISCONTINUED | OUTPATIENT
Start: 2018-01-27 | End: 2018-01-27

## 2018-01-27 RX ORDER — CHOLECALCIFEROL (VITAMIN D3) 125 MCG
100 CAPSULE ORAL DAILY
Status: DISCONTINUED | OUTPATIENT
Start: 2018-01-27 | End: 2018-01-28 | Stop reason: HOSPADM

## 2018-01-27 RX ADMIN — OXYCODONE HYDROCHLORIDE AND ACETAMINOPHEN 500 MG: 500 TABLET ORAL at 08:23

## 2018-01-27 RX ADMIN — RANOLAZINE 500 MG: 500 TABLET, FILM COATED, EXTENDED RELEASE ORAL at 23:22

## 2018-01-27 RX ADMIN — INSULIN GLARGINE 40 UNITS: 100 INJECTION, SOLUTION SUBCUTANEOUS at 23:21

## 2018-01-27 RX ADMIN — ACETAMINOPHEN 650 MG: 325 TABLET, FILM COATED ORAL at 23:43

## 2018-01-27 RX ADMIN — PANTOPRAZOLE SODIUM 40 MG: 40 TABLET, DELAYED RELEASE ORAL at 17:05

## 2018-01-27 RX ADMIN — ASPIRIN 325 MG: 325 TABLET ORAL at 08:23

## 2018-01-27 RX ADMIN — ACETAMINOPHEN 650 MG: 325 TABLET, FILM COATED ORAL at 09:04

## 2018-01-27 RX ADMIN — ATORVASTATIN CALCIUM 80 MG: 80 TABLET, FILM COATED ORAL at 17:05

## 2018-01-27 RX ADMIN — Medication 1 TABLET: at 08:24

## 2018-01-27 RX ADMIN — HEPARIN SODIUM 2000 UNITS: 1000 INJECTION, SOLUTION INTRAVENOUS; SUBCUTANEOUS at 11:23

## 2018-01-27 RX ADMIN — RANOLAZINE 500 MG: 500 TABLET, FILM COATED, EXTENDED RELEASE ORAL at 08:24

## 2018-01-27 RX ADMIN — HEPARIN SODIUM AND DEXTROSE 13.1 UNITS/KG/HR: 10000; 5 INJECTION INTRAVENOUS at 13:25

## 2018-01-27 RX ADMIN — Medication 100 MG: at 08:24

## 2018-01-27 RX ADMIN — CYANOCOBALAMIN TAB 500 MCG 500 MCG: 500 TAB at 08:23

## 2018-01-27 RX ADMIN — DOCUSATE SODIUM 100 MG: 100 CAPSULE, LIQUID FILLED ORAL at 17:05

## 2018-01-27 RX ADMIN — CLOPIDOGREL BISULFATE 75 MG: 75 TABLET ORAL at 08:22

## 2018-01-27 RX ADMIN — LORAZEPAM 0.5 MG: 0.5 TABLET ORAL at 23:43

## 2018-01-27 RX ADMIN — PANTOPRAZOLE SODIUM 40 MG: 40 TABLET, DELAYED RELEASE ORAL at 08:23

## 2018-01-27 RX ADMIN — VILAZODONE HYDROCHLORIDE 40 MG: 40 TABLET ORAL at 08:26

## 2018-01-27 RX ADMIN — INSULIN LISPRO 1 UNITS: 100 INJECTION, SOLUTION INTRAVENOUS; SUBCUTANEOUS at 08:22

## 2018-01-27 RX ADMIN — DOCUSATE SODIUM 100 MG: 100 CAPSULE, LIQUID FILLED ORAL at 08:23

## 2018-01-27 RX ADMIN — INSULIN LISPRO 20 UNITS: 100 INJECTION, SOLUTION INTRAVENOUS; SUBCUTANEOUS at 08:23

## 2018-01-27 RX ADMIN — METOPROLOL SUCCINATE 25 MG: 25 TABLET, EXTENDED RELEASE ORAL at 08:24

## 2018-01-27 RX ADMIN — LORAZEPAM 0.5 MG: 0.5 TABLET ORAL at 08:23

## 2018-01-27 RX ADMIN — VITAMIN D, TAB 1000IU (100/BT) 2000 UNITS: 25 TAB at 08:24

## 2018-01-27 NOTE — CASE MANAGEMENT
Initial Clinical Review    Admission: Date/Time/Statement: 01/26/18 @ 2331 -- OBS -- changed to INPATIENT 1/27 @ 1556    Start   Ordered   01/27/18 1556  Inpatient Admission Once     Transfer Service: General Medicine       Question Answer Comment   Admitting Physician Shahla Niño    Level of Care Med Surg    Estimated length of stay More than 2 Midnights    Certification I certify that inpatient services are medically necessary for this patient for a duration of greater than two midnights  See H&P and MD Progress Notes for additional information about the patient's course of treatment  01/27/18 1556       Orders Placed This Encounter   Procedures    Place in Observation (expected length of stay for this patient is less than two midnights)     Standing Status:   Standing     Number of Occurrences:   1     Order Specific Question:   Admitting Physician     Answer:   Shahla Niño [[id number]]     Order Specific Question:   Level of Care     Answer:   Med Surg [16]       ED: Date/Time/Mode of Arrival:   ED Arrival Information     Expected Arrival Acuity Means of Arrival Escorted By Service Admission Type    - 1/26/2018 20:17 Emergent Ambulance [address] Emergency    Arrival Complaint    chest pain          Chief Complaint:   Chief Complaint   Patient presents with    Chest Pain     burning chest pain and pressure relieved with 324 ASA and 1 SL [facility]  Pt states his pain radiated to his left shoulder and left neck       History of Illness:  76 y o  male who has a past medical history significant for extensive CAD with history of CABG  According to patient, he had chest discomfort middle part of last year for which he was evaluated by Cardiology with elevation of troponins  However he was told that it is not possible to do any stenting or intervention  He is therefore placed on medical management  He was placed on cardiac rehabilitation without any problems   Approximately 630 the evening of his visit in [place] emergency room, the patient suddenly felt as if he was having an epigastric discomfort with radiation towards the mid chest and towards the jaw  The patient denies any shortness of breath  Likewise, the patient states that there is left arm numbness  It started on its own and subsequently relieved 15 minutes later  He took aspirin 325 mg x1      Currently, patient is on heparin drip  Noted is that the patient feels much better without any chest discomfort  Patient denies any shortness of breath  There is no note of any dizziness    No current history of palpitations        ED Vital Signs:   ED Triage Vitals   Temperature Pulse Respirations Blood Pressure SpO2   01/26/18 2018 01/26/18 2018 01/26/18 2018 01/26/18 2018 01/26/18 2018   98 1 °F (36 7 °C) 90 16 162/63 95 %      Temp src Heart Rate Source Patient Position - Orthostatic VS BP Location FiO2 (%)   -- 01/26/18 2018 01/26/18 2218 01/26/18 2218 --    Monitor Lying Right arm       Pain Score       01/26/18 2018       No Pain        Wt Readings from Last 1 Encounters:   01/26/18 99 8 kg (220 lb)       Vital Signs:  01/26 0701  01/27 0700 01/27 0701  01/27 0937  Most Recent     Temperature (°F) 98 1        Pulse 8190 82  82    Respirations 1618 18  18    Blood Pressure 146/77185/82 170/77  170/77    SpO2 (%) 9596 95  95        Abnormal Labs/Diagnostic Test Results:   Lab Units 01/26/18 2024   WBC Thousand/uL 7 54   HEMOGLOBIN g/dL 12 9   HEMATOCRIT % 38 6   PLATELETS Thousands/uL 262   NEUTROS PCT % 76*   LYMPHS PCT % 13*   MONOS PCT % 6   EOS PCT % 5      Lab Units 01/26/18 2024   SODIUM mmol/L 136   POTASSIUM mmol/L 4 1   CHLORIDE mmol/L 101   CO2 mmol/L 28   BUN mg/dL 33*   CREATININE mg/dL 1 09   CALCIUM mg/dL 8 6   TOTAL PROTEIN g/dL 7 7   BILIRUBIN TOTAL mg/dL 0 29   ALK PHOS U/L 99   ALT U/L 25   AST U/L 19   GLUCOSE RANDOM mg/dL 263*      Troponin -- 0 52    EKG:  Sinus rhythm with wide QRS, bundle branch block pattern      Chest x-ray -- No infiltrates seen      ED Treatment:   Medication Administration from 01/26/2018 2017 to 01/27/2018 0935       Date/Time Order Dose Route Action Action by Comments     01/26/2018 2121 heparin (porcine) injection 4,000 Units 4,000 Units Intravenous Given Flakita Soni RN      01/26/2018 2121 heparin (porcine) 25,000 units in 250 mL infusion (premix) 11 1 Units/kg/hr Intravenous Rita 37 Flakita Soni RN      01/27/2018 2701 ascorbic acid (VITAMIN C) tablet 500 mg 500 mg Oral Given [facility] [address] [place] [address]      01/27/2018 4545 aspirin tablet 325 mg 325 mg [facility] [address] [place] [address]      01/27/2018 7690 cholecalciferol (VITAMIN D3) tablet 2,000 Units 2,000 Units Oral Given Lidia Up RN      01/27/2018 1441 clopidogrel (PLAVIX) tablet 75 mg 75 mg Oral Given Lidia Up RN      01/27/2018 8866 co-enzyme Q-10 capsule 100 mg 100 mg Oral Given Lidia Up RN      01/27/2018 4185 cyanocobalamin (VITAMIN B-12) tablet 500 mcg 500 mcg Oral Given Lidia Up RN      01/27/2018 9738 docusate sodium (COLACE) capsule 100 mg 100 mg Oral Given [facility]anny [address] [place] [address]      01/27/2018 2804 insulin lispro (HumaLOG) 100 units/mL subcutaneous injection 20 Units 20 Units Subcutaneous Given Lidia Up RN      01/27/2018 2055 LORazepam (ATIVAN) tablet 0 5 mg 0 5 mg [facility] [address] [place] [address]      01/27/2018 7657 metoprolol succinate (TOPROL-XL) 24 hr tablet 25 mg 25 mg Oral Given Lidia Up RN      01/27/2018 6032 multivitamin-minerals (CENTRUM) tablet 1 tablet 1 tablet Oral Given Lidia Up RN      01/27/2018 3391 pantoprazole (PROTONIX) EC tablet 40 mg 40 mg Oral Given Lidia Up RN      01/27/2018 3291 ranolazine (RANEXA) 12 hr tablet 500 mg 500 mg Oral Given Lidia Up, RN      01/27/2018 2298 vilazodone (VIIBRYD) tablet 40 mg 40 mg Oral Given Lidia Up, [facility]      01/27/2018 0757 insulin lispro (HumaLOG) 100 units/mL subcutaneous injection 1-6 Units 1 Units Subcutaneous Given Dale Ellis, [address] [facility]      01/27/2018 4626 acetaminophen (TYLENOL) tablet 650 mg 650 mg Oral Given Dale Ellis RN           Past Medical/Surgical History: Active Ambulatory Problems     Diagnosis Date Noted    Elevated troponin 07/03/2017    [facility] 07/[facility]    Hypertensive urgency 07/03/2017    Chest pain 07/03/2017    [facility] 07/03/2017    Hyperlipidemia 07/03/2017    Abdominal aneurysm ([place] [address] ) 07/03/2017    Hx of CABG 07/03/2017    Orthostasis 07/13/2017    Hypertension 07/13/2017    Obesity 07/13/2017       Past Medical History:   Diagnosis Date    AAA [facility]) [facility])     Anxiety     [facility]     DVT, lower extremity ([facility])     Hyperlipidemia     Hypertension     Prostate cancer ([place] [address] )     Skin cancer        Admitting Diagnosis: Chest pain [R07 9]    Age/Sex: 76 y o  male    Assessment/Plan:   * Non-STEMI (non-ST elevated myocardial infarction) ([facility] )   Assessment & Plan     Will continue to trend troponins with EKG  Also, patient is already on heparin drip at low dose for ACS  Cardiology consult           Chest pain   Assessment & Plan     Will continue to monitor  See plans as per non ST wave MI and CAD           Hx of CABG   Assessment & Plan     Previous history of CABG in current T with chest discomfort  According to patient, he is on medical management as stenting is not possible  Patient placed on heparin drip  Will trend troponins and consult Cardiology           Abdominal aneurysm [facility])   Assessment & Plan     Patient is due to have aortic aneurysm reimaged  Therefore, will get abdominal ultrasound           Diabetes mellitus ([facility] )   Assessment & Plan     Patient is currently on insulin sliding scale and to continue NovoLog 20 units before meals as well as insulin glargine 50 units at bedtime    Check blood sugars before meals and before bedtime  Hemoglobin A1c           Hyperlipidemia   Assessment & Plan     Will continue Crestor (substituted as Lipitor due to formulary)           Hypertension   Assessment & Plan     Will continue with metoprolol                  VTE Prophylaxis: Heparin Drip  / sequential compression device   Code Status: Prior full code  POLST: There is no POLST form on file for this patient (pre-hospital)     Anticipated Length of Stay:  Patient will be admitted on an Observation basis with an anticipated length of stay of  less than 2 midnights  Justification for Hospital Stay: Please see detailed plans noted above  Admission Orders:  M/S/Tele unit  Telem  Serial troponins -- 3 92, 3 37  SCD's  Up with assistance  accuchecks qid w/ ssi  Cons carb diet  Monitor I&O's, daily weights  Consult cardiology    Scheduled Meds:   ascorbic acid 500 mg Oral Daily   aspirin 325 mg Oral Daily   atorvastatin 80 mg Oral Daily With Dinner   cholecalciferol 2,000 Units Oral Daily   clopidogrel 75 mg Oral Daily   co-enzyme Q-10 100 mg Oral Daily   cyanocobalamin 500 mcg Oral Daily   docusate sodium 100 mg Oral BID   insulin glargine 50 Units Subcutaneous HS   insulin lispro 1-5 Units Subcutaneous HS   insulin lispro 1-6 Units Subcutaneous TID AC   insulin lispro 20 Units Subcutaneous TID With Meals   metoprolol succinate 25 mg Oral Daily   multivitamin-minerals 1 tablet Oral Daily   pantoprazole 40 mg Oral BID   ranolazine 500 mg Oral BID   vilazodone 40 mg Oral Daily     Continuous Infusions:   heparin (porcine) 3-20 Units/kg/hr (Order-Specific) Last Rate: 11 1 Units/kg/hr (01/26/18 2121)     PRN Meds:   acetaminophen    aluminum-magnesium hydroxide-simethicone    heparin (porcine)    LORazepam    ondansetron    simethicone        Chest pain   Assessment & Plan     Will continue to monitor      See plans as per non ST wave MI and CAD           * Non-STEMI (non-ST elevated myocardial infarction) ([facility] )   Assessment & Plan     Pt with hx of cad sp cabg  troponins peaked at 3 92 with slight trend downward on last with accelerated hypertension, now improving  Patient previously reported dizziness  Metoprolol was increased from 25-75 mg in July of last year and lisinopril discontinued secondary to low blood pressures  Previous catheterizations per documentation per Cardiology recommending intervention patient reportedly not amenable to intervention  At this time cardiology with plan to optimize medical management  Continue aspirin, metoprolol XL 25 mg, Plavix, Crestor 40 mg  Patient has been on Imdur and Ranexa in the past again adjusted secondary to hypotension  If blood pressures increased would look to add or consider low-dose lisinopril          Abdominal aneurysm [facility])   Assessment & Plan     Patient is due to have aortic aneurysm reimaged  Therefore, will get abdominal ultrasound, changed from screen to order discussed with nursing           Diabetes mellitus ([facility] )   Assessment & Plan     insulin sliding scale and to continue NovoLog 20 units before meals as well as insulin glargine 50 units at bedtime reduced to 40units  due to lunch time level of 48  a1c 7 5  Will need to monitor   Ada diet           Obesity   Assessment & Plan     Diet control  Education  ADA diet  Diabetic management             VTE Pharmacologic Prophylaxis:   Pharmacologic: Heparin Drip  Mechanical VTE Prophylaxis in Place:  Yes      Current Patient Status: Observation   Certification Statement: The patient will continue to require additional inpatient hospital stay due to pending plan with cardiology on heparin drip at this time due to nstemi      Discharge Plan: discharge home when medically stable

## 2018-01-27 NOTE — ED ATTENDING ATTESTATION
Awais Prado DO, saw and evaluated the patient  I have discussed the patient with the resident/non-physician practitioner and agree with the resident's/non-physician practitioner's findings, Plan of Care, and MDM as documented in the resident's/non-physician practitioner's note, except where noted  All available labs and Radiology studies were reviewed  At this point I agree with the current assessment done in the Emergency Department  I have conducted an independent evaluation of this patient a history and physical is as follows:      75 yo male presents for evaluation of intermittent chest pain lasting for a few minutes at a time  Described as a burning sensation  Today had recurrence as he was walking up stairs  Radiates up his neck and L arm  He called EMS then received NTG at which point his CP resolved  Denies associated dyspnea, vomiting, diaphoresis, leg pain or swelling  Pt has hx of CABG, prior MI  Pt states symptoms feel similar to his prior MI  Pt is pain free at this time  Imp: chest pain, pt moderate to high risk for ACS given story  Has well established CAD  Plan: cardiac eval  Already given ASA  Serial ECG  Will require admission for further eval and tx  Critical Care Time  The patient presented with a condition in which there was a high probability of imminent or life-threatening deterioration, and critical care services (excluding separately billable procedures) totalled 30-74 minutes          Procedures

## 2018-01-27 NOTE — H&P
H&P- Tatiana Ramírez 1940, 76 y o  male MRN: [id number]    Unit/Bed#: ED 06 Encounter: [id number]    Primary Care Provider: Suzanne Rodriguez DO   Date and time admitted to hospital: 1/26/2018  8:17 PM        * Non-STEMI (non-ST elevated myocardial infarction) [facility])   Assessment & Plan    Will continue to trend troponins with EKG  Also, patient is already on heparin drip at low dose for ACS  Cardiology consult  Chest pain   Assessment & Plan    Will continue to monitor  See plans as per non ST wave MI and CAD  Hx of CABG   Assessment & Plan    Previous history of CABG in current T with chest discomfort  According to patient, he is on medical management as stenting is not possible  Patient placed on heparin drip  Will trend troponins and consult Cardiology  Abdominal aneurysm [facility])   Assessment & Plan    Patient is due to have aortic aneurysm reimaged  Therefore, will get abdominal ultrasound  Diabetes mellitus ([address] )   Assessment & Plan    Patient is currently on insulin sliding scale and to continue NovoLog 20 units before meals as well as insulin glargine 50 units at bedtime  Check blood sugars before meals and before bedtime  Hemoglobin A1c  Hyperlipidemia   Assessment & Plan    Will continue Crestor (substituted as Lipitor due to formulary)  Hypertension   Assessment & Plan    Will continue with metoprolol  VTE Prophylaxis: Heparin Drip  / sequential compression device   Code Status: Prior full code  POLST: There is no POLST form on file for this patient (pre-hospital)    Anticipated Length of Stay:  Patient will be admitted on an Observation basis with an anticipated length of stay of  less than 2 midnights  Justification for Hospital Stay: Please see detailed plans noted above      Chief Complaint:     Chest discomfort  History of Present Illness:  Tatiana Ramírez is a 76 y o  male who has a past medical history significant for extensive CAD with history of CABG  According to patient, he had chest discomfort middle part of last year for which he was evaluated by Cardiology with elevation of troponins  However he was told that it is not possible to do any stenting or intervention  He is therefore placed on medical management  He was placed on cardiac rehabilitation without any problems  Approximately 630 the evening of his visit in [place] emergency room, the patient suddenly felt as if he was having an epigastric discomfort with radiation towards the mid chest and towards the jaw  The patient denies any shortness of breath  Likewise, the patient states that there is left arm numbness  It started on its own and subsequently relieved 15 minutes later  He took aspirin 325 mg x1  Currently, patient is on heparin drip  Noted is that the patient feels much better without any chest discomfort  Patient denies any shortness of breath  There is no note of any dizziness  No current history of palpitations  Review of Systems:    Constitutional:  Denies fever or chills   Eyes:  Denies change in visual acuity   HENT:  Denies nasal congestion or sore throat   Respiratory:  Denies cough or shortness of breath   Cardiovascular:  Earlier with chest discomfort  However currently is comfortable  No note of any edema  No dyspnea on exertion  Patient claims that he was in cardiac rehabilitation without any problems      GI:  Denies abdominal pain, nausea, vomiting, bloody stools or diarrhea   :  Denies dysuria   Musculoskeletal:  Denies back pain or joint pain   Integument:  Denies rash   Neurologic:  Denies headache, focal weakness or sensory changes   Endocrine:  Denies polyuria or polydipsia   Lymphatic:  Denies swollen glands   Psychiatric:  Denies depression or anxiety     Past Medical and Surgical History:   Past Medical History:   Diagnosis Date    AAA (abdominal aortic aneurysm) ([facility] )     Anxiety     Diabetes mellitus ([facility] )  D[facility], [facility]     Hyperlipidemia     Hypertension     Prostate cancer ([facility] )     Skin cancer      Past Surgical History:   Procedure Laterality Date    CARDIAC SURGERY      CORONARY ARTERY BYPASS GRAFT      OH REPAIR UMBILICAL JOTU,7+L/E,LWDIT N/A 3/10/2017    Procedure: REPAIR HERNIA UMBILICAL;  Surgeon: Rik Rick MD;  Location: [facility];  Service: General    PROSTATECTOMY         Meds/Allergies:  ascorbic acid (VITAMIN C) 500 mg tablet Take 500 mg by mouth daily Sanket Eli MD Reordered   Ordered as: ascorbic acid (VITAMIN C) tablet 500 mg - 500 mg, Oral, Daily, First dose on Sat 1/27/18 at 0900   aspirin 325 mg tablet Take 325 mg by mouth daily  Sanket Eli MD Reordered   Ordered as: aspirin tablet 325 mg - 325 mg, Oral, Daily, First dose on Sat 1/27/18 at 0900   Cholecalciferol (VITAMIN D3 PO) Take 2,000 Units by mouth daily  Sanket Eli MD Reordered   Ordered as: cholecalciferol (VITAMIN D3) tablet 2,000 Units - 2,000 Units, Oral, Daily, First dose on Sat 1/27/18 at 0900   clopidogrel (PLAVIX) 75 mg tablet Take 1 tablet by mouth see administration instructions Sanket Eli MD Reordered   Ordered as: clopidogrel (PLAVIX) tablet 75 mg - 75 mg, Oral, See admin instructions, Starting Fri 1/26/18 at [phone] Do not take with grapefruit or grapefruit juice  LOOK ALIKE SOUND ALIKE MED    coenzyme Q-10 100 MG capsule Take 1 capsule by mouth daily Sanket Eli MD Reordered   Ordered as: coenzyme Q-10 capsule 100 mg - 100 mg, Oral, Daily, First dose on Sat 1/27/18 at 0900   cyanocobalamin (VITAMIN B-12) 500 mcg tablet Take 5,000 mcg by mouth daily Sanket Eli MD Reordered   Ordered as: cyanocobalamin (VITAMIN B-12) tablet 500 mcg - 500 mcg, Oral, Daily, First dose on Sat 1/27/18 at 0900   docusate sodium (COLACE) 100 mg capsule Take 100 mg by mouth 2 (two) times a day   Sanket Eli MD Reordered   Ordered as: docusate sodium (COLACE) capsule 100 mg - 100 mg, Oral, 2 times daily, First dose on Sat 1/27/18 at 0900   insulin aspart (NovoLOG) 100 units/mL injection Inject 20 Units under the skin 3 (three) times a day before meals   Blossom Moralez MD Reordered   Ordered as: insulin lispro (HumaLOG) 100 units/mL subcutaneous injection 20 Units - 20 Units, Subcutaneous, 3 times daily with meals, First dose on Sat 1/27/18 at [address]  **DISPOSE IN 8 GALLON BLACK CONTAINER**  LOOK ALIKE SOUND ALIKE MED    Insulin Glargine (TOUJEO SOLOSTAR SC) Inject 50 Units under the skin daily at bedtime Blossom Moralez MD Reordered   Ordered as: insulin glargine (LANTUS) subcutaneous injection 50 Units - 50 Units, Subcutaneous, Daily at bedtime, First dose on Fri 1/26/18 at [address]  Do not dilute/mix insulin glargine with any other insulin formulation/solution  **DISPOSE IN 8 GALLON BLACK CONTAINER** Do not hold medication without a physician order  LORazepam (ATIVAN) 1 mg tablet Take 0 5 tablets by mouth every 8 (eight) hours as needed for anxiety for up to 10 days Blossom Moralez MD Reordered   Ordered as: LORazepam (ATIVAN) tablet 0 5 mg - 0 5 mg, Oral, Every 8 hours PRN, anxiety, Starting Fri 1/26/18 at 2237 High alert medication  LOOK ALIKE SOUND ALIKE MED    metoprolol succinate (TOPROL-XL) 25 mg 24 hr tablet Take 1 tablet by mouth daily Blossom Moralez MD Reordered   Ordered as: metoprolol succinate (TOPROL-XL) 24 hr tablet 25 mg - 25 mg, Oral, Daily, First dose on Sat 1/27/18 at 0900 Hold for heart rate less than 50 beats per minute  Do not crush or chew   LOOK ALIKE SOUND ALIKE MED Hold for systolic blood pressure less than (mmHg): 110   Multiple Vitamins-Minerals (CENTRUM SILVER PO) Take 1 tablet by mouth daily Blossom Moralez MD Reordered   Ordered as: multivitamin-minerals (CENTRUM) tablet 1 tablet - 1 tablet, Oral, Daily, First dose on Sat 1/27/18 at 0900 **DISPOSE IN 8 GALLON BLACK CONTAINER**    pantoprazole (PROTONIX) 40 mg tablet Take 40 mg by mouth 2 (two) times a day  Sebastian Casiano MD Reordered   Ordered as: pantoprazole (PROTONIX) EC tablet 40 mg - 40 mg, Oral, 2 times daily, First dose on Sat 1/27/18 at 0900 Swallow whole; do not crush, chew or split  LOOK ALIKE SOUND ALIKE MED    ranolazine (RANEXA) 500 mg 12 hr tablet Take 1 tablet by mouth 2 (two) times a day Sebastian Casiano MD Reordered   Ordered as: ranolazine (RANEXA) 12 hr tablet 500 mg - 500 mg, Oral, 2 times daily, First dose on Sat 1/27/18 at 0900 Swallow whole; do not crush, chew or split    rosuvastatin (CRESTOR) 20 MG tablet Take 40 mg by mouth daily   Sebastian Casiano MD Reordered   Ordered as: atorvastatin (LIPITOR) tablet 80 mg - 80 mg, Oral, Daily with dinner, First dose on Sat 1/27/18 at 1630   senna (SENOKOT) 8 6 mg Take 1 tablet by mouth daily Sebastian Casiano MD Not Ordered   simethicone (MYLICON) 80 mg chewable tablet Chew 1 tablet 4 (four) times a day as needed for flatulence Sebastian Casiano MD Reordered   Ordered as: [facility] chewable tablet 80 mg - 80 mg, Oral, 4 times daily PRN, flatulence, Starting Fri 1/26/18 at [facility] before swallowing  Vilazodone HCl (VIIBRYD PO) Take 1 tablet by mouth daily Indications: unsure of dose  Sebastian Casiano MD Reordered   Ordered as: vilazodone [facility]) tablet 40 mg - 40 mg, Oral, Daily, First dose on Sat 1/27/18 at 0900 Administer with food  Allergies: Allergies   Allergen Reactions    Sulfa Antibiotics Other (See Comments)     Generalized redness     History:  Marital Status: /Civil Union   Occupation:  Currently retired  Patient Pre-hospital Living Situation:  Lives at home  Patient Pre-hospital Level of Mobility:  Ambulatory  Patient Pre-hospital Diet Restrictions:  Cardiac and diabetic  Substance Use History:   History   Alcohol Use No     History   Smoking Status    Former Smoker   Smokeless Tobacco    Never Used     History   Drug Use No       Family History:  History reviewed   No pertinent family history  Physical Exam:     Vitals:   Blood Pressure: 149/74 (01/27/18 0035)  Pulse: 84 (01/27/18 0035)  Temperature: 98 1 °F (36 7 °C) (01/26/18 2018)  Respirations: 16 (01/27/18 0035)  Weight - Scale: 99 8 kg (220 lb) (01/26/18 2018)  SpO2: 95 % (01/27/18 0035)    Constitutional:  Well developed, well nourished, no acute distress, non-toxic appearance with obese habitus  Eyes:  PERRL, conjunctiva normal   HENT:  Atraumatic, external ears normal, nose normal, oropharynx moist, no pharyngeal exudates  Neck- normal range of motion, no tenderness, supple   Respiratory:  No respiratory distress, normal breath sounds, no rales, no wheezing   Cardiovascular:  Normal rate, normal rhythm, no murmurs, no gallops, no rubs   GI:  Soft, nondistended, normal bowel sounds, nontender, no organomegaly, no mass, no rebound, no guarding   :  No costovertebral angle tenderness   Musculoskeletal:  No edema, no tenderness, no deformities  Back- no tenderness  Integument:  Well hydrated, no rash   Lymphatic:  No lymphadenopathy noted   Neurologic:  Alert &awake, communicative, CN 2-12 normal, normal motor function, normal sensory function, no focal deficits noted   Psychiatric:  Speech and behavior appropriate       Lab Results: I have personally reviewed pertinent reports  Results from last 7 days  Lab Units 01/26/18 2024   WBC Thousand/uL 7 54   HEMOGLOBIN g/dL 12 9   HEMATOCRIT % 38 6   PLATELETS Thousands/uL 262   NEUTROS PCT % 76*   LYMPHS PCT % 13*   MONOS PCT % 6   EOS PCT % 5       Results from last 7 days  Lab Units 01/26/18 2024   SODIUM mmol/L 136   POTASSIUM mmol/L 4 1   CHLORIDE mmol/L 101   CO2 mmol/L 28   BUN mg/dL 33*   CREATININE mg/dL 1 09   CALCIUM mg/dL 8 6   TOTAL PROTEIN g/dL 7 7   BILIRUBIN TOTAL mg/dL 0 29   ALK PHOS U/L 99   ALT U/L 25   AST U/L 19   GLUCOSE RANDOM mg/dL 263*           EKG:  Sinus rhythm with wide QRS, bundle branch block pattern      Imaging:  Chest x-ray personally reviewed and is clear  No infiltrates seen  No results found  ** Please Note: Dragon 360 Dictation voice to text software was used in the creation of this document   **

## 2018-01-27 NOTE — PROGRESS NOTES
Dr Eric Groves was in to see the patient and is going to adjust some medications and keep him on heparin overnight will continue to monitor   PT has no complaints at this time, insulin medication is not here from the pharmacy requested the dose and will give to the patient when I receive the medication

## 2018-01-27 NOTE — PROGRESS NOTES
Progress Note - Leland Reading 1940, 76 y o  male MRN: [id number]    Unit/Bed#: CW2 216-01 Encounter: [id number]    Primary Care Provider: Julian Glass DO   Date and time admitted to hospital: 1/26/2018  8:17 PM        Chest pain   Assessment & Plan    Will continue to monitor  See plans as per non ST wave MI and CAD  * Non-STEMI (non-ST elevated myocardial infarction) [facility])   Assessment & Plan    Pt with hx of cad sp cabg  troponins peaked at 3 92 with slight trend downward on last with accelerated hypertension, now improving  Patient previously reported dizziness  Metoprolol was increased from 25-75 mg in July of last year and lisinopril discontinued secondary to low blood pressures  Previous catheterizations per documentation per Cardiology recommending intervention patient reportedly not amenable to intervention  At this time cardiology with plan to optimize medical management  Continue aspirin, metoprolol XL 25 mg, Plavix, Crestor 40 mg  Patient has been on Imdur and Ranexa in the past again adjusted secondary to hypotension  If blood pressures increased would look to add or consider low-dose lisinopril        Abdominal aneurysm [facility])   Assessment & Plan    Patient is due to have aortic aneurysm reimaged  Therefore, will get abdominal ultrasound, changed from screen to order discussed with nursing         Diabetes mellitus ([facility] )   Assessment & Plan    insulin sliding scale and to continue NovoLog 20 units before meals as well as insulin glargine 50 units at bedtime reduced to 40units  due to lunch time level of 48  a1c 7 5  Will need to monitor   Ada diet         Obesity   Assessment & Plan    Diet control  Education  ADA diet  Diabetic management          VTE Pharmacologic Prophylaxis:   Pharmacologic: Heparin Drip  Mechanical VTE Prophylaxis in Place: Yes    Patient Centered Rounds: I have performed bedside rounds with nursing staff today      Discussions with Specialists or Other Care Team Provider: nursing     Education and Discussions with Family / Patient: patient     Time Spent for Care: 30 minutes  More than 50% of total time spent on counseling and coordination of care as described above  Current Length of Stay: 0 day(s)    Current Patient Status: Observation   Certification Statement: The patient will continue to require additional inpatient hospital stay due to pending plan with cardiology on heparin drip at this time due to nstemi     Discharge Plan: discharge home when medically stable     Code Status: Level 1 - Full Code      Subjective:   Patient reports ongoing shortness of breath with just sitting up at bedside  Also reports shortness of breath when in bathroom this morning short amount of activity  Patient denies chest pain at this time  States he did not really understand physician who spoke with him from Cardiology today  Discussed current plan of care explained that attending will be seeing patient shortly  Objective:     Vitals:   Temp (24hrs), Av °F (36 7 °C), Min:97 7 °F (36 5 °C), Max:98 2 °F (36 8 °C)    HR:  [75-90] 75  Resp:  [16-24] 20  BP: (122-185)/(59-82) 122/61  SpO2:  [95 %-97 %] 97 %  Body mass index is 31 57 kg/m²  Input and Output Summary (last 24 hours): Intake/Output Summary (Last 24 hours) at 18 1449  Last data filed at 18 1229   Gross per 24 hour   Intake              551 ml   Output                0 ml   Net              551 ml       Physical Exam:     Physical Exam   Constitutional: He is oriented to person, place, and time  He appears well-developed  No distress  HENT:   Head: Normocephalic and atraumatic  Eyes: Conjunctivae are normal  Pupils are equal, round, and reactive to light  Right eye exhibits no discharge  Left eye exhibits no discharge  No scleral icterus  Neck: No JVD present  No tracheal deviation present  No thyromegaly present  Cardiovascular: Normal rate    Exam reveals no gallop and no friction rub  Murmur heard  Pulmonary/Chest: No stridor  No respiratory distress  He has no wheezes  He has no rales  He exhibits no tenderness  Abdominal: Soft  Bowel sounds are normal  He exhibits no distension and no mass  There is no tenderness  There is no rebound and no guarding  Hernia    Musculoskeletal: He exhibits no edema, tenderness or deformity  Lymphadenopathy:     He has no cervical adenopathy  Neurological: He is oriented to person, place, and time  Skin: No rash noted  He is not diaphoretic  No erythema  No pallor  Psychiatric: He has a normal mood and affect  Additional Data:     Labs:      Results from last 7 days  Lab Units 01/27/18  0813 01/26/18 2024   WBC Thousand/uL 8 56 7 54   HEMOGLOBIN g/dL 12 8 12 9   HEMATOCRIT % 37 7 38 6   PLATELETS Thousands/uL 250 262   NEUTROS PCT %  --  76*   LYMPHS PCT %  --  13*   MONOS PCT %  --  6   EOS PCT %  --  5       Results from last 7 days  Lab Units 01/27/18  0813 01/26/18 2024   SODIUM mmol/L 140 136   POTASSIUM mmol/L 4 0 4 1   CHLORIDE mmol/L 104 101   CO2 mmol/L 31 28   BUN mg/dL 25 33*   CREATININE mg/dL 0 92 1 09   CALCIUM mg/dL 8 6 8 6   TOTAL PROTEIN g/dL  --  7 7   BILIRUBIN TOTAL mg/dL  --  0 29   ALK PHOS U/L  --  99   ALT U/L  --  25   AST U/L  --  19   GLUCOSE RANDOM mg/dL 140 263*           * I Have Reviewed All Lab Data Listed Above  * Additional Pertinent Lab Tests Reviewed:  All Labs Within Last 24 Hours Reviewed    Imaging:    Imaging Reports Reviewed Today Include: reviewed       Recent Cultures (last 7 days):           Last 24 Hours Medication List:     ascorbic acid 500 mg Oral Daily   aspirin 325 mg Oral Daily   atorvastatin 80 mg Oral Daily With Dinner   cholecalciferol 2,000 Units Oral Daily   clopidogrel 75 mg Oral Daily   co-enzyme Q-10 100 mg Oral Daily   cyanocobalamin 500 mcg Oral Daily   docusate sodium 100 mg Oral BID   insulin glargine 50 Units Subcutaneous HS   insulin lispro 1-5 Units Subcutaneous HS   insulin lispro 1-6 Units Subcutaneous TID AC   insulin lispro 20 Units Subcutaneous TID With Meals   metoprolol succinate 25 mg Oral Daily   multivitamin-minerals 1 tablet Oral Daily   pantoprazole 40 mg Oral BID   ranolazine 500 mg Oral BID   vilazodone 40 mg Oral Daily        Today, Patient Was Seen By: Tyler Soulier, CRNP    ** Please Note: Dictation voice to text software may have been used in the creation of this document   **

## 2018-01-27 NOTE — ED NOTES
EKG completed  Signed by Dr Cisco Webster and placed on chart        [address] [facility]  65/24/67 [id number]

## 2018-01-27 NOTE — ED NOTES
Upon entering room to check on pt, IV in left arm found to be infiltrated  New IV established in right AC and heparin switched to new line   Left AC line d/c'jacinda Mcfadden, RN  01/27/18 0242

## 2018-01-27 NOTE — ED PROVIDER NOTES
History  Chief Complaint   Patient presents with    Chest Pain     burning chest pain and pressure relieved with 324 ASA and 1 SL Nitro prehospital  Pt states his pain radiated to his left shoulder and left neck     HPI    This is a 66-year-old male with history of CABG ACS who presents today with chest pain  Patient states for the past 2-3 days he has been having burning in his chest that radiates to his arm neck along with numbness in his arm  Denies any shortness of breath  States it lasts for few minutes and goes away  Today it has lasted for 15 minutes he tried to get nitro but could not find it  He states he took 324 of aspirin when paramedics got there  His chest pain resolved after 1 sublingual nitro  He states his last CABG was a year ago  States he is chest pain-free right now  Denies any fevers or chills  No back pain no abdominal pain weakness numbness or tingling  66-year-old male with chest pain  Appears to be ACS  Will give him heparin admit him for NSTEMI    Prior to Admission Medications   Prescriptions Last Dose Informant Patient Reported? Taking? Cholecalciferol (VITAMIN D3 PO)   Yes Yes   Sig: Take 2,000 Units by mouth daily  Insulin Glargine (TOUJEO SOLOSTAR SC)   Yes Yes   Sig: Inject 50 Units under the skin daily at bedtime   LORazepam (ATIVAN) 1 mg tablet   No Yes   Sig: Take 0 5 tablets by mouth every 8 (eight) hours as needed for anxiety for up to 10 days   Multiple Vitamins-Minerals (CENTRUM SILVER PO)   Yes Yes   Sig: Take 1 tablet by mouth daily   Vilazodone HCl (VIIBRYD PO)   Yes Yes   Sig: Take 1 tablet by mouth daily Indications: unsure of dose  ascorbic acid (VITAMIN C) 500 mg tablet   Yes Yes   Sig: Take 500 mg by mouth daily   aspirin 325 mg tablet   Yes Yes   Sig: Take 325 mg by mouth daily     clopidogrel (PLAVIX) 75 mg tablet   No Yes   Sig: Take 1 tablet by mouth see administration instructions   coenzyme Q-10 100 MG capsule   No Yes   Sig: Take 1 capsule by mouth daily   cyanocobalamin (VITAMIN B-12) 500 mcg tablet   Yes Yes   Sig: Take 5,000 mcg by mouth daily   docusate sodium (COLACE) 100 mg capsule   Yes Yes   Sig: Take 100 mg by mouth 2 (two) times a day  insulin aspart (NovoLOG) 100 units/mL injection   Yes Yes   Sig: Inject 20 Units under the skin 3 (three) times a day before meals     metoprolol succinate (TOPROL-XL) 25 mg 24 hr tablet   No Yes   Sig: Take 1 tablet by mouth daily   pantoprazole (PROTONIX) 40 mg tablet   Yes Yes   Sig: Take 40 mg by mouth 2 (two) times a day  ranolazine (RANEXA) 500 mg 12 hr tablet   No Yes   Sig: Take 1 tablet by mouth 2 (two) times a day   rosuvastatin (CRESTOR) 20 MG tablet   Yes Yes   Sig: Take 40 mg by mouth daily     senna (SENOKOT) 8 6 mg   No Yes   Sig: Take 1 tablet by mouth daily   simethicone (MYLICON) 80 mg chewable tablet   No Yes   Sig: Chew 1 tablet 4 (four) times a day as needed for flatulence      Facility-Administered Medications: None       Past Medical History:   Diagnosis Date    AAA (abdominal aortic aneurysm) (HCC)     Anxiety     [facility] mellitus ([facility] )     DVT, lower extremity (HCC)     Hyperlipidemia     Hypertension     Prostate cancer ([facility] )     Skin cancer        Past Surgical History:   Procedure Laterality Date    CARDIAC SURGERY      CORONARY ARTERY BYPASS GRAFT      MN REPAIR UMBILICAL DXIZ,6+G/Q,YJGFA N/A 3/10/2017    Procedure: REPAIR HERNIA UMBILICAL;  Surgeon: Gertrude Bernard MD;  [facility];  Service: General    PROSTATECTOMY         History reviewed  No pertinent family history  I have reviewed and agree with the history as documented      Social History   Substance Use Topics    Smoking status: Former Smoker    Smokeless tobacco: Never Used    Alcohol use No        Review of Systems  REVIEW OF SYSTEMS  Constitutional:  Denies fever or chills   Eyes:  Denies change in visual acuity   HENT:  Denies nasal congestion or sore throat   Respiratory:  Denies cough or shortness of breath   Cardiovascular:  +chest pain   GI:  Denies abdominal pain, nausea, vomiting, bloody stools or diarrhea   :  Denies dysuria   Musculoskeletal:  Denies back pain or joint pain   Integument:  Denies rash   Neurologic:  Denies headache, focal weakness or sensory changes   Endocrine:  Denies polyuria or polydipsia   Lymphatic:  Denies swollen glands   Psychiatric:  Denies depression or anxiety     Physical Exam  ED Triage Vitals   Temperature Pulse Respirations Blood Pressure SpO2   01/26/18 2018 01/26/18 2018 01/26/18 2018 01/26/18 2018 01/26/18 2018   98 1 °F (36 7 °C) 90 16 162/63 95 %      Temp src Heart Rate Source Patient Position - Orthostatic VS BP Location FiO2 (%)   -- 01/26/18 2018 01/26/18 2218 01/26/18 2218 --    Monitor Lying Right arm       Pain Score       01/26/18 2018       No Pain           Orthostatic Vital Signs  Vitals:    01/26/18 2018 01/26/18 2126 01/26/18 2218   BP: 162/63 (!) 185/82 165/74   Pulse: 90 84 81   Patient Position - Orthostatic VS:   Lying       Physical Exam  PHYSICAL EXAM    Constitutional:  Well developed, well nourished, no acute distress, non-toxic appearance    HEENT:  Atraumatic, PERRL, conjunctiva normal  Oropharynx moist, no pharyngeal exudates  Neck- normal range of motion, no tenderness, supple   Respiratory:  No respiratory distress, normal breath sounds, no rales, no wheezing   Cardiovascular:  Normal rate, normal rhythm, no murmurs, no gallops, no rubs   GI:  Soft, nondistended, normal bowel sounds, nontender, no organomegaly, no mass, no rebound, no guarding   :  No costovertebral angle tenderness   Musculoskeletal:  No edema, no tenderness, no deformities   Back- no tenderness  Integument:  Well hydrated, no rash   Lymphatic:  No lymphadenopathy noted   Neurologic:  Alert & oriented x 3, CN 2-12 normal, normal motor function, normal sensory function, no focal deficits noted   Psychiatric:  Speech and behavior appropriate     ED Medications  Medications   heparin (porcine) 25,000 units in 250 mL infusion (premix) (11 1 Units/kg/hr × 90 kg (Order-Specific) Intravenous New Bag 1/26/18 2121)   heparin (porcine) injection 4,000 Units (not administered)   heparin (porcine) injection 2,000 Units (not administered)   heparin (porcine) injection 4,000 Units (4,000 Units Intravenous Given 1/26/18 2121)       Diagnostic Studies  Results Reviewed     Procedure Component Value Units Date/Time    APTT six (6) hours after Heparin bolus/drip initiation or dosing change [[id number]]     Lab Status:  No result Specimen:  Blood     Protime-INR [[id number]]     Lab Status:  No result Specimen:  Blood     Troponin I [[id number]]  (Abnormal) Collected:  01/26/18 2024    Lab Status:  Final result Specimen:  Blood from Arm, Left Updated:  01/26/18 2054     Troponin I 0 52 (H) ng/mL     Narrative:         [employer] Chemistry analyzer 99% cutoff is > 0 04 ng/mL in network labs    o cTnI 99% cutoff is useful only when applied to patients in the clinical setting of myocardial ischemia  o cTnI 99% cutoff should be interpreted in the context of clinical history, ECG findings and possibly cardiac imaging to establish correct diagnosis  o cTnI 99% cutoff may be suggestive but clearly not indicative of a coronary event without the clinical setting of myocardial ischemia      Comprehensive metabolic panel [[id number]]  (Abnormal) Collected:  01/26/18 2024    Lab Status:  Final result Specimen:  Blood from Arm, Left Updated:  01/26/18 2053     Sodium 136 mmol/L      Potassium 4 1 mmol/L      Chloride 101 mmol/L      CO2 28 mmol/L      Anion Gap 7 mmol/L      BUN 33 (H) mg/dL      Creatinine 1 09 mg/dL      Glucose 263 (H) mg/dL      Calcium 8 6 mg/dL      AST 19 U/L      ALT 25 U/L      Alkaline Phosphatase 99 U/L      Total Protein 7 7 g/dL      Albumin 3 3 (L) g/dL      Total Bilirubin 0 29 mg/dL      eGFR 64 ml/min/1 73sq m     Narrative:         National Kidney Disease Education Program recommendations are as follows:  GFR calculation is accurate only with a steady state creatinine  Chronic Kidney disease less than 60 ml/min/1 73 sq  meters  Kidney failure less than 15 ml/min/1 73 sq  meters  CBC and differential [[id number]]  (Abnormal) Collected:  01/26/18 2024    Lab Status:  Final result Specimen:  Blood from Arm, Left Updated:  01/26/18 2037     WBC 7 54 Thousand/uL      RBC 4 27 Million/uL      Hemoglobin 12 9 g/dL      Hematocrit 38 6 %      MCV 90 fL      MCH 30 2 pg      MCHC 33 4 g/dL      RDW 14 2 %      MPV 10 5 fL      Platelets 301 Thousands/uL      nRBC 0 /100 WBCs      Neutrophils Relative 76 (H) %      Lymphocytes Relative 13 (L) %      Monocytes Relative 6 %      Eosinophils Relative 5 %      Basophils Relative 0 %      Neutrophils Absolute 5 63 Thousands/µL      Lymphocytes Absolute 1 00 Thousands/µL      Monocytes Absolute 0 46 Thousand/µL      Eosinophils Absolute 0 39 Thousand/µL      Basophils Absolute 0 03 Thousands/µL                  X-ray chest 2 views    (Results Pending)         Procedures  ECG 12 Lead Documentation  Date/Time: 1/27/2018 1:37 AM  Performed by: Luis [place] by: Randi Ramos     Indications / Diagnosis:  Chest pain  Patient location:  ED  Previous ECG:     Previous ECG:  Compared to current    Similarity:  Changes noted  Interpretation:     Interpretation: abnormal    Rate:     ECG rate:  84    ECG rate assessment: normal    Rhythm:     Rhythm: sinus rhythm    Ectopy:     Ectopy: none    QRS:     QRS axis:  Normal  ST segments:     ST segments:  Normal  T waves:     T waves: peaked    Comments:      RBBB with peaked T waves, no st elevations or depressions           Phone Consults  ED Phone Contact    ED Course  ED Course as of Jan 26 2236 Fri Jan 26, 2018 2037 Ekg: RBBB with rate 91 with no st elevation   Hyperacute T waves             Identification of Seniors at [facility] Most Recent Value   (ISAR) Identification of Seniors at Risk   Before the illness or injury that brought you to the Emergency, did you need someone to help you on a regular basis? 0 Filed at: 01/26/2018 2020   In the last 24 hours, have you needed more help than usual?  0 Filed at: 01/26/2018 2020   Have you been hospitalized for one or more nights during the past 6 months? 1 Filed at: 01/26/2018 2020   In general, do you see well?  0 Filed at: 01/26/2018 2020   In general, do you have serious problems with your memory? 0 Filed at: 01/26/2018 2020   Do you take more than three different medications every day? 1 Filed at: 01/26/2018 2020   ISAR Score  2 Filed at: 01/26/2018 2020                          MDM  CritCare Time    Disposition  Final diagnoses:   NSTEMI (non-ST elevated myocardial infarction) [facility])     Time reflects when diagnosis was documented in both MDM as applicable and the Disposition within this note     Time User Action Codes Description Comment    1/26/2018 10:29 PM Wan Duncan U  8  [I21 4] NSTEMI (non-ST elevated myocardial infarction) [facility])       ED Disposition     ED Disposition Condition Comment    Admit  Case was discussed with terrence and the patient's admission status was agreed to be Admission Status: inpatient status to the service of Dr Camryn Acevedo     Follow-up Information    None       Patient's Medications   Discharge Prescriptions    No medications on file     No discharge procedures on file  ED Provider  Attending physically available and evaluated Alden Jackson I managed the patient along with the ED Attending      Electronically Signed by         Princess Danelle MD  01/27/18 5926

## 2018-01-27 NOTE — CONSULTS
Consultation - Cardiology   Alden Manuel 76 y o  male MRN: [id number]  Unit/Bed#: ED 06 Encounter: [id number]    Assessment/Plan     Assessment:  1  NSTEMI type 1  2  CAD s/p CABGx3 (12/2013)  · CABGx3 = LIMA to LAD, SVG to OM1, SVG to rPL  · LHC - 7/5/17 - LM 90%, oLAD 99%, oLCx 60%, OM1 100% , LIMA to LAD - patent, distal LAD was very small sized with diffuse disease, SVG to OM1, patent, SVG to dRCA - aneurysmal dilatation in distal portion, no disease  · Nuclear stress - 8/15/17 - LVEF 41%, medium sized, moderately severe defect of basal-mid anterior wall; small, moderately severe reversible defect of apical inferior wall  3  H/o Abdominal aneurysm  · Family h/o AAA, and death related to AAA rupture in brother  3  Diabetes Mellitus Type 2   5  Hyperlipidemia  6  Hypertension    Plan:  NSTEMI type 1  · Troponins peaked at 3 92, and then downtrending  · Did present with elevated BP in 160-190s at presentation, which has since improved  · He has previously had issues with dizziness, off his metoprolol was increased from 25 to 75 in July last year, and he was eventually taken off even his lisinopril as blood pressure of was noted to be subsequently low in the 80s to 100s outpatient visit  · Based on previous catheterization reports, it is does not seem to be amenable to intervention  But will discuss with interventional Cardiology regarding possibilities for any intervention   · EKG - NSR, RBBB, septal infarct   Appears unchanged  · Will continue optimizing medical therapy in the meantime  · Continue aspirin, Plavix, metoprolol XL 25, Crestor 40  · He was also originally on Imdur 30 and Ranexa, but was taken off Imdur late last year due to his low blood pressure  · HR in 70-90s, will reattempt to slowly uptitrate metoprolol XL  · Increase metoprolol XL 50  · Also add Imdur 30 for angina, and monitor BP  · Continue heparin drip for 48 hours per ACS protocol    History of Present Illness   Physician Requesting Consult: [address] [place] [address], DO  Reason for Consult / Principal Problem: NSTEMI  HPI: Hamilton Kang is a 76y o  year old male who presents with chest pain    73-year-old gentleman who has a history of cardiac bypass about 4 years ago, comes in with complains of chest pain  He has been having complaints of on and off chest pain since last July  He had a NSTEMI in July 2017 for which he underwent left heart catheterization  He did have disease in the native vessel distal to the LAD bypass graft, which was not amenable to any intervention  He was recommended medical management at that time, and has been optimizing his medications and diet since then  He was recommended to be started on cardiac rehab, and underwent a nuclear stress test prior to this rehab  On this nuclear stress he had small-moderate reversible abnormalities in the basal to mid anterior and apical inferior walls, but given his most recent catheterization, he was started on cardiac rehab  In cardiac rehab, he has been unable to do much on the treadmill, but has been using a hand cycle for about 30-40 minutes, and has not had any complaints with this same  He did the same yesterday  In the mean after dinner, he notice some central chest burning  He initially took antacids for this same without much relief  He could not find any nitroglycerin tablets at home, and has not eventually ended up calling EMS  He got a nitroglycerin tablet from EMS which relieved his pain  He was then brought to the ED for further evaluation, and has not had any further chest pain since that  Inpatient consult to Cardiology  Consult performed by: Evy Sanchez ordered by: Enrique Shabazz          Review of Systems   c/o chest pain+, No c/o palpitations, No c/o shortness of breath, No c/o dizziness or light-headedness   All other systems negative      Historical Information   Past Medical History:   Diagnosis Date    [facility]     Anxiety     Diabetes mellitus ([facility] )     DVT, lower extremity (HCC)     Hyperlipidemia     Hypertension     Prostate cancer ([place] [id number] )     Skin cancer      Past Surgical History:   Procedure Laterality Date    CARDIAC SURGERY      CORONARY ARTERY BYPASS GRAFT      MD REPAIR UMBILICAL IJBZ,1+U/S,AJZGW N/A 3/10/2017    Procedure: REPAIR HERNIA UMBILICAL;  Surgeon: Jose Luis Connell MD;  Location: [facility];  Service: General    PROSTATECTOMY       History   Alcohol Use No     History   Drug Use No     History   Smoking Status    Former Smoker   Smokeless Tobacco    Never Used     Family History: History reviewed  No pertinent family history      Meds/Allergies   current meds:   Current Facility-Administered Medications   Medication Dose Route Frequency    acetaminophen (TYLENOL) tablet 650 mg  650 mg Oral Q4H PRN    aluminum-magnesium hydroxide-simethicone (MYLANTA) 200-200-20 mg/5 mL oral suspension 15 mL  15 mL Oral Q6H PRN    ascorbic acid (VITAMIN C) tablet 500 mg  500 mg Oral Daily    aspirin tablet 325 mg  325 mg Oral Daily    atorvastatin (LIPITOR) tablet 80 mg  80 mg Oral Daily With Dinner    cholecalciferol (VITAMIN D3) tablet 2,000 Units  2,000 Units Oral Daily    clopidogrel (PLAVIX) tablet 75 mg  75 mg Oral Daily    co-enzyme Q-10 capsule 100 mg  100 mg Oral Daily    cyanocobalamin (VITAMIN B-12) tablet 500 mcg  500 mcg Oral Daily    docusate sodium (COLACE) capsule 100 mg  100 mg Oral BID    heparin (porcine) 25,000 units in 250 mL infusion (premix)  3-20 Units/kg/hr (Order-Specific) Intravenous Titrated    heparin (porcine) injection 2,000 Units  2,000 Units Intravenous PRN    heparin (porcine) injection 4,000 Units  4,000 Units Intravenous PRN    insulin glargine (LANTUS) subcutaneous injection 50 Units  50 Units Subcutaneous HS    insulin lispro (HumaLOG) 100 units/mL subcutaneous injection 1-5 Units  1-5 Units Subcutaneous HS    insulin lispro (HumaLOG) 100 units/mL subcutaneous injection 1-6 Units  1-6 Units Subcutaneous TID AC    insulin lispro (HumaLOG) 100 units/mL subcutaneous injection 20 Units  20 Units Subcutaneous TID With Meals    LORazepam (ATIVAN) tablet 0 5 mg  0 5 mg Oral Q8H PRN    metoprolol succinate (TOPROL-XL) 24 hr tablet 25 mg  25 mg Oral Daily    multivitamin-minerals (CENTRUM) tablet 1 tablet  1 tablet Oral Daily    ondansetron (ZOFRAN) injection 4 mg  4 mg Intravenous Q6H PRN    pantoprazole (PROTONIX) EC tablet 40 mg  40 mg Oral BID    ranolazine (RANEXA) 12 hr tablet 500 mg  500 mg Oral BID    simethicone (MYLICON) chewable tablet 80 mg  80 mg Oral 4x Daily PRN    vilazodone (VIIBRYD) tablet 40 mg  40 mg Oral Daily     Allergies   Allergen Reactions    Sulfa Antibiotics Other (See Comments)     Generalized redness       Objective   Vitals: Blood pressure 170/77, pulse 82, temperature 98 1 °F (36 7 °C), resp  rate 18, weight 99 8 kg (220 lb), SpO2 95 %  Orthostatic Blood Pressures    Flowsheet Row Most Recent Value   Blood Pressure  170/77 filed at 01/27/2018 0805   Patient Position - Orthostatic VS  Lying filed at 01/27/2018 0805          No intake or output data in the 24 hours ending 01/27/18 0956    Invasive Devices     Peripheral Intravenous Line            Peripheral IV 01/26/18 Left Antecubital less than 1 day                Physical Exam   Constitutional:  Alert, cooperative, no acute distress   HEENT:    Normocephalic, atraumatic   Neck:  No JVD, No stridor   Lungs:  Clear to auscultation bilaterally, respirations unlabored    Chest Wall:  No tenderness or deformity    Heart::  Regular rate, Regular rhythm, S1 and S2 normal, no murmur, rub or gallop    Abdomen:  Soft, non-tender, non-distended   Neurological:  Awake, alert, oriented x3   Extremities:  No pedal edema or calf tenderness         Lab Results:   I have personally reviewed pertinent lab results      CBC with diff:   Results from last 7 days  Lab Units 01/27/18  0813   WBC Thousand/uL 8 56   RBC Million/uL 4 22   HEMOGLOBIN g/dL 12 8   HEMATOCRIT % 37 7   MCV fL 89   MCH pg 30 3   MCHC g/dL 34 0   RDW % 14 1   MPV fL 10 4   PLATELETS Thousands/uL 250     CMP:   Results from last 7 days  Lab Units 01/27/18  0813 01/26/18  2024   SODIUM mmol/L 140 136   POTASSIUM mmol/L 4 0 4 1   CHLORIDE mmol/L 104 101   CO2 mmol/L 31 28   ANION GAP mmol/L 5 7   BUN mg/dL 25 33*   CREATININE mg/dL 0 92 1 09   GLUCOSE RANDOM mg/dL 140 263*   CALCIUM mg/dL 8 6 8 6   AST U/L  --  19   ALT U/L  --  25   ALK PHOS U/L  --  99   TOTAL PROTEIN g/dL  --  7 7   BILIRUBIN TOTAL mg/dL  --  0 29   EGFR ml/min/1 73sq m 79 64     Troponin:   0  Lab Value Date/Time   TROPONINI 3 37 (H) 01/27/2018 0813   TROPONINI 3 92 (H) 01/27/2018 0343   TROPONINI 0 52 (H) 01/26/2018 2024   TROPONINI 0 10 (H) 07/14/2017 0454   TROPONINI 0 08 (H) 07/14/2017 0158   TROPONINI >40 00 (H) 07/04/2017 0600   TROPONINI >40 00 (H) 07/04/2017 0316   TROPONINI 20 12 (H) 07/04/2017 0024   TROPONINI 5 76 (H) 07/03/2017 2124   TROPONINI 2 63 (H) 07/03/2017 1801   TROPONINI 1 05 (H) 07/03/2017 1311     BNP:   Results from last 7 days  Lab Units 01/27/18  0813   SODIUM mmol/L 140   POTASSIUM mmol/L 4 0   CHLORIDE mmol/L 104   CO2 mmol/L 31   ANION GAP mmol/L 5   BUN mg/dL 25   CREATININE mg/dL 0 92   GLUCOSE RANDOM mg/dL 140   CALCIUM mg/dL 8 6   EGFR ml/min/1 73sq m 79     Coags:   Results from last 7 days  Lab Units 01/27/18  0344   PTT seconds 70*     TSH:   Results from last 7 days  Lab Units 01/27/18  0813   TSH 3RD GENERATON uIU/mL 2 410     Magnesium:   Results from last 7 days  Lab Units 01/27/18  0813   MAGNESIUM mg/dL 2 3     Lipid Profile:   Results from last 7 days  Lab Units 01/27/18  0813   HDL mg/dL 32*   CHOLESTEROL mg/dL 104   LDL CALC mg/dL 47   TRIGLYCERIDES mg/dL 124     Imaging: I have personally reviewed pertinent films in PACS  EKG: NSR, RBBB, old septal infarct (unchanged)  VTE Prophylaxis: Sequential compression device (Venodyne)     Code Status: Level 1 - Full Code  Advance Directive and Living Will:      Power of :    POLST:      Counseling / Coordination of Care  Total floor / unit time spent today 40 minutes  Greater than 50% of total time was spent with the patient and / or family counseling and / or coordination of care  A description of the counseling / coordination of care: Obtained history, performed physical examination, discussed laboratory and imaging results, and explained further plan of care  Osiris Suazo

## 2018-01-27 NOTE — ED NOTES
Finger stick was a 48, ED tech gave the pt OJ and also his meal tray will recheck finger stick in about an hour     Terrance Muñiz, LINDA  01/27/18 7273

## 2018-01-28 VITALS
SYSTOLIC BLOOD PRESSURE: 131 MMHG | TEMPERATURE: 98.5 F | OXYGEN SATURATION: 98 % | HEIGHT: 70 IN | DIASTOLIC BLOOD PRESSURE: 60 MMHG | BODY MASS INDEX: 31.53 KG/M2 | RESPIRATION RATE: 18 BRPM | HEART RATE: 70 BPM | WEIGHT: 220.24 LBS

## 2018-01-28 PROBLEM — R07.9 CHEST PAIN: Status: RESOLVED | Noted: 2017-07-03 | Resolved: 2018-01-28

## 2018-01-28 LAB
APTT PPP: 65 SECONDS (ref 23–35)
APTT PPP: 68 SECONDS (ref 23–35)
GLUCOSE SERPL-MCNC: 122 MG/DL (ref 65–140)
GLUCOSE SERPL-MCNC: 155 MG/DL (ref 65–140)

## 2018-01-28 PROCEDURE — 85730 THROMBOPLASTIN TIME PARTIAL: CPT | Performed by: INTERNAL MEDICINE

## 2018-01-28 PROCEDURE — 99232 SBSQ HOSP IP/OBS MODERATE 35: CPT | Performed by: INTERNAL MEDICINE

## 2018-01-28 PROCEDURE — 99238 HOSP IP/OBS DSCHRG MGMT 30/<: CPT | Performed by: NURSE PRACTITIONER

## 2018-01-28 PROCEDURE — 82948 REAGENT STRIP/BLOOD GLUCOSE: CPT

## 2018-01-28 RX ORDER — METOPROLOL SUCCINATE 50 MG/1
50 TABLET, EXTENDED RELEASE ORAL DAILY
Status: DISCONTINUED | OUTPATIENT
Start: 2018-01-29 | End: 2018-01-28 | Stop reason: HOSPADM

## 2018-01-28 RX ORDER — RANOLAZINE 500 MG/1
1000 TABLET, EXTENDED RELEASE ORAL 2 TIMES DAILY
Qty: 30 TABLET | Refills: 0 | Status: SHIPPED | OUTPATIENT
Start: 2018-01-28 | End: 2018-04-10 | Stop reason: SDUPTHER

## 2018-01-28 RX ORDER — ISOSORBIDE MONONITRATE 30 MG/1
30 TABLET, EXTENDED RELEASE ORAL DAILY
Status: DISCONTINUED | OUTPATIENT
Start: 2018-01-28 | End: 2018-01-28 | Stop reason: HOSPADM

## 2018-01-28 RX ORDER — ISOSORBIDE MONONITRATE 30 MG/1
30 TABLET, EXTENDED RELEASE ORAL DAILY
Qty: 30 TABLET | Refills: 0 | Status: SHIPPED | OUTPATIENT
Start: 2018-01-29 | End: 2018-02-21 | Stop reason: CLARIF

## 2018-01-28 RX ORDER — METOPROLOL SUCCINATE 25 MG/1
50 TABLET, EXTENDED RELEASE ORAL DAILY
Qty: 30 TABLET | Refills: 0 | Status: SHIPPED | OUTPATIENT
Start: 2018-01-28 | End: 2018-04-10 | Stop reason: SDUPTHER

## 2018-01-28 RX ORDER — RANOLAZINE 500 MG/1
1000 TABLET, EXTENDED RELEASE ORAL 2 TIMES DAILY
Status: DISCONTINUED | OUTPATIENT
Start: 2018-01-28 | End: 2018-01-28 | Stop reason: HOSPADM

## 2018-01-28 RX ADMIN — OXYCODONE HYDROCHLORIDE AND ACETAMINOPHEN 500 MG: 500 TABLET ORAL at 08:26

## 2018-01-28 RX ADMIN — ACETAMINOPHEN 650 MG: 325 TABLET, FILM COATED ORAL at 13:25

## 2018-01-28 RX ADMIN — INSULIN LISPRO 1 UNITS: 100 INJECTION, SOLUTION INTRAVENOUS; SUBCUTANEOUS at 12:13

## 2018-01-28 RX ADMIN — VILAZODONE HYDROCHLORIDE 40 MG: 40 TABLET ORAL at 08:26

## 2018-01-28 RX ADMIN — VITAMIN D, TAB 1000IU (100/BT) 2000 UNITS: 25 TAB at 08:26

## 2018-01-28 RX ADMIN — Medication 1 TABLET: at 08:26

## 2018-01-28 RX ADMIN — RANOLAZINE 500 MG: 500 TABLET, FILM COATED, EXTENDED RELEASE ORAL at 08:26

## 2018-01-28 RX ADMIN — PANTOPRAZOLE SODIUM 40 MG: 40 TABLET, DELAYED RELEASE ORAL at 06:32

## 2018-01-28 RX ADMIN — METOPROLOL SUCCINATE 25 MG: 25 TABLET, EXTENDED RELEASE ORAL at 08:26

## 2018-01-28 RX ADMIN — DOCUSATE SODIUM 100 MG: 100 CAPSULE, LIQUID FILLED ORAL at 08:26

## 2018-01-28 RX ADMIN — ISOSORBIDE MONONITRATE 30 MG: 30 TABLET, EXTENDED RELEASE ORAL at 09:59

## 2018-01-28 RX ADMIN — CLOPIDOGREL BISULFATE 75 MG: 75 TABLET ORAL at 08:26

## 2018-01-28 RX ADMIN — HEPARIN SODIUM AND DEXTROSE 13.1 UNITS/KG/HR: 10000; 5 INJECTION INTRAVENOUS at 08:21

## 2018-01-28 RX ADMIN — Medication 100 MG: at 08:26

## 2018-01-28 RX ADMIN — ASPIRIN 325 MG: 325 TABLET ORAL at 08:25

## 2018-01-28 RX ADMIN — CYANOCOBALAMIN TAB 500 MCG 500 MCG: 500 TAB at 08:26

## 2018-01-28 NOTE — DISCHARGE INSTRUCTIONS
THERE IS A GLUCOMETER (BLOOD SUGAR CHECK MACHINE) CALLED [employer], SEE IF YOUR INSURANCE WILL COVER THAT , AS YOU REQUESTED THIS INFORMATION FROM ME     Please call to schedule your doctors appointment with the family doctor within one week        Basic Carbohydrate Counting   WHAT YOU NEED TO KNOW:   Carbohydrate counting is a way to plan your meals by counting the amount of carbohydrate in foods  Carbohydrates are the sugars, starches, and fiber found in fruit, grains, vegetables, and milk products  Carbohydrates increase your blood sugar levels  Carbohydrate counting can help you eat the right amount of carbohydrate to keep your blood sugar levels under control  DISCHARGE INSTRUCTIONS:   What you need to know about planning meals using carbohydrate counting:  · A dietitian or healthcare provider will help you develop a healthy meal plan that works best for you  You will be taught how much carbohydrate to eat or drink for each meal and snack  Your meal plan will be based on your age, weight, usual food intake, and physical activity level  If you have diabetes, it will also include your blood sugar levels and diabetes medicine  Once you know how much carbohydrate you should eat, you can decide what type of food you want to eat  · You will need to know what foods contain carbohydrate and how much they contain  Keep track of the amount of carbohydrate in meals and snacks in order to follow your meal plan  Do not avoid carbohydrates or skip meals  Your blood sugar may fall too low if you do not eat enough carbohydrate or you skip meals  Foods that contain carbohydrate:   · Breads:  Each serving of food listed below contains about 15 g of carbohydrate       ¨ 1 slice of bread (1 ounce) or 1 flour or corn tortilla (6 inch)    ¨ ½ of a hamburger bun or ¼ of a large bagel (about 1 ounce)    ¨ 1 pancake (about 4 inches across and ¼ inch thick)    · Cereals and grains:  Serving sizes of ready-to-eat cereals vary  Look at the serving size and the total carbohydrate amount listed on the food label  Each serving of food listed below contains about 15 g of carbohydrate   ¨ ¾ cup of dry, unsweetened, ready-to-eat cereal or ¼ cup of low-fat granola     ¨ ½ cup of oatmeal or other cooked cereal     ¨ ? cup of cooked rice or pasta    · Starchy vegetables and beans:  Each serving of food listed below contains about 15 g of carbohydrate   ¨ ½ cup of corn, green peas, sweet potatoes, or mashed potatoes    ¨ ¼ of a large baked potato    ¨ ½ cup of beans, lentils, and peas (garbanzo, llanes, kidney, white, split, black-eyed)    · Crackers and snacks:  Each serving of food listed below contains about 15 g of carbohydrate   ¨ 3 soledad cracker squares or 8 animal crackers     ¨ 6 saltine-type crackers    ¨ 3 cups of popcorn or ¾ ounce of pretzels, potato chips, or tortilla chips    · Fruit:  Each serving of food listed below contains about 15 g of carbohydrate   ¨ 1 small (4 ounce) piece of fresh fruit or ¾ to 1 cup of fresh fruit    ¨ ½ cup of canned or frozen fruit, packed in natural juice    ¨ ½ cup (4 ounces) of unsweetened fruit juice    ¨ 2 tablespoons of dried fruit    · Desserts or sugary foods:  Each serving of food listed below contains about 15 g of carbohydrate   ¨ 2-inch square unfrosted cake or brownie     ¨ 2 small cookies    ¨ ½ cup of ice cream, frozen yogurt, or nondairy frozen yogurt    ¨ ¼ cup of sherbet or sorbet    ¨ 1 tablespoon of regular syrup, jam, or jelly    ¨ 2 tablespoons of light syrup    · Milk and yogurt:  Foods from the milk group contain about 12 g of carbohydrate per serving  ¨ 1 cup of fat-free or low-fat milk    ¨ 1 cup of soy milk    ¨ ? cup of fat-free, yogurt sweetened with artificial sweetener    · Non-starchy vegetables:  Each serving contains about 5 g of carbohydrate   Three servings of non-starch vegetables count as 1 carbohydrate serving       ¨ ½ cup of cooked vegetables or 1 cup of raw vegetables  This includes beets, broccoli, cabbage, cauliflower, cucumber, mushrooms, tomatoes, and zucchini    ¨ ½ cup of vegetable juice  How to use carbohydrate counting to plan meals:   · Count carbohydrate amounts using serving sizes:      ¨ Pasta dinner example: You plan to have pasta, tossed salad, and an 8-ounce glass of milk  Your healthcare provider tells you that you may have 4 carbohydrate servings for dinner  One carbohydrate serving of pasta is ? cup  One cup of pasta will equal 3 carbohydrate servings  An 8-ounce glass of milk will count as 1 carbohydrate serving  These amounts of food would equal 4 carbohydrate servings  One cup of tossed salad does not count toward your carbohydrate servings  · Count carbohydrate amounts using food labels:  Find the total amount of carbohydrate in a packaged food by reading the food label  Food labels tell you the serving size of the food and the total carbohydrate amount in each serving  Find the serving size on the food label and then decide how many servings you will eat  Multiply the number of servings you plan to eat by the carbohydrate amount per serving  ¨ Granola bar snack example: Your meal plan allows you to have 2 carbohydrate servings (30 grams) of carbohydrate for a snack  You plan to eat 1 package of granola bars, which contains 2 bars  According to the food label, the serving size of food in this package is 1 bar  Each serving (1 bar) contains 25 grams of carbohydrate  The total amount of carbohydrate in this package of granola bars would be 50 g  Based on your meal plan, you should eat only 1 bar  Follow up with your healthcare provider as directed:  Write down your questions so you remember to ask them during your visits  © 2017 [employer] Information is for End User's use only and may not be sold, redistributed or otherwise used for commercial purposes   All illustrations and images included in CareNotes® are the copyrighted property of [employer] M [employer]  or Wilbur Blas  The above information is an  only  It is not intended as medical advice for individual conditions or treatments  Talk to your doctor, nurse or pharmacist before following any medical regimen to see if it is safe and effective for you  Angina   WHAT YOU NEED TO KNOW:   Angina is pain, pressure, or tightness that is usually felt in your chest  Chest pain may come on when you are stressed or do physical activities, such as walking or exercising  Angina is caused by decreased blood flow and oxygen to your heart  These are often caused by atherosclerosis (hardening of the arteries)  If left untreated, angina may get worse, increase your risk for a heart attack, or become life-threatening  DISCHARGE INSTRUCTIONS:   Call 911 if:   · You have any of the following signs of a heart attack:      ¨ Squeezing, pressure, or pain in your chest that lasts longer than 5 minutes or returns    ¨ Discomfort or pain in your back, neck, jaw, stomach, or arm     ¨ Trouble breathing    ¨ Nausea or vomiting    ¨ Lightheadedness or a sudden cold sweat, especially with chest pain or trouble breathing    · You have chest pain that does not go away after you take medicine as directed  · You lose feeling in your face, arms, or legs, or you suddenly feel weak  Seek care immediately if:   · Your angina is happening more frequently, lasting longer, or causing worse pain  Contact your healthcare provider if:   · You are dizzy or nauseated after you take your medicine  · You have shortness of breath at rest     · You have new or worse swelling in your feet or ankles  · You have questions or concerns about your condition or care  Medicines: You may need any of the following:  · Antiplatelets , such as aspirin, help prevent blood clots  Take your antiplatelet medicine exactly as directed   These medicines make it more likely for you to bleed or bruise  If you are told to take aspirin, do not take acetaminophen or ibuprofen instead  · Blood thinners    help prevent blood clots  Examples of blood thinners include heparin and warfarin  Clots can cause strokes, heart attacks, and death  The following are general safety guidelines to follow while you are taking a blood thinner:    ¨ Watch for bleeding and bruising while you take blood thinners  Watch for bleeding from your gums or nose  Watch for blood in your urine and bowel movements  Use a soft washcloth on your skin, and a soft toothbrush to brush your teeth  This can keep your skin and gums from bleeding  If you shave, use an electric shaver  Do not play contact sports  ¨ Tell your dentist and other healthcare providers that you take anticoagulants  Wear a bracelet or necklace that says you take this medicine  ¨ Do not start or stop any medicines unless your healthcare provider tells you to  Many medicines cannot be used with blood thinners  ¨ Tell your healthcare provider right away if you forget to take the medicine, or if you take too much  ¨ Warfarin  is a blood thinner that you may need to take  The following are things you should be aware of if you take warfarin  § Foods and medicines can affect the amount of warfarin in your blood  Do not make major changes to your diet while you take warfarin  Warfarin works best when you eat about the same amount of vitamin K every day  Vitamin K is found in green leafy vegetables and certain other foods  Ask for more information about what to eat when you are taking warfarin  § You will need to see your healthcare provider for follow-up visits when you are on warfarin  You will need regular blood tests  These tests are used to decide how much medicine you need  · Other medicines  may be given to open the arteries to your heart, slow your heartbeat, or decrease your blood pressure or cholesterol  · Do not take certain medicines without asking your healthcare provider first   These include NSAIDs, herbal or vitamin supplements, or hormones (estrogen or progestin)  · Take your medicine as directed  Contact your healthcare provider if you think your medicine is not helping or if you have side effects  Tell him or her if you are allergic to any medicine  Keep a list of the medicines, vitamins, and herbs you take  Include the amounts, and when and why you take them  Bring the list or the pill bottles to follow-up visits  Carry your medicine list with you in case of an emergency  Follow up with your healthcare provider as directed:  Keep a record or a calendar with details about your chest pain  Every time you have pain or symptoms, record what the pain is like, how long it lasts, and how severe it is  Also record what you are doing when the pain starts, and what makes it go away  Bring this with you every time you see your healthcare provider  Write down your questions so you remember to ask them during your visits  Cardiac rehabilitation:  Your healthcare provider may recommend that you attend cardiac rehabilitation (rehab)  This is a program run by specialists who will help you safely strengthen your heart and prevent more heart disease  The plan includes exercise, relaxation, stress management, and heart-healthy nutrition  Healthcare providers will also check to make sure any medicines you are taking are working  The plan may also include instructions for when you can drive, return to work, and do other normal daily activities  Manage angina:   · Do not smoke  Nicotine and other chemicals in cigarettes and cigars can cause heart and lung damage  Ask your healthcare provider for information if you currently smoke and need help to quit  E-cigarettes or smokeless tobacco still contain nicotine  Talk to your healthcare provider before you use these products  · Maintain a healthy weight    When you weigh more than is healthy for you, your heart must work harder  You are at higher risk for serious health problems if you are overweight  Ask your healthcare provider how much you should weigh  Ask him to help you create a weight loss plan if you are overweight  · Ask about activity  Your healthcare provider will tell you which activities to limit or avoid  Ask about the best exercise plan for you  · Eat heart-healthy foods  Include fresh fruits and vegetables in your meal plan  Choose low-fat foods, such as skim or 1% fat milk, low-fat cheese and yogurt, fish, chicken (without skin), and lean meats  Eat two 4-ounce servings of fish high in omega-3 fats each week, such as salmon, fresh tuna, and herring  Do not eat foods that are high in sodium, such as canned foods, potato chips, salty snacks, and cold cuts  Put less table salt on your food  · Avoid activities that cause angina  Pay attention to your symptoms and find out what seems to make your angina worse  · Ask if you should have a flu vaccine  The flu vaccine will decrease your risk for an infection  An infection can put more stress on your heart and worsen your angina  © 2017 [employer] Information is for End User's use only and may not be sold, redistributed or otherwise used for commercial purposes  All illustrations and images included in CareNotes® are the copyrighted property of [employer]  or Wilbur Blas  The above information is an  only  It is not intended as medical advice for individual conditions or treatments  Talk to your doctor, nurse or pharmacist before following any medical regimen to see if it is safe and effective for you  How to Check Your Blood Sugar   WHAT YOU NEED TO KNOW:   High blood sugar levels increase your risk for heart attack, stroke, eye problems, and kidney problems  You can decrease your risk by controlling your blood sugar levels   Low blood sugar levels can also lead to serious health problems and must be treated right away  Check your blood sugar to help you learn how food, exercise, stress, and medicines affect your levels  Keep a record of your blood sugar levels  It can be used to adjust your meal plan, exercise routine, insulin doses, or diabetes medicine if needed  DISCHARGE INSTRUCTIONS:   How to check your blood sugar level:   · Check your blood sugar level with a glucose meter  This device uses a small drop of blood to measure your blood sugar level  Some glucose meters measure a drop of blood taken from your finger using a special lancet device  Other meters will also measure a drop of blood taken from your thigh, forearm, or the palm of your hand  · Blood sugar levels change quickly after meals, after you take insulin, during exercise, and when you feel stressed or ill  It is best to use blood from your finger to check your blood sugar level during these times  Your healthcare provider will teach you how to use a glucose meter to check your blood sugar level  Ask your healthcare provider for more information about taking blood samples from areas other than your finger  When and how often to check your blood sugar level:  Ask your healthcare provider when and how often you should check your blood sugar levels  If you check your blood sugar level before a meal , it should be between 80 and 130 mg/dL  If you check your blood sugar level 2 hours after a meal , it should be less than 180 mg/dL  Ask your healthcare provider if these are good goals for you  Blood sugar levels need to be checked more often when you are sick, there is a change to your medicine, or if you change your daily routine  Test your blood sugar level if you feel like it may be too high (hyperglycemia) or too low (hypoglycemia)  Keep a record of your blood sugar levels:  Write down your blood sugar level each time you test it   Write down the date, the time of the test (including if it was before or after a meal), and the result  Write down the time you took your insulin or diabetes pills  Record the type and amount of insulin or diabetes medicine you took  Write comments about anything that may have made your blood sugar level go up or down  Your blood sugar level can be affected by exercise, eating more or less than usual, or stress  Bring this record with you to your follow-up visits  How to care for your glucose meter and test strips:  Ask your healthcare provider how and how often to check the accuracy of your meter  You may need to do the following:  · Store your equipment properly  Keep the test strips away from heat, cold, and moisture  Do not take a test strip out of the container until you are ready to use it  Put the lid back tightly on the container  Do not use test strips that are damaged, wet, or bent  · Check the expiration date  on the test strip container to be sure the test strips have not   Your blood sugar readings may be wrong if you use  test strips  Use only the type of glucose test strips that work with your glucose meter  Wear medical alert identification:  Wear medical alert jewelry or carry a card that says you have diabetes  Ask your healthcare provider where to get these items  Follow up with your healthcare provider as directed:  Write down your questions so you remember to ask them during your visits  ©  2600 [employer] Information is for End User's use only and may not be sold, redistributed or otherwise used for commercial purposes  All illustrations and images included in CareNotes® are the copyrighted property of [employer] M [employer]  or Wilbur Blas  The above information is an  only  It is not intended as medical advice for individual conditions or treatments   Talk to your doctor, nurse or pharmacist before following any medical regimen to see if it is safe and effective for you

## 2018-01-28 NOTE — SOCIAL WORK
Pt lives with his wife in a 2 story home; [address]  Pt was driving, independent PTA  Pt has a cane and bed rails  Pt has a hx of [facility] and a hx of rehab at [facility]  Pt has a prescription plan and uses [facility] in [place] for his prescriptions  Primary contact is pt's wife Wilberto Cuevas, contact [phone]  No needs noted  CM reviewed d/c planning process including the following: identifying help at home, patient preference for d/c planning needs, Discharge Lounge, [facility] Meds to Bed program, availability of treatment team to discuss questions or concerns patient and/or family may have regarding understanding medications and recognizing signs and symptoms once discharged  CM also encouraged patient to follow up with all recommended appointments after discharge  Patient advised of importance for patient and family to participate in managing patients medical well being

## 2018-01-28 NOTE — DISCHARGE SUMMARY
Discharge- Miladys [place] 1940, 76 y o  male MRN: [id number]    Unit/Bed#: CW2 216-01 Encounter: [id number]    Primary Care Provider: Tony Osuna DO   Date and time admitted to hospital: 1/26/2018  8:17 PM        Chest pain   Assessment & Plan    Medications adjusted will need close outpt follow up   See plans as per non ST wave MI and CAD  * Non-STEMI (non-ST elevated myocardial infarction) [facility])   Assessment & Plan    Pt with hx of cad sp cabg  troponins peaked at 3 92 with slight trend downward on last with accelerated hypertension, now improving  Patient previously reported dizziness  Metoprolol was increased from 25-75 mg in July of last year and lisinopril discontinued secondary to low blood pressures today metoprolol increased and imdur and ranexa increased   Previous catheterizations per documentation per Cardiology recommending intervention patient reportedly not amenable to intervention  At this time cardiology with plan to optimize medical management  Continue aspirin, metoprolol XL 25 mg increased, imdur added and ranexa increased , Plavix, Crestor 40 mg          Abdominal aneurysm [facility])   Assessment & Plan    Patient is due to have aortic aneurysm reimaged  Results demonstrate: Infrarenal abdominal aorta measures 3 5 x 3 3 cm, marginally increased in caliber when compared to CT of 7/3/2017  Will need to follow with vascular as an outpt             Diabetes mellitus ([address] )   Assessment & Plan    pts lantus was reduced to 40 units at hs as pt yesterday am was 48 prior to lunch     Reduced meal time coverage to  10 units   a1c 7 5, discussed with pt he has difficulty keep focus during discussion, he states his pcp has told him to drink gatorade equivalent but seems to mix up low sugar with low blood pressure when talking to him  Wife takes care of him and recommend wife administering medications as have concern with pt and his scattered focus and reasoning,   Will need to monitor   Ada diet         Obesity   Assessment & Plan    Diet control  Education  ADA diet, pt has been told he states to drink the g4 equivelant to gatorade  I have informed him he needs to monitor sodium and sugar in these drinks  Diabetic management, needs a lot of education as he is not chking his blood sugars         Hypertension   Assessment & Plan    Cards have increased bb and added imdur pt with concern as he has passed out in the past not sure if bs vs hypotension as pt is not checking his blood sugars at home         Hx of CABG   Assessment & Plan    Previous history of CABG  With on and off chest discomfort/angina   Elevated trops  Discussed with cards today some medications have been adjusted increased to help with the angina increased imdur and ranexa  Pt with prn nitro  Discussed with pt who seems to confuse his blood pressures with his blood sugars   Pt requested glucometer name that would calculate his carbs for him name placed on dc paper work         Hyperlipidemia   Assessment & Plan    Will continue Crestor (substituted as Lipitor due to formulary)  Consultations During Hospital Stay:  · Dr Cam Marlow cards     Procedures Performed:     · Abdominal aorta us: Atherosclerotic disease  Infrarenal abdominal aorta measures 3 5 x 3 3 cm, marginally increased in caliber when compared to CT of 7/3/2017  · Xray chest: No focal airspace consolidation  Significant Findings / Test Results:     · See above     Incidental Findings:   · None     Test Results Pending at Discharge (will require follow up):    · None      Outpatient Tests Requested:  · Call to schedule follow up with pcp in one week   · Follow up with cardiology in one week   · Call to schedule follow up with vascular doctor to monitor aaa    Complications:  None     Reason for Admission:  Chest pain    Hospital Course:     Scot Bethea is a 76 y o  male patient who originally presented to the hospital on 1/26/2018 due to chest discomfort  Patient has a known history of extensive CAD with history of CABG  Patient was previously evaluated by Cardiology for elevated troponins the middle part of last year  He was told that was not possible to do any stenting or intervention  Patient was placed on medical management  He has also recently been undergoing cardiac rehabilitation without any problems  At about 630 on the evening of admission patient suddenly felt that he was having epigastric discomfort with radiation towards the mid chest and towards the door  He denied any shortness of breath  But did report some left arm numbness  Stated that the symptoms went away alone relieved within 15 minutes  He had taken 1325 mg aspirin  Patient was admitted for further evaluation per our cardiology team in  Patient noted to be on dual antiplatelet therapy  As well as Toprol 25 mg daily, Ranexa 500 mg twice daily  Patient's blood pressures were noted to be elevated upon admission  Patient also reported that he did not have any sublingual nitro at home and this was his main reason why he came in as he was unable to get any relief  Troponins peaked to 3 92 this admission  EKG demonstrated sinus rhythm with right bundle branch block possible prior septal infarct but no significant changes from prior EKG readings  S non STEMI type 1 was documented from based on patient's prior cardiac catheterization from July cardiology did determine that there would continue medical management  Patient was taken off of heparin drip Toprol was increased Ranexa was increased and low-dose Imdur was added  Recommendations to continue dual antiplatelet therapy and continue to monitor blood pressures  Patient does report episodes of passing out  He seems to relate this to low blood pressure in regards to increased antihypertensives in the past   At this time patient is currently stable    Patient will take medications this morning and will monitor blood pressures and if stable by lunch time patient will be stable for discharge to return home  He will need to follow up with his PCP in 1 week as well as follow up with Cardiology at soonest available appointment  After extensive discussion with the patient it seems that he does get a little mixed up with blood sugars and hypotension  He does state that he is not taking his blood sugars at home  He reports having had an issue with his glucometer and sending it in for a replacement glue commented that is able to calculate his carbohydrates  He states that his primary care physician as instructed him to take G for an equivalent to Gatorade, when he has low blood pressures  I feel that this was more likely related to his blood sugars  I have informed the patient to only use in emergency situations  As some of these drinks have high sodium and high sugar concentrates  Patient seems to need extensive education on diabetic management  Upon entry to the hospital he was found to have blood sugar following day of 48, I question if sometimes patient is hypoglycemic on some of these events  I recommended that patient at least check his blood sugars once in the morning and once before bedtime  I have reduced his nighttime Lantus to 40 units instead of 50 units and decreased his mealtime coverage to 10 units with meals  Patient should documented blood sugars when checking and follow with his PCP for further recommendations  pt is medically stable  Please see above list of diagnoses and related plan for additional information  Condition at Discharge: fair     Discharge Day Visit / Exam:     Subjective:  Pt is doing well, he denies sob today a little "joaniekey" had long discussion in regards to his blood sugars and blood pressures  Pt is a little forgetful and unable to focus fully on the conversation     Vitals: Blood Pressure: 162/81 (01/28/18 0757)  Pulse: 80 (01/28/18 0757)  Temperature: 97 6 °F (36 4 °C) (01/28/18 0757)  Temp Source: Oral (01/28/18 0757)  Respirations: 18 (01/28/18 0757)  Height: 5' 10" (177 8 cm) (01/27/18 1300)  Weight - Scale: 99 9 kg (220 lb 3 8 oz) (01/28/18 0600)  SpO2: 94 % (01/28/18 0757)  Exam:   Physical Exam   Constitutional: He is oriented to person, place, and time  He appears well-developed and well-nourished  No distress  HENT:   Head: Normocephalic and atraumatic  Mouth/Throat: No oropharyngeal exudate  Eyes: Conjunctivae are normal  Pupils are equal, round, and reactive to light  Right eye exhibits no discharge  Left eye exhibits no discharge  No scleral icterus  Neck: No JVD present  No tracheal deviation present  No thyromegaly present  Pulmonary/Chest: Effort normal  No stridor  No respiratory distress  He has no wheezes  He has no rales  He exhibits no tenderness  Abdominal: Soft  He exhibits no distension and no mass  There is no tenderness  There is no rebound and no guarding  Musculoskeletal: He exhibits no edema, tenderness or deformity  Lymphadenopathy:     He has no cervical adenopathy  Neurological: He is oriented to person, place, and time  Forgetful    Skin: No rash noted  He is not diaphoretic  No erythema  No pallor  Discussion with Family: wife n phone and at bedside yesterday     Discharge instructions/Information to patient and family:   See after visit summary for information provided to patient and family  Provisions for Follow-Up Care:  See after visit summary for information related to follow-up care and any pertinent home health orders  Disposition:     Home    For Discharges to [facility] SNF:   · Not Applicable to this Patient - Not Applicable to this Patient    Planned Readmission: high risk      Discharge Statement:  I spent 50 minutes discharging the patient  This time was spent on the day of discharge  I had direct contact with the patient on the day of discharge   Greater than 50% of the total time was spent examining patient, answering all patient questions, arranging and discussing plan of care with patient as well as directly providing post-discharge instructions  Additional time then spent on discharge activities  Discharge Medications:  See after visit summary for reconciled discharge medications provided to patient and family        ** Please Note: This note has been constructed using a voice recognition system **

## 2018-01-28 NOTE — PROGRESS NOTES
Cardiology adjusted the patients medication but will monitor for a bit before discharge to make sure that the patients blood pressure remains stable

## 2018-01-28 NOTE — PROGRESS NOTES
Cardiology Progress Note - Kai Romero 76 y o  male MRN: [id number]    Unit/Bed#: CW2 216-01 Encounter: [id number]      Assessment:  Principal Problem:    Non-STEMI (non-ST elevated myocardial infarction) [facility])  Active Problems:    Chest pain    Diabetes mellitus ([facility] [address] )    Hyperlipidemia    Abdominal aneurysm ([facility] )    Hx of CABG    Hypertension    Obesity      Plan:    1  NSTEMI Type 1 - patient has known disease of the LAD diffusely distal to the LIMA  This was not amenable by intervention in 7/17  Other bypass grafts were patent  Discontinue heparin  At this point recommendation is to continue medical management  We increased metoprolol, added low-dose Imdur or and increased Ranexa to 1000 mg twice daily  We will see if he tolerates this from a blood pressure standpoint  He is without angina, and is okay for discharge later today  2  CAD - Prior CABG in 2013, with known diffuse LAD disease distal to the LIMA by cardiac catheterization in July  Medical management is all that is recommended as stated above  We will arrange close outpatient follow-up  3   Hypertensive urgency - Patient was hypertensive upon arrival   However, has a history of low blood pressures with antianginal therapy  We will follow blood pressures closely as we titrate therapy  Blood pressure is now under good control  Subjective:   Patient seen and examined  No significant events overnight  Patient without chest pain or shortness of breath overnight  Objective:     Vitals: Blood pressure 162/81, pulse 80, temperature 97 6 °F (36 4 °C), temperature source Oral, resp  rate 18, height 5' 10" (1 778 m), weight 99 9 kg (220 lb 3 8 oz), SpO2 94 %  , Body mass index is 31 6 kg/m² , Orthostatic Blood Pressures    Flowsheet Row Most Recent Value   Blood Pressure  162/81 filed at 01/28/2018 0757   Patient Position - Orthostatic VS  Sitting filed at 01/28/2018 0757            Intake/Output Summary (Last 24 hours) at 01/28/18 [address] filed at 01/28/18 5952   Gross per 24 hour   Intake             1091 ml   Output                0 ml   Net             1091 ml       No significant arrhythmias seen on telemetry review  Physical Exam:    GEN: Manuelito Ramos appears well, alert and oriented x 3, pleasant and cooperative   HEENT: pupils equal, round, and reactive to light; extraocular muscles intact  NECK: supple, no carotid bruits   HEART: regular rhythm, normal S1 and S2, no murmurs, clicks, gallops or rubs   LUNGS:  Diminished bilaterally; no wheezes, rales, or rhonchi   ABDOMEN: normal bowel sounds, soft, no tenderness, no distention  EXTREMITIES: peripheral pulses normal; no clubbing, cyanosis  Trace edema     NEURO: no focal findings   SKIN: normal without suspicious lesions on exposed skin    Medications:      Current Facility-Administered Medications:     acetaminophen (TYLENOL) tablet 650 mg, 650 mg, Oral, Q4H PRN, Vladimir Infante MD, 650 mg at 01/27/18 2343    aluminum-magnesium hydroxide-simethicone (MYLANTA) 200-200-20 mg/5 mL oral suspension 15 mL, 15 mL, Oral, Q6H PRN, Vladimir Infante MD    ascorbic acid (VITAMIN C) tablet 500 mg, 500 mg, Oral, Daily, Vladimir Infante MD, 500 mg at 01/28/18 8485    aspirin tablet 325 mg, 325 mg, Oral, Daily, Vladimir Infante MD, 325 mg at 01/28/18 0825    atorvastatin (LIPITOR) tablet 80 mg, 80 mg, Oral, Daily With Joseline Talbert MD, 80 mg at 01/27/18 1705    cholecalciferol (VITAMIN D3) tablet 2,000 Units, 2,000 Units, Oral, Daily, Vladimir Infante MD, 2,000 Units at 01/28/18 0956    clopidogrel (PLAVIX) tablet 75 mg, 75 mg, Oral, Daily, Vladimir Infante MD, 75 mg at 01/28/18 1944    co-enzyme Q-10 capsule 100 mg, 100 mg, Oral, Daily, Vladimir Infante MD, 100 mg at 01/28/18 5511    cyanocobalamin (VITAMIN B-12) tablet 500 mcg, 500 mcg, Oral, Daily, Vladimir Infante MD, 500 mcg at 01/28/18 0826    docusate sodium (COLACE) capsule 100 mg, 100 mg, Oral, BID, Tammy Drake MD, 100 mg at 01/28/18 4450    heparin (porcine) 25,000 units in 250 mL infusion (premix), 3-20 Units/kg/hr (Order-Specific), Intravenous, Titrated, Sonja Ram Pester, DO, Last Rate: 11 8 mL/hr at 01/28/18 0821, 13 1 Units/kg/hr at 01/28/18 0364    heparin (porcine) injection 2,000 Units, 2,000 Units, Intravenous, PRN, Sonja Moseleyer, DO, 2,000 Units at 01/27/18 1123    heparin (porcine) injection 4,000 Units, 4,000 Units, Intravenous, PRN, Sonja Ram Pester, DO    insulin glargine (LANTUS) subcutaneous injection 40 Units, 40 Units, Subcutaneous, HS, needmade, CRNP, 40 Units at 01/27/18 2321    insulin lispro (HumaLOG) 100 units/mL subcutaneous injection 1-5 Units, 1-5 Units, Subcutaneous, HS, Tammy Drake MD    insulin lispro (HumaLOG) 100 units/mL subcutaneous injection 1-6 Units, 1-6 Units, Subcutaneous, TID AC, 1 Units at 01/27/18 1768 **AND** Fingerstick Glucose (POCT), , , TID AC, Tammy Drake MD    insulin lispro (HumaLOG) 100 units/mL subcutaneous injection 10 Units, 10 Units, Subcutaneous, TID With Meals, needmade, CRNP, 10 Units at 01/28/18 1338    LORazepam (ATIVAN) tablet 0 5 mg, 0 5 mg, Oral, Q8H PRN, Tammy Drake MD, 0 5 mg at 01/27/18 2343    metoprolol succinate (TOPROL-XL) 24 hr tablet 25 mg, 25 mg, Oral, Daily, Tammy Drake MD, 25 mg at 01/28/18 5796    multivitamin-minerals (CENTRUM) tablet 1 tablet, 1 tablet, Oral, Daily, Tammy Drake MD, 1 tablet at 01/28/18 0826    ondansetron (ZOFRAN) injection 4 mg, 4 mg, Intravenous, Q6H PRN, Tammy Drake MD    pantoprazole (PROTONIX) EC tablet 40 mg, 40 mg, Oral, BID, Tammy Drake MD, 40 mg at 01/28/18 3643    ranolazine (RANEXA) 12 hr tablet 500 mg, 500 mg, Oral, BID, Tammy Drake MD, 500 mg at 01/28/18 2052    simethicone (MYLICON) chewable tablet 80 mg, 80 mg, Oral, 4x Daily PRN, Tammy Drake MD    vilazodone (VIIBRYD) tablet 40 mg, 40 mg, Oral, Daily, Tammy Drake MD, 40 mg at 01/28/18 0826     Labs & Results:      Results from last 7 days  Lab Units 01/27/18  0813 01/27/18  0343 01/26/18 2024   TROPONIN I ng/mL 3 37* 3 92* 0 52*       Results from last 7 days  Lab Units 01/27/18  0813 01/26/18 2024   WBC Thousand/uL 8 56 7 54   HEMOGLOBIN g/dL 12 8 12 9   HEMATOCRIT % 37 7 38 6   PLATELETS Thousands/uL 250 262       Results from last 7 days  Lab Units 01/27/18  0813   CHOLESTEROL mg/dL 104   TRIGLYCERIDES mg/dL 124   HDL mg/dL 32*       Results from last 7 days  Lab Units 01/27/18  0813 01/26/18 2024   SODIUM mmol/L 140 136   POTASSIUM mmol/L 4 0 4 1   CHLORIDE mmol/L 104 101   CO2 mmol/L 31 28   BUN mg/dL 25 33*   CREATININE mg/dL 0 92 1 09   CALCIUM mg/dL 8 6 8 6   TOTAL PROTEIN g/dL  --  7 7   BILIRUBIN TOTAL mg/dL  --  0 29   ALK PHOS U/L  --  99   ALT U/L  --  25   AST U/L  --  19   GLUCOSE RANDOM mg/dL 140 263*       Results from last 7 days  Lab Units 01/28/18  0655 01/27/18  2359 01/27/18  1754   PTT seconds 65* 68* 84*       Results from last 7 days  Lab Units 01/27/18  0813   MAGNESIUM mg/dL 2 3     EKG personally reviewed by Bennett Peters MD   Normal sinus rhythm and right bundle branch block  Possible septal infarct  Counseling / Coordination of Care  Total floor / unit time spent today 20 minutes  Greater than 50% of total time was spent with the patient and / or family counseling and / or coordination of care

## 2018-01-29 ENCOUNTER — TRANSITIONAL CARE MANAGEMENT (OUTPATIENT)
Dept: INTERNAL MEDICINE CLINIC | Facility: CLINIC | Age: 78
End: 2018-01-29

## 2018-01-29 LAB
ATRIAL RATE: 80 BPM
ATRIAL RATE: 82 BPM
ATRIAL RATE: 82 BPM
ATRIAL RATE: 91 BPM
P AXIS: 74 DEGREES
P AXIS: 75 DEGREES
P AXIS: 75 DEGREES
P AXIS: 78 DEGREES
PR INTERVAL: 164 MS
PR INTERVAL: 170 MS
PR INTERVAL: 170 MS
PR INTERVAL: 172 MS
QRS AXIS: -72 DEGREES
QRS AXIS: 72 DEGREES
QRS AXIS: 74 DEGREES
QRS AXIS: 79 DEGREES
QRSD INTERVAL: 152 MS
QRSD INTERVAL: 154 MS
QRSD INTERVAL: 158 MS
QRSD INTERVAL: 160 MS
QT INTERVAL: 448 MS
QT INTERVAL: 450 MS
QTC INTERVAL: 519 MS
QTC INTERVAL: 525 MS
QTC INTERVAL: 525 MS
QTC INTERVAL: 551 MS
T WAVE AXIS: 65 DEGREES
T WAVE AXIS: 70 DEGREES
T WAVE AXIS: 70 DEGREES
T WAVE AXIS: 93 DEGREES
VENTRICULAR RATE: 80 BPM
VENTRICULAR RATE: 82 BPM
VENTRICULAR RATE: 82 BPM
VENTRICULAR RATE: 91 BPM

## 2018-01-29 PROCEDURE — 93010 ELECTROCARDIOGRAM REPORT: CPT | Performed by: INTERNAL MEDICINE

## 2018-01-30 ENCOUNTER — APPOINTMENT (OUTPATIENT)
Dept: CARDIAC REHAB | Facility: CLINIC | Age: 78
End: 2018-01-30
Payer: MEDICARE

## 2018-01-30 ENCOUNTER — APPOINTMENT (OUTPATIENT)
Dept: PHYSICAL THERAPY | Facility: CLINIC | Age: 78
End: 2018-01-30
Payer: MEDICARE

## 2018-02-05 DIAGNOSIS — E11.21 TYPE 2 DIABETES MELLITUS WITH DIABETIC NEPHROPATHY (HCC): ICD-10-CM

## 2018-02-05 RX ORDER — LORAZEPAM 1 MG/1
1 TABLET ORAL 2 TIMES DAILY PRN
COMMUNITY
Start: 2015-03-12 | End: 2018-02-15 | Stop reason: SDUPTHER

## 2018-02-05 RX ORDER — RANOLAZINE 500 MG/1
1 TABLET, EXTENDED RELEASE ORAL EVERY 12 HOURS
COMMUNITY
Start: 2017-04-05 | End: 2018-02-06

## 2018-02-05 RX ORDER — VILAZODONE HYDROCHLORIDE 40 MG/1
1 TABLET ORAL DAILY
COMMUNITY
Start: 2014-07-30 | End: 2018-03-03 | Stop reason: SDUPTHER

## 2018-02-05 RX ORDER — ROSUVASTATIN CALCIUM 40 MG/1
1 TABLET, COATED ORAL DAILY
COMMUNITY
Start: 2014-06-02 | End: 2018-08-14 | Stop reason: SDUPTHER

## 2018-02-05 RX ORDER — MULTIVIT WITH MINERALS/LUTEIN
1 TABLET ORAL DAILY
COMMUNITY
Start: 2017-10-05

## 2018-02-05 RX ORDER — SENNOSIDES 8.6 MG
1 TABLET ORAL DAILY
COMMUNITY
Start: 2017-07-17 | End: 2019-04-15 | Stop reason: ALTCHOICE

## 2018-02-05 RX ORDER — LANCETS
EACH MISCELLANEOUS
COMMUNITY
Start: 2014-01-29 | End: 2018-12-06 | Stop reason: SDUPTHER

## 2018-02-05 RX ORDER — NITROGLYCERIN 0.4 MG/1
1 TABLET SUBLINGUAL
COMMUNITY
Start: 2016-11-07 | End: 2018-05-08 | Stop reason: SDUPTHER

## 2018-02-05 RX ORDER — METOPROLOL SUCCINATE 25 MG/1
1 TABLET, EXTENDED RELEASE ORAL DAILY
COMMUNITY
Start: 2015-03-12 | End: 2018-02-06

## 2018-02-05 RX ORDER — METFORMIN HYDROCHLORIDE 500 MG/1
TABLET, EXTENDED RELEASE ORAL
COMMUNITY
Start: 2015-01-22 | End: 2018-02-06

## 2018-02-05 RX ORDER — CHOLECALCIFEROL (VITAMIN D3) 125 MCG
1 CAPSULE ORAL DAILY
COMMUNITY
Start: 2017-07-10

## 2018-02-06 ENCOUNTER — DOCUMENTATION (OUTPATIENT)
Dept: INTERNAL MEDICINE CLINIC | Facility: CLINIC | Age: 78
End: 2018-02-06

## 2018-02-06 ENCOUNTER — OFFICE VISIT (OUTPATIENT)
Dept: INTERNAL MEDICINE CLINIC | Facility: CLINIC | Age: 78
End: 2018-02-06
Payer: MEDICARE

## 2018-02-06 ENCOUNTER — TELEPHONE (OUTPATIENT)
Dept: INTERNAL MEDICINE CLINIC | Facility: CLINIC | Age: 78
End: 2018-02-06

## 2018-02-06 VITALS
BODY MASS INDEX: 31.44 KG/M2 | HEART RATE: 74 BPM | SYSTOLIC BLOOD PRESSURE: 112 MMHG | RESPIRATION RATE: 18 BRPM | WEIGHT: 219.6 LBS | HEIGHT: 70 IN | DIASTOLIC BLOOD PRESSURE: 58 MMHG

## 2018-02-06 DIAGNOSIS — E78.5 HYPERLIPIDEMIA, UNSPECIFIED HYPERLIPIDEMIA TYPE: Chronic | ICD-10-CM

## 2018-02-06 DIAGNOSIS — Z95.1 HX OF CABG: Chronic | ICD-10-CM

## 2018-02-06 DIAGNOSIS — I71.4 ABDOMINAL AORTIC ANEURYSM (AAA) WITHOUT RUPTURE (HCC): ICD-10-CM

## 2018-02-06 DIAGNOSIS — E11.59 TYPE 2 DIABETES MELLITUS WITH OTHER CIRCULATORY COMPLICATION, WITH LONG-TERM CURRENT USE OF INSULIN (HCC): Chronic | ICD-10-CM

## 2018-02-06 DIAGNOSIS — I21.4 NON-STEMI (NON-ST ELEVATED MYOCARDIAL INFARCTION) (HCC): Primary | ICD-10-CM

## 2018-02-06 DIAGNOSIS — Z79.4 TYPE 2 DIABETES MELLITUS WITH OTHER CIRCULATORY COMPLICATION, WITH LONG-TERM CURRENT USE OF INSULIN (HCC): Chronic | ICD-10-CM

## 2018-02-06 DIAGNOSIS — F32.A DEPRESSION, UNSPECIFIED DEPRESSION TYPE: ICD-10-CM

## 2018-02-06 DIAGNOSIS — Z12.11 SCREENING FOR COLON CANCER: ICD-10-CM

## 2018-02-06 DIAGNOSIS — I10 ESSENTIAL HYPERTENSION: Chronic | ICD-10-CM

## 2018-02-06 PROCEDURE — 99495 TRANSJ CARE MGMT MOD F2F 14D: CPT | Performed by: INTERNAL MEDICINE

## 2018-02-06 NOTE — PROGRESS NOTES
Assessment/Plan:    No problem-specific Assessment & Plan notes found for this encounter  Date and time hospital follow up call was made:  1/29/2018  2:52 PM  Hospital care reviewed:  Records reviewed  Patient was hopsitalized at:  [facility]  Date of admission:  1/26/18  Date of discharge:  1/28/18  Diagnosis:  Non-STEMI Myocardial infarction  Were the patients medicaitons reviewed and updated:  No  Current symptoms:  (Comment: Itching where tape was; left arm bruised)  Post hospital issues:  None  Should patient be enrolled in anticoag monitoring?:  No  Scheduled for follow up?:  Yes  Patients specialists:  Cardiologist  Cardiologist's Name:  Dr Blnaca Manzano  I have advised the patient to call PCP with any new or worsening symptoms (please type in name along with any credentials):  Jeana Gu   Living Arrangements:  Spouse or Significiant other  Counseling:  Patient      Assessment and plan 1  Transition care management visit regarding recent hospitalization for non ST elevation myocardial infarction on the day of the admission the patient had developed chest pain he had recently had a major heart attack and was undergoing cardiac rehab  He had had a recent catheterization  The patient has had bypass  The patient was brought by ambulance the hospital he was found to have a mild elevation of troponin his symptoms resolved within 15 minutes of nitroglycerin  During the hospitalization the beta-blocker metoprolol was increased to 50 mg once daily, and or 30 mg was added, his blood sugars were controlled and at times he had hypoglycemia therefore his Lantus was reduced to 40 units  The patient will be following up with Cardiology and resume cardiac rehab after okay from Cardiology  The patient will follow up with Endocrinology regarding his type 2 diabetes which is currently controlled he has not had any further hypoglycemia  2   Wax impaction patient will use Debrox 3    Aortic aneurysm see vascular doctor Dr Hussein Kaye return to office 3  months  call if any problems     Problem List Items Addressed This Visit     None            Subjective:      Patient ID: Wilfredo Hamilton is a 76 y o  male  HPI 76year old male coming in for transition care management visit regarding recent hospitalization for non ST elevation myocardial infarction, hypertension, hyperlipidemia, status post CABG and type 2 diabetes; the patient reports me that he developed cp - the blood sugar 110-115 missed the endo appt The patient is planning to see Endocrinology  Patient reports me that he was hospitalized there was elevation of the troponin  Patient was heparinized during the hospitalization  Patient did have hypoglycemic event during the hospital therefore Lantus was reduced to 40 units he has not had any further hypoglycemia  Patient reports me wax blockage of the left ear some slight blood in the wax  The following portions of the patient's history were reviewed and updated as appropriate: allergies, current medications, past family history, past social history, past surgical history and problem list     Review of Systems   Constitutional: Positive for activity change  Negative for appetite change and fatigue  Respiratory: Positive for wheezing  Negative for chest tightness and shortness of breath  Cardiovascular: Negative for chest pain, palpitations and leg swelling  Gastrointestinal: Negative for abdominal distention, abdominal pain, blood in stool, diarrhea and nausea  Psychiatric/Behavioral: Negative for suicidal ideas (no anxiety)  No Follow-up on file        Allergies   Allergen Reactions    Other     Sulfa Antibiotics Other (See Comments)     Generalized redness       Past Medical History:   Diagnosis Date    [facility]     Anxiety     Diabetes mellitus ([facility]     DVT, lower extremity (HCC)     Hyperlipidemia     Hypertension     [facility]     Skin cancer      Past Surgical History:   Procedure Laterality Date    CARDIAC SURGERY      CORONARY ARTERY BYPASS GRAFT      DC REPAIR UMBILICAL GNSB,4+Y/F,MEMFE N/A 3/10/2017    Procedure: REPAIR HERNIA UMBILICAL;  Surgeon: Thelma Albert MD;  Location: [facility];  Service: General    PROSTATECTOMY       Current Outpatient Prescriptions on File Prior to Visit   Medication Sig Dispense Refill    ACCU-CHEK FASTCLIX LANCETS MISC by Does not apply route      ascorbic acid (VITAMIN C) 500 mg tablet Take 500 mg by mouth daily      aspirin 325 mg tablet Take 325 mg by mouth daily   aspirin 325 mg tablet Take 1 tablet by mouth daily      Cholecalciferol (VITAMIN D3 PO) Take 2,000 Units by mouth daily   clopidogrel (PLAVIX) 75 mg tablet Take 1 tablet by mouth see administration instructions 30 tablet 0    coenzyme Q-10 100 MG capsule Take 1 capsule by mouth daily 1 capsule 0    Coenzyme Q10 (CO Q 10) 100 MG CAPS Take 1 capsule by mouth daily      cyanocobalamin (VITAMIN B-12) 500 mcg tablet Take 5,000 mcg by mouth daily      cyanocobalamin (VITAMIN B-12) 500 mcg tablet Take 1 tablet by mouth daily      docusate sodium (COLACE) 100 mg capsule Take 100 mg by mouth 2 (two) times a day        econazole nitrate 1 % cream Apply topically 2 (two) times a day      glucose blood (ACCU-CHEK DENYS PLUS) test strip by In Vitro route      insulin aspart (NOVOLOG FLEXPEN) 100 Units/mL SOPN Inject under the skin      insulin aspart (NovoLOG) 100 units/mL injection Inject 10 Units under the skin 3 (three) times a day before meals  0    Insulin Glargine (TOUJEO SOLOSTAR) injection pen 300 units/mL Inject 40 Units under the skin daily at bedtime 3 pen 0    Insulin Glargine (TOUJEO SOLOSTAR) injection pen 300 units/mL Inject under the skin      Insulin Pen Needle (NOVOFINE AUTOCOVER) 30G X 8 MM MISC by Does not apply route      isosorbide mononitrate (IMDUR) 30 mg 24 hr tablet Take 1 tablet (30 mg total) by mouth daily 30 tablet 0    LORazepam (ATIVAN) 1 mg tablet Take 0 5 tablets by mouth every 8 (eight) hours as needed for anxiety for up to 10 days 30 tablet 0    LORazepam (ATIVAN) 1 mg tablet Take 1 tablet by mouth 2 (two) times a day as needed      metFORMIN (GLUCOPHAGE-XR) 500 mg 24 hr tablet Take by mouth      metoprolol succinate (TOPROL-XL) 25 mg 24 hr tablet Take 2 tablets (50 mg total) by mouth daily 30 tablet 0    metoprolol succinate (TOPROL-XL) 25 mg 24 hr tablet Take 1 tablet by mouth daily      Multiple Vitamins-Minerals (CENTRUM SILVER PO) Take 1 tablet by mouth daily      Multiple Vitamins-Minerals (CENTRUM SILVER) tablet Take 1 tablet by mouth daily      nitroglycerin (NITROSTAT) 0 4 mg SL tablet Place 1 tablet under the tongue      pantoprazole (PROTONIX) 40 mg tablet Take 40 mg by mouth 2 (two) times a day   ranolazine (RANEXA) 500 mg 12 hr tablet Take 2 tablets (1,000 mg total) by mouth 2 (two) times a day 30 tablet 0    ranolazine (RANEXA) 500 mg 12 hr tablet Take 1 tablet by mouth every 12 (twelve) hours      rosuvastatin (CRESTOR) 40 MG tablet Take 1 tablet by mouth daily      senna (SENOKOT) 8 6 mg Take by mouth      simethicone (MYLICON) 80 mg chewable tablet Chew 1 tablet 4 (four) times a day as needed for flatulence 30 tablet 0    vilazodone (VIIBRYD) 40 mg tablet Take 1 tablet by mouth daily       No current facility-administered medications on file prior to visit  No family history on file  Social History     Social History    Marital status: /Civil Union     Spouse name: N/A    Number of children: N/A    Years of education: N/A     Occupational History    Not on file       Social History Main Topics    Smoking status: Former Smoker    Smokeless tobacco: Never Used    Alcohol use No    Drug use: No    Sexual activity: Not on file     Other Topics Concern    Not on file     Social History Narrative    No narrative on file     Vitals: 02/06/18 1049   BP: 112/58   BP Location: Left arm   Patient Position: Sitting   Cuff Size: Large   Pulse: 74   Resp: 18   Weight: 99 6 kg (219 lb 9 6 oz)   Height: 5' 10" (1 778 m)     Results for orders placed or performed during the hospital encounter of 01/26/18   Comprehensive metabolic panel   Result Value Ref Range    Sodium 136 136 - 145 mmol/L    Potassium 4 1 3 5 - 5 3 mmol/L    Chloride 101 100 - 108 mmol/L    CO2 28 21 - 32 mmol/L    Anion Gap 7 4 - 13 mmol/L    BUN 33 (H) 5 - 25 mg/dL    Creatinine 1 09 0 60 - 1 30 mg/dL    Glucose 263 (H) 65 - 140 mg/dL    Calcium 8 6 8 3 - 10 1 mg/dL    AST 19 5 - 45 U/L    ALT 25 12 - 78 U/L    Alkaline Phosphatase 99 46 - 116 U/L    Total Protein 7 7 6 4 - 8 2 g/dL    Albumin 3 3 (L) 3 5 - 5 0 g/dL    Total Bilirubin 0 29 0 20 - 1 00 mg/dL    eGFR 64 ml/min/1 73sq m   CBC and differential   Result Value Ref Range    WBC 7 54 4 31 - 10 16 Thousand/uL    RBC 4 27 3 88 - 5 62 Million/uL    Hemoglobin 12 9 12 0 - 17 0 g/dL    Hematocrit 38 6 36 5 - 49 3 %    MCV 90 82 - 98 fL    MCH 30 2 26 8 - 34 3 pg    MCHC 33 4 31 4 - 37 4 g/dL    RDW 14 2 11 6 - 15 1 %    MPV 10 5 8 9 - 12 7 fL    Platelets 138 811 - 987 Thousands/uL    nRBC 0 /100 WBCs    Neutrophils Relative 76 (H) 43 - 75 %    Lymphocytes Relative 13 (L) 14 - 44 %    Monocytes Relative 6 4 - 12 %    Eosinophils Relative 5 0 - 6 %    Basophils Relative 0 0 - 1 %    Neutrophils Absolute 5 63 1 85 - 7 62 Thousands/µL    Lymphocytes Absolute 1 00 0 60 - 4 47 Thousands/µL    Monocytes Absolute 0 46 0 17 - 1 22 Thousand/µL    Eosinophils Absolute 0 39 0 00 - 0 61 Thousand/µL    Basophils Absolute 0 03 0 00 - 0 10 Thousands/µL   Troponin I   Result Value Ref Range    Troponin I 0 52 (H) <=0 04 ng/mL   APTT   Result Value Ref Range    PTT 70 (H) 23 - 35 seconds   Hemoglobin A1c (Orders if not completed within the last 90 days)   Result Value Ref Range    Hemoglobin A1C 7 5 (H) 4 2 - 6 3 %     mg/dl   Troponin I Result Value Ref Range    Troponin I 3 92 (H) <=0 04 ng/mL   TSH, 3rd generation   Result Value Ref Range    TSH 3RD GENERATON 2 410 0 358 - 3 740 uIU/mL   Basic metabolic panel   Result Value Ref Range    Sodium 140 136 - 145 mmol/L    Potassium 4 0 3 5 - 5 3 mmol/L    Chloride 104 100 - 108 mmol/L    CO2 31 21 - 32 mmol/L    Anion Gap 5 4 - 13 mmol/L    BUN 25 5 - 25 mg/dL    Creatinine 0 92 0 60 - 1 30 mg/dL    Glucose 140 65 - 140 mg/dL    Calcium 8 6 8 3 - 10 1 mg/dL    eGFR 79 ml/min/1 73sq m   Magnesium   Result Value Ref Range    Magnesium 2 3 1 6 - 2 6 mg/dL   Phosphorus   Result Value Ref Range    Phosphorus 3 0 2 3 - 4 1 mg/dL   Lipid panel   Result Value Ref Range    Cholesterol 104 50 - 200 mg/dL    Triglycerides 124 <=150 mg/dL    HDL, Direct 32 (L) 40 - 60 mg/dL    LDL Calculated 47 0 - 100 mg/dL   CBC (With Platelets)   Result Value Ref Range    WBC 8 56 4 31 - 10 16 Thousand/uL    RBC 4 22 3 88 - 5 62 Million/uL    Hemoglobin 12 8 12 0 - 17 0 g/dL    Hematocrit 37 7 36 5 - 49 3 %    MCV 89 82 - 98 fL    MCH 30 3 26 8 - 34 3 pg    MCHC 34 0 31 4 - 37 4 g/dL    RDW 14 1 11 6 - 15 1 %    Platelets 000 462 - 190 Thousands/uL    MPV 10 4 8 9 - 12 7 fL   Troponin I   Result Value Ref Range    Troponin I 3 37 (H) <=0 04 ng/mL   APTT   Result Value Ref Range    PTT 48 (H) 23 - 35 seconds   APTT   Result Value Ref Range    PTT 84 (H) 23 - 35 seconds   APTT   Result Value Ref Range    PTT 68 (H) 23 - 35 seconds   APTT   Result Value Ref Range    PTT 65 (H) 23 - 35 seconds   ECG 12 lead   Result Value Ref Range    Ventricular Rate 91 BPM    Atrial Rate 91 BPM    SD Interval 164 ms    QRSD Interval 160 ms    QT Interval 448 ms    QTC Interval 551 ms    P Axis 75 degrees    QRS Axis -72 degrees    T Wave Axis 93 degrees   ECG 12 lead with 2nd troponin   Result Value Ref Range    Ventricular Rate 82 BPM    Atrial Rate 82 BPM    SD Interval 172 ms    QRSD Interval 152 ms    QT Interval 450 ms    QTC Interval 525 ms    P Axis 75 degrees    QRS Axis 74 degrees    T Wave Axis 70 degrees   ECG 12 lead with 3rd troponin   Result Value Ref Range    Ventricular Rate 80 BPM    Atrial Rate 80 BPM    [place] Interval 170 ms    QRSD Interval 154 ms    QT Interval 450 ms    QTC Interval 519 ms    P Axis 74 degrees    QRS Axis 79 degrees    T Wave Axis 65 degrees   ECG 12 lead   Result Value Ref Range    Ventricular Rate 82 BPM    Atrial Rate 82 BPM    [place] Interval 170 ms    QRSD Interval 158 ms    QT Interval 450 ms    QTC Interval 525 ms    P Axis 78 degrees    QRS Axis 72 degrees    T Wave Axis 70 degrees   Fingerstick Glucose (POCT)   Result Value Ref Range    POC Glucose 150 (H) 65 - 140 mg/dl   Fingerstick Glucose (POCT)   Result Value Ref Range    POC Glucose 48 (L) 65 - 140 mg/dl   Fingerstick Glucose (POCT)   Result Value Ref Range    POC Glucose 147 (H) 65 - 140 mg/dl   Fingerstick Glucose (POCT)   Result Value Ref Range    POC Glucose 136 65 - 140 mg/dl   Fingerstick Glucose (POCT)   Result Value Ref Range    POC Glucose 265 (H) 65 - 140 mg/dl   Fingerstick Glucose (POCT)   Result Value Ref Range    POC Glucose 98 65 - 140 mg/dl   Fingerstick Glucose (POCT)   Result Value Ref Range    POC Glucose 122 65 - 140 mg/dl   Fingerstick Glucose (POCT)   Result Value Ref Range    POC Glucose 155 (H) 65 - 140 mg/dl     Weight (last 2 days)     Date/Time   Weight    02/06/18 1049  99 6 (219 6)            Body mass index is 31 51 kg/m²  /58 (02/06/18 1049)    Temp      Pulse 74 (02/06/18 1049)   Resp 18 (02/06/18 1049)    SpO2        Vitals:    02/06/18 1049   Weight: 99 6 kg (219 lb 9 6 oz)     Vitals:    02/06/18 1049   Weight: 99 6 kg (219 lb 9 6 oz)     Objective:     Physical Exam   Constitutional: He appears well-developed and well-nourished  No distress  HENT:   Head: Normocephalic and atraumatic     Right Ear: External ear normal    Left Ear: External ear normal    Mouth/Throat: Oropharynx is clear and moist    Eyes: Conjunctivae are normal  Pupils are equal, round, and reactive to light  Right eye exhibits no discharge  Left eye exhibits no discharge  No scleral icterus  Neck: Neck supple  Cardiovascular: Normal rate, regular rhythm and normal heart sounds  Exam reveals no gallop and no friction rub  No murmur heard  Pulmonary/Chest: No respiratory distress  He has no wheezes  He has no rales  Abdominal: Soft  Bowel sounds are normal  He exhibits no distension and no mass  There is no tenderness  There is no rebound and no guarding  Musculoskeletal: He exhibits no edema or deformity  Lymphadenopathy:     He has no cervical adenopathy  Neurological: He is alert  Skin: He is not diaphoretic  Psychiatric: He has a normal mood and affect

## 2018-02-14 ENCOUNTER — OFFICE VISIT (OUTPATIENT)
Dept: BEHAVIORAL/MENTAL HEALTH CLINIC | Facility: CLINIC | Age: 78
End: 2018-02-14
Payer: MEDICARE

## 2018-02-14 DIAGNOSIS — F41.9 ANXIETY: Primary | ICD-10-CM

## 2018-02-14 PROCEDURE — 90834 PSYTX W PT 45 MINUTES: CPT | Performed by: SOCIAL WORKER

## 2018-02-14 NOTE — PATIENT INSTRUCTIONS
Provided supportive therapy and normalized their concerns regarding their daughter  Provided provider listing for [facility] that includes inpatient and outpatient Bh, dual diagnosis treatment, supported living agencies, etc  They will review  In addition, provided them contact information for local [facility] [place], operated by [facility]  They will contact  Reviewed stress mgmt strategies to apply  Will continue to disseminate information to them  Offered additional sessions if needed

## 2018-02-15 ENCOUNTER — OFFICE VISIT (OUTPATIENT)
Dept: INTERNAL MEDICINE CLINIC | Facility: CLINIC | Age: 78
End: 2018-02-15
Payer: MEDICARE

## 2018-02-15 VITALS
SYSTOLIC BLOOD PRESSURE: 124 MMHG | DIASTOLIC BLOOD PRESSURE: 62 MMHG | HEIGHT: 70 IN | WEIGHT: 219 LBS | HEART RATE: 79 BPM | RESPIRATION RATE: 16 BRPM | OXYGEN SATURATION: 95 % | BODY MASS INDEX: 31.35 KG/M2

## 2018-02-15 DIAGNOSIS — H61.20 IMPACTED CERUMEN, UNSPECIFIED LATERALITY: ICD-10-CM

## 2018-02-15 DIAGNOSIS — E66.09 CLASS 1 OBESITY DUE TO EXCESS CALORIES WITH SERIOUS COMORBIDITY AND BODY MASS INDEX (BMI) OF 31.0 TO 31.9 IN ADULT: ICD-10-CM

## 2018-02-15 DIAGNOSIS — I21.4 NON-STEMI (NON-ST ELEVATED MYOCARDIAL INFARCTION) (HCC): ICD-10-CM

## 2018-02-15 DIAGNOSIS — E11.59 TYPE 2 DIABETES MELLITUS WITH OTHER CIRCULATORY COMPLICATION, WITH LONG-TERM CURRENT USE OF INSULIN (HCC): Chronic | ICD-10-CM

## 2018-02-15 DIAGNOSIS — Z79.4 TYPE 2 DIABETES MELLITUS WITH OTHER CIRCULATORY COMPLICATION, WITH LONG-TERM CURRENT USE OF INSULIN (HCC): Chronic | ICD-10-CM

## 2018-02-15 DIAGNOSIS — E78.5 HYPERLIPIDEMIA, UNSPECIFIED HYPERLIPIDEMIA TYPE: Chronic | ICD-10-CM

## 2018-02-15 DIAGNOSIS — I10 ESSENTIAL HYPERTENSION: Chronic | ICD-10-CM

## 2018-02-15 DIAGNOSIS — F41.9 ANXIETY: Primary | ICD-10-CM

## 2018-02-15 PROCEDURE — 69209 REMOVE IMPACTED EAR WAX UNI: CPT | Performed by: INTERNAL MEDICINE

## 2018-02-15 PROCEDURE — 99214 OFFICE O/P EST MOD 30 MIN: CPT | Performed by: INTERNAL MEDICINE

## 2018-02-15 RX ORDER — LORAZEPAM 1 MG/1
1 TABLET ORAL 2 TIMES DAILY PRN
Qty: 30 TABLET | Refills: 0 | Status: SHIPPED | OUTPATIENT
Start: 2018-02-15 | End: 2018-04-20 | Stop reason: SDUPTHER

## 2018-02-15 NOTE — PROGRESS NOTES
Assessment/Plan:    No problem-specific Assessment & Plan notes found for this encounter  Diagnoses and all orders for this visit:    Anxiety  Comments:  I have refilled the patient's Ativan 1 mg b i d  p r n  I have increased the dose to b i d  his daughter is being released from prison   Orders:  -     LORazepam (ATIVAN) 1 mg tablet; Take 1 tablet (1 mg total) by mouth 2 (two) times a day as needed (anxiety)    Type 2 diabetes mellitus with other circulatory complication, with long-term current use of insulin (HCC)  Comments:  Improving, no hypoglycemia continue with current medical regiment go for laboratories and follow up with Endocrinology    Essential hypertension  Comments:  Hypertension - controlled, I have counseled patient following healthy imbalance diet, I would like the patient reduce sodium, exercise routinely    Non-STEMI (non-ST elevated myocardial infarction) [facility])  Comments:  Stable patient has follow-up with Cardiology this Friday he does report intermittent atypical chest pain secondary to anxiety he will discuss further with Cardi    Hyperlipidemia, unspecified hyperlipidemia type  Comments:  Hyperlipidemia controlled continue with current medical regiment recommend a low-cholesterol diet and recommend routine exercise we will continue to monitor the    Class 1 obesity due to excess calories with serious comorbidity and body mass index (BMI) of 31 0 to 31 9 in adult  Comments:  Obesity -I have counseled patient following healthy and balanced diet, I would like the patient to lose weight, I would like the patient exercise routinely; we     Impacted cerumen, unspecified laterality  Comments:  Irritated and successfully removed the wax without complication        Assessment and plan 1  Anxiety the patient has met with Counsellor Teri Denis likely his anxiety levels will increase when his daughter is released from prison and will likely be coming home with the patient    I am concerned the correlation of his anxiety and his multiple myocardial infarctions  The patient does understand this concern and there will be a 0 tolerance policy at home  We will increase the patient's Ativan to 1 mg b i d  p r n  no alcohol and no driving after taking the medication he will continue with his selective serotonin inhibitor No suicidal ideation  we did check the pdmp  2  Type 2 diabetes I have counselled the pt to follow a healthy and balanced diet ,and recommend routine exercise  Patient will be going for  including comprehensive metabolic panel, hemoglobin A1c, urine microalbumin, lipid panel  Patient does have an appointment with Endocrinology coming up  3   Hypertension controlled continue with current medical regimen will continue monitor 4  Status post myocardial infarction the patient has been having mild atypical chest pain not at rest not similar to his usual angina secondary to anxiety currently asymptomatic he does have nitroglycerin he will discuss this further on Friday with his Cardiology specialist   5   Hyperlipidemia controlled continue with current medical regiment recommend a low-cholesterol diet and recommend routine exercise we will continue to monitor the progress  6   Obesity follow healthy imbalance diet number sign 7 wax impaction today we did remove the wax via irrigation patient tolerated well without complication return to office 3  months  call if any problems  Assessment and plan 1  Subjective:      Patient ID: Rasheed Duncan is a 76 y o  male  He gets minor cp ?  Anxiety not similar to when he had angina , diet improved ,  Met with Carloz Forrest ; -15- and has appt    HPI 68-year old male coming in for a follow up visit regarding anxiety, type 2 diabetes, hypertension, status post myocardial infarction, hyperlipidemia, obesity and ear-wax impaction; the patient reports me he has been doing well he is compliant taking his medications no further hospitalizations he has a follow-up with his cardiologist this Friday  He does report me some mild atypical chest pain when he is anxious but no exertional chest pain  His chest pain is when at rest and is not similar to when he has angina or has had a heart attack in the past   Currently he is asymptomatic  The patient does report me under a lot of stress with the anticipation of his daughter getting out of [employer] she is a drug abuser and will be released from [employer] in the next week, he reports me that she will likely be living back at home with him  In the past there have been arguments leading to him becoming very anxious and stressed  The patient has met with counselor Luz Davila and his plane to have a 0 tolerance policy in the house  The patient does request ear wax removal he has been trying Debrox without success taking meds taking meds  taking meds taking meds  taking meds taking meds off caffine , used the debrox for 5 days, no hypoglycemia  The following portions of the patient's history were reviewed and updated as appropriate: allergies, past family history, past medical history, past social history, past surgical history and problem list     Review of Systems   Constitutional: Negative for activity change, appetite change, chills, fever and unexpected weight change  HENT: Positive for hearing loss  Negative for postnasal drip  Eyes: Negative for pain  Respiratory: Negative for cough and shortness of breath  Cardiovascular: Negative for chest pain (Mild atypical chest pain at rest when anxious)  Gastrointestinal: Negative for abdominal distention, abdominal pain, diarrhea, nausea and vomiting  Neurological: Negative for light-headedness and headaches  Psychiatric/Behavioral: Negative for suicidal ideas  The patient is nervous/anxious  Objective:    Vitals:    02/15/18 1043   BP: 124/62   Pulse: 79   Resp: 16   SpO2: 95%                     No Follow-up on file        Allergies   Allergen Reactions    Other     Sulfa Antibiotics Other (See Comments)     Generalized redness       Past Medical History:   Diagnosis Date    AAA (abdominal aortic aneurysm) (HCC)     Anxiety     CVD (cardiovascular disease)     [facility] [facility]     DVT, lower extremity [facility] [facility]     Hyperlipidemia     Hypertension     NSTEMI (non-ST elevated myocardial infarction) [facility] [address] [facility]     Prostate cancer ([facility] )     Right bundle branch block (RBBB)     Skin cancer      Past Surgical History:   Procedure Laterality Date    ANKLE ARTHROSCOPY W/ OPEN REPAIR Left     CARDIAC SURGERY      CORONARY ARTERY BYPASS GRAFT  12/11/2013    CABG x3 with LIMA to LAD, SVG to OM-1, SVG to posterior lateral, LYSIS of pericardial adhesions   [place] REPAIR UMBILICAL KIHA,5+Q/J,GRDBS N/A 3/10/2017    Procedure: REPAIR HERNIA UMBILICAL;  Surgeon: Grover Serra MD;  [facility];  Service: General    PROSTATECTOMY       Current Outpatient Prescriptions on File Prior to Visit   Medication Sig Dispense Refill    ACCU-CHEK FASTCLIX LANCETS MISC by Does not apply route      ascorbic acid (VITAMIN C) 500 mg tablet Take 500 mg by mouth daily      aspirin 325 mg tablet Take 1 tablet by mouth daily      Cholecalciferol (VITAMIN D3 PO) Take 2,000 Units by mouth daily   clopidogrel (PLAVIX) 75 mg tablet Take 1 tablet by mouth see administration instructions 30 tablet 0    coenzyme Q-10 100 MG capsule Take 1 capsule by mouth daily 1 capsule 0    cyanocobalamin (VITAMIN B-12) 500 mcg tablet Take 1 tablet by mouth daily      docusate sodium (COLACE) 100 mg capsule Take 100 mg by mouth 2 (two) times a day        glucose blood (ACCU-CHEK DENYS PLUS) test strip by In Vitro route      insulin aspart (NOVOLOG FLEXPEN) 100 Units/mL SOPN Inject under the skin      Insulin Glargine (TOUJEO SOLOSTAR) injection pen 300 units/mL Inject under the skin      Insulin Pen Needle (NOVOFINE Moonshado) 30G X 8 MM MISC by Does not apply route      isosorbide mononitrate (IMDUR) 30 mg 24 hr tablet Take 1 tablet (30 mg total) by mouth daily 30 tablet 0    metoprolol succinate (TOPROL-XL) 25 mg 24 hr tablet Take 2 tablets (50 mg total) by mouth daily 30 tablet 0    Multiple Vitamins-Minerals (CENTRUM SILVER) tablet Take 1 tablet by mouth daily      nitroglycerin (NITROSTAT) 0 4 mg SL tablet Place 1 tablet under the tongue      pantoprazole (PROTONIX) 40 mg tablet Take 40 mg by mouth 2 (two) times a day   ranolazine (RANEXA) 500 mg 12 hr tablet Take 2 tablets (1,000 mg total) by mouth 2 (two) times a day 30 tablet 0    rosuvastatin (CRESTOR) 40 MG tablet Take 1 tablet by mouth daily      senna (SENOKOT) 8 6 mg Take by mouth      vilazodone (VIIBRYD) 40 mg tablet Take 1 tablet by mouth daily      [DISCONTINUED] LORazepam (ATIVAN) 1 mg tablet Take 1 tablet by mouth 2 (two) times a day as needed       No current facility-administered medications on file prior to visit  Family History   Problem Relation Age of Onset    Crohn's disease Mother     Liver cancer Mother     Crohn's disease Father     Liver cancer Father     Colon cancer Brother     Aortic aneurysm Brother      abdominal     Colon polyps Family     Stomach cancer Family      Social History     Social History    Marital status: /Civil Union     Spouse name: N/A    Number of children: N/A    Years of education: N/A     Occupational History    Not on file       Social History Main Topics    Smoking status: Former Smoker    Smokeless tobacco: Never Used      Comment: quit 1967    Alcohol use Yes      Comment: social    Drug use: No    Sexual activity: Not on file     Other Topics Concern    Not on file     Social History Narrative    No narrative on file     Vitals:    02/15/18 1043   BP: 124/62   BP Location: Left arm   Patient Position: Sitting   Cuff Size: Standard   Pulse: 79   Resp: 16   SpO2: 95%   Weight: 99 3 kg (219 lb)   Height: 5' 10" (1 778 m)     Results for orders placed or performed during the hospital encounter of 01/26/18   Comprehensive metabolic panel   Result Value Ref Range    Sodium 136 136 - 145 mmol/L    Potassium 4 1 3 5 - 5 3 mmol/L    Chloride 101 100 - 108 mmol/L    CO2 28 21 - 32 mmol/L    Anion Gap 7 4 - 13 mmol/L    BUN 33 (H) 5 - 25 mg/dL    Creatinine 1 09 0 60 - 1 30 mg/dL    Glucose 263 (H) 65 - 140 mg/dL    Calcium 8 6 8 3 - 10 1 mg/dL    AST 19 5 - 45 U/L    ALT 25 12 - 78 U/L    Alkaline Phosphatase 99 46 - 116 U/L    Total Protein 7 7 6 4 - 8 2 g/dL    Albumin 3 3 (L) 3 5 - 5 0 g/dL    Total Bilirubin 0 29 0 20 - 1 00 mg/dL    eGFR 64 ml/min/1 73sq m   CBC and differential   Result Value Ref Range    WBC 7 54 4 31 - 10 16 Thousand/uL    RBC 4 27 3 88 - 5 62 Million/uL    Hemoglobin 12 9 12 0 - 17 0 g/dL    Hematocrit 38 6 36 5 - 49 3 %    MCV 90 82 - 98 fL    MCH 30 2 26 8 - 34 3 pg    MCHC 33 4 31 4 - 37 4 g/dL    RDW 14 2 11 6 - 15 1 %    MPV 10 5 8 9 - 12 7 fL    Platelets 125 083 - 678 Thousands/uL    nRBC 0 /100 WBCs    Neutrophils Relative 76 (H) 43 - 75 %    Lymphocytes Relative 13 (L) 14 - 44 %    Monocytes Relative 6 4 - 12 %    Eosinophils Relative 5 0 - 6 %    Basophils Relative 0 0 - 1 %    Neutrophils Absolute 5 63 1 85 - 7 62 Thousands/µL    Lymphocytes Absolute 1 00 0 60 - 4 47 Thousands/µL    Monocytes Absolute 0 46 0 17 - 1 22 Thousand/µL    Eosinophils Absolute 0 39 0 00 - 0 61 Thousand/µL    Basophils Absolute 0 03 0 00 - 0 10 Thousands/µL   Troponin I   Result Value Ref Range    Troponin I 0 52 (H) <=0 04 ng/mL   APTT   Result Value Ref Range    PTT 70 (H) 23 - 35 seconds   Hemoglobin A1c (Orders if not completed within the last 90 days)   Result Value Ref Range    Hemoglobin A1C 7 5 (H) 4 2 - 6 3 %     mg/dl   Troponin I   Result Value Ref Range    Troponin I 3 92 (H) <=0 04 ng/mL   TSH, 3rd generation   Result Value Ref Range    TSH 3RD GENERATON 2 410 0 358 - 3 740 uIU/mL   Basic metabolic panel   Result Value Ref Range Sodium 140 136 - 145 mmol/L    Potassium 4 0 3 5 - 5 3 mmol/L    Chloride 104 100 - 108 mmol/L    CO2 31 21 - 32 mmol/L    Anion Gap 5 4 - 13 mmol/L    BUN 25 5 - 25 mg/dL    Creatinine 0 92 0 60 - 1 30 mg/dL    Glucose 140 65 - 140 mg/dL    Calcium 8 6 8 3 - 10 1 mg/dL    eGFR 79 ml/min/1 73sq m   Magnesium   Result Value Ref Range    Magnesium 2 3 1 6 - 2 6 mg/dL   Phosphorus   Result Value Ref Range    Phosphorus 3 0 2 3 - 4 1 mg/dL   Lipid panel   Result Value Ref Range    Cholesterol 104 50 - 200 mg/dL    Triglycerides 124 <=150 mg/dL    HDL, Direct 32 (L) 40 - 60 mg/dL    LDL Calculated 47 0 - 100 mg/dL   CBC (With Platelets)   Result Value Ref Range    WBC 8 56 4 31 - 10 16 Thousand/uL    RBC 4 22 3 88 - 5 62 Million/uL    Hemoglobin 12 8 12 0 - 17 0 g/dL    Hematocrit 37 7 36 5 - 49 3 %    MCV 89 82 - 98 fL    MCH 30 3 26 8 - 34 3 pg    MCHC 34 0 31 4 - 37 4 g/dL    RDW 14 1 11 6 - 15 1 %    Platelets 455 448 - 087 Thousands/uL    MPV 10 4 8 9 - 12 7 fL   Troponin I   Result Value Ref Range    Troponin I 3 37 (H) <=0 04 ng/mL   APTT   Result Value Ref Range    PTT 48 (H) 23 - 35 seconds   APTT   Result Value Ref Range    PTT 84 (H) 23 - 35 seconds   APTT   Result Value Ref Range    PTT 68 (H) 23 - 35 seconds   APTT   Result Value Ref Range    PTT 65 (H) 23 - 35 seconds   ECG 12 lead   Result Value Ref Range    Ventricular Rate 91 BPM    Atrial Rate 91 BPM    OR Interval 164 ms    QRSD Interval 160 ms    QT Interval 448 ms    QTC Interval 551 ms    P Axis 75 degrees    QRS Axis -72 degrees    T Wave Axis 93 degrees   ECG 12 lead with 2nd troponin   Result Value Ref Range    Ventricular Rate 82 BPM    Atrial Rate 82 BPM    OR Interval 172 ms    QRSD Interval 152 ms    QT Interval 450 ms    QTC Interval 525 ms    P Axis 75 degrees    QRS Axis 74 degrees    T Wave Axis 70 degrees   ECG 12 lead with 3rd troponin   Result Value Ref Range    Ventricular Rate 80 BPM    Atrial Rate 80 BPM    OR Interval 170 ms QRSD Interval 154 ms    QT Interval 450 ms    QTC Interval 519 ms    P Axis 74 degrees    QRS Axis 79 degrees    T Wave Axis 65 degrees   ECG 12 lead   Result Value Ref Range    Ventricular Rate 82 BPM    Atrial Rate 82 BPM    MO Interval 170 ms    QRSD Interval 158 ms    QT Interval 450 ms    QTC Interval 525 ms    P Axis 78 degrees    QRS Axis 72 degrees    T Wave Axis 70 degrees   Fingerstick Glucose (POCT)   Result Value Ref Range    POC Glucose 150 (H) 65 - 140 mg/dl   Fingerstick Glucose (POCT)   Result Value Ref Range    POC Glucose 48 (L) 65 - 140 mg/dl   Fingerstick Glucose (POCT)   Result Value Ref Range    POC Glucose 147 (H) 65 - 140 mg/dl   Fingerstick Glucose (POCT)   Result Value Ref Range    POC Glucose 136 65 - 140 mg/dl   Fingerstick Glucose (POCT)   Result Value Ref Range    POC Glucose 265 (H) 65 - 140 mg/dl   Fingerstick Glucose (POCT)   Result Value Ref Range    POC Glucose 98 65 - 140 mg/dl   Fingerstick Glucose (POCT)   Result Value Ref Range    POC Glucose 122 65 - 140 mg/dl   Fingerstick Glucose (POCT)   Result Value Ref Range    POC Glucose 155 (H) 65 - 140 mg/dl     Weight (last 2 days)     Date/Time   Weight    02/15/18 1043  99 3 (219)            Body mass index is 31 42 kg/m²    BP      Temp      Pulse     Resp      SpO2        Vitals:    02/15/18 1043   Weight: 99 3 kg (219 lb)     Vitals:    02/15/18 1043   Weight: 99 3 kg (219 lb)   Ear cerumen removal  Date/Time: 2/15/2018 4:18 PM  Performed by: Reginaldo Tanner by: Shayne Oneill     Patient location:  Clinic  Other Assisting Provider: Yes (comment)    Consent:     Consent obtained:  Verbal    Consent given by:  Parent    Alternatives discussed:  Alternative treatment  Procedure details:     Location:  R ear    Procedure type: irrigation      Approach:  Internal    Visualization (free text):  Using otoscope    Equipment used:  Using the nino forcep removed the wax from the external canal  Post-procedure details:     Complication:  None    Hearing quality:  Improved    Patient tolerance of procedure: Tolerated well, no immediate complications         Physical Exam   Constitutional: He appears well-developed and well-nourished  No distress  HENT:   Head: Normocephalic and atraumatic  Right Ear: External ear normal    Left Ear: External ear normal    Mouth/Throat: Oropharynx is clear and moist    r ear wax impaction   Eyes: Conjunctivae are normal  Pupils are equal, round, and reactive to light  Right eye exhibits no discharge  Left eye exhibits no discharge  No scleral icterus  Neck: Neck supple  Cardiovascular: Normal rate, regular rhythm and normal heart sounds  Exam reveals no gallop and no friction rub  No murmur heard  Pulmonary/Chest: No respiratory distress  He has no wheezes  He has no rales  Abdominal: Soft  Bowel sounds are normal  He exhibits no distension and no mass  There is no tenderness  There is no rebound and no guarding  Musculoskeletal: He exhibits no edema or deformity  Lymphadenopathy:     He has no cervical adenopathy  Neurological: He is alert  Skin: He is not diaphoretic  Psychiatric: He has a normal mood and affect

## 2018-02-16 ENCOUNTER — LAB (OUTPATIENT)
Dept: LAB | Facility: CLINIC | Age: 78
End: 2018-02-16
Payer: MEDICARE

## 2018-02-16 DIAGNOSIS — Z79.4 TYPE 2 DIABETES MELLITUS WITH OTHER CIRCULATORY COMPLICATION, WITH LONG-TERM CURRENT USE OF INSULIN (HCC): Chronic | ICD-10-CM

## 2018-02-16 DIAGNOSIS — E11.59 TYPE 2 DIABETES MELLITUS WITH OTHER CIRCULATORY COMPLICATION, WITH LONG-TERM CURRENT USE OF INSULIN (HCC): Chronic | ICD-10-CM

## 2018-02-16 LAB
ALBUMIN SERPL BCP-MCNC: 3.5 G/DL (ref 3.5–5)
ALP SERPL-CCNC: 64 U/L (ref 46–116)
ALT SERPL W P-5'-P-CCNC: 23 U/L (ref 12–78)
ANION GAP SERPL CALCULATED.3IONS-SCNC: 5 MMOL/L (ref 4–13)
AST SERPL W P-5'-P-CCNC: 15 U/L (ref 5–45)
BILIRUB SERPL-MCNC: 0.42 MG/DL (ref 0.2–1)
BUN SERPL-MCNC: 27 MG/DL (ref 5–25)
CALCIUM SERPL-MCNC: 8.8 MG/DL (ref 8.3–10.1)
CHLORIDE SERPL-SCNC: 103 MMOL/L (ref 100–108)
CHOLEST SERPL-MCNC: 111 MG/DL (ref 50–200)
CO2 SERPL-SCNC: 29 MMOL/L (ref 21–32)
CREAT SERPL-MCNC: 1.1 MG/DL (ref 0.6–1.3)
EST. AVERAGE GLUCOSE BLD GHB EST-MCNC: 157 MG/DL
GFR SERPL CREATININE-BSD FRML MDRD: 64 ML/MIN/1.73SQ M
GLUCOSE P FAST SERPL-MCNC: 128 MG/DL (ref 65–99)
HBA1C MFR BLD: 7.1 % (ref 4.2–6.3)
HDLC SERPL-MCNC: 38 MG/DL (ref 40–60)
LDLC SERPL CALC-MCNC: 55 MG/DL (ref 0–100)
POTASSIUM SERPL-SCNC: 4.4 MMOL/L (ref 3.5–5.3)
PROT SERPL-MCNC: 7.9 G/DL (ref 6.4–8.2)
SODIUM SERPL-SCNC: 137 MMOL/L (ref 136–145)
TRIGL SERPL-MCNC: 89 MG/DL

## 2018-02-16 PROCEDURE — 80053 COMPREHEN METABOLIC PANEL: CPT

## 2018-02-16 PROCEDURE — 80061 LIPID PANEL: CPT

## 2018-02-16 PROCEDURE — 36415 COLL VENOUS BLD VENIPUNCTURE: CPT

## 2018-02-16 PROCEDURE — 83036 HEMOGLOBIN GLYCOSYLATED A1C: CPT

## 2018-02-19 PROBLEM — E55.9 VITAMIN D DEFICIENCY: Status: ACTIVE | Noted: 2018-02-19

## 2018-02-19 PROBLEM — E78.2 MIXED HYPERLIPIDEMIA: Chronic | Status: ACTIVE | Noted: 2017-07-03

## 2018-02-19 PROBLEM — E02 SUBCLINICAL IODINE-DEFICIENCY HYPOTHYROIDISM: Status: ACTIVE | Noted: 2018-02-19

## 2018-02-19 NOTE — PROGRESS NOTES
Follow-up Progress Note      No chief complaint on file  Referring Provider  No referring provider defined for this encounter  History of Present Illness:  Patient here for follow-up on diabetes, hyperglycemia, history of triple CABG in 2013  Susannah Red is a 76 y o  male with a history of type *** diabetes {insulin:35388794} since ***  Diabetes course has been stable  Reports complications of ***  Denies recent illness or hospitalizations  Denies recent severe hypoglycemic or severe hyperglycemic episodes  Denies any issues with his current regimen  home glucose monitoring: {Home testin}    Home blood glucose readings:   Before breakfast: ***  Before lunch: ***  Before dinner: ***  Bedtime: ***    Current regimen: ***  {Desc; compliance:5303::"compliant most of the time"}{EFO Amb Denies/Reports Side Effects:06661}  Injects in: *** Rotates sites: {YES/NO:00981}  Hypoglycemic episodes: {YES/NO:99109} {Frequencies; rare to continuous:5330}  H/o of hypoglycemia causing hospitalization or Intervention such as glucagon injection  or ambulance call :  {YES/NO:71579}  Hypoglycemia symptoms: {symptoms; hypoglycemia:85009}  Treatment of hypoglycemia:   Glucagon:{YES/NO:81578}   Medic alert tag: recommended: {YES/NO:82441}    Diabetes education: {YES/NO:61715} Date -   Diet: *** meals per day, *** snacks per day  Timing of meals is predictable   {YES/NO:16473}  diabetic diet compliance:  {Desc; compliance:::"compliant most of the time"}  Activity: Daily activity is predictable {YES/NO:59343} {Exercise:52140}    Adjustments for activity include: ***                further diabetic ROS: {exam; ros diabetes:5304::"no polyuria or polydipsia","no chest pain, dyspnea or TIAs","no numbness, tingling or pain in extremities"}      acute symptoms are ***    Opthamology: sees ***; no retinopathy, no macular edema  Podiatry: ***  Infuenza vaccine: ***    Has hypertension: followed by PCP; on ACE inhibitor/ARB, {Desc; compliance:5303::"compliant most of the time"}  Has hyperlipidemia: followed by PCP; on statin - tolerating well, no myalgias  {Desc; compliance:5303::"compliant most of the time"}  {EFO Amb Denies/Reports Side Effects:05931}  Thyroid disorders: ***  History of pancreatitis: ***    Patient Active Problem List   Diagnosis    Non-STEMI (non-ST elevated myocardial infarction) ([place] [address] )    Hypertensive urgency    Diabetes mellitus ([place] [address] )    Mixed hyperlipidemia    Abdominal aneurysm ([place] [address] )    Hx of CABG    Orthostasis    Hypertension    Obesity    Vitamin D deficiency    Subclinical iodine-deficiency hypothyroidism      Past Medical History:   Diagnosis Date    AAA (abdominal aortic aneurysm) (HCC)     Anxiety     CVD (cardiovascular disease)     Diabetes mellitus ([place] [address] )     DVT, lower extremity ([place] [address] )     Hyperlipidemia     Hypertension     NSTEMI (non-ST elevated myocardial infarction) ([place] [address] )     Prostate cancer ([place] [address] )     Right bundle branch block (RBBB)     Skin cancer       Past Surgical History:   Procedure Laterality Date    ANKLE ARTHROSCOPY W/ OPEN REPAIR Left     CARDIAC SURGERY      CORONARY ARTERY BYPASS GRAFT  12/11/2013    CABG x3 with LIMA to LAD, SVG to OM-1, SVG to posterior lateral, LYSIS of pericardial adhesions      IN REPAIR UMBILICAL FKFM,4+S/U,UJOWV N/A 3/10/2017    Procedure: REPAIR HERNIA UMBILICAL;  Surgeon: Rasheed South MD;  [facility];  Service: General    PROSTATECTOMY        Family History   Problem Relation Age of Onset    Crohn's disease Mother     Liver cancer Mother     Crohn's disease Father     Liver cancer Father     Colon cancer Brother     Aortic aneurysm Brother      abdominal     Colon polyps Family     Stomach cancer Family      Social History   Substance Use Topics    Smoking status: Former Smoker    Smokeless tobacco: Never Used      Comment: quit 1967    Alcohol use Yes      Comment: social     Allergies   Allergen Reactions    Other     Sulfa Antibiotics Other (See Comments)     Generalized redness     [unfilled]    Review of Systems    Physical Exam:  There is no height or weight on file to calculate BMI  There were no vitals taken for this visit  [unfilled]     Physical Exam  Diabetic Foot Exam    Labs:   Component      Latest Ref Rng & Units 1/28/2018 2/16/2018   Sodium      136 - 145 mmol/L  137   Potassium      3 5 - 5 3 mmol/L  4 4   Chloride      100 - 108 mmol/L  103   CO2      21 - 32 mmol/L  29   Anion Gap      4 - 13 mmol/L  5   BUN      5 - 25 mg/dL  27 (H)   Creatinine      0 60 - 1 30 mg/dL  1 10   GLUCOSE FASTING      65 - 99 mg/dL  128 (H)   Calcium      8 3 - 10 1 mg/dL  8 8   AST      5 - 45 U/L  15   ALT      12 - 78 U/L  23   Alkaline Phosphatase      46 - 116 U/L  64   Total Protein      6 4 - 8 2 g/dL  7 9   Albumin      3 5 - 5 0 g/dL  3 5   Total Bilirubin      0 20 - 1 00 mg/dL  0 42   eGFR      ml/min/1 73sq m  64   Cholesterol      50 - 200 mg/dL  111   Triglycerides      <=150 mg/dL  89   HDL      40 - 60 mg/dL  38 (L)   LDL Calculated      0 - 100 mg/dL  55   Hemoglobin A1C      4 2 - 6 3 %  7 1 (H)   EAG      mg/dl  157   POC Glucose      65 - 140 mg/dl 155 (H)            Lab Results   Component Value Date    CREATININE 1 10 02/16/2018    CREATININE 0 92 01/27/2018    CREATININE 1 09 01/26/2018    BUN 27 (H) 02/16/2018     02/16/2018    K 4 4 02/16/2018     02/16/2018    CO2 29 02/16/2018     eGFR   Date Value Ref Range Status   02/16/2018 64 ml/min/1 73sq m Final     No components found for: [facility]    Lab Results   Component Value Date    CHOL 111 02/16/2018    HDL 38 (L) 02/16/2018    TRIG 89 02/16/2018       Lab Results   Component Value Date    ALT 23 02/16/2018    AST 15 02/16/2018    ALKPHOS 64 02/16/2018    BILITOT 0 42 02/16/2018       Lab Results   Component Value Date    FREET4 0 99 08/16/2016       Impression:  1   Diabetes mellitus due to underlying condition with hyperosmolarity without coma, without long-term current use of insulin ([address] )    2  Vitamin D deficiency    3  Subclinical iodine-deficiency hypothyroidism    4  Mixed hyperlipidemia           Plan:    Diagnoses and all orders for this visit:    Diabetes mellitus due to underlying condition with hyperosmolarity without coma, without long-term current use of insulin ([address] )    Vitamin D deficiency    Subclinical iodine-deficiency hypothyroidism    Mixed hyperlipidemia      There are no diagnoses linked to this encounter  Discussed with the patient and all questioned fully answered  He will call me if any problems arise      Counseled patient on diagnostic results, prognosis, risk and benefit of treatment options, instruction for management, importance of treatment compliance, Risk  factor reduction and impressions      TANISHA Duff

## 2018-02-20 ENCOUNTER — TELEPHONE (OUTPATIENT)
Dept: ENDOCRINOLOGY | Facility: CLINIC | Age: 78
End: 2018-02-20

## 2018-02-20 ENCOUNTER — OFFICE VISIT (OUTPATIENT)
Dept: ENDOCRINOLOGY | Facility: CLINIC | Age: 78
End: 2018-02-20
Payer: MEDICARE

## 2018-02-20 ENCOUNTER — OFFICE VISIT (OUTPATIENT)
Dept: DIABETES SERVICES | Facility: CLINIC | Age: 78
End: 2018-02-20
Payer: MEDICARE

## 2018-02-20 VITALS
DIASTOLIC BLOOD PRESSURE: 62 MMHG | WEIGHT: 220 LBS | HEIGHT: 70 IN | BODY MASS INDEX: 31.5 KG/M2 | SYSTOLIC BLOOD PRESSURE: 100 MMHG

## 2018-02-20 DIAGNOSIS — Z79.4 TYPE 2 DIABETES MELLITUS WITH HYPERGLYCEMIA, WITH LONG-TERM CURRENT USE OF INSULIN (HCC): Primary | ICD-10-CM

## 2018-02-20 DIAGNOSIS — I10 ESSENTIAL HYPERTENSION: Chronic | ICD-10-CM

## 2018-02-20 DIAGNOSIS — E78.2 MIXED HYPERLIPIDEMIA: ICD-10-CM

## 2018-02-20 DIAGNOSIS — E55.9 VITAMIN D DEFICIENCY: ICD-10-CM

## 2018-02-20 DIAGNOSIS — E02 SUBCLINICAL IODINE-DEFICIENCY HYPOTHYROIDISM: ICD-10-CM

## 2018-02-20 DIAGNOSIS — E08.00 DIABETES MELLITUS DUE TO UNDERLYING CONDITION WITH HYPEROSMOLARITY WITHOUT COMA, WITHOUT LONG-TERM CURRENT USE OF INSULIN (HCC): Primary | Chronic | ICD-10-CM

## 2018-02-20 DIAGNOSIS — E11.65 TYPE 2 DIABETES MELLITUS WITH HYPERGLYCEMIA, WITH LONG-TERM CURRENT USE OF INSULIN (HCC): Primary | ICD-10-CM

## 2018-02-20 PROCEDURE — G0108 DIAB MANAGE TRN  PER INDIV: HCPCS | Performed by: INTERNAL MEDICINE

## 2018-02-20 PROCEDURE — 99214 OFFICE O/P EST MOD 30 MIN: CPT | Performed by: NURSE PRACTITIONER

## 2018-02-20 NOTE — PATIENT INSTRUCTIONS
blood work and urine in 3 months  Continue all medications as directed  Follow up in 3 months  Follow up with diabetic educator, in regards to insulin carb ratio

## 2018-02-20 NOTE — PATIENT INSTRUCTIONS
1  Follow instructions for calculating bolus insulin doses for meals  2  Count carbs to accurately calculate insulin dose  3   Download tracie on smartphone to find carb counts when eating out

## 2018-02-20 NOTE — PROGRESS NOTES
Insulin Instruction  Met with Garry Emmanuel for education on manually calculating flexible insulin dosing  Noah Novoa was using Accu Chek Expert meter but it is no longer working and meter has been discontinued  ICR 1:4 and ISF 1:15  Patient instructed on: Calculating dosage based on above ICR and ISF  Using apps to count carbs  His wife was able to successfully calculate bolus insulin doses  Comments: Noah Novoa and his wife have a good understanding of FIT and carb counting and were able to demonstrate use of formula given  Patient instruction completed on Hypoglycemia: definition/risk, causes/symptoms, treatment, prevention of hypoglycemia, when to notify physician and EMS  Comments: Noah Novoa has good understanding of hypoglycemia and it's treatment  Patient instruction completed on Hyperglycemia: definition, causes/symptoms, treatment, prevention of hypoglycemia, when to notify physician and EMS  Comments: Noah Novoa has good understanding of hyperglycemia and treatment  Diabetes Education Record: Noah Novoa was given the following education material: Flexible Insulin Workbook, Fast 15 List, Hypoglycemia Fact Sheet, Hyperglycemia Fact Sheet  Patient response to instruction  Comprehension: good  Motivation: good  Expected Compliance: good  Readiness: action  Barriers to Learning: none known     Begin Time: 2:40  End Time: 3:10  Referring Provider: Delma Ontiveros    Thank you for referring your patient to [facility], it was a pleasure working with them today  Please feel free to call with any questions or concerns      [facility] [place] Hansen [phone]

## 2018-02-20 NOTE — PROGRESS NOTES
Follow-up Progress Note      No chief complaint on file  Referring Provider  Afshan Sy Do  [address]  [place], [facility] [place]      History of Present Illness:   Laly Garcia is a 76 y o  male with a history of type 2 diabetes with long term use of insulin  Diabetes course has been stable  Reports complications of broken meter, has replaced Accu-Chek with a [facility] meter-does not understand how to do the insulin to carb ratio  Denies recent illness or hospitalizations  Denies recent severe hypoglycemic or severe hyperglycemic episodes  Denies any issues with his current regimen  home glucose monitoring: are performed regularly    Home blood glucose readings:  Patient does not understand how pull up past blood sugars  But reports the following  Before breakfast: 130-145  Before lunch: 115-145  Before dinner: 130-165      Current regimen: Tujeo 50 units, novolog 21 units before each meal hold if not eating, metformin 500 milligrams twice daily   compliant most of the timedenies any side effects from medications  Injects in:   AbdomeYesn Rotates sites: Injects in:  Hypoglycemic episodes: No rare  H/o of hypoglycemia causing hospitalization or Intervention such as glucagon injection  or ambulance call :  No  Hypoglycemia symptoms: jitteriness and sweating  Treatment of hypoglycemia:   Glucagon:No   Medic alert tag: recommended: No    Diabetes education: Yes   Diet: 3 meals per day,1 snacks per day  Timing of meals is predictable  Yes  diabetic diet compliance:  compliant most of the time  Activity: Daily activity is predictable Yes The patient engages in little, if any physical activity             further diabetic ROS: no polyuria or polydipsia, no chest pain, dyspnea or TIAs, no numbness, tingling or pain in extremities          Opthamology: yaerly; no retinopathy, no macular edema  Podiatry: Yearly  Infuenza vaccine: yes    Has hypertension: followed by PCP; on ACE inhibitor/ARB, compliant most of the time  Has hyperlipidemia: followed by PCP; on statin - tolerating well, no myalgias  compliant most of the time  denies any side effects from medications  Patient Active Problem List   Diagnosis    Non-STEMI (non-ST elevated myocardial infarction) ([place] [address] )    Hypertensive urgency    Diabetes mellitus ([place] [address] )    Mixed hyperlipidemia    Abdominal aneurysm ([place] [address] )    Hx of CABG    Orthostasis    Hypertension    Obesity    Vitamin D deficiency    Subclinical iodine-deficiency hypothyroidism      Past Medical History:   Diagnosis Date    AAA (abdominal aortic aneurysm) (HCC)     Anxiety     CVD (cardiovascular disease)     Diabetes mellitus ([place] [address] )     DVT, lower extremity ([place] [address] )     Hyperlipidemia     Hypertension     NSTEMI (non-ST elevated myocardial infarction) ([place] [address] )     Prostate cancer ([place] [address] )     Right bundle branch block (RBBB)     Skin cancer       Past Surgical History:   Procedure Laterality Date    ANKLE ARTHROSCOPY W/ OPEN REPAIR Left     CARDIAC SURGERY      CORONARY ARTERY BYPASS GRAFT  12/11/2013    CABG x3 with LIMA to LAD, SVG to OM-1, SVG to posterior lateral, LYSIS of pericardial adhesions      [place] REPAIR UMBILICAL HHFZ,7+W/T,OHNRQ N/A 3/10/2017    Procedure: REPAIR HERNIA UMBILICAL;  Surgeon: Janey Amin MD;  Location: [facility];  Service: General    PROSTATECTOMY        Family History   Problem Relation Age of Onset    Crohn's disease Mother     Liver cancer Mother     Crohn's disease Father     Liver cancer Father     Colon cancer Brother     Aortic aneurysm Brother      abdominal     Colon polyps Family     Stomach cancer Family      Social History   Substance Use Topics    Smoking status: Former Smoker    Smokeless tobacco: Never Used      Comment: quit 1967    Alcohol use Yes      Comment: social     Allergies   Allergen Reactions    Other     Sulfa Antibiotics Other (See Comments)     Generalized redness     [unfilled]    Current Outpatient Prescriptions:     ACCU-CHEK FASTCLIX LANCETS [facility], by Does not apply route, Disp: , Rfl:     ascorbic acid (VITAMIN C) 500 mg tablet, Take 500 mg by mouth daily, Disp: , Rfl:     aspirin 325 mg tablet, Take 1 tablet by mouth daily, Disp: , Rfl:     Cholecalciferol (VITAMIN D3 PO), Take 2,000 Units by mouth daily  , Disp: , Rfl:     clopidogrel (PLAVIX) 75 mg tablet, Take 1 tablet by mouth see administration instructions, Disp: 30 tablet, Rfl: 0    coenzyme Q-10 100 MG capsule, Take 1 capsule by mouth daily, Disp: 1 capsule, Rfl: 0    cyanocobalamin (VITAMIN B-12) 500 mcg tablet, Take 1 tablet by mouth daily, Disp: , Rfl:     docusate sodium (COLACE) 100 mg capsule, Take 100 mg by mouth 2 (two) times a day , Disp: , Rfl:     insulin aspart (NOVOLOG FLEXPEN) 100 Units/mL SOPN, Inject under the skin, Disp: , Rfl:     Insulin Glargine (TOUJEO SOLOSTAR) injection pen 300 units/mL, Inject under the skin, Disp: , Rfl:     Insulin Pen Needle (Fartun Erwin) 30G X 8 MM MISC, by Does not apply route, Disp: , Rfl:     isosorbide mononitrate (IMDUR) 30 mg 24 hr tablet, Take 1 tablet (30 mg total) by mouth daily, Disp: 30 tablet, Rfl: 0    LORazepam (ATIVAN) 1 mg tablet, Take 1 tablet (1 mg total) by mouth 2 (two) times a day as needed (anxiety), Disp: 30 tablet, Rfl: 0    metoprolol succinate (TOPROL-XL) 25 mg 24 hr tablet, Take 2 tablets (50 mg total) by mouth daily, Disp: 30 tablet, Rfl: 0    Multiple Vitamins-Minerals (CENTRUM SILVER) tablet, Take 1 tablet by mouth daily, Disp: , Rfl:     nitroglycerin (NITROSTAT) 0 4 mg SL tablet, Place 1 tablet under the tongue, Disp: , Rfl:     pantoprazole (PROTONIX) 40 mg tablet, Take 40 mg by mouth 2 (two) times a day , Disp: , Rfl:     ranolazine (RANEXA) 500 mg 12 hr tablet, Take 2 tablets (1,000 mg total) by mouth 2 (two) times a day, Disp: 30 tablet, Rfl: 0    rosuvastatin (CRESTOR) 40 MG tablet, Take 1 tablet by mouth daily, Disp: , Rfl:     senna (SENOKOT) 8 6 mg, Take by mouth, Disp: , Rfl:     vilazodone (VIIBRYD) 40 mg tablet, Take 1 tablet by mouth daily, Disp: , Rfl:     glucose blood (ACCU-CHEK DENYS PLUS) test strip, by In Vitro route, Disp: , Rfl:     metFORMIN (GLUCOPHAGE) 500 mg tablet, Take 1 tablet (500 mg total) by mouth 2 (two) times a day with meals, Disp: 60 tablet, Rfl: 2  Review of Systems   Endocrine: Negative  All other systems reviewed and are negative  Physical Exam:  Body mass index is 31 57 kg/m²  /62   Ht 5' 10" (1 778 m)   Wt 99 8 kg (220 lb)   BMI 31 57 kg/m²    [unfilled]   Wt Readings from Last 3 Encounters:   02/20/18 99 8 kg (220 lb)   02/15/18 99 3 kg (219 lb)   02/06/18 99 6 kg (219 lb 9 6 oz)     Physical Exam   Constitutional: He appears well-developed  HENT:   Head: Normocephalic  Eyes: Pupils are equal, round, and reactive to light  Neck: Neck supple  Cardiovascular: Normal rate  Pulses:       Dorsalis pedis pulses are 1+ on the right side, and 1+ on the left side  Pulmonary/Chest: Effort normal    Feet:   Right Foot:   Skin Integrity: Negative for ulcer, skin breakdown, erythema, warmth, callus or dry skin  Left Foot:   Skin Integrity: Negative for ulcer, skin breakdown, erythema, warmth, callus or dry skin  Neurological: He is alert  Skin: Skin is warm and dry  Patient's shoes and socks removed  Right Foot/Ankle   Right Foot Inspection  Skin Exam: skin normal and skin intact no dry skin, no warmth, no callus, no erythema, no maceration, no abnormal color, no pre-ulcer, no ulcer and no callus                            Sensory   Vibration: intact    Monofilament testing: intact  Vascular  Capillary refills: < 3 seconds  The right DP pulse is 1+       Left Foot/Ankle  Left Foot Inspection  Skin Exam: skin normal and skin intactno dry skin, no warmth, no erythema, no maceration, normal color, no pre-ulcer, no ulcer and no callus                                         Sensory Vibration: intact    Monofilament: intact  Vascular  Capillary refills: < 3 seconds  The left DP pulse is 1+  Labs:   Component      Latest Ref Rng & Units 2/16/2018   Sodium      136 - 145 mmol/L 137   Potassium      3 5 - 5 3 mmol/L 4 4   Chloride      100 - 108 mmol/L 103   CO2      21 - 32 mmol/L 29   Anion Gap      4 - 13 mmol/L 5   BUN      5 - 25 mg/dL 27 (H)   Creatinine      0 60 - 1 30 mg/dL 1 10   GLUCOSE FASTING      65 - 99 mg/dL 128 (H)   Calcium      8 3 - 10 1 mg/dL 8 8   AST      5 - 45 U/L 15   ALT      12 - 78 U/L 23   Alkaline Phosphatase      46 - 116 U/L 64   Total Protein      6 4 - 8 2 g/dL 7 9   Albumin      3 5 - 5 0 g/dL 3 5   Total Bilirubin      0 20 - 1 00 mg/dL 0 42   eGFR      ml/min/1 73sq m 64   Cholesterol      50 - 200 mg/dL 111   Triglycerides      <=150 mg/dL 89   HDL      40 - 60 mg/dL 38 (L)   LDL Calculated      0 - 100 mg/dL 55   Hemoglobin A1C      4 2 - 6 3 % 7 1 (H)   EAG      mg/dl 157         Lab Results   Component Value Date    CREATININE 1 10 02/16/2018    CREATININE 0 92 01/27/2018    CREATININE 1 09 01/26/2018    BUN 27 (H) 02/16/2018     02/16/2018    K 4 4 02/16/2018     02/16/2018    CO2 29 02/16/2018     eGFR   Date Value Ref Range Status   02/16/2018 64 ml/min/1 73sq m Final     No components found for: [facility]    Lab Results   Component Value Date    CHOL 111 02/16/2018    HDL 38 (L) 02/16/2018    TRIG 89 02/16/2018       Lab Results   Component Value Date    ALT 23 02/16/2018    AST 15 02/16/2018    ALKPHOS 64 02/16/2018    BILITOT 0 42 02/16/2018       Lab Results   Component Value Date    FREET4 0 99 08/16/2016       Impression:  1  Diabetes mellitus due to underlying condition with hyperosmolarity without coma, without long-term current use of insulin ([address] )    2  Vitamin D deficiency    3  Essential hypertension   4  Mixed hyperlipidemia           Plan:    Diagnoses and all orders for this visit:    1   Diabetes mellitus due to underlying condition with hyperosmolarity without coma, without long-term current use of insulin (HCC)  - A1c more improved 7 1 versus 8 1 previously, the patient still not at goal continue Tujeo 50 units at 22:00, NovoLog 21 units before each meal, and if not eating hold it, continue metformin 500 milligrams twice daily with meals  Check blood sugars before meals at bedtime and if having any hypoglycemia  Always have glucose available for low blood sugars  Reviewed the 15 x 15 rule  Referred to diabetic educator to review insulin carb ratios  A1c and BMP ordered in 3 months, and reordered microalbumin which was not done a previous time at the lab  -    Hemoglobin A1c Lab Collect; Future  -     Basic metabolic panel Lab Collect; Future  -     Microalbumin / creatinine urine ratio Lab Collect; Future  -     Ambulatory referral to Diabetic Education; Future  -     metFORMIN (GLUCOPHAGE) 500 mg tablet; Take 1 tablet (500 mg total) by mouth 2 (two) times a day with meals  2  Vitamin D deficiency  -last level not at goal, patient to continue 5000 International Units daily  3   Essential hypertension  -BP is at goal, continue following PCP      4  Mixed hyperlipidemia  -LDL at goal, continue medications and working on dietary modifications  Discussed with the patient and all questioned fully answered  He will call me if any problems arise      Counseled patient on diagnostic results, prognosis, risk and benefit of treatment options, instruction for management, importance of treatment compliance, Risk  factor reduction and impressions      TANISHA Quijano

## 2018-02-21 ENCOUNTER — OFFICE VISIT (OUTPATIENT)
Dept: CARDIOLOGY CLINIC | Facility: CLINIC | Age: 78
End: 2018-02-21
Payer: MEDICARE

## 2018-02-21 VITALS
OXYGEN SATURATION: 95 % | BODY MASS INDEX: 31.78 KG/M2 | HEIGHT: 70 IN | HEART RATE: 72 BPM | WEIGHT: 222 LBS | DIASTOLIC BLOOD PRESSURE: 52 MMHG | SYSTOLIC BLOOD PRESSURE: 106 MMHG

## 2018-02-21 DIAGNOSIS — I25.10 CORONARY ARTERY DISEASE INVOLVING NATIVE CORONARY ARTERY OF NATIVE HEART WITHOUT ANGINA PECTORIS: Primary | ICD-10-CM

## 2018-02-21 DIAGNOSIS — I71.4 ANEURYSM OF INFRARENAL ABDOMINAL AORTA (HCC): ICD-10-CM

## 2018-02-21 DIAGNOSIS — E78.5 DYSLIPIDEMIA: ICD-10-CM

## 2018-02-21 DIAGNOSIS — I10 BENIGN ESSENTIAL HYPERTENSION: ICD-10-CM

## 2018-02-21 DIAGNOSIS — I95.1 ORTHOSTATIC HYPOTENSION: ICD-10-CM

## 2018-02-21 DIAGNOSIS — I45.10 RIGHT BUNDLE-BRANCH BLOCK: ICD-10-CM

## 2018-02-21 DIAGNOSIS — Z95.1 S/P CABG X 3: Chronic | ICD-10-CM

## 2018-02-21 DIAGNOSIS — E66.09 CLASS 1 OBESITY DUE TO EXCESS CALORIES WITH SERIOUS COMORBIDITY AND BODY MASS INDEX (BMI) OF 31.0 TO 31.9 IN ADULT: ICD-10-CM

## 2018-02-21 PROBLEM — E78.2 MIXED HYPERLIPIDEMIA: Chronic | Status: RESOLVED | Noted: 2017-07-03 | Resolved: 2018-02-21

## 2018-02-21 PROBLEM — I16.0 HYPERTENSIVE URGENCY: Status: RESOLVED | Noted: 2017-07-03 | Resolved: 2018-02-21

## 2018-02-21 PROCEDURE — 99214 OFFICE O/P EST MOD 30 MIN: CPT | Performed by: INTERNAL MEDICINE

## 2018-02-21 NOTE — PROGRESS NOTES
Cardiology Follow Up    Job Schmidt  1940  [id number]  Stacie Robb [facility] [place] [phone]    1  Coronary artery disease involving native coronary artery of native heart without angina pectoris     2  S/P CABG x 3     3  Benign essential hypertension     4  Orthostatic hypotension     5  Dyslipidemia     6  Aneurysm of infrarenal abdominal aorta (HCC)     7  Right bundle-branch block     8  Class 1 obesity due to excess calories with serious comorbidity and body mass index (BMI) of 31 0 to 31 9 in adult         Discussion/Summary:  Mr Leigh Fountain is a pleasant 70-year-old gentleman who presents to the office today for routine follow-up  Since his last visit he was hospitalized with a NSTEMI and his medical regimen was intensified  He has noted some recurrent lightheadedness with standing  I have asked him to discontinue his Imdur  He will remain on his other antianginal regimen as prescribed  He was asked to notify the office of any recurrent anginal like chest discomfort      His most recent lipids from earlier this month were reviewed  They are acceptable on current therapy with exception of a slightly low HDL for which therapeutic lifestyle modifications were recommended      His blood pressure is well controlled in the office today       He is due to see vascular surgery in the near future as his infrarenal abdominal aortic aneurysm was noted to enlarged on recent imaging  I will reassess his ejection fraction with an echocardiogram as it does not appear he had a repeat echo during his hospital stay  He will follow-up in the office in three months  Interval History:   Mr Leigh Fountain is a pleasant 70-year-old male who presents to the office for follow-up  After his last visit with me he was re hospitalized with a type 1 NSTEMI  He was seen by one of my associates    He had a recent heart catheterization which revealed patent bypass grafts but a diffusely disease and small LAD  Medical management was intensified including an increase in his beta-blocker and Ranexa doses with the addition of Imdur  Since his hospital stay he has been feeling well  He denies any exertional chest pain or shortness of breath  He has not been attending cardiac rehab since discharge  He denies symptoms of heart failure  He denies symptoms of claudication  His lightheadedness with standing has returned  He denies any further near syncope, syncope or falls  Problem List     Non-STEMI (non-ST elevated myocardial infarction) ([facility] )    Hypertensive urgency    Diabetes mellitus ([place] [address] ) (Chronic)    Mixed hyperlipidemia (Chronic)    Abdominal aneurysm (HCC) (Chronic)    Hx of CABG (Chronic)    Orthostasis    Hypertension (Chronic)    Obesity    Vitamin D deficiency    Subclinical iodine-deficiency hypothyroidism    Aneurysm of infrarenal abdominal aorta (HCC)    Arteriosclerosis of coronary artery    Overview Signed 2/21/2018  7:50 AM by Carmen Rosario DO     Description: 50% distal left main, 75% proximal LAD, 90% 1st diagonal, 50% ostial circumflex, 99% proximal circumflex, 90% 2nd OM, 30 mid RCA , 90% right posterolateral - left heart catheterization December 2013  Dyslipidemia    Right bundle-branch block        Past Medical History:   Diagnosis Date    AAA [facility]abdominal aortic [facility]) ([facility])     Anxiety     CVD (cardiovascular disease)     Diabetes mellitus ([place] [address] )     DVT, lower extremity ([facility] )     Hyperlipidemia     Hypertension     NSTEMI (non-ST elevated myocardial infarction) ([place] [address] )     Prostate cancer ([place] [address] )     Right bundle branch block (RBBB)     Skin cancer      Social History     Social History    Marital status: /Civil Union     Spouse name: N/A    Number of children: N/A    Years of education: N/A     Occupational History    Not on file       Social History Main Topics    Smoking status: Former Smoker    Smokeless tobacco: Never Used      Comment: quit 1967    Alcohol use Yes      Comment: social    Drug use: No    Sexual activity: Not on file     Other Topics Concern    Not on file     Social History Narrative    No narrative on file      Family History   Problem Relation Age of Onset    Crohn's disease Mother     Liver cancer Mother     Crohn's disease Father     Liver cancer Father     Colon cancer Brother     Aortic aneurysm Brother      abdominal     Colon polyps Family     Stomach cancer Family      Past Surgical History:   Procedure Laterality Date    ANKLE ARTHROSCOPY W/ OPEN REPAIR Left     CARDIAC SURGERY      CORONARY ARTERY BYPASS GRAFT  12/11/2013    CABG x3 with LIMA to LAD, SVG to OM-1, SVG to posterior lateral, LYSIS of pericardial adhesions   [facility] REPAIR UMBILICAL GPLL,7+S/G,CWHLY N/A 3/10/2017    Procedure: REPAIR HERNIA UMBILICAL;  Surgeon: Anthony Johnson MD;  Location: [facility];  Service: General    PROSTATECTOMY         Current Outpatient Prescriptions:     ACCU-CHEK FASTCLIX LANCETS MISC, by Does not apply route, Disp: , Rfl:     ascorbic acid (VITAMIN C) 500 mg tablet, Take 500 mg by mouth daily, Disp: , Rfl:     aspirin 325 mg tablet, Take 1 tablet by mouth daily, Disp: , Rfl:     Cholecalciferol (VITAMIN D3 PO), Take 2,000 Units by mouth daily  , Disp: , Rfl:     clopidogrel (PLAVIX) 75 mg tablet, Take 1 tablet by mouth see administration instructions, Disp: 30 tablet, Rfl: 0    coenzyme Q-10 100 MG capsule, Take 1 capsule by mouth daily, Disp: 1 capsule, Rfl: 0    cyanocobalamin (VITAMIN B-12) 500 mcg tablet, Take 1 tablet by mouth daily, Disp: , Rfl:     docusate sodium (COLACE) 100 mg capsule, Take 100 mg by mouth 2 (two) times a day , Disp: , Rfl:     glucose blood (ACCU-CHEK DENYS PLUS) test strip, by In Vitro route, Disp: , Rfl:     insulin aspart (NOVOLOG FLEXPEN) 100 Units/mL SOPN, Inject under the skin, Disp: , Rfl:    Insulin Glargine (TOUJEO SOLOSTAR) injection pen 300 units/mL, Inject under the skin, Disp: , Rfl:     Insulin Pen Needle (Dolph Dus) 30G X 8 MM MISC, by Does not apply route, Disp: , Rfl:     isosorbide mononitrate (IMDUR) 30 mg 24 hr tablet, Take 1 tablet (30 mg total) by mouth daily, Disp: 30 tablet, Rfl: 0    LORazepam (ATIVAN) 1 mg tablet, Take 1 tablet (1 mg total) by mouth 2 (two) times a day as needed (anxiety), Disp: 30 tablet, Rfl: 0    metFORMIN (GLUCOPHAGE) 500 mg tablet, Take 1 tablet (500 mg total) by mouth 2 (two) times a day with meals, Disp: 60 tablet, Rfl: 2    metoprolol succinate (TOPROL-XL) 25 mg 24 hr tablet, Take 2 tablets (50 mg total) by mouth daily, Disp: 30 tablet, Rfl: 0    Multiple Vitamins-Minerals (CENTRUM SILVER) tablet, Take 1 tablet by mouth daily, Disp: , Rfl:     nitroglycerin (NITROSTAT) 0 4 mg SL tablet, Place 1 tablet under the tongue, Disp: , Rfl:     pantoprazole (PROTONIX) 40 mg tablet, Take 40 mg by mouth 2 (two) times a day , Disp: , Rfl:     ranolazine (RANEXA) 500 mg 12 hr tablet, Take 2 tablets (1,000 mg total) by mouth 2 (two) times a day, Disp: 30 tablet, Rfl: 0    rosuvastatin (CRESTOR) 40 MG tablet, Take 1 tablet by mouth daily, Disp: , Rfl:     senna (SENOKOT) 8 6 mg, Take by mouth, Disp: , Rfl:     vilazodone (VIIBRYD) 40 mg tablet, Take 1 tablet by mouth daily, Disp: , Rfl:   Allergies   Allergen Reactions    Other     Sulfa Antibiotics Other (See Comments)     Generalized redness       Labs:     Chemistry        Component Value Date/Time     02/16/2018 0946     10/13/2015 0823    K 4 4 02/16/2018 0946    K 4 3 10/13/2015 0823     02/16/2018 0946     10/13/2015 0823    CO2 29 02/16/2018 0946    CO2 34 (H) 10/13/2015 0823    BUN 27 (H) 02/16/2018 0946    BUN 11 10/13/2015 0823    CREATININE 1 10 02/16/2018 0946    CREATININE 1 0 10/13/2015 0823        Component Value Date/Time    CALCIUM 8 8 02/16/2018 0946    CALCIUM 9 2 10/13/2015 0823    ALKPHOS 64 02/16/2018 0946    ALKPHOS 74 10/13/2015 0823    AST 15 02/16/2018 0946    AST 19 10/13/2015 0823    ALT 23 02/16/2018 0946    ALT 25 10/13/2015 0823    BILITOT 0 42 02/16/2018 0946    BILITOT 0 4 10/13/2015 0823            Lab Results   Component Value Date    CHOL 111 02/16/2018    CHOL 104 01/27/2018    CHOL 124 12/04/2017     Lab Results   Component Value Date    HDL 38 (L) 02/16/2018    HDL 32 (L) 01/27/2018    HDL 36 (L) 12/04/2017     Lab Results   Component Value Date    LDLCALC 55 02/16/2018    LDLCALC 47 01/27/2018    LDLCALC 61 12/04/2017     Lab Results   Component Value Date    TRIG 89 02/16/2018    TRIG 124 01/27/2018    TRIG 135 12/04/2017     No components found for: CHOLHDL    Imaging: X-ray Chest 2 Views    Result Date: 1/27/2018  Narrative: CHEST - DUAL ENERGY INDICATION:  chest pain  History taken directly from the electronic ordering system  COMPARISON:  Chest x-ray performed on 7/13/2017 VIEWS:  PA (including soft tissue/bone algorithms) and lateral projections IMAGES:  4 FINDINGS:     Cardiac silhouette is top normal in size and changes of prior intervention  No focal airspace consolidation  No pleural effusion or pneumothorax  Visualized osseous structures appear within normal limits for the patient's age  Impression: No focal airspace consolidation  Workstation performed: [id number]     Us Abdominal Aorta    Result Date: 1/27/2018  Narrative: ABDOMINAL AORTIC ULTRASOUND INDICATION: Evaluate for aortic aneurysm  COMPARISON: CT performed on 7/13/2017 FINDINGS: Ultrasound of the abdominal aorta was performed in longitudinal and transverse planes with a curvilinear transducer  Signs of atherosclerotic disease  Proximal aorta:  2 19 x 2 11 cm Mid aorta:    2 39 x 2 24 cm Distal aorta:    3 5 x 3 3 cm Right common iliac origin:  1 6 x 1 4 cm Left common iliac origin:  1 6 x 1 4 cm No periaortic collections or adenopathy detected       Impression: Atherosclerotic disease  Infrarenal abdominal aorta measures 3 5 x 3 3 cm, marginally increased in caliber when compared to CT of 7/3/2017  Workstation performed: [id number]       Review of Systems   Cardiovascular: Negative for chest pain, dyspnea on exertion and irregular heartbeat  Neurological: Positive for light-headedness  Negative for loss of balance  All other systems reviewed and are negative  Vitals:    02/21/18 1536   BP: 106/52   Pulse: 72   SpO2: 95%     Vitals:    02/21/18 1536   Weight: 101 kg (222 lb)     Height: 5' 10" (177 8 cm)   Body mass index is 31 85 kg/m²      Physical Exam:   General appearance:  Appears stated age, alert, well appearing and in no distress  HEENT:  PERRLA, EOMI, no scleral icterus, no conjunctival pallor  NECK:  Supple, No elevated JVP, no thyromegaly, no carotid bruits  HEART:  Regular rate and rhythm, normal S1/S2, soft TORRIE RUSB without radiation  LUNGS:  Clear to auscultation bilaterally  ABDOMEN:  Soft, non-tender, positive bowel sounds, no rebound or guarding, no organomegaly   EXTREMITIES:  No edema  VASCULAR:  Normal pedal pulses   SKIN: No lesions or rashes on exposed skin  NEURO:  CN II-XII intact, no focal deficits

## 2018-02-23 DIAGNOSIS — E11.8 TYPE 2 DIABETES MELLITUS WITH COMPLICATION, UNSPECIFIED LONG TERM INSULIN USE STATUS: Primary | ICD-10-CM

## 2018-03-01 ENCOUNTER — OFFICE VISIT (OUTPATIENT)
Dept: VASCULAR SURGERY | Facility: CLINIC | Age: 78
End: 2018-03-01
Payer: MEDICARE

## 2018-03-01 VITALS
HEART RATE: 80 BPM | WEIGHT: 220 LBS | RESPIRATION RATE: 21 BRPM | DIASTOLIC BLOOD PRESSURE: 66 MMHG | SYSTOLIC BLOOD PRESSURE: 112 MMHG | BODY MASS INDEX: 31.5 KG/M2 | HEIGHT: 70 IN

## 2018-03-01 DIAGNOSIS — I71.4 ABDOMINAL AORTIC ANEURYSM (AAA) WITHOUT RUPTURE (HCC): Primary | ICD-10-CM

## 2018-03-01 DIAGNOSIS — I71.4 ANEURYSM OF INFRARENAL ABDOMINAL AORTA (HCC): ICD-10-CM

## 2018-03-01 PROCEDURE — 99204 OFFICE O/P NEW MOD 45 MIN: CPT | Performed by: SURGERY

## 2018-03-01 NOTE — PROGRESS NOTES
Assessment/Plan:    Aneurysm of infrarenal abdominal aorta (HCC)  Small 3 5 cm abdominal aortic aneurysm with a strong family history of aneurysm disease  We did discuss the pathophysiology of aneurysmal disease and the indications for further evaluation and treatment  At this time no further intervention is necessary other than annual duplex follow-up which has been scheduled  We did discuss the incidence of aneurysmal disease throughout the family for which I have advised screening for first-degree relatives  At this time I have advise no heavy lifting but have encouraged him to resume cardiac rehab       Diagnoses and all orders for this visit:    Abdominal aortic aneurysm (AAA) without rupture ([facility] )  -     Ambulatory referral to Vascular Surgery  -     VAS abdominal aorta/iliacs; complete study; Future    Aneurysm of infrarenal abdominal aorta (HCC)          Subjective:      Patient ID: Moses Sandifer is a 76 y o  male  56-year-old presents for evaluation and treatment recommendations regarding an abdominal aortic aneurysm  He and 2 of his brothers have been found to have aneurysmal disease and in fact 1 of his brothers had a ruptured aneurysm  He recently underwent duplex evaluation which identifies a 3 4 cm abdominal aortic aneurysm  He denies any abdominal or back pain  He does have a significant history of coronary disease to include coronary stenting and cardiac surgery cool bypass  He is currently undergoing cardiac rehab which is on hold until after his aneurysm is evaluated  The following portions of the patient's history were reviewed and updated as appropriate: allergies, current medications, past family history, past medical history, past social history, past surgical history and problem list     Review of Systems   Constitutional: Negative  HENT: Positive for ear discharge  Eyes: Negative  Respiratory: Negative  Cardiovascular: Positive for chest pain  Gastrointestinal: Negative  Endocrine: Negative  Genitourinary: Negative  Musculoskeletal: Negative  Skin: Negative  Allergic/Immunologic: Negative  Neurological: Negative  Hematological: Negative  Psychiatric/Behavioral: Negative  Objective:      /66 (BP Location: Right arm, Patient Position: Sitting, Cuff Size: Standard)   Pulse 80   Resp 21   Ht 5' 10" (1 778 m)   Wt 99 8 kg (220 lb)   BMI 31 57 kg/m²          Physical Exam   Constitutional: He is oriented to person, place, and time  He appears well-developed and well-nourished  HENT:   Head: Normocephalic and atraumatic  Eyes: Conjunctivae and EOM are normal    Neck: Normal range of motion  Neck supple  Cardiovascular: Normal rate, regular rhythm, S1 normal, S2 normal and normal heart sounds  No murmur heard  Pulses:       Carotid pulses are 2+ on the right side, and 2+ on the left side  Radial pulses are 2+ on the right side, and 2+ on the left side  Femoral pulses are 2+ on the right side, and 2+ on the left side  Popliteal pulses are 2+ on the right side, and 2+ on the left side  Pulmonary/Chest: Effort normal and breath sounds normal    Abdominal: Soft  Normal appearance  There is no tenderness  No hernia  Abdomen is obese/protuberant  An aortic pulse could not be palpated  Musculoskeletal: Normal range of motion  He exhibits no edema, tenderness or deformity  Neurological: He is alert and oriented to person, place, and time  No cranial nerve deficit  Skin: Skin is warm, dry and intact  Psychiatric: He has a normal mood and affect

## 2018-03-01 NOTE — LETTER
March 1, 2018     Debbiemegan CarlsonAustin  [address]  [address] [place] [address]    Patient: Marilin Duran   YOB: 1940   Date of Visit: 3/1/2018       Dear Dr Jessica Tang: Thank you for referring Valentina Camacho to me for evaluation  Below are the relevant portions of my assessment and plan of care  Diagnoses and all orders for this visit:    Aneurysm of infrarenal abdominal aorta (HCC)  Small 3 5 cm abdominal aortic aneurysm with a strong family history of aneurysm disease  We did discuss the pathophysiology of aneurysmal disease and the indications for further evaluation and treatment  At this time no further intervention is necessary other than annual duplex follow-up which has been scheduled  We did discuss the incidence of aneurysmal disease throughout the family for which I have advised screening for first-degree relatives  At this time I have advise no heavy lifting but have encouraged him to resume cardiac rehab          If you have questions, please do not hesitate to call me  I look forward to following Jeana Irvin along with you  Sincerely,        Vincent Su MD        CC:  Cookie Schroeder DO

## 2018-03-01 NOTE — ASSESSMENT & PLAN NOTE
Small 3 5 cm abdominal aortic aneurysm with a strong family history of aneurysm disease  We did discuss the pathophysiology of aneurysmal disease and the indications for further evaluation and treatment  At this time no further intervention is necessary other than annual duplex follow-up which has been scheduled  We did discuss the incidence of aneurysmal disease throughout the family for which I have advised screening for first-degree relatives    At this time I have advise no heavy lifting but have encouraged him to resume cardiac rehab

## 2018-03-03 DIAGNOSIS — F41.9 ANXIETY: Primary | ICD-10-CM

## 2018-03-04 RX ORDER — VILAZODONE HYDROCHLORIDE 40 MG/1
TABLET ORAL
Qty: 30 TABLET | Refills: 5 | Status: SHIPPED | OUTPATIENT
Start: 2018-03-04 | End: 2018-09-28 | Stop reason: SDUPTHER

## 2018-03-06 ENCOUNTER — HOSPITAL ENCOUNTER (OUTPATIENT)
Dept: NON INVASIVE DIAGNOSTICS | Facility: CLINIC | Age: 78
Discharge: HOME | End: 2018-03-06
Payer: MEDICARE

## 2018-03-06 DIAGNOSIS — I25.10 CORONARY ARTERY DISEASE INVOLVING NATIVE CORONARY ARTERY OF NATIVE HEART WITHOUT ANGINA PECTORIS: ICD-10-CM

## 2018-03-06 PROCEDURE — 93306 TTE W/DOPPLER COMPLETE: CPT | Performed by: INTERNAL MEDICINE

## 2018-03-06 PROCEDURE — 93306 TTE W/DOPPLER COMPLETE: CPT

## 2018-04-05 DIAGNOSIS — E08.00 DIABETES MELLITUS DUE TO UNDERLYING CONDITION WITH HYPEROSMOLARITY WITHOUT COMA, WITHOUT LONG-TERM CURRENT USE OF INSULIN (HCC): Primary | ICD-10-CM

## 2018-04-06 ENCOUNTER — TELEPHONE (OUTPATIENT)
Dept: ENDOCRINOLOGY | Facility: CLINIC | Age: 78
End: 2018-04-06

## 2018-04-06 DIAGNOSIS — E08.00 DIABETES MELLITUS DUE TO UNDERLYING CONDITION WITH HYPEROSMOLARITY WITHOUT COMA, WITHOUT LONG-TERM CURRENT USE OF INSULIN (HCC): ICD-10-CM

## 2018-04-10 DIAGNOSIS — I21.4 NON-STEMI (NON-ST ELEVATED MYOCARDIAL INFARCTION) (HCC): ICD-10-CM

## 2018-04-10 RX ORDER — METOPROLOL SUCCINATE 25 MG/1
25 TABLET, EXTENDED RELEASE ORAL DAILY
Qty: 90 TABLET | Refills: 3 | Status: SHIPPED | OUTPATIENT
Start: 2018-04-10 | End: 2018-04-11 | Stop reason: SDUPTHER

## 2018-04-10 RX ORDER — RANOLAZINE 500 MG/1
500 TABLET, EXTENDED RELEASE ORAL 2 TIMES DAILY
Qty: 360 TABLET | Refills: 3 | Status: SHIPPED | OUTPATIENT
Start: 2018-04-10 | End: 2018-04-11 | Stop reason: SDUPTHER

## 2018-04-11 ENCOUNTER — HOSPITAL ENCOUNTER (OUTPATIENT)
Facility: HOSPITAL | Age: 78
Setting detail: OBSERVATION
Discharge: HOME | End: 2018-04-12
Attending: EMERGENCY MEDICINE | Admitting: HOSPITALIST
Payer: MEDICARE

## 2018-04-11 DIAGNOSIS — R77.8 ELEVATED TROPONIN: ICD-10-CM

## 2018-04-11 DIAGNOSIS — I95.9 HYPOTENSION: Primary | ICD-10-CM

## 2018-04-11 PROBLEM — Z79.4 TYPE 2 DIABETES MELLITUS WITH HYPERGLYCEMIA, WITH LONG-TERM CURRENT USE OF INSULIN (HCC): Status: ACTIVE | Noted: 2017-07-03

## 2018-04-11 PROBLEM — E11.65 TYPE 2 DIABETES MELLITUS WITH HYPERGLYCEMIA, WITH LONG-TERM CURRENT USE OF INSULIN (HCC): Status: ACTIVE | Noted: 2017-07-03

## 2018-04-11 PROBLEM — J96.01 ACUTE RESPIRATORY FAILURE WITH HYPOXIA (HCC): Status: ACTIVE | Noted: 2018-04-11

## 2018-04-11 PROBLEM — N18.2 CKD (CHRONIC KIDNEY DISEASE) STAGE 2, GFR 60-89 ML/MIN: Chronic | Status: ACTIVE | Noted: 2018-04-11

## 2018-04-11 LAB
ANION GAP SERPL CALCULATED.3IONS-SCNC: 5 MMOL/L (ref 4–13)
ATRIAL RATE: 77 BPM
ATRIAL RATE: 77 BPM
BASOPHILS # BLD AUTO: 0.02 THOUSANDS/ΜL (ref 0–0.1)
BASOPHILS NFR BLD AUTO: 0 % (ref 0–1)
BUN SERPL-MCNC: 26 MG/DL (ref 5–25)
CALCIUM SERPL-MCNC: 8.9 MG/DL
CHLORIDE SERPL-SCNC: 103 MMOL/L (ref 100–108)
CO2 SERPL-SCNC: 28 MMOL/L (ref 21–32)
CREAT SERPL-MCNC: 1.28 MG/DL (ref 0.6–1.3)
EOSINOPHIL # BLD AUTO: 0.4 THOUSAND/ΜL (ref 0–0.61)
EOSINOPHIL NFR BLD AUTO: 5 % (ref 0–6)
ERYTHROCYTE [DISTWIDTH] IN BLOOD BY AUTOMATED COUNT: 14.6 % (ref 11.6–15.1)
GFR SERPL CREATININE-BSD FRML MDRD: 53 ML/MIN/1.73SQ M
GLUCOSE SERPL-MCNC: 202 MG/DL (ref 65–140)
GLUCOSE SERPL-MCNC: 239 MG/DL (ref 65–140)
GLUCOSE SERPL-MCNC: 300 MG/DL (ref 65–140)
HCT VFR BLD AUTO: 38.7 % (ref 36.5–49.3)
HGB BLD-MCNC: 13 G/DL (ref 12–17)
LACTATE SERPL-SCNC: 1.8 MMOL/L (ref 0.5–2)
LYMPHOCYTES # BLD AUTO: 1.16 THOUSANDS/ΜL (ref 0.6–4.47)
LYMPHOCYTES NFR BLD AUTO: 14 % (ref 14–44)
MCH RBC QN AUTO: 30.2 PG (ref 26.8–34.3)
MCHC RBC AUTO-ENTMCNC: 33.6 G/DL (ref 31.4–37.4)
MCV RBC AUTO: 90 FL (ref 82–98)
MONOCYTES # BLD AUTO: 0.58 THOUSAND/ΜL (ref 0.17–1.22)
MONOCYTES NFR BLD AUTO: 7 % (ref 4–12)
NEUTROPHILS # BLD AUTO: 6.12 THOUSANDS/ΜL (ref 1.85–7.62)
NEUTS SEG NFR BLD AUTO: 74 % (ref 43–75)
NRBC BLD AUTO-RTO: 0 /100 WBCS
P AXIS: 59 DEGREES
P AXIS: 59 DEGREES
PLATELET # BLD AUTO: 276 THOUSANDS/UL (ref 149–390)
PMV BLD AUTO: 10.5 FL (ref 8.9–12.7)
POTASSIUM SERPL-SCNC: 4.4 MMOL/L (ref 3.5–5.3)
PR INTERVAL: 166 MS
PR INTERVAL: 166 MS
QRS AXIS: 75 DEGREES
QRS AXIS: 75 DEGREES
QRSD INTERVAL: 152 MS
QRSD INTERVAL: 152 MS
QT INTERVAL: 454 MS
QT INTERVAL: 454 MS
QTC INTERVAL: 513 MS
QTC INTERVAL: 513 MS
RBC # BLD AUTO: 4.3 MILLION/UL (ref 3.88–5.62)
SODIUM SERPL-SCNC: 136 MMOL/L (ref 136–145)
T WAVE AXIS: 59 DEGREES
T WAVE AXIS: 59 DEGREES
TROPONIN I SERPL-MCNC: 0.11 NG/ML
TROPONIN I SERPL-MCNC: 0.13 NG/ML
TROPONIN I SERPL-MCNC: 0.18 NG/ML
VENTRICULAR RATE: 77 BPM
VENTRICULAR RATE: 77 BPM
WBC # BLD AUTO: 8.31 THOUSAND/UL (ref 4.31–10.16)

## 2018-04-11 PROCEDURE — 85025 COMPLETE CBC W/AUTO DIFF WBC: CPT | Performed by: EMERGENCY MEDICINE

## 2018-04-11 PROCEDURE — 84484 ASSAY OF TROPONIN QUANT: CPT | Performed by: NURSE PRACTITIONER

## 2018-04-11 PROCEDURE — 84484 ASSAY OF TROPONIN QUANT: CPT | Performed by: EMERGENCY MEDICINE

## 2018-04-11 PROCEDURE — 83605 ASSAY OF LACTIC ACID: CPT | Performed by: EMERGENCY MEDICINE

## 2018-04-11 PROCEDURE — 82948 REAGENT STRIP/BLOOD GLUCOSE: CPT

## 2018-04-11 PROCEDURE — 93005 ELECTROCARDIOGRAM TRACING: CPT

## 2018-04-11 PROCEDURE — 99285 EMERGENCY DEPT VISIT HI MDM: CPT

## 2018-04-11 PROCEDURE — 80048 BASIC METABOLIC PNL TOTAL CA: CPT | Performed by: EMERGENCY MEDICINE

## 2018-04-11 PROCEDURE — 96360 HYDRATION IV INFUSION INIT: CPT

## 2018-04-11 PROCEDURE — 99220 PR INITIAL OBSERVATION CARE/DAY 70 MINUTES: CPT | Performed by: HOSPITALIST

## 2018-04-11 PROCEDURE — 93010 ELECTROCARDIOGRAM REPORT: CPT | Performed by: INTERNAL MEDICINE

## 2018-04-11 PROCEDURE — 36415 COLL VENOUS BLD VENIPUNCTURE: CPT | Performed by: EMERGENCY MEDICINE

## 2018-04-11 RX ORDER — DOCUSATE SODIUM 100 MG/1
100 CAPSULE, LIQUID FILLED ORAL 2 TIMES DAILY
Status: DISCONTINUED | OUTPATIENT
Start: 2018-04-11 | End: 2018-04-12 | Stop reason: HOSPADM

## 2018-04-11 RX ORDER — METOPROLOL SUCCINATE 25 MG/1
25 TABLET, EXTENDED RELEASE ORAL DAILY
Status: DISCONTINUED | OUTPATIENT
Start: 2018-04-12 | End: 2018-04-12 | Stop reason: HOSPADM

## 2018-04-11 RX ORDER — ASCORBIC ACID 500 MG
500 TABLET ORAL DAILY
Status: DISCONTINUED | OUTPATIENT
Start: 2018-04-12 | End: 2018-04-12 | Stop reason: HOSPADM

## 2018-04-11 RX ORDER — CLOPIDOGREL BISULFATE 75 MG/1
75 TABLET ORAL DAILY
Status: DISCONTINUED | OUTPATIENT
Start: 2018-04-12 | End: 2018-04-12 | Stop reason: HOSPADM

## 2018-04-11 RX ORDER — INSULIN GLARGINE 100 [IU]/ML
40 INJECTION, SOLUTION SUBCUTANEOUS
Status: DISCONTINUED | OUTPATIENT
Start: 2018-04-11 | End: 2018-04-12 | Stop reason: HOSPADM

## 2018-04-11 RX ORDER — ASPIRIN 325 MG
325 TABLET ORAL DAILY
Status: DISCONTINUED | OUTPATIENT
Start: 2018-04-12 | End: 2018-04-12 | Stop reason: HOSPADM

## 2018-04-11 RX ORDER — CHOLECALCIFEROL (VITAMIN D3) 125 MCG
100 CAPSULE ORAL DAILY
Status: DISCONTINUED | OUTPATIENT
Start: 2018-04-12 | End: 2018-04-12 | Stop reason: HOSPADM

## 2018-04-11 RX ORDER — VILAZODONE HYDROCHLORIDE 40 MG/1
40 TABLET ORAL DAILY
Status: DISCONTINUED | OUTPATIENT
Start: 2018-04-12 | End: 2018-04-12 | Stop reason: HOSPADM

## 2018-04-11 RX ORDER — ATORVASTATIN CALCIUM 80 MG/1
80 TABLET, FILM COATED ORAL
Status: DISCONTINUED | OUTPATIENT
Start: 2018-04-11 | End: 2018-04-12 | Stop reason: HOSPADM

## 2018-04-11 RX ORDER — RANOLAZINE 500 MG/1
500 TABLET, EXTENDED RELEASE ORAL EVERY 12 HOURS SCHEDULED
Status: DISCONTINUED | OUTPATIENT
Start: 2018-04-11 | End: 2018-04-12 | Stop reason: HOSPADM

## 2018-04-11 RX ORDER — CHOLECALCIFEROL (VITAMIN D3) 125 MCG
500 CAPSULE ORAL DAILY
Status: DISCONTINUED | OUTPATIENT
Start: 2018-04-12 | End: 2018-04-12 | Stop reason: HOSPADM

## 2018-04-11 RX ORDER — PANTOPRAZOLE SODIUM 40 MG/1
40 TABLET, DELAYED RELEASE ORAL 2 TIMES DAILY
Status: DISCONTINUED | OUTPATIENT
Start: 2018-04-11 | End: 2018-04-12 | Stop reason: HOSPADM

## 2018-04-11 RX ORDER — RANOLAZINE 500 MG/1
TABLET, EXTENDED RELEASE ORAL
Qty: 360 TABLET | Refills: 3 | Status: SHIPPED | OUTPATIENT
Start: 2018-04-11 | End: 2018-08-17

## 2018-04-11 RX ORDER — METOPROLOL SUCCINATE 25 MG/1
25 TABLET, EXTENDED RELEASE ORAL DAILY
Qty: 90 TABLET | Refills: 3 | Status: SHIPPED | OUTPATIENT
Start: 2018-04-11 | End: 2018-05-08 | Stop reason: SDUPTHER

## 2018-04-11 RX ORDER — LORAZEPAM 1 MG/1
1 TABLET ORAL 2 TIMES DAILY PRN
Status: DISCONTINUED | OUTPATIENT
Start: 2018-04-11 | End: 2018-04-12 | Stop reason: HOSPADM

## 2018-04-11 RX ADMIN — INSULIN GLARGINE 40 UNITS: 100 INJECTION, SOLUTION SUBCUTANEOUS at 21:14

## 2018-04-11 RX ADMIN — ATORVASTATIN CALCIUM 80 MG: 80 TABLET, FILM COATED ORAL at 20:26

## 2018-04-11 RX ADMIN — RANOLAZINE 500 MG: 500 TABLET, FILM COATED, EXTENDED RELEASE ORAL at 21:13

## 2018-04-11 RX ADMIN — SODIUM CHLORIDE 1000 ML: 0.9 INJECTION, SOLUTION INTRAVENOUS at 14:07

## 2018-04-11 RX ADMIN — PANTOPRAZOLE SODIUM 40 MG: 40 TABLET, DELAYED RELEASE ORAL at 20:27

## 2018-04-11 RX ADMIN — DOCUSATE SODIUM 100 MG: 100 CAPSULE, LIQUID FILLED ORAL at 20:27

## 2018-04-11 RX ADMIN — LORAZEPAM 1 MG: 1 TABLET ORAL at 22:30

## 2018-04-11 NOTE — H&P
H&P- Kayley Esparza 1940, 76 y o  male MRN: [id number]    Unit/Bed#: ED 02 Encounter: [id number]    Primary Care Provider: Mariza Jimenez DO   Date and time admitted to hospital: 4/11/2018  1:12 PM      Hypotension   Assessment & Plan    · BP 93/44, 87/49  · Improved after 1L NS  · Orthostatic VS  · Imdur discontinued by cardiologist Feb 2018 d/t recurrent lightheadedness with standing  · Hold Toprol SBP <110        Acute respiratory failure with hypoxia (HCC)   Assessment & Plan    · O2 sat 77% on admission  · Currently O2 sat 93-97% on room air  · Monitor O2 sat  · Check ambulate O2        Elevated troponin   Assessment & Plan    · Troponin 0 18, possibly NSTEMI II due to hypotn and hypoxia  · Will trend x3  · Telemetry monitoring         Coronary artery disease involving native coronary artery of native heart without angina pectoris   Assessment & Plan    · Continue ASA, Plavix, Toprol, Ranexa and Crestor        S/P CABG x 3   Assessment & Plan    · S/p CABG x3        Type 2 diabetes mellitus with hyperglycemia, with long-term current use of insulin (HCC)   Assessment & Plan    · A1c 7 1 on 2/2018  · On metformin, NovoLog 10-20 units t i d  with meals and Toujeo 45-50 units at bedtime; patient adjusts insulin according to fingerstick   · Hold metformin; continue home insulin  · Fingersticks ACHS and ISS        Aneurysm of infrarenal abdominal aorta (HCC)   Assessment & Plan    · U/S: Infrarenal abdominal aorta 3 5 x 3 3cm, increased when compared to CT of 7/3/2017   · Strong family history of aneurysm disease; follows outpatient with vascular, Dr Kayla Guaman, seen 3/1/18: no further intervention necessary other than annual duplex which was scheduled        Benign essential hypertension   Assessment & Plan    · Arrived hypotensive but BP has improved now 149/68  · Continue Toprol        Dyslipidemia   Assessment & Plan    · Lipid panel Chol 111, Trig 89, HDL 38, LDL 55  · Continue Crestor        Obesity Assessment & Plan    · BMI 30 85  · Nutrition mgmt and exercise as tolerated        CKD (chronic kidney disease) stage 2, GFR 60-89 ml/min   Assessment & Plan    · Chronic, stable            VTE Prophylaxis: Enoxaparin (Lovenox)  / reason for no mechanical VTE prophylaxis Ambulate patient   Code Status: Full code  POLST: POLST is not applicable to this patient  Discussion with family: wife at bedside    Anticipated Length of Stay:  Patient will be admitted on an Observation basis with an anticipated length of stay of  Less than 2 midnights  Justification for Hospital Stay:  Hypotension and acute hypoxic respiratory failure    Total Time for Visit, including Counseling / Coordination of Care: 45 minutes  Greater than 50% of this total time spent on direct patient counseling and coordination of care  Chief Complaint:   Felt like he was going to fall    History of Present Illness:    Mars Thao is a 76 y o  male who presents with c/o near syncope  Took his morning meds, ate breakfast, then took his daughter to rehab and came here for lunch  He ate soup and a fruit cup, when he got up he felt like his knees were going to buckle so he leaned on the counter and 3 people came over to assist him  He was due to take his insulin at lunchtime but did not have it on him  Denies dizziness or lightheadedness prior to this episode  Denies chest pain, palpitations, SOB, cough, abdominal pain, NVCD or dysuria  Reports over the last week or so he has taken NTG on 3 occassions due to chest pressure that radiates to his neck and shoulder, states when he feels these symptoms he takes his BP and it is elevated, he takes NTG and it helps  Saturday had gas and abdominal bloating then developed diarrhea which resolved immediately  Review of Systems:    Review of Systems   HENT: Negative  Eyes: Negative  Respiratory: Negative  Cardiovascular: Negative  Gastrointestinal: Negative  Endocrine: Negative  Genitourinary: Negative  Musculoskeletal:        Felt his knees were going to buckle   Neurological: Positive for weakness  Psychiatric/Behavioral: Negative  Past Medical and Surgical History:     Past Medical History:   Diagnosis Date    AAA (abdominal [facility]) [facility]     Anxiety     CVD (cardiovascular disease)     [facility]     DVT, lower extremity [facility]     Hyperlipidemia     Hypertension     [facility]STEM[facility]non-ST elevated [facility] infarction) ([facility]     Prostate cancer ([facility] )     Right bundle branch block (RBBB)     Skin cancer        Past Surgical History:   Procedure Laterality Date    ANKLE ARTHROSCOPY W/ OPEN REPAIR Left     CARDIAC SURGERY      CORONARY ARTERY BYPASS GRAFT  12/11/2013    CABG x3 with LIMA to LAD, SVG to OM-1, SVG to posterior lateral, LYSIS of pericardial adhesions   ID REPAIR UMBILICAL [id number] N/A 3/10/2017    Procedure: REPAIR HERNIA UMBILICAL;  Surgeon: Manuel Morillo MD;  Location: [facility];  Service: General    PROSTATECTOMY         Meds/Allergies:    Prior to Admission medications    Medication Sig Start Date End Date Taking? Authorizing Provider   ACCU-CHEK FASTCLIX LANCETS MISC by Does not apply route 1/29/14   Historical Provider, MD   ascorbic acid (VITAMIN C) 500 mg tablet Take 500 mg by mouth daily    Historical Provider, MD   aspirin 325 mg tablet Take 1 tablet by mouth daily 1/3/14   Historical Provider, MD   Cholecalciferol (VITAMIN D3 PO) Take 2,000 Units by mouth daily  Historical Provider, MD   clopidogrel (PLAVIX) 75 mg tablet Take 1 tablet by mouth see administration instructions 7/6/17   No Connell DO   coenzyme Q-10 100 MG capsule Take 1 capsule by mouth daily 7/14/17   TANISHA Flores   cyanocobalamin (VITAMIN B-12) 500 mcg tablet Take 1 tablet by mouth daily 7/10/17   Historical Provider, MD   docusate sodium (COLACE) 100 mg capsule Take 100 mg by mouth 2 (two) times a day      Historical Provider, MD glucose blood (ACCU-CHEK GUIDE) test strip Check blood sugar 4 times daily  2/23/18   TANISHA Gastelum   insulin aspart (NOVOLOG FLEXPEN) 100 Units/mL SOPN Inject 10-20 Units under the skin 3 (three) times a day with meals   1/22/15   Historical Provider, MD   Insulin Glargine (TOUJEO SOLOSTAR) injection pen 300 units/mL Inject 50 Units under the skin daily at bedtime for 90 days  Patient taking differently: Inject 45-50 Units under the skin daily at bedtime   4/5/18 7/4/18  TANISHA Gastelum   Insulin Pen Needle (Elwyn Blind) 30G X 8 MM MISC by Does not apply route 1/6/14   Historical Provider, MD   LORazepam (ATIVAN) 1 mg tablet Take 1 tablet (1 mg total) by mouth 2 (two) times a day as needed (anxiety) 2/15/18   Riana Myers DO   metFORMIN (GLUCOPHAGE) 500 mg tablet Take 1 tablet (500 mg total) by mouth 2 (two) times a day with meals 2/20/18   TANISHA Gastelum   metoprolol succinate (TOPROL-XL) 25 mg 24 hr tablet Take 1 tablet (25 mg total) by mouth daily 4/11/18   Elsa Alvarenga DO   Multiple Vitamins-Minerals (CENTRUM SILVER) tablet Take 1 tablet by mouth daily 10/5/17   Historical Provider, MD   nitroglycerin (NITROSTAT) 0 4 mg SL tablet Place 1 tablet under the tongue every 5 (five) minutes as needed   11/7/16   Historical Provider, MD   pantoprazole (PROTONIX) 40 mg tablet Take 40 mg by mouth 2 (two) times a day      Historical Provider, MD   ranolazine (RANEXA) 500 mg 12 hr tablet 2 tabs two time daily 4/11/18   Elsa Alvarenga DO   rosuvastatin (CRESTOR) 40 MG tablet Take 1 tablet by mouth daily 6/2/14   Historical Provider, MD   senna (SENOKOT) 8 6 mg Take by mouth 7/17/17   Historical Provider, MD   VIIBRYD 40 MG tablet TAKE 1 TABLET BY MOUTH EVERY DAY 3/4/18   Riana Myers DO   metoprolol succinate (TOPROL-XL) 25 mg 24 hr tablet Take 1 tablet (25 mg total) by mouth daily 4/10/18 4/11/18  Elsa Alvaernga DO   ranolazine (RANEXA) 500 mg 12 hr tablet Take 1 tablet (500 mg total) by mouth 2 (two) times a day 2 tabs two time daily 4/10/18 4/11/18  Fred Arrieta,      I have reviewed home medications with patient personally  Allergies: Allergies   Allergen Reactions    Other     Sulfa Antibiotics Other (See Comments)     Generalized redness       Social History:     Marital Status: /Civil Union   Occupation: retired  Patient Pre-hospital Living Situation:  Lives with spouse and daughter  Patient Pre-hospital Level of Mobility:  Ambulatory  Patient Pre-hospital Diet Restrictions:  Low salt, no caffeine  Substance Use History:   History   Alcohol Use    Yes     Comment: social     History   Smoking Status    Former Smoker   Smokeless Tobacco    Never Used     Comment: quit 1967     History   Drug Use No       Family History:    Family History   Problem Relation Age of Onset    Crohn's disease Mother     Liver cancer Mother     Hypertension Mother     Crohn's disease Father     Liver cancer Father     Heart disease Father     Hypertension Father     Hyperlipidemia Father     Colon cancer Brother     Aortic aneurysm Brother      abdominal     Heart disease Brother      abdominal aortic aneurysm    Hypertension Brother     Hyperlipidemia Brother     Colon polyps Family     Stomach cancer Family     Hypertension Sister        Physical Exam:     Vitals:   Blood Pressure: 153/67 (04/11/18 1606)  Pulse: 73 (04/11/18 1606)  Temperature: 97 9 °F (36 6 °C) (04/11/18 1327)  Temp Source: Oral (04/11/18 1327)  Respirations: 13 (04/11/18 1606)  Weight - Scale: 97 5 kg (215 lb) (04/11/18 1318)  SpO2: 93 % (04/11/18 1606)    Physical Exam   Constitutional: He is oriented to person, place, and time  He appears well-developed and well-nourished  No distress  Obese   HENT:   Head: Normocephalic and atraumatic  Eyes: Conjunctivae are normal  Right eye exhibits no discharge  Left eye exhibits no discharge  No scleral icterus     Cardiovascular: Normal rate, regular rhythm, normal heart sounds and intact distal pulses  No murmur heard  Pulmonary/Chest: Effort normal and breath sounds normal  No respiratory distress  He has no wheezes  He has no rales  He exhibits no tenderness  Abdominal: Soft  Bowel sounds are normal  He exhibits no distension and no mass  There is tenderness  There is no rebound and no guarding  LLQ tender   Musculoskeletal: He exhibits no edema or tenderness  Neurological: He is alert and oriented to person, place, and time  Skin: Skin is warm and dry  No rash noted  He is not diaphoretic  No erythema  No pallor  Psychiatric: He has a normal mood and affect  His behavior is normal  Judgment and thought content normal      Additional Data:     Lab Results: I have personally reviewed pertinent reports  Results from last 7 days  Lab Units 04/11/18  1403   WBC Thousand/uL 8 31   HEMOGLOBIN g/dL 13 0   HEMATOCRIT % 38 7   PLATELETS Thousands/uL 276   NEUTROS PCT % 74   LYMPHS PCT % 14   MONOS PCT % 7   EOS PCT % 5       Results from last 7 days  Lab Units 04/11/18  1403   SODIUM mmol/L 136   POTASSIUM mmol/L 4 4   CHLORIDE mmol/L 103   CO2 mmol/L 28   BUN mg/dL 26*   CREATININE mg/dL 1 28   CALCIUM mg/dL 8 9   GLUCOSE RANDOM mg/dL 239*           Imaging: I have personally reviewed pertinent reports  No orders to display       EKG, Pathology, and Other Studies Reviewed on Admission:   · EKG, u/s abd aorta,CXR, ECHO    Allscripts / Epic Records Reviewed: Yes     ** Please Note: This note has been constructed using a voice recognition system   **

## 2018-04-11 NOTE — ASSESSMENT & PLAN NOTE
· O2 sat 77% on admission  · Currently O2 sat 93-97% on room air  · Monitor O2 sat  · Check ambulate O2

## 2018-04-11 NOTE — ASSESSMENT & PLAN NOTE
· Troponin 0 18, possibly NSTEMI II due to hypotn and hypoxia  · Will trend x3  · Telemetry monitoring

## 2018-04-11 NOTE — ASSESSMENT & PLAN NOTE
· A1c 7 1 on 2/2018  · On metformin, NovoLog 10-20 units t i d  with meals and Toujeo 45-50 units at bedtime; patient adjusts insulin according to fingerstick   · Hold metformin; continue home insulin  · Fingersticks ACHS and ISS

## 2018-04-11 NOTE — ASSESSMENT & PLAN NOTE
· U/S: Infrarenal abdominal aorta 3 5 x 3 3cm, increased when compared to CT of 7/3/2017   · Strong family history of aneurysm disease; follows outpatient with vascular, Dr Guevara Urrutia, seen 3/1/18: no further intervention necessary other than annual duplex which was scheduled

## 2018-04-11 NOTE — ASSESSMENT & PLAN NOTE
· BP 93/44, 87/49  · Improved after 1L NS  · Orthostatic VS  · Imdur discontinued by cardiologist Feb 2018 d/t recurrent lightheadedness with standing  · Hold Toprol SBP <110

## 2018-04-11 NOTE — ED PROVIDER NOTES
History  Chief Complaint   Patient presents with    Hyperglycemia - Symptomatic     Pt states that he was in the cafeteria and ate but forgot to bring his insulin with him and felt like he was going to fall and got shaky     75 yo M with PMH CAD s/p CABG, DM, HTN, HLD presents to ED for episode of lightheadedness  Pt says he was in the cafeteria eating lunch (had mushroom soup and a fruit cup) and realized he forgot to bring his insulin  He stood up and became lightheaded and said he felt as if he was going to pass out/fall over  He says three people immediately came over to help him, and he was taken to the ED for further evaluation  He denies any chest pain, shortness of breath, heart palpitations, nausea/vomiting with this episode  Now that he is lying down, he says he feels better  Pt says his blood pressure is normally elevated in 150s  He admits that over the last week, he has had to use his nitro a few times for chest pressure with resolution, most recently a few nights ago  He says for the last few days, he also did have some diarrhea, but that is now resolved  Prior to Admission Medications   Prescriptions Last Dose Informant Patient Reported? Taking? ACCU-CHEK FASTCLIX LANCETS MISC  Self Yes No   Sig: by Does not apply route   Cholecalciferol (VITAMIN D3 PO)  Self Yes No   Sig: Take 2,000 Units by mouth daily     Insulin Glargine (TOUJEO SOLOSTAR) injection pen 300 units/mL   No No   Sig: Inject 50 Units under the skin daily at bedtime for 90 days   Insulin Pen Needle (NOVOFINE AUTOCOVER) 30G X 8 MM MISC  Self Yes No   Sig: by Does not apply route   LORazepam (ATIVAN) 1 mg tablet  Self No No   Sig: Take 1 tablet (1 mg total) by mouth 2 (two) times a day as needed (anxiety)   Multiple Vitamins-Minerals (CENTRUM SILVER) tablet  Self Yes No   Sig: Take 1 tablet by mouth daily   VIIBRYD 40 MG tablet   No No   Sig: TAKE 1 TABLET BY MOUTH EVERY DAY   ascorbic acid (VITAMIN C) 500 mg tablet  Self Yes No   Sig: Take 500 mg by mouth daily   aspirin 325 mg tablet  Self Yes No   Sig: Take 1 tablet by mouth daily   clopidogrel (PLAVIX) 75 mg tablet  Self No No   Sig: Take 1 tablet by mouth see administration instructions   coenzyme Q-10 100 MG capsule  Self No No   Sig: Take 1 capsule by mouth daily   cyanocobalamin (VITAMIN B-12) 500 mcg tablet  Self Yes No   Sig: Take 1 tablet by mouth daily   docusate sodium (COLACE) 100 mg capsule  Self Yes No   Sig: Take 100 mg by mouth 2 (two) times a day  glucose blood (ACCU-CHEK GUIDE) test strip   No No   Sig: Check blood sugar 4 times daily  insulin aspart (NOVOLOG FLEXPEN) 100 Units/mL SOPN  Self Yes No   Sig: Inject under the skin   metFORMIN (GLUCOPHAGE) 500 mg tablet  Self No No   Sig: Take 1 tablet (500 mg total) by mouth 2 (two) times a day with meals   metoprolol succinate (TOPROL-XL) 25 mg 24 hr tablet   No No   Sig: Take 1 tablet (25 mg total) by mouth daily   nitroglycerin (NITROSTAT) 0 4 mg SL tablet  Self Yes No   Sig: Place 1 tablet under the tongue   pantoprazole (PROTONIX) 40 mg tablet  Self Yes No   Sig: Take 40 mg by mouth 2 (two) times a day     ranolazine (RANEXA) 500 mg 12 hr tablet   No No   Si tabs two time daily   rosuvastatin (CRESTOR) 40 MG tablet  Self Yes No   Sig: Take 1 tablet by mouth daily   senna (SENOKOT) 8 6 mg  Self Yes No   Sig: Take by mouth      Facility-Administered Medications: None       Past Medical History:   Diagnosis Date    AAA (abdominal aortic aneurysm) (HCC)     Anxiety     CVD (cardiovascular disease)     Diabetes mellitus [facility]     DVT, lower extremity (HCC)     Hyperlipidemia     Hypertension     NSTEMI (non-ST elevated myocardial infarction) [facility]     [facility]     Right bundle branch block (RBBB)     Skin cancer        Past Surgical History:   Procedure Laterality Date    ANKLE ARTHROSCOPY W/ OPEN REPAIR Left     CARDIAC SURGERY      CORONARY ARTERY BYPASS GRAFT  2013    CABG x3 with LIMA to LAD, SVG to OM-1, SVG to posterior lateral, LYSIS of pericardial adhesions   [place] REPAIR UMBILICAL FTVZ,3+H/H,BCVFI N/A 3/10/2017    Procedure: REPAIR HERNIA UMBILICAL;  Surgeon: Chrissy Haq MD;  Location: [facility];  Service: General    PROSTATECTOMY         Family History   Problem Relation Age of Onset    Crohn's disease Mother     Liver cancer Mother     Hypertension Mother     Crohn's disease Father     Liver cancer Father     Heart disease Father     Hypertension Father     Hyperlipidemia Father     Colon cancer Brother     Aortic aneurysm Brother      abdominal     Heart disease Brother      abdominal aortic aneurysm    Hypertension Brother     Hyperlipidemia Brother     Colon polyps Family     Stomach cancer Family     Hypertension Sister      I have reviewed and agree with the history as documented  Social History   Substance Use Topics    Smoking status: Former Smoker    Smokeless tobacco: Never Used      Comment: quit 1967    Alcohol use Yes      Comment: social        Review of Systems   Constitutional: Negative for chills, fatigue and fever  HENT: Negative for congestion, rhinorrhea, sore throat and trouble swallowing  Eyes: Negative for pain, discharge and visual disturbance  Respiratory: Negative for cough, chest tightness and shortness of breath  Cardiovascular: Negative for chest pain, palpitations and leg swelling  Gastrointestinal: Negative for abdominal pain, nausea and vomiting  Genitourinary: Negative for decreased urine volume, dysuria, frequency and urgency  Musculoskeletal: Negative for gait problem, neck pain and neck stiffness  Skin: Negative for color change, rash and wound  Neurological: Positive for light-headedness  Negative for syncope and headaches         Physical Exam  ED Triage Vitals   Temperature Pulse Respirations Blood Pressure SpO2   04/11/18 1327 04/11/18 1327 04/11/18 1327 04/11/18 1327 04/11/18 1327   97 9 °F (36 6 °C) 77 18 (!) 93/44 (!) 77 %      Temp Source Heart Rate Source Patient Position - Orthostatic VS BP Location FiO2 (%)   04/11/18 1327 04/11/18 1327 04/11/18 1327 04/11/18 1327 --   Oral Monitor Lying Right arm       Pain Score       04/11/18 1339       No Pain           Orthostatic Vital Signs  Vitals:    04/11/18 1327 04/11/18 1339 04/11/18 1400 04/11/18 1458   BP: (!) 93/44 (!) 87/49 90/52 115/56   Pulse: 77 78 74 69   Patient Position - Orthostatic VS: Lying Lying Lying Lying       Physical Exam   Constitutional: He is oriented to person, place, and time  He appears well-developed and well-nourished  No distress  HENT:   Head: Normocephalic and atraumatic  Mouth/Throat: Oropharynx is clear and moist    Eyes: Conjunctivae and EOM are normal  Right eye exhibits no discharge  Left eye exhibits no discharge  Neck: Normal range of motion  Neck supple  Cardiovascular: Normal rate, regular rhythm and intact distal pulses  Pulmonary/Chest: Effort normal and breath sounds normal  No stridor  No respiratory distress  Abdominal: Soft  Bowel sounds are normal  There is no tenderness  There is no rebound and no guarding  Musculoskeletal: Normal range of motion  He exhibits no edema or tenderness  Neurological: He is alert and oriented to person, place, and time  No sensory deficit  He exhibits normal muscle tone  Skin: Skin is warm and dry  Nursing note and vitals reviewed  ED Medications  Medications   sodium chloride 0 9 % bolus 1,000 mL (1,000 mL Intravenous New Bag 4/11/18 1407)       Diagnostic Studies  Results Reviewed     Procedure Component Value Units Date/Time    Lactic acid, plasma [[id number]]  (Normal) Collected:  04/11/18 1403    Lab Status:  Final result Specimen:  Blood from Arm, Right Updated:  04/11/18 1429     LACTIC ACID 1 8 mmol/L     Narrative:         Result may be elevated if tourniquet was used during collection      Troponin I [[id number]]  (Abnormal) Collected:  04/11/18 1403 Lab Status:  Final result Specimen:  Blood from Arm, Right Updated:  04/11/18 1429     Troponin I 0 18 (H) ng/mL     Narrative:         [employer] Chemistry analyzer 99% cutoff is > 0 04 ng/mL in network labs    o cTnI 99% cutoff is useful only when applied to patients in the clinical setting of myocardial ischemia  o cTnI 99% cutoff should be interpreted in the context of clinical history, ECG findings and possibly cardiac imaging to establish correct diagnosis  o cTnI 99% cutoff may be suggestive but clearly not indicative of a coronary event without the clinical setting of myocardial ischemia  Basic metabolic panel [[id number]]  (Abnormal) Collected:  04/11/18 1403    Lab Status:  Final result Specimen:  Blood from Arm, Right Updated:  04/11/18 1425     Sodium 136 mmol/L      Potassium 4 4 mmol/L      Chloride 103 mmol/L      CO2 28 mmol/L      Anion Gap 5 mmol/L      BUN 26 (H) mg/dL      Creatinine 1 28 mg/dL      Glucose 239 (H) mg/dL      Calcium 8 9 mg/dL      eGFR 53 ml/min/1 73sq m     Narrative:         National Kidney Disease Education Program recommendations are as follows:  GFR calculation is accurate only with a steady state creatinine  Chronic Kidney disease less than 60 ml/min/1 73 sq  meters  Kidney failure less than 15 ml/min/1 73 sq  meters      CBC and differential [[id number]]  (Normal) Collected:  04/11/18 1403    Lab Status:  Final result Specimen:  Blood from Arm, Right Updated:  04/11/18 1412     WBC 8 31 Thousand/uL      RBC 4 30 Million/uL      Hemoglobin 13 0 g/dL      Hematocrit 38 7 %      MCV 90 fL      MCH 30 2 pg      MCHC 33 6 g/dL      RDW 14 6 %      MPV 10 5 fL      Platelets 279 Thousands/uL      nRBC 0 /100 WBCs      Neutrophils Relative 74 %      Lymphocytes Relative 14 %      Monocytes Relative 7 %      Eosinophils Relative 5 %      Basophils Relative 0 %      Neutrophils Absolute 6 12 Thousands/µL      Lymphocytes Absolute 1 16 Thousands/µL      Monocytes Absolute 0 58 Thousand/µL      Eosinophils Absolute 0 40 Thousand/µL      Basophils Absolute 0 02 Thousands/µL     Fingerstick Glucose (POCT) [[id number]]  (Abnormal) Collected:  04/11/18 1317    Lab Status:  Final result Updated:  04/11/18 1317     POC Glucose 202 (H) mg/dl                  No orders to display         Procedures  ECG 12 Lead Documentation  Date/Time: 4/11/2018 1:30 PM  Performed by: Cassandra Angel  Authorized by: Armen Hardy     Indications / Diagnosis:  Lightheadedness  ECG reviewed by me, the ED Provider: yes    Patient location:  ED  Previous ECG:     Previous ECG:  Compared to current    Similarity:  No change  Rate:     ECG rate:  77    ECG rate assessment: normal    Comments:      RBBB, unchanged from previous EKG 1/27/18          Phone Consults  ED Phone Contact    ED Course  ED Course            Identification of Seniors at [address] [place] [address] Most Recent Value   (ISAR) Identification of Seniors at Risk   Before the illness or injury that brought you to the Emergency, did you need someone to help you on a regular basis? 0 Filed at: 04/11/2018 1318   In the last 24 hours, have you needed more help than usual?  0 Filed at: 04/11/2018 1318   Have you been hospitalized for one or more nights during the past 6 months? 0 Filed at: 04/11/2018 1318   In general, do you see well?  0 Filed at: 04/11/2018 1318   In general, do you have serious problems with your memory? 0 Filed at: 04/11/2018 1318   Do you take more than three different medications every day? 1 Filed at: 04/11/2018 1318   ISAR Score  1 Filed at: 04/11/2018 1318                          MDM  Number of Diagnoses or Management Options  Elevated troponin:   Hypotension:   Diagnosis management comments: Episode of lightheadedness without chest pain  Now asymptomatic  Troponin 0 18  EKG with RBBB, unchanged from previous  Labs otherwise unremarkable  Hypotensive initially, improved after 1L NS   Pt with significant cardiac history, recent increased chest pressure and use of nitro at home over the last week  Will admit to [facility] for obs    CritCare Time    Disposition  Final diagnoses:   Hypotension   Elevated troponin     Time reflects when diagnosis was documented in both MDM as applicable and the Disposition within this note     Time User Action Codes Description Comment    4/11/2018  3:25 PM Princess Jade [I95 9] Hypotension     4/11/2018  3:25 PM Almita Lange, [address] [R74 8] Elevated troponin       ED Disposition     ED Disposition Condition Comment    Admit  Case was discussed with [facility] and the patient's admission status was agreed to be Admission Status: observation status to the service of Dr Flaquita Berrios   Follow-up Information    None       Patient's Medications   Discharge Prescriptions    No medications on file     No discharge procedures on file  ED Provider  Attending physically available and evaluated Jaquan Nguyen I managed the patient along with the ED Attending      Electronically Signed by         Yuli Resendiz MD  04/11/18 1245

## 2018-04-11 NOTE — ED NOTES
Dr Arabella Pham at bedside, made aware of patient's BP  Patient remains awake and alert, denies headache or blurry vision       Rissa Ro RN  04/11/18 4845

## 2018-04-12 VITALS
SYSTOLIC BLOOD PRESSURE: 153 MMHG | RESPIRATION RATE: 18 BRPM | HEART RATE: 73 BPM | OXYGEN SATURATION: 94 % | HEIGHT: 70 IN | BODY MASS INDEX: 30.06 KG/M2 | TEMPERATURE: 97.6 F | DIASTOLIC BLOOD PRESSURE: 71 MMHG | WEIGHT: 210 LBS

## 2018-04-12 PROBLEM — J96.01 ACUTE RESPIRATORY FAILURE WITH HYPOXIA (HCC): Status: RESOLVED | Noted: 2018-04-11 | Resolved: 2018-04-12

## 2018-04-12 PROBLEM — I95.9 HYPOTENSION: Status: RESOLVED | Noted: 2018-04-11 | Resolved: 2018-04-12

## 2018-04-12 LAB
ANION GAP SERPL CALCULATED.3IONS-SCNC: 5 MMOL/L (ref 4–13)
BUN SERPL-MCNC: 18 MG/DL (ref 5–25)
CALCIUM SERPL-MCNC: 8.7 MG/DL
CHLORIDE SERPL-SCNC: 102 MMOL/L (ref 100–108)
CO2 SERPL-SCNC: 29 MMOL/L (ref 21–32)
CREAT SERPL-MCNC: 0.88 MG/DL (ref 0.6–1.3)
ERYTHROCYTE [DISTWIDTH] IN BLOOD BY AUTOMATED COUNT: 14.4 % (ref 11.6–15.1)
GFR SERPL CREATININE-BSD FRML MDRD: 82 ML/MIN/1.73SQ M
GLUCOSE SERPL-MCNC: 116 MG/DL (ref 65–140)
GLUCOSE SERPL-MCNC: 141 MG/DL (ref 65–140)
GLUCOSE SERPL-MCNC: 172 MG/DL (ref 65–140)
GLUCOSE SERPL-MCNC: 196 MG/DL (ref 65–140)
HCT VFR BLD AUTO: 39.6 % (ref 36.5–49.3)
HGB BLD-MCNC: 12.7 G/DL (ref 12–17)
MCH RBC QN AUTO: 29.3 PG (ref 26.8–34.3)
MCHC RBC AUTO-ENTMCNC: 32.1 G/DL (ref 31.4–37.4)
MCV RBC AUTO: 91 FL (ref 82–98)
PLATELET # BLD AUTO: 298 THOUSANDS/UL (ref 149–390)
PMV BLD AUTO: 10.4 FL (ref 8.9–12.7)
POTASSIUM SERPL-SCNC: 4 MMOL/L (ref 3.5–5.3)
RBC # BLD AUTO: 4.34 MILLION/UL (ref 3.88–5.62)
SODIUM SERPL-SCNC: 136 MMOL/L (ref 136–145)
WBC # BLD AUTO: 8.16 THOUSAND/UL (ref 4.31–10.16)

## 2018-04-12 PROCEDURE — 82948 REAGENT STRIP/BLOOD GLUCOSE: CPT

## 2018-04-12 PROCEDURE — 99225 PR SBSQ OBSERVATION CARE/DAY 25 MINUTES: CPT | Performed by: NURSE PRACTITIONER

## 2018-04-12 PROCEDURE — 80048 BASIC METABOLIC PNL TOTAL CA: CPT | Performed by: NURSE PRACTITIONER

## 2018-04-12 PROCEDURE — 99217 PR OBSERVATION CARE DISCHARGE MANAGEMENT: CPT | Performed by: NURSE PRACTITIONER

## 2018-04-12 PROCEDURE — 85027 COMPLETE CBC AUTOMATED: CPT | Performed by: NURSE PRACTITIONER

## 2018-04-12 PROCEDURE — 99214 OFFICE O/P EST MOD 30 MIN: CPT | Performed by: INTERNAL MEDICINE

## 2018-04-12 RX ORDER — HEPARIN SODIUM 5000 [USP'U]/ML
5000 INJECTION, SOLUTION INTRAVENOUS; SUBCUTANEOUS EVERY 8 HOURS SCHEDULED
Status: DISCONTINUED | OUTPATIENT
Start: 2018-04-12 | End: 2018-04-12 | Stop reason: HOSPADM

## 2018-04-12 RX ORDER — ACETAMINOPHEN 325 MG/1
650 TABLET ORAL EVERY 4 HOURS PRN
Status: DISCONTINUED | OUTPATIENT
Start: 2018-04-12 | End: 2018-04-12 | Stop reason: HOSPADM

## 2018-04-12 RX ADMIN — METOPROLOL SUCCINATE 25 MG: 25 TABLET, EXTENDED RELEASE ORAL at 09:58

## 2018-04-12 RX ADMIN — HEPARIN SODIUM 5000 UNITS: 5000 INJECTION, SOLUTION INTRAVENOUS; SUBCUTANEOUS at 10:44

## 2018-04-12 RX ADMIN — OXYCODONE HYDROCHLORIDE AND ACETAMINOPHEN 500 MG: 500 TABLET ORAL at 09:58

## 2018-04-12 RX ADMIN — CYANOCOBALAMIN TAB 500 MCG 500 MCG: 500 TAB at 09:58

## 2018-04-12 RX ADMIN — Medication 100 MG: at 09:58

## 2018-04-12 RX ADMIN — Medication 1 TABLET: at 10:00

## 2018-04-12 RX ADMIN — VILAZODONE HYDROCHLORIDE 40 MG: 40 TABLET ORAL at 09:58

## 2018-04-12 RX ADMIN — DOCUSATE SODIUM 100 MG: 100 CAPSULE, LIQUID FILLED ORAL at 09:58

## 2018-04-12 RX ADMIN — INSULIN LISPRO 2 UNITS: 100 INJECTION, SOLUTION INTRAVENOUS; SUBCUTANEOUS at 12:31

## 2018-04-12 RX ADMIN — ACETAMINOPHEN 650 MG: 325 TABLET, FILM COATED ORAL at 09:58

## 2018-04-12 RX ADMIN — CLOPIDOGREL BISULFATE 75 MG: 75 TABLET ORAL at 09:58

## 2018-04-12 RX ADMIN — INSULIN LISPRO 1 UNITS: 100 INJECTION, SOLUTION INTRAVENOUS; SUBCUTANEOUS at 16:30

## 2018-04-12 RX ADMIN — PANTOPRAZOLE SODIUM 40 MG: 40 TABLET, DELAYED RELEASE ORAL at 09:58

## 2018-04-12 RX ADMIN — ASPIRIN 325 MG: 325 TABLET ORAL at 09:58

## 2018-04-12 RX ADMIN — RANOLAZINE 500 MG: 500 TABLET, FILM COATED, EXTENDED RELEASE ORAL at 09:58

## 2018-04-12 RX ADMIN — LORAZEPAM 1 MG: 1 TABLET ORAL at 10:04

## 2018-04-12 NOTE — CONSULTS
[address],  [address] [place] [address]  [phone]                                              Cardiology Consult  Roshan Greco 76 y o  male   YOB: 1940 MRN: [id number]  Unit/Bed#: CW2 216-01 Encounter: [id number]      Physician Requesting Consult: Estee Gamez MD  Reason for Consult / Principal Problem: Hypotension    Assessment and Plan  1  Presyncopal / hypotension  2  Elevated troponins  3  CAD s/p CABGx3  · Echo - 3/2018 - LVEF 50-55%, mild hypokinesis of basal to mid septal and basal inferior, mild LVH, mild to moderately dilated LA, mild MR, very mild AS, mean gradient 8  · Nuclear stress test - 8/2017 - medium size moderately severe reversible myocardial perfusion defect of basal to mid anterior wall  Small moderate to severe was would perfusion defect of apical inferior wall  LVEF 41%  · Cath - 7/2017 - left main discrete 90% stenosis in distal 1/3, medium-sized LAD with 98% ostial lesion  Ostial circumflex 60% lesion  When 100% at ostium  RCA moderate stenosis  LIMA to LAD - normal, but native vessel distal to graft diffusely diseased and small  SVG to OM1 medium to large sized, normal  SVG to distal RCA aneurysmal dilatation of distal portion, no disease  4  Infrarenal abdominal aortic aneurysm  5  Diabetes Mellitus Type 2  6  Orthostatic hypotension  7  CKD  8  Hyperlipidemia  9   H/o prostate cancer    Outpatient cardiologist: Amber Dennis  Presyncope/Hypotension  · 2/2 hypovolemia/dehydration  · Had positive orthostatic vital signs  · Also had DEZ, with Cr 1 28, which has improved with IV fluids --> all supporting dehydration as etiology  · BP after that has actually been in higher range in 120-160s on home meds  · Okay to continue on home meds - metoprolol  · Encourage oral fluid intake  · Okay to discharge from cardiac standpoint    Elevated troponins  · 0 18 --> 0 13 --> 0 11  · No complaints of chest pain  · EKG - NSR, RBBB, septal infarct (unchanged)    CAD s/p CABGx3  · Stable, no active chest pain  · Continue home meds - , Plavix 75, Atorvas 80, metoprolol, ranolazine    History of Present Illness   HPI: Lisa Herrmann is a 76y o  year old male who presents with hypotension and presyncopal symptoms  38-year-old gentleman was in the hospital yesterday, and and had brought his daughter here for counseling meeting  He had lunch with a definite area and then came back to see if there was any sugar free desserts  He could not find any, and then started to feel a bit dizzy  As a result started leaning against the wall  People around her her were concerned and he was taken to the ED for evaluation  He was found to have a low blood pressure in the 80s  As a result he was loaded with 1 L NS, with which his Bp improved  He has no had further episodes of dizziness since then  He was monitored overnight and his troponins were trended  He denie having had any nausea, vomiting , diarrhea had any nausea, vomitingshis troponins were trended  He is not on any diuretics  His not to complaints of chest pain shortness of breath, palpitations or dizziness  He remains off isosorbide  Past Medical History:   Diagnosis Date    AAA (abdominal aortic aneurysm) (HCC)     Anxiety     CVD (cardiovascular disease)     Diabetes mellitus [facility]     DVT, lower extremity [facility]     Hyperlipidemia     Hypertension     NSTEMI (non-ST elevated myocardial infarction) ([facility]     Prostate cancer ([facility]     Right bundle branch block (RBBB)     Skin cancer      Past Surgical History:   Procedure Laterality Date    ANKLE ARTHROSCOPY W/ OPEN REPAIR Left     CARDIAC SURGERY      CORONARY ARTERY BYPASS GRAFT  12/11/2013    CABG x3 with LIMA to LAD, SVG to OM-1, SVG to posterior lateral, LYSIS of pericardial adhesions      MN REPAIR UMBILICAL GJFB,3+V/U,OAEWC N/A 3/10/2017    Procedure: REPAIR HERNIA UMBILICAL;  Surgeon: Dillon Carpenter MD; Location: [facility];  Service: General    PROSTATECTOMY       Family History   Problem Relation Age of Onset    Crohn's disease Mother     Liver cancer Mother     Hypertension Mother     Crohn's disease Father     Liver cancer Father     Heart disease Father     Hypertension Father     Hyperlipidemia Father     Colon cancer Brother     Aortic aneurysm Brother      abdominal     Heart disease Brother      abdominal aortic aneurysm    Hypertension Brother     Hyperlipidemia Brother     Colon polyps Family     Stomach cancer Family     Hypertension Sister      Meds/Allergies   all current active meds have been reviewed and current meds: Current Facility-Administered Medications   Medication Dose Route Frequency    acetaminophen (TYLENOL) tablet 650 mg  650 mg Oral Q4H PRN    ascorbic acid (VITAMIN C) tablet 500 mg  500 mg Oral Daily    aspirin tablet 325 mg  325 mg Oral Daily    atorvastatin (LIPITOR) tablet 80 mg  80 mg Oral Daily With Dinner    clopidogrel (PLAVIX) tablet 75 mg  75 mg Oral Daily    co-enzyme Q-10 capsule 100 mg  100 mg Oral Daily    cyanocobalamin (VITAMIN B-12) tablet 500 mcg  500 mcg Oral Daily    docusate sodium (COLACE) capsule 100 mg  100 mg Oral BID    heparin (porcine) subcutaneous injection 5,000 Units  5,000 Units Subcutaneous Q8H [facility]    insulin glargine (LANTUS) subcutaneous injection 40 Units  40 Units Subcutaneous HS    insulin lispro (HumaLOG) 100 units/mL subcutaneous injection 1-6 Units  1-6 Units Subcutaneous TID AC    LORazepam (ATIVAN) tablet 1 mg  1 mg Oral BID PRN    metoprolol succinate (TOPROL-XL) 24 hr tablet 25 mg  25 mg Oral Daily    multivitamin-minerals (CENTRUM) tablet 1 tablet  1 tablet Oral Daily    pantoprazole (PROTONIX) EC tablet 40 mg  40 mg Oral BID    ranolazine (RANEXA) 12 hr tablet 500 mg  500 mg Oral Q12H CRISTINO    vilazodone (VIIBRYD) tablet 40 mg  40 mg Oral Daily     Prescriptions Prior to Admission   Medication    aspirin 325 mg tablet    Cholecalciferol (VITAMIN D3 PO)    clopidogrel (PLAVIX) 75 mg tablet    cyanocobalamin (VITAMIN B-12) 500 mcg tablet    docusate sodium (COLACE) 100 mg capsule    metFORMIN (GLUCOPHAGE) 500 mg tablet    metoprolol succinate (TOPROL-XL) 25 mg 24 hr tablet    pantoprazole (PROTONIX) 40 mg tablet    ranolazine (RANEXA) 500 mg 12 hr tablet    rosuvastatin (CRESTOR) 40 MG tablet    senna (SENOKOT) 8 6 mg    VIIBRYD 40 MG tablet    ACCU-CHEK FASTCLIX LANCETS MISC    ascorbic acid (VITAMIN C) 500 mg tablet    coenzyme Q-10 100 MG capsule    glucose blood (ACCU-CHEK GUIDE) test strip    insulin aspart (NOVOLOG FLEXPEN) 100 Units/mL SOPN    Insulin Glargine (TOUJEO SOLOSTAR) injection pen 300 units/mL    Insulin Pen Needle (NOVOFINE AUTOCOVER) 30G X 8 MM MISC    LORazepam (ATIVAN) 1 mg tablet    Multiple Vitamins-Minerals (CENTRUM SILVER) tablet    nitroglycerin (NITROSTAT) 0 4 mg SL tablet     Allergies   Allergen Reactions    Other     Sulfa Antibiotics Other (See Comments)     Generalized redness     Social History     Social History    Marital status: /Civil Union     Spouse name: N/A    Number of children: N/A    Years of education: N/A     Social History Main Topics    Smoking status: Former Smoker    Smokeless tobacco: Never Used      Comment: quit 1967    Alcohol use Yes      Comment: social    Drug use: No    Sexual activity: Not Asked     Other Topics Concern    None     Social History Narrative    None         Review of Systems  No c/o chest pain, No c/o palpitations, No c/o shortness of breath, c/o dizziness or light-headedness+, No c/o abdominal pain, no c/o nausea/vomitting  All other systems negative    Vitals:    04/12/18 0950 04/12/18 0952 04/12/18 0954 04/12/18 1055   BP: 142/68 142/72 128/57 147/98   BP Location: Right arm Right arm Right arm Right arm   Pulse: 78 78 86 76   Resp:    18   Temp:       TempSrc:       SpO2:    96%   Weight:       Height: Orthostatic Blood Pressures    Flowsheet Row Most Recent Value   Blood Pressure  147/98 filed at 04/12/2018 1055   Patient Position - Orthostatic VS  Sitting filed at 04/12/2018 1055        Body mass index is 30 13 kg/m²  Wt Readings from Last 5 Encounters:   04/11/18 95 3 kg (210 lb)   03/01/18 99 8 kg (220 lb)   02/21/18 101 kg (222 lb)   02/20/18 99 8 kg (220 lb)   02/15/18 99 3 kg (219 lb)     I/O last 3 completed shifts: In: 240 [P O :240]  Out: -       Physical Exam  Constitutional:  Jasmina Rosenthal appears well, alert and oriented x 3, pleasant and cooperative  No acute distress   HEENT:    Normocephalic, atraumatic   Neck:  Supple, No JVD   Lungs:  Clear to auscultation bilaterally, respirations unlabored    Chest Wall:  No tenderness or deformity    Heart::  Regular rate, Regular rhythm, S1 and S2 normal, no murmur, rub or gallop    Abdomen:  Soft, non-tender, non-distended   Neurological:  Awake, alert, oriented x3  Non-focal exam    Extremities:  No pedal edema, No calf tenderness         Labs:    Results from last 7 days  Lab Units 04/12/18  0515 04/11/18  1403   WBC Thousand/uL 8 16 8 31   HEMOGLOBIN g/dL 12 7 13 0   HEMATOCRIT % 39 6 38 7   RDW % 14 4 14 6   PLATELETS Thousands/uL 298 276       Results from last 7 days  Lab Units 04/12/18  0515 04/11/18  1403   SODIUM mmol/L 136 136   POTASSIUM mmol/L 4 0 4 4   CHLORIDE mmol/L 102 103   CO2 mmol/L 29 28   BUN mg/dL 18 26*   CREATININE mg/dL 0 88 1 28   CALCIUM mg/dL 8 7 8 9   GLUCOSE RANDOM mg/dL 116 239*       Results from last 7 days  Lab Units 04/11/18  2240 04/11/18  1935 04/11/18  1403   TROPONIN I ng/mL 0 11* 0 13* 0 18*                   EKG: NSR, RBBB, septal infarct  Imaging: No results found      Cardiac testing:   Results for orders placed during the hospital encounter of 03/06/18   Echo complete with contrast if indicated    [address]  [phone]    Transthoracic Echocardiogram  2D, M-mode, Doppler, and Color Doppler    Study date:  06-Mar-2018    Patient: Caroline Downs  MR number: [id number]  Account number: [de-identified]  : 1940  Age: 76 years  Gender: Male  Status: Outpatient  Location: [address] [place] [address]  Height: 70 in  Weight: 220 lb  BP: 110/ 70 mmHg    Indications: Evaluate for coronary artery disease  Diagnoses: I25 10 - Atherosclerotic heart disease of native coronary artery without angina pectoris    Sonographer:  FREDDY Singer  Primary Physician:  Aniyah Clemnet DO  Referring Physician:  Kenyon Padilla DO  Group:  [facility]  Interpreting Physician:  Alyssa Griffith MD    SUMMARY    LEFT VENTRICLE:  Size was normal   Systolic function was at the lower limits of normal  Ejection fraction was estimated in the range of 50 % to 55 %  There was mild hypokinesis of the basal-mid inferoseptal and basal inferior wall(s)  Wall thickness was increased  There was mild concentric hypertrophy  LEFT ATRIUM:  The atrium was mildly to moderately dilated  RIGHT ATRIUM:  The atrium was mildly dilated  MITRAL VALVE:  There was mild-moderate calcification  There was mild regurgitation  AORTIC VALVE:  The valve was trileaflet  Leaflets exhibited mild to moderate calcification and lower normal cuspal separation  There was very mild stenosis  There was mild regurgitation  Valve peak gradient was 12 92 mmHg  Valve mean gradient was 8 17 mmHg  TRICUSPID VALVE:  There was mild regurgitation  HISTORY: PRIOR HISTORY: Coronary artery disease; CABG x3; RBBB; NSTEMI: diabetes; Dyslipidemia; Abdominal aortic aneurysm    PROCEDURE: The study was performed in the [address] [place] [address]  This was a routine study  The transthoracic approach was used  The study included complete 2D imaging, M-mode, complete spectral Doppler, and color Doppler  The  heart rate was 76 bpm, at the start of the study  Images were obtained from the parasternal, apical, subcostal, and suprasternal notch acoustic windows  Echocardiographic views were limited due to poor acoustic window availability,  decreased penetration, and lung interference  Image quality was adequate  LEFT VENTRICLE: Size was normal  Systolic function was at the lower limits of normal  Ejection fraction was estimated in the range of 50 % to 55 %  There was mild hypokinesis of the basal-mid inferoseptal and basal inferior wall(s)  Wall  thickness was increased  There was mild concentric hypertrophy  RIGHT VENTRICLE: The size was normal  Systolic function was normal  Wall thickness was normal     LEFT ATRIUM: The atrium was mildly to moderately dilated  RIGHT ATRIUM: The atrium was mildly dilated  MITRAL VALVE: There was mild-moderate calcification  DOPPLER: There was mild regurgitation  AORTIC VALVE: The valve was trileaflet  Leaflets exhibited mild to moderate calcification and lower normal cuspal separation  DOPPLER: There was very mild stenosis  There was mild regurgitation  TRICUSPID VALVE: DOPPLER: There was mild regurgitation  PULMONIC VALVE: Leaflets exhibited normal thickness, no calcification, and normal cuspal separation  DOPPLER: The transpulmonic velocity was within the normal range  There was no regurgitation  PERICARDIUM: There was no pericardial effusion  The pericardium was normal in appearance  AORTA: The root exhibited normal size      MEASUREMENT TABLES    DOPPLER MEASUREMENTS  Aortic valve   (Reference normals)  Peak gradient   12 92 mmHg   (--)  Mean gradient   8 17 mmHg   (--)  Valve area, cont   1 5 cm squared   (--)    SYSTEM MEASUREMENT TABLES    2D  %FS: 31 9 %  AV Diam: 3 06 cm  EDV(Teich): 112 16 ml  EF(Cube): 68 41 %  EF(Teich): 59 82 %  ESV(Cube): 36 88 ml  ESV(Teich): 45 07 ml  IVSd: 1 53 cm  LA Area: 23 09 cm2  LA Diam: 4 05 cm  LVEDV MOD A4C: 191 55 ml  LVEF MOD A4C: 53 %  LVESV MOD A4C: 90 02 ml  LVIDd: 4 89 cm  LVIDs: 3 33 cm  LVLd A4C: 7 9 cm  LVLs A4C: 7 37 cm  LVOT Diam: 1 97 cm  LVPWd: 1 39 cm  RA Area: 22 09 cm2  RV Diam: 4 63 cm  SV MOD A4C: 101 53 ml  SV(Cube): 79 89 ml  SV(Teich): 67 09 ml    CW  AV Env  Ti: 243 99 ms  AV MaxP 92 mmHg  AV SV: 247 7 ml  AV VTI: 33 71 cm  AV Vmax: 1 79 m/s  AV Vmean: 1 38 m/s  AV meanP 17 mmHg    MM  TAPSE: 1 46 cm    PW  DEVON (VTI): 1 41 cm2  DEVON Vmax: 1 4 cm2  E': 0 06 m/s  E/E': 11 67  HR: 80 82 BPM  LVCO Dopp: 3 85 L/min  LVOT Env  Ti: 251 39 ms  LVOT VTI: 15 64 cm  LVOT Vmax: 0 82 m/s  LVOT Vmean: 0 62 m/s  LVOT maxP 71 mmHg  LVOT meanP 76 mmHg  LVSV Dopp: 47 68 ml  MV A Yovanny: 0 75 m/s  MV Dec Claiborne: 3 44 m/s2  MV DecT: 208 42 ms  MV E Yovanny: 0 72 m/s  MV E/A Ratio: 0 96    [facility] Accredited Echocardiography Laboratory    Prepared and electronically signed by    Gabrielle Ferreira MD  Signed 06-Mar-2018 12:18:34       No results found for this or any previous visit  No results found for this or any previous visit  Results for orders placed during the hospital encounter of 16   NM myocardial perfusion spect (stress and/or rest)    Narrative  [address] [place] [address]  [phone]    Rest/Stress Gated SPECT Myocardial Perfusion Imaging After Exercise    Patient: Monae Goldman  MR number: [id number]  Account number: [de-identified]  : 1940  Age: 66 years  Gender: Male  Status: Outpatient  Location: [address] [place] [address] and [facility]  Height: 74 in  Weight: 232 lb  BP: 120/ 72 mmHg    Allergies: SULFA ANTIBIOTICS    Diagnosis: R07 9 - Chest pain, unspecified    Primary Physician:  Florencia Chandra DO  RN:  Laurie Rivas RN  Referring Physician:  Jeffrey Robles DO  Technician:  Antonia Power  Group:  [facility]  Report Prepared By[de-identified]  Laurie Rivas RN  Interpreting Physician:  Ange Walsh MD    INDICATIONS: Evaluation of known coronary artery disease      HISTORY: The patient is a 66year old  male  Chest pain status: chest  pain  Other symptoms: decreased effort tolerance  Coronary artery disease risk  factors: dyslipidemia, hypertension, smoking, and diabetes mellitus  Cardiovascular history: coronary artery disease  Prior cardiovascular  procedures: coronary artery bypass grafting  AAA that is being monitored  Co-morbidity: obesity  Medications: a beta blocker, an ACE inhibitor/ARB,  aspirin, a lipid lowering agent, and diabetic medications  Previous test  results: abnormal ECG  PHYSICAL EXAM: Baseline physical exam screening: no wheezes audible  REST ECG: Normal sinus rhythm  The ECG showed occasional premature atrial  contractions and right bundle branch block  PROCEDURE: The study was performed at the [address] [place] [address] and Vascular Center  Treadmill exercise testing was performed, using the [place] protocol  Gated SPECT  myocardial perfusion imaging was performed after stress  Systolic blood  pressure was 120 mmHg, at the start of the study  Diastolic blood pressure was  72 mmHg, at the start of the study  The heart rate was 86 bpm, at the start of  the study  IV double checked  URBAN PROTOCOL:  HR bpm SBP mmHg DBP mmHg Symptoms Rhythm/conduct  Baseline 86 120 72 none occasional PAC's, rare PVC's  Stage 1 98 132 76 none occasional PAC's, occasional PVC's  Stage 2 123 158 80 mild dyspnea, moderate fatigue occasional PAC's, occasional  PVC's  Immediate 125 174 90 same as above occasional PAC's, occasional PVC's  Recovery 1 95 190 72 subsiding --  Recovery 2 81 168 74 subsiding --  Recovery 3 76 162 70 none --  No medications or fluids given  STRESS SUMMARY: Duration of exercise was 6 min and 16 sec  The patient  exercised to protocol stage 2  Maximal work rate was 7 2 METs  Functional  capacity was slightly decreased (20% to 30%)  Maximal heart rate during stress  was 125 bpm ( 87 % of maximal predicted heart rate)   The heart rate response to  stress was normal  There was normal resting blood pressure with an appropriate  response to stress  The rate-pressure product for the peak heart rate and blood  pressure was 80650  There was no chest pain during stress  The stress test was  terminated due to moderate fatigue  Pre oxygen saturation: 98 %  Peak oxygen  saturation: 97 %  The stress ECG was negative for ischemia  Arrhythmia during  stress: isolated atrial premature beats and isolated premature ventricular  beats  ISOTOPE ADMINISTRATION:  Resting isotope administration Stress isotope administration  Agent Tetrofosmin Tetrofosmin  Dose 11 mCi 32 5 mCi  Date 11/16/2016 11/16/2016  Injection time 12:52 15:06  Injection-image interval 40 min 35 min    The radiopharmaceutical was injected one minute before the end of exercise  MYOCARDIAL PERFUSION IMAGING:  The image quality was good  Rotating projection images reveal moderate  diaphragmatic attenuation  Left ventricular size was normal  The TID ratio was  1 13  PERFUSION DEFECTS:  -  There was a moderate-sized, moderately severe, paradoxical (reverse  redistribution) myocardial perfusion defect of the basal-mid inferior wall  likely due to diaphragm attenuation  GATED SPECT:  The calculated left ventricular ejection fraction was 42 %  Left ventricular  ejection fraction was mildly decreased by visual estimate  There was no left  ventricular regional abnormality  SUMMARY:  -  Stress results: Duration of exercise was 6 min and 16 sec  Functional  capacity was slightly decreased (20% to 30%)  Target heart rate was achieved  There was no chest pain during stress  -  ECG conclusions: The stress ECG was negative for ischemia  -  Perfusion imaging: There was a moderate-sized, moderately severe,  paradoxical (reverse redistribution) myocardial perfusion defect of the  basal-mid inferior wall likely due to diaphragm attenuation  -  Gated SPECT:  The calculated left ventricular ejection fraction was 42 %   Left ventricular  ejection fraction was mildly decreased by visual estimate  There was no left  ventricular regional abnormality  IMPRESSIONS: Abnormal study after maximal exercise due to reduced LV function  There was image artifact, without diagnostic evidence for perfusion  abnormality  Left ventricular systolic function was reduced, without distinct  regional wall motion abnormalities  Prepared and signed by    Jaycee Archer MD  Signed 11/16/2016 16:35:59         Counseling / Coordination of Care  Total floor / unit time spent today 35 minutes  Greater than 50% of total time was spent with the patient and / or family counseling and / or coordination of care  A description of the counseling / coordination of care: Obtained history, performed physical examination, discussed laboratory and imaging results, and explained further plan of care  Karla Hernandez

## 2018-04-12 NOTE — ASSESSMENT & PLAN NOTE
· Troponin 0 18/0 13/0  11  Denies any CP or SOB  Likely NSTEMI type 2 secondary to hypotension/hypoxia  · Will ask cardiology to evaluate patient  Sees Dr Coe Mention as outpatient  · EKG on admission without ischemic changes  · Continue telemetry, no events upon review     · Monitor

## 2018-04-12 NOTE — ASSESSMENT & PLAN NOTE
· O2 sat 77% on admission, improved today  CXR negative on admission  No respiratory symptoms today  Currently on room air  Will ask nursing to check ambulatory saturation     · Monitor

## 2018-04-12 NOTE — SOCIAL WORK
CM met with patient and explained cm role  Pt alert and oriented  Pt reports he lives in a 2 story home w/wife Citlali  Pt reports DME: 3 cane's, prev  [facility] w/SL, prev  Rehab w/SL  Pt reports being independent PTA, reports good support at home, he drives and has transport home with wife for d/c  Pts pharmacy is [facility] [address] [place]  No POA  Pt denies hx/admission for drugs/etoh and psych/mental health  CM discussed DASH, and meds to bed program, patient not interested in meds to bed, acknowledged understanding of DASH  CM reviewed d/c planning process including the following: identifying help at home, patient preference for d/c planning needs, Discharge Lounge, [facility] Meds to Bed program, availability of treatment team to discuss questions or concerns patient and/or family may have regarding understanding medications and recognizing signs and symptoms once discharged  CM also encouraged patient to follow up with all recommended appointments after discharge  Patient advised of importance for patient and family to participate in managing patients medical well being

## 2018-04-12 NOTE — DISCHARGE SUMMARY
Discharge Summary - [place] [address] Internal Medicine    Patient Information: Mars Thao 76 y o  male MRN: [id number]  Unit/Bed#: CW2 216-01 Encounter: [id number]    Discharging Physician / Practitioner: TANISHA Jason  PCP: Zhanna Todd DO  Admission Date: 4/11/2018  Discharge Date: 04/12/18    Reason for Admission: Near Syncopal Episode        Discharge Diagnoses:     Principal Problem (Resolved):    Hypotension  Active Problems:    Elevated troponin    Benign essential hypertension    CKD (chronic kidney disease) stage 2, GFR 60-89 ml/min    Coronary artery disease involving native coronary artery of native heart without angina pectoris    Dyslipidemia    S/P CABG x 3    Type 2 diabetes mellitus with hyperglycemia, with long-term current use of insulin ([facility])    Obesity    Aneurysm of infrarenal abdominal aorta ([facility] [address] )  Resolved Problems:    Acute respiratory failure with hypoxia ([facility] )      Consultations During Hospital Stay:  · Cardiology     Procedures Performed:     · None     Significant Findings / Test Results:     · EKG:  Normal sinus rhythm with right bundle branch block  · Troponin 0 18/0 13/0 11    Incidental Findings:   · None    Test Results Pending at Discharge (will require follow up): · None     Outpatient Tests Requested:  · Follow-up with vascular surgery, Cardiology and primary care physician  Complications:  None    Hospital Course:     Mars Thao is a 76 y o  male patient with a past medical history of type 2 diabetes, abdominal aorta aneurysm, hypertension, hyperlipidemia who originally presented to the hospital on 4/11/2018 due to a near syncopal episode  Patient was in the cafeteria here at the hospital when he began feeling like his knees were buckling  He thought that it might be an episode of hypoglycemia but this was not the case  He denied any lightheadedness or dizziness  He denied any chest pain, palpitations or shortness of breath    Patient was found to be hypotensive and hypoxic in the emergency department  This improved with intravenous fluids  He did have minimal troponin elevation which trended down  This is likely secondary to hypotension/hypoxia  Cardiology did evaluate the patient who felt that his troponin elevation was secondary to above  No changes in medications were made  He will be discharged home  He will be seen in the cardiology office for a follow-up  I discussed with Cardiology on day of discharge, who are recommending increased fluid intake, change position slowly  I also instructed the patient to wear Aroldo stockings  On day of discharge the patient is stable  He has had no further episodes of hypotension or hypoxia  He denies any dizziness, lightheadedness, chest pain or shortness of breath  The patient is stable for discharge  Condition at Discharge: stable     Discharge Day Visit / Exam:     * Please refer to separate progress note for these details *    Discussion with Family: Patient       Discharge instructions/Information to patient and family:   See after visit summary for information provided to patient and family  Provisions for Follow-Up Care:  See after visit summary for information related to follow-up care and any pertinent home health orders  Disposition:     Home    For Discharges to [facility] SNF:   · Not Applicable to this Patient - Not Applicable to this Patient    Planned Readmission: No    Discharge Statement:  I spent 35 minutes discharging the patient  This time was spent on the day of discharge  I had direct contact with the patient on the day of discharge  Greater than 50% of the total time was spent examining patient, answering all patient questions, arranging and discussing plan of care with patient as well as directly providing post-discharge instructions  Additional time then spent on discharge activities      Discharge Medications:  See after visit summary for reconciled discharge medications provided to patient and family  ** Please Note: This note has been constructed using a voice recognition system   **

## 2018-04-12 NOTE — PROGRESS NOTES
[address] Internal Medicine     Progress Note - Roshan Greco 1940, 76 y o  male MRN: [id number]    Unit/Bed#: CW2 216-01 Encounter: [id number]    Primary Care Provider: Eufemia Lund DO   Date and time admitted to hospital: 4/11/2018  1:12 PM    * Hypotension   Assessment & Plan    · BP 93/44, 87/49 on admission  Improved after 1L NS   · Overall improved today, last /90  · Orthostatic VS last evening were positive  Will repeat  · Apply PAMELLA stockings  · Imdur discontinued by cardiologist Feb 2018 d/t recurrent lightheadedness with standing  · Hold Toprol SBP <110  · Monitor         Elevated troponin   Assessment & Plan    · Troponin 0 18/0 13/0  11  Denies any CP or SOB  Likely NSTEMI type 2 secondary to hypotension/hypoxia  · Will ask cardiology to evaluate patient  Sees Dr Margarita Palm as outpatient  · EKG on admission without ischemic changes  · Continue telemetry, no events upon review  · Monitor          Acute respiratory failure with hypoxia (HCC)   Assessment & Plan    · O2 sat 77% on admission, improved today  CXR negative on admission  No respiratory symptoms today  Currently on room air  Will ask nursing to check ambulatory saturation  · Monitor        Benign essential hypertension   Assessment & Plan    · Arrived hypotensive but BP has improved  · Continue Toprol with holding parameters   · Monitor        CKD (chronic kidney disease) stage 2, GFR 60-89 ml/min   Assessment & Plan    · Chronic, stable  · Monitor        Coronary artery disease involving native coronary artery of native heart without angina pectoris   Assessment & Plan    · Continue ASA, Plavix, Toprol, Ranexa and Crestor     · Lipid panel from 2/18 showing LDL below goal          Dyslipidemia   Assessment & Plan    · Lipid panel Chol 111, Trig 89, HDL 38, LDL 55 from 2/18  · Continue Crestor        S/P CABG x 3   Assessment & Plan    · S/p CABG x3        Type 2 diabetes mellitus with hyperglycemia, with long-term current use of insulin (HCC)   Assessment & Plan    · A1c 7 1 on 2/2018  · On metformin, NovoLog 10-20 units t i d  with meals and Toujeo 45-50 units at bedtime; patient adjusts insulin according to fingerstick   · Hold metformin; continue home insulin  · Fingersticks ACHS and ISS  · Diabetic diet  · Monitor        Aneurysm of infrarenal abdominal aorta (HCC)   Assessment & Plan    · U/S: Infrarenal abdominal aorta 3 5 x 3 3cm, increased when compared to CT of 7/3/2017   · Strong family history of aneurysm disease; follows outpatient with vascular, Dr Guevara Urrutia, seen 3/1/18: no further intervention necessary other than annual duplex which was scheduled        Obesity   Assessment & Plan    · BMI 30 85  · Nutrition mgmt and exercise as tolerated          Pharmacologic: Heparin  Mechanical VTE Prophylaxis in Place: No    Patient Centered Rounds: I have performed bedside rounds with nursing staff today  Discussions with Specialists or Other Care Team Provider: nursing, case management     Education and Discussions with Family / Patient: Patient     Time Spent for Care: 30 minutes  More than 50% of total time spent on counseling and coordination of care as described above  Current Length of Stay: 0 day(s)    Current Patient Status: Observation   Certification Statement: The patient will continue to require additional inpatient hospital stay due to cardiology evaluation, symptomatic improvement  Discharge Plan / Estimated Discharge Date: Pending cardiology evaluation and symptomatic improvement  Still orthostatic  Code Status: Level 1 - Full Code      Subjective:   Patient offers no complaints  Denies any chest pain or shortness of breath  Denies any lightheadedness or dizziness  States that when he had his prior MIs he never had any symptoms such as chest pain or shortness of breath but more pain in his neck and upper back    He had known that the symptoms yesterday during this episode  Objective:     Vitals:   Temp (24hrs), Av 9 °F (36 6 °C), Min:97 8 °F (36 6 °C), Max:97 9 °F (36 6 °C)    HR:  [69-78] 78  Resp:  [13-20] 18  BP: ()/(44-90) 145/90  SpO2:  [77 %-98 %] 96 %  Body mass index is 30 13 kg/m²  Input and Output Summary (last 24 hours): Intake/Output Summary (Last 24 hours) at 18 0916  Last data filed at 18 0840   Gross per 24 hour   Intake              360 ml   Output                0 ml   Net              360 ml       Physical Exam:     Physical Exam   Constitutional: He is oriented to person, place, and time  No distress  HENT:   Head: Normocephalic  Eyes: Pupils are equal, round, and reactive to light  Neck: Normal range of motion  Cardiovascular: Normal rate, regular rhythm and intact distal pulses  No murmur heard  Pulmonary/Chest: Effort normal  He has decreased breath sounds  Abdominal: Soft  Bowel sounds are normal  He exhibits no distension  There is no tenderness  Musculoskeletal: Normal range of motion  He exhibits no edema  Neurological: He is alert and oriented to person, place, and time  Skin: Skin is warm and dry  Psychiatric: He has a normal mood and affect  Nursing note and vitals reviewed        Additional Data:     Labs:      Results from last 7 days  Lab Units 18  0515 18  1403   WBC Thousand/uL 8 16 8 31   HEMOGLOBIN g/dL 12 7 13 0   HEMATOCRIT % 39 6 38 7   PLATELETS Thousands/uL 298 276   NEUTROS PCT %  --  74   LYMPHS PCT %  --  14   MONOS PCT %  --  7   EOS PCT %  --  5       Results from last 7 days  Lab Units 18  0515   SODIUM mmol/L 136   POTASSIUM mmol/L 4 0   CHLORIDE mmol/L 102   CO2 mmol/L 29   BUN mg/dL 18   CREATININE mg/dL 0 88   CALCIUM mg/dL 8 7   GLUCOSE RANDOM mg/dL 116             Recent Cultures (last 7 days):           Last 24 Hours Medication List:     Current Facility-Administered Medications:  ascorbic acid 500 mg Oral Daily Alida Carlin MD   aspirin 325 mg Oral Daily Marimar Quigley MD   atorvastatin 80 mg Oral Daily With Bridget Saab MD   clopidogrel 75 mg Oral Daily Marimar Quigley MD   co-enzyme Q-10 100 mg Oral Daily Marimar Quigley MD   cyanocobalamin 500 mcg Oral Daily Marimar Quigley MD   docusate sodium 100 mg Oral BID Marimar Quigley MD   heparin (porcine) 5,000 Units Subcutaneous [facility] TANISHA Blakely   insulin glargine 40 Units Subcutaneous HS Marimar Quigley MD   insulin lispro 1-6 Units Subcutaneous TID AC Marimar Quigley MD   LORazepam 1 mg Oral BID PRN Marimar Quigley MD   metoprolol succinate 25 mg Oral Daily Marimar Quigley MD   multivitamin-minerals 1 tablet Oral Daily Marimar Quigley MD   pantoprazole 40 mg Oral BID Marimar Quigley MD   ranolazine 500 mg Oral Q12H Alejandra Turpin MD   vilazodone 40 mg Oral Daily Marimar Quigley MD        Today, Patient Was Seen By: TANISHA Loomis    ** Please Note: Dragon 360 Dictation voice to text software may have been used in the creation of this document   **

## 2018-04-12 NOTE — ASSESSMENT & PLAN NOTE
· U/S: Infrarenal abdominal aorta 3 5 x 3 3cm, increased when compared to CT of 7/3/2017   · Strong family history of aneurysm disease; follows outpatient with vascular, Dr Shiela Mcknight, seen 3/1/18: no further intervention necessary other than annual duplex which was scheduled

## 2018-04-12 NOTE — ASSESSMENT & PLAN NOTE
· A1c 7 1 on 2/2018  · On metformin, NovoLog 10-20 units t i d  with meals and Toujeo 45-50 units at bedtime; patient adjusts insulin according to fingerstick   · Hold metformin; continue home insulin  · Fingersticks ACHS and ISS  · Diabetic diet  · Monitor

## 2018-04-12 NOTE — PLAN OF CARE
Problem: Potential for Falls  Goal: Patient will remain free of falls  INTERVENTIONS:  - Assess patient frequently for physical needs  -  Identify cognitive and physical deficits and behaviors that affect risk of falls    -  [place] fall precautions as indicated by assessment   - Educate patient/family on patient safety including physical limitations  - Instruct patient to call for assistance with activity based on assessment  - Modify environment to reduce risk of injury  - Consider OT/PT consult to assist with strengthening/mobility   Outcome: Progressing

## 2018-04-12 NOTE — ASSESSMENT & PLAN NOTE
· BP 93/44, 87/49 on admission  Improved after 1L NS   · Overall improved today, last /90  · Orthostatic VS last evening were positive  Will repeat  · Apply PAMELLA stockings     · Imdur discontinued by cardiologist Feb 2018 d/t recurrent lightheadedness with standing  · Hold Toprol SBP <110  · Monitor

## 2018-04-12 NOTE — ASSESSMENT & PLAN NOTE
· Continue ASA, Plavix, Toprol, Ranexa and Crestor     · Lipid panel from 2/18 showing LDL below goal

## 2018-04-12 NOTE — CASE MANAGEMENT
Initial Clinical Review    Admission: Date/Time/Statement: OBSERVATION 4/11/18 @ 1527    Orders Placed This Encounter   Procedures    Place in Observation (expected length of stay for this patient is less than two midnights)     Standing Status:   Standing     Number of Occurrences:   1     Order Specific Question:   Admitting Physician     Answer:   Marialuisa Bettencourt LeylaJohnathan [address] [place] [address]     Order Specific Question:   Level of Care     Answer:   Med Surg [16]     Order Specific Question:   Bed request comments     Answer:   telemetry     ED: Date/Time/Mode of Arrival:   ED Arrival Information     Expected Arrival 70 Sarah Alaniz of Arrival Escorted By Service Admission Type    - 4/11/2018 13:11 Urgent Walk-In Self General Medicine Urgent    Arrival Complaint    CHEST PAIN        Chief Complaint:   Chief Complaint   Patient presents with    Hyperglycemia - Symptomatic     Pt states that he was in the cafeteria and ate but forgot to bring his insulin with him and felt like he was going to fall and got shaky     History of Illness: Jesse Ferreira is a 76 y o  male who presents with c/o near syncope  Took his morning meds, ate breakfast, then took his daughter to rehab and came here for lunch  He ate soup and a fruit cup, when he got up he felt like his knees were going to buckle so he leaned on the counter and 3 people came over to assist him  He was due to take his insulin at lunchtime but did not have it on him  Denies dizziness or lightheadedness prior to this episode  Denies chest pain, palpitations, SOB, cough, abdominal pain, NVCD or dysuria  Reports over the last week or so he has taken NTG on 3 occassions due to chest pressure that radiates to his neck and shoulder, states when he feels these symptoms he takes his BP and it is elevated, he takes NTG and it helps  Saturday had gas and abdominal bloating then developed diarrhea which resolved immediately      ED Vital Signs:   ED Triage Vitals   Temperature Pulse Respirations Blood Pressure SpO2   04/11/18 1327 04/11/18 1327 04/11/18 1327 04/11/18 1327 04/11/18 1327   97 9 °F (36 6 °C) 77 18 (!) 93/44 (!) 77 %      Temp Source Heart Rate Source Patient Position - Orthostatic VS BP Location FiO2 (%)   04/11/18 1327 04/11/18 1327 04/11/18 1327 04/11/18 1327 --   Oral Monitor Lying Right arm       Pain Score       04/11/18 1339       No Pain        Wt Readings from Last 1 Encounters:   04/11/18 95 3 kg (210 lb)     Vital Signs (abnormal):   04/11/18 1339  --  78  18   87/49  95 %  None (Room air)  Lying   04/11/18 1327  97 9 °F (36 6 °C)  77  18   93/44   77 %  None (Room air)  Lying     Abnormal Labs:    BUN 26  Glucose 239  Trop 0 18, 0 13, 0 11  POC glucose 202, 300, 141    Diagnostic Test Results:     4/11 EKG - Normal sinus rhythm  Right bundle branch block  Septal infarct (cited on or before 03-JUL-2017)  Abnormal ECG    ED Treatment:   Medication Administration from 04/11/2018 1311 to 04/11/2018 1720       Date/Time Order Dose Route Action     04/11/2018 1407 sodium chloride 0 9 % bolus 1,000 mL 1,000 mL Intravenous New Bag        Past Medical/Surgical History:    Active Ambulatory Problems     Diagnosis Date Noted    Non-STEMI (non-ST elevated myocardial infarction) [facility] ) 07/03/2017    Type 2 diabetes mellitus with hyperglycemia, with long-term current use of insulin ([facility] ) 07/03/2017    S/P CABG x 3 07/03/2017    Benign essential hypertension 07/13/2017    Obesity 07/13/2017    Vitamin D deficiency 02/19/2018    Subclinical iodine-deficiency hypothyroidism 02/19/2018    Aneurysm of infrarenal abdominal aorta ([facility] ) 10/23/2012    Coronary artery disease involving native coronary artery of native heart without angina pectoris 01/06/2014    Dyslipidemia 09/24/2012    Right bundle-branch block 01/16/2014    Orthostatic hypotension 02/21/2018     Resolved Ambulatory Problems     Diagnosis Date Noted    Hypertensive urgency 07/03/2017    Chest pain 07/03/2017    Mixed hyperlipidemia 07/03/2017    Abdominal aneurysm ([facility] ) 07/03/2017    Orthostasis 07/13/2017     Past Medical History:   Diagnosis Date    AAA [facility]abdominal aortic aneurysm) [facility]     Anxiety     CVD (cardiovascular disease)     Diabetes mellitus ([place] [address] )     DVT, lower extremity ([facility])     Hyperlipidemia     Hypertension     NSTEMI (non-ST elevated myocardial infarction) ([place] [address] )     Prostate cancer ([facility])     Right bundle branch block (RBBB)     Skin cancer      Admitting Diagnosis: Chest pain [R07 9]  Hypotension [I95 9]  Elevated troponin [R74 8]    Age/Sex: 76 y o  male    Assessment/Plan:   Hypotension   Assessment & Plan     · BP 93/44, 87/49  · Improved after 1L NS  · Orthostatic VS  · Imdur discontinued by cardiologist Feb 2018 d/t recurrent lightheadedness with standing  · Hold Toprol SBP <110          Acute respiratory failure with hypoxia ([facility])   Assessment & Plan     · O2 sat 77% on admission  · Currently O2 sat 93-97% on room air  · Monitor O2 sat  · Check ambulate O2          Elevated troponin   Assessment & Plan     · Troponin 0 18, possibly NSTEMI II due to hypotn and hypoxia  · Will trend x3  · Telemetry monitoring           Coronary artery disease involving native coronary artery of native heart without angina pectoris   Assessment & Plan     · Continue ASA, Plavix, Toprol, Ranexa and Crestor          S/P CABG x 3   Assessment & Plan     · S/p CABG x3          Type 2 diabetes mellitus with hyperglycemia, with long-term current use of insulin ([facility])   Assessment & Plan     · A1c 7 1 on 2/2018  · On metformin, NovoLog 10-20 units t i d  with meals and Toujeo 45-50 units at bedtime; patient adjusts insulin according to fingerstick   · Hold metformin; continue home insulin  · Fingersticks ACHS and ISS          Aneurysm of infrarenal abdominal aorta ([facility])   Assessment & Plan     · U/S: Infrarenal abdominal aorta 3 5 x 3 3cm, increased when compared to CT of 7/3/2017   · Strong family history of aneurysm disease; follows outpatient with vascular, Dr Toy Cameron, seen 3/1/18: no further intervention necessary other than annual duplex which was scheduled          Benign essential hypertension   Assessment & Plan     · Arrived hypotensive but BP has improved now 149/68  · Continue Toprol          Dyslipidemia   Assessment & Plan     · Lipid panel Chol 111, Trig 89, HDL 38, LDL 55  · Continue Crestor          Obesity   Assessment & Plan     · BMI 30 85  · Nutrition mgmt and exercise as tolerated          CKD (chronic kidney disease) stage 2, GFR 60-89 ml/min   Assessment & Plan     · Chronic, stable          VTE Prophylaxis: Enoxaparin (Lovenox)  / reason for no mechanical VTE prophylaxis Ambulate patient   Code Status: Full code  Anticipated Length of Stay:  Patient will be admitted on an Observation basis with an anticipated length of stay of  Less than 2 midnights     Justification for Hospital Stay:  Hypotension and acute hypoxic respiratory failure    Admission Orders:  Scheduled Meds:   Current Facility-Administered Medications:  acetaminophen 650 mg Oral Q4H PRN TANISHA Blakely   ascorbic acid 500 mg Oral Daily Tabitha Mcmullen MD   aspirin 325 mg Oral Daily Tabitha Mcmullen MD   atorvastatin 80 mg Oral Daily With Vy Mesa MD   clopidogrel 75 mg Oral Daily Tabitha Mcmullen MD   co-enzyme Q-10 100 mg Oral Daily Tabitha Mcmullen MD   cyanocobalamin 500 mcg Oral Daily Tabitha Mcmullen MD   docusate sodium 100 mg Oral BID Tabitha Mcmullen MD   heparin (porcine) 5,000 Units Subcutaneous [facility] TANISHA Blakely   insulin glargine 40 Units Subcutaneous HS Tabitha Mcmullen MD   insulin lispro 1-6 Units Subcutaneous TID AC Tabitha Mcmullen MD   LORazepam 1 mg Oral BID PRN Tabitha Mcmullen MD   metoprolol succinate 25 mg Oral Daily Tabitha Mcmullen MD   multivitamin-minerals 1 tablet Oral Daily Tabitha Mcmullen MD   pantoprazole 40 mg Oral BID Tabitha Mcmullen MD   ranolazine 500 mg Oral Q12H Emanuel Herrmann MD   vilazodone 40 mg Oral Daily [place] MD Aidan     PRN Meds:   Acetaminophen x1    LORazepam x2    Tele  SCDs  Orthostatics q shift + on admission   Pulse ox while ambulatory   POC glucose ac/hs   Up w/ assist   Diet [place]/CHO Controlled; Consistent Carbohydrate Diet Level 2 (5 carb servings/75 grams CHO/meal); Cardiac TLC 2 3 GM NA   Cons Cardiology   __________________________  4/12 Medicine Progress Note  * Hypotension   Assessment & Plan     · BP 93/44, 87/49 on admission  Improved after 1L NS   · Overall improved today, last /90  · Orthostatic VS last evening were positive  Will repeat  · Apply PAMELLA stockings  · Imdur discontinued by cardiologist Feb 2018 d/t recurrent lightheadedness with standing  · Hold Toprol SBP <110  · Monitor         Elevated troponin   Assessment & Plan     · Troponin 0 18/0 13/0  11  Denies any CP or SOB  Likely NSTEMI type 2 secondary to hypotension/hypoxia  · Will ask cardiology to evaluate patient  Sees Dr Aris Jin as outpatient  · EKG on admission without ischemic changes  · Continue telemetry, no events upon review  · Monitor           Acute respiratory failure with hypoxia (HCC)   Assessment & Plan     · O2 sat 77% on admission, improved today  CXR negative on admission  No respiratory symptoms today  Currently on room air  Will ask nursing to check ambulatory saturation  · Monitor        Benign essential hypertension   Assessment & Plan     · Arrived hypotensive but BP has improved  · Continue Toprol with holding parameters   · Monitor        CKD (chronic kidney disease) stage 2, GFR 60-89 ml/min   Assessment & Plan     · Chronic, stable  · Monitor        Coronary artery disease involving native coronary artery of native heart without angina pectoris   Assessment & Plan     · Continue ASA, Plavix, Toprol, Ranexa and Crestor     · Lipid panel from 2/18 showing LDL below goal          Dyslipidemia   Assessment & Plan     · Lipid panel Chol 111, Trig 89, HDL 38, LDL 55 from 2/18  · Continue Crestor        S/P CABG x 3   Assessment & Plan     · S/p CABG x3        Type 2 diabetes mellitus with hyperglycemia, with long-term current use of insulin (HCC)   Assessment & Plan     · A1c 7 1 on 2/2018  · On metformin, NovoLog 10-20 units t i d  with meals and Toujeo 45-50 units at bedtime; patient adjusts insulin according to fingerstick   · Hold metformin; continue home insulin  · Fingersticks ACHS and ISS  · Diabetic diet  · Monitor        Aneurysm of infrarenal abdominal aorta (HCC)   Assessment & Plan     · U/S: Infrarenal abdominal aorta 3 5 x 3 3cm, increased when compared to CT of 7/3/2017   · Strong family history of aneurysm disease; follows outpatient with vascular, Dr Parker Franz, seen 3/1/18: no further intervention necessary other than annual duplex which was scheduled        Obesity   Assessment & Plan     · BMI 30 85  Nutrition mgmt and exercise as tolerated           Pharmacologic: Heparin  Mechanical VTE Prophylaxis in Place: No  Certification Statement: The patient will continue to require additional inpatient hospital stay due to cardiology evaluation, symptomatic improvement

## 2018-04-16 ENCOUNTER — TELEPHONE (OUTPATIENT)
Dept: CARDIOLOGY CLINIC | Facility: CLINIC | Age: 78
End: 2018-04-16

## 2018-04-16 NOTE — TELEPHONE ENCOUNTER
Patient's Metoprolol and Ranexa sent to [facility] on 4/10 spoke with pharmacy and both scripts are there  Insurance is saying it is to early to   Pharmacy calling Insurance to get override   Will call if problems

## 2018-04-20 ENCOUNTER — TELEPHONE (OUTPATIENT)
Dept: INTERNAL MEDICINE CLINIC | Facility: CLINIC | Age: 78
End: 2018-04-20

## 2018-04-20 DIAGNOSIS — F41.9 ANXIETY: ICD-10-CM

## 2018-04-20 NOTE — TELEPHONE ENCOUNTER
Check the PDMP
Spoke to [employer]  The meds do not require prior authorization  The Lorazepam is due for a refill and the Metoprolol was filled on 2/22/18 by [facility] for a 90 day  The patient can not fill Metoprolol at [facility] until 5/1/18 and they already have a prescription on file for it   I submitted a request to Dr Tierra Licea for the Lorazepam 
aching

## 2018-04-21 RX ORDER — LORAZEPAM 1 MG/1
1 TABLET ORAL 2 TIMES DAILY PRN
Qty: 60 TABLET | Refills: 0 | Status: SHIPPED | OUTPATIENT
Start: 2018-04-21 | End: 2018-05-09 | Stop reason: SDUPTHER

## 2018-04-21 NOTE — ED ATTENDING ATTESTATION
Olivia Rodriguez MD, saw and evaluated the patient  I have discussed the patient with the resident/non-physician practitioner and agree with the resident's/non-physician practitioner's findings, Plan of Care, and MDM as documented in the resident's/non-physician practitioner's note, except where noted  All available labs and Radiology studies were reviewed  At this point I agree with the current assessment done in the Emergency Department  I have conducted an independent evaluation of this patient a history and physical is as follows:    Patient presents after having an episode of lightheadedness while eating lunch in the cafeteria  Patient states that he forgot to bring his insulin and felt lightheaded after eating  Patient denies having any chest pain, shortness of breath, palpitations, nausea, or vomiting  No additional complaints  Exam: AAOx3, NAD, RRR, CTA, S/NT/ND, no motor/sensory deficits  A/P:  Near-syncope  Will check cardiac labs, EKG, chest x-ray and evaluate      Critical Care Time  CritCare Time    Procedures

## 2018-05-07 ENCOUNTER — TELEPHONE (OUTPATIENT)
Dept: CARDIOLOGY CLINIC | Facility: CLINIC | Age: 78
End: 2018-05-07

## 2018-05-07 ENCOUNTER — APPOINTMENT (OUTPATIENT)
Dept: LAB | Facility: CLINIC | Age: 78
End: 2018-05-07
Payer: MEDICARE

## 2018-05-07 ENCOUNTER — TRANSCRIBE ORDERS (OUTPATIENT)
Dept: LAB | Facility: CLINIC | Age: 78
End: 2018-05-07

## 2018-05-07 DIAGNOSIS — E55.9 VITAMIN D DEFICIENCY: ICD-10-CM

## 2018-05-07 DIAGNOSIS — E08.00 DIABETES MELLITUS DUE TO UNDERLYING CONDITION WITH HYPEROSMOLARITY WITHOUT COMA, WITHOUT LONG-TERM CURRENT USE OF INSULIN (HCC): Chronic | ICD-10-CM

## 2018-05-07 LAB
25(OH)D3 SERPL-MCNC: 22.6 NG/ML (ref 30–100)
ANION GAP SERPL CALCULATED.3IONS-SCNC: 7 MMOL/L (ref 4–13)
BUN SERPL-MCNC: 27 MG/DL (ref 5–25)
CALCIUM SERPL-MCNC: 8.7 MG/DL (ref 8.3–10.1)
CHLORIDE SERPL-SCNC: 103 MMOL/L (ref 100–108)
CO2 SERPL-SCNC: 30 MMOL/L (ref 21–32)
CREAT SERPL-MCNC: 0.97 MG/DL (ref 0.6–1.3)
CREAT UR-MCNC: 103 MG/DL
EST. AVERAGE GLUCOSE BLD GHB EST-MCNC: 174 MG/DL
GFR SERPL CREATININE-BSD FRML MDRD: 74 ML/MIN/1.73SQ M
GLUCOSE P FAST SERPL-MCNC: 143 MG/DL (ref 65–99)
HBA1C MFR BLD: 7.7 % (ref 4.2–6.3)
MICROALBUMIN UR-MCNC: 89.7 MG/L (ref 0–20)
MICROALBUMIN/CREAT 24H UR: 87 MG/G CREATININE (ref 0–30)
POTASSIUM SERPL-SCNC: 4.5 MMOL/L (ref 3.5–5.3)
SODIUM SERPL-SCNC: 140 MMOL/L (ref 136–145)

## 2018-05-07 PROCEDURE — 36415 COLL VENOUS BLD VENIPUNCTURE: CPT

## 2018-05-07 PROCEDURE — 83036 HEMOGLOBIN GLYCOSYLATED A1C: CPT

## 2018-05-07 PROCEDURE — 80048 BASIC METABOLIC PNL TOTAL CA: CPT

## 2018-05-07 PROCEDURE — 82570 ASSAY OF URINE CREATININE: CPT

## 2018-05-07 PROCEDURE — 82306 VITAMIN D 25 HYDROXY: CPT

## 2018-05-07 PROCEDURE — 82043 UR ALBUMIN QUANTITATIVE: CPT

## 2018-05-07 NOTE — TELEPHONE ENCOUNTER
Pt wanted to inform Dr Fred Barcenas that he just had bloodwork for his endocrinologist today  Also he needs Refill on Metoprolol 25 mg Qd #30 sent to [facility]

## 2018-05-08 ENCOUNTER — OFFICE VISIT (OUTPATIENT)
Dept: CARDIOLOGY CLINIC | Facility: CLINIC | Age: 78
End: 2018-05-08
Payer: MEDICARE

## 2018-05-08 VITALS
DIASTOLIC BLOOD PRESSURE: 76 MMHG | HEART RATE: 80 BPM | OXYGEN SATURATION: 96 % | SYSTOLIC BLOOD PRESSURE: 132 MMHG | HEIGHT: 70 IN | BODY MASS INDEX: 31.98 KG/M2 | WEIGHT: 223.4 LBS

## 2018-05-08 DIAGNOSIS — I45.10 RIGHT BUNDLE-BRANCH BLOCK: ICD-10-CM

## 2018-05-08 DIAGNOSIS — E66.09 CLASS 1 OBESITY DUE TO EXCESS CALORIES WITH SERIOUS COMORBIDITY AND BODY MASS INDEX (BMI) OF 31.0 TO 31.9 IN ADULT: ICD-10-CM

## 2018-05-08 DIAGNOSIS — E78.5 DYSLIPIDEMIA: ICD-10-CM

## 2018-05-08 DIAGNOSIS — Z95.1 S/P CABG X 3: Chronic | ICD-10-CM

## 2018-05-08 DIAGNOSIS — I10 BENIGN ESSENTIAL HYPERTENSION: ICD-10-CM

## 2018-05-08 DIAGNOSIS — I25.10 CORONARY ARTERY DISEASE INVOLVING NATIVE CORONARY ARTERY OF NATIVE HEART WITHOUT ANGINA PECTORIS: Primary | ICD-10-CM

## 2018-05-08 DIAGNOSIS — I71.4 ANEURYSM OF INFRARENAL ABDOMINAL AORTA (HCC): ICD-10-CM

## 2018-05-08 DIAGNOSIS — I21.4 NON-STEMI (NON-ST ELEVATED MYOCARDIAL INFARCTION) (HCC): ICD-10-CM

## 2018-05-08 DIAGNOSIS — I95.1 ORTHOSTATIC HYPOTENSION: ICD-10-CM

## 2018-05-08 PROCEDURE — 99214 OFFICE O/P EST MOD 30 MIN: CPT | Performed by: INTERNAL MEDICINE

## 2018-05-08 RX ORDER — ISOSORBIDE MONONITRATE 30 MG/1
30 TABLET, EXTENDED RELEASE ORAL DAILY
Qty: 30 TABLET | Refills: 11 | Status: ON HOLD | OUTPATIENT
Start: 2018-05-08 | End: 2018-08-09 | Stop reason: ALTCHOICE

## 2018-05-08 RX ORDER — NITROGLYCERIN 0.4 MG/1
0.4 TABLET SUBLINGUAL
Qty: 90 TABLET | Refills: 3 | Status: SHIPPED | OUTPATIENT
Start: 2018-05-08

## 2018-05-08 RX ORDER — METOPROLOL SUCCINATE 25 MG/1
25 TABLET, EXTENDED RELEASE ORAL 2 TIMES DAILY
Qty: 60 TABLET | Refills: 11 | Status: SHIPPED | OUTPATIENT
Start: 2018-05-08 | End: 2018-05-30 | Stop reason: SDUPTHER

## 2018-05-08 NOTE — PROGRESS NOTES
Cardiology Follow Up    [facility]  1940  [id number]  [address] [facility] [address] [place] [phone]    1  Coronary artery disease involving native coronary artery of native heart without angina pectoris     2  S/P CABG x 3     3  Benign essential hypertension     4  Orthostatic hypotension     5  Dyslipidemia     6  Aneurysm of infrarenal abdominal aorta (HCC)     7  Right bundle-branch block     8  Class 1 obesity due to excess calories with serious comorbidity and body mass index (BMI) of 31 0 to 31 9 in adult         Discussion/Summary:  Mr Olivia Ellis is a pleasant 77-year-old gentleman who presents to the office today for routine follow-up  Since his last visit he was hospitalized with lightheadedness with standing  He was found to have hypotension and DEZ  Since his last visit he did have a few episodes of chest pain necessitating the use of nitroglycerin  I had discontinued his Imdur in the past due to hypotension  I have asked him to resume this in the evening  He was reminded to stay well hydrated and rise slowly from a seated position  He will remain on Ranexa as prescribed      His most recent lipids from earlier this year were reviewed  They are acceptable on current therapy with exception of a slightly low HDL for which therapeutic lifestyle modifications were recommended      His blood pressure is well controlled in the office today  Since his last visit he did see vascular surgery regarding his infrarenal abdominal aortic aneurysm and at this time no intervention is necessary besides surveillance      He will follow-up in the office in three months  Interval History:   Mr Olivia Ellis is a pleasant 77-year-old male who presents to the office for follow-up  After his last visit with me he was re hospitalized with lightheadedness    He was found to have acute kidney injury and he was hypotensive  He is given IV fluid with improvement of both  He was discharged for outpatient follow-up  Since his hospital stay he had one recurrent episode of lightheadedness with standing from a seated position  He has had a few episodes of chest tightness which occur mostly at night necessitating the use of nitroglycerin with resolution of the symptoms within a few minutes  He denies shortness of breath or chest pain with exertion  He denies symptoms of heart failure  He denies symptoms of claudication  He denies symptoms of palpitations  Problem List     Non-STEMI (non-ST elevated myocardial infarction) ([facility] )    Hypertensive urgency    Diabetes mellitus ([place] [address] ) (Chronic)    Mixed hyperlipidemia (Chronic)    Abdominal aneurysm (HCC) (Chronic)    Hx of CABG (Chronic)    Orthostasis    Hypertension (Chronic)    Obesity    Vitamin D deficiency    Subclinical iodine-deficiency hypothyroidism    Aneurysm of infrarenal abdominal aorta (HCC)    Arteriosclerosis of coronary artery    Overview Signed 2/21/2018  7:50 AM by Korin Mcconnell DO     Description: 50% distal left main, 75% proximal LAD, 90% 1st diagonal, 50% ostial circumflex, 99% proximal circumflex, 90% 2nd OM, 30 mid RCA , 90% right posterolateral - left heart catheterization December 2013  Dyslipidemia    Right bundle-branch block        Past Medical History:   Diagnosis Date    AAA (abdominal aortic aneurysm) (HCC)     Anxiety     CVD (cardiovascular disease)     Diabetes mellitus ([facility] )     DVT, lower extremity ([facility] )     Hyperlipidemia     Hypertension     NSTEMI (non-ST elevated myocardial infarction) ([facility] )     Prostate cancer ([place] [address] )     Right bundle branch block (RBBB)     Skin cancer      Social History     Social History    Marital status: /Civil Union     Spouse name: N/A    Number of children: N/A    Years of education: N/A     Occupational History    Not on file       Social History Main Topics    Smoking status: Former Smoker    Smokeless tobacco: Never Used      Comment: quit 1967    Alcohol use Yes      Comment: social    Drug use: No    Sexual activity: Not on file     Other Topics Concern    Not on file     Social History Narrative    No narrative on file      Family History   Problem Relation Age of Onset    Crohn's disease Mother     Liver cancer Mother     Hypertension Mother     Crohn's disease Father     Liver cancer Father     Heart disease Father     Hypertension Father     Hyperlipidemia Father     Colon cancer Brother     Aortic aneurysm Brother      abdominal     Heart disease Brother      abdominal aortic aneurysm    Hypertension Brother     Hyperlipidemia Brother     Colon polyps Family     Stomach cancer Family     Hypertension Sister      Past Surgical History:   Procedure Laterality Date    ANKLE ARTHROSCOPY W/ OPEN REPAIR Left     CARDIAC SURGERY      CORONARY ARTERY BYPASS GRAFT  12/11/2013    CABG x3 with LIMA to LAD, SVG to OM-1, SVG to posterior lateral, LYSIS of pericardial adhesions   OR REPAIR UMBILICAL QCRJ,6+M/L,CENOQ N/A 3/10/2017    Procedure: REPAIR HERNIA UMBILICAL;  Surgeon: Shelley Caceres MD;  Location: [facility];  Service: General    PROSTATECTOMY         Current Outpatient Prescriptions:     ACCU-CHEK FASTCLIX LANCETS MISC, by Does not apply route, Disp: , Rfl:     ascorbic acid (VITAMIN C) 500 mg tablet, Take 500 mg by mouth daily, Disp: , Rfl:     aspirin 325 mg tablet, Take 1 tablet by mouth daily, Disp: , Rfl:     Cholecalciferol (VITAMIN D3 PO), Take 2,000 Units by mouth daily  , Disp: , Rfl:     clopidogrel (PLAVIX) 75 mg tablet, Take 1 tablet by mouth see administration instructions, Disp: 30 tablet, Rfl: 0    coenzyme Q-10 100 MG capsule, Take 1 capsule by mouth daily, Disp: 1 capsule, Rfl: 0    cyanocobalamin (VITAMIN B-12) 500 mcg tablet, Take 1 tablet by mouth daily, Disp: , Rfl:     docusate sodium (COLACE) 100 mg capsule, Take 100 mg by mouth 2 (two) times a day , Disp: , Rfl:     glucose blood (ACCU-CHEK GUIDE) test strip, Check blood sugar 4 times daily  , Disp: 400 each, Rfl: 1    insulin aspart (NOVOLOG FLEXPEN) 100 Units/mL SOPN, Inject 10-20 Units under the skin 3 (three) times a day with meals  , Disp: , Rfl:     Insulin Glargine (TOUJEO SOLOSTAR) injection pen 300 units/mL, Inject 50 Units under the skin daily at bedtime for 90 days (Patient taking differently: Inject 45-50 Units under the skin daily at bedtime  ), Disp: 10 pen, Rfl: 1    Insulin Pen Needle (Chicomary Dias) 30G X 8 MM MISC, by Does not apply route, Disp: , Rfl:     LORazepam (ATIVAN) 1 mg tablet, Take 1 tablet (1 mg total) by mouth 2 (two) times a day as needed (anxiety), Disp: 60 tablet, Rfl: 0    metFORMIN (GLUCOPHAGE) 500 mg tablet, Take 1 tablet (500 mg total) by mouth 2 (two) times a day with meals, Disp: 60 tablet, Rfl: 2    metoprolol succinate (TOPROL-XL) 25 mg 24 hr tablet, Take 1 tablet (25 mg total) by mouth daily, Disp: 90 tablet, Rfl: 3    Multiple Vitamins-Minerals (CENTRUM SILVER) tablet, Take 1 tablet by mouth daily, Disp: , Rfl:     nitroglycerin (NITROSTAT) 0 4 mg SL tablet, Place 1 tablet under the tongue every 5 (five) minutes as needed  , Disp: , Rfl:     pantoprazole (PROTONIX) 40 mg tablet, Take 40 mg by mouth 2 (two) times a day , Disp: , Rfl:     ranolazine (RANEXA) 500 mg 12 hr tablet, 2 tabs two time daily (Patient taking differently: 1,000 mg 2 (two) times a day 2 tabs two time daily ), Disp: 360 tablet, Rfl: 3    rosuvastatin (CRESTOR) 40 MG tablet, Take 1 tablet by mouth daily, Disp: , Rfl:     senna (SENOKOT) 8 6 mg, Take by mouth, Disp: , Rfl:     VIIBRYD 40 MG tablet, TAKE 1 TABLET BY MOUTH EVERY DAY, Disp: 30 tablet, Rfl: 5  Allergies   Allergen Reactions    Other     Sulfa Antibiotics Other (See Comments)     Generalized redness       Labs:     Chemistry        Component Value Date/Time     05/07/2018 1225     10/13/2015 0823    K 4 5 05/07/2018 1225    K 4 3 10/13/2015 0823     05/07/2018 1225     10/13/2015 0823    CO2 30 05/07/2018 1225    CO2 34 (H) 10/13/2015 0823    BUN 27 (H) 05/07/2018 1225    BUN 11 10/13/2015 0823    CREATININE 0 97 05/07/2018 1225    CREATININE 1 0 10/13/2015 0823        Component Value Date/Time    CALCIUM 8 7 05/07/2018 1225    CALCIUM 9 2 10/13/2015 0823    ALKPHOS 64 02/16/2018 0946    ALKPHOS 74 10/13/2015 0823    AST 15 02/16/2018 0946    AST 19 10/13/2015 0823    ALT 23 02/16/2018 0946    ALT 25 10/13/2015 0823    BILITOT 0 42 02/16/2018 0946    BILITOT 0 4 10/13/2015 0823            Lab Results   Component Value Date    CHOL 111 02/16/2018    CHOL 104 01/27/2018    CHOL 124 12/04/2017     Lab Results   Component Value Date    HDL 38 (L) 02/16/2018    HDL 32 (L) 01/27/2018    HDL 36 (L) 12/04/2017     Lab Results   Component Value Date    LDLCALC 55 02/16/2018    LDLCALC 47 01/27/2018    LDLCALC 61 12/04/2017     Lab Results   Component Value Date    TRIG 89 02/16/2018    TRIG 124 01/27/2018    TRIG 135 12/04/2017     No components found for: CHOLHDL    Imaging: X-ray Chest 2 Views    Result Date: 1/27/2018  Narrative: CHEST - DUAL ENERGY INDICATION:  chest pain  History taken directly from the electronic ordering system  COMPARISON:  Chest x-ray performed on 7/13/2017 VIEWS:  PA (including soft tissue/bone algorithms) and lateral projections IMAGES:  4 FINDINGS:     Cardiac silhouette is top normal in size and changes of prior intervention  No focal airspace consolidation  No pleural effusion or pneumothorax  Visualized osseous structures appear within normal limits for the patient's age  Impression: No focal airspace consolidation  Workstation performed: [id number]     Us Abdominal Aorta    Result Date: 1/27/2018  Narrative: ABDOMINAL AORTIC ULTRASOUND INDICATION: Evaluate for aortic aneurysm   COMPARISON: CT performed on 7/13/2017 FINDINGS: Ultrasound of the abdominal aorta was performed in longitudinal and transverse planes with a curvilinear transducer  Signs of atherosclerotic disease  Proximal aorta:  2 19 x 2 11 cm Mid aorta:    2 39 x 2 24 cm Distal aorta:    3 5 x 3 3 cm Right common iliac origin:  1 6 x 1 4 cm Left common iliac origin:  1 6 x 1 4 cm No periaortic collections or adenopathy detected  Impression: Atherosclerotic disease  Infrarenal abdominal aorta measures 3 5 x 3 3 cm, marginally increased in caliber when compared to CT of 7/3/2017  Workstation performed: [id number]       Review of Systems   Cardiovascular: Negative for chest pain, dyspnea on exertion and irregular heartbeat  Neurological: Positive for light-headedness  Negative for loss of balance  There were no vitals filed for this visit  There were no vitals filed for this visit  There is no height or weight on file to calculate BMI      Physical Exam:   General appearance:  Appears stated age, alert, well appearing and in no distress  HEENT:  PERRLA, EOMI, no scleral icterus, no conjunctival pallor  NECK:  Supple, No elevated JVP, no thyromegaly, no carotid bruits  HEART:  Regular rate and rhythm, normal S1/S2, soft TORRIE RUSB without radiation  LUNGS:  Clear to auscultation bilaterally  ABDOMEN:  Soft, non-tender, positive bowel sounds, no rebound or guarding, no organomegaly   EXTREMITIES:  No edema  VASCULAR:  Normal pedal pulses   SKIN: No lesions or rashes on exposed skin  NEURO:  CN II-XII intact, no focal deficits

## 2018-05-09 ENCOUNTER — OFFICE VISIT (OUTPATIENT)
Dept: INTERNAL MEDICINE CLINIC | Facility: CLINIC | Age: 78
End: 2018-05-09
Payer: MEDICARE

## 2018-05-09 ENCOUNTER — TELEPHONE (OUTPATIENT)
Dept: ENDOCRINOLOGY | Facility: CLINIC | Age: 78
End: 2018-05-09

## 2018-05-09 VITALS
SYSTOLIC BLOOD PRESSURE: 102 MMHG | HEART RATE: 81 BPM | DIASTOLIC BLOOD PRESSURE: 44 MMHG | BODY MASS INDEX: 31.52 KG/M2 | HEIGHT: 70 IN | RESPIRATION RATE: 22 BRPM | OXYGEN SATURATION: 95 % | WEIGHT: 220.2 LBS

## 2018-05-09 DIAGNOSIS — F41.9 ANXIETY: ICD-10-CM

## 2018-05-09 DIAGNOSIS — T14.8XXA ABRASION: Primary | ICD-10-CM

## 2018-05-09 PROCEDURE — 99214 OFFICE O/P EST MOD 30 MIN: CPT | Performed by: NURSE PRACTITIONER

## 2018-05-09 RX ORDER — DOCUSATE SODIUM 100 MG/1
CAPSULE, LIQUID FILLED ORAL DAILY
COMMUNITY
End: 2018-08-17

## 2018-05-09 RX ORDER — PEG-3350, SODIUM SULFATE, SODIUM CHLORIDE, POTASSIUM CHLORIDE, SODIUM ASCORBATE AND ASCORBIC ACID 7.5-2.691G
KIT ORAL
Status: ON HOLD | COMMUNITY
End: 2018-08-09 | Stop reason: ALTCHOICE

## 2018-05-09 RX ORDER — LORAZEPAM 1 MG/1
1 TABLET ORAL 2 TIMES DAILY PRN
Qty: 60 TABLET | Refills: 0 | Status: SHIPPED | OUTPATIENT
Start: 2018-05-09 | End: 2018-06-20 | Stop reason: SDUPTHER

## 2018-05-09 RX ORDER — METFORMIN HYDROCHLORIDE 1000 MG/1
TABLET, FILM COATED, EXTENDED RELEASE ORAL DAILY
COMMUNITY
End: 2018-08-17

## 2018-05-09 NOTE — PROGRESS NOTES
Assessment/Plan:    No problem-specific Assessment & Plan notes found for this encounter  Diagnoses and all orders for this visit:    Abrasion  -     mupirocin (BACTROBAN) 2 % ointment; Apply topically 3 (three) times a day    Anxiety  Comments:  I have refilled the patient's Ativan 1 mg b i d  p r n  I have increased the dose to b i d  his daughter is being released from prison   Orders:  -     LORazepam (ATIVAN) 1 mg tablet; Take 1 tablet (1 mg total) by mouth 2 (two) times a day as needed (anxiety)    Other orders  -     docusate sodium (COLACE) 100 mg capsule; Daily  -     PEG 3350-KCl-NaCl-NaSulf-Na Asc-C (MOVIPREP) 100 g; MoviPrep 100 g-7 5 g-2 691 g-4 7 g oral powder packet  -     Insulin Glargine (TOUJEO SOLOSTAR) injection pen 300 units/mL; Toujeo SoloStar U-300 Insulin 300 unit/mL (1 5 mL) subcutaneous pen  -     metFORMIN (GLUMETZA) 1000 MG (MOD) 24 hr tablet; Daily  -     insulin glargine (LANTUS SOLOSTAR) injection pen 100 units/mL; Lantus Solostar U-100 Insulin 100 unit/mL (3 mL) subcutaneous pen          Subjective:      Patient ID: Korey Tariq is a 76 y o  male  Pt cut himself shaving this morning, on plavix, couldn't stop the bleeding until he used skin glue    His bp has been low since starting imdur, put in it yesterday by dr Rhea Patel refills of ativan for anxiety    Diabetes has been under control, sugar does not go over 200        The following portions of the patient's history were reviewed and updated as appropriate: allergies, current medications, past family history, past medical history, past social history, past surgical history and problem list     Review of Systems   Constitutional: Negative  HENT: Negative  Eyes: Negative  Respiratory: Negative  Cardiovascular: Negative  Gastrointestinal: Negative  Musculoskeletal: Negative  Neurological: Negative            Objective:      BP (!) 102/44 (BP Location: Left arm, Patient Position: Sitting, Cuff Size: Large)   Pulse 81   Resp 22   Ht 5' 10" (1 778 m)   Wt 99 9 kg (220 lb 3 2 oz)   SpO2 95%   BMI 31 60 kg/m²          Physical Exam   Constitutional: He is oriented to person, place, and time  He appears well-developed and well-nourished  HENT:   Head: Normocephalic and atraumatic  Eyes: Conjunctivae are normal  Pupils are equal, round, and reactive to light  Neck: Normal range of motion  Neck supple  Cardiovascular: Normal rate and regular rhythm  Pulmonary/Chest: Effort normal and breath sounds normal    Abdominal: Soft  Bowel sounds are normal    Musculoskeletal: Normal range of motion  Neurological: He is alert and oriented to person, place, and time  Skin: Skin is warm and dry

## 2018-05-09 NOTE — TELEPHONE ENCOUNTER
----- Message from Bryant Mcadams MD sent at 5/9/2018  8:42 AM EDT -----  Please call the patient regarding a1c result  7 7%, protein in urine improved, her vitamin D is low, she should take vitamin D3, 2000 IU daily with out missing

## 2018-05-22 DIAGNOSIS — E08.00 DIABETES MELLITUS DUE TO UNDERLYING CONDITION WITH HYPEROSMOLARITY WITHOUT COMA, WITHOUT LONG-TERM CURRENT USE OF INSULIN (HCC): Chronic | ICD-10-CM

## 2018-05-30 DIAGNOSIS — I21.4 NON-STEMI (NON-ST ELEVATED MYOCARDIAL INFARCTION) (HCC): ICD-10-CM

## 2018-05-31 NOTE — TELEPHONE ENCOUNTER
Patient called stating he is taking 1,000 mg in the morning and 1,000 mg in the evening of the Ranexa

## 2018-05-31 NOTE — TELEPHONE ENCOUNTER
The Ranexa the last time he was given the prescription it was for 2 tablets every 12 hours but the most recent one is a is 1 tablet every 12 please clarify

## 2018-06-01 DIAGNOSIS — I21.4 NON-STEMI (NON-ST ELEVATED MYOCARDIAL INFARCTION) (HCC): ICD-10-CM

## 2018-06-01 RX ORDER — METOPROLOL SUCCINATE 25 MG/1
TABLET, EXTENDED RELEASE ORAL
Qty: 90 TABLET | Refills: 1 | Status: SHIPPED | OUTPATIENT
Start: 2018-06-01 | End: 2019-04-03 | Stop reason: HOSPADM

## 2018-06-01 RX ORDER — CLOPIDOGREL BISULFATE 75 MG/1
TABLET ORAL
Qty: 90 TABLET | Refills: 1 | Status: SHIPPED | OUTPATIENT
Start: 2018-06-01 | End: 2018-11-26 | Stop reason: SDUPTHER

## 2018-06-01 RX ORDER — RANOLAZINE 500 MG/1
TABLET, FILM COATED, EXTENDED RELEASE ORAL
Qty: 180 TABLET | Refills: 1 | Status: CANCELLED | OUTPATIENT
Start: 2018-06-01

## 2018-06-01 RX ORDER — RANOLAZINE 1000 MG/1
1000 TABLET, EXTENDED RELEASE ORAL 2 TIMES DAILY
Qty: 180 TABLET | Refills: 1 | Status: SHIPPED | OUTPATIENT
Start: 2018-06-01 | End: 2018-06-08 | Stop reason: SDUPTHER

## 2018-06-01 NOTE — TELEPHONE ENCOUNTER
I corrected the Ranexa prescription to reflect what the patient states he is taking  I am not able to remove the Ranexa 500mg so you will need to deny that one separately  Please advise

## 2018-06-06 DIAGNOSIS — E13.9 DIABETES 1.5, MANAGED AS TYPE 2 (HCC): Primary | ICD-10-CM

## 2018-06-07 RX ORDER — INSULIN ASPART 100 [IU]/ML
INJECTION, SOLUTION INTRAVENOUS; SUBCUTANEOUS
Qty: 60 ML | Refills: 1 | Status: SHIPPED | OUTPATIENT
Start: 2018-06-07 | End: 2019-01-16 | Stop reason: SDUPTHER

## 2018-06-08 DIAGNOSIS — I21.4 NON-STEMI (NON-ST ELEVATED MYOCARDIAL INFARCTION) (HCC): ICD-10-CM

## 2018-06-08 RX ORDER — RANOLAZINE 1000 MG/1
1000 TABLET, EXTENDED RELEASE ORAL 2 TIMES DAILY
Qty: 180 TABLET | Refills: 3 | Status: SHIPPED | OUTPATIENT
Start: 2018-06-08 | End: 2019-04-08 | Stop reason: SDUPTHER

## 2018-06-20 DIAGNOSIS — F41.9 ANXIETY: ICD-10-CM

## 2018-06-20 RX ORDER — LORAZEPAM 1 MG/1
1 TABLET ORAL 2 TIMES DAILY PRN
Qty: 60 TABLET | Refills: 0 | Status: SHIPPED | OUTPATIENT
Start: 2018-06-20 | End: 2018-07-24 | Stop reason: SDUPTHER

## 2018-07-10 ENCOUNTER — TELEPHONE (OUTPATIENT)
Dept: CARDIOLOGY CLINIC | Facility: CLINIC | Age: 78
End: 2018-07-10

## 2018-07-10 NOTE — TELEPHONE ENCOUNTER
Received a fax from Dr [facility] asking how long pt may hold Plavix prior to a dental extraction?  Please advise  [phone]  #[phone]

## 2018-07-17 ENCOUNTER — OFFICE VISIT (OUTPATIENT)
Dept: GASTROENTEROLOGY | Facility: AMBULARY SURGERY CENTER | Age: 78
End: 2018-07-17
Payer: MEDICARE

## 2018-07-17 VITALS
HEART RATE: 86 BPM | DIASTOLIC BLOOD PRESSURE: 78 MMHG | HEIGHT: 70 IN | TEMPERATURE: 97.8 F | WEIGHT: 223 LBS | BODY MASS INDEX: 31.92 KG/M2 | SYSTOLIC BLOOD PRESSURE: 140 MMHG

## 2018-07-17 DIAGNOSIS — Z86.010 HISTORY OF COLON POLYPS: ICD-10-CM

## 2018-07-17 DIAGNOSIS — R19.4 CHANGE IN BOWEL HABITS: Primary | ICD-10-CM

## 2018-07-17 PROCEDURE — 99214 OFFICE O/P EST MOD 30 MIN: CPT | Performed by: INTERNAL MEDICINE

## 2018-07-17 NOTE — ASSESSMENT & PLAN NOTE
Personal history of colon polyps- patient is at increased risk for colon cancer screening  Rule out colorectal lesions including polyps or malignancy      -schedule for colonoscopy

## 2018-07-17 NOTE — ASSESSMENT & PLAN NOTE
Constipation and change in bowel habits-appear to be physiologic  Rule out any colorectal lesions including polyps or malignancy     -Schedule for colonoscopy  -High-fiber diet     -continue Colace and Senokot    -Patient was given instructions about the colonoscopy prep     -Patient was explained about  the risks and benefits of the procedure  Risks including but not limited to bleeding, infection, perforation were explained in detail  Also explained about less than 100% sensitivity with the exam and other alternatives

## 2018-07-17 NOTE — PROGRESS NOTES
Follow-up Note -  [facility]  Yuri Thomas 1940 male         Reason:  Follow-up    HPI:  Mr Emir Jones came in for follow-up because of change in bowel habits  He reports having issues with constipation for which he is taking Senokot and Colace with large bowel movements  Denies any blood or mucus in the stool  Good appetite, no recent weight loss  No abdominal pain, nausea or vomiting  Denies any heartburn acid reflux  Denies any difficulty swallowing  He has history of colon polyps and had a colonoscopy in 2015  REVIEW OF SYSTEMS: Review of Systems   Constitutional: Negative for activity change, appetite change, chills, diaphoresis, fatigue, fever and unexpected weight change  HENT: Negative for ear discharge, ear pain, facial swelling, hearing loss, nosebleeds, sore throat, tinnitus and voice change  Eyes: Negative for pain, discharge, redness, itching and visual disturbance  Respiratory: Negative for apnea, cough, chest tightness, shortness of breath and wheezing  Cardiovascular: Negative for chest pain and palpitations  Gastrointestinal:        As noted in HPI   Endocrine: Negative for cold intolerance, heat intolerance and polyuria  Genitourinary: Negative for difficulty urinating, dysuria, flank pain, hematuria and urgency  Musculoskeletal: Negative for arthralgias, back pain, gait problem, joint swelling and myalgias  Skin: Negative for rash and wound  Neurological: Negative for dizziness, tremors, seizures, speech difficulty, light-headedness, numbness and headaches  Hematological: Negative for adenopathy  Does not bruise/bleed easily  Psychiatric/Behavioral: Negative for agitation, behavioral problems and confusion  The patient is not nervous/anxious           Past Medical History:   Diagnosis Date    AAA (abdominal aortic aneurysm) (HCC)     Anxiety     CVD (cardiovascular disease)     [facility]     DVT, lower extremity ([facility] )     Hyperlipidemia     Hypertension     [facility]STEM[facility]non-ST elevated [facility]) [facility]     Prostate cancer ([facility] )     Right bundle branch block (RBBB)     Skin cancer       Past Surgical History:   Procedure Laterality Date    ANKLE ARTHROSCOPY W/ OPEN REPAIR Left     CARDIAC SURGERY      CORONARY ARTERY BYPASS GRAFT  12/11/2013    CABG x3 with LIMA to LAD, SVG to OM-1, SVG to posterior lateral, LYSIS of pericardial adhesions   WI REPAIR UMBILICAL EKEV,7+C/K,APVSJ N/A 3/10/2017    Procedure: REPAIR HERNIA UMBILICAL;  Surgeon: Sangeetha Borges MD;  [facility];  Service: General    PROSTATECTOMY       Social History     Social History    Marital status: /Civil Union     Spouse name: N/A    Number of children: N/A    Years of education: N/A     Occupational History    Not on file       Social History Main Topics    Smoking status: Former Smoker     Types: Cigarettes    Smokeless tobacco: Never Used      Comment: quit 1967    Alcohol use Yes      Comment: social    Drug use: No    Sexual activity: Not on file     Other Topics Concern    Not on file     Social History Narrative    No narrative on file     Family History   Problem Relation Age of Onset    Crohn's disease Mother     Liver cancer Mother     Hypertension Mother     Crohn's disease Father     Liver cancer Father     Heart disease Father     Hypertension Father     Hyperlipidemia Father     Colon cancer Brother     Aortic aneurysm Brother         abdominal     Heart disease Brother         abdominal aortic aneurysm    Hypertension Brother     Hyperlipidemia Brother     Colon polyps Family     Stomach cancer Family     Hypertension Sister      Sulfa antibiotics  Current Outpatient Prescriptions   Medication Sig Dispense Refill    ACCU-CHEK FASTCLIX LANCETS MISC by Does not apply route      ascorbic acid (VITAMIN C) 500 mg tablet Take 500 mg by mouth daily      aspirin 325 mg tablet Take 1 tablet by mouth daily      Cholecalciferol (VITAMIN D3 PO) Take 2,000 Units by mouth daily   clopidogrel (PLAVIX) 75 mg tablet TAKE 1 TABLET DAILY 90 tablet 1    coenzyme Q-10 100 MG capsule Take 1 capsule by mouth daily 1 capsule 0    cyanocobalamin (VITAMIN B-12) 500 mcg tablet Take 1 tablet by mouth daily      docusate sodium (COLACE) 100 mg capsule Take 100 mg by mouth 2 (two) times a day   glucose blood (ACCU-CHEK GUIDE) test strip Check blood sugar 4 times daily  400 each 1    insulin glargine (LANTUS SOLOSTAR) injection pen 100 units/mL Lantus Solostar U-100 Insulin 100 unit/mL (3 mL) subcutaneous pen      Insulin Glargine (TOUJEO SOLOSTAR) injection pen 300 units/mL Toujeo SoloStar U-300 Insulin 300 unit/mL (1 5 mL) subcutaneous pen      LORazepam (ATIVAN) 1 mg tablet Take 1 tablet (1 mg total) by mouth 2 (two) times a day as needed (anxiety) 60 tablet 0    metFORMIN (GLUCOPHAGE) 500 mg tablet TAKE 1 TABLET BY MOUTH TWO TIMES DAILY WITH MEALS 60 tablet 2    metoprolol succinate (TOPROL-XL) 25 mg 24 hr tablet TAKE 1 TABLET DAILY 90 tablet 1    Multiple Vitamins-Minerals (CENTRUM SILVER) tablet Take 1 tablet by mouth daily      mupirocin (BACTROBAN) 2 % ointment Apply topically 3 (three) times a day 22 g 0    nitroglycerin (NITROSTAT) 0 4 mg SL tablet Place 1 tablet (0 4 mg total) under the tongue every 5 (five) minutes as needed for chest pain 90 tablet 3    NOVOLOG FLEXPEN 100 units/mL injection pen INJECT 20 UNITS BEFORE     MEALS (ICR 1:4, ISF 1:15) 60 mL 1    pantoprazole (PROTONIX) 40 mg tablet Take 40 mg by mouth 2 (two) times a day        ranolazine (RANEXA) 1000 MG SR tablet Take 1 tablet (1,000 mg total) by mouth 2 (two) times a day 180 tablet 3    rosuvastatin (CRESTOR) 40 MG tablet Take 1 tablet by mouth daily      senna (SENOKOT) 8 6 mg Take by mouth      VIIBRYD 40 MG tablet TAKE 1 TABLET BY MOUTH EVERY DAY 30 tablet 5    docusate sodium (COLACE) 100 mg capsule Daily      Insulin Glargine (TOUJEO SOLOSTAR) injection pen 300 units/mL Inject 50 Units under the skin daily at bedtime for 90 days (Patient taking differently: Inject 45-50 Units under the skin daily at bedtime  ) 10 pen 1    Insulin Pen Needle (NOVOFINE AUTOCOVER) 30G X 8 MM MISC by Does not apply route      isosorbide mononitrate (IMDUR) 30 mg 24 hr tablet Take 1 tablet (30 mg total) by mouth daily 30 tablet 11    metFORMIN (GLUMETZA) 1000 MG (MOD) 24 hr tablet Daily      Na Sulfate-K Sulfate-Mg Sulf (SUPREP BOWEL PREP KIT) 17 5-3 13-1 6 GM/180ML SOLN Take 2 Bottles by mouth see administration instructions Please follow the instructions from the office 2 Bottle 0    PEG 3350-KCl-NaCl-NaSulf-Na Asc-C (MOVIPREP) 100 g MoviPrep 100 g-7 5 g-2 691 g-4 7 g oral powder packet      ranolazine (RANEXA) 500 mg 12 hr tablet 2 tabs two time daily (Patient taking differently: 1,000 mg 2 (two) times a day 2 tabs two time daily ) 360 tablet 3     No current facility-administered medications for this visit  Blood pressure 140/78, pulse 86, temperature 97 8 °F (36 6 °C), temperature source Tympanic, height 5' 10" (1 778 m), weight 101 kg (223 lb)  PHYSICAL EXAM: Physical Exam   Constitutional: He is oriented to person, place, and time  He appears well-developed  HENT:   Head: Normocephalic and atraumatic  Mouth/Throat: Oropharynx is clear and moist    Eyes: Conjunctivae are normal  Pupils are equal, round, and reactive to light  Right eye exhibits no discharge  Left eye exhibits no discharge  No scleral icterus  Neck: Neck supple  No JVD present  No tracheal deviation present  No thyromegaly present  Cardiovascular: Normal rate, regular rhythm, normal heart sounds and intact distal pulses  Exam reveals no gallop and no friction rub  No murmur heard  Pulmonary/Chest: Effort normal and breath sounds normal  No respiratory distress  He has no wheezes  He has no rales  He exhibits no tenderness  Abdominal: Soft  Bowel sounds are normal  He exhibits no distension and no mass  There is no tenderness  There is no rebound and no guarding  No hernia  Musculoskeletal: He exhibits no edema  Lymphadenopathy:     He has no cervical adenopathy  Neurological: He is alert and oriented to person, place, and time  Skin: Skin is warm and dry  No rash noted  No erythema  Psychiatric: He has a normal mood and affect  His behavior is normal  Thought content normal         Lab Results   Component Value Date    WBC 8 16 04/12/2018    HGB 12 7 04/12/2018    HCT 39 6 04/12/2018    MCV 91 04/12/2018     04/12/2018     Lab Results   Component Value Date    GLUCOSE 116 04/12/2018    CALCIUM 8 7 05/07/2018     05/07/2018    K 4 5 05/07/2018    CO2 30 05/07/2018     05/07/2018    BUN 27 (H) 05/07/2018    CREATININE 0 97 05/07/2018     Lab Results   Component Value Date    ALT 23 02/16/2018    AST 15 02/16/2018    ALKPHOS 64 02/16/2018    BILITOT 0 42 02/16/2018     Lab Results   Component Value Date    INR 1 00 07/04/2017    INR 0 95 07/03/2017    PROTIME 13 5 07/04/2017    PROTIME 13 0 07/03/2017       No results found  ASSESSMENT & PLAN:    Change in bowel habits  Constipation and change in bowel habits-appear to be physiologic  Rule out any colorectal lesions including polyps or malignancy     -Schedule for colonoscopy  -High-fiber diet     -continue Colace and Senokot    -Patient was given instructions about the colonoscopy prep     -Patient was explained about  the risks and benefits of the procedure  Risks including but not limited to bleeding, infection, perforation were explained in detail  Also explained about less than 100% sensitivity with the exam and other alternatives  History of colon polyps  Personal history of colon polyps- patient is at increased risk for colon cancer screening  Rule out colorectal lesions including polyps or malignancy      -schedule for colonoscopy

## 2018-07-19 ENCOUNTER — PREP FOR PROCEDURE (OUTPATIENT)
Dept: GASTROENTEROLOGY | Facility: AMBULARY SURGERY CENTER | Age: 78
End: 2018-07-19

## 2018-07-19 DIAGNOSIS — R19.4 CHANGE IN BOWEL HABITS: Primary | ICD-10-CM

## 2018-07-24 ENCOUNTER — OFFICE VISIT (OUTPATIENT)
Dept: INTERNAL MEDICINE CLINIC | Facility: CLINIC | Age: 78
End: 2018-07-24
Payer: MEDICARE

## 2018-07-24 VITALS
WEIGHT: 223 LBS | HEART RATE: 74 BPM | OXYGEN SATURATION: 93 % | BODY MASS INDEX: 32 KG/M2 | DIASTOLIC BLOOD PRESSURE: 64 MMHG | SYSTOLIC BLOOD PRESSURE: 130 MMHG

## 2018-07-24 DIAGNOSIS — I71.4 ANEURYSM OF INFRARENAL ABDOMINAL AORTA (HCC): ICD-10-CM

## 2018-07-24 DIAGNOSIS — I10 BENIGN ESSENTIAL HYPERTENSION: ICD-10-CM

## 2018-07-24 DIAGNOSIS — E66.09 CLASS 1 OBESITY DUE TO EXCESS CALORIES WITH SERIOUS COMORBIDITY AND BODY MASS INDEX (BMI) OF 31.0 TO 31.9 IN ADULT: ICD-10-CM

## 2018-07-24 DIAGNOSIS — I21.4 NON-STEMI (NON-ST ELEVATED MYOCARDIAL INFARCTION) (HCC): ICD-10-CM

## 2018-07-24 DIAGNOSIS — F41.9 ANXIETY: ICD-10-CM

## 2018-07-24 DIAGNOSIS — E11.65 TYPE 2 DIABETES MELLITUS WITH HYPERGLYCEMIA, WITH LONG-TERM CURRENT USE OF INSULIN (HCC): Primary | ICD-10-CM

## 2018-07-24 DIAGNOSIS — Z79.4 TYPE 2 DIABETES MELLITUS WITH HYPERGLYCEMIA, WITH LONG-TERM CURRENT USE OF INSULIN (HCC): Primary | ICD-10-CM

## 2018-07-24 DIAGNOSIS — E78.5 DYSLIPIDEMIA: ICD-10-CM

## 2018-07-24 PROCEDURE — 99214 OFFICE O/P EST MOD 30 MIN: CPT | Performed by: INTERNAL MEDICINE

## 2018-07-24 RX ORDER — LORAZEPAM 1 MG/1
1 TABLET ORAL 2 TIMES DAILY PRN
Qty: 60 TABLET | Refills: 1 | Status: SHIPPED | OUTPATIENT
Start: 2018-07-24 | End: 2018-09-21 | Stop reason: SDUPTHER

## 2018-07-24 NOTE — PROGRESS NOTES
Assessment/Plan:           Problem List Items Addressed This Visit        Endocrine    Type 2 diabetes mellitus with hyperglycemia, with long-term current use of insulin ([facility] ) - Primary     Lab Results   Component Value Date    HGBA1C 7 7 (H) 05/07/2018       No results for input(s): POCGLU in the last 72 hours  Blood Sugar Average: Last 72 hrs:   suboptimal controlled, I have counselled the pt to follow a healthy and balanced diet ,and recommend routine exercise  I will be ordering diabetic laboratories including comprehensive metabolic panel, hemoglobin A1c, urine microalbumin, lipid panel  Relevant Orders    Ambulatory referral to Ophthalmology    Comprehensive metabolic panel    Hemoglobin A1C    Lipid Panel with Direct LDL reflex    Microalbumin / creatinine urine ratio       Cardiovascular and Mediastinum    Non-STEMI (non-ST elevated myocardial infarction) ([facility] )     Clinically stable doing well continue with current medical regiment patient will follow up Cardiology, I did review the Cardiology report with the patient  Benign essential hypertension     Hypertension - controlled, I have counseled patient following healthy balance diet, I would like the patient reduce sodium, exercise routinely, I would like the patient continued the med current medical regiment and we will continue to monitor  Aneurysm of infrarenal abdominal aorta (HCC)     Currently stable currently being monitored through vascular  Other    Obesity     Obesity -I have counseled patient following healthy and balanced diet, I would like the patient to lose weight, I would like the patient exercise routinely; we will continue monitor the patient's progress  Dyslipidemia    Anxiety     Currently stable he does request a refill of lorazepam 0 5 mg b i d  p r n  we did check the PDMP patient does tolerate the medication without any side effects           Relevant Medications    LORazepam (ATIVAN) 1 mg tablet          Return to office 4-6  months  call if any problems  Subjective:      Patient ID: Peter Harvey is a 76 y o  male  HPI 68-year old male coming in for a follow up visit regarding type 2 diabetes, myocardial infarction, hypertension, abdominal aneurysm, hyperlipidemia, obesity, anxiety; The patient reports me compliant taking medications without untoward side effects the  The patient is here to review his medical condition, update me on the medical condition and the patient reports me no hospitalizations and no ER visits  He does report me ongoing anxiety and he needs a refill of Ativan  His primary anxiety stems from his daughter who has recently gotten out senior living and had been in a psychiatric hospital for bipolar disorder she is currently on lithium  She is living in the house again  He reports me that when she is on her medication she is doing a little bit better  No suicidal ideation, no homicidal ideation he reports me the Ativan is helpful  He reports me that sounds will make him very nervous  The had molar pulled; needs refill xanax, still with noises make me jump out my skin  No si no hi  The following portions of the patient's history were reviewed and updated as appropriate: allergies, current medications, past family history, past medical history, past social history, past surgical history and problem list     Review of Systems   Constitutional: Negative for activity change, appetite change and unexpected weight change  HENT: Negative for congestion and postnasal drip  Eyes: Negative for visual disturbance  Respiratory: Negative for cough and shortness of breath  Cardiovascular: Negative for chest pain  Gastrointestinal: Negative for abdominal pain, diarrhea, nausea and vomiting  Neurological: Negative for dizziness, light-headedness and headaches  Hematological: Negative for adenopathy  Psychiatric/Behavioral: Negative for suicidal ideas   The patient is nervous/anxious  Objective:      /64   Pulse 74   Wt 101 kg (223 lb)   SpO2 93%   BMI 32 00 kg/m²                       No Follow-up on file  Allergies   Allergen Reactions    Sulfa Antibiotics Other (See Comments)     Generalized redness       Past Medical History:   Diagnosis Date    AAA (abdominal aortic aneurysm) (HCC)     Anxiety     CVD (cardiovascular disease)     Diabetes mellitus ([facility] )     D[facility] lower extremity ([facility] )     Hyperlipidemia     Hypertension     NSTEMI (non-ST elevated myocardial infarction) ([facility] )     Prostate cancer ([place] [address] )     Right bundle branch block (RBBB)     Skin cancer      Past Surgical History:   Procedure Laterality Date    ANKLE ARTHROSCOPY W/ OPEN REPAIR Left     CARDIAC SURGERY      CORONARY ARTERY BYPASS GRAFT  12/11/2013    CABG x3 with LIMA to LAD, SVG to OM-1, SVG to posterior lateral, LYSIS of pericardial adhesions   [place] REPAIR [facility]ILICAL [id number]+U/S[facility] N/A 3/10/2017    Procedure: REPAIR HERNIA UMBILICAL;  Surgeon: Beth Boas, MD;  [facility];  Service: General    PROSTATECTOMY       Current Outpatient Prescriptions on File Prior to Visit   Medication Sig Dispense Refill    ACCU-CHEK FASTCLIX LANCETS MISC by Does not apply route      ascorbic acid (VITAMIN C) 500 mg tablet Take 500 mg by mouth daily      aspirin 325 mg tablet Take 1 tablet by mouth daily      Cholecalciferol (VITAMIN D3 PO) Take 2,000 Units by mouth daily   clopidogrel (PLAVIX) 75 mg tablet TAKE 1 TABLET DAILY 90 tablet 1    coenzyme Q-10 100 MG capsule Take 1 capsule by mouth daily 1 capsule 0    cyanocobalamin (VITAMIN B-12) 500 mcg tablet Take 1 tablet by mouth daily      docusate sodium (COLACE) 100 mg capsule Take 100 mg by mouth 2 (two) times a day   docusate sodium (COLACE) 100 mg capsule Daily      glucose blood (ACCU-CHEK GUIDE) test strip Check blood sugar 4 times daily   400 each 1    insulin glargine (LANTUS SOLOSTAR) injection pen 100 units/mL Lantus Solostar U-100 Insulin 100 unit/mL (3 mL) subcutaneous pen      Insulin Glargine (TOUJEO SOLOSTAR) injection pen 300 units/mL Toujeo SoloStar U-300 Insulin 300 unit/mL (1 5 mL) subcutaneous pen      Insulin Pen Needle (Yanet Arreguin) 30G X 8 MM MISC by Does not apply route      isosorbide mononitrate (IMDUR) 30 mg 24 hr tablet Take 1 tablet (30 mg total) by mouth daily 30 tablet 11    metFORMIN (GLUCOPHAGE) 500 mg tablet TAKE 1 TABLET BY MOUTH TWO TIMES DAILY WITH MEALS 60 tablet 2    metFORMIN (GLUMETZA) 1000 MG (MOD) 24 hr tablet Daily      metoprolol succinate (TOPROL-XL) 25 mg 24 hr tablet TAKE 1 TABLET DAILY 90 tablet 1    Multiple Vitamins-Minerals (CENTRUM SILVER) tablet Take 1 tablet by mouth daily      mupirocin (BACTROBAN) 2 % ointment Apply topically 3 (three) times a day 22 g 0    Na Sulfate-K Sulfate-Mg Sulf (SUPREP BOWEL PREP KIT) 17 5-3 13-1 6 GM/180ML SOLN Take 2 Bottles by mouth see administration instructions Please follow the instructions from the office 2 Bottle 0    nitroglycerin (NITROSTAT) 0 4 mg SL tablet Place 1 tablet (0 4 mg total) under the tongue every 5 (five) minutes as needed for chest pain 90 tablet 3    NOVOLOG FLEXPEN 100 units/mL injection pen INJECT 20 UNITS BEFORE     MEALS (ICR 1:4, ISF 1:15) 60 mL 1    pantoprazole (PROTONIX) 40 mg tablet Take 40 mg by mouth 2 (two) times a day        PEG 3350-KCl-NaCl-NaSulf-Na Asc-C (MOVIPREP) 100 g MoviPrep 100 g-7 5 g-2 691 g-4 7 g oral powder packet      ranolazine (RANEXA) 500 mg 12 hr tablet 2 tabs two time daily (Patient taking differently: 1,000 mg 2 (two) times a day 2 tabs two time daily ) 360 tablet 3    rosuvastatin (CRESTOR) 40 MG tablet Take 1 tablet by mouth daily      senna (SENOKOT) 8 6 mg Take by mouth      VIIBRYD 40 MG tablet TAKE 1 TABLET BY MOUTH EVERY DAY 30 tablet 5    [DISCONTINUED] LORazepam (ATIVAN) 1 mg tablet Take 1 tablet (1 mg total) by mouth 2 (two) times a day as needed (anxiety) 60 tablet 0    Insulin Glargine (TOUJEO SOLOSTAR) injection pen 300 units/mL Inject 50 Units under the skin daily at bedtime for 90 days (Patient taking differently: Inject 45-50 Units under the skin daily at bedtime  ) 10 pen 1    ranolazine (RANEXA) 1000 MG SR tablet Take 1 tablet (1,000 mg total) by mouth 2 (two) times a day 180 tablet 3     No current facility-administered medications on file prior to visit  Family History   Problem Relation Age of Onset   Tyra Miller Crohn's disease Mother     Liver cancer Mother     Hypertension Mother     Crohn's disease Father     Liver cancer Father     Heart disease Father     Hypertension Father     Hyperlipidemia Father     Colon cancer Brother     Aortic aneurysm Brother         abdominal     Heart disease Brother         abdominal aortic aneurysm    Hypertension Brother     Hyperlipidemia Brother     Colon polyps Family     Stomach cancer Family     Hypertension Sister      Social History     Social History    Marital status: /Civil Union     Spouse name: N/A    Number of children: N/A    Years of education: N/A     Occupational History    Not on file  Social History Main Topics    Smoking status: Former Smoker     Types: Cigarettes    Smokeless tobacco: Never Used      Comment: quit 1967    Alcohol use Yes      Comment: social    Drug use: No    Sexual activity: Not on file     Other Topics Concern    Not on file     Social History Narrative    No narrative on file     Vitals:    07/24/18 1615   BP: 130/64   Pulse: 74   SpO2: 93%   Weight: 101 kg (223 lb)     Results for orders placed or performed in visit on 05/07/18   Hemoglobin A1c Lab Collect   Result Value Ref Range    Hemoglobin A1C 7 7 (H) 4 2 - 6 3 %     mg/dl   Microalbumin / creatinine urine ratio Lab Collect   Result Value Ref Range    Creatinine, Ur 103 0 mg/dL    Microalbum  ,U,Random 89 7 (H) 0 0 - 20 0 mg/L    Microalb Creat Ratio 87 (H) 0 - 30 mg/g creatinine   Vitamin D 25 hydroxy Lab Collect   Result Value Ref Range    Vit D, 25-Hydroxy 22 6 (L) 30 0 - 100 0 ng/mL   Basic metabolic panel Lab Collect   Result Value Ref Range    Sodium 140 136 - 145 mmol/L    Potassium 4 5 3 5 - 5 3 mmol/L    Chloride 103 100 - 108 mmol/L    CO2 30 21 - 32 mmol/L    Anion Gap 7 4 - 13 mmol/L    BUN 27 (H) 5 - 25 mg/dL    Creatinine 0 97 0 60 - 1 30 mg/dL    Glucose, Fasting 143 (H) 65 - 99 mg/dL    Calcium 8 7 8 3 - 10 1 mg/dL    eGFR 74 ml/min/1 73sq m     Weight (last 2 days)     Date/Time   Weight    07/24/18 1615  101 (223)            Body mass index is 32 kg/m²  BP      Temp      Pulse     Resp      SpO2        Vitals:    07/24/18 1615   Weight: 101 kg (223 lb)     Vitals:    07/24/18 1615   Weight: 101 kg (223 lb)        Physical Exam   Constitutional: He appears well-developed and well-nourished  No distress  HENT:   Head: Normocephalic and atraumatic  Right Ear: External ear normal    Left Ear: External ear normal    Mouth/Throat: Oropharynx is clear and moist    Eyes: Conjunctivae are normal  Pupils are equal, round, and reactive to light  Right eye exhibits no discharge  Left eye exhibits no discharge  No scleral icterus  Neck: Neck supple  Cardiovascular: Normal rate, regular rhythm and normal heart sounds  Exam reveals no gallop and no friction rub  No murmur heard  Pulmonary/Chest: No respiratory distress  He has no wheezes  He has no rales  Abdominal: Soft  Bowel sounds are normal  He exhibits no distension and no mass  There is no tenderness  There is no rebound and no guarding  Musculoskeletal: He exhibits no edema or deformity  Lymphadenopathy:     He has no cervical adenopathy  Neurological: He is alert  Skin: He is not diaphoretic  Psychiatric: His mood appears anxious  He expresses no homicidal and no suicidal ideation

## 2018-07-25 NOTE — ASSESSMENT & PLAN NOTE
Currently stable he does request a refill of lorazepam 0 5 mg b i d  p r n  we did check the PDMP patient does tolerate the medication without any side effects

## 2018-07-25 NOTE — ASSESSMENT & PLAN NOTE
Clinically stable doing well continue with current medical regiment patient will follow up Cardiology, I did review the Cardiology report with the patient

## 2018-07-25 NOTE — ASSESSMENT & PLAN NOTE
Lab Results   Component Value Date    HGBA1C 7 7 (H) 05/07/2018       No results for input(s): POCGLU in the last 72 hours  Blood Sugar Average: Last 72 hrs:   suboptimal controlled, I have counselled the pt to follow a healthy and balanced diet ,and recommend routine exercise  I will be ordering diabetic laboratories including comprehensive metabolic panel, hemoglobin A1c, urine microalbumin, lipid panel

## 2018-08-02 ENCOUNTER — TELEPHONE (OUTPATIENT)
Dept: CARDIOLOGY CLINIC | Facility: CLINIC | Age: 78
End: 2018-08-02

## 2018-08-09 ENCOUNTER — ANESTHESIA (OUTPATIENT)
Dept: GASTROENTEROLOGY | Facility: HOSPITAL | Age: 78
End: 2018-08-09
Payer: MEDICARE

## 2018-08-09 ENCOUNTER — ANESTHESIA EVENT (OUTPATIENT)
Dept: GASTROENTEROLOGY | Facility: HOSPITAL | Age: 78
End: 2018-08-09
Payer: MEDICARE

## 2018-08-09 ENCOUNTER — HOSPITAL ENCOUNTER (OUTPATIENT)
Facility: HOSPITAL | Age: 78
Setting detail: OUTPATIENT SURGERY
Discharge: HOME | End: 2018-08-09
Attending: INTERNAL MEDICINE | Admitting: INTERNAL MEDICINE
Payer: MEDICARE

## 2018-08-09 VITALS
SYSTOLIC BLOOD PRESSURE: 149 MMHG | RESPIRATION RATE: 16 BRPM | DIASTOLIC BLOOD PRESSURE: 70 MMHG | OXYGEN SATURATION: 94 % | HEIGHT: 70 IN | BODY MASS INDEX: 30.64 KG/M2 | HEART RATE: 73 BPM | WEIGHT: 214 LBS | TEMPERATURE: 97.4 F

## 2018-08-09 DIAGNOSIS — R19.4 CHANGE IN BOWEL HABITS: ICD-10-CM

## 2018-08-09 LAB — GLUCOSE SERPL-MCNC: 138 MG/DL (ref 65–140)

## 2018-08-09 PROCEDURE — 45380 COLONOSCOPY AND BIOPSY: CPT | Performed by: INTERNAL MEDICINE

## 2018-08-09 PROCEDURE — 88305 TISSUE EXAM BY PATHOLOGIST: CPT | Performed by: PATHOLOGY

## 2018-08-09 PROCEDURE — 82948 REAGENT STRIP/BLOOD GLUCOSE: CPT

## 2018-08-09 PROCEDURE — 45385 COLONOSCOPY W/LESION REMOVAL: CPT | Performed by: INTERNAL MEDICINE

## 2018-08-09 RX ORDER — SODIUM CHLORIDE, SODIUM LACTATE, POTASSIUM CHLORIDE, CALCIUM CHLORIDE 600; 310; 30; 20 MG/100ML; MG/100ML; MG/100ML; MG/100ML
50 INJECTION, SOLUTION INTRAVENOUS CONTINUOUS
Status: CANCELLED | OUTPATIENT
Start: 2018-08-09

## 2018-08-09 RX ORDER — PROPOFOL 10 MG/ML
INJECTION, EMULSION INTRAVENOUS AS NEEDED
Status: DISCONTINUED | OUTPATIENT
Start: 2018-08-09 | End: 2018-08-09 | Stop reason: SURG

## 2018-08-09 RX ORDER — SODIUM CHLORIDE 9 MG/ML
INJECTION, SOLUTION INTRAVENOUS CONTINUOUS PRN
Status: DISCONTINUED | OUTPATIENT
Start: 2018-08-09 | End: 2018-08-09 | Stop reason: SURG

## 2018-08-09 RX ADMIN — PROPOFOL 50 MG: 10 INJECTION, EMULSION INTRAVENOUS at 12:31

## 2018-08-09 RX ADMIN — PROPOFOL 100 MG: 10 INJECTION, EMULSION INTRAVENOUS at 12:25

## 2018-08-09 RX ADMIN — SODIUM CHLORIDE: 0.9 INJECTION, SOLUTION INTRAVENOUS at 12:21

## 2018-08-09 NOTE — OP NOTE
COLONOSCOPY    PROCEDURE: Colonoscopy/ Polypectomy (Cold Snare)  Polypectomny (Cold Biopsy)    INDICATIONS: Constipation, History of Colon Polyps    POST-OP DIAGNOSIS: See the impression below    SEDATION: Monitored anesthesia care, check anesthesia records    PHYSICAL EXAM:    /59   Pulse 70   Temp (!) 97 4 °F (36 3 °C) (Temporal)   Resp 20   Ht 5' 10" (1 778 m)   Wt 97 1 kg (214 lb)   SpO2 97%   BMI 30 71 kg/m²   Body mass index is 30 71 kg/m²  General: NAD  Heart: S1 & S2 normal, RRR  Lungs: CTA, No rales or rhonchi  Abdomen: Soft, nontender, nondistended, good bowel sounds    CONSENT:  Informed consent was obtained for the procedure, including sedation after explaining the risks and benefits of the procedure  Risks including but not limited to bleeding, perforation, infection, aspiration were discussed in detail  Also explained about less than 100%$ sensitivity with the exam and other alternatives  PREPARATION:   EKG tracing, pulse oximetry, blood pressure were monitored throughout the procedure  Patient was identified by myself both verbally and by visual inspection of ID band  DESCRIPTION:   Patient was placed in the left lateral decubitus position and was sedated with the above medication  Digital rectal examination was performed  The colonoscope was introduced in to the anal canal and advanced up to cecum, which was identified by the appendiceal orifice and IC valve  A careful inspection was made as the colonoscope was withdrawn, including a retroflexed view of the rectum; findings and interventions are described below  Appropriate photodocumentation was obtained  The quality of the colonic preparation was adequate  FINDINGS:    1  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

## 2018-08-09 NOTE — ANESTHESIA PREPROCEDURE EVALUATION
Review of Systems/Medical History  Patient summary reviewed  Chart reviewed  No history of anesthetic complications     Cardiovascular  EKG reviewed, Exercise tolerance (METS): >4,  Hyperlipidemia, Hypertension on > 1 medication, Past MI , CAD , History of CABG, Dysrhythmias ,   Comment: Patient describes single episode of chest pain yesterday as deep and stabbing in the center of his chest that began while he was drinking his colon prep  He describes this pain as completely different from his history of angina  He took a NTG pill and the pain resolved and has not recurred  By description, this pain appears to be esophageal spasm ,  Pulmonary  Negative pulmonary ROS        GI/Hepatic  Negative GI/hepatic ROS   GERD ,        Negative  ROS        Endo/Other  Diabetes poorly controlled type 2 Insulin, History of thyroid disease , hypothyroidism,   Obesity    GYN  Negative gynecology ROS          Hematology  Negative hematology ROS      Musculoskeletal  Negative musculoskeletal ROS        Neurology  Negative neurology ROS      Psychology   Anxiety,              Physical Exam    Airway    Mallampati score: II  TM Distance: >3 FB  Neck ROM: full     Dental   No notable dental hx     Cardiovascular      Pulmonary      Other Findings        Anesthesia Plan  ASA Score- 3     Anesthesia Type- IV sedation with anesthesia with ASA Monitors  Additional Monitors:   Airway Plan:         Plan Factors- Patient instructed to abstain from smoking on day of procedure  Patient did not smoke on day of surgery  Induction-     Postoperative Plan-     Informed Consent- Anesthetic plan and risks discussed with patient  I personally reviewed this patient with the CRNA  Discussed and agreed on the Anesthesia Plan with the CRNA  Pratik Polk

## 2018-08-09 NOTE — ANESTHESIA POSTPROCEDURE EVALUATION
Post-Op Assessment Note      CV Status:  Stable    Mental Status:  Alert and awake    Hydration Status:  Stable and euvolemic    PONV Controlled:  Controlled    Airway Patency:  Patent and adequate    Post Op Vitals Reviewed: Yes          Staff: CRNA           /59 (08/09/18 1236)    Temp     Pulse 70 (08/09/18 1236)   Resp 20 (08/09/18 1236)    SpO2 97 % (08/09/18 1236)

## 2018-08-09 NOTE — H&P (VIEW-ONLY)
Follow-up Note -  [facility]  Lisa Herrmann 1940 male         Reason:  Follow-up    HPI:  Mr Huber Cheema came in for follow-up because of change in bowel habits  He reports having issues with constipation for which he is taking Senokot and Colace with large bowel movements  Denies any blood or mucus in the stool  Good appetite, no recent weight loss  No abdominal pain, nausea or vomiting  Denies any heartburn acid reflux  Denies any difficulty swallowing  He has history of colon polyps and had a colonoscopy in 2015  REVIEW OF SYSTEMS: Review of Systems   Constitutional: Negative for activity change, appetite change, chills, diaphoresis, fatigue, fever and unexpected weight change  HENT: Negative for ear discharge, ear pain, facial swelling, hearing loss, nosebleeds, sore throat, tinnitus and voice change  Eyes: Negative for pain, discharge, redness, itching and visual disturbance  Respiratory: Negative for apnea, cough, chest tightness, shortness of breath and wheezing  Cardiovascular: Negative for chest pain and palpitations  Gastrointestinal:        As noted in HPI   Endocrine: Negative for cold intolerance, heat intolerance and polyuria  Genitourinary: Negative for difficulty urinating, dysuria, flank pain, hematuria and urgency  Musculoskeletal: Negative for arthralgias, back pain, gait problem, joint swelling and myalgias  Skin: Negative for rash and wound  Neurological: Negative for dizziness, tremors, seizures, speech difficulty, light-headedness, numbness and headaches  Hematological: Negative for adenopathy  Does not bruise/bleed easily  Psychiatric/Behavioral: Negative for agitation, behavioral problems and confusion  The patient is not nervous/anxious           Past Medical History:   Diagnosis Date    AAA (abdominal aortic aneurysm) (HCC)     Anxiety     CVD (cardiovascular disease)     Diabetes mellitus ([facility] [address] )     D[facility], lower extremity [facility] )     Hyperlipidemia     Hypertension     NSTEMI (non-ST elevated myocardial infarction) ([facility] )     Prostate cancer ([facility] )     Right bundle branch block (RBBB)     Skin cancer       Past Surgical History:   Procedure Laterality Date    ANKLE ARTHROSCOPY W/ OPEN REPAIR Left     CARDIAC SURGERY      CORONARY ARTERY BYPASS GRAFT  12/11/2013    CABG x3 with LIMA to LAD, SVG to OM-1, SVG to posterior lateral, LYSIS of pericardial adhesions   TN REPAIR UMBILICAL BHWP,3+W/H,JFNLL N/A 3/10/2017    Procedure: REPAIR HERNIA UMBILICAL;  Surgeon: Shellie Lopez MD;  Location: [facility];  Service: General    PROSTATECTOMY       Social History     Social History    Marital status: /Civil Union     Spouse name: N/A    Number of children: N/A    Years of education: N/A     Occupational History    Not on file       Social History Main Topics    Smoking status: Former Smoker     Types: Cigarettes    Smokeless tobacco: Never Used      Comment: quit 1967    Alcohol use Yes      Comment: social    Drug use: No    Sexual activity: Not on file     Other Topics Concern    Not on file     Social History Narrative    No narrative on file     Family History   Problem Relation Age of Onset    Crohn's disease Mother     Liver cancer Mother     Hypertension Mother     Crohn's disease Father     Liver cancer Father     Heart disease Father     Hypertension Father     Hyperlipidemia Father     Colon cancer Brother     Aortic aneurysm Brother         abdominal     Heart disease Brother         abdominal aortic aneurysm    Hypertension Brother     Hyperlipidemia Brother     Colon polyps Family     Stomach cancer Family     Hypertension Sister      Sulfa antibiotics  Current Outpatient Prescriptions   Medication Sig Dispense Refill    ACCU-CHEK FASTCLIX LANCETS MISC by Does not apply route      ascorbic acid (VITAMIN C) 500 mg tablet Take 500 mg by mouth daily      aspirin 325 mg tablet Take 1 tablet by mouth daily      Cholecalciferol (VITAMIN D3 PO) Take 2,000 Units by mouth daily   clopidogrel (PLAVIX) 75 mg tablet TAKE 1 TABLET DAILY 90 tablet 1    coenzyme Q-10 100 MG capsule Take 1 capsule by mouth daily 1 capsule 0    cyanocobalamin (VITAMIN B-12) 500 mcg tablet Take 1 tablet by mouth daily      docusate sodium (COLACE) 100 mg capsule Take 100 mg by mouth 2 (two) times a day   glucose blood (ACCU-CHEK GUIDE) test strip Check blood sugar 4 times daily  400 each 1    insulin glargine (LANTUS SOLOSTAR) injection pen 100 units/mL Lantus Solostar U-100 Insulin 100 unit/mL (3 mL) subcutaneous pen      Insulin Glargine (TOUJEO SOLOSTAR) injection pen 300 units/mL Toujeo SoloStar U-300 Insulin 300 unit/mL (1 5 mL) subcutaneous pen      LORazepam (ATIVAN) 1 mg tablet Take 1 tablet (1 mg total) by mouth 2 (two) times a day as needed (anxiety) 60 tablet 0    metFORMIN (GLUCOPHAGE) 500 mg tablet TAKE 1 TABLET BY MOUTH TWO TIMES DAILY WITH MEALS 60 tablet 2    metoprolol succinate (TOPROL-XL) 25 mg 24 hr tablet TAKE 1 TABLET DAILY 90 tablet 1    Multiple Vitamins-Minerals (CENTRUM SILVER) tablet Take 1 tablet by mouth daily      mupirocin (BACTROBAN) 2 % ointment Apply topically 3 (three) times a day 22 g 0    nitroglycerin (NITROSTAT) 0 4 mg SL tablet Place 1 tablet (0 4 mg total) under the tongue every 5 (five) minutes as needed for chest pain 90 tablet 3    NOVOLOG FLEXPEN 100 units/mL injection pen INJECT 20 UNITS BEFORE     MEALS (ICR 1:4, ISF 1:15) 60 mL 1    pantoprazole (PROTONIX) 40 mg tablet Take 40 mg by mouth 2 (two) times a day        ranolazine (RANEXA) 1000 MG SR tablet Take 1 tablet (1,000 mg total) by mouth 2 (two) times a day 180 tablet 3    rosuvastatin (CRESTOR) 40 MG tablet Take 1 tablet by mouth daily      senna (SENOKOT) 8 6 mg Take by mouth      VIIBRYD 40 MG tablet TAKE 1 TABLET BY MOUTH EVERY DAY 30 tablet 5    docusate sodium (COLACE) 100 mg capsule Daily      Insulin Glargine (TOUJEO SOLOSTAR) injection pen 300 units/mL Inject 50 Units under the skin daily at bedtime for 90 days (Patient taking differently: Inject 45-50 Units under the skin daily at bedtime  ) 10 pen 1    Insulin Pen Needle (NOVOFINE AUTOCOVER) 30G X 8 MM MISC by Does not apply route      isosorbide mononitrate (IMDUR) 30 mg 24 hr tablet Take 1 tablet (30 mg total) by mouth daily 30 tablet 11    metFORMIN (GLUMETZA) 1000 MG (MOD) 24 hr tablet Daily      Na Sulfate-K Sulfate-Mg Sulf (SUPREP BOWEL PREP KIT) 17 5-3 13-1 6 GM/180ML SOLN Take 2 Bottles by mouth see administration instructions Please follow the instructions from the office 2 Bottle 0    PEG 3350-KCl-NaCl-NaSulf-Na Asc-C (MOVIPREP) 100 g MoviPrep 100 g-7 5 g-2 691 g-4 7 g oral powder packet      ranolazine (RANEXA) 500 mg 12 hr tablet 2 tabs two time daily (Patient taking differently: 1,000 mg 2 (two) times a day 2 tabs two time daily ) 360 tablet 3     No current facility-administered medications for this visit  Blood pressure 140/78, pulse 86, temperature 97 8 °F (36 6 °C), temperature source Tympanic, height 5' 10" (1 778 m), weight 101 kg (223 lb)  PHYSICAL EXAM: Physical Exam   Constitutional: He is oriented to person, place, and time  He appears well-developed  HENT:   Head: Normocephalic and atraumatic  Mouth/Throat: Oropharynx is clear and moist    Eyes: Conjunctivae are normal  Pupils are equal, round, and reactive to light  Right eye exhibits no discharge  Left eye exhibits no discharge  No scleral icterus  Neck: Neck supple  No JVD present  No tracheal deviation present  No thyromegaly present  Cardiovascular: Normal rate, regular rhythm, normal heart sounds and intact distal pulses  Exam reveals no gallop and no friction rub  No murmur heard  Pulmonary/Chest: Effort normal and breath sounds normal  No respiratory distress  He has no wheezes  He has no rales  He exhibits no tenderness  Abdominal: Soft  Bowel sounds are normal  He exhibits no distension and no mass  There is no tenderness  There is no rebound and no guarding  No hernia  Musculoskeletal: He exhibits no edema  Lymphadenopathy:     He has no cervical adenopathy  Neurological: He is alert and oriented to person, place, and time  Skin: Skin is warm and dry  No rash noted  No erythema  Psychiatric: He has a normal mood and affect  His behavior is normal  Thought content normal         Lab Results   Component Value Date    WBC 8 16 04/12/2018    HGB 12 7 04/12/2018    HCT 39 6 04/12/2018    MCV 91 04/12/2018     04/12/2018     Lab Results   Component Value Date    GLUCOSE 116 04/12/2018    CALCIUM 8 7 05/07/2018     05/07/2018    K 4 5 05/07/2018    CO2 30 05/07/2018     05/07/2018    BUN 27 (H) 05/07/2018    CREATININE 0 97 05/07/2018     Lab Results   Component Value Date    ALT 23 02/16/2018    AST 15 02/16/2018    ALKPHOS 64 02/16/2018    BILITOT 0 42 02/16/2018     Lab Results   Component Value Date    INR 1 00 07/04/2017    INR 0 95 07/03/2017    PROTIME 13 5 07/04/2017    PROTIME 13 0 07/03/2017       No results found  ASSESSMENT & PLAN:    Change in bowel habits  Constipation and change in bowel habits-appear to be physiologic  Rule out any colorectal lesions including polyps or malignancy     -Schedule for colonoscopy  -High-fiber diet     -continue Colace and Senokot    -Patient was given instructions about the colonoscopy prep     -Patient was explained about  the risks and benefits of the procedure  Risks including but not limited to bleeding, infection, perforation were explained in detail  Also explained about less than 100% sensitivity with the exam and other alternatives  History of colon polyps  Personal history of colon polyps- patient is at increased risk for colon cancer screening  Rule out colorectal lesions including polyps or malignancy      -schedule for colonoscopy

## 2018-08-14 DIAGNOSIS — E11.8 UNCONTROLLED TYPE 2 DIABETES MELLITUS WITH COMPLICATION, UNSPECIFIED WHETHER LONG TERM INSULIN USE: Primary | ICD-10-CM

## 2018-08-14 DIAGNOSIS — E11.65 UNCONTROLLED TYPE 2 DIABETES MELLITUS WITH COMPLICATION, UNSPECIFIED WHETHER LONG TERM INSULIN USE: Primary | ICD-10-CM

## 2018-08-14 DIAGNOSIS — E13.9 DIABETES 1.5, MANAGED AS TYPE 2 (HCC): Primary | ICD-10-CM

## 2018-08-14 DIAGNOSIS — E78.5 HYPERLIPIDEMIA, UNSPECIFIED HYPERLIPIDEMIA TYPE: ICD-10-CM

## 2018-08-14 DIAGNOSIS — E78.00 PURE HYPERCHOLESTEROLEMIA: Primary | ICD-10-CM

## 2018-08-14 RX ORDER — ROSUVASTATIN CALCIUM 40 MG/1
40 TABLET, COATED ORAL DAILY
Qty: 90 TABLET | Refills: 3 | Status: SHIPPED | OUTPATIENT
Start: 2018-08-14 | End: 2019-09-04 | Stop reason: SDUPTHER

## 2018-08-14 RX ORDER — ROSUVASTATIN CALCIUM 40 MG/1
40 TABLET, COATED ORAL DAILY
Qty: 90 TABLET | Refills: 1 | Status: SHIPPED | OUTPATIENT
Start: 2018-08-14 | End: 2018-08-17

## 2018-08-16 ENCOUNTER — TELEPHONE (OUTPATIENT)
Dept: GASTROENTEROLOGY | Facility: AMBULARY SURGERY CENTER | Age: 78
End: 2018-08-16

## 2018-08-16 NOTE — TELEPHONE ENCOUNTER
----- Message from John Cohen MD sent at 8/15/2018  5:37 PM EDT -----    Colon polyps removed came back as precancerous tubular adenoma  Next colonoscopy in 3 years  Patient to call our office for any GI symptoms

## 2018-08-17 ENCOUNTER — APPOINTMENT (EMERGENCY)
Dept: CT IMAGING | Facility: HOSPITAL | Age: 78
End: 2018-08-17
Payer: MEDICARE

## 2018-08-17 ENCOUNTER — HOSPITAL ENCOUNTER (EMERGENCY)
Facility: HOSPITAL | Age: 78
Discharge: HOME | End: 2018-08-17
Attending: EMERGENCY MEDICINE | Admitting: EMERGENCY MEDICINE
Payer: MEDICARE

## 2018-08-17 VITALS
RESPIRATION RATE: 18 BRPM | TEMPERATURE: 98.1 F | BODY MASS INDEX: 30.68 KG/M2 | SYSTOLIC BLOOD PRESSURE: 154 MMHG | DIASTOLIC BLOOD PRESSURE: 67 MMHG | OXYGEN SATURATION: 95 % | HEART RATE: 72 BPM | WEIGHT: 213.8 LBS

## 2018-08-17 DIAGNOSIS — S22.31XA CLOSED FRACTURE OF ONE RIB OF RIGHT SIDE, INITIAL ENCOUNTER: Primary | ICD-10-CM

## 2018-08-17 LAB
ALBUMIN SERPL BCP-MCNC: 2.9 G/DL (ref 3.5–5)
ALP SERPL-CCNC: 57 U/L (ref 46–116)
ALT SERPL W P-5'-P-CCNC: 27 U/L (ref 12–78)
ANION GAP SERPL CALCULATED.3IONS-SCNC: 4 MMOL/L (ref 4–13)
AST SERPL W P-5'-P-CCNC: 16 U/L (ref 5–45)
BASOPHILS # BLD AUTO: 0.03 THOUSANDS/ΜL (ref 0–0.1)
BASOPHILS NFR BLD AUTO: 0 % (ref 0–1)
BILIRUB SERPL-MCNC: 0.3 MG/DL (ref 0.2–1)
BUN SERPL-MCNC: 26 MG/DL (ref 5–25)
CALCIUM SERPL-MCNC: 8.7 MG/DL (ref 8.3–10.1)
CHLORIDE SERPL-SCNC: 103 MMOL/L (ref 100–108)
CO2 SERPL-SCNC: 30 MMOL/L (ref 21–32)
CREAT SERPL-MCNC: 1.01 MG/DL (ref 0.6–1.3)
EOSINOPHIL # BLD AUTO: 0.55 THOUSAND/ΜL (ref 0–0.61)
EOSINOPHIL NFR BLD AUTO: 7 % (ref 0–6)
ERYTHROCYTE [DISTWIDTH] IN BLOOD BY AUTOMATED COUNT: 13.9 % (ref 11.6–15.1)
GFR SERPL CREATININE-BSD FRML MDRD: 70 ML/MIN/1.73SQ M
GLUCOSE SERPL-MCNC: 155 MG/DL (ref 65–140)
HCT VFR BLD AUTO: 35.9 % (ref 36.5–49.3)
HGB BLD-MCNC: 12 G/DL (ref 12–17)
IMM GRANULOCYTES # BLD AUTO: 0.03 THOUSAND/UL (ref 0–0.2)
IMM GRANULOCYTES NFR BLD AUTO: 0 % (ref 0–2)
LYMPHOCYTES # BLD AUTO: 0.98 THOUSANDS/ΜL (ref 0.6–4.47)
LYMPHOCYTES NFR BLD AUTO: 13 % (ref 14–44)
MCH RBC QN AUTO: 30.5 PG (ref 26.8–34.3)
MCHC RBC AUTO-ENTMCNC: 33.4 G/DL (ref 31.4–37.4)
MCV RBC AUTO: 91 FL (ref 82–98)
MONOCYTES # BLD AUTO: 0.73 THOUSAND/ΜL (ref 0.17–1.22)
MONOCYTES NFR BLD AUTO: 10 % (ref 4–12)
NEUTROPHILS # BLD AUTO: 5.12 THOUSANDS/ΜL (ref 1.85–7.62)
NEUTS SEG NFR BLD AUTO: 70 % (ref 43–75)
NRBC BLD AUTO-RTO: 0 /100 WBCS
PLATELET # BLD AUTO: 268 THOUSANDS/UL (ref 149–390)
PMV BLD AUTO: 10 FL (ref 8.9–12.7)
POTASSIUM SERPL-SCNC: 4.4 MMOL/L (ref 3.5–5.3)
PROT SERPL-MCNC: 7.1 G/DL (ref 6.4–8.2)
RBC # BLD AUTO: 3.94 MILLION/UL (ref 3.88–5.62)
SODIUM SERPL-SCNC: 137 MMOL/L (ref 136–145)
WBC # BLD AUTO: 7.44 THOUSAND/UL (ref 4.31–10.16)

## 2018-08-17 PROCEDURE — 85025 COMPLETE CBC W/AUTO DIFF WBC: CPT | Performed by: EMERGENCY MEDICINE

## 2018-08-17 PROCEDURE — 71260 CT THORAX DX C+: CPT

## 2018-08-17 PROCEDURE — 36415 COLL VENOUS BLD VENIPUNCTURE: CPT | Performed by: EMERGENCY MEDICINE

## 2018-08-17 PROCEDURE — 99284 EMERGENCY DEPT VISIT MOD MDM: CPT

## 2018-08-17 PROCEDURE — 74177 CT ABD & PELVIS W/CONTRAST: CPT

## 2018-08-17 PROCEDURE — 80053 COMPREHEN METABOLIC PANEL: CPT | Performed by: EMERGENCY MEDICINE

## 2018-08-17 RX ORDER — HYDROCODONE BITARTRATE AND ACETAMINOPHEN 5; 325 MG/1; MG/1
1 TABLET ORAL EVERY 6 HOURS PRN
Qty: 20 TABLET | Refills: 0 | Status: SHIPPED | OUTPATIENT
Start: 2018-08-17 | End: 2018-08-24

## 2018-08-17 RX ORDER — HYDROCODONE BITARTRATE AND ACETAMINOPHEN 5; 325 MG/1; MG/1
1 TABLET ORAL ONCE
Status: COMPLETED | OUTPATIENT
Start: 2018-08-17 | End: 2018-08-17

## 2018-08-17 RX ADMIN — HYDROCODONE BITARTRATE AND ACETAMINOPHEN 1 TABLET: 5; 325 TABLET ORAL at 11:04

## 2018-08-17 RX ADMIN — IOHEXOL 100 ML: 350 INJECTION, SOLUTION INTRAVENOUS at 12:18

## 2018-08-17 NOTE — ED NOTES
Dr Braden Pae ok to let pt  Drive after taking vicodin earlier in this visit        Janis Amin RN  08/17/18 5636

## 2018-08-17 NOTE — DISCHARGE INSTRUCTIONS
Rib Fracture   WHAT YOU NEED TO KNOW:   A rib fracture is a crack or break in a rib bone  Rib fractures usually heal within 6 weeks  You should be able to return to normal activities before that time  Do not wrap anything around your body to try to splint your ribs  This can prevent you from taking deep breaths and increases your risk for pneumonia  DISCHARGE INSTRUCTIONS:   Call 911 for any of the following:   · You have trouble breathing  · You have new or increased pain  Seek care immediately if:   · Your pain does not get better, even after treatment  · You have a fever or a cough  Contact your healthcare provider if:   · You have questions or concerns about your condition or care  Medicines:   · NSAIDs  help decrease swelling and pain  NSAIDs are available without a doctor's order  Ask your healthcare provider which medicine is right for you  Ask how much to take and when to take it  Take as directed  NSAIDs can cause stomach bleeding and kidney problems if not taken correctly  · Prescription pain medicine  may be given  Ask your healthcare provider how to take this medicine safely  Some prescription pain medicines contain acetaminophen  Do not take other medicines that contain acetaminophen without talking to your healthcare provider  Too much acetaminophen may cause liver damage  Prescription pain medicine may cause constipation  Ask your healthcare provider how to prevent or treat constipation  · Take your medicine as directed  Contact your healthcare provider if you think your medicine is not helping or if you have side effects  Tell him or her if you are allergic to any medicine  Keep a list of the medicines, vitamins, and herbs you take  Include the amounts, and when and why you take them  Bring the list or the pill bottles to follow-up visits  Carry your medicine list with you in case of an emergency    Follow up with your healthcare provider as directed:  Write down your questions so you remember to ask them during your visits  Deep breathing:  Deep breathing will decrease your risk for pneumonia  Hug a pillow on the injured side while doing this exercise, to decrease pain  Take a deep breath and hold it for as long as possible  You should let the air out and then cough strongly  Deep breaths help open your airway  You may be given an incentive spirometer to help take deep breaths  Put the plastic piece in your mouth  Take a slow, deep breath  You should then let the air out and cough  Repeat these steps 10 times every hour  Rest:  Rest and limit activity to decrease swelling and pain, and allow your injury to heal  Avoid activities that may cause more pain or damage to your ribs such as, pulling, pushing, and lifting  As your pain decreases, begin movements slowly  Take short walks between rest periods  Ice:  Apply ice on the fractured area for 15 to 20 minutes every hour or as directed  Use an ice pack or put crushed ice in a plastic bag  Cover it with a towel  Ice helps prevent tissue damage and decreases swelling and pain  © 2017 [facility] Information is for End User's use only and may not be sold, redistributed or otherwise used for commercial purposes  All illustrations and images included in CareNotes® are the copyrighted property of A [employer]  or [employer]  The above information is an  only  It is not intended as medical advice for individual conditions or treatments  Talk to your doctor, nurse or pharmacist before following any medical regimen to see if it is safe and effective for you

## 2018-08-17 NOTE — ED PROVIDER NOTES
History  Chief Complaint   Patient presents with    Fall     Pt tripped over computer wires on Wednesday night  pt broke his fall with a round piano table  Since then has been having pain on right side  HPI   75-year-old male with history of CAD status post CABG x3, on Plavix and 325 mg of aspirin daily, HLD, DM2, who presents for evaluation of right-sided pain after a fall 2 days ago  Patient states that he tripped over a computer wire, and broke his fall on a piano bench  Did not hit his head or lose consciousness  Was able to get up immediately after the fall  Reports that he decided to come in today when the pain persisted  Pain is described as beneath my rib cage    It is worse with certain movements and with palpation  Described as sharp  Not worse with deep inspiration  He denies chest pain or shortness of breath  No nausea, vomiting, or diarrhea  Denies fevers or chills  No other aggravating or alleviating factors or associated symptoms  Denies headache, neck pain, or pain in his back  No pain in his extremities  Prior to Admission Medications   Prescriptions Last Dose Informant Patient Reported? Taking? ACCU-CHEK FASTCLIX LANCETS MISC  Self Yes Yes   Sig: by Does not apply route   Cholecalciferol (VITAMIN D3 PO)  Self Yes Yes   Sig: Take 2,000 Units by mouth daily     Insulin Glargine (TOUJEO SOLOSTAR) injection pen 300 units/mL  Self No Yes   Sig: Inject 50 Units under the skin daily at bedtime for 90 days   Patient taking differently: Inject 45-50 Units under the skin daily at bedtime     Insulin Pen Needle (NOVOFINE AUTOCOVER) 30G X 8 MM MISC  Self Yes Yes   Sig: by Does not apply route   LORazepam (ATIVAN) 1 mg tablet   No Yes   Sig: Take 1 tablet (1 mg total) by mouth 2 (two) times a day as needed (anxiety)   Patient taking differently: Take 0 5 mg by mouth 2 (two) times a day     Multiple Vitamins-Minerals (CENTRUM SILVER) tablet  Self Yes Yes   Sig: Take 1 tablet by mouth daily   NOVOLOG FLEXPEN 100 units/mL injection pen   No Yes   Sig: INJECT 20 UNITS BEFORE     MEALS (ICR 1:4, ISF 1:15)   VIIBRYD 40 MG tablet  Self No Yes   Sig: TAKE 1 TABLET BY MOUTH EVERY DAY   ascorbic acid (VITAMIN C) 500 mg tablet  Self Yes Yes   Sig: Take 500 mg by mouth daily   aspirin 325 mg tablet  Self Yes Yes   Sig: Take 1 tablet by mouth daily   clopidogrel (PLAVIX) 75 mg tablet   No Yes   Sig: TAKE 1 TABLET DAILY   cyanocobalamin (VITAMIN B-12) 500 mcg tablet  Self Yes Yes   Sig: Take 1 tablet by mouth daily   docusate sodium (COLACE) 100 mg capsule  Self Yes Yes   Sig: Take 100 mg by mouth 2 (two) times a day  glucose blood (ACCU-CHEK GUIDE) test strip   No Yes   Sig: Check blood sugar 4 times daily  metFORMIN (GLUCOPHAGE) 500 mg tablet   No Yes   Sig: TAKE 1 TABLET BY MOUTH TWO TIMES DAILY WITH MEALS   metoprolol succinate (TOPROL-XL) 25 mg 24 hr tablet   No Yes   Sig: TAKE 1 TABLET DAILY   Patient taking differently: TAKE 1 TABLET BID   nitroglycerin (NITROSTAT) 0 4 mg SL tablet   No Yes   Sig: Place 1 tablet (0 4 mg total) under the tongue every 5 (five) minutes as needed for chest pain   pantoprazole (PROTONIX) 40 mg tablet  Self Yes Yes   Sig: Take 40 mg by mouth 2 (two) times a day     ranolazine (RANEXA) 1000 MG SR tablet   No Yes   Sig: Take 1 tablet (1,000 mg total) by mouth 2 (two) times a day   rosuvastatin (CRESTOR) 40 MG tablet   No Yes   Sig: Take 1 tablet (40 mg total) by mouth daily   Patient taking differently: Take 20 mg by mouth daily     senna (SENOKOT) 8 6 mg  Self Yes Yes   Sig: Take 1 tablet by mouth daily        Facility-Administered Medications: None       Past Medical History:   Diagnosis Date    AAA (abdominal aortic aneurysm) (HCC)     Anxiety     CVD (cardiovascular disease)     [facility]     DVT, lower extremity (HCC)     Hyperlipidemia     Hypertension     NSTEMI (non-ST elevated myocardial infarction) ([facility] )     Prostate cancer ([facility] )     Right bundle branch block (RBBB)     Skin cancer        Past Surgical History:   Procedure Laterality Date    ANKLE ARTHROSCOPY W/ OPEN REPAIR Left     CARDIAC SURGERY      CORONARY ARTERY BYPASS GRAFT  12/11/2013    CABG x3 with LIMA to LAD, SVG to OM-1, SVG to posterior lateral, LYSIS of pericardial adhesions   FL COLONOSCOPY FLX DX W/COLLJ SPEC WHEN PFRMD N/A 8/9/2018    Procedure: COLONOSCOPY;  Surgeon: Compa Garcia MD;  Location: [facility]; Service: Gastroenterology    FL REPAIR UMBILICAL QRRI,9+Z/P,HGLJZ N/A 3/10/2017    Procedure: REPAIR HERNIA UMBILICAL;  Surgeon: Joseph Hidalgo MD;  Location: [facility];  Service: General    PROSTATECTOMY         Family History   Problem Relation Age of Onset    Crohn's disease Mother     Liver cancer Mother     Hypertension Mother     Crohn's disease Father     Liver cancer Father     Heart disease Father     Hypertension Father     Hyperlipidemia Father     Colon cancer Brother     Aortic aneurysm Brother         abdominal     Heart disease Brother         abdominal aortic aneurysm    Hypertension Brother     Hyperlipidemia Brother     Colon polyps Family     Stomach cancer Family     Hypertension Sister      I have reviewed and agree with the history as documented  Social History   Substance Use Topics    Smoking status: Former Smoker     Types: Cigarettes    Smokeless tobacco: Never Used      Comment: quit 1967    Alcohol use Yes      Comment: social        Review of Systems   Constitutional: Negative for chills and fever  HENT: Negative for congestion and facial swelling  Eyes: Negative for visual disturbance  Respiratory: Negative for cough and shortness of breath  Cardiovascular: Negative for chest pain and leg swelling  Gastrointestinal: Positive for abdominal pain (R side)  Negative for diarrhea, nausea and vomiting  Genitourinary: Negative for dysuria and frequency     Musculoskeletal: Negative for arthralgias, back pain, joint swelling, neck pain and neck stiffness  Skin: Negative for rash  Neurological: Negative for weakness, numbness and headaches  Psychiatric/Behavioral: Negative for agitation, behavioral problems and confusion  Physical Exam  Physical Exam   Constitutional: He is oriented to person, place, and time  He appears well-developed and well-nourished  No distress  HENT:   Head: Normocephalic and atraumatic  Right Ear: External ear normal    Left Ear: External ear normal    Nose: Nose normal    Mouth/Throat: Oropharynx is clear and moist    Eyes: Conjunctivae are normal    Neck: Normal range of motion  Neck supple  Cardiovascular: Normal rate, regular rhythm and normal heart sounds  Exam reveals no gallop and no friction rub  No murmur heard  Pulmonary/Chest: Effort normal and breath sounds normal  No respiratory distress  He has no wheezes  He has no rales  He exhibits tenderness (TTP over the R lateral rib cage)  Abdominal: Soft  Bowel sounds are normal  He exhibits no distension  There is tenderness (R flank TTP)  There is no guarding  Musculoskeletal: Normal range of motion  He exhibits no edema, tenderness (No midline TTP over the C, T, or L spine) or deformity  Neurological: He is alert and oriented to person, place, and time  He exhibits normal muscle tone  Skin: Skin is warm and dry  He is not diaphoretic  Psychiatric: He has a normal mood and affect         Vital Signs  ED Triage Vitals [08/17/18 1030]   Temperature Pulse Respirations Blood Pressure SpO2   98 1 °F (36 7 °C) 79 16 149/70 94 %      Temp Source Heart Rate Source Patient Position - Orthostatic VS BP Location FiO2 (%)   Oral Monitor Sitting Right arm --      Pain Score       No Pain           Vitals:    08/17/18 1030 08/17/18 1219 08/17/18 1300 08/17/18 1357   BP: 149/70 (!) 178/77  154/67   Pulse: 79 69 66 72   Patient Position - Orthostatic VS: Sitting Lying  Lying       Visual Acuity      ED Medications  Medications   HYDROcodone-acetaminophen (NORCO) 5-325 mg per tablet 1 tablet (1 tablet Oral Given 8/17/18 1104)   iohexol (OMNIPAQUE) 350 MG/ML injection (MULTI-DOSE) 100 mL (100 mL Intravenous Given 8/17/18 1218)       Diagnostic Studies  Results Reviewed     Procedure Component Value Units Date/Time    Comprehensive metabolic panel [[id number]]  (Abnormal) Collected:  08/17/18 1112    Lab Status:  Final result Specimen:  Blood from Arm, Right Updated:  08/17/18 1140     Sodium 137 mmol/L      Potassium 4 4 mmol/L      Chloride 103 mmol/L      CO2 30 mmol/L      Anion Gap 4 mmol/L      BUN 26 (H) mg/dL      Creatinine 1 01 mg/dL      Glucose 155 (H) mg/dL      Calcium 8 7 mg/dL      AST 16 U/L      ALT 27 U/L      Alkaline Phosphatase 57 U/L      Total Protein 7 1 g/dL      Albumin 2 9 (L) g/dL      Total Bilirubin 0 30 mg/dL      eGFR 70 ml/min/1 73sq m     Narrative:         National Kidney Disease Education Program recommendations are as follows:  GFR calculation is accurate only with a steady state creatinine  Chronic Kidney disease less than 60 ml/min/1 73 sq  meters  Kidney failure less than 15 ml/min/1 73 sq  meters      CBC and differential [[id number]]  (Abnormal) Collected:  08/17/18 1112    Lab Status:  Final result Specimen:  Blood from Arm, Right Updated:  08/17/18 1122     WBC 7 44 Thousand/uL      RBC 3 94 Million/uL      Hemoglobin 12 0 g/dL      Hematocrit 35 9 (L) %      MCV 91 fL      MCH 30 5 pg      MCHC 33 4 g/dL      RDW 13 9 %      MPV 10 0 fL      Platelets 854 Thousands/uL      nRBC 0 /100 WBCs      Neutrophils Relative 70 %      Immat GRANS % 0 %      Lymphocytes Relative 13 (L) %      Monocytes Relative 10 %      Eosinophils Relative 7 (H) %      Basophils Relative 0 %      Neutrophils Absolute 5 12 Thousands/µL      Immature Grans Absolute 0 03 Thousand/uL      Lymphocytes Absolute 0 98 Thousands/µL      Monocytes Absolute 0 73 Thousand/µL      Eosinophils Absolute 0 55 Thousand/µL      Basophils Absolute 0 03 Thousands/µL                  CT chest abdomen pelvis w contrast   Final Result by Treva Ivory MD (08/17 1242)      Nondisplaced right 7th rib fracture and minimal soft tissue contusion in the right chest wall with trace right pleural fluid  No significant pulmonary parenchymal abnormality or evidence of pneumothorax  Bilateral renal cortical cysts and cortical scarring similar to prior studies  Stable infrarenal abdominal aortic aneurysm  Workstation performed: [id number]                    Procedures  Procedures       Phone Contacts  ED Phone Contact    ED Course                               MDM  Number of Diagnoses or Management Options  Closed fracture of one rib of right side, initial encounter: new and requires workup  Diagnosis management comments: Generally well appearing  Afebrile and hemodynamically stable  Patient denies shortness of breath and is satting well on room air  He has tenderness to palpation over the right lateral chest wall, along the course of the ribs  CBC and CMP are unremarkable  CT of the abdomen pelvis was obtained that shows a nondisplaced right 7th rib fracture, with small underlying trace pleural fluid, with no pneumothorax  I discussed the findings with the patient at bedside  He denies any shortness of breath or difficulty breathing, and pain is well controlled after Vicodin in the emergency department  He would like to get discharged home, and follow up with his doctor in 1 week  We discussed that he should return to the emergency department immediately for sudden worsening in his pain or shortness of breath  I feel that this is reasonable  Will discharge with a small amount of Norco for pain relief, as well as incentive spirometry  Patient discharged in good condition      Patient Progress  Patient progress: stable        CritCare Time    Disposition  Final diagnoses:   Closed fracture of one rib of right side, initial encounter     Time reflects when diagnosis was documented in both MDM as applicable and the Disposition within this note     Time User Action Codes Description Comment    8/17/2018  1:48 PM Courtney Carrasco Add [[id number]] Closed fracture of one rib of right side, initial encounter       ED Disposition     ED Disposition Condition Comment    Discharge  Linda Ladd discharge to home/self care  Condition at discharge: Good        Follow-up Information     Follow up With Specialties Details Why Contact Info Additional [address] , DO Internal Medicine In 1 week For further management of your rib fracture  [facility] Emergency Department Emergency Medicine  For sudden worsening of your pain, or shortness of breath  [address]  [phone] [facility], [address], [place], [place], [address]          Discharge Medication List as of 8/17/2018  1:50 PM      START taking these medications    Details   HYDROcodone-acetaminophen (NORCO) 5-325 mg per tablet Take 1 tablet by mouth every 6 (six) hours as needed for pain for up to 7 days Do not take with tylenol  Max Daily Amount: 4 tablets, Starting Fri 8/17/2018, Until Fri 8/24/2018, Print         CONTINUE these medications which have NOT CHANGED    Details   ACCU-CHEK FASTCLIX LANCETS MISC by Does not apply route, Starting Wed 1/29/2014, Historical Med      ascorbic acid (VITAMIN C) 500 mg tablet Take 500 mg by mouth daily, Historical Med      aspirin 325 mg tablet Take 1 tablet by mouth daily, Starting Fri 1/3/2014, Historical Med      Cholecalciferol (VITAMIN D3 PO) Take 2,000 Units by mouth daily  , Historical Med      clopidogrel (PLAVIX) 75 mg tablet TAKE 1 TABLET DAILY, Normal      cyanocobalamin (VITAMIN B-12) 500 mcg tablet Take 1 tablet by mouth daily, Starting Mon 7/10/2017, Historical Med      docusate sodium (COLACE) 100 mg capsule Take 100 mg by mouth 2 (two) times a day , Historical Med      glucose blood (ACCU-CHEK GUIDE) test strip Check blood sugar 4 times daily  , Normal      Insulin Glargine (TOUJEO SOLOSTAR) injection pen 300 units/mL Inject 50 Units under the skin daily at bedtime for 90 days, Starting Thu 4/5/2018, Until Fri 8/17/2018, Normal      Insulin Pen Needle (Earnstine Bumps) 30G X 8 MM MISC by Does not apply route, Starting Mon 1/6/2014, Historical Med      LORazepam (ATIVAN) 1 mg tablet Take 1 tablet (1 mg total) by mouth 2 (two) times a day as needed (anxiety), Starting Tue 7/24/2018, Print      metFORMIN (GLUCOPHAGE) 500 mg tablet TAKE 1 TABLET BY MOUTH TWO TIMES DAILY WITH MEALS, Normal      metoprolol succinate (TOPROL-XL) 25 mg 24 hr tablet TAKE 1 TABLET DAILY, Normal      Multiple Vitamins-Minerals (CENTRUM SILVER) tablet Take 1 tablet by mouth daily, Starting Thu 10/5/2017, Historical Med      nitroglycerin (NITROSTAT) 0 4 mg SL tablet Place 1 tablet (0 4 mg total) under the tongue every 5 (five) minutes as needed for chest pain, Starting Tue 5/8/2018, Normal      NOVOLOG FLEXPEN 100 units/mL injection pen INJECT 20 UNITS BEFORE     MEALS (ICR 1:4, ISF 1:15), Normal      pantoprazole (PROTONIX) 40 mg tablet Take 40 mg by mouth 2 (two) times a day , Historical Med      ranolazine (RANEXA) 1000 MG SR tablet Take 1 tablet (1,000 mg total) by mouth 2 (two) times a day, Starting Fri 6/8/2018, Normal      rosuvastatin (CRESTOR) 40 MG tablet Take 1 tablet (40 mg total) by mouth daily, Starting Tue 8/14/2018, Normal      senna (SENOKOT) 8 6 mg Take 1 tablet by mouth daily  , Starting Mon 7/17/2017, Historical Med      VIIBRYD 40 MG tablet TAKE 1 TABLET BY MOUTH EVERY DAY, Normal           No discharge procedures on file      ED Provider  Electronically Signed by           John Paul Booker MD  08/17/18 6663

## 2018-08-17 NOTE — ED NOTES
Pt  Awake and alert, no distress  Pt  With no questions at time of dispo  Pt  Ambulated out of dept  At this time   Pt  Gait steady     Taylor Rodriguez RN  08/17/18 3956

## 2018-08-28 NOTE — PROGRESS NOTES
Cardiology Follow Up    Fanta Ellsworth  1940  [id number]  Stacie [facility] [place] [address] [address]    1  Coronary artery disease involving native coronary artery of native heart without angina pectoris     2  S/P CABG x 3     3  Benign essential hypertension     4  Orthostatic hypotension     5  Dyslipidemia     6  Right bundle-branch block         Discussion/Summary:  Mr Krishan Osorio is a pleasant 73-year-old gentleman who presents to the office today for routine follow-up  His most recent lipids from earlier this year were reviewed  They are acceptable on current therapy with exception of a slightly low HDL for which therapeutic lifestyle modifications were recommended  He is due to have the reassessed in the near future be his primary care physician and I will await those results       His blood pressure is well controlled in the office today  He has not had any recurrent lightheadedness with standing  He is now under the care of vascular surgery regarding his infrarenal abdominal aortic aneurysm and at this time no intervention is necessary besides surveillance  He also underwent a CT scan of chest abdomen pelvis for unrelated reasons which noted it was stable and unchanged in size  He will follow-up in the office in six months  Interval History:   Mr Krishan Osorio is a pleasant 73-year-old male who presents to the office for follow-up  He has not had any recurrent lightheadedness with standing  He had one episode of chest tightness with exertion when he was walking at a fast pace that necessitated the use of nitroglycerin with resolution of the symptoms within a few minutes  He denies shortness of breath with exertion  He denies symptoms of heart failure  He denies symptoms of claudication  He denies symptoms of palpitations         Problem List     Non-STEMI (non-ST elevated myocardial infarction) ([facility] )    Hypertensive urgency    Diabetes mellitus ([place] [address] ) (Chronic)    Mixed hyperlipidemia (Chronic)    Abdominal aneurysm (HCC) (Chronic)    Hx of CABG (Chronic)    Orthostasis    Hypertension (Chronic)    Obesity    Vitamin D deficiency    Subclinical iodine-deficiency hypothyroidism    Aneurysm of infrarenal abdominal aorta (HCC)    Arteriosclerosis of coronary artery    Overview Signed 2/21/2018  7:50 AM by Shruthi Rene DO     Description: 50% distal left main, 75% proximal LAD, 90% 1st diagonal, 50% ostial circumflex, 99% proximal circumflex, 90% 2nd OM, 30 mid RCA , 90% right posterolateral - left heart catheterization December 2013  Dyslipidemia    Right bundle-branch block        Past Medical History:   Diagnosis Date    AAA (abdominal aortic aneurysm) (HCC)     Anxiety     CVD (cardiovascular disease)     Diabetes mellitus ([place] [address] )     DVT, lower extremity ([place] [address] )     Hyperlipidemia     Hypertension     NSTEMI (non-ST elevated myocardial infarction) ([place] [address] )     Prostate cancer ([place] [address] )     Right bundle branch block (RBBB)     Skin cancer      Social History     Social History    Marital status: /Civil Union     Spouse name: N/A    Number of children: N/A    Years of education: N/A     Occupational History    Not on file       Social History Main Topics    Smoking status: Former Smoker     Types: Cigarettes    Smokeless tobacco: Never Used      Comment: quit 1967    Alcohol use Yes      Comment: social    Drug use: No    Sexual activity: Not on file     Other Topics Concern    Not on file     Social History Narrative    No narrative on file      Family History   Problem Relation Age of Onset    Crohn's disease Mother     Liver cancer Mother     Hypertension Mother     Crohn's disease Father     Liver cancer Father     Heart disease Father     Hypertension Father     Hyperlipidemia Father     Colon cancer Brother     Aortic aneurysm Brother abdominal     Heart disease Brother         abdominal aortic aneurysm    Hypertension Brother     Hyperlipidemia Brother     Colon polyps Family     Stomach cancer Family     Hypertension Sister      Past Surgical History:   Procedure Laterality Date    ANKLE ARTHROSCOPY W/ OPEN REPAIR Left     CARDIAC SURGERY      CORONARY ARTERY BYPASS GRAFT  12/11/2013    CABG x3 with LIMA to LAD, SVG to OM-1, SVG to posterior lateral, LYSIS of pericardial adhesions   HI COLONOSCOPY FLX DX W/COLLJ SPEC WHEN PFRMD N/A 8/9/2018    Procedure: COLONOSCOPY;  Surgeon: Anna Gaytan MD;  [facility]; Service: Gastroenterology    HI REPAIR UMBILICAL HEGA,9+I/O,WSDEK N/A 3/10/2017    Procedure: REPAIR HERNIA UMBILICAL;  Surgeon: Hernesto May MD;  [facility];  Service: General    PROSTATECTOMY         Current Outpatient Prescriptions:     ACCU-CHEK FASTCLIX LANCETS MISC, by Does not apply route, Disp: , Rfl:     ascorbic acid (VITAMIN C) 500 mg tablet, Take 500 mg by mouth daily, Disp: , Rfl:     aspirin 325 mg tablet, Take 1 tablet by mouth daily, Disp: , Rfl:     Cholecalciferol (VITAMIN D3 PO), Take 2,000 Units by mouth daily  , Disp: , Rfl:     clopidogrel (PLAVIX) 75 mg tablet, TAKE 1 TABLET DAILY, Disp: 90 tablet, Rfl: 1    cyanocobalamin (VITAMIN B-12) 500 mcg tablet, Take 1 tablet by mouth daily, Disp: , Rfl:     docusate sodium (COLACE) 100 mg capsule, Take 100 mg by mouth 2 (two) times a day , Disp: , Rfl:     glucose blood (ACCU-CHEK GUIDE) test strip, Check blood sugar 4 times daily  , Disp: 400 each, Rfl: 1    Insulin Glargine (TOUJEO SOLOSTAR) injection pen 300 units/mL, Inject 50 Units under the skin daily at bedtime for 90 days (Patient taking differently: Inject 45-50 Units under the skin daily at bedtime  ), Disp: 10 pen, Rfl: 1    Insulin Pen Needle (NOVOFINE AUTOCOVER) 30G X 8 MM MISC, by Does not apply route, Disp: , Rfl:     LORazepam (ATIVAN) 1 mg tablet, Take 1 tablet (1 mg total) by mouth 2 (two) times a day as needed (anxiety) (Patient taking differently: Take 0 5 mg by mouth 2 (two) times a day  ), Disp: 60 tablet, Rfl: 1    metFORMIN (GLUCOPHAGE) 500 mg tablet, TAKE 1 TABLET BY MOUTH TWO TIMES DAILY WITH MEALS, Disp: 60 tablet, Rfl: 2    metoprolol succinate (TOPROL-XL) 25 mg 24 hr tablet, TAKE 1 TABLET DAILY (Patient taking differently: TAKE 1 TABLET BID), Disp: 90 tablet, Rfl: 1    Multiple Vitamins-Minerals (CENTRUM SILVER) tablet, Take 1 tablet by mouth daily, Disp: , Rfl:     nitroglycerin (NITROSTAT) 0 4 mg SL tablet, Place 1 tablet (0 4 mg total) under the tongue every 5 (five) minutes as needed for chest pain, Disp: 90 tablet, Rfl: 3    NOVOLOG FLEXPEN 100 units/mL injection pen, INJECT 20 UNITS BEFORE     MEALS (ICR 1:4, ISF 1:15), Disp: 60 mL, Rfl: 1    pantoprazole (PROTONIX) 40 mg tablet, Take 40 mg by mouth 2 (two) times a day , Disp: , Rfl:     ranolazine (RANEXA) 1000 MG SR tablet, Take 1 tablet (1,000 mg total) by mouth 2 (two) times a day, Disp: 180 tablet, Rfl: 3    rosuvastatin (CRESTOR) 40 MG tablet, Take 1 tablet (40 mg total) by mouth daily (Patient taking differently: Take 20 mg by mouth daily  ), Disp: 90 tablet, Rfl: 3    senna (SENOKOT) 8 6 mg, Take 1 tablet by mouth daily  , Disp: , Rfl:     VIIBRYD 40 MG tablet, TAKE 1 TABLET BY MOUTH EVERY DAY, Disp: 30 tablet, Rfl: 5  Allergies   Allergen Reactions    Sulfa Antibiotics Other (See Comments)     Generalized redness       Labs:     Chemistry        Component Value Date/Time     08/17/2018 1112     10/13/2015 0823    K 4 4 08/17/2018 1112    K 4 3 10/13/2015 0823     08/17/2018 1112     10/13/2015 0823    CO2 30 08/17/2018 1112    CO2 34 (H) 10/13/2015 0823    BUN 26 (H) 08/17/2018 1112    BUN 11 10/13/2015 0823    CREATININE 1 01 08/17/2018 1112    CREATININE 1 0 10/13/2015 0823        Component Value Date/Time    CALCIUM 8 7 08/17/2018 1112    CALCIUM 9 2 10/13/2015 0823    ALKPHOS 57 08/17/2018 1112    ALKPHOS 74 10/13/2015 0823    AST 16 08/17/2018 1112    AST 19 10/13/2015 0823    ALT 27 08/17/2018 1112    ALT 25 10/13/2015 0823    BILITOT 0 4 10/13/2015 0823            Lab Results   Component Value Date    CHOL 117 (L) 10/13/2015    CHOL 121 04/11/2015    CHOL 156 04/24/2014     Lab Results   Component Value Date    HDL 38 (L) 02/16/2018    HDL 32 (L) 01/27/2018    HDL 36 (L) 12/04/2017     Lab Results   Component Value Date    LDLCALC 55 02/16/2018    LDLCALC 47 01/27/2018    LDLCALC 61 12/04/2017     Lab Results   Component Value Date    TRIG 89 02/16/2018    TRIG 124 01/27/2018    TRIG 135 12/04/2017     No components found for: CHOLHDL    Imaging: X-ray Chest 2 Views    Result Date: 1/27/2018  Narrative: CHEST - DUAL ENERGY INDICATION:  chest pain  History taken directly from the electronic ordering system  COMPARISON:  Chest x-ray performed on 7/13/2017 VIEWS:  PA (including soft tissue/bone algorithms) and lateral projections IMAGES:  4 FINDINGS:     Cardiac silhouette is top normal in size and changes of prior intervention  No focal airspace consolidation  No pleural effusion or pneumothorax  Visualized osseous structures appear within normal limits for the patient's age  Impression: No focal airspace consolidation  Workstation performed: [id number]     Us Abdominal Aorta    Result Date: 1/27/2018  Narrative: ABDOMINAL AORTIC ULTRASOUND INDICATION: Evaluate for aortic aneurysm  COMPARISON: CT performed on 7/13/2017 FINDINGS: Ultrasound of the abdominal aorta was performed in longitudinal and transverse planes with a curvilinear transducer  Signs of atherosclerotic disease  Proximal aorta:  2 19 x 2 11 cm Mid aorta:    2 39 x 2 24 cm Distal aorta:    3 5 x 3 3 cm Right common iliac origin:  1 6 x 1 4 cm Left common iliac origin:  1 6 x 1 4 cm No periaortic collections or adenopathy detected  Impression: Atherosclerotic disease   Infrarenal abdominal aorta measures 3 5 x 3 3 cm, marginally increased in caliber when compared to CT of 7/3/2017  Workstation performed: [id number]       Review of Systems   Cardiovascular: Negative for chest pain, dyspnea on exertion and irregular heartbeat  Neurological: Positive for light-headedness  Negative for loss of balance  There were no vitals filed for this visit  There were no vitals filed for this visit  There is no height or weight on file to calculate BMI      Physical Exam:   General appearance:  Appears stated age, alert, well appearing and in no distress  HEENT:  PERRLA, EOMI, no scleral icterus, no conjunctival pallor  NECK:  Supple, No elevated JVP, no thyromegaly, no carotid bruits  HEART:  Regular rate and rhythm, normal S1/S2, soft TORRIE RUSB without radiation  LUNGS:  Clear to auscultation bilaterally  ABDOMEN:  Soft, non-tender, positive bowel sounds, no rebound or guarding, no organomegaly   EXTREMITIES:  No edema  VASCULAR:  Normal pedal pulses   SKIN: No lesions or rashes on exposed skin  NEURO:  CN II-XII intact, no focal deficits

## 2018-08-29 ENCOUNTER — OFFICE VISIT (OUTPATIENT)
Dept: CARDIOLOGY CLINIC | Facility: CLINIC | Age: 78
End: 2018-08-29
Payer: MEDICARE

## 2018-08-29 VITALS
OXYGEN SATURATION: 96 % | HEIGHT: 70 IN | SYSTOLIC BLOOD PRESSURE: 106 MMHG | DIASTOLIC BLOOD PRESSURE: 52 MMHG | BODY MASS INDEX: 32.07 KG/M2 | HEART RATE: 64 BPM | WEIGHT: 224 LBS

## 2018-08-29 DIAGNOSIS — I45.10 RIGHT BUNDLE-BRANCH BLOCK: ICD-10-CM

## 2018-08-29 DIAGNOSIS — I25.10 CORONARY ARTERY DISEASE INVOLVING NATIVE CORONARY ARTERY OF NATIVE HEART WITHOUT ANGINA PECTORIS: Primary | ICD-10-CM

## 2018-08-29 DIAGNOSIS — I95.1 ORTHOSTATIC HYPOTENSION: ICD-10-CM

## 2018-08-29 DIAGNOSIS — E78.5 DYSLIPIDEMIA: ICD-10-CM

## 2018-08-29 DIAGNOSIS — Z95.1 S/P CABG X 3: Chronic | ICD-10-CM

## 2018-08-29 DIAGNOSIS — I10 BENIGN ESSENTIAL HYPERTENSION: ICD-10-CM

## 2018-08-29 PROCEDURE — 99214 OFFICE O/P EST MOD 30 MIN: CPT | Performed by: INTERNAL MEDICINE

## 2018-09-01 DIAGNOSIS — K21.9 GASTROESOPHAGEAL REFLUX DISEASE WITHOUT ESOPHAGITIS: Primary | ICD-10-CM

## 2018-09-02 RX ORDER — PANTOPRAZOLE SODIUM 40 MG/1
TABLET, DELAYED RELEASE ORAL
Qty: 180 TABLET | Refills: 1 | Status: SHIPPED | OUTPATIENT
Start: 2018-09-02 | End: 2019-03-25 | Stop reason: SDUPTHER

## 2018-09-21 DIAGNOSIS — F41.9 ANXIETY: ICD-10-CM

## 2018-09-21 DIAGNOSIS — E13.9 DIABETES 1.5, MANAGED AS TYPE 2 (HCC): Primary | ICD-10-CM

## 2018-09-21 NOTE — TELEPHONE ENCOUNTER
PDMP CHECKED  LAST FILL- 8/18/18        Prescriptions   Filled  Written  Drug  QTY  Days  Prescriber  Rx #  Pharmacy *  Refills  Pymt Type      08/18/2018 07/25/2018  LORAZEPAM 1 MG TABLET  60 0  30  [facility]  [id number]  WEGMA (2508)  1  Comm Ins  [place]    08/17/2018 08/17/2018  HYDROCODONE-ACETAMIN 5-325 MG  20 0  5  [facility]  [id number]  WEGMA (2508)  0  [place] Ins  [place]    07/25/2018 07/25/2018  LORAZEPAM 1 MG TABLET  60 0  30  [facility]  [id number]  WEGMA (2508)  0  [place] Ins  [place]    06/21/2018 06/20/2018  LORAZEPAM 1 MG TABLET  60 0  30  [facility]  [id number]  WEGMA (2508)  0  Comm Ins  [place]

## 2018-09-24 RX ORDER — LORAZEPAM 1 MG/1
1 TABLET ORAL 2 TIMES DAILY PRN
Qty: 60 TABLET | Refills: 0 | Status: SHIPPED | OUTPATIENT
Start: 2018-09-24 | End: 2018-10-22 | Stop reason: SDUPTHER

## 2018-09-28 DIAGNOSIS — F41.9 ANXIETY: ICD-10-CM

## 2018-09-28 RX ORDER — VILAZODONE HYDROCHLORIDE 40 MG/1
TABLET ORAL
Qty: 30 TABLET | Refills: 5 | Status: SHIPPED | OUTPATIENT
Start: 2018-09-28 | End: 2019-03-20 | Stop reason: SDUPTHER

## 2018-10-18 ENCOUNTER — TELEPHONE (OUTPATIENT)
Dept: ENDOCRINOLOGY | Facility: CLINIC | Age: 78
End: 2018-10-18

## 2018-10-18 DIAGNOSIS — E08.00 DIABETES MELLITUS DUE TO UNDERLYING CONDITION WITH HYPEROSMOLARITY WITHOUT COMA, WITHOUT LONG-TERM CURRENT USE OF INSULIN (HCC): Primary | ICD-10-CM

## 2018-10-18 DIAGNOSIS — E55.9 VITAMIN D DEFICIENCY: ICD-10-CM

## 2018-10-18 DIAGNOSIS — E78.5 HYPERLIPIDEMIA, UNSPECIFIED HYPERLIPIDEMIA TYPE: ICD-10-CM

## 2018-10-18 DIAGNOSIS — I10 ESSENTIAL HYPERTENSION: ICD-10-CM

## 2018-10-22 DIAGNOSIS — F41.9 ANXIETY: ICD-10-CM

## 2018-10-22 RX ORDER — LORAZEPAM 1 MG/1
1 TABLET ORAL 2 TIMES DAILY PRN
Qty: 60 TABLET | Refills: 0 | Status: SHIPPED | OUTPATIENT
Start: 2018-10-22 | End: 2018-11-24 | Stop reason: SDUPTHER

## 2018-10-25 LAB
LEFT EYE DIABETIC RETINOPATHY: NORMAL
RIGHT EYE DIABETIC RETINOPATHY: NORMAL
SEVERITY (EYE EXAM): NORMAL

## 2018-10-31 ENCOUNTER — APPOINTMENT (OUTPATIENT)
Dept: LAB | Facility: CLINIC | Age: 78
End: 2018-10-31
Payer: MEDICARE

## 2018-10-31 DIAGNOSIS — I10 ESSENTIAL HYPERTENSION: ICD-10-CM

## 2018-10-31 DIAGNOSIS — E11.65 TYPE 2 DIABETES MELLITUS WITH HYPERGLYCEMIA, WITH LONG-TERM CURRENT USE OF INSULIN (HCC): ICD-10-CM

## 2018-10-31 DIAGNOSIS — E78.5 HYPERLIPIDEMIA, UNSPECIFIED HYPERLIPIDEMIA TYPE: ICD-10-CM

## 2018-10-31 DIAGNOSIS — E55.9 VITAMIN D DEFICIENCY: ICD-10-CM

## 2018-10-31 DIAGNOSIS — E08.00 DIABETES MELLITUS DUE TO UNDERLYING CONDITION WITH HYPEROSMOLARITY WITHOUT COMA, WITHOUT LONG-TERM CURRENT USE OF INSULIN (HCC): ICD-10-CM

## 2018-10-31 DIAGNOSIS — Z79.4 TYPE 2 DIABETES MELLITUS WITH HYPERGLYCEMIA, WITH LONG-TERM CURRENT USE OF INSULIN (HCC): ICD-10-CM

## 2018-10-31 LAB
25(OH)D3 SERPL-MCNC: 28.2 NG/ML (ref 30–100)
ALBUMIN SERPL BCP-MCNC: 3.3 G/DL (ref 3.5–5)
ALP SERPL-CCNC: 68 U/L (ref 46–116)
ALT SERPL W P-5'-P-CCNC: 31 U/L (ref 12–78)
ANION GAP SERPL CALCULATED.3IONS-SCNC: 5 MMOL/L (ref 4–13)
AST SERPL W P-5'-P-CCNC: 19 U/L (ref 5–45)
BILIRUB SERPL-MCNC: 0.55 MG/DL (ref 0.2–1)
BUN SERPL-MCNC: 23 MG/DL (ref 5–25)
CALCIUM SERPL-MCNC: 9 MG/DL (ref 8.3–10.1)
CHLORIDE SERPL-SCNC: 100 MMOL/L (ref 100–108)
CHOLEST SERPL-MCNC: 122 MG/DL (ref 50–200)
CO2 SERPL-SCNC: 30 MMOL/L (ref 21–32)
CREAT SERPL-MCNC: 1.08 MG/DL (ref 0.6–1.3)
CREAT UR-MCNC: 187 MG/DL
EST. AVERAGE GLUCOSE BLD GHB EST-MCNC: 166 MG/DL
GFR SERPL CREATININE-BSD FRML MDRD: 65 ML/MIN/1.73SQ M
GLUCOSE P FAST SERPL-MCNC: 127 MG/DL (ref 65–99)
HBA1C MFR BLD: 7.4 % (ref 4.2–6.3)
HDLC SERPL-MCNC: 34 MG/DL (ref 40–60)
LDLC SERPL CALC-MCNC: 60 MG/DL (ref 0–100)
MICROALBUMIN UR-MCNC: 305 MG/L (ref 0–20)
MICROALBUMIN/CREAT 24H UR: 163 MG/G CREATININE (ref 0–30)
POTASSIUM SERPL-SCNC: 4.2 MMOL/L (ref 3.5–5.3)
PROT SERPL-MCNC: 8 G/DL (ref 6.4–8.2)
SODIUM SERPL-SCNC: 135 MMOL/L (ref 136–145)
TRIGL SERPL-MCNC: 141 MG/DL

## 2018-10-31 PROCEDURE — 82570 ASSAY OF URINE CREATININE: CPT | Performed by: INTERNAL MEDICINE

## 2018-10-31 PROCEDURE — 80053 COMPREHEN METABOLIC PANEL: CPT

## 2018-10-31 PROCEDURE — 83036 HEMOGLOBIN GLYCOSYLATED A1C: CPT

## 2018-10-31 PROCEDURE — 82043 UR ALBUMIN QUANTITATIVE: CPT | Performed by: INTERNAL MEDICINE

## 2018-10-31 PROCEDURE — 80061 LIPID PANEL: CPT

## 2018-10-31 PROCEDURE — 36415 COLL VENOUS BLD VENIPUNCTURE: CPT

## 2018-10-31 PROCEDURE — 82306 VITAMIN D 25 HYDROXY: CPT

## 2018-11-21 PROBLEM — N18.2 TYPE 2 DIABETES MELLITUS WITH STAGE 2 CHRONIC KIDNEY DISEASE, WITH LONG-TERM CURRENT USE OF INSULIN (HCC): Status: ACTIVE | Noted: 2018-11-21

## 2018-11-21 PROBLEM — Z79.4 TYPE 2 DIABETES MELLITUS WITH STAGE 2 CHRONIC KIDNEY DISEASE, WITH LONG-TERM CURRENT USE OF INSULIN (HCC): Status: ACTIVE | Noted: 2018-11-21

## 2018-11-21 PROBLEM — E11.22 TYPE 2 DIABETES MELLITUS WITH STAGE 2 CHRONIC KIDNEY DISEASE, WITH LONG-TERM CURRENT USE OF INSULIN (HCC): Status: ACTIVE | Noted: 2018-11-21

## 2018-11-24 DIAGNOSIS — F41.9 ANXIETY: ICD-10-CM

## 2018-11-26 ENCOUNTER — OFFICE VISIT (OUTPATIENT)
Dept: INTERNAL MEDICINE CLINIC | Facility: CLINIC | Age: 78
End: 2018-11-26
Payer: MEDICARE

## 2018-11-26 VITALS
BODY MASS INDEX: 32.3 KG/M2 | OXYGEN SATURATION: 97 % | SYSTOLIC BLOOD PRESSURE: 122 MMHG | DIASTOLIC BLOOD PRESSURE: 68 MMHG | RESPIRATION RATE: 16 BRPM | HEART RATE: 73 BPM | HEIGHT: 70 IN | WEIGHT: 225.6 LBS

## 2018-11-26 DIAGNOSIS — Z79.4 TYPE 2 DIABETES MELLITUS WITH STAGE 2 CHRONIC KIDNEY DISEASE, WITH LONG-TERM CURRENT USE OF INSULIN (HCC): ICD-10-CM

## 2018-11-26 DIAGNOSIS — Z23 NEED FOR INFLUENZA VACCINATION: Primary | ICD-10-CM

## 2018-11-26 DIAGNOSIS — E66.09 CLASS 1 OBESITY DUE TO EXCESS CALORIES WITH SERIOUS COMORBIDITY AND BODY MASS INDEX (BMI) OF 31.0 TO 31.9 IN ADULT: ICD-10-CM

## 2018-11-26 DIAGNOSIS — I21.4 NON-STEMI (NON-ST ELEVATED MYOCARDIAL INFARCTION) (HCC): ICD-10-CM

## 2018-11-26 DIAGNOSIS — E11.29 TYPE 2 DIABETES MELLITUS WITH MICROALBUMINURIA, WITH LONG-TERM CURRENT USE OF INSULIN (HCC): ICD-10-CM

## 2018-11-26 DIAGNOSIS — L98.9 SKIN LESION OF FACE: ICD-10-CM

## 2018-11-26 DIAGNOSIS — E11.22 TYPE 2 DIABETES MELLITUS WITH STAGE 2 CHRONIC KIDNEY DISEASE, WITH LONG-TERM CURRENT USE OF INSULIN (HCC): ICD-10-CM

## 2018-11-26 DIAGNOSIS — B35.3 TINEA PEDIS OF BOTH FEET: ICD-10-CM

## 2018-11-26 DIAGNOSIS — Z79.4 TYPE 2 DIABETES MELLITUS WITH MICROALBUMINURIA, WITH LONG-TERM CURRENT USE OF INSULIN (HCC): ICD-10-CM

## 2018-11-26 DIAGNOSIS — R80.9 TYPE 2 DIABETES MELLITUS WITH MICROALBUMINURIA, WITH LONG-TERM CURRENT USE OF INSULIN (HCC): ICD-10-CM

## 2018-11-26 DIAGNOSIS — T78.40XA ALLERGIC REACTION, INITIAL ENCOUNTER: ICD-10-CM

## 2018-11-26 DIAGNOSIS — N18.2 TYPE 2 DIABETES MELLITUS WITH STAGE 2 CHRONIC KIDNEY DISEASE, WITH LONG-TERM CURRENT USE OF INSULIN (HCC): ICD-10-CM

## 2018-11-26 PROCEDURE — 90662 IIV NO PRSV INCREASED AG IM: CPT

## 2018-11-26 PROCEDURE — G0008 ADMIN INFLUENZA VIRUS VAC: HCPCS

## 2018-11-26 PROCEDURE — 99214 OFFICE O/P EST MOD 30 MIN: CPT | Performed by: INTERNAL MEDICINE

## 2018-11-26 RX ORDER — LISINOPRIL 2.5 MG/1
2.5 TABLET ORAL DAILY
Qty: 30 TABLET | Refills: 6 | Status: CANCELLED | OUTPATIENT
Start: 2018-11-26

## 2018-11-26 RX ORDER — CLOPIDOGREL BISULFATE 75 MG/1
TABLET ORAL
Qty: 90 TABLET | Refills: 1 | Status: SHIPPED | OUTPATIENT
Start: 2018-11-26 | End: 2019-05-26 | Stop reason: SDUPTHER

## 2018-11-26 RX ORDER — LORAZEPAM 1 MG/1
TABLET ORAL
Qty: 60 TABLET | Refills: 0 | Status: SHIPPED | OUTPATIENT
Start: 2018-11-26 | End: 2018-12-31 | Stop reason: SDUPTHER

## 2018-11-26 NOTE — PROGRESS NOTES
Assessment/Plan:           Problem List Items Addressed This Visit        Endocrine    Type 2 diabetes mellitus with microalbuminuria, with long-term current use of insulin ([facility] )     Lab Results   Component Value Date    HGBA1C 7 4 (H) 10/31/2018       No results for input(s): POCGLU in the last 72 hours  Blood Sugar Average: Last 72 hrs:   I have counselled the pt to follow a healthy and balanced diet ,and recommend routine exercise  I have explained to the patient to try to avoid hypoglycemia he is seeing Endocrinology I explained to me he is at his goal for hemoglobin A1c for over age 70 although he can continue fine tuning his hemoglobin A1c is almost we do not run into hypoglycemia  Relevant Orders    Comprehensive metabolic panel    Lipid Panel with Direct LDL reflex    CBC (Includes Diff/Plt) (Refl)    Hemoglobin A1C       Musculoskeletal and Integument    Skin lesion of face      solar lentigo will have the patient see Dermatology         Relevant Medications    econazole nitrate 1 % cream    Other Relevant Orders    Ambulatory referral to Dermatology    Tinea pedis of both feet     I have prescribed econazole cream apply to affected area once a day times 14 days recommend diabetic foot care         Relevant Medications    econazole nitrate 1 % cream       Other    Obesity     Obesity -I have counseled patient following healthy and balanced diet, I would like the patient to lose weight, I would like the patient exercise routinely; we will continue monitor the patient's progress           Need for influenza vaccination - Primary     Counseled, patient received the flu vaccine         Relevant Orders    influenza vaccine, [id number], high-dose, PF 0 5 mL, for patients 65 yr+ (FLUZONE HIGH-DOSE) (Completed)    Allergic reaction     Secondary to food African American the patient reports me he will not eat this food in the future he would like to hold off on allergy testing if he has any further symptoms he will let me know                Return to office 6  months  call if any problems proved unable to give the patient a patient her secondary to low blood pressure he will increase his water intake, he will change positions slowly currently is asymptomatic he does need the beta-blocker from a cardiac standpoint  Subjective:      Patient ID: Scot Bethea is a 76 y o  male  HPI 68-year old male coming in for a follow up visit regarding accompanied with his wife type 2 diabetes with microalbuminuria, skin lesions of the face, tinea pedis, obesity; The patient reports me compliant taking medications without untoward side effects the  The patient is here to review his medical condition, update me on the medical condition and the patient reports me no hospitalizations and no ER visits  the pt reports rxn to african food , more sedentary,     The following portions of the patient's history were reviewed and updated as appropriate: allergies, current medications, past family history, past medical history, past social history, past surgical history and problem list     Review of Systems   Constitutional: Negative for activity change, appetite change and unexpected weight change  HENT: Negative for congestion and postnasal drip  Eyes: Negative for visual disturbance  Respiratory: Negative for cough and shortness of breath  Cardiovascular: Negative for chest pain  Gastrointestinal: Negative for abdominal pain, diarrhea, nausea and vomiting  Neurological: Negative for dizziness, light-headedness and headaches  Hematological: Negative for adenopathy  Psychiatric/Behavioral: Negative for suicidal ideas  The patient is nervous/anxious  Objective:                  No Follow-up on file        Allergies   Allergen Reactions    Sulfa Antibiotics Other (See Comments)     Generalized redness       Past Medical History:   Diagnosis Date    AAA (abdominal aortic aneurysm) (HCC)     Anxiety  CVD (cardiovascular disease)     Diabetes mellitus ([facility] )     DVT, lower extremity ([place] [address] )     Hyperlipidemia     Hypertension     NSTEMI (non-ST elevated myocardial infarction) ([place] [address] )     Prostate cancer ([place] [address] )     Right bundle branch block (RBBB)     Skin cancer      Past Surgical History:   Procedure Laterality Date    ANKLE ARTHROSCOPY W/ OPEN REPAIR Left     CARDIAC SURGERY      CORONARY ARTERY BYPASS GRAFT  12/11/2013    CABG x3 with LIMA to LAD, SVG to OM-1, SVG to posterior lateral, LYSIS of pericardial adhesions   MI COLONOSCOPY FLX DX W/COLLJ SPEC WHEN PFRMD N/A 8/9/2018    Procedure: COLONOSCOPY;  Surgeon: Nora Haynes MD;  [facility]; Service: Gastroenterology    MI REPAIR UMBILICAL ORTY,2+L/B,OHHZG N/A 3/10/2017    Procedure: REPAIR HERNIA UMBILICAL;  Surgeon: Nitish Weston MD;  [facility];  Service: General    PROSTATECTOMY       Current Outpatient Prescriptions on File Prior to Visit   Medication Sig Dispense Refill    ACCU-CHEK FASTCLIX LANCETS MISC by Does not apply route      ascorbic acid (VITAMIN C) 500 mg tablet Take 500 mg by mouth daily      aspirin 325 mg tablet Take 1 tablet by mouth daily      Cholecalciferol (VITAMIN D3 PO) Take 2,000 Units by mouth daily   cyanocobalamin (VITAMIN B-12) 500 mcg tablet Take 1 tablet by mouth daily      docusate sodium (COLACE) 100 mg capsule Take 100 mg by mouth 2 (two) times a day   glucose blood (ACCU-CHEK GUIDE) test strip Check blood sugar 4 times daily  400 each 1    Insulin Pen Needle (NOVOFINE OpDemandOVER) 30G X 8 MM MISC The patient test his blood sugars 4 times daily   100 each 4    metFORMIN (GLUCOPHAGE) 500 mg tablet TAKE 1 TABLET BY MOUTH TWO TIMES DAILY WITH MEALS 60 tablet 2    metoprolol succinate (TOPROL-XL) 25 mg 24 hr tablet TAKE 1 TABLET DAILY (Patient taking differently: TAKE 1 TABLET BID) 90 tablet 1    Multiple Vitamins-Minerals (CENTRUM SILVER) tablet Take 1 tablet by mouth daily  nitroglycerin (NITROSTAT) 0 4 mg SL tablet Place 1 tablet (0 4 mg total) under the tongue every 5 (five) minutes as needed for chest pain 90 tablet 3    NOVOLOG FLEXPEN 100 units/mL injection pen INJECT 20 UNITS BEFORE     MEALS (ICR 1:4, ISF 1:15) 60 mL 1    pantoprazole (PROTONIX) 40 mg tablet TAKE 1 TABLET TWICE A DAY  30 MINUTES BEFORE BREAKFASTAND DINNER 180 tablet 1    ranolazine (RANEXA) 1000 MG SR tablet Take 1 tablet (1,000 mg total) by mouth 2 (two) times a day 180 tablet 3    rosuvastatin (CRESTOR) 40 MG tablet Take 1 tablet (40 mg total) by mouth daily (Patient taking differently: Take 20 mg by mouth daily  ) 90 tablet 3    senna (SENOKOT) 8 6 mg Take 1 tablet by mouth daily        VIIBRYD 40 MG tablet TAKE 1 TABLET BY MOUTH EVERY DAY 30 tablet 5    Insulin Glargine (TOUJEO SOLOSTAR) injection pen 300 units/mL Inject 50 Units under the skin daily at bedtime for 90 days (Patient taking differently: Inject 45-50 Units under the skin daily at bedtime  ) 10 pen 1    LORazepam (ATIVAN) 1 mg tablet TAKE 1 TABLET BY MOUTH TWO TIMES DAILY AS NEEDED FOR ANXIETY 60 tablet 0    [DISCONTINUED] clopidogrel (PLAVIX) 75 mg tablet TAKE 1 TABLET DAILY 90 tablet 1     No current facility-administered medications on file prior to visit        Family History   Problem Relation Age of Onset   24 \[facility] [place] [facility]\"" Crohn's disease Mother     Liver cancer Mother     Hypertension Mother     Crohn's disease Father     Liver cancer Father     Heart disease Father     Hypertension Father     Hyperlipidemia Father     Colon cancer Brother     Aortic aneurysm Brother         abdominal     Heart disease Brother         abdominal aortic aneurysm    Hypertension Brother     Hyperlipidemia Brother     Colon polyps Family     Stomach cancer Family     Hypertension Sister      Social History     Social History    Marital status: /Civil Union     Spouse name: N/A    Number of children: N/A    Years of education: N/A Occupational History    Not on file  Social History Main Topics    Smoking status: Former Smoker     Types: Cigarettes    Smokeless tobacco: Never Used      Comment: quit 1967    Alcohol use Yes      Comment: social    Drug use: No    Sexual activity: Not on file     Other Topics Concern    Not on file     Social History Narrative    No narrative on file     Vitals:    11/26/18 1427   BP: 122/68   BP Location: Right arm   Patient Position: Sitting   Cuff Size: Standard   Pulse: 73   Resp: 16   SpO2: 97%   Weight: 102 kg (225 lb 9 6 oz)   Height: 5' 10" (1 778 m)     Results for orders placed or performed in visit on 10/31/18   Comprehensive metabolic panel   Result Value Ref Range    Sodium 135 (L) 136 - 145 mmol/L    Potassium 4 2 3 5 - 5 3 mmol/L    Chloride 100 100 - 108 mmol/L    CO2 30 21 - 32 mmol/L    ANION GAP 5 4 - 13 mmol/L    BUN 23 5 - 25 mg/dL    Creatinine 1 08 0 60 - 1 30 mg/dL    Glucose, Fasting 127 (H) 65 - 99 mg/dL    Calcium 9 0 8 3 - 10 1 mg/dL    AST 19 5 - 45 U/L    ALT 31 12 - 78 U/L    Alkaline Phosphatase 68 46 - 116 U/L    Total Protein 8 0 6 4 - 8 2 g/dL    Albumin 3 3 (L) 3 5 - 5 0 g/dL    Total Bilirubin 0 55 0 20 - 1 00 mg/dL    eGFR 65 ml/min/1 73sq m   Hemoglobin A1C   Result Value Ref Range    Hemoglobin A1C 7 4 (H) 4 2 - 6 3 %     mg/dl   Lipid Panel with Direct LDL reflex   Result Value Ref Range    Cholesterol 122 50 - 200 mg/dL    Triglycerides 141 <=150 mg/dL    HDL, Direct 34 (L) 40 - 60 mg/dL    LDL Calculated 60 0 - 100 mg/dL   Vitamin D 25 hydroxy Lab Collect   Result Value Ref Range    Vit D, 25-Hydroxy 28 2 (L) 30 0 - 100 0 ng/mL     Weight (last 2 days)     Date/Time   Weight    11/26/18 1427  102 (225 6)            Body mass index is 32 37 kg/m²    BP      Temp      Pulse     Resp      SpO2        Vitals:    11/26/18 1427   Weight: 102 kg (225 lb 9 6 oz)     Vitals:    11/26/18 1427   Weight: 102 kg (225 lb 9 6 oz)       /68 (BP Location: Right arm, Patient Position: Sitting, Cuff Size: Standard)   Pulse 73   Resp 16   Ht 5' 10" (1 778 m)   Wt 102 kg (225 lb 9 6 oz)   SpO2 97%   BMI 32 37 kg/m²       Bilateral tinea pedis; mild erythematous solar lentigo bilateral face   Physical Exam   Constitutional: He appears well-developed and well-nourished  No distress  HENT:   Head: Normocephalic and atraumatic  Right Ear: External ear normal    Left Ear: External ear normal    Mouth/Throat: Oropharynx is clear and moist    Eyes: Pupils are equal, round, and reactive to light  Conjunctivae are normal  Right eye exhibits no discharge  Left eye exhibits no discharge  No scleral icterus  Neck: Neck supple  Cardiovascular: Normal rate, regular rhythm and normal heart sounds  Exam reveals no gallop and no friction rub  No murmur heard  Pulmonary/Chest: No respiratory distress  He has no wheezes  He has no rales  Abdominal: Soft  Bowel sounds are normal  He exhibits no distension and no mass  There is tenderness (Right lower quadrant abdominal tenderness mild for may years unchanged)  There is no rebound and no guarding  Musculoskeletal: He exhibits no edema or deformity  Lymphadenopathy:     He has no cervical adenopathy  Neurological: He is alert  Skin: He is not diaphoretic  Psychiatric: He has a normal mood and affect   Judgment and thought content normal

## 2018-11-27 NOTE — ASSESSMENT & PLAN NOTE
Secondary to food African American the patient reports me he will not eat this food in the future he would like to hold off on allergy testing if he has any further symptoms he will let me know

## 2018-11-27 NOTE — ASSESSMENT & PLAN NOTE
I have prescribed econazole cream apply to affected area once a day times 14 days recommend diabetic foot care

## 2018-11-27 NOTE — ASSESSMENT & PLAN NOTE
Lab Results   Component Value Date    HGBA1C 7 4 (H) 10/31/2018       No results for input(s): POCGLU in the last 72 hours  Blood Sugar Average: Last 72 hrs:   I have counselled the pt to follow a healthy and balanced diet ,and recommend routine exercise  I have explained to the patient to try to avoid hypoglycemia he is seeing Endocrinology I explained to me he is at his goal for hemoglobin A1c for over age 70 although he can continue fine tuning his hemoglobin A1c is almost we do not run into hypoglycemia

## 2018-12-06 DIAGNOSIS — Z79.4 TYPE 2 DIABETES MELLITUS WITH COMPLICATION, WITH LONG-TERM CURRENT USE OF INSULIN (HCC): Primary | ICD-10-CM

## 2018-12-06 DIAGNOSIS — E11.8 TYPE 2 DIABETES MELLITUS WITH COMPLICATION, WITH LONG-TERM CURRENT USE OF INSULIN (HCC): Primary | ICD-10-CM

## 2018-12-06 RX ORDER — LANCETS
EACH MISCELLANEOUS
Qty: 306 EACH | Refills: 1 | Status: SHIPPED | OUTPATIENT
Start: 2018-12-06 | End: 2019-04-19 | Stop reason: SDUPTHER

## 2018-12-11 NOTE — PROGRESS NOTES
Patient Progress Note      CC: follow-up of DM2     Referring Provider  S[place], [place]  [address]  [place], [facility] Jose Roberto Sánchez     History of Present Illness:   Kai Romero is a 76 y o  male with a history of type 2 diabetes with long term use of insulin  Diabetes course has been stable  Complications of DM: CAD  Denies recent illness or hospitalizations  Denies recent severe hypoglycemic or severe hyperglycemic episodes  Denies any issues with his current regimen  Home glucose monitoring: are performed sporadically, typically once a week     Did not bring blood glucose log or glucometer  Today morning blood glucose was 170 mg/dl    Current regimen: Toujeo 50 units QHS, Novolog 20 units TID with meals and takes more if glucose is elevated, he just estimates, does not use a scale  Not using ICR and ISF (ICR 1:4 and ISF 1:15), metformin 500 mg BID  Reports he takes an extra 10 units of Novolog if over 300  Compliant most of the time, denies any side effects from medications  Injects in: abdomen  Rotates sites: Yes  Hypoglycemic episodes: Not recently, infrequent  H/o of hypoglycemia causing hospitalization or Intervention such as glucagon injection  or ambulance call :  No  Hypoglycemia symptoms: jitteriness and weakness  Treatment of hypoglycemia: juice    Medic alert tag: recommended: Yes    Diabetes education: yes   Diet: 3 meals per day, 1-2 snacks per day  Timing of meals is predictable  Diabetic diet compliance:  noncompliant some of the time  Activity: Daily activity is predictable: Yes, activity is limited    Further diabetic ROS: no polyuria or polydipsia, no chest pain, dyspnea or TIAs, no numbness, tingling or pain in extremities        Ophthamology: sees yearly; no retinopathy per patient  Podiatry: last visit was yesterday  Infuenza vaccine: up-to-date    Has hypertension: on metoprolol, compliant most of the time  Has hyperlipidemia: on rosuvastatin - tolerating well, no myalgias  compliant most of the time, denies any side effects from medications  Thyroid disorders: No  History of pancreatitis: No    Vitamin D deficiency: taking vitamin D3 3000 IU daily  Recently increased by PCP as vitamin D was slightly low  Patient Active Problem List   Diagnosis    Non-STEMI (non-ST elevated myocardial infarction) ([place] [address] )    Type 2 diabetes mellitus with hyperglycemia, with long-term current use of insulin ([facility])    S/P CABG x 3    Benign essential hypertension    Obesity    Vitamin D deficiency    Subclinical iodine-deficiency hypothyroidism    Aneurysm of infrarenal abdominal aorta ([place] [address] )    Coronary artery disease involving native coronary artery of native heart without angina pectoris    Dyslipidemia    Right bundle-branch block    Orthostatic hypotension    CKD (chronic kidney disease) stage 2, GFR 60-89 ml/min    Elevated troponin    Change in bowel habits    History of colon polyps    Anxiety    Type 2 diabetes mellitus with microalbuminuria, with long-term current use of insulin ([facility])    Need for influenza vaccination    Skin lesion of face    Tinea pedis of both feet    Allergic reaction      Past Medical History:   Diagnosis Date    AAA (abdominal aortic aneurysm) ([place] [address] )     Anxiety     CVD (cardiovascular disease)     Diabetes mellitus ([place] [address] )     DVT, lower extremity ([place] [address] )     Hyperlipidemia     Hypertension     NSTEMI (non-ST elevated myocardial infarction) ([place] [address] )     Prostate cancer ([place] [address] )     Right bundle branch block (RBBB)     Skin cancer       Past Surgical History:   Procedure Laterality Date    ANKLE ARTHROSCOPY W/ OPEN REPAIR Left     CARDIAC SURGERY      CORONARY ARTERY BYPASS GRAFT  12/11/2013    CABG x3 with LIMA to LAD, SVG to OM-1, SVG to posterior lateral, LYSIS of pericardial adhesions   CA COLONOSCOPY FLX DX W/COLLJ SPEC WHEN PFRMD N/A 8/9/2018    Procedure: COLONOSCOPY;  Surgeon: Kei Perry MD;  [facility];   Service: Gastroenterology    WA REPAIR UMBILICAL NYCE,7+Z/Z,ARH N/A 3/10/2017    Procedure: REPAIR HERNIA UMBILICAL;  Surgeon: Thelma Albert MD;  [facility];  Service: General    PROSTATECTOMY        Family History   Problem Relation Age of Onset    Crohn's disease Mother     Liver cancer Mother     Hypertension Mother     Crohn's disease Father     Liver cancer Father     Heart disease Father    [facility] Hypertension Father     Hyperlipidemia Father     Colon cancer Brother     Aortic aneurysm Brother         abdominal     Heart disease Brother         abdominal aortic aneurysm    Hypertension Brother     Hyperlipidemia Brother     Colon polyps Family     Stomach cancer Family     Hypertension Sister      Social History   Substance Use Topics    Smoking status: Former Smoker     Types: Cigarettes    Smokeless tobacco: Never Used      Comment: quit 1967    Alcohol use Yes      Comment: social     Allergies   Allergen Reactions    Sulfa Antibiotics Other (See Comments)     Generalized redness         Current Outpatient Prescriptions:     ACCU-CHEK FASTCLIX LANCETS MISC, Check blood sugar 4 times daily  , Disp: 306 each, Rfl: 1    ascorbic acid (VITAMIN C) 500 mg tablet, Take 500 mg by mouth daily, Disp: , Rfl:     aspirin 325 mg tablet, Take 1 tablet by mouth daily, Disp: , Rfl:     Cholecalciferol (VITAMIN D3 PO), Take 3,000 Units by mouth daily  , Disp: , Rfl:     clopidogrel (PLAVIX) 75 mg tablet, TAKE 1 TABLET DAILY, Disp: 90 tablet, Rfl: 1    cyanocobalamin (VITAMIN B-12) 500 mcg tablet, Take 1 tablet by mouth daily, Disp: , Rfl:     docusate sodium (COLACE) 100 mg capsule, Take 100 mg by mouth 2 (two) times a day , Disp: , Rfl:     glucose blood (ACCU-CHEK GUIDE) test strip, Check blood sugar 4 times daily  , Disp: 400 each, Rfl: 1    Insulin Glargine (TOUJEO SOLOSTAR) injection pen 300 units/mL, Inject 50 Units under the skin daily at bedtime for 90 days (Patient taking differently: Inject 60 Units under the skin daily at bedtime  ), Disp: 10 pen, Rfl: 1    Insulin Pen Needle (NOVOFINE AUTOCOVER) 30G X 8 MM MISC, The patient test his blood sugars 4 times daily  , Disp: 100 each, Rfl: 4    LORazepam (ATIVAN) 1 mg tablet, TAKE 1 TABLET BY MOUTH TWO TIMES DAILY AS NEEDED FOR ANXIETY, Disp: 60 tablet, Rfl: 0    metFORMIN (GLUCOPHAGE) 500 mg tablet, TAKE 1 TABLET BY MOUTH TWO TIMES DAILY WITH MEALS, Disp: 60 tablet, Rfl: 2    metoprolol succinate (TOPROL-XL) 25 mg 24 hr tablet, TAKE 1 TABLET DAILY (Patient taking differently: TAKE 1 TABLET BID), Disp: 90 tablet, Rfl: 1    Multiple Vitamins-Minerals (CENTRUM SILVER) tablet, Take 1 tablet by mouth daily, Disp: , Rfl:     nitroglycerin (NITROSTAT) 0 4 mg SL tablet, Place 1 tablet (0 4 mg total) under the tongue every 5 (five) minutes as needed for chest pain, Disp: 90 tablet, Rfl: 3    NOVOLOG FLEXPEN 100 units/mL injection pen, INJECT 20 UNITS BEFORE     MEALS (ICR 1:4, ISF 1:15), Disp: 60 mL, Rfl: 1    pantoprazole (PROTONIX) 40 mg tablet, TAKE 1 TABLET TWICE A DAY  30 MINUTES BEFORE BREAKFASTAND DINNER, Disp: 180 tablet, Rfl: 1    polyethylene glycol (MIRALAX) 17 g packet, Take 17 g by mouth daily, Disp: , Rfl:     ranolazine (RANEXA) 1000 MG SR tablet, Take 1 tablet (1,000 mg total) by mouth 2 (two) times a day, Disp: 180 tablet, Rfl: 3    rosuvastatin (CRESTOR) 40 MG tablet, Take 1 tablet (40 mg total) by mouth daily (Patient taking differently: Take 20 mg by mouth daily  ), Disp: 90 tablet, Rfl: 3    VIIBRYD 40 MG tablet, TAKE 1 TABLET BY MOUTH EVERY DAY, Disp: 30 tablet, Rfl: 5    econazole nitrate 1 % cream, Apply topically daily for 14 days, Disp: 15 g, Rfl: 0    senna (SENOKOT) 8 6 mg, Take 1 tablet by mouth daily  , Disp: , Rfl:   Review of Systems   Constitutional: Negative for activity change, appetite change, fatigue and unexpected weight change  HENT: Negative for trouble swallowing      Eyes: Negative for visual disturbance  Respiratory: Negative for shortness of breath  Cardiovascular: Negative for chest pain and palpitations  Gastrointestinal: Negative for constipation and diarrhea  Endocrine: Negative for cold intolerance, heat intolerance, polydipsia and polyuria  Musculoskeletal: Positive for arthralgias  Neurological: Negative for numbness  Psychiatric/Behavioral: Negative  Physical Exam:  Body mass index is 32 28 kg/m²  /80   Ht 5' 10" (1 778 m)   Wt 102 kg (225 lb)   BMI 32 28 kg/m²    Wt Readings from Last 3 Encounters:   12/12/18 102 kg (225 lb)   11/26/18 102 kg (225 lb 9 6 oz)   08/29/18 102 kg (224 lb)       Physical Exam   Constitutional: He appears well-developed and well-nourished  HENT:   Head: Normocephalic  Eyes: Pupils are equal, round, and reactive to light  EOM are normal  No scleral icterus  Neck: Neck supple  No thyromegaly present  Cardiovascular: Normal rate and regular rhythm  Murmur heard  Pulses:       Radial pulses are 2+ on the right side, and 2+ on the left side  Pulmonary/Chest: Effort normal and breath sounds normal  No respiratory distress  He has no wheezes  Neurological: He is alert  Skin: Skin is warm and dry  Psychiatric: He has a normal mood and affect  Nursing note and vitals reviewed  Patient's shoes and socks were not removed  Labs:      Ref   Range 10/31/2018 10:54   Sodium Latest Ref Range: 136 - 145 mmol/L 135 (L)   Potassium Latest Ref Range: 3 5 - 5 3 mmol/L 4 2   Chloride Latest Ref Range: 100 - 108 mmol/L 100   CO2 Latest Ref Range: 21 - 32 mmol/L 30   Anion Gap Latest Ref Range: 4 - 13 mmol/L 5   BUN Latest Ref Range: 5 - 25 mg/dL 23   Creatinine Latest Ref Range: 0 60 - 1 30 mg/dL 1 08   GLUCOSE FASTING Latest Ref Range: 65 - 99 mg/dL 127 (H)   Calcium Latest Ref Range: 8 3 - 10 1 mg/dL 9 0   AST Latest Ref Range: 5 - 45 U/L 19   ALT Latest Ref Range: 12 - 78 U/L 31   Alkaline Phosphatase Latest Ref Range: 46 - 116 U/L 68   Total Protein Latest Ref Range: 6 4 - 8 2 g/dL 8 0   Albumin Latest Ref Range: 3 5 - 5 0 g/dL 3 3 (L)   TOTAL BILIRUBIN Latest Ref Range: 0 20 - 1 00 mg/dL 0 55   eGFR Latest Units: ml/min/1 73sq m 65   Cholesterol Latest Ref Range: 50 - 200 mg/dL 122   Triglycerides Latest Ref Range: <=150 mg/dL 141   HDL Latest Ref Range: 40 - 60 mg/dL 34 (L)   LDL Direct Latest Ref Range: 0 - 100 mg/dL 60   Vit D, 25-Hydroxy Latest Ref Range: 30 0 - 100 0 ng/mL 28 2 (L)      Ref  Range 5/7/2018 12:25 10/31/2018 10:54   Hemoglobin A1C Latest Ref Range: 4 2 - 6 3 % 7 7 (H) 7 4 (H)   EAG Latest Units: mg/dl 174 166      Ref  Range 10/31/2018 12:41   EXT Creatinine Urine Latest Units: mg/dL 187 0   MICROALBUMIN/CREATININE RATIO Latest Ref Range: 0 - 30 mg/g creatinine 163 (H)   MICROALBUM ,U,RANDOM Latest Ref Range: 0 0 - 20 0 mg/L 305 0 (H)      Ref  Range 8/16/2016 13:45 7/3/2017 13:11 1/27/2018 08:13   TSH 3RD GENERATON Latest Ref Range: 0 358 - 3 740 uIU/mL 1 412 1 336 2 410   Free T4 Latest Ref Range: 0 76 - 1 46 ng/dL 0 99           Plan:    Diagnoses and all orders for this visit:    Type 2 diabetes mellitus with hyperglycemia, with long-term current use of insulin ([facility])  HGA1C 7 4%  Improved from 7 7%  Treatment regimen: Continue current treatment, discussed to use insulin as instructed  Unable to make changes as patient did not bring blood glucose log for review  He does not check his blood sugars as instructed  Prescribed Freestyle Anthony  Discussed intensive insulin regimen does increase risk for hypoglycemia  Episodes of hypoglycemia can lead to permanent disability and death  Discussed risks/complications associated with uncontrolled diabetes  Advised to adhere to diabetic diet, and recommended staying active/exercising routinely  Keep carbohydrates consistent to limit blood glucose fluctuations  Advised to call if blood sugars less than 70 mg/dl or over 300 mg/dl     Check blood glucose 4 times a day  Discussed symptoms and treatment of hypoglycemia  Discussed use of CGM to collect additional blood glucose data to reveal trends and patterns that can be used to optimize treatment plan  Recommended routine follow-up with podiatry and ophthalmology  Send log in 2 weeks  Ordered blood work to complete prior to next visit  -     Hemoglobin A1C; Future  -     Basic metabolic panel; Future    Benign essential hypertension  Blood pressure adequately controlled, continue current treatment  -     Basic metabolic panel; Future    Vitamin D deficiency  Vitamin D low at 28 2  Dose of vitamin D3 was recently increased to 3000 IU  Advised to continue current supplementation   -     Vitamin D 25 hydroxy; Future    Dyslipidemia  LDL 60, at goal  Continue statin therapy     Discussed with the patient diagnosis and treatment and all questions fully answered  He will call me if any problems arise  Counseled patient on diagnostic results, prognosis, risk and benefit of treatment options, instruction for management, importance of treatment compliance, risk factor reduction and impressions        Liliana Mcadams PA-C

## 2018-12-12 ENCOUNTER — OFFICE VISIT (OUTPATIENT)
Dept: ENDOCRINOLOGY | Facility: CLINIC | Age: 78
End: 2018-12-12
Payer: MEDICARE

## 2018-12-12 VITALS
BODY MASS INDEX: 32.21 KG/M2 | DIASTOLIC BLOOD PRESSURE: 80 MMHG | SYSTOLIC BLOOD PRESSURE: 138 MMHG | WEIGHT: 225 LBS | HEIGHT: 70 IN

## 2018-12-12 DIAGNOSIS — I10 BENIGN ESSENTIAL HYPERTENSION: ICD-10-CM

## 2018-12-12 DIAGNOSIS — E55.9 VITAMIN D DEFICIENCY: ICD-10-CM

## 2018-12-12 DIAGNOSIS — E78.5 DYSLIPIDEMIA: ICD-10-CM

## 2018-12-12 DIAGNOSIS — Z79.4 TYPE 2 DIABETES MELLITUS WITH HYPERGLYCEMIA, WITH LONG-TERM CURRENT USE OF INSULIN (HCC): Primary | ICD-10-CM

## 2018-12-12 DIAGNOSIS — E11.65 TYPE 2 DIABETES MELLITUS WITH HYPERGLYCEMIA, WITH LONG-TERM CURRENT USE OF INSULIN (HCC): Primary | ICD-10-CM

## 2018-12-12 PROCEDURE — 99214 OFFICE O/P EST MOD 30 MIN: CPT | Performed by: PHYSICIAN ASSISTANT

## 2018-12-12 RX ORDER — FLASH GLUCOSE SENSOR
1 KIT MISCELLANEOUS CONTINUOUS
Qty: 2 EACH | Refills: 2 | Status: SHIPPED | OUTPATIENT
Start: 2018-12-12 | End: 2020-01-01

## 2018-12-12 RX ORDER — FLASH GLUCOSE SCANNING READER
1 EACH MISCELLANEOUS CONTINUOUS
Qty: 1 DEVICE | Refills: 0 | Status: SHIPPED | OUTPATIENT
Start: 2018-12-12 | End: 2019-04-04 | Stop reason: SDUPTHER

## 2018-12-12 RX ORDER — POLYETHYLENE GLYCOL 3350 17 G/17G
17 POWDER, FOR SOLUTION ORAL AS NEEDED
COMMUNITY
End: 2019-11-29 | Stop reason: HOSPADM

## 2018-12-12 NOTE — PATIENT INSTRUCTIONS
INSULIN DOSAGE INSTRUCTIONS    Name: Alden Jackson                        : 1940  MRN #: [id number]    Your Current Insulin  and dose is: Before Breakfast Before Lunch Before Evening Meal Bedtime     Novolog Insulin   20 units     20 units   20 units    Regular, Apidra, Humalog orNovolog Sliding Scale:   <80              151-200 +2 +2 +2    201-250 +4 +4 +4 +   251-300 +6 +6 +6 +   301-350 +8 +8 +8 +   >350 +10 +10 +10 +     Novolog 70/30 Insulin  Humalog Mix 75/25 Insulin  Novolin 70/30 Insulin         Toujeo    50 units     Additional Instructions:   Please test your blood sugar:  _4_ Times per day  X_ Before Breakfast                _ Alternate Testing  X_ Before Lunch                _ 2 Hours After  Meal  X_ Before Evening Meal               _ 3 a m   x_ Before Bedtime Snack     Target Blood sugar range _100_to _180__  Call if your blood sugar is less than _70_ or greater than _400__  Today's Date: 2018      Hypoglycemia instructions   Alden Jackson  2018  [id number]    Low Blood Sugar    Steps to treat low blood sugar  1  Test blood sugar if you have symptoms of low blood sugar:   Low Blood Sugar Symptoms:  o Sweaty  o Dizzy  o Rapid heartbeat  o Shaky  o Bad mood  o Hungry      2  Treat blood sugar less than 70 with 15 grams of fast-acting carbohydrate:   Examples of 15 grams Fast-Acting Carbohydrate:  o 4 oz juice  o 4 oz regular soda  o 3-4 glucose tablets (chew)  o 3-4 hard candies (chew)          3  Wait 15 minutes and test your blood sugar again     4   If blood sugar is less than 100, repeat steps 2-3     5  When your blood sugar is 100 or more, eat a snack if it will be longer than one hour until your next meal  The snack should be 15 grams of carbohydrate and a protein:   Examples of snacks:  o ½ sandwich  o 6 crackers with cheese  o Piece of fruit with cheese or peanut butter  o 6 crackers with peanut butter

## 2018-12-19 ENCOUNTER — HOSPITAL ENCOUNTER (EMERGENCY)
Facility: HOSPITAL | Age: 78
Discharge: HOME | End: 2018-12-20
Attending: EMERGENCY MEDICINE | Admitting: EMERGENCY MEDICINE
Payer: MEDICARE

## 2018-12-19 ENCOUNTER — APPOINTMENT (EMERGENCY)
Dept: CT IMAGING | Facility: HOSPITAL | Age: 78
End: 2018-12-19
Payer: MEDICARE

## 2018-12-19 DIAGNOSIS — S22.080A COMPRESSION FRACTURE OF TWELFTH THORACIC VERTEBRA (HCC): Primary | ICD-10-CM

## 2018-12-19 LAB
ANION GAP SERPL CALCULATED.3IONS-SCNC: 4 MMOL/L (ref 4–13)
APTT PPP: 33 SECONDS (ref 26–38)
BASOPHILS # BLD AUTO: 0.03 THOUSANDS/ΜL (ref 0–0.1)
BASOPHILS NFR BLD AUTO: 0 % (ref 0–1)
BUN SERPL-MCNC: 25 MG/DL (ref 5–25)
CALCIUM SERPL-MCNC: 8.5 MG/DL (ref 8.3–10.1)
CHLORIDE SERPL-SCNC: 102 MMOL/L (ref 100–108)
CO2 SERPL-SCNC: 31 MMOL/L (ref 21–32)
CREAT SERPL-MCNC: 1.12 MG/DL (ref 0.6–1.3)
EOSINOPHIL # BLD AUTO: 0.69 THOUSAND/ΜL (ref 0–0.61)
EOSINOPHIL NFR BLD AUTO: 9 % (ref 0–6)
ERYTHROCYTE [DISTWIDTH] IN BLOOD BY AUTOMATED COUNT: 13.9 % (ref 11.6–15.1)
GFR SERPL CREATININE-BSD FRML MDRD: 62 ML/MIN/1.73SQ M
GLUCOSE SERPL-MCNC: 228 MG/DL (ref 65–140)
HCT VFR BLD AUTO: 38.9 % (ref 36.5–49.3)
HGB BLD-MCNC: 12.6 G/DL (ref 12–17)
HOLD SPECIMEN: NORMAL
IMM GRANULOCYTES # BLD AUTO: 0.03 THOUSAND/UL (ref 0–0.2)
IMM GRANULOCYTES NFR BLD AUTO: 0 % (ref 0–2)
INR PPP: 1.02 (ref 0.86–1.17)
LYMPHOCYTES # BLD AUTO: 1.08 THOUSANDS/ΜL (ref 0.6–4.47)
LYMPHOCYTES NFR BLD AUTO: 14 % (ref 14–44)
MCH RBC QN AUTO: 30.7 PG (ref 26.8–34.3)
MCHC RBC AUTO-ENTMCNC: 32.4 G/DL (ref 31.4–37.4)
MCV RBC AUTO: 95 FL (ref 82–98)
MONOCYTES # BLD AUTO: 0.65 THOUSAND/ΜL (ref 0.17–1.22)
MONOCYTES NFR BLD AUTO: 9 % (ref 4–12)
NEUTROPHILS # BLD AUTO: 5.16 THOUSANDS/ΜL (ref 1.85–7.62)
NEUTS SEG NFR BLD AUTO: 68 % (ref 43–75)
NRBC BLD AUTO-RTO: 0 /100 WBCS
PLATELET # BLD AUTO: 266 THOUSANDS/UL (ref 149–390)
PMV BLD AUTO: 10.2 FL (ref 8.9–12.7)
POTASSIUM SERPL-SCNC: 4.3 MMOL/L (ref 3.5–5.3)
PROTHROMBIN TIME: 13.1 SECONDS (ref 11.8–14.2)
RBC # BLD AUTO: 4.11 MILLION/UL (ref 3.88–5.62)
SODIUM SERPL-SCNC: 137 MMOL/L (ref 136–145)
WBC # BLD AUTO: 7.64 THOUSAND/UL (ref 4.31–10.16)

## 2018-12-19 PROCEDURE — 85610 PROTHROMBIN TIME: CPT | Performed by: EMERGENCY MEDICINE

## 2018-12-19 PROCEDURE — 96374 THER/PROPH/DIAG INJ IV PUSH: CPT

## 2018-12-19 PROCEDURE — 85730 THROMBOPLASTIN TIME PARTIAL: CPT | Performed by: EMERGENCY MEDICINE

## 2018-12-19 PROCEDURE — 36415 COLL VENOUS BLD VENIPUNCTURE: CPT | Performed by: EMERGENCY MEDICINE

## 2018-12-19 PROCEDURE — 93005 ELECTROCARDIOGRAM TRACING: CPT

## 2018-12-19 PROCEDURE — 85025 COMPLETE CBC W/AUTO DIFF WBC: CPT | Performed by: EMERGENCY MEDICINE

## 2018-12-19 PROCEDURE — 80048 BASIC METABOLIC PNL TOTAL CA: CPT | Performed by: EMERGENCY MEDICINE

## 2018-12-19 PROCEDURE — 99284 EMERGENCY DEPT VISIT MOD MDM: CPT

## 2018-12-19 PROCEDURE — 74177 CT ABD & PELVIS W/CONTRAST: CPT

## 2018-12-19 RX ORDER — HYDROMORPHONE HCL/PF 1 MG/ML
0.5 SYRINGE (ML) INJECTION ONCE
Status: COMPLETED | OUTPATIENT
Start: 2018-12-19 | End: 2018-12-19

## 2018-12-19 RX ADMIN — HYDROMORPHONE HYDROCHLORIDE 0.5 MG: 1 INJECTION, SOLUTION INTRAMUSCULAR; INTRAVENOUS; SUBCUTANEOUS at 21:58

## 2018-12-19 RX ADMIN — IOHEXOL 100 ML: 350 INJECTION, SOLUTION INTRAVENOUS at 22:46

## 2018-12-20 VITALS
BODY MASS INDEX: 31.5 KG/M2 | HEIGHT: 70 IN | DIASTOLIC BLOOD PRESSURE: 67 MMHG | RESPIRATION RATE: 18 BRPM | OXYGEN SATURATION: 94 % | WEIGHT: 220 LBS | SYSTOLIC BLOOD PRESSURE: 146 MMHG | HEART RATE: 78 BPM | TEMPERATURE: 99 F

## 2018-12-20 LAB
ATRIAL RATE: 70 BPM
P AXIS: 49 DEGREES
PR INTERVAL: 160 MS
QRS AXIS: 65 DEGREES
QRSD INTERVAL: 154 MS
QT INTERVAL: 452 MS
QTC INTERVAL: 488 MS
T WAVE AXIS: 58 DEGREES
VENTRICULAR RATE: 70 BPM

## 2018-12-20 PROCEDURE — 93010 ELECTROCARDIOGRAM REPORT: CPT | Performed by: INTERNAL MEDICINE

## 2018-12-20 RX ORDER — ACETAMINOPHEN AND CODEINE PHOSPHATE 300; 30 MG/1; MG/1
1 TABLET ORAL EVERY 6 HOURS PRN
Qty: 20 TABLET | Refills: 0 | Status: SHIPPED | OUTPATIENT
Start: 2018-12-20 | End: 2019-01-02 | Stop reason: SDUPTHER

## 2018-12-20 NOTE — ED NOTES
Dr brown aware of patient's history and presentation  Patient describes back pain to be "in front of my spine in my back"   Last AAA evaluation with MD was 6 months ago "they told me it is at 2 5ish and when it gets to 3 they will do something about itEast Margi Garcia, RN  12/19/18 [id number]

## 2018-12-20 NOTE — ED PROVIDER NOTES
History  Chief Complaint   Patient presents with    Fall     Fall two days ago, at home, tripped at home, fell foward  Took tylenol for pain, HX of AAA  2 1/2cm, pain when laying down or standing up  History provided by:  Patient   used: No     [de-identified] y/o male presented for eval of mid to low back pain with some radiation into the abdomen after a fall 2 days ago  Akash Mattschner forwards  No head strike  Takes ASA, plavix  Reports hx of AAA and is concerned due to location of his pain  Pain moderate, constant, worse with movement, deep breathing  Abdomen soft, nontender  No ecchymosis  Has mid spine tenderness  No neuro deficits  Plan CT A/P to r/o injury/AAA rupture  Prior to Admission Medications   Prescriptions Last Dose Informant Patient Reported? Taking? ACCU-CHEK FASTCLIX LANCETS MISC   No Yes   Sig: Check blood sugar 4 times daily  Cholecalciferol (VITAMIN D3 PO)  Self Yes Yes   Sig: Take 3,000 Units by mouth daily     Continuous Blood Gluc  (FREESTYLE POLINA 14 DAY READER) DMITRI   No Yes   Si Device by Does not apply route continuous for 30 days Scan at least 4 times a day to monitor blood glucose levels   Continuous Blood Gluc Sensor (ZamzeeSTYLE POLINA 14 DAY SENSOR) MISC   No Yes   Si each by Does not apply route continuous   Insulin Glargine (TOUJEO SOLOSTAR) injection pen 300 units/mL  Self No Yes   Sig: Inject 50 Units under the skin daily at bedtime for 90 days   Patient taking differently: Inject 60 Units under the skin daily at bedtime     Insulin Pen Needle (NOVOFINE AUTOCOVER) 30G X 8 MM MISC   No Yes   Sig: The patient test his blood sugars 4 times daily     LORazepam (ATIVAN) 1 mg tablet   No Yes   Sig: TAKE 1 TABLET BY MOUTH TWO TIMES DAILY AS NEEDED FOR ANXIETY   Multiple Vitamins-Minerals (CENTRUM SILVER) tablet  Self Yes Yes   Sig: Take 1 tablet by mouth daily   NOVOLOG FLEXPEN 100 units/mL injection pen  Self No Yes   Sig: INJECT 20 UNITS BEFORE     MEALS (ICR 1:4, ISF 1:15)   VIIBRYD 40 MG tablet   No Yes   Sig: TAKE 1 TABLET BY MOUTH EVERY DAY   ascorbic acid (VITAMIN C) 500 mg tablet  Self Yes Yes   Sig: Take 500 mg by mouth daily   aspirin 325 mg tablet  Self Yes Yes   Sig: Take 1 tablet by mouth daily   clopidogrel (PLAVIX) 75 mg tablet   No Yes   Sig: TAKE 1 TABLET DAILY   cyanocobalamin (VITAMIN B-12) 500 mcg tablet  Self Yes Yes   Sig: Take 1 tablet by mouth daily   docusate sodium (COLACE) 100 mg capsule  Self Yes Yes   Sig: Take 100 mg by mouth 2 (two) times a day    econazole nitrate 1 % cream   No Yes   Sig: Apply topically daily for 14 days   glucose blood (ACCU-CHEK GUIDE) test strip  Self No Yes   Sig: Check blood sugar 4 times daily     metFORMIN (GLUCOPHAGE) 500 mg tablet  Self No Yes   Sig: TAKE 1 TABLET BY MOUTH TWO TIMES DAILY WITH MEALS   metoprolol succinate (TOPROL-XL) 25 mg 24 hr tablet  Self No Yes   Sig: TAKE 1 TABLET DAILY   Patient taking differently: TAKE 1 TABLET BID   nitroglycerin (NITROSTAT) 0 4 mg SL tablet  Self No Yes   Sig: Place 1 tablet (0 4 mg total) under the tongue every 5 (five) minutes as needed for chest pain   pantoprazole (PROTONIX) 40 mg tablet   No Yes   Sig: TAKE 1 TABLET TWICE A DAY  30 MINUTES BEFORE BREAKFASTAND DINNER   polyethylene glycol (MIRALAX) 17 g packet  Self Yes Yes   Sig: Take 17 g by mouth daily   ranolazine (RANEXA) 1000 MG SR tablet  Self No Yes   Sig: Take 1 tablet (1,000 mg total) by mouth 2 (two) times a day   rosuvastatin (CRESTOR) 40 MG tablet  Self No Yes   Sig: Take 1 tablet (40 mg total) by mouth daily   Patient taking differently: Take 20 mg by mouth daily     senna (SENOKOT) 8 6 mg  Self Yes Yes   Sig: Take 1 tablet by mouth daily        Facility-Administered Medications: None       Past Medical History:   Diagnosis Date    AAA (abdominal aortic aneurysm) (HCC)     Anxiety     CVD (cardiovascular disease)     Diabetes mellitus (HCC)     DVT, lower extremity (HCC)     Hyperlipidemia     Hypertension     [facility]non-ST elevated [facility] infarction) [facility]     Prostate cancer ([facility] )     Right bundle branch block (RBBB)     Skin cancer        Past Surgical History:   Procedure Laterality Date    ANKLE ARTHROSCOPY W/ OPEN REPAIR Left     CARDIAC SURGERY      CORONARY ARTERY BYPASS GRAFT  12/11/2013    CABG x3 with LIMA to LAD, SVG to OM-1, SVG to posterior lateral, LYSIS of pericardial adhesions   PA COLONOSCOPY FLX DX W/COLLJ SPEC WHEN PFRMD N/A 8/9/2018    Procedure: COLONOSCOPY;  Surgeon: Rosenda Fontaine MD;  Location: [facility]; Service: Gastroenterology    PA REPAIR UMBILICAL GYKM,8+Y/V,XANZA N/A 3/10/2017    Procedure: REPAIR HERNIA UMBILICAL;  Surgeon: Regina Montero MD;  [facility]: [facility];  Service: General    PROSTATECTOMY         Family History   Problem Relation Age of Onset    Crohn's disease Mother     Liver cancer Mother     Hypertension Mother     Crohn's disease Father     Liver cancer Father     Heart disease Father     Hypertension Father     Hyperlipidemia Father     Colon cancer Brother     Aortic aneurysm Brother         abdominal     Heart disease Brother         abdominal aortic aneurysm    Hypertension Brother     Hyperlipidemia Brother     Colon polyps Family     Stomach cancer Family     Hypertension Sister      I have reviewed and agree with the history as documented  Social History   Substance Use Topics    Smoking status: Former Smoker     Types: Cigarettes    Smokeless tobacco: Never Used      Comment: quit 1967    Alcohol use Yes      Comment: social        Review of Systems   Constitutional: Negative for activity change, appetite change and fever  HENT: Negative for congestion and dental problem  Respiratory: Negative for cough, chest tightness and shortness of breath  Cardiovascular: Negative for chest pain and leg swelling  Gastrointestinal: Positive for abdominal pain  Negative for nausea and vomiting     Musculoskeletal: Positive for back pain  Negative for gait problem and neck pain  Neurological: Negative for dizziness, weakness and headaches  All other systems reviewed and are negative  Physical Exam  Physical Exam   Constitutional: He is oriented to person, place, and time  He appears well-developed and well-nourished  No distress  HENT:   Head: Normocephalic and atraumatic  Mouth/Throat: Oropharynx is clear and moist    Neck: Normal range of motion  Neck supple  Cardiovascular: Normal rate, regular rhythm and intact distal pulses  Abdominal: Soft  He exhibits no distension  There is no tenderness  Musculoskeletal: Normal range of motion  He exhibits no edema  Tenderness of the mid spine  No stepoff  Neurological: He is alert and oriented to person, place, and time  No sensory deficit  He exhibits normal muscle tone  Skin: Skin is warm and dry  Psychiatric: He has a normal mood and affect  His behavior is normal    Nursing note and vitals reviewed        Vital Signs  ED Triage Vitals   Temperature Pulse Respirations Blood Pressure SpO2   12/19/18 1922 12/19/18 1922 12/19/18 1922 12/19/18 1923 12/19/18 1922   99 °F (37 2 °C) 73 20 151/65 95 %      Temp Source Heart Rate Source Patient Position - Orthostatic VS BP Location FiO2 (%)   12/19/18 1922 12/19/18 1922 12/19/18 1922 12/19/18 1922 --   Oral Monitor Sitting Right arm       Pain Score       12/19/18 1922       No Pain           Vitals:    12/19/18 2252 12/19/18 2300 12/20/18 0012 12/20/18 0030   BP: (!) 187/63 (!) 182/81 162/62 146/67   Pulse:  82 80 78   Patient Position - Orthostatic VS: Lying Lying Lying Lying       Visual Acuity      ED Medications  Medications   HYDROmorphone (DILAUDID) injection 0 5 mg (0 5 mg Intravenous Given 12/19/18 2158)   iohexol (OMNIPAQUE) 350 MG/ML injection (MULTI-DOSE) 100 mL (100 mL Intravenous Given 12/19/18 2246)       Diagnostic Studies  Results Reviewed     Procedure Component Value Units Date/Time    [place] draw [[id number]] Collected:  12/19/18 2146    Lab Status:  Final result Specimen:  Blood Updated:  12/19/18 2301    Narrative: The following orders were created for panel order [place] draw  Procedure                               Abnormality         Status                     ---------                               -----------         ------                     Neida Josiah Top on ZLZU[[id number]]                           Final result               Green / Yellow tube on CWNI[[id number]]                      Final result               Green / Black tube on KYAS[[id number]]                       Final result               Lavender Top 3 ml on SXOC[[id number]]                        Final result               Lavender Top 7ml on DVBR[[id number]]                         Final result                 Please view results for these tests on the individual orders  Basic metabolic panel [[id number]]  (Abnormal) Collected:  12/19/18 2146    Lab Status:  Final result Specimen:  Blood from Arm, Right Updated:  12/19/18 2210     Sodium 137 mmol/L      Potassium 4 3 mmol/L      Chloride 102 mmol/L      CO2 31 mmol/L      ANION GAP 4 mmol/L      BUN 25 mg/dL      Creatinine 1 12 mg/dL      Glucose 228 (H) mg/dL      Calcium 8 5 mg/dL      eGFR 62 ml/min/1 73sq m     Narrative:         National Kidney Disease Education Program recommendations are as follows:  GFR calculation is accurate only with a steady state creatinine  Chronic Kidney disease less than 60 ml/min/1 73 sq  meters  Kidney failure less than 15 ml/min/1 73 sq  meters      Cloretta Reddish [[id number]]  (Normal) Collected:  12/19/18 2146    Lab Status:  Final result Specimen:  Blood from Arm, Right Updated:  12/19/18 2204     Protime 13 1 seconds      INR 1 02    APTT [[id number]]  (Normal) Collected:  12/19/18 2146    Lab Status:  Final result Specimen:  Blood from Arm, Right Updated:  12/19/18 2204     PTT 33 seconds     CBC and differential [[id number]]  (Abnormal) Collected:  12/19/18 2146    Lab Status:  Final result Specimen:  Blood from Arm, Right Updated:  12/19/18 2201     WBC 7 64 Thousand/uL      RBC 4 11 Million/uL      Hemoglobin 12 6 g/dL      Hematocrit 38 9 %      MCV 95 fL      MCH 30 7 pg      MCHC 32 4 g/dL      RDW 13 9 %      MPV 10 2 fL      Platelets 169 Thousands/uL      nRBC 0 /100 WBCs      Neutrophils Relative 68 %      Immat GRANS % 0 %      Lymphocytes Relative 14 %      Monocytes Relative 9 %      Eosinophils Relative 9 (H) %      Basophils Relative 0 %      Neutrophils Absolute 5 16 Thousands/µL      Immature Grans Absolute 0 03 Thousand/uL      Lymphocytes Absolute 1 08 Thousands/µL      Monocytes Absolute 0 65 Thousand/µL      Eosinophils Absolute 0 69 (H) Thousand/µL      Basophils Absolute 0 03 Thousands/µL                  CT abdomen pelvis with contrast   Final Result by Eve Briggs MD (12/19 2309)      No evidence of solid organ injury  Minimally displaced acute appearing superior endplate fracture of U40  No acute intra-abdominal abnormality  No free air or free fluid  Stable ectatic/mildly aneurysmal infrarenal abdominal aorta  Workstation performed: [id number]                    Procedures  Procedures       Phone Contacts  ED Phone Contact    ED Course                               MDM  Number of Diagnoses or Management Options  Compression fracture of twelfth thoracic vertebra [facility]): new and requires workup  Diagnosis management comments: [de-identified] y/o male with trip and fall forwards landing on abdomen 2 days ago with back pain radiating to abdomen  CT notable for minimal compression fracture of T12 which is consistent with his symptoms  Pain controlled in ED  Given Rx for analgesics  Discussed using stool softener  Patient declined TLSO brace  Given f/u info for ortho  Advised return for any worsening sx         Amount and/or Complexity of Data Reviewed  Clinical lab tests: ordered and reviewed  Tests in the radiology section of CPT®: ordered and reviewed    Patient Progress  Patient progress: improved    CritCare Time    Disposition  Final diagnoses:   Compression fracture of twelfth thoracic vertebra [facility])     Time reflects when diagnosis was documented in both MDM as applicable and the Disposition within this note     Time User Action Codes Description Comment    12/20/2018 12:31 AM Jesse Hanna Add [O16 331N] Compression fracture of twelfth thoracic vertebra [facility])       ED Disposition     ED Disposition Condition Comment    Discharge  Ebb Sharp discharge to home/self care  Condition at discharge: Stable        Follow-up Information     Follow up With Specialties Details Why Contact Info Additional Information    [facility] Physical [facility]   [address] [address] [phone] [facility], [place], [place], [address]          Discharge Medication List as of 12/20/2018 12:34 AM      START taking these medications    Details   acetaminophen-codeine (TYLENOL/CODEINE #3) 300-30 MG per tablet Take 1 tablet by mouth every 6 (six) hours as needed for moderate pain, Starting Thu 12/20/2018, Print         CONTINUE these medications which have NOT CHANGED    Details   ACCU-CHEK FASTCLIX LANCETS MISC Check blood sugar 4 times daily  , Normal      ascorbic acid (VITAMIN C) 500 mg tablet Take 500 mg by mouth daily, Historical Med      aspirin 325 mg tablet Take 1 tablet by mouth daily, Starting Fri 1/3/2014, Historical Med      Cholecalciferol (VITAMIN D3 PO) Take 3,000 Units by mouth daily  , Historical Med      clopidogrel (PLAVIX) 75 mg tablet TAKE 1 TABLET DAILY, Normal      Continuous Blood Gluc  (FREESTYLE POLINA 14 DAY READER) DMITRI 1 Device by Does not apply route continuous for 30 days Scan at least 4 times a day to monitor blood glucose levels, Starting Wed 12/12/2018, Until Fri 1/11/2019, Normal      Continuous Blood Gluc Sensor (FREESTYLE POLINA 14 DAY SENSOR) MISC 1 each by Does not apply route continuous, Starting Wed 12/12/2018, Normal      cyanocobalamin (VITAMIN B-12) 500 mcg tablet Take 1 tablet by mouth daily, Starting Mon 7/10/2017, Historical Med      docusate sodium (COLACE) 100 mg capsule Take 100 mg by mouth 2 (two) times a day , Historical Med      econazole nitrate 1 % cream Apply topically daily for 14 days, Starting Mon 11/26/2018, Until Wed 12/19/2018, Normal      glucose blood (ACCU-CHEK GUIDE) test strip Check blood sugar 4 times daily  , Normal      Insulin Glargine (TOUJEO SOLOSTAR) injection pen 300 units/mL Inject 50 Units under the skin daily at bedtime for 90 days, Starting Thu 4/5/2018, Until Wed 12/19/2018, Normal      Insulin Pen Needle (NOVOFINE AUTOCOVER) 30G X 8 MM MISC The patient test his blood sugars 4 times daily  , Normal      LORazepam (ATIVAN) 1 mg tablet TAKE 1 TABLET BY MOUTH TWO TIMES DAILY AS NEEDED FOR ANXIETY, Normal      metFORMIN (GLUCOPHAGE) 500 mg tablet TAKE 1 TABLET BY MOUTH TWO TIMES DAILY WITH MEALS, Normal      metoprolol succinate (TOPROL-XL) 25 mg 24 hr tablet TAKE 1 TABLET DAILY, Normal      Multiple Vitamins-Minerals (CENTRUM SILVER) tablet Take 1 tablet by mouth daily, Starting Thu 10/5/2017, Historical Med      nitroglycerin (NITROSTAT) 0 4 mg SL tablet Place 1 tablet (0 4 mg total) under the tongue every 5 (five) minutes as needed for chest pain, Starting Tue 5/8/2018, Normal      NOVOLOG FLEXPEN 100 units/mL injection pen INJECT 20 UNITS BEFORE     MEALS (ICR 1:4, ISF 1:15), Normal      pantoprazole (PROTONIX) 40 mg tablet TAKE 1 TABLET TWICE A DAY  30 MINUTES BEFORE BREAKFASTAND DINNER, Normal      polyethylene glycol (MIRALAX) 17 g packet Take 17 g by mouth daily, Historical Med      ranolazine (RANEXA) 1000 MG SR tablet Take 1 tablet (1,000 mg total) by mouth 2 (two) times a day, Starting Fri 6/8/2018, Normal      rosuvastatin (CRESTOR) 40 MG tablet Take 1 tablet (40 mg total) by mouth daily, Starting Tue 8/14/2018, Normal      senna (SENOKOT) 8 6 mg Take 1 tablet by mouth daily  , Starting Mon 7/17/2017, Historical Med      VIIBRYD 40 MG tablet TAKE 1 TABLET BY MOUTH EVERY DAY, Normal           No discharge procedures on file      ED Provider  Electronically Signed by           Olga Bashir MD  12/26/18 5150

## 2018-12-20 NOTE — DISCHARGE INSTRUCTIONS
Vertebral Compression Fracture   WHAT YOU NEED TO KNOW:   A vertebral compression fracture (VCF) is a break in a part of the vertebra  Vertebrae are the round, strong bones that form your spine  VCFs most often occur in the thoracic (middle) and lumbar (lower) areas of your spine  Fractures may be mild to severe  DISCHARGE INSTRUCTIONS:   Medicines: You may need any of the following:  · NSAIDs , such as ibuprofen, help decrease swelling, pain, and fever  This medicine is available with or without a doctor's order  NSAIDs can cause stomach bleeding or kidney problems in certain people  If you take blood thinner medicine, always ask if NSAIDs are safe for you  Always read the medicine label and follow directions  Do not give these medicines to children under 10months of age without direction from your child's healthcare provider  · Acetaminophen  decreases pain and fever  It is available without a doctor's order  Ask how much to take and how often to take it  Follow directions  Acetaminophen can cause liver damage if not taken correctly  · Prescription pain medicine  may be given  Ask your healthcare provider how to take this medicine safely  · Bisphosphonates and calcitonin  may be recommended to help your bones get stronger  They can decrease the pain of a VCF caused by osteoporosis, and decrease your risk for another fracture  · Take your medicine as directed  Contact your healthcare provider if you think your medicine is not helping or if you have side effects  Tell him or her if you are allergic to any medicine  Keep a list of the medicines, vitamins, and herbs you take  Include the amounts, and when and why you take them  Bring the list or the pill bottles to follow-up visits  Carry your medicine list with you in case of an emergency  Follow up with your healthcare provider as directed: You may need to return for x-rays or other tests   Write down your questions so you remember to ask them during your visits  Heat and ice:   · Apply ice  on your back for 15 to 20 minutes every hour or as directed  Use an ice pack, or put crushed ice in a plastic bag  Cover it with a towel  Ice helps prevent tissue damage and decreases swelling and pain  · Apply heat  on your back for 20 to 30 minutes every 2 hours for as many days as directed  Heat helps decrease pain and muscle spasms  Activity:   · Avoid activities that may make the pain worse, such as picking up heavy objects  When the pain decreases, begin normal, slow movements as directed by your healthcare provider  Your healthcare provider may have you do weight-bearing exercises such as walking  You may also do non-weight-bearing exercises such as swimming and bicycling  · You may need to use a walker or cane  Ask your healthcare provider for more information about how to use a cane or a walker  · When you  objects, bend at the hips and knees  Never bend from the waist only  Use bent knees and your leg muscles as you lift the object  While you lift the object, keep it close to your chest  Try not to twist or lift anything above your waist   Physical and occupational therapy:  Your healthcare provider may recommend physical and occupational therapy  A physical therapist teaches you exercises to help improve movement and strength, and to decrease pain  An occupational therapist teaches you skills to help with your daily activities  Manage pain during sleep:   · Do not sleep on a waterbed  Waterbeds do not provide good back support  · Sleep on a firm mattress  You may also put a ½ to 1-inch piece of plywood between the mattress and box spring  · Sleep on your back with a pillow under your knees  This will decrease pressure on your back  You may also sleep on your side with 1 or both of your knees bent and a pillow between them  It may also be helpful to sleep on your stomach with a pillow under you at waist level    Contact your healthcare provider if:   · You are not hungry, and you are losing weight  · You cannot sleep or rest because of back pain  · You have pain or swelling in your back that is getting worse, or does not go away  · You have questions or concerns about your condition or care  Return to the emergency department if:   · You feel lightheaded, short of breath, and have chest pain  · You cough up blood  · Your arm or leg feels warm, tender, and painful  It may look swollen and red  · You have new problems urinating or having bowel movements  · You have severe pain in your back after falling, bending forward, sneezing, or coughing strongly  · You suddenly cannot feel your legs  · You suddenly have trouble moving your arms or legs  © 2017 [address] Information is for End User's use only and may not be sold, redistributed or otherwise used for commercial purposes  All illustrations and images included in CareNotes® are the copyrighted property of A D A M , Inc  or [employer]  The above information is an  only  It is not intended as medical advice for individual conditions or treatments  Talk to your doctor, nurse or pharmacist before following any medical regimen to see if it is safe and effective for you

## 2018-12-28 ENCOUNTER — TELEPHONE (OUTPATIENT)
Dept: PHYSICAL THERAPY | Facility: OTHER | Age: 78
End: 2018-12-28

## 2018-12-28 NOTE — TELEPHONE ENCOUNTER
Pt's wife Lavell Hinojosa), called the [facility] to schedule her  for physical therapy  He was seen in the E R  On 12/19/2018, Pt  was seen for s/p fall and had a CT scan which did show an acute compression fx of 12th thoracic vertebrae, as reported by Pt's wife  Pt's wife called us due to the after visit summary from the E R  Pt  has not been seen by his PCP or any other doctor after being told of the compression fracture  This R N  explained to Pt's wife that he will need clearance from a doctor prior to starting physical therapy, since this is a new fracture  , and Pt's wife agreed  This R N  did review with Pt's wife what we can offer to Pt  after he receives clearance from Dr Pedro Pablo Cameron  Our phone # and hours reviewed with Pt's wife  She did ask if there are any "close by" physical therapy locations, and location were given to her for future consideration  Pt was transferred to orthopedic call center to schedule appointment with Dr Pedro Pablo Cameron   Pt  has an appointment for 1/2/2019 @9am

## 2018-12-31 DIAGNOSIS — F41.9 ANXIETY: ICD-10-CM

## 2018-12-31 NOTE — TELEPHONE ENCOUNTER
PDMP CHECKED  LAST FILL: 11/26  QUANT: 60      Prescriptions   Filled  ID  Written  Drug  QTY  Days  Prescriber  Rx #  Pharmacy *  Refills  Daily Dose  Pymt Type      12/20/2018  1  12/20/2018  ACETAMINOPHEN-COD #3 TABLET  20 0  5  BR TAG  [id number]  WEGMA ([id number])  0  18  0 MME  Comm Ins  [place]    11/26/2018  1  11/26/2018  LORAZEPAM 1 MG TABLET  60 0  30  [facility] MAT  [id number]  WEGMA ([id number])  0   Comm Ins  [place]

## 2019-01-01 ENCOUNTER — TELEPHONE (OUTPATIENT)
Dept: INTERNAL MEDICINE CLINIC | Facility: CLINIC | Age: 79
End: 2019-01-01

## 2019-01-01 ENCOUNTER — PATIENT OUTREACH (OUTPATIENT)
Dept: INTERNAL MEDICINE CLINIC | Facility: CLINIC | Age: 79
End: 2019-01-01

## 2019-01-01 ENCOUNTER — NURSING HOME VISIT (OUTPATIENT)
Dept: GERIATRICS | Facility: OTHER | Age: 79
End: 2019-01-01
Payer: MEDICARE

## 2019-01-01 ENCOUNTER — OFFICE VISIT (OUTPATIENT)
Dept: INTERNAL MEDICINE CLINIC | Facility: CLINIC | Age: 79
End: 2019-01-01
Payer: MEDICARE

## 2019-01-01 VITALS
OXYGEN SATURATION: 90 % | HEART RATE: 90 BPM | SYSTOLIC BLOOD PRESSURE: 100 MMHG | DIASTOLIC BLOOD PRESSURE: 60 MMHG | WEIGHT: 218.6 LBS | BODY MASS INDEX: 31.3 KG/M2 | HEIGHT: 70 IN | TEMPERATURE: 98.1 F

## 2019-01-01 DIAGNOSIS — Z79.4 TYPE 2 DIABETES MELLITUS WITH HYPOGLYCEMIA WITHOUT COMA, WITH LONG-TERM CURRENT USE OF INSULIN (HCC): Primary | ICD-10-CM

## 2019-01-01 DIAGNOSIS — E11.649 TYPE 2 DIABETES MELLITUS WITH HYPOGLYCEMIA WITHOUT COMA, WITH LONG-TERM CURRENT USE OF INSULIN (HCC): ICD-10-CM

## 2019-01-01 DIAGNOSIS — R32 URINARY INCONTINENCE, UNSPECIFIED TYPE: ICD-10-CM

## 2019-01-01 DIAGNOSIS — E55.9 VITAMIN D DEFICIENCY: ICD-10-CM

## 2019-01-01 DIAGNOSIS — F41.9 ANXIETY: ICD-10-CM

## 2019-01-01 DIAGNOSIS — N18.9 ACUTE KIDNEY INJURY SUPERIMPOSED ON CKD (HCC): Primary | ICD-10-CM

## 2019-01-01 DIAGNOSIS — R55 SYNCOPE AND COLLAPSE: Primary | ICD-10-CM

## 2019-01-01 DIAGNOSIS — S10.91XA ABRASION OF NECK, INITIAL ENCOUNTER: Primary | ICD-10-CM

## 2019-01-01 DIAGNOSIS — A04.72 C. DIFFICILE DIARRHEA: ICD-10-CM

## 2019-01-01 DIAGNOSIS — I71.4 ANEURYSM OF INFRARENAL ABDOMINAL AORTA (HCC): ICD-10-CM

## 2019-01-01 DIAGNOSIS — R55 SYNCOPE AND COLLAPSE: ICD-10-CM

## 2019-01-01 DIAGNOSIS — E11.649 TYPE 2 DIABETES MELLITUS WITH HYPOGLYCEMIA WITHOUT COMA, WITH LONG-TERM CURRENT USE OF INSULIN (HCC): Primary | ICD-10-CM

## 2019-01-01 DIAGNOSIS — Z79.4 TYPE 2 DIABETES MELLITUS WITH HYPOGLYCEMIA WITHOUT COMA, WITH LONG-TERM CURRENT USE OF INSULIN (HCC): ICD-10-CM

## 2019-01-01 DIAGNOSIS — I95.1 ORTHOSTATIC HYPOTENSION: ICD-10-CM

## 2019-01-01 DIAGNOSIS — R53.81 PHYSICAL DECONDITIONING: ICD-10-CM

## 2019-01-01 DIAGNOSIS — E78.5 DYSLIPIDEMIA: ICD-10-CM

## 2019-01-01 DIAGNOSIS — I25.10 CORONARY ARTERY DISEASE INVOLVING NATIVE CORONARY ARTERY OF NATIVE HEART WITHOUT ANGINA PECTORIS: ICD-10-CM

## 2019-01-01 DIAGNOSIS — E66.09 CLASS 1 OBESITY DUE TO EXCESS CALORIES WITH SERIOUS COMORBIDITY AND BODY MASS INDEX (BMI) OF 31.0 TO 31.9 IN ADULT: ICD-10-CM

## 2019-01-01 DIAGNOSIS — J06.9 UPPER RESPIRATORY TRACT INFECTION, UNSPECIFIED TYPE: Primary | ICD-10-CM

## 2019-01-01 DIAGNOSIS — G25.0 ESSENTIAL TREMOR: ICD-10-CM

## 2019-01-01 DIAGNOSIS — N17.9 ACUTE KIDNEY INJURY SUPERIMPOSED ON CKD (HCC): Primary | ICD-10-CM

## 2019-01-01 PROCEDURE — 99309 SBSQ NF CARE MODERATE MDM 30: CPT | Performed by: NURSE PRACTITIONER

## 2019-01-01 PROCEDURE — 99316 NF DSCHRG MGMT 30 MIN+: CPT | Performed by: NURSE PRACTITIONER

## 2019-01-01 PROCEDURE — 99213 OFFICE O/P EST LOW 20 MIN: CPT | Performed by: NURSE PRACTITIONER

## 2019-01-01 PROCEDURE — 99308 SBSQ NF CARE LOW MDM 20: CPT | Performed by: NURSE PRACTITIONER

## 2019-01-01 RX ORDER — AMOXICILLIN 500 MG/1
500 CAPSULE ORAL EVERY 8 HOURS SCHEDULED
Qty: 21 CAPSULE | Refills: 0 | Status: SHIPPED | OUTPATIENT
Start: 2019-01-01 | End: 2019-01-01

## 2019-01-01 RX ORDER — LORAZEPAM 1 MG/1
1 TABLET ORAL 2 TIMES DAILY PRN
Qty: 60 TABLET | Refills: 0 | Status: SHIPPED | OUTPATIENT
Start: 2019-01-01 | End: 2019-01-31 | Stop reason: SDUPTHER

## 2019-01-01 RX ORDER — ALBUTEROL SULFATE 90 UG/1
2 AEROSOL, METERED RESPIRATORY (INHALATION) EVERY 6 HOURS PRN
Qty: 1 INHALER | Refills: 1 | Status: SHIPPED | OUTPATIENT
Start: 2019-01-01 | End: 2020-01-01

## 2019-01-01 RX ORDER — INSULIN GLARGINE 100 [IU]/ML
INJECTION, SOLUTION SUBCUTANEOUS
Refills: 0
Start: 2019-01-01 | End: 2020-01-01 | Stop reason: DRUGHIGH

## 2019-01-01 RX ORDER — LORAZEPAM 1 MG/1
1 TABLET ORAL 2 TIMES DAILY
Qty: 4 TABLET | Refills: 0 | Status: SHIPPED | OUTPATIENT
Start: 2019-01-01 | End: 2020-01-01 | Stop reason: SDUPTHER

## 2019-01-02 ENCOUNTER — OFFICE VISIT (OUTPATIENT)
Dept: OBGYN CLINIC | Facility: CLINIC | Age: 79
End: 2019-01-02
Payer: MEDICARE

## 2019-01-02 VITALS
WEIGHT: 220 LBS | SYSTOLIC BLOOD PRESSURE: 112 MMHG | BODY MASS INDEX: 31.5 KG/M2 | HEART RATE: 86 BPM | HEIGHT: 70 IN | DIASTOLIC BLOOD PRESSURE: 66 MMHG

## 2019-01-02 DIAGNOSIS — S22.080A COMPRESSION FRACTURE OF TWELFTH THORACIC VERTEBRA (HCC): ICD-10-CM

## 2019-01-02 PROCEDURE — 99213 OFFICE O/P EST LOW 20 MIN: CPT | Performed by: ORTHOPAEDIC SURGERY

## 2019-01-02 RX ORDER — ACETAMINOPHEN AND CODEINE PHOSPHATE 300; 30 MG/1; MG/1
1 TABLET ORAL EVERY 6 HOURS PRN
Qty: 20 TABLET | Refills: 0 | Status: ON HOLD | OUTPATIENT
Start: 2019-01-02 | End: 2019-04-01 | Stop reason: ALTCHOICE

## 2019-01-02 NOTE — ASSESSMENT & PLAN NOTE
Patient presents for evaluation of activity related lower back pain  He reports history of frequent falls as well as history of previous compression deformity at L3 and T11  Recently has been diagnosed with a T12 compression deformity  He has been taking Tylenol with codeine for relief sparingly    On physical exam he appears stated age in well-developed in no acute distress  He is awake alert oriented x3  Affect is appropriate  He has essential tremors bilateral upper extremities  He demonstrates no significant reproducible tenderness to palpation over the lumbosacral spine  No crepitus  Skin is intact  Lower extremities demonstrate good strength L2-S1 bilaterally  Sensation is grossly intact to light touch L2-S1 bilaterally  Negative modified straight leg raise bilaterally  CT imaging demonstrates T12 compression deformity which appears acute compared to imaging from 4 months ago  He also has chronic T11 and L3 compression deformities    Assessment and plan  Subacute T12 compression deformity/fracture  Conservative management is recommended including pain medicine and LSO brace with avoidance of heavy lifting or repetitive bending  The alternative to this is discussed which would include kyphoplasty  LSO brace will be arranged      Follow-up in 4 weeks for re-evaluation and new x-rays

## 2019-01-02 NOTE — PROGRESS NOTES
Assessment/Plan:    Compression fracture of twelfth thoracic vertebra [facility])  Patient presents for evaluation of activity related lower back pain  He reports history of frequent falls as well as history of previous compression deformity at L3 and T11  Recently has been diagnosed with a T12 compression deformity  He has been taking Tylenol with codeine for relief sparingly    On physical exam he appears stated age in well-developed in no acute distress  He is awake alert oriented x3  Affect is appropriate  He has essential tremors bilateral upper extremities  He demonstrates no significant reproducible tenderness to palpation over the lumbosacral spine  No crepitus  Skin is intact  Lower extremities demonstrate good strength L2-S1 bilaterally  Sensation is grossly intact to light touch L2-S1 bilaterally  Negative modified straight leg raise bilaterally  CT imaging demonstrates T12 compression deformity which appears acute compared to imaging from 4 months ago  He also has chronic T11 and L3 compression deformities    Assessment and plan  Subacute T12 compression deformity/fracture  Conservative management is recommended including pain medicine and LSO brace with avoidance of heavy lifting or repetitive bending  The alternative to this is discussed which would include kyphoplasty  LSO brace will be arranged  Follow-up in 4 weeks for re-evaluation and new x-rays    · Acute T12 fracture  · Injury date 12/17/18  · Tylenol #3 Refilled today  · LSO brace provided  · Patient to wear at all times with exceptions of lying down  · Follow up in one month       Problem List Items Addressed This Visit     Compression fracture of twelfth thoracic vertebra [facility])     Patient presents for evaluation of activity related lower back pain  He reports history of frequent falls as well as history of previous compression deformity at L3 and T11  Recently has been diagnosed with a T12 compression deformity    He has been taking Tylenol with codeine for relief sparingly    On physical exam he appears stated age in well-developed in no acute distress  He is awake alert oriented x3  Affect is appropriate  He has essential tremors bilateral upper extremities  He demonstrates no significant reproducible tenderness to palpation over the lumbosacral spine  No crepitus  Skin is intact  Lower extremities demonstrate good strength L2-S1 bilaterally  Sensation is grossly intact to light touch L2-S1 bilaterally  Negative modified straight leg raise bilaterally  CT imaging demonstrates T12 compression deformity which appears acute compared to imaging from 4 months ago  He also has chronic T11 and L3 compression deformities    Assessment and plan  Subacute T12 compression deformity/fracture  Conservative management is recommended including pain medicine and LSO brace with avoidance of heavy lifting or repetitive bending  The alternative to this is discussed which would include kyphoplasty  LSO brace will be arranged  Follow-up in 4 weeks for re-evaluation and new x-rays         Relevant Medications    acetaminophen-codeine (TYLENOL/CODEINE #3) 300-30 MG per tablet            Subjective:      Patient ID: Luis Enrique Black is a [de-identified] y o  male  HPI   The patient is here for initial evaluation of T12 fracture sustained on 12/17/18 after a fall on his abdomin  Today he complains of thoracic pain  Mornings are the most painful  Getting in and out of bed aggravates  Sitting alleviates  He was seen at the ED 12/19/18 including a CT scan showing a T12 fracture  He has been using tylenol #3 with benefit  He had past history of compression fractures and had been treated at [facility]  He has had no acute bowel or bladder changes  He does have history of multiple falls         The following portions of the patient's history were reviewed and updated as appropriate: current medications, past family history, past medical history, past social history, past surgical history and problem list     Review of Systems   Constitutional: Negative for chills, fever and unexpected weight change  HENT: Negative for hearing loss, nosebleeds and sore throat  Eyes: Negative for pain, redness and visual disturbance  Respiratory: Negative for cough, shortness of breath and wheezing  Cardiovascular: Negative for chest pain, palpitations and leg swelling  Gastrointestinal: Negative for abdominal pain, nausea and vomiting  Endocrine: Negative for polydipsia and polyuria  Genitourinary: Negative for difficulty urinating and hematuria  Skin: Negative for rash and wound  Psychiatric/Behavioral: Negative for decreased concentration and suicidal ideas  The patient is not nervous/anxious  Objective:      /66   Pulse 86   Ht 5' 10" (1 778 m)   Wt 99 8 kg (220 lb)   BMI 31 57 kg/m²          Physical Exam   Constitutional: He is oriented to person, place, and time  He appears well-developed and well-nourished  HENT:   Right Ear: External ear normal    Left Ear: External ear normal    Nose: Nose normal    Eyes: Pupils are equal, round, and reactive to light  Conjunctivae and EOM are normal    Neck: Normal range of motion  Pulmonary/Chest: Effort normal    Neurological: He is alert and oriented to person, place, and time  Skin: Skin is warm and dry  Psychiatric: He has a normal mood and affect   His behavior is normal  Judgment and thought content normal        Patient ambulates without ambulatation  Tender to palpation: none  Modified straight leg raise negative bilaterally  Strength L2-S1 5/5 bilaterally  Sensation L2-S1 intact bilaterally      [id number] Abdomen CT reviewed with patient      Scribe Attestation    I,:   Daphne Alvarez am acting as a scribe while in the presence of the attending physician :        I,:   Alvaro Kerr MD personally performed the services described in this documentation    as scribed in my presence :

## 2019-01-07 DIAGNOSIS — E11.65 UNCONTROLLED TYPE 2 DIABETES MELLITUS WITH HYPERGLYCEMIA (HCC): Primary | ICD-10-CM

## 2019-01-16 DIAGNOSIS — E13.9 DIABETES 1.5, MANAGED AS TYPE 2 (HCC): ICD-10-CM

## 2019-01-16 RX ORDER — INSULIN ASPART 100 [IU]/ML
INJECTION, SOLUTION INTRAVENOUS; SUBCUTANEOUS
Qty: 60 ML | Refills: 1 | Status: SHIPPED | OUTPATIENT
Start: 2019-01-16 | End: 2019-07-09 | Stop reason: SDUPTHER

## 2019-01-31 DIAGNOSIS — F41.9 ANXIETY: ICD-10-CM

## 2019-01-31 RX ORDER — LORAZEPAM 1 MG/1
1 TABLET ORAL 2 TIMES DAILY PRN
Qty: 60 TABLET | Refills: 0 | Status: SHIPPED | OUTPATIENT
Start: 2019-01-31 | End: 2019-03-04 | Stop reason: SDUPTHER

## 2019-02-04 ENCOUNTER — HOSPITAL ENCOUNTER (EMERGENCY)
Facility: HOSPITAL | Age: 79
Discharge: HOME | End: 2019-02-04
Attending: EMERGENCY MEDICINE | Admitting: EMERGENCY MEDICINE
Payer: MEDICARE

## 2019-02-04 ENCOUNTER — APPOINTMENT (EMERGENCY)
Dept: RADIOLOGY | Facility: HOSPITAL | Age: 79
End: 2019-02-04
Payer: MEDICARE

## 2019-02-04 VITALS
DIASTOLIC BLOOD PRESSURE: 64 MMHG | OXYGEN SATURATION: 98 % | HEART RATE: 76 BPM | RESPIRATION RATE: 18 BRPM | SYSTOLIC BLOOD PRESSURE: 137 MMHG | TEMPERATURE: 97.6 F
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
## 2019-02-04 DIAGNOSIS — R53.1 GENERALIZED WEAKNESS: ICD-10-CM

## 2019-02-04 DIAGNOSIS — N39.0 UTI (URINARY TRACT INFECTION): Primary | ICD-10-CM

## 2019-02-04 LAB
ALBUMIN SERPL BCP-MCNC: 3.3 G/DL (ref 3.5–5)
ALP SERPL-CCNC: 93 U/L (ref 46–116)
ALT SERPL W P-5'-P-CCNC: 29 U/L (ref 12–78)
ANION GAP SERPL CALCULATED.3IONS-SCNC: 8 MMOL/L (ref 4–13)
AST SERPL W P-5'-P-CCNC: 20 U/L (ref 5–45)
ATRIAL RATE: 83 BPM
BACTERIA UR QL AUTO: ABNORMAL /HPF
BASOPHILS # BLD AUTO: 0.03 THOUSANDS/ΜL (ref 0–0.1)
BASOPHILS NFR BLD AUTO: 0 % (ref 0–1)
BILIRUB SERPL-MCNC: 0.41 MG/DL (ref 0.2–1)
BILIRUB UR QL STRIP: ABNORMAL
BUN SERPL-MCNC: 27 MG/DL (ref 5–25)
CALCIUM SERPL-MCNC: 8.9 MG/DL (ref 8.3–10.1)
CHLORIDE SERPL-SCNC: 102 MMOL/L (ref 100–108)
CLARITY UR: CLEAR
CO2 SERPL-SCNC: 25 MMOL/L (ref 21–32)
COLOR UR: ABNORMAL
COLOR, POC: NORMAL
CREAT SERPL-MCNC: 1.38 MG/DL (ref 0.6–1.3)
EOSINOPHIL # BLD AUTO: 0.5 THOUSAND/ΜL (ref 0–0.61)
EOSINOPHIL NFR BLD AUTO: 7 % (ref 0–6)
ERYTHROCYTE [DISTWIDTH] IN BLOOD BY AUTOMATED COUNT: 14 % (ref 11.6–15.1)
GFR SERPL CREATININE-BSD FRML MDRD: 48 ML/MIN/1.73SQ M
GLUCOSE SERPL-MCNC: 192 MG/DL (ref 65–140)
GLUCOSE SERPL-MCNC: 204 MG/DL (ref 65–140)
GLUCOSE UR STRIP-MCNC: NEGATIVE MG/DL
HCT VFR BLD AUTO: 39 % (ref 36.5–49.3)
HGB BLD-MCNC: 12.9 G/DL (ref 12–17)
HGB UR QL STRIP.AUTO: NEGATIVE
HYALINE CASTS #/AREA URNS LPF: ABNORMAL /LPF
IMM GRANULOCYTES # BLD AUTO: 0.03 THOUSAND/UL (ref 0–0.2)
IMM GRANULOCYTES NFR BLD AUTO: 0 % (ref 0–2)
KETONES UR STRIP-MCNC: ABNORMAL MG/DL
LEUKOCYTE ESTERASE UR QL STRIP: NEGATIVE
LYMPHOCYTES # BLD AUTO: 1 THOUSANDS/ΜL (ref 0.6–4.47)
LYMPHOCYTES NFR BLD AUTO: 14 % (ref 14–44)
MCH RBC QN AUTO: 31.4 PG (ref 26.8–34.3)
MCHC RBC AUTO-ENTMCNC: 33.1 G/DL (ref 31.4–37.4)
MCV RBC AUTO: 95 FL (ref 82–98)
MONOCYTES # BLD AUTO: 0.6 THOUSAND/ΜL (ref 0.17–1.22)
MONOCYTES NFR BLD AUTO: 9 % (ref 4–12)
MUCOUS THREADS UR QL AUTO: ABNORMAL
NEUTROPHILS # BLD AUTO: 4.91 THOUSANDS/ΜL (ref 1.85–7.62)
NEUTS SEG NFR BLD AUTO: 70 % (ref 43–75)
NITRITE UR QL STRIP: POSITIVE
NON-SQ EPI CELLS URNS QL MICRO: ABNORMAL /HPF
NRBC BLD AUTO-RTO: 0 /100 WBCS
P AXIS: 34 DEGREES
PH UR STRIP.AUTO: 5.5 [PH] (ref 4.5–8)
PLATELET # BLD AUTO: 285 THOUSANDS/UL (ref 149–390)
PMV BLD AUTO: 10.5 FL (ref 8.9–12.7)
POTASSIUM SERPL-SCNC: 4.4 MMOL/L (ref 3.5–5.3)
PR INTERVAL: 172 MS
PROT SERPL-MCNC: 7.8 G/DL (ref 6.4–8.2)
PROT UR STRIP-MCNC: ABNORMAL MG/DL
QRS AXIS: -66 DEGREES
QRSD INTERVAL: 168 MS
QT INTERVAL: 444 MS
QTC INTERVAL: 521 MS
RBC # BLD AUTO: 4.11 MILLION/UL (ref 3.88–5.62)
RBC #/AREA URNS AUTO: ABNORMAL /HPF
SODIUM SERPL-SCNC: 135 MMOL/L (ref 136–145)
SP GR UR STRIP.AUTO: >=1.03 (ref 1–1.03)
T WAVE AXIS: 34 DEGREES
TROPONIN I SERPL-MCNC: 0.04 NG/ML
TROPONIN I SERPL-MCNC: 0.05 NG/ML
UROBILINOGEN UR QL STRIP.AUTO: 1 E.U./DL
VENTRICULAR RATE: 83 BPM
WBC # BLD AUTO: 7.07 THOUSAND/UL (ref 4.31–10.16)
WBC #/AREA URNS AUTO: ABNORMAL /HPF

## 2019-02-04 PROCEDURE — 93005 ELECTROCARDIOGRAM TRACING: CPT

## 2019-02-04 PROCEDURE — 84484 ASSAY OF TROPONIN QUANT: CPT | Performed by: EMERGENCY MEDICINE

## 2019-02-04 PROCEDURE — 71046 X-RAY EXAM CHEST 2 VIEWS: CPT

## 2019-02-04 PROCEDURE — 82948 REAGENT STRIP/BLOOD GLUCOSE: CPT

## 2019-02-04 PROCEDURE — 80053 COMPREHEN METABOLIC PANEL: CPT | Performed by: EMERGENCY MEDICINE

## 2019-02-04 PROCEDURE — 70450 CT HEAD/BRAIN W/O DYE: CPT

## 2019-02-04 PROCEDURE — 85025 COMPLETE CBC W/AUTO DIFF WBC: CPT | Performed by: EMERGENCY MEDICINE

## 2019-02-04 PROCEDURE — 81001 URINALYSIS AUTO W/SCOPE: CPT

## 2019-02-04 PROCEDURE — 93010 ELECTROCARDIOGRAM REPORT: CPT | Performed by: INTERNAL MEDICINE

## 2019-02-04 PROCEDURE — 99284 EMERGENCY DEPT VISIT MOD MDM: CPT

## 2019-02-04 PROCEDURE — 36415 COLL VENOUS BLD VENIPUNCTURE: CPT | Performed by: EMERGENCY MEDICINE

## 2019-02-04 PROCEDURE — 96360 HYDRATION IV INFUSION INIT: CPT

## 2019-02-04 RX ORDER — CEPHALEXIN 500 MG/1
500 CAPSULE ORAL EVERY 12 HOURS SCHEDULED
Qty: 14 CAPSULE | Refills: 0 | Status: SHIPPED | OUTPATIENT
Start: 2019-02-04 | End: 2019-02-11

## 2019-02-04 RX ORDER — CEPHALEXIN 250 MG/1
500 CAPSULE ORAL ONCE
Status: COMPLETED | OUTPATIENT
Start: 2019-02-04 | End: 2019-02-04

## 2019-02-04 RX ADMIN — SODIUM CHLORIDE 1000 ML: 0.9 INJECTION, SOLUTION INTRAVENOUS at 17:06

## 2019-02-04 RX ADMIN — CEPHALEXIN 500 MG: 250 CAPSULE ORAL at 18:43

## 2019-02-04 NOTE — ED PROVIDER NOTES
History  Chief Complaint   Patient presents with    Fall     Pt was visiting a patient in the hospital and became dizzy and weak and fell onto his right side  No head strike  No LOC  Pt is a diabetic      [de-identified] yo M presents to ED for evaluation after he became weak/shaky and fell  He had just eaten lunch in the cafeteria and took 24 units of novolog  He was walking with his cane and noticed his knees felt shaky and says he knew he was going to fall  He says he tipped himself to the R side so that he could avoid hitting his head  He landed on his R arm  Did not hit his head  No LOC  Pt says multiple people helped him up, but he thinks he would have been able to get up on his own  He has been ambulatory since then  Pt says he now feels at his baseline  Denies any pain or injuries from the fall  Pt reports he has had multiple similar episodes in the past  Sometimes mechanical falls, sometimes similar episodes of feeling weak/shaky and falling  Sometimes has LOC with these falls, but not today  He says they are usually due to either his blood sugar or his blood pressure  Pt says he has had workup for these episodes and has another appointment with his cardiologist tomorrow for follow up  Pt says his last fall resulted in compression fracture T12  No longer causing him symptoms  Per chart review, he does have known AAA, last seen on CT at 3 4 cm a few months ago  Prior to Admission Medications   Prescriptions Last Dose Informant Patient Reported? Taking? ACCU-CHEK FASTCLIX LANCETS MISC  Self No Yes   Sig: Check blood sugar 4 times daily     Cholecalciferol (VITAMIN D3 PO)  Self Yes Yes   Sig: Take 3,000 Units by mouth daily     Continuous Blood Gluc  (FREESTYLE POLINA 14 DAY READER) DMITRI  Self No No   Si Device by Does not apply route continuous for 30 days Scan at least 4 times a day to monitor blood glucose levels   Continuous Blood Gluc Sensor (FREESTYLE POLINA 14 DAY SENSOR) MISC  Self No No Si each by Does not apply route continuous   Insulin Glargine (TOUJEO SOLOSTAR) injection pen 300 units/mL  Self No Yes   Sig: Inject 50 Units under the skin daily at bedtime for 90 days   Patient taking differently: Inject 60 Units under the skin daily at bedtime     Insulin Pen Needle (NOVOFINE AUTOCOVER) 30G X 8 MM MISC  Self No No   Sig: The patient test his blood sugars 4 times daily  LORazepam (ATIVAN) 1 mg tablet   No Yes   Sig: Take 1 tablet (1 mg total) by mouth 2 (two) times a day as needed for anxiety   Patient taking differently: Take 0 5 mg by mouth 2 (two) times a day as needed for anxiety     Multiple Vitamins-Minerals (CENTRUM SILVER) tablet  Self Yes Yes   Sig: Take 1 tablet by mouth daily   NOVOLOG FLEXPEN 100 units/mL injection pen   No Yes   Sig: FOR DIRECTIONS ON HOW TO   TAKE THIS MEDICINE, READ   THE ENCLOSED MEDICATION    INFORMATION FORM   VIIBRYD 40 MG tablet  Self No Yes   Sig: TAKE 1 TABLET BY MOUTH EVERY DAY   acetaminophen-codeine (TYLENOL/CODEINE #3) 300-30 MG per tablet   No No   Sig: Take 1 tablet by mouth every 6 (six) hours as needed for moderate pain   ascorbic acid (VITAMIN C) 500 mg tablet  Self Yes Yes   Sig: Take 500 mg by mouth daily   aspirin 325 mg tablet  Self Yes Yes   Sig: Take 1 tablet by mouth daily   clopidogrel (PLAVIX) 75 mg tablet  Self No Yes   Sig: TAKE 1 TABLET DAILY   cyanocobalamin (VITAMIN B-12) 500 mcg tablet  Self Yes Yes   Sig: Take 1 tablet by mouth daily   docusate sodium (COLACE) 100 mg capsule  Self Yes Yes   Sig: Take 100 mg by mouth 2 (two) times a day  glucose blood (ACCU-CHEK GUIDE) test strip   No Yes   Sig: Check blood sugar 4 times daily     metFORMIN (GLUCOPHAGE) 500 mg tablet  Self No Yes   Sig: TAKE 1 TABLET BY MOUTH TWO TIMES DAILY WITH MEALS   metoprolol succinate (TOPROL-XL) 25 mg 24 hr tablet  Self No Yes   Sig: TAKE 1 TABLET DAILY   Patient taking differently: TAKE 1 TABLET BID   nitroglycerin (NITROSTAT) 0 4 mg SL tablet  Self No No   Sig: Place 1 tablet (0 4 mg total) under the tongue every 5 (five) minutes as needed for chest pain   pantoprazole (PROTONIX) 40 mg tablet  Self No Yes   Sig: TAKE 1 TABLET TWICE A DAY  30 MINUTES BEFORE BREAKFASTAND DINNER   polyethylene glycol (MIRALAX) 17 g packet  Self Yes No   Sig: Take 17 g by mouth daily   ranolazine (RANEXA) 1000 MG SR tablet  Self No Yes   Sig: Take 1 tablet (1,000 mg total) by mouth 2 (two) times a day   rosuvastatin (CRESTOR) 40 MG tablet  Self No Yes   Sig: Take 1 tablet (40 mg total) by mouth daily   Patient taking differently: Take 20 mg by mouth daily     senna (SENOKOT) 8 6 mg  Self Yes Yes   Sig: Take 1 tablet by mouth daily        Facility-Administered Medications: None       Past Medical History:   Diagnosis Date    AAA (abdominal aortic aneurysm) (HCC)     Anxiety     CVD (cardiovascular disease)     Diabetes mellitus ([address] )     DVT, lower extremity (HCC)     Hyperlipidemia     Hypertension     [facility]non-ST elevated [facility]     Prostate cancer ([facility] )     Right bundle branch block (RBBB)     Skin cancer        Past Surgical History:   Procedure Laterality Date    ANKLE ARTHROSCOPY W/ OPEN REPAIR Left     CARDIAC SURGERY      CORONARY ARTERY BYPASS GRAFT  12/11/2013    CABG x3 with LIMA to LAD, SVG to OM-1, SVG to posterior lateral, LYSIS of pericardial adhesions   DC COLONOSCOPY FLX DX W/COLLJ SPEC WHEN PFRMD N/A 8/9/2018    Procedure: COLONOSCOPY;  Surgeon: Liot Puente MD;  Location: [facility];   Service: Gastroenterology    DC REPAIR UMBILICAL AWWO,3+M/J,COCNP N/A 3/10/2017    Procedure: REPAIR HERNIA UMBILICAL;  Surgeon: Cherry Allen MD;  Location: [facility];  Service: General    PROSTATECTOMY         Family History   Problem Relation Age of Onset    Crohn's disease Mother     Liver cancer Mother     Hypertension Mother     Crohn's disease Father     Liver cancer Father     Heart disease Father     Hypertension Father  Hyperlipidemia Father     Colon cancer Brother     Aortic aneurysm Brother         abdominal     Heart disease Brother         abdominal aortic aneurysm    Hypertension Brother     Hyperlipidemia Brother     Colon polyps Family     Stomach cancer Family     Hypertension Sister      I have reviewed and agree with the history as documented  Social History   Substance Use Topics    Smoking status: Former Smoker     Types: Cigarettes    Smokeless tobacco: Never Used      Comment: quit 1967    Alcohol use Yes      Comment: seldom        Review of Systems   Constitutional: Negative for chills, fatigue and fever  HENT: Negative for congestion, rhinorrhea, sore throat and trouble swallowing  Eyes: Negative for pain, discharge and visual disturbance  Respiratory: Negative for cough, chest tightness and shortness of breath  Cardiovascular: Negative for chest pain, palpitations and leg swelling  Gastrointestinal: Negative for abdominal pain, nausea and vomiting  Genitourinary: Negative for decreased urine volume, dysuria, frequency and urgency  Musculoskeletal: Negative for joint swelling, neck pain and neck stiffness  Skin: Negative for color change, rash and wound  Neurological: Positive for weakness  Negative for dizziness, syncope, light-headedness and headaches  Physical Exam  ED Triage Vitals [02/04/19 1356]   Temperature Pulse Respirations Blood Pressure SpO2   97 6 °F (36 4 °C) 82 18 119/58 94 %      Temp Source Heart Rate Source Patient Position - Orthostatic VS BP Location FiO2 (%)   Oral Monitor Sitting Right arm --      Pain Score       No Pain           Orthostatic Vital Signs  Vitals:    02/04/19 1715 02/04/19 1730 02/04/19 1800 02/04/19 1830   BP: 149/70 155/67 137/65 137/64   Pulse: 75 74 74 76   Patient Position - Orthostatic VS: Sitting Sitting Sitting Sitting       Physical Exam   Constitutional: He is oriented to person, place, and time   He appears well-developed and well-nourished  No distress  HENT:   Head: Normocephalic and atraumatic  Mouth/Throat: Oropharynx is clear and moist    Eyes: Pupils are equal, round, and reactive to light  Conjunctivae and EOM are normal  Right eye exhibits no discharge  Left eye exhibits no discharge  Neck: Normal range of motion  Neck supple  nontender to palpation   Cardiovascular: Normal rate, regular rhythm and intact distal pulses  Pulmonary/Chest: Effort normal and breath sounds normal  No stridor  He exhibits no tenderness  Abdominal: Soft  Bowel sounds are normal  He exhibits distension  There is no tenderness  There is no rebound and no guarding  Musculoskeletal: Normal range of motion  He exhibits no edema, tenderness or deformity  nontender extremities, normal ROM without pain  Back nontender to palpation   Neurological: He is alert and oriented to person, place, and time  No sensory deficit  He exhibits normal muscle tone  Nursing note and vitals reviewed        Bedside ultrasound known AAA- 3 3cm at most dilated    ED Medications  Medications   sodium chloride 0 9 % bolus 1,000 mL (0 mL Intravenous Stopped 2/4/19 1833)   cephalexin (KEFLEX) capsule 500 mg (500 mg Oral Given 2/4/19 1843)       Diagnostic Studies  Results Reviewed     Procedure Component Value Units Date/Time    Urine Microscopic [[id number]]  (Abnormal) Collected:  02/04/19 1717    Lab Status:  Final result Specimen:  Urine from Urine, Clean Catch Updated:  02/04/19 1834     RBC, UA None Seen /hpf      WBC, UA 2-4 (A) /hpf      Epithelial Cells Occasional /hpf      Bacteria, UA None Seen /hpf      Hyaline Casts, UA 0-3 (A) /lpf      MUCUS THREADS Occasional (A)    Troponin I [[id number]]  (Normal) Collected:  02/04/19 1658    Lab Status:  Final result Specimen:  Blood from Arm, Left Updated:  02/04/19 1833     Troponin I 0 04 ng/mL     POCT urinalysis dipstick [[id number]]  (Normal) Resulted:  02/04/19 1721    Lab Status:  Final result Updated: 02/04/19 1722     Color, UA see results    ED Urine Macroscopic [[id number]]  (Abnormal) Collected:  02/04/19 1717    Lab Status:  Final result Specimen:  Urine Updated:  02/04/19 1715     Color, UA Olga     Clarity, UA Clear     pH, UA 5 5     Leukocytes, UA Negative     Nitrite, UA Positive (A)     Protein,  (2+) (A) mg/dl      Glucose, UA Negative mg/dl      Ketones, UA 15 (1+) (A) mg/dl      Urobilinogen, UA 1 0 E U /dl      Bilirubin, UA Interference- unable to analyze (A)     Blood, UA Negative     Specific Gravity, UA >=1 030    Narrative:       CLINITEK RESULT    Comprehensive metabolic panel [[id number]]  (Abnormal) Collected:  02/04/19 1401    Lab Status:  Final result Specimen:  Blood from Arm, Right Updated:  02/04/19 1457     Sodium 135 (L) mmol/L      Potassium 4 4 mmol/L      Chloride 102 mmol/L      CO2 25 mmol/L      ANION GAP 8 mmol/L      BUN 27 (H) mg/dL      Creatinine 1 38 (H) mg/dL      Glucose 192 (H) mg/dL      Calcium 8 9 mg/dL      AST 20 U/L      ALT 29 U/L      Alkaline Phosphatase 93 U/L      Total Protein 7 8 g/dL      Albumin 3 3 (L) g/dL      Total Bilirubin 0 41 mg/dL      eGFR 48 ml/min/1 73sq m     Narrative:         National Kidney Disease Education Program recommendations are as follows:  GFR calculation is accurate only with a steady state creatinine  Chronic Kidney disease less than 60 ml/min/1 73 sq  meters  Kidney failure less than 15 ml/min/1 73 sq  meters      Troponin I [[id number]]  (Abnormal) Collected:  02/04/19 1401    Lab Status:  Final result Specimen:  Blood from Arm, Right Updated:  02/04/19 1434     Troponin I 0 05 (H) ng/mL     CBC and differential [[id number]]  (Abnormal) Collected:  02/04/19 1401    Lab Status:  Final result Specimen:  Blood from Arm, Right Updated:  02/04/19 1415     WBC 7 07 Thousand/uL      RBC 4 11 Million/uL      Hemoglobin 12 9 g/dL      Hematocrit 39 0 %      MCV 95 fL      MCH 31 4 pg      MCHC 33 1 g/dL      RDW 14 0 %      MPV 10 5 fL      Platelets 064 Thousands/uL      nRBC 0 /100 WBCs      Neutrophils Relative 70 %      Immat GRANS % 0 %      Lymphocytes Relative 14 %      Monocytes Relative 9 %      Eosinophils Relative 7 (H) %      Basophils Relative 0 %      Neutrophils Absolute 4 91 Thousands/µL      Immature Grans Absolute 0 03 Thousand/uL      Lymphocytes Absolute 1 00 Thousands/µL      Monocytes Absolute 0 60 Thousand/µL      Eosinophils Absolute 0 50 Thousand/µL      Basophils Absolute 0 03 Thousands/µL     Fingerstick Glucose (POCT) [[id number]]  (Abnormal) Collected:  02/04/19 1350    Lab Status:  Final result Updated:  02/04/19 1351     POC Glucose 204 (H) mg/dl                  XR chest 2 views   Final Result by Yashira Rodríguez MD (02/04 7227)      1  No active pulmonary disease  2   Moderate anterior wedging at T12, new from CT of 12/19/2018  The study was marked in EPIC for significant notification  Workstation performed: [id number]         CT head without contrast   Final Result by Melva Parker MD (02/04 1743)      No acute intracranial abnormality  Microangiopathic changes  Workstation performed: [id number]               Procedures  ECG 12 Lead Documentation  Date/Time: 2/4/2019 2:28 PM  Performed by: Vincent Meza by: Maia Villafana     Indications / Diagnosis:  Weakness, fall  ECG reviewed by me, the ED Provider: yes    Patient location:  ED  Previous ECG:     Previous ECG:  Compared to current    Similarity:  No change  Rate:     ECG rate:  83    ECG rate assessment: normal    Rhythm:     Rhythm: sinus rhythm    QRS:     QRS intervals:   Wide  Conduction:     Conduction: abnormal    ST segments:     ST segments:  Normal  T waves:     T waves: non-specific    Comments:      Bundle branch block, unchanged from previous EKG          Phone Consults  ED Phone Contact    ED Course           Identification of Seniors at Risk      Most Recent Value   (ISAR) Identification of Seniors at Risk   Before the illness or injury that brought you to the Emergency, did you need someone to help you on a regular basis? 0 Filed at: 02/04/2019 1350   In the last 24 hours, have you needed more help than usual?  0 Filed at: 02/04/2019 1350   Have you been hospitalized for one or more nights during the past 6 months? 1 Filed at: 02/04/2019 1350   In general, do you see well?  0 Filed at: 02/04/2019 1350   In general, do you have serious problems with your memory? 0 Filed at: 02/04/2019 1350   Do you take more than three different medications every day? 1 Filed at: 02/04/2019 1350   ISAR Score  2 Filed at: 02/04/2019 1350                          MDM  Number of Diagnoses or Management Options  Fall, initial encounter:   Generalized weakness:   UTI (urinary tract infection):   Diagnosis management comments: Pt now feeling at baseline  Normal vital signs  Not hypoglycemic  No concerning EKG changes  Troponin mildly elevated, but appears to have been elevated in the past as well at baseline  Delta troponin negative  Pt found to have UTI  May be what is causing his shakiness  Will treat with keflex  First dose given in ED  Return precautions discussed  Follow up with PCP      Disposition  Final diagnoses:   UTI (urinary tract infection)   Generalized weakness   Fall, initial encounter     Time reflects when diagnosis was documented in both MDM as applicable and the Disposition within this note     Time User Action Codes Description Comment    2/4/2019  6:35 PM Tay George Add [N39 0] UTI (urinary tract infection)     2/4/2019  6:36 PM Tay George Add [R53 1] Generalized weakness     2/4/2019  6:36 PM Jason Quinonez [W19  Kit Flirt, initial encounter       ED Disposition     ED Disposition Condition Date/Time Comment    Discharge  Mon Feb 4, 2019  6:36 PM Wilfredo Hamilton discharge to home/self care      Condition at discharge: Stable        Follow-up Information     Follow up With Specialties Details Why Contact Info Additional [address] [facility] Emergency Medicine  As needed, If symptoms worsen [address]  [phone]  ED, [address] [place] [address], [address] [facility], DO Internal Medicine  As needed [address]  [phone]             Discharge Medication List as of 2/4/2019  6:37 PM      START taking these medications    Details   cephalexin (KEFLEX) 500 mg capsule Take 1 capsule (500 mg total) by mouth every 12 (twelve) hours for 7 days, Starting Mon 2/4/2019, Until Mon 2/11/2019, Print         CONTINUE these medications which have NOT CHANGED    Details   ACCU-CHEK FASTCLIX LANCETS MISC Check blood sugar 4 times daily  , Normal      ascorbic acid (VITAMIN C) 500 mg tablet Take 500 mg by mouth daily, Historical Med      aspirin 325 mg tablet Take 1 tablet by mouth daily, Starting Fri 1/3/2014, Historical Med      Cholecalciferol (VITAMIN D3 PO) Take 3,000 Units by mouth daily  , Historical Med      clopidogrel (PLAVIX) 75 mg tablet TAKE 1 TABLET DAILY, Normal      cyanocobalamin (VITAMIN B-12) 500 mcg tablet Take 1 tablet by mouth daily, Starting Mon 7/10/2017, Historical Med      docusate sodium (COLACE) 100 mg capsule Take 100 mg by mouth 2 (two) times a day , Historical Med      glucose blood (ACCU-CHEK GUIDE) test strip Check blood sugar 4 times daily  , Normal      Insulin Glargine (TOUJEO SOLOSTAR) injection pen 300 units/mL Inject 50 Units under the skin daily at bedtime for 90 days, Starting Thu 4/5/2018, Until Mon 2/4/2019, Normal      LORazepam (ATIVAN) 1 mg tablet Take 1 tablet (1 mg total) by mouth 2 (two) times a day as needed for anxiety, Starting Thu 1/31/2019, Normal      metFORMIN (GLUCOPHAGE) 500 mg tablet TAKE 1 TABLET BY MOUTH TWO TIMES DAILY WITH MEALS, Normal      metoprolol succinate (TOPROL-XL) 25 mg 24 hr tablet TAKE 1 TABLET DAILY, Normal      Multiple Vitamins-Minerals (CENTRUM SILVER) tablet Take 1 tablet by mouth daily, Starting Thu 10/5/2017, Historical Med      NOVOLOG FLEXPEN 100 units/mL injection pen FOR DIRECTIONS ON HOW TO   TAKE THIS MEDICINE, READ   THE ENCLOSED MEDICATION    INFORMATION FORM, Normal      pantoprazole (PROTONIX) 40 mg tablet TAKE 1 TABLET TWICE A DAY  30 MINUTES BEFORE BREAKFASTAND DINNER, Normal      ranolazine (RANEXA) 1000 MG SR tablet Take 1 tablet (1,000 mg total) by mouth 2 (two) times a day, Starting Fri 6/8/2018, Normal      rosuvastatin (CRESTOR) 40 MG tablet Take 1 tablet (40 mg total) by mouth daily, Starting Tue 8/14/2018, Normal      senna (SENOKOT) 8 6 mg Take 1 tablet by mouth daily  , Starting Mon 7/17/2017, Historical Med      VIIBRYD 40 MG tablet TAKE 1 TABLET BY MOUTH EVERY DAY, Normal      acetaminophen-codeine (TYLENOL/CODEINE #3) 300-30 MG per tablet Take 1 tablet by mouth every 6 (six) hours as needed for moderate pain, Starting Wed 1/2/2019, Print      Continuous Blood Gluc  (FREESTYLE POLINA 14 DAY READER) DMITRI 1 Device by Does not apply route continuous for 30 days Scan at least 4 times a day to monitor blood glucose levels, Starting Wed 12/12/2018, Until Fri 1/11/2019, Normal      Continuous Blood Gluc Sensor (FREESTYLE POLINA 14 DAY SENSOR) MISC 1 each by Does not apply route continuous, Starting Wed 12/12/2018, Normal      Insulin Pen Needle (NOVOFINE AUTOCOVER) 30G X 8 MM MISC The patient test his blood sugars 4 times daily  , Normal      nitroglycerin (NITROSTAT) 0 4 mg SL tablet Place 1 tablet (0 4 mg total) under the tongue every 5 (five) minutes as needed for chest pain, Starting Tue 5/8/2018, Normal      polyethylene glycol (MIRALAX) 17 g packet Take 17 g by mouth daily, Historical Med           No discharge procedures on file  ED Provider  Attending physically available and evaluated Matilda Darryl DOTSON managed the patient along with the ED Attending      Electronically Signed by         Jodi Evans Montserrat Maier MD  02/04/19 8300

## 2019-02-04 NOTE — DISCHARGE INSTRUCTIONS
Urinary Tract Infection in Men   WHAT YOU NEED TO KNOW:   A urinary tract infection (UTI) is caused by bacteria that get inside your urinary tract  Most bacteria that enter your urinary tract come out when you urinate  If the bacteria stay in your urinary tract, you may get an infection  Your urinary tract includes your kidneys, ureters, bladder, and urethra  Urine is made in your kidneys, and it flows from the ureters to the bladder  Urine leaves the bladder through the urethra  A UTI is more common in your lower urinary tract, which includes your bladder and urethra  DISCHARGE INSTRUCTIONS:   Return to the emergency department if:   · You are urinating very little or not at all  · You have a high fever with shaking chills  · You have side or back pain that gets worse  Contact your healthcare provider if:   · You have a mild fever  · You do not feel better after 2 days of taking antibiotics  · You are vomiting  · You have new symptoms, such as blood or pus in your urine  · You have questions or concerns about your condition or care  Medicines:   · Antibiotics  help fight a bacterial infection  · Medicines  may be given to decrease pain and burning when you urinate  They will also help decrease the feeling that you need to urinate often  These medicines will make your urine orange or red  · Take your medicine as directed  Contact your healthcare provider if you think your medicine is not helping or if you have side effects  Tell him of her if you are allergic to any medicine  Keep a list of the medicines, vitamins, and herbs you take  Include the amounts, and when and why you take them  Bring the list or the pill bottles to follow-up visits  Carry your medicine list with you in case of an emergency  Prevent another UTI:   · Empty your bladder often  Urinate and empty your bladder as soon as you feel the need  Do not hold your urine for long periods of time      · Drink liquids as directed  Ask how much liquid to drink each day and which liquids are best for you  You may need to drink more liquids than usual to help flush out the bacteria  Do not drink alcohol, caffeine, or citrus juices  These can irritate your bladder and increase your symptoms  Your healthcare provider may recommend cranberry juice to help prevent a UTI  · Urinate after you have sex  This can help flush out bacteria passed during sex  · Do pelvic muscle exercises often  Pelvic muscle exercises may help you start and stop urinating  Strong pelvic muscles may help you empty your bladder easier  Squeeze these muscles tightly for 5 seconds like you are trying to hold back urine  Then relax for 5 seconds  Gradually work up to squeezing for 10 seconds  Do 3 sets of 15 repetitions a day, or as directed  Follow up with your healthcare provider as directed:  Write down your questions so you remember to ask them during your visits  © 2017 [address] [employer] Information is for End User's use only and may not be sold, redistributed or otherwise used for commercial purposes  All illustrations and images included in CareNotes® are the copyrighted property of [employer]  or [employer]  The above information is an  only  It is not intended as medical advice for individual conditions or treatments  Talk to your doctor, nurse or pharmacist before following any medical regimen to see if it is safe and effective for you

## 2019-02-04 NOTE — ED NOTES
Lab called at this time stating there is 9 minutes left before the troponin is resulted     Manjula Almonte RN  02/04/19 7378

## 2019-02-04 NOTE — ED ATTENDING ATTESTATION
Eliza Rapp MD, saw and evaluated the patient  I have discussed the patient with the resident/non-physician practitioner and agree with the resident's/non-physician practitioner's findings, Plan of Care, and MDM as documented in the resident's/non-physician practitioner's note, except where noted  All available labs and Radiology studies were reviewed  At this point I agree with the current assessment done in the Emergency Department  I have conducted an independent evaluation of this patient a history and physical is as follows:      Critical Care Time  Procedures     [de-identified] yo male while visiting someone in hospital, feeling weak and shaky and while walking with cane from cafeteria and knees buckled and landed on right side  No loc, no head trauma  Pt with hx of dm on insulin  Pt feels back to normal and currently no complaints  pmh dm, htn, hld, cad  Pt with hx of multiple falls  No recent illness, no fever, no chills  Cardiac workup,  Ct head  Bedside u/s evaluate aaa

## 2019-02-05 LAB
ATRIAL RATE: 75 BPM
ATRIAL RATE: 79 BPM
P AXIS: 61 DEGREES
P AXIS: 65 DEGREES
PR INTERVAL: 164 MS
PR INTERVAL: 168 MS
QRS AXIS: 60 DEGREES
QRS AXIS: 82 DEGREES
QRSD INTERVAL: 162 MS
QRSD INTERVAL: 164 MS
QT INTERVAL: 444 MS
QT INTERVAL: 462 MS
QTC INTERVAL: 509 MS
QTC INTERVAL: 515 MS
T WAVE AXIS: 51 DEGREES
T WAVE AXIS: 69 DEGREES
VENTRICULAR RATE: 75 BPM
VENTRICULAR RATE: 79 BPM

## 2019-02-05 PROCEDURE — 93010 ELECTROCARDIOGRAM REPORT: CPT | Performed by: INTERNAL MEDICINE

## 2019-03-04 DIAGNOSIS — F41.9 ANXIETY: ICD-10-CM

## 2019-03-04 RX ORDER — LORAZEPAM 1 MG/1
1 TABLET ORAL 2 TIMES DAILY PRN
Qty: 60 TABLET | Refills: 0 | Status: SHIPPED | OUTPATIENT
Start: 2019-03-04 | End: 2019-04-04 | Stop reason: SDUPTHER

## 2019-03-20 DIAGNOSIS — F41.9 ANXIETY: ICD-10-CM

## 2019-03-20 RX ORDER — VILAZODONE HYDROCHLORIDE 40 MG/1
TABLET ORAL
Qty: 30 TABLET | Refills: 5 | Status: SHIPPED | OUTPATIENT
Start: 2019-03-20 | End: 2019-04-04 | Stop reason: SDUPTHER

## 2019-03-25 DIAGNOSIS — K21.9 GASTROESOPHAGEAL REFLUX DISEASE WITHOUT ESOPHAGITIS: ICD-10-CM

## 2019-03-25 RX ORDER — PANTOPRAZOLE SODIUM 40 MG/1
TABLET, DELAYED RELEASE ORAL
Qty: 180 TABLET | Refills: 1 | Status: SHIPPED | OUTPATIENT
Start: 2019-03-25 | End: 2019-09-20 | Stop reason: SDUPTHER

## 2019-03-27 ENCOUNTER — OFFICE VISIT (OUTPATIENT)
Dept: CARDIOLOGY CLINIC | Facility: CLINIC | Age: 79
End: 2019-03-27
Payer: MEDICARE

## 2019-03-27 VITALS
WEIGHT: 220.2 LBS | DIASTOLIC BLOOD PRESSURE: 44 MMHG | HEIGHT: 70 IN | OXYGEN SATURATION: 96 % | HEART RATE: 80 BPM | BODY MASS INDEX: 31.52 KG/M2 | SYSTOLIC BLOOD PRESSURE: 88 MMHG

## 2019-03-27 DIAGNOSIS — I71.4 ANEURYSM OF INFRARENAL ABDOMINAL AORTA (HCC): ICD-10-CM

## 2019-03-27 DIAGNOSIS — I25.10 CORONARY ARTERY DISEASE INVOLVING NATIVE CORONARY ARTERY OF NATIVE HEART WITHOUT ANGINA PECTORIS: Primary | ICD-10-CM

## 2019-03-27 DIAGNOSIS — I45.10 RIGHT BUNDLE-BRANCH BLOCK: ICD-10-CM

## 2019-03-27 DIAGNOSIS — Z95.1 S/P CABG X 3: Chronic | ICD-10-CM

## 2019-03-27 DIAGNOSIS — I95.1 ORTHOSTATIC HYPOTENSION: ICD-10-CM

## 2019-03-27 DIAGNOSIS — I10 BENIGN ESSENTIAL HYPERTENSION: ICD-10-CM

## 2019-03-27 DIAGNOSIS — E78.5 DYSLIPIDEMIA: ICD-10-CM

## 2019-03-27 PROCEDURE — 99214 OFFICE O/P EST MOD 30 MIN: CPT | Performed by: INTERNAL MEDICINE

## 2019-03-27 NOTE — PROGRESS NOTES
Cardiology Follow Up    Delia Ya  1940  [id number]  [address] [facility] [place] [phone]    1  Coronary artery disease involving native coronary artery of native heart without angina pectoris     2  S/P CABG x 3     3  Benign essential hypertension     4  Orthostatic hypotension     5  Dyslipidemia     6  Right bundle-branch block     7  Aneurysm of infrarenal abdominal aorta [facility])         Discussion/Summary:  Mr Micah Haynes is a pleasant 24-year-old gentleman who presents to the office today for routine follow-up  His blood pressure is on the low side today  He continues with lightheadedness with standing and multiple falls  It is unclear if these falls are related to orthostatic hypotension  Nonetheless I have asked him to eliminate the morning dose of his metoprolol succinate  I have also encouraged him to attempt to drink water with electrolytes  His wife has the capability to monitor his blood pressure at home and I have asked her to do so  His most recent lipids from 2018 were reviewed  They are acceptable on current therapy with the exception of a low HDL for which therapeutic lifestyle modifications were recommended  He is due to have them reassessed in the near future and I will await those results      He is now under the care of vascular surgery regarding his infrarenal abdominal aortic aneurysm and at this time no intervention is necessary besides surveillance  He also underwent a CT scan of chest abdomen pelvis for unrelated reasons in December of 2018 which noted it was stable and unchanged in size  He will follow-up in the office in three months or sooner if deemed necessary  Interval History:   Mr Micah Haynes is a pleasant 24-year-old male who presents to the office for follow-up       He has had some recurrent lightheadedness with standing and continues to have recurrent falls  He has not had any recurrent chest pain with exertion  He occasionally notes some shortness of breath with activity  He denies symptoms of heart failure including increasing lower extremity edema, paroxysmal nocturnal dyspnea, orthopnea, acute weight gain or increasing abdominal girth  He denies palpitations or symptoms of claudication  Problem List     Non-STEMI (non-ST elevated myocardial infarction) ([facility] )    Hypertensive urgency    Diabetes mellitus ([facility] ) (Chronic)    Mixed hyperlipidemia (Chronic)    Abdominal aneurysm (HCC) (Chronic)    Hx of CABG (Chronic)    Orthostasis    Hypertension (Chronic)    Obesity    Vitamin D deficiency    Subclinical iodine-deficiency hypothyroidism    Aneurysm of infrarenal abdominal aorta (HCC)    Arteriosclerosis of coronary artery    Overview Signed 2/21/2018  7:50 AM by Carmen Rosario DO     Description: 50% distal left main, 75% proximal LAD, 90% 1st diagonal, 50% ostial circumflex, 99% proximal circumflex, 90% 2nd OM, 30 mid RCA , 90% right posterolateral - left heart catheterization December 2013           Dyslipidemia    Right bundle-branch block        Past Medical History:   Diagnosis Date    AAA [facility]abdominal aortic [facility]) ([facility])     Anxiety     CVD (cardiovascular disease)     Diabetes mellitus ([facility] )     DVT, lower extremity ([facility] )     Hyperlipidemia     Hypertension     NSTEMI (non-ST elevated myocardial infarction) ([place] [address] )     Prostate cancer (HCC)     Right bundle branch block (RBBB)     Skin cancer      Social History     Socioeconomic History    Marital status: /Civil Union     Spouse name: Not on file    Number of children: Not on file    Years of education: Not on file    Highest education level: Not on file   Occupational History    Not on file   Social Needs    Financial resource strain: Not on file    Food insecurity:     Worry: Not on file     Inability: Not on file    Transportation needs:     Medical: Not on file     Non-medical: Not on file   Tobacco Use    Smoking status: Former Smoker     Types: Cigarettes    Smokeless tobacco: Never Used    Tobacco comment: quit 1967   Substance and Sexual Activity    Alcohol use: Yes     Comment: seldom    Drug use: No    Sexual activity: Never   Lifestyle    Physical activity:     Days per week: Not on file     Minutes per session: Not on file    Stress: Not on file   Relationships    Social connections:     Talks on phone: Not on file     Gets together: Not on file     Attends Islam service: Not on file     Active member of club or organization: Not on file     Attends meetings of clubs or organizations: Not on file     Relationship status: Not on file    Intimate partner violence:     Fear of current or ex partner: Not on file     Emotionally abused: Not on file     Physically abused: Not on file     Forced sexual activity: Not on file   Other Topics Concern    Not on file   Social History Narrative    Not on file      Family History   Problem Relation Age of Onset    Crohn's disease Mother     Liver cancer Mother     Hypertension Mother     Crohn's disease Father     Liver cancer Father     Heart disease Father     Hypertension Father     Hyperlipidemia Father     Colon cancer Brother     Aortic aneurysm Brother         abdominal     Heart disease Brother         abdominal aortic aneurysm    Hypertension Brother     Hyperlipidemia Brother     Colon polyps Family     Stomach cancer Family     Hypertension Sister      Past Surgical History:   Procedure Laterality Date    ANKLE ARTHROSCOPY W/ OPEN REPAIR Left     CARDIAC SURGERY      CORONARY ARTERY BYPASS GRAFT  12/11/2013    CABG x3 with LIMA to LAD, SVG to OM-1, SVG to posterior lateral, LYSIS of pericardial adhesions   ID COLONOSCOPY FLX DX W/COLLJ SPEC WHEN PFRMD N/A 8/9/2018    Procedure: COLONOSCOPY;  Surgeon: Joy Moreno MD;  [facility]: [facility];   Service: Gastroenterology    ID REPAIR UMBILICAL KCDZ,3+E/O,QVMEG N/A 3/10/2017    Procedure: REPAIR HERNIA UMBILICAL;  Surgeon: Chitra Roman MD;  Location: [facility];  Service: General    PROSTATECTOMY         Current Outpatient Medications:     ACCU-CHEK FASTCLIX LANCETS MISC, Check blood sugar 4 times daily  , Disp: 306 each, Rfl: 1    ascorbic acid (VITAMIN C) 500 mg tablet, Take 500 mg by mouth daily, Disp: , Rfl:     aspirin 325 mg tablet, Take 1 tablet by mouth daily, Disp: , Rfl:     Cholecalciferol (VITAMIN D3 PO), Take 3,000 Units by mouth daily  , Disp: , Rfl:     clopidogrel (PLAVIX) 75 mg tablet, TAKE 1 TABLET DAILY, Disp: 90 tablet, Rfl: 1    Continuous Blood Gluc  (FREESTYLE POLINA 14 DAY READER) DMITRI, 1 Device by Does not apply route continuous for 30 days Scan at least 4 times a day to monitor blood glucose levels, Disp: 1 Device, Rfl: 0    Continuous Blood Gluc Sensor (FREESTYLE POLINA 14 DAY SENSOR) MISC, 1 each by Does not apply route continuous, Disp: 2 each, Rfl: 2    cyanocobalamin (VITAMIN B-12) 500 mcg tablet, Take 1 tablet by mouth daily, Disp: , Rfl:     docusate sodium (COLACE) 100 mg capsule, Take 100 mg by mouth 2 (two) times a day , Disp: , Rfl:     glucose blood (ACCU-CHEK GUIDE) test strip, Check blood sugar 4 times daily  , Disp: 400 each, Rfl: 1    Insulin Glargine (TOUJEO SOLOSTAR) injection pen 300 units/mL, Inject 50 Units under the skin daily at bedtime for 90 days (Patient taking differently: Inject 60 Units under the skin daily at bedtime  ), Disp: 10 pen, Rfl: 1    Insulin Pen Needle (NOVOFINE AUTOCOVER) 30G X 8 MM MISC, The patient test his blood sugars 4 times daily  , Disp: 100 each, Rfl: 4    LORazepam (ATIVAN) 1 mg tablet, Take 1 tablet (1 mg total) by mouth 2 (two) times a day as needed for anxiety, Disp: 60 tablet, Rfl: 0    metFORMIN (GLUCOPHAGE) 500 mg tablet, TAKE 1 TABLET BY MOUTH TWO TIMES DAILY WITH MEALS, Disp: 60 tablet, Rfl: 2    metoprolol succinate (TOPROL-XL) 25 mg 24 hr tablet, TAKE 1 TABLET DAILY (Patient taking differently: TAKE 1 TABLET BID), Disp: 90 tablet, Rfl: 1    Multiple Vitamins-Minerals (CENTRUM SILVER) tablet, Take 1 tablet by mouth daily, Disp: , Rfl:     NOVOLOG FLEXPEN 100 units/mL injection pen, FOR DIRECTIONS ON HOW TO   TAKE THIS MEDICINE, READ   THE ENCLOSED MEDICATION    INFORMATION FORM, Disp: 60 mL, Rfl: 1    pantoprazole (PROTONIX) 40 mg tablet, TAKE 1 TABLET TWICE A DAY  30 MINUTES BEFORE BREAKFASTAND DINNER, Disp: 180 tablet, Rfl: 1    ranolazine (RANEXA) 1000 MG SR tablet, Take 1 tablet (1,000 mg total) by mouth 2 (two) times a day, Disp: 180 tablet, Rfl: 3    rosuvastatin (CRESTOR) 40 MG tablet, Take 1 tablet (40 mg total) by mouth daily (Patient taking differently: Take 20 mg by mouth daily  ), Disp: 90 tablet, Rfl: 3    senna (SENOKOT) 8 6 mg, Take 1 tablet by mouth daily  , Disp: , Rfl:     VIIBRYD 40 MG tablet, TAKE 1 TABLET BY MOUTH EVERY DAY, Disp: 30 tablet, Rfl: 5    acetaminophen-codeine (TYLENOL/CODEINE #3) 300-30 MG per tablet, Take 1 tablet by mouth every 6 (six) hours as needed for moderate pain, Disp: 20 tablet, Rfl: 0    nitroglycerin (NITROSTAT) 0 4 mg SL tablet, Place 1 tablet (0 4 mg total) under the tongue every 5 (five) minutes as needed for chest pain (Patient not taking: Reported on 3/27/2019), Disp: 90 tablet, Rfl: 3    polyethylene glycol (MIRALAX) 17 g packet, Take 17 g by mouth daily, Disp: , Rfl:   Allergies   Allergen Reactions    Sulfa Antibiotics Other (See Comments)     Generalized redness    Adhesive [Medical Tape] Rash       Labs:     Chemistry        Component Value Date/Time     10/13/2015 0823    K 4 4 02/04/2019 1401    K 4 3 10/13/2015 0823     02/04/2019 1401     10/13/2015 0823    CO2 25 02/04/2019 1401    CO2 34 (H) 10/13/2015 0823    BUN 27 (H) 02/04/2019 1401    BUN 11 10/13/2015 0823    CREATININE 1 38 (H) 02/04/2019 1401    CREATININE 1 0 10/13/2015 0823        Component Value Date/Time    CALCIUM 8 9 02/04/2019 1401    CALCIUM 9 2 10/13/2015 0823    ALKPHOS 93 02/04/2019 1401    ALKPHOS 74 10/13/2015 0823    AST 20 02/04/2019 1401    AST 19 10/13/2015 0823    ALT 29 02/04/2019 1401    ALT 25 10/13/2015 0823    BILITOT 0 4 10/13/2015 0823            Lab Results   Component Value Date    CHOL 117 (L) 10/13/2015    CHOL 121 04/11/2015    CHOL 156 04/24/2014     Lab Results   Component Value Date    HDL 34 (L) 10/31/2018    HDL 38 (L) 02/16/2018    HDL 32 (L) 01/27/2018     Lab Results   Component Value Date    LDLCALC 60 10/31/2018    LDLCALC 55 02/16/2018    LDLCALC 47 01/27/2018     Lab Results   Component Value Date    TRIG 141 10/31/2018    TRIG 89 02/16/2018    TRIG 124 01/27/2018     No results found for: CHOLHDL    Imaging: X-ray Chest 2 Views    Result Date: 1/27/2018  Narrative: CHEST - DUAL ENERGY INDICATION:  chest pain  History taken directly from the electronic ordering system  COMPARISON:  Chest x-ray performed on 7/13/2017 VIEWS:  PA (including soft tissue/bone algorithms) and lateral projections IMAGES:  4 FINDINGS:     Cardiac silhouette is top normal in size and changes of prior intervention  No focal airspace consolidation  No pleural effusion or pneumothorax  Visualized osseous structures appear within normal limits for the patient's age  Impression: No focal airspace consolidation  Workstation performed: [id number]     Us Abdominal Aorta    Result Date: 1/27/2018  Narrative: ABDOMINAL AORTIC ULTRASOUND INDICATION: Evaluate for aortic aneurysm  COMPARISON: CT performed on 7/13/2017 FINDINGS: Ultrasound of the abdominal aorta was performed in longitudinal and transverse planes with a curvilinear transducer  Signs of atherosclerotic disease  Proximal aorta:  2 19 x 2 11 cm Mid aorta:    2 39 x 2 24 cm Distal aorta:    3 5 x 3 3 cm Right common iliac origin:  1 6 x 1 4 cm Left common iliac origin:  1 6 x 1 4 cm No periaortic collections or adenopathy detected  Impression: Atherosclerotic disease  Infrarenal abdominal aorta measures 3 5 x 3 3 cm, marginally increased in caliber when compared to CT of 7/3/2017  Workstation performed: [id number]       Review of Systems   Cardiovascular: Positive for dyspnea on exertion  Negative for chest pain, irregular heartbeat, leg swelling, orthopnea and palpitations  Neurological: Positive for light-headedness and loss of balance  Vitals:    03/27/19 1442   BP: (!) 88/44   Pulse:    SpO2:      Vitals:    03/27/19 1420   Weight: 99 9 kg (220 lb 3 2 oz)     Height: 5' 10" (177 8 cm)   Body mass index is 31 6 kg/m²      Physical Exam:  General:  Alert and cooperative, appears stated age  HEENT:  PERRLA, EOMI, no scleral icterus, no conjunctival pallor  Neck:  No lymphadenopathy, no thyromegaly, no carotid bruits, no elevated JVP  Heart[de-identified]  Regular rate and rhythm, normal S1/S2, no S3/S4, no murmur  Lungs:  Clear to auscultation bilaterally   Abdomen:  Soft, non-tender, positive bowel sounds, no rebound or guarding,   no organomegaly   Extremities:  No clubbing, cyanosis or edema   Vascular:  2+ pedal pulses  Skin:  No rashes or lesions on exposed skin  Neurologic:  Cranial nerves II-XII grossly intact without focal deficits

## 2019-04-01 ENCOUNTER — APPOINTMENT (OUTPATIENT)
Dept: LAB | Facility: HOSPITAL | Age: 79
DRG: 074 | End: 2019-04-01
Payer: MEDICARE

## 2019-04-01 ENCOUNTER — HOSPITAL ENCOUNTER (INPATIENT)
Facility: HOSPITAL | Age: 79
LOS: 1 days | Discharge: HOME HEALTH | DRG: 074 | End: 2019-04-03
Attending: EMERGENCY MEDICINE | Admitting: STUDENT IN AN ORGANIZED HEALTH CARE EDUCATION/TRAINING PROGRAM
Payer: MEDICARE

## 2019-04-01 ENCOUNTER — TRANSCRIBE ORDERS (OUTPATIENT)
Dept: ADMINISTRATIVE | Facility: HOSPITAL | Age: 79
End: 2019-04-01

## 2019-04-01 DIAGNOSIS — E11.65 TYPE 2 DIABETES MELLITUS WITH HYPERGLYCEMIA, WITH LONG-TERM CURRENT USE OF INSULIN (HCC): ICD-10-CM

## 2019-04-01 DIAGNOSIS — E66.09 CLASS 1 OBESITY DUE TO EXCESS CALORIES WITH SERIOUS COMORBIDITY AND BODY MASS INDEX (BMI) OF 31.0 TO 31.9 IN ADULT: ICD-10-CM

## 2019-04-01 DIAGNOSIS — N18.2 CKD (CHRONIC KIDNEY DISEASE) STAGE 2, GFR 60-89 ML/MIN: Chronic | ICD-10-CM

## 2019-04-01 DIAGNOSIS — Z95.1 S/P CABG X 3: Chronic | ICD-10-CM

## 2019-04-01 DIAGNOSIS — Z86.010 HISTORY OF COLON POLYPS: ICD-10-CM

## 2019-04-01 DIAGNOSIS — I95.1 ORTHOSTATIC HYPOTENSION: ICD-10-CM

## 2019-04-01 DIAGNOSIS — S22.080A COMPRESSION FRACTURE OF TWELFTH THORACIC VERTEBRA (HCC): ICD-10-CM

## 2019-04-01 DIAGNOSIS — N17.9 ACUTE KIDNEY INJURY (HCC): ICD-10-CM

## 2019-04-01 DIAGNOSIS — R42 DIZZINESS: Primary | ICD-10-CM

## 2019-04-01 DIAGNOSIS — E11.29 TYPE 2 DIABETES MELLITUS WITH MICROALBUMINURIA, WITH LONG-TERM CURRENT USE OF INSULIN (HCC): ICD-10-CM

## 2019-04-01 DIAGNOSIS — I10 BENIGN ESSENTIAL HYPERTENSION: ICD-10-CM

## 2019-04-01 DIAGNOSIS — G25.0 ESSENTIAL TREMOR: ICD-10-CM

## 2019-04-01 DIAGNOSIS — Z23 NEED FOR INFLUENZA VACCINATION: ICD-10-CM

## 2019-04-01 DIAGNOSIS — B35.3 TINEA PEDIS OF BOTH FEET: ICD-10-CM

## 2019-04-01 DIAGNOSIS — I25.10 CORONARY ARTERY DISEASE INVOLVING NATIVE CORONARY ARTERY OF NATIVE HEART WITHOUT ANGINA PECTORIS: ICD-10-CM

## 2019-04-01 DIAGNOSIS — R19.4 CHANGE IN BOWEL HABITS: ICD-10-CM

## 2019-04-01 DIAGNOSIS — I71.4 ANEURYSM OF INFRARENAL ABDOMINAL AORTA (HCC): ICD-10-CM

## 2019-04-01 DIAGNOSIS — E02 SUBCLINICAL IODINE-DEFICIENCY HYPOTHYROIDISM: ICD-10-CM

## 2019-04-01 DIAGNOSIS — E78.5 DYSLIPIDEMIA: ICD-10-CM

## 2019-04-01 DIAGNOSIS — K59.00 CONSTIPATION, UNSPECIFIED CONSTIPATION TYPE: ICD-10-CM

## 2019-04-01 DIAGNOSIS — L98.9 SKIN LESION OF FACE: ICD-10-CM

## 2019-04-01 DIAGNOSIS — I45.10 RIGHT BUNDLE-BRANCH BLOCK: ICD-10-CM

## 2019-04-01 DIAGNOSIS — R80.9 TYPE 2 DIABETES MELLITUS WITH MICROALBUMINURIA, WITH LONG-TERM CURRENT USE OF INSULIN (HCC): ICD-10-CM

## 2019-04-01 DIAGNOSIS — E55.9 VITAMIN D DEFICIENCY: ICD-10-CM

## 2019-04-01 DIAGNOSIS — Z79.4 TYPE 2 DIABETES MELLITUS WITH HYPERGLYCEMIA, WITH LONG-TERM CURRENT USE OF INSULIN (HCC): ICD-10-CM

## 2019-04-01 DIAGNOSIS — I49.5 SSS (SICK SINUS SYNDROME) (HCC): ICD-10-CM

## 2019-04-01 DIAGNOSIS — I21.4 NON-STEMI (NON-ST ELEVATED MYOCARDIAL INFARCTION) (HCC): ICD-10-CM

## 2019-04-01 DIAGNOSIS — F41.9 ANXIETY: ICD-10-CM

## 2019-04-01 DIAGNOSIS — R77.8 ELEVATED TROPONIN: ICD-10-CM

## 2019-04-01 DIAGNOSIS — Z79.4 TYPE 2 DIABETES MELLITUS WITH MICROALBUMINURIA, WITH LONG-TERM CURRENT USE OF INSULIN (HCC): ICD-10-CM

## 2019-04-01 DIAGNOSIS — T78.40XA ALLERGIC REACTION, INITIAL ENCOUNTER: ICD-10-CM

## 2019-04-01 LAB
25(OH)D3 SERPL-MCNC: 44.7 NG/ML (ref 30–100)
ALBUMIN SERPL BCP-MCNC: 3 G/DL (ref 3.5–5)
ALBUMIN SERPL BCP-MCNC: 3.2 G/DL (ref 3.5–5)
ALP SERPL-CCNC: 63 U/L (ref 46–116)
ALP SERPL-CCNC: 67 U/L (ref 46–116)
ALT SERPL W P-5'-P-CCNC: 23 U/L (ref 12–78)
ALT SERPL W P-5'-P-CCNC: 25 U/L (ref 12–78)
ANION GAP SERPL CALCULATED.3IONS-SCNC: 10 MMOL/L (ref 4–13)
ANION GAP SERPL CALCULATED.3IONS-SCNC: 9 MMOL/L (ref 4–13)
APTT PPP: 29 SECONDS (ref 24–33)
AST SERPL W P-5'-P-CCNC: 15 U/L (ref 5–45)
AST SERPL W P-5'-P-CCNC: 19 U/L (ref 5–45)
BASOPHILS # BLD AUTO: 0.04 THOUSANDS/ΜL (ref 0–0.1)
BASOPHILS NFR BLD AUTO: 1 % (ref 0–1)
BILIRUB SERPL-MCNC: 0.4 MG/DL (ref 0.2–1)
BILIRUB SERPL-MCNC: 0.4 MG/DL (ref 0.2–1)
BILIRUB UR QL STRIP: NEGATIVE
BUN SERPL-MCNC: 19 MG/DL (ref 5–25)
BUN SERPL-MCNC: 23 MG/DL (ref 5–25)
CALCIUM SERPL-MCNC: 8.7 MG/DL (ref 8.3–10.1)
CALCIUM SERPL-MCNC: 9.1 MG/DL (ref 8.3–10.1)
CHLORIDE SERPL-SCNC: 103 MMOL/L (ref 100–108)
CHLORIDE SERPL-SCNC: 104 MMOL/L (ref 100–108)
CHOLEST SERPL-MCNC: 126 MG/DL (ref 50–200)
CLARITY UR: CLEAR
CO2 SERPL-SCNC: 27 MMOL/L (ref 21–32)
CO2 SERPL-SCNC: 27 MMOL/L (ref 21–32)
COLOR UR: YELLOW
CREAT SERPL-MCNC: 1.01 MG/DL (ref 0.6–1.3)
CREAT SERPL-MCNC: 1.32 MG/DL (ref 0.6–1.3)
EOSINOPHIL # BLD AUTO: 0.43 THOUSAND/ΜL (ref 0–0.61)
EOSINOPHIL NFR BLD AUTO: 6 % (ref 0–6)
ERYTHROCYTE [DISTWIDTH] IN BLOOD BY AUTOMATED COUNT: 13.6 % (ref 11.6–15.1)
EST. AVERAGE GLUCOSE BLD GHB EST-MCNC: 163 MG/DL
GFR SERPL CREATININE-BSD FRML MDRD: 51 ML/MIN/1.73SQ M
GFR SERPL CREATININE-BSD FRML MDRD: 70 ML/MIN/1.73SQ M
GLUCOSE P FAST SERPL-MCNC: 132 MG/DL (ref 65–99)
GLUCOSE SERPL-MCNC: 127 MG/DL (ref 65–140)
GLUCOSE SERPL-MCNC: 169 MG/DL (ref 65–140)
GLUCOSE SERPL-MCNC: 214 MG/DL (ref 65–140)
GLUCOSE SERPL-MCNC: 226 MG/DL (ref 65–140)
GLUCOSE SERPL-MCNC: 251 MG/DL (ref 65–140)
GLUCOSE UR STRIP-MCNC: NEGATIVE MG/DL
HBA1C MFR BLD: 7.3 % (ref 4.2–6.3)
HCT VFR BLD AUTO: 42.5 % (ref 36.5–49.3)
HDLC SERPL-MCNC: 29 MG/DL (ref 40–60)
HGB BLD-MCNC: 13.4 G/DL (ref 12–17)
HGB UR QL STRIP.AUTO: NEGATIVE
IMM GRANULOCYTES # BLD AUTO: 0.04 THOUSAND/UL (ref 0–0.2)
IMM GRANULOCYTES NFR BLD AUTO: 1 % (ref 0–2)
INR PPP: 1.02 (ref 0.86–1.16)
KETONES UR STRIP-MCNC: ABNORMAL MG/DL
LDLC SERPL CALC-MCNC: 75 MG/DL (ref 0–100)
LEUKOCYTE ESTERASE UR QL STRIP: NEGATIVE
LYMPHOCYTES # BLD AUTO: 1.07 THOUSANDS/ΜL (ref 0.6–4.47)
LYMPHOCYTES NFR BLD AUTO: 14 % (ref 14–44)
MCH RBC QN AUTO: 30.7 PG (ref 26.8–34.3)
MCHC RBC AUTO-ENTMCNC: 31.5 G/DL (ref 31.4–37.4)
MCV RBC AUTO: 98 FL (ref 82–98)
MONOCYTES # BLD AUTO: 0.54 THOUSAND/ΜL (ref 0.17–1.22)
MONOCYTES NFR BLD AUTO: 7 % (ref 4–12)
NEUTROPHILS # BLD AUTO: 5.31 THOUSANDS/ΜL (ref 1.85–7.62)
NEUTS SEG NFR BLD AUTO: 71 % (ref 43–75)
NITRITE UR QL STRIP: NEGATIVE
NRBC BLD AUTO-RTO: 0 /100 WBCS
PH UR STRIP.AUTO: 5.5 [PH]
PLATELET # BLD AUTO: 283 THOUSANDS/UL (ref 149–390)
PMV BLD AUTO: 10.6 FL (ref 8.9–12.7)
POTASSIUM SERPL-SCNC: 4.1 MMOL/L (ref 3.5–5.3)
POTASSIUM SERPL-SCNC: 4.7 MMOL/L (ref 3.5–5.3)
PROT SERPL-MCNC: 7.3 G/DL (ref 6.4–8.2)
PROT SERPL-MCNC: 8 G/DL (ref 6.4–8.2)
PROT UR STRIP-MCNC: NEGATIVE MG/DL
PROTHROMBIN TIME: 10.7 SECONDS (ref 9.4–11.7)
RBC # BLD AUTO: 4.36 MILLION/UL (ref 3.88–5.62)
SODIUM SERPL-SCNC: 139 MMOL/L (ref 136–145)
SODIUM SERPL-SCNC: 141 MMOL/L (ref 136–145)
SP GR UR STRIP.AUTO: >=1.03 (ref 1–1.03)
TRIGL SERPL-MCNC: 109 MG/DL
TROPONIN I SERPL-MCNC: 0.06 NG/ML
UROBILINOGEN UR QL STRIP.AUTO: 0.2 E.U./DL
WBC # BLD AUTO: 7.43 THOUSAND/UL (ref 4.31–10.16)

## 2019-04-01 PROCEDURE — 99204 OFFICE O/P NEW MOD 45 MIN: CPT | Performed by: PSYCHIATRY & NEUROLOGY

## 2019-04-01 PROCEDURE — 85025 COMPLETE CBC W/AUTO DIFF WBC: CPT

## 2019-04-01 PROCEDURE — 85610 PROTHROMBIN TIME: CPT | Performed by: PHYSICIAN ASSISTANT

## 2019-04-01 PROCEDURE — 99284 EMERGENCY DEPT VISIT MOD MDM: CPT

## 2019-04-01 PROCEDURE — 97163 PT EVAL HIGH COMPLEX 45 MIN: CPT

## 2019-04-01 PROCEDURE — 99220 PR INITIAL OBSERVATION CARE/DAY 70 MINUTES: CPT | Performed by: INTERNAL MEDICINE

## 2019-04-01 PROCEDURE — G8978 MOBILITY CURRENT STATUS: HCPCS

## 2019-04-01 PROCEDURE — 84484 ASSAY OF TROPONIN QUANT: CPT | Performed by: PHYSICIAN ASSISTANT

## 2019-04-01 PROCEDURE — 87081 CULTURE SCREEN ONLY: CPT | Performed by: INTERNAL MEDICINE

## 2019-04-01 PROCEDURE — G8987 SELF CARE CURRENT STATUS: HCPCS

## 2019-04-01 PROCEDURE — 99222 1ST HOSP IP/OBS MODERATE 55: CPT | Performed by: INTERNAL MEDICINE

## 2019-04-01 PROCEDURE — G8988 SELF CARE GOAL STATUS: HCPCS

## 2019-04-01 PROCEDURE — 97530 THERAPEUTIC ACTIVITIES: CPT

## 2019-04-01 PROCEDURE — 84484 ASSAY OF TROPONIN QUANT: CPT | Performed by: INTERNAL MEDICINE

## 2019-04-01 PROCEDURE — 97167 OT EVAL HIGH COMPLEX 60 MIN: CPT

## 2019-04-01 PROCEDURE — 97535 SELF CARE MNGMENT TRAINING: CPT

## 2019-04-01 PROCEDURE — 81003 URINALYSIS AUTO W/O SCOPE: CPT | Performed by: INTERNAL MEDICINE

## 2019-04-01 PROCEDURE — 93005 ELECTROCARDIOGRAM TRACING: CPT

## 2019-04-01 PROCEDURE — 85730 THROMBOPLASTIN TIME PARTIAL: CPT | Performed by: PHYSICIAN ASSISTANT

## 2019-04-01 PROCEDURE — G8979 MOBILITY GOAL STATUS: HCPCS

## 2019-04-01 PROCEDURE — 80053 COMPREHEN METABOLIC PANEL: CPT | Performed by: PHYSICIAN ASSISTANT

## 2019-04-01 PROCEDURE — 80061 LIPID PANEL: CPT

## 2019-04-01 PROCEDURE — 80053 COMPREHEN METABOLIC PANEL: CPT

## 2019-04-01 PROCEDURE — 83036 HEMOGLOBIN GLYCOSYLATED A1C: CPT

## 2019-04-01 PROCEDURE — 82306 VITAMIN D 25 HYDROXY: CPT

## 2019-04-01 PROCEDURE — 82948 REAGENT STRIP/BLOOD GLUCOSE: CPT

## 2019-04-01 PROCEDURE — 36415 COLL VENOUS BLD VENIPUNCTURE: CPT

## 2019-04-01 RX ORDER — SENNOSIDES 8.6 MG
1 TABLET ORAL DAILY
Status: DISCONTINUED | OUTPATIENT
Start: 2019-04-02 | End: 2019-04-03 | Stop reason: HOSPADM

## 2019-04-01 RX ORDER — CLOPIDOGREL BISULFATE 75 MG/1
75 TABLET ORAL DAILY
Status: DISCONTINUED | OUTPATIENT
Start: 2019-04-02 | End: 2019-04-03 | Stop reason: HOSPADM

## 2019-04-01 RX ORDER — POLYETHYLENE GLYCOL 3350 17 G/17G
17 POWDER, FOR SOLUTION ORAL DAILY PRN
Status: DISCONTINUED | OUTPATIENT
Start: 2019-04-01 | End: 2019-04-01

## 2019-04-01 RX ORDER — ASPIRIN 325 MG
325 TABLET ORAL DAILY
Status: DISCONTINUED | OUTPATIENT
Start: 2019-04-02 | End: 2019-04-01

## 2019-04-01 RX ORDER — METOPROLOL SUCCINATE 25 MG/1
25 TABLET, EXTENDED RELEASE ORAL DAILY
Status: DISCONTINUED | OUTPATIENT
Start: 2019-04-01 | End: 2019-04-02

## 2019-04-01 RX ORDER — LORAZEPAM 1 MG/1
1 TABLET ORAL 2 TIMES DAILY PRN
Status: DISCONTINUED | OUTPATIENT
Start: 2019-04-01 | End: 2019-04-03 | Stop reason: HOSPADM

## 2019-04-01 RX ORDER — ATORVASTATIN CALCIUM 40 MG/1
40 TABLET, FILM COATED ORAL
Status: DISCONTINUED | OUTPATIENT
Start: 2019-04-02 | End: 2019-04-03 | Stop reason: HOSPADM

## 2019-04-01 RX ORDER — VILAZODONE HYDROCHLORIDE 40 MG/1
40 TABLET ORAL DAILY
Status: DISCONTINUED | OUTPATIENT
Start: 2019-04-02 | End: 2019-04-02 | Stop reason: CLARIF

## 2019-04-01 RX ORDER — DOCUSATE SODIUM 100 MG/1
100 CAPSULE, LIQUID FILLED ORAL 2 TIMES DAILY
Status: DISCONTINUED | OUTPATIENT
Start: 2019-04-01 | End: 2019-04-03 | Stop reason: HOSPADM

## 2019-04-01 RX ORDER — PRIMIDONE 50 MG/1
50 TABLET ORAL
Status: DISCONTINUED | OUTPATIENT
Start: 2019-04-01 | End: 2019-04-03 | Stop reason: HOSPADM

## 2019-04-01 RX ORDER — RANOLAZINE 500 MG/1
1000 TABLET, EXTENDED RELEASE ORAL 2 TIMES DAILY
Status: DISCONTINUED | OUTPATIENT
Start: 2019-04-01 | End: 2019-04-03 | Stop reason: HOSPADM

## 2019-04-01 RX ORDER — ASCORBIC ACID 500 MG
500 TABLET ORAL DAILY
Status: DISCONTINUED | OUTPATIENT
Start: 2019-04-02 | End: 2019-04-03 | Stop reason: HOSPADM

## 2019-04-01 RX ORDER — INSULIN GLARGINE 100 [IU]/ML
60 INJECTION, SOLUTION SUBCUTANEOUS
Status: DISCONTINUED | OUTPATIENT
Start: 2019-04-01 | End: 2019-04-03 | Stop reason: HOSPADM

## 2019-04-01 RX ORDER — POLYETHYLENE GLYCOL 3350 17 G/17G
17 POWDER, FOR SOLUTION ORAL ONCE
Status: COMPLETED | OUTPATIENT
Start: 2019-04-01 | End: 2019-04-01

## 2019-04-01 RX ORDER — CHOLECALCIFEROL (VITAMIN D3) 125 MCG
500 CAPSULE ORAL DAILY
Status: DISCONTINUED | OUTPATIENT
Start: 2019-04-02 | End: 2019-04-03 | Stop reason: HOSPADM

## 2019-04-01 RX ORDER — ASPIRIN 81 MG/1
81 TABLET ORAL DAILY
Status: DISCONTINUED | OUTPATIENT
Start: 2019-04-02 | End: 2019-04-03 | Stop reason: HOSPADM

## 2019-04-01 RX ORDER — SODIUM PHOSPHATE, DIBASIC AND SODIUM PHOSPHATE, MONOBASIC 7; 19 G/133ML; G/133ML
1 ENEMA RECTAL ONCE
Status: COMPLETED | OUTPATIENT
Start: 2019-04-01 | End: 2019-04-01

## 2019-04-01 RX ORDER — ACETAMINOPHEN 325 MG/1
650 TABLET ORAL EVERY 6 HOURS PRN
Status: DISCONTINUED | OUTPATIENT
Start: 2019-04-01 | End: 2019-04-03 | Stop reason: HOSPADM

## 2019-04-01 RX ORDER — MELATONIN
2000 DAILY
Status: DISCONTINUED | OUTPATIENT
Start: 2019-04-02 | End: 2019-04-03 | Stop reason: HOSPADM

## 2019-04-01 RX ORDER — POLYETHYLENE GLYCOL 3350 17 G/17G
17 POWDER, FOR SOLUTION ORAL DAILY PRN
Status: DISCONTINUED | OUTPATIENT
Start: 2019-04-01 | End: 2019-04-03 | Stop reason: HOSPADM

## 2019-04-01 RX ORDER — PANTOPRAZOLE SODIUM 40 MG/1
40 TABLET, DELAYED RELEASE ORAL
Status: DISCONTINUED | OUTPATIENT
Start: 2019-04-01 | End: 2019-04-03 | Stop reason: HOSPADM

## 2019-04-01 RX ADMIN — POLYETHYLENE GLYCOL 3350 17 G: 17 POWDER, FOR SOLUTION ORAL at 15:37

## 2019-04-01 RX ADMIN — ENOXAPARIN SODIUM 40 MG: 40 INJECTION SUBCUTANEOUS at 13:26

## 2019-04-01 RX ADMIN — PRIMIDONE 50 MG: 50 TABLET ORAL at 22:05

## 2019-04-01 RX ADMIN — INSULIN LISPRO 2 UNITS: 100 INJECTION, SOLUTION INTRAVENOUS; SUBCUTANEOUS at 17:31

## 2019-04-01 RX ADMIN — SODIUM PHOSPHATE 1 ENEMA: 7; 19 ENEMA RECTAL at 17:56

## 2019-04-01 RX ADMIN — DOCUSATE SODIUM 100 MG: 100 CAPSULE, LIQUID FILLED ORAL at 21:21

## 2019-04-01 RX ADMIN — RANOLAZINE 1000 MG: 500 TABLET, FILM COATED, EXTENDED RELEASE ORAL at 17:30

## 2019-04-01 RX ADMIN — INSULIN GLARGINE 60 UNITS: 100 INJECTION, SOLUTION SUBCUTANEOUS at 22:15

## 2019-04-01 RX ADMIN — PANTOPRAZOLE SODIUM 40 MG: 40 TABLET, DELAYED RELEASE ORAL at 15:38

## 2019-04-01 RX ADMIN — METOPROLOL SUCCINATE 25 MG: 25 TABLET, EXTENDED RELEASE ORAL at 17:30

## 2019-04-01 RX ADMIN — SODIUM CHLORIDE 1000 ML: 0.9 INJECTION, SOLUTION INTRAVENOUS at 10:36

## 2019-04-01 RX ADMIN — METFORMIN HYDROCHLORIDE 500 MG: 500 TABLET ORAL at 15:38

## 2019-04-02 ENCOUNTER — APPOINTMENT (OUTPATIENT)
Dept: NON INVASIVE DIAGNOSTICS | Facility: HOSPITAL | Age: 79
DRG: 074 | End: 2019-04-02
Payer: MEDICARE

## 2019-04-02 ENCOUNTER — APPOINTMENT (OUTPATIENT)
Dept: NON INVASIVE DIAGNOSTICS | Facility: HOSPITAL | Age: 79
DRG: 074 | End: 2019-04-02
Attending: INTERNAL MEDICINE
Payer: MEDICARE

## 2019-04-02 PROBLEM — K59.00 CONSTIPATION: Status: ACTIVE | Noted: 2019-04-02

## 2019-04-02 PROBLEM — G25.0 ESSENTIAL TREMOR: Status: ACTIVE | Noted: 2019-04-02

## 2019-04-02 PROBLEM — N17.9 ACUTE KIDNEY INJURY (HCC): Status: RESOLVED | Noted: 2019-04-02 | Resolved: 2019-04-02

## 2019-04-02 PROBLEM — N17.9 ACUTE KIDNEY INJURY (HCC): Status: ACTIVE | Noted: 2019-04-02

## 2019-04-02 PROBLEM — K59.00 CONSTIPATION: Status: RESOLVED | Noted: 2019-04-02 | Resolved: 2019-04-02

## 2019-04-02 LAB
ANION GAP SERPL CALCULATED.3IONS-SCNC: 8 MMOL/L (ref 4–13)
ATRIAL RATE: 72 BPM
BASOPHILS # BLD AUTO: 0.02 THOUSANDS/ΜL (ref 0–0.1)
BASOPHILS NFR BLD AUTO: 0 % (ref 0–1)
BUN SERPL-MCNC: 21 MG/DL (ref 5–25)
CALCIUM SERPL-MCNC: 8.6 MG/DL (ref 8.3–10.1)
CHLORIDE SERPL-SCNC: 101 MMOL/L (ref 100–108)
CO2 SERPL-SCNC: 28 MMOL/L (ref 21–32)
CREAT SERPL-MCNC: 1 MG/DL (ref 0.6–1.3)
EOSINOPHIL # BLD AUTO: 0.39 THOUSAND/ΜL (ref 0–0.61)
EOSINOPHIL NFR BLD AUTO: 6 % (ref 0–6)
ERYTHROCYTE [DISTWIDTH] IN BLOOD BY AUTOMATED COUNT: 13.7 % (ref 11.6–15.1)
GFR SERPL CREATININE-BSD FRML MDRD: 71 ML/MIN/1.73SQ M
GLUCOSE P FAST SERPL-MCNC: 89 MG/DL (ref 65–99)
GLUCOSE SERPL-MCNC: 169 MG/DL (ref 65–140)
GLUCOSE SERPL-MCNC: 189 MG/DL (ref 65–140)
GLUCOSE SERPL-MCNC: 61 MG/DL (ref 65–140)
GLUCOSE SERPL-MCNC: 65 MG/DL (ref 65–140)
GLUCOSE SERPL-MCNC: 81 MG/DL (ref 65–140)
GLUCOSE SERPL-MCNC: 88 MG/DL (ref 65–140)
GLUCOSE SERPL-MCNC: 89 MG/DL (ref 65–140)
HCT VFR BLD AUTO: 39.5 % (ref 36.5–49.3)
HGB BLD-MCNC: 12.4 G/DL (ref 12–17)
IMM GRANULOCYTES # BLD AUTO: 0.02 THOUSAND/UL (ref 0–0.2)
IMM GRANULOCYTES NFR BLD AUTO: 0 % (ref 0–2)
LYMPHOCYTES # BLD AUTO: 1.21 THOUSANDS/ΜL (ref 0.6–4.47)
LYMPHOCYTES NFR BLD AUTO: 19 % (ref 14–44)
MCH RBC QN AUTO: 30.5 PG (ref 26.8–34.3)
MCHC RBC AUTO-ENTMCNC: 31.4 G/DL (ref 31.4–37.4)
MCV RBC AUTO: 97 FL (ref 82–98)
MONOCYTES # BLD AUTO: 0.59 THOUSAND/ΜL (ref 0.17–1.22)
MONOCYTES NFR BLD AUTO: 9 % (ref 4–12)
MRSA NOSE QL CULT: NORMAL
NEUTROPHILS # BLD AUTO: 4.27 THOUSANDS/ΜL (ref 1.85–7.62)
NEUTS SEG NFR BLD AUTO: 66 % (ref 43–75)
NRBC BLD AUTO-RTO: 0 /100 WBCS
P AXIS: 44 DEGREES
PLATELET # BLD AUTO: 256 THOUSANDS/UL (ref 149–390)
PMV BLD AUTO: 10.5 FL (ref 8.9–12.7)
POTASSIUM SERPL-SCNC: 4.1 MMOL/L (ref 3.5–5.3)
PR INTERVAL: 178 MS
QRS AXIS: -1 DEGREES
QRSD INTERVAL: 174 MS
QT INTERVAL: 484 MS
QTC INTERVAL: 529 MS
RBC # BLD AUTO: 4.06 MILLION/UL (ref 3.88–5.62)
SODIUM SERPL-SCNC: 137 MMOL/L (ref 136–145)
T WAVE AXIS: 43 DEGREES
VENTRICULAR RATE: 72 BPM
WBC # BLD AUTO: 6.5 THOUSAND/UL (ref 4.31–10.16)

## 2019-04-02 PROCEDURE — 93660 TILT TABLE EVALUATION: CPT

## 2019-04-02 PROCEDURE — 85025 COMPLETE CBC W/AUTO DIFF WBC: CPT | Performed by: INTERNAL MEDICINE

## 2019-04-02 PROCEDURE — 82948 REAGENT STRIP/BLOOD GLUCOSE: CPT

## 2019-04-02 PROCEDURE — 99232 SBSQ HOSP IP/OBS MODERATE 35: CPT | Performed by: INTERNAL MEDICINE

## 2019-04-02 PROCEDURE — 93010 ELECTROCARDIOGRAM REPORT: CPT | Performed by: INTERNAL MEDICINE

## 2019-04-02 PROCEDURE — 99232 SBSQ HOSP IP/OBS MODERATE 35: CPT | Performed by: STUDENT IN AN ORGANIZED HEALTH CARE EDUCATION/TRAINING PROGRAM

## 2019-04-02 PROCEDURE — 80048 BASIC METABOLIC PNL TOTAL CA: CPT | Performed by: INTERNAL MEDICINE

## 2019-04-02 PROCEDURE — 93306 TTE W/DOPPLER COMPLETE: CPT

## 2019-04-02 RX ORDER — PYRIDOSTIGMINE BROMIDE 60 MG/1
30 TABLET ORAL 3 TIMES DAILY
Status: DISCONTINUED | OUTPATIENT
Start: 2019-04-02 | End: 2019-04-03 | Stop reason: HOSPADM

## 2019-04-02 RX ORDER — DEXTROSE MONOHYDRATE 25 G/50ML
25 INJECTION, SOLUTION INTRAVENOUS ONCE
Status: DISCONTINUED | OUTPATIENT
Start: 2019-04-02 | End: 2019-04-02

## 2019-04-02 RX ORDER — DEXTROSE MONOHYDRATE 25 G/50ML
12.5 INJECTION, SOLUTION INTRAVENOUS ONCE
Status: COMPLETED | OUTPATIENT
Start: 2019-04-02 | End: 2019-04-02

## 2019-04-02 RX ORDER — DEXTROSE MONOHYDRATE 25 G/50ML
50 INJECTION, SOLUTION INTRAVENOUS ONCE
Status: COMPLETED | OUTPATIENT
Start: 2019-04-02 | End: 2019-04-02

## 2019-04-02 RX ORDER — DIPHENHYDRAMINE HCL 25 MG
12.5 TABLET ORAL ONCE AS NEEDED
Status: COMPLETED | OUTPATIENT
Start: 2019-04-02 | End: 2019-04-02

## 2019-04-02 RX ADMIN — DOCUSATE SODIUM 100 MG: 100 CAPSULE, LIQUID FILLED ORAL at 08:46

## 2019-04-02 RX ADMIN — OXYCODONE HYDROCHLORIDE AND ACETAMINOPHEN 500 MG: 500 TABLET ORAL at 08:46

## 2019-04-02 RX ADMIN — INSULIN LISPRO 1 UNITS: 100 INJECTION, SOLUTION INTRAVENOUS; SUBCUTANEOUS at 22:47

## 2019-04-02 RX ADMIN — INSULIN GLARGINE 60 UNITS: 100 INJECTION, SOLUTION SUBCUTANEOUS at 22:46

## 2019-04-02 RX ADMIN — ENOXAPARIN SODIUM 40 MG: 40 INJECTION SUBCUTANEOUS at 08:46

## 2019-04-02 RX ADMIN — ASPIRIN 81 MG: 81 TABLET, COATED ORAL at 08:46

## 2019-04-02 RX ADMIN — VITAMIN D, TAB 1000IU (100/BT) 2000 UNITS: 25 TAB at 08:46

## 2019-04-02 RX ADMIN — RANOLAZINE 1000 MG: 500 TABLET, FILM COATED, EXTENDED RELEASE ORAL at 08:46

## 2019-04-02 RX ADMIN — DEXTROSE MONOHYDRATE 13 ML: 500 INJECTION PARENTERAL at 08:47

## 2019-04-02 RX ADMIN — DOCUSATE SODIUM 100 MG: 100 CAPSULE, LIQUID FILLED ORAL at 22:45

## 2019-04-02 RX ADMIN — DIPHENHYDRAMINE HCL 12.5 MG: 25 TABLET, FILM COATED ORAL at 19:35

## 2019-04-02 RX ADMIN — DEXTROSE MONOHYDRATE 50 ML: 500 INJECTION PARENTERAL at 11:49

## 2019-04-02 RX ADMIN — Medication 1 TABLET: at 08:46

## 2019-04-02 RX ADMIN — CLOPIDOGREL BISULFATE 75 MG: 75 TABLET ORAL at 08:46

## 2019-04-02 RX ADMIN — PANTOPRAZOLE SODIUM 40 MG: 40 TABLET, DELAYED RELEASE ORAL at 17:09

## 2019-04-02 RX ADMIN — CYANOCOBALAMIN TAB 500 MCG 500 MCG: 500 TAB at 08:53

## 2019-04-02 RX ADMIN — SENNOSIDES 8.6 MG: 8.6 TABLET, FILM COATED ORAL at 08:46

## 2019-04-02 RX ADMIN — ATORVASTATIN CALCIUM 40 MG: 40 TABLET, FILM COATED ORAL at 17:09

## 2019-04-02 RX ADMIN — PRIMIDONE 50 MG: 50 TABLET ORAL at 22:45

## 2019-04-02 RX ADMIN — PYRIDOSTIGMINE BROMIDE 30 MG: 60 TABLET ORAL at 22:43

## 2019-04-02 RX ADMIN — RANOLAZINE 1000 MG: 500 TABLET, FILM COATED, EXTENDED RELEASE ORAL at 17:09

## 2019-04-03 ENCOUNTER — TRANSITIONAL CARE MANAGEMENT (OUTPATIENT)
Dept: INTERNAL MEDICINE CLINIC | Facility: CLINIC | Age: 79
End: 2019-04-03

## 2019-04-03 VITALS
TEMPERATURE: 97.7 F | DIASTOLIC BLOOD PRESSURE: 75 MMHG | BODY MASS INDEX: 31.09 KG/M2 | WEIGHT: 217.15 LBS | HEIGHT: 70 IN | SYSTOLIC BLOOD PRESSURE: 175 MMHG | HEART RATE: 76 BPM | RESPIRATION RATE: 18 BRPM | OXYGEN SATURATION: 97 %

## 2019-04-03 PROBLEM — R77.8 ELEVATED TROPONIN: Chronic | Status: RESOLVED | Noted: 2018-04-11 | Resolved: 2019-04-03

## 2019-04-03 PROBLEM — S22.080A COMPRESSION FRACTURE OF TWELFTH THORACIC VERTEBRA (HCC): Status: RESOLVED | Noted: 2019-01-02 | Resolved: 2019-04-03

## 2019-04-03 PROBLEM — I21.4 NON-STEMI (NON-ST ELEVATED MYOCARDIAL INFARCTION) (HCC): Status: RESOLVED | Noted: 2017-07-03 | Resolved: 2019-04-03

## 2019-04-03 PROBLEM — Z23 NEED FOR INFLUENZA VACCINATION: Status: RESOLVED | Noted: 2018-11-26 | Resolved: 2019-04-03

## 2019-04-03 PROBLEM — I95.1 ORTHOSTATIC HYPOTENSION: Status: ACTIVE | Noted: 2019-04-01

## 2019-04-03 PROBLEM — T78.40XA ALLERGIC REACTION: Status: RESOLVED | Noted: 2018-11-26 | Resolved: 2019-04-03

## 2019-04-03 LAB
ANION GAP SERPL CALCULATED.3IONS-SCNC: 8 MMOL/L (ref 4–13)
BUN SERPL-MCNC: 18 MG/DL (ref 5–25)
CALCIUM SERPL-MCNC: 8.7 MG/DL (ref 8.3–10.1)
CHLORIDE SERPL-SCNC: 100 MMOL/L (ref 100–108)
CO2 SERPL-SCNC: 29 MMOL/L (ref 21–32)
CREAT SERPL-MCNC: 1.03 MG/DL (ref 0.6–1.3)
ERYTHROCYTE [DISTWIDTH] IN BLOOD BY AUTOMATED COUNT: 13.7 % (ref 11.6–15.1)
GFR SERPL CREATININE-BSD FRML MDRD: 68 ML/MIN/1.73SQ M
GLUCOSE SERPL-MCNC: 136 MG/DL (ref 65–140)
GLUCOSE SERPL-MCNC: 144 MG/DL (ref 65–140)
GLUCOSE SERPL-MCNC: 181 MG/DL (ref 65–140)
HCT VFR BLD AUTO: 37.2 % (ref 36.5–49.3)
HGB BLD-MCNC: 11.9 G/DL (ref 12–17)
MAGNESIUM SERPL-MCNC: 2 MG/DL (ref 1.6–2.6)
MCH RBC QN AUTO: 30.6 PG (ref 26.8–34.3)
MCHC RBC AUTO-ENTMCNC: 32 G/DL (ref 31.4–37.4)
MCV RBC AUTO: 96 FL (ref 82–98)
PLATELET # BLD AUTO: 268 THOUSANDS/UL (ref 149–390)
PMV BLD AUTO: 10.9 FL (ref 8.9–12.7)
POTASSIUM SERPL-SCNC: 4.1 MMOL/L (ref 3.5–5.3)
RBC # BLD AUTO: 3.89 MILLION/UL (ref 3.88–5.62)
SODIUM SERPL-SCNC: 137 MMOL/L (ref 136–145)
WBC # BLD AUTO: 6.51 THOUSAND/UL (ref 4.31–10.16)

## 2019-04-03 PROCEDURE — 82948 REAGENT STRIP/BLOOD GLUCOSE: CPT

## 2019-04-03 PROCEDURE — 93306 TTE W/DOPPLER COMPLETE: CPT | Performed by: INTERNAL MEDICINE

## 2019-04-03 PROCEDURE — 80048 BASIC METABOLIC PNL TOTAL CA: CPT | Performed by: STUDENT IN AN ORGANIZED HEALTH CARE EDUCATION/TRAINING PROGRAM

## 2019-04-03 PROCEDURE — 97110 THERAPEUTIC EXERCISES: CPT

## 2019-04-03 PROCEDURE — 99232 SBSQ HOSP IP/OBS MODERATE 35: CPT | Performed by: INTERNAL MEDICINE

## 2019-04-03 PROCEDURE — 99239 HOSP IP/OBS DSCHRG MGMT >30: CPT | Performed by: STUDENT IN AN ORGANIZED HEALTH CARE EDUCATION/TRAINING PROGRAM

## 2019-04-03 PROCEDURE — 85027 COMPLETE CBC AUTOMATED: CPT | Performed by: STUDENT IN AN ORGANIZED HEALTH CARE EDUCATION/TRAINING PROGRAM

## 2019-04-03 PROCEDURE — 97116 GAIT TRAINING THERAPY: CPT

## 2019-04-03 PROCEDURE — 83735 ASSAY OF MAGNESIUM: CPT | Performed by: STUDENT IN AN ORGANIZED HEALTH CARE EDUCATION/TRAINING PROGRAM

## 2019-04-03 RX ORDER — DIPHENHYDRAMINE HCL 25 MG
12.5 TABLET ORAL ONCE AS NEEDED
Status: COMPLETED | OUTPATIENT
Start: 2019-04-03 | End: 2019-04-03

## 2019-04-03 RX ORDER — PYRIDOSTIGMINE BROMIDE 60 MG/1
30 TABLET ORAL 3 TIMES DAILY
Status: DISCONTINUED | OUTPATIENT
Start: 2019-04-03 | End: 2019-04-03 | Stop reason: SDUPTHER

## 2019-04-03 RX ORDER — PRIMIDONE 50 MG/1
50 TABLET ORAL
Qty: 30 TABLET | Refills: 0 | Status: SHIPPED | OUTPATIENT
Start: 2019-04-03 | End: 2019-05-03

## 2019-04-03 RX ORDER — PYRIDOSTIGMINE BROMIDE 60 MG/1
30 TABLET ORAL 3 TIMES DAILY
Qty: 90 TABLET | Refills: 0 | Status: SHIPPED | OUTPATIENT
Start: 2019-04-03 | End: 2019-06-13 | Stop reason: SDUPTHER

## 2019-04-03 RX ORDER — DIPHENHYDRAMINE HCL 25 MG
25 TABLET ORAL EVERY 8 HOURS PRN
Status: DISCONTINUED | OUTPATIENT
Start: 2019-04-03 | End: 2019-04-03 | Stop reason: HOSPADM

## 2019-04-03 RX ADMIN — RANOLAZINE 1000 MG: 500 TABLET, FILM COATED, EXTENDED RELEASE ORAL at 10:12

## 2019-04-03 RX ADMIN — CLOPIDOGREL BISULFATE 75 MG: 75 TABLET ORAL at 10:13

## 2019-04-03 RX ADMIN — ENOXAPARIN SODIUM 40 MG: 40 INJECTION SUBCUTANEOUS at 10:12

## 2019-04-03 RX ADMIN — CYANOCOBALAMIN TAB 500 MCG 500 MCG: 500 TAB at 10:15

## 2019-04-03 RX ADMIN — DOCUSATE SODIUM 100 MG: 100 CAPSULE, LIQUID FILLED ORAL at 10:13

## 2019-04-03 RX ADMIN — ASPIRIN 81 MG: 81 TABLET, COATED ORAL at 10:13

## 2019-04-03 RX ADMIN — VITAMIN D, TAB 1000IU (100/BT) 2000 UNITS: 25 TAB at 10:12

## 2019-04-03 RX ADMIN — Medication 1 TABLET: at 10:12

## 2019-04-03 RX ADMIN — PANTOPRAZOLE SODIUM 40 MG: 40 TABLET, DELAYED RELEASE ORAL at 06:46

## 2019-04-03 RX ADMIN — OXYCODONE HYDROCHLORIDE AND ACETAMINOPHEN 500 MG: 500 TABLET ORAL at 10:13

## 2019-04-03 RX ADMIN — DIPHENHYDRAMINE HCL 12.5 MG: 25 TABLET, FILM COATED ORAL at 06:46

## 2019-04-03 RX ADMIN — SENNOSIDES 8.6 MG: 8.6 TABLET, FILM COATED ORAL at 10:12

## 2019-04-03 RX ADMIN — PYRIDOSTIGMINE BROMIDE 30 MG: 60 TABLET ORAL at 10:13

## 2019-04-03 RX ADMIN — INSULIN LISPRO 1 UNITS: 100 INJECTION, SOLUTION INTRAVENOUS; SUBCUTANEOUS at 13:08

## 2019-04-04 ENCOUNTER — OFFICE VISIT (OUTPATIENT)
Dept: INTERNAL MEDICINE CLINIC | Facility: CLINIC | Age: 79
End: 2019-04-04
Payer: MEDICARE

## 2019-04-04 VITALS
HEIGHT: 70 IN | TEMPERATURE: 97.6 F | BODY MASS INDEX: 31.41 KG/M2 | HEART RATE: 70 BPM | WEIGHT: 219.4 LBS | SYSTOLIC BLOOD PRESSURE: 116 MMHG | DIASTOLIC BLOOD PRESSURE: 60 MMHG

## 2019-04-04 DIAGNOSIS — G25.0 ESSENTIAL TREMOR: ICD-10-CM

## 2019-04-04 DIAGNOSIS — I10 BENIGN ESSENTIAL HYPERTENSION: ICD-10-CM

## 2019-04-04 DIAGNOSIS — Z79.4 TYPE 2 DIABETES MELLITUS WITH COMPLICATION, WITH LONG-TERM CURRENT USE OF INSULIN (HCC): ICD-10-CM

## 2019-04-04 DIAGNOSIS — F41.9 ANXIETY: ICD-10-CM

## 2019-04-04 DIAGNOSIS — E11.65 TYPE 2 DIABETES MELLITUS WITH HYPERGLYCEMIA, WITH LONG-TERM CURRENT USE OF INSULIN (HCC): ICD-10-CM

## 2019-04-04 DIAGNOSIS — E11.8 TYPE 2 DIABETES MELLITUS WITH COMPLICATION, WITH LONG-TERM CURRENT USE OF INSULIN (HCC): ICD-10-CM

## 2019-04-04 DIAGNOSIS — Z79.4 TYPE 2 DIABETES MELLITUS WITH HYPERGLYCEMIA, WITH LONG-TERM CURRENT USE OF INSULIN (HCC): ICD-10-CM

## 2019-04-04 DIAGNOSIS — I95.1 ORTHOSTATIC HYPOTENSION: ICD-10-CM

## 2019-04-04 DIAGNOSIS — I95.1 POSTURAL HYPOTENSION: Primary | ICD-10-CM

## 2019-04-04 DIAGNOSIS — C61 MALIGNANT NEOPLASM OF PROSTATE (HCC): ICD-10-CM

## 2019-04-04 DIAGNOSIS — I21.4 NON-STEMI (NON-ST ELEVATED MYOCARDIAL INFARCTION) (HCC): ICD-10-CM

## 2019-04-04 DIAGNOSIS — I25.10 CORONARY ARTERY DISEASE INVOLVING NATIVE CORONARY ARTERY OF NATIVE HEART WITHOUT ANGINA PECTORIS: ICD-10-CM

## 2019-04-04 DIAGNOSIS — R25.1 TREMOR: ICD-10-CM

## 2019-04-04 PROCEDURE — 99496 TRANSJ CARE MGMT HIGH F2F 7D: CPT | Performed by: INTERNAL MEDICINE

## 2019-04-04 RX ORDER — LORAZEPAM 1 MG/1
1 TABLET ORAL 2 TIMES DAILY PRN
Qty: 60 TABLET | Refills: 0 | Status: SHIPPED | OUTPATIENT
Start: 2019-04-04 | End: 2019-05-07 | Stop reason: SDUPTHER

## 2019-04-04 RX ORDER — FLASH GLUCOSE SCANNING READER
1 EACH MISCELLANEOUS CONTINUOUS
Qty: 3 DEVICE | Refills: 0 | Status: SHIPPED | OUTPATIENT
Start: 2019-04-04 | End: 2019-10-30

## 2019-04-04 RX ORDER — VILAZODONE HYDROCHLORIDE 40 MG/1
40 TABLET ORAL DAILY
Qty: 30 TABLET | Refills: 5 | Status: SHIPPED | OUTPATIENT
Start: 2019-04-04 | End: 2019-10-30

## 2019-04-07 PROBLEM — C61 MALIGNANT NEOPLASM OF PROSTATE (HCC): Status: ACTIVE | Noted: 2019-04-07

## 2019-04-07 PROBLEM — G25.0 ESSENTIAL TREMOR: Status: RESOLVED | Noted: 2019-04-02 | Resolved: 2019-04-07

## 2019-04-08 DIAGNOSIS — I21.4 NON-STEMI (NON-ST ELEVATED MYOCARDIAL INFARCTION) (HCC): ICD-10-CM

## 2019-04-08 RX ORDER — RANOLAZINE 1000 MG/1
1000 TABLET, EXTENDED RELEASE ORAL 2 TIMES DAILY
Qty: 180 TABLET | Refills: 3 | Status: SHIPPED | OUTPATIENT
Start: 2019-04-08 | End: 2020-01-01 | Stop reason: SDUPTHER

## 2019-04-11 ENCOUNTER — OFFICE VISIT (OUTPATIENT)
Dept: ENDOCRINOLOGY | Facility: CLINIC | Age: 79
End: 2019-04-11
Payer: MEDICARE

## 2019-04-11 VITALS
WEIGHT: 217 LBS | BODY MASS INDEX: 31.07 KG/M2 | SYSTOLIC BLOOD PRESSURE: 102 MMHG | DIASTOLIC BLOOD PRESSURE: 68 MMHG | HEIGHT: 70 IN

## 2019-04-11 DIAGNOSIS — I10 BENIGN ESSENTIAL HYPERTENSION: ICD-10-CM

## 2019-04-11 DIAGNOSIS — Z79.4 TYPE 2 DIABETES MELLITUS WITH HYPERGLYCEMIA, WITH LONG-TERM CURRENT USE OF INSULIN (HCC): Primary | ICD-10-CM

## 2019-04-11 DIAGNOSIS — E11.65 TYPE 2 DIABETES MELLITUS WITH HYPERGLYCEMIA, WITH LONG-TERM CURRENT USE OF INSULIN (HCC): Primary | ICD-10-CM

## 2019-04-11 DIAGNOSIS — E78.5 DYSLIPIDEMIA: ICD-10-CM

## 2019-04-11 PROCEDURE — 99214 OFFICE O/P EST MOD 30 MIN: CPT | Performed by: PHYSICIAN ASSISTANT

## 2019-04-11 RX ORDER — BLOOD-GLUCOSE METER
EACH MISCELLANEOUS
COMMUNITY
End: 2019-10-10 | Stop reason: SDUPTHER

## 2019-04-15 ENCOUNTER — OFFICE VISIT (OUTPATIENT)
Dept: CARDIOLOGY CLINIC | Facility: CLINIC | Age: 79
End: 2019-04-15
Payer: MEDICARE

## 2019-04-15 VITALS
OXYGEN SATURATION: 97 % | DIASTOLIC BLOOD PRESSURE: 68 MMHG | BODY MASS INDEX: 32.14 KG/M2 | WEIGHT: 224.5 LBS | SYSTOLIC BLOOD PRESSURE: 142 MMHG | HEIGHT: 70 IN | HEART RATE: 88 BPM

## 2019-04-15 DIAGNOSIS — Z95.1 S/P CABG X 3: Primary | ICD-10-CM

## 2019-04-15 DIAGNOSIS — I25.10 CORONARY ARTERY DISEASE INVOLVING NATIVE CORONARY ARTERY OF NATIVE HEART WITHOUT ANGINA PECTORIS: ICD-10-CM

## 2019-04-15 DIAGNOSIS — I95.1 ORTHOSTATIC HYPOTENSION: ICD-10-CM

## 2019-04-15 DIAGNOSIS — I10 BENIGN ESSENTIAL HYPERTENSION: ICD-10-CM

## 2019-04-15 DIAGNOSIS — E11.65 TYPE 2 DIABETES MELLITUS WITH HYPERGLYCEMIA, WITH LONG-TERM CURRENT USE OF INSULIN (HCC): ICD-10-CM

## 2019-04-15 DIAGNOSIS — Z79.4 TYPE 2 DIABETES MELLITUS WITH HYPERGLYCEMIA, WITH LONG-TERM CURRENT USE OF INSULIN (HCC): ICD-10-CM

## 2019-04-15 DIAGNOSIS — N18.2 CKD (CHRONIC KIDNEY DISEASE) STAGE 2, GFR 60-89 ML/MIN: ICD-10-CM

## 2019-04-15 DIAGNOSIS — I71.4 ANEURYSM OF INFRARENAL ABDOMINAL AORTA (HCC): ICD-10-CM

## 2019-04-15 PROCEDURE — 99214 OFFICE O/P EST MOD 30 MIN: CPT | Performed by: NURSE PRACTITIONER

## 2019-04-17 DIAGNOSIS — E13.9 DIABETES 1.5, MANAGED AS TYPE 2 (HCC): ICD-10-CM

## 2019-04-19 DIAGNOSIS — Z79.4 TYPE 2 DIABETES MELLITUS WITH COMPLICATION, WITH LONG-TERM CURRENT USE OF INSULIN (HCC): ICD-10-CM

## 2019-04-19 DIAGNOSIS — E11.8 TYPE 2 DIABETES MELLITUS WITH COMPLICATION, WITH LONG-TERM CURRENT USE OF INSULIN (HCC): ICD-10-CM

## 2019-04-19 RX ORDER — LANCETS
EACH MISCELLANEOUS
Qty: 306 EACH | Refills: 1 | Status: SHIPPED | OUTPATIENT
Start: 2019-04-19 | End: 2019-09-30 | Stop reason: SDUPTHER

## 2019-04-23 PROCEDURE — 93660 TILT TABLE EVALUATION: CPT | Performed by: INTERNAL MEDICINE

## 2019-04-24 DIAGNOSIS — E08.00 DIABETES MELLITUS DUE TO UNDERLYING CONDITION WITH HYPEROSMOLARITY WITHOUT COMA, WITHOUT LONG-TERM CURRENT USE OF INSULIN (HCC): ICD-10-CM

## 2019-04-29 ENCOUNTER — APPOINTMENT (EMERGENCY)
Dept: CT IMAGING | Facility: HOSPITAL | Age: 79
End: 2019-04-29
Payer: MEDICARE

## 2019-04-29 ENCOUNTER — HOSPITAL ENCOUNTER (OUTPATIENT)
Facility: HOSPITAL | Age: 79
Setting detail: OBSERVATION
Discharge: HOME | End: 2019-05-01
Attending: EMERGENCY MEDICINE | Admitting: HOSPITALIST
Payer: MEDICARE

## 2019-04-29 ENCOUNTER — APPOINTMENT (EMERGENCY)
Dept: RADIOLOGY | Facility: HOSPITAL | Age: 79
End: 2019-04-29
Payer: MEDICARE

## 2019-04-29 DIAGNOSIS — R09.89 CHEST CONGESTION: ICD-10-CM

## 2019-04-29 DIAGNOSIS — R53.1 WEAKNESS: ICD-10-CM

## 2019-04-29 DIAGNOSIS — R55 NEAR SYNCOPE: Primary | ICD-10-CM

## 2019-04-29 DIAGNOSIS — J20.9 ACUTE BRONCHITIS: ICD-10-CM

## 2019-04-29 LAB
ALBUMIN SERPL BCP-MCNC: 3 G/DL (ref 3.5–5)
ALP SERPL-CCNC: 72 U/L (ref 46–116)
ALT SERPL W P-5'-P-CCNC: 21 U/L (ref 12–78)
ANION GAP SERPL CALCULATED.3IONS-SCNC: 9 MMOL/L (ref 4–13)
APTT PPP: 33 SECONDS (ref 26–38)
AST SERPL W P-5'-P-CCNC: 15 U/L (ref 5–45)
ATRIAL RATE: 88 BPM
BASOPHILS # BLD AUTO: 0.03 THOUSANDS/ΜL (ref 0–0.1)
BASOPHILS NFR BLD AUTO: 0 % (ref 0–1)
BILIRUB SERPL-MCNC: 0.4 MG/DL (ref 0.2–1)
BUN SERPL-MCNC: 21 MG/DL (ref 5–25)
CALCIUM SERPL-MCNC: 8.7 MG/DL (ref 8.3–10.1)
CHLORIDE SERPL-SCNC: 103 MMOL/L (ref 100–108)
CO2 SERPL-SCNC: 26 MMOL/L (ref 21–32)
CREAT SERPL-MCNC: 1.25 MG/DL (ref 0.6–1.3)
EOSINOPHIL # BLD AUTO: 0.32 THOUSAND/ΜL (ref 0–0.61)
EOSINOPHIL NFR BLD AUTO: 3 % (ref 0–6)
ERYTHROCYTE [DISTWIDTH] IN BLOOD BY AUTOMATED COUNT: 14.6 % (ref 11.6–15.1)
FLUAV AG SPEC QL: NOT DETECTED
FLUBV AG SPEC QL: NOT DETECTED
GFR SERPL CREATININE-BSD FRML MDRD: 54 ML/MIN/1.73SQ M
GLUCOSE SERPL-MCNC: 110 MG/DL (ref 65–140)
GLUCOSE SERPL-MCNC: 114 MG/DL (ref 65–140)
GLUCOSE SERPL-MCNC: 189 MG/DL (ref 65–140)
HCT VFR BLD AUTO: 36.7 % (ref 36.5–49.3)
HGB BLD-MCNC: 12.3 G/DL (ref 12–17)
IMM GRANULOCYTES # BLD AUTO: 0.06 THOUSAND/UL (ref 0–0.2)
IMM GRANULOCYTES NFR BLD AUTO: 1 % (ref 0–2)
INR PPP: 1.06 (ref 0.86–1.17)
LACTATE SERPL-SCNC: 1.3 MMOL/L (ref 0.5–2)
LYMPHOCYTES # BLD AUTO: 0.69 THOUSANDS/ΜL (ref 0.6–4.47)
LYMPHOCYTES NFR BLD AUTO: 7 % (ref 14–44)
MCH RBC QN AUTO: 31.8 PG (ref 26.8–34.3)
MCHC RBC AUTO-ENTMCNC: 33.5 G/DL (ref 31.4–37.4)
MCV RBC AUTO: 95 FL (ref 82–98)
MONOCYTES # BLD AUTO: 0.58 THOUSAND/ΜL (ref 0.17–1.22)
MONOCYTES NFR BLD AUTO: 6 % (ref 4–12)
NEUTROPHILS # BLD AUTO: 8.72 THOUSANDS/ΜL (ref 1.85–7.62)
NEUTS SEG NFR BLD AUTO: 83 % (ref 43–75)
NRBC BLD AUTO-RTO: 0 /100 WBCS
NT-PROBNP SERPL-MCNC: 2105 PG/ML
P AXIS: 43 DEGREES
PLATELET # BLD AUTO: 274 THOUSANDS/UL (ref 149–390)
PMV BLD AUTO: 10 FL (ref 8.9–12.7)
POTASSIUM SERPL-SCNC: 4.3 MMOL/L (ref 3.5–5.3)
PR INTERVAL: 160 MS
PROT SERPL-MCNC: 7.5 G/DL (ref 6.4–8.2)
PROTHROMBIN TIME: 13.5 SECONDS (ref 11.8–14.2)
QRS AXIS: 48 DEGREES
QRSD INTERVAL: 158 MS
QT INTERVAL: 418 MS
QTC INTERVAL: 505 MS
RBC # BLD AUTO: 3.87 MILLION/UL (ref 3.88–5.62)
RSV B RNA SPEC QL NAA+PROBE: NOT DETECTED
SODIUM SERPL-SCNC: 138 MMOL/L (ref 136–145)
T WAVE AXIS: 67 DEGREES
TROPONIN I SERPL-MCNC: 0.06 NG/ML
TROPONIN I SERPL-MCNC: 0.08 NG/ML
TROPONIN I SERPL-MCNC: 0.09 NG/ML
TSH SERPL DL<=0.05 MIU/L-ACNC: 1.53 UIU/ML (ref 0.36–3.74)
VENTRICULAR RATE: 88 BPM
WBC # BLD AUTO: 10.4 THOUSAND/UL (ref 4.31–10.16)

## 2019-04-29 PROCEDURE — 80053 COMPREHEN METABOLIC PANEL: CPT | Performed by: EMERGENCY MEDICINE

## 2019-04-29 PROCEDURE — 87631 RESP VIRUS 3-5 TARGETS: CPT | Performed by: EMERGENCY MEDICINE

## 2019-04-29 PROCEDURE — 94640 AIRWAY INHALATION TREATMENT: CPT

## 2019-04-29 PROCEDURE — 93005 ELECTROCARDIOGRAM TRACING: CPT

## 2019-04-29 PROCEDURE — 71046 X-RAY EXAM CHEST 2 VIEWS: CPT

## 2019-04-29 PROCEDURE — 85610 PROTHROMBIN TIME: CPT | Performed by: EMERGENCY MEDICINE

## 2019-04-29 PROCEDURE — 84443 ASSAY THYROID STIM HORMONE: CPT | Performed by: HOSPITALIST

## 2019-04-29 PROCEDURE — 99285 EMERGENCY DEPT VISIT HI MDM: CPT

## 2019-04-29 PROCEDURE — 99219 PR INITIAL OBSERVATION CARE/DAY 50 MINUTES: CPT | Performed by: HOSPITALIST

## 2019-04-29 PROCEDURE — 83880 ASSAY OF NATRIURETIC PEPTIDE: CPT | Performed by: EMERGENCY MEDICINE

## 2019-04-29 PROCEDURE — 94760 N-INVAS EAR/PLS OXIMETRY 1: CPT

## 2019-04-29 PROCEDURE — 36415 COLL VENOUS BLD VENIPUNCTURE: CPT

## 2019-04-29 PROCEDURE — 83605 ASSAY OF LACTIC ACID: CPT | Performed by: EMERGENCY MEDICINE

## 2019-04-29 PROCEDURE — 85730 THROMBOPLASTIN TIME PARTIAL: CPT | Performed by: EMERGENCY MEDICINE

## 2019-04-29 PROCEDURE — 96360 HYDRATION IV INFUSION INIT: CPT

## 2019-04-29 PROCEDURE — 84484 ASSAY OF TROPONIN QUANT: CPT | Performed by: EMERGENCY MEDICINE

## 2019-04-29 PROCEDURE — 99285 EMERGENCY DEPT VISIT HI MDM: CPT | Performed by: EMERGENCY MEDICINE

## 2019-04-29 PROCEDURE — 71250 CT THORAX DX C-: CPT

## 2019-04-29 PROCEDURE — 84484 ASSAY OF TROPONIN QUANT: CPT | Performed by: HOSPITALIST

## 2019-04-29 PROCEDURE — 85025 COMPLETE CBC W/AUTO DIFF WBC: CPT | Performed by: EMERGENCY MEDICINE

## 2019-04-29 PROCEDURE — 93010 ELECTROCARDIOGRAM REPORT: CPT | Performed by: INTERNAL MEDICINE

## 2019-04-29 PROCEDURE — 82948 REAGENT STRIP/BLOOD GLUCOSE: CPT

## 2019-04-29 PROCEDURE — 87040 BLOOD CULTURE FOR BACTERIA: CPT | Performed by: EMERGENCY MEDICINE

## 2019-04-29 RX ORDER — PYRIDOSTIGMINE BROMIDE 60 MG/1
30 TABLET ORAL 3 TIMES DAILY
Status: DISCONTINUED | OUTPATIENT
Start: 2019-04-29 | End: 2019-05-01 | Stop reason: HOSPADM

## 2019-04-29 RX ORDER — ACETAMINOPHEN 325 MG/1
650 TABLET ORAL EVERY 6 HOURS PRN
Status: DISCONTINUED | OUTPATIENT
Start: 2019-04-29 | End: 2019-05-01 | Stop reason: HOSPADM

## 2019-04-29 RX ORDER — CLOPIDOGREL BISULFATE 75 MG/1
75 TABLET ORAL DAILY
Status: DISCONTINUED | OUTPATIENT
Start: 2019-04-30 | End: 2019-05-01 | Stop reason: HOSPADM

## 2019-04-29 RX ORDER — ALBUTEROL SULFATE 2.5 MG/3ML
2.5 SOLUTION RESPIRATORY (INHALATION) EVERY 6 HOURS PRN
Status: DISCONTINUED | OUTPATIENT
Start: 2019-04-29 | End: 2019-05-01 | Stop reason: HOSPADM

## 2019-04-29 RX ORDER — PRIMIDONE 50 MG/1
50 TABLET ORAL
Status: DISCONTINUED | OUTPATIENT
Start: 2019-04-29 | End: 2019-04-29

## 2019-04-29 RX ORDER — PANTOPRAZOLE SODIUM 40 MG/1
40 TABLET, DELAYED RELEASE ORAL
Status: DISCONTINUED | OUTPATIENT
Start: 2019-04-29 | End: 2019-05-01 | Stop reason: HOSPADM

## 2019-04-29 RX ORDER — VILAZODONE HYDROCHLORIDE 20 MG/1
40 TABLET ORAL DAILY
Status: DISCONTINUED | OUTPATIENT
Start: 2019-04-30 | End: 2019-05-01 | Stop reason: HOSPADM

## 2019-04-29 RX ORDER — CHOLECALCIFEROL (VITAMIN D3) 125 MCG
100 CAPSULE ORAL DAILY
Status: DISCONTINUED | OUTPATIENT
Start: 2019-04-30 | End: 2019-05-01 | Stop reason: HOSPADM

## 2019-04-29 RX ORDER — SODIUM CHLORIDE 9 MG/ML
50 INJECTION, SOLUTION INTRAVENOUS CONTINUOUS
Status: DISCONTINUED | OUTPATIENT
Start: 2019-04-29 | End: 2019-04-30

## 2019-04-29 RX ORDER — CHOLECALCIFEROL (VITAMIN D3) 125 MCG
500 CAPSULE ORAL DAILY
Status: DISCONTINUED | OUTPATIENT
Start: 2019-04-30 | End: 2019-05-01 | Stop reason: HOSPADM

## 2019-04-29 RX ORDER — NITROGLYCERIN 0.4 MG/1
0.4 TABLET SUBLINGUAL
Status: DISCONTINUED | OUTPATIENT
Start: 2019-04-29 | End: 2019-05-01 | Stop reason: HOSPADM

## 2019-04-29 RX ORDER — LORAZEPAM 1 MG/1
1 TABLET ORAL 2 TIMES DAILY PRN
Status: DISCONTINUED | OUTPATIENT
Start: 2019-04-29 | End: 2019-05-01 | Stop reason: HOSPADM

## 2019-04-29 RX ORDER — MELATONIN
3000 DAILY
Status: DISCONTINUED | OUTPATIENT
Start: 2019-04-30 | End: 2019-05-01 | Stop reason: HOSPADM

## 2019-04-29 RX ORDER — POLYETHYLENE GLYCOL 3350 17 G/17G
17 POWDER, FOR SOLUTION ORAL DAILY
Status: DISCONTINUED | OUTPATIENT
Start: 2019-04-30 | End: 2019-05-01 | Stop reason: HOSPADM

## 2019-04-29 RX ORDER — MULTIVIT WITH MINERALS/LUTEIN
500 TABLET ORAL DAILY
Status: DISCONTINUED | OUTPATIENT
Start: 2019-04-30 | End: 2019-05-01 | Stop reason: HOSPADM

## 2019-04-29 RX ORDER — INSULIN GLARGINE 100 [IU]/ML
60 INJECTION, SOLUTION SUBCUTANEOUS
Status: DISCONTINUED | OUTPATIENT
Start: 2019-04-29 | End: 2019-05-01 | Stop reason: HOSPADM

## 2019-04-29 RX ORDER — ASPIRIN 325 MG
325 TABLET ORAL DAILY
Status: DISCONTINUED | OUTPATIENT
Start: 2019-04-30 | End: 2019-05-01 | Stop reason: HOSPADM

## 2019-04-29 RX ORDER — ATORVASTATIN CALCIUM 40 MG/1
80 TABLET, FILM COATED ORAL
Status: DISCONTINUED | OUTPATIENT
Start: 2019-04-29 | End: 2019-05-01 | Stop reason: HOSPADM

## 2019-04-29 RX ORDER — ALBUTEROL SULFATE 2.5 MG/3ML
5 SOLUTION RESPIRATORY (INHALATION) ONCE
Status: COMPLETED | OUTPATIENT
Start: 2019-04-29 | End: 2019-04-29

## 2019-04-29 RX ORDER — RANOLAZINE 500 MG/1
1000 TABLET, EXTENDED RELEASE ORAL 2 TIMES DAILY
Status: DISCONTINUED | OUTPATIENT
Start: 2019-04-29 | End: 2019-05-01 | Stop reason: HOSPADM

## 2019-04-29 RX ORDER — HEPARIN SODIUM 5000 [USP'U]/ML
5000 INJECTION, SOLUTION INTRAVENOUS; SUBCUTANEOUS EVERY 8 HOURS SCHEDULED
Status: DISCONTINUED | OUTPATIENT
Start: 2019-04-29 | End: 2019-05-01 | Stop reason: HOSPADM

## 2019-04-29 RX ORDER — DOCUSATE SODIUM 100 MG/1
100 CAPSULE, LIQUID FILLED ORAL 2 TIMES DAILY
Status: DISCONTINUED | OUTPATIENT
Start: 2019-04-29 | End: 2019-05-01 | Stop reason: HOSPADM

## 2019-04-29 RX ADMIN — ALBUTEROL SULFATE 5 MG: 2.5 SOLUTION RESPIRATORY (INHALATION) at 12:00

## 2019-04-29 RX ADMIN — DOCUSATE SODIUM 100 MG: 100 CAPSULE, LIQUID FILLED ORAL at 17:21

## 2019-04-29 RX ADMIN — IPRATROPIUM BROMIDE 0.5 MG: 0.5 SOLUTION RESPIRATORY (INHALATION) at 12:00

## 2019-04-29 RX ADMIN — ALBUTEROL SULFATE 2.5 MG: 2.5 SOLUTION RESPIRATORY (INHALATION) at 20:27

## 2019-04-29 RX ADMIN — METFORMIN HYDROCHLORIDE 500 MG: 500 TABLET ORAL at 17:21

## 2019-04-29 RX ADMIN — SODIUM CHLORIDE 1000 ML: 0.9 INJECTION, SOLUTION INTRAVENOUS at 11:20

## 2019-04-29 RX ADMIN — PYRIDOSTIGMINE BROMIDE 30 MG: 60 TABLET ORAL at 21:34

## 2019-04-29 RX ADMIN — INSULIN LISPRO 20 UNITS: 100 INJECTION, SOLUTION INTRAVENOUS; SUBCUTANEOUS at 17:37

## 2019-04-29 RX ADMIN — HEPARIN SODIUM 5000 UNITS: 5000 INJECTION INTRAVENOUS; SUBCUTANEOUS at 21:33

## 2019-04-29 RX ADMIN — HEPARIN SODIUM 5000 UNITS: 5000 INJECTION INTRAVENOUS; SUBCUTANEOUS at 17:21

## 2019-04-29 RX ADMIN — INSULIN GLARGINE 60 UNITS: 100 INJECTION, SOLUTION SUBCUTANEOUS at 21:33

## 2019-04-29 RX ADMIN — PANTOPRAZOLE SODIUM 40 MG: 40 TABLET, DELAYED RELEASE ORAL at 17:21

## 2019-04-29 RX ADMIN — RANOLAZINE 1000 MG: 500 TABLET, FILM COATED, EXTENDED RELEASE ORAL at 17:21

## 2019-04-29 RX ADMIN — PYRIDOSTIGMINE BROMIDE 30 MG: 60 TABLET ORAL at 17:36

## 2019-04-29 RX ADMIN — SODIUM CHLORIDE 50 ML/HR: 0.9 INJECTION, SOLUTION INTRAVENOUS at 17:22

## 2019-04-29 RX ADMIN — ATORVASTATIN CALCIUM 80 MG: 40 TABLET, FILM COATED ORAL at 17:21

## 2019-04-30 PROBLEM — R09.89 CHEST CONGESTION: Status: ACTIVE | Noted: 2019-04-30

## 2019-04-30 LAB
ANION GAP SERPL CALCULATED.3IONS-SCNC: 6 MMOL/L (ref 4–13)
BUN SERPL-MCNC: 17 MG/DL (ref 5–25)
CALCIUM SERPL-MCNC: 8.6 MG/DL (ref 8.3–10.1)
CHLORIDE SERPL-SCNC: 103 MMOL/L (ref 100–108)
CO2 SERPL-SCNC: 29 MMOL/L (ref 21–32)
CREAT SERPL-MCNC: 1.02 MG/DL (ref 0.6–1.3)
ERYTHROCYTE [DISTWIDTH] IN BLOOD BY AUTOMATED COUNT: 14.3 % (ref 11.6–15.1)
GFR SERPL CREATININE-BSD FRML MDRD: 69 ML/MIN/1.73SQ M
GLUCOSE SERPL-MCNC: 110 MG/DL (ref 65–140)
GLUCOSE SERPL-MCNC: 111 MG/DL (ref 65–140)
GLUCOSE SERPL-MCNC: 195 MG/DL (ref 65–140)
GLUCOSE SERPL-MCNC: 67 MG/DL (ref 65–140)
GLUCOSE SERPL-MCNC: 91 MG/DL (ref 65–140)
GLUCOSE SERPL-MCNC: 96 MG/DL (ref 65–140)
HCT VFR BLD AUTO: 37.5 % (ref 36.5–49.3)
HGB BLD-MCNC: 12.3 G/DL (ref 12–17)
MAGNESIUM SERPL-MCNC: 2 MG/DL (ref 1.6–2.6)
MCH RBC QN AUTO: 31 PG (ref 26.8–34.3)
MCHC RBC AUTO-ENTMCNC: 32.8 G/DL (ref 31.4–37.4)
MCV RBC AUTO: 95 FL (ref 82–98)
PHOSPHATE SERPL-MCNC: 3.2 MG/DL (ref 2.3–4.1)
PLATELET # BLD AUTO: 286 THOUSANDS/UL (ref 149–390)
PMV BLD AUTO: 9.8 FL (ref 8.9–12.7)
POTASSIUM SERPL-SCNC: 3.8 MMOL/L (ref 3.5–5.3)
RBC # BLD AUTO: 3.97 MILLION/UL (ref 3.88–5.62)
SODIUM SERPL-SCNC: 138 MMOL/L (ref 136–145)
TROPONIN I SERPL-MCNC: 0.05 NG/ML
TROPONIN I SERPL-MCNC: 0.08 NG/ML
WBC # BLD AUTO: 8.03 THOUSAND/UL (ref 4.31–10.16)

## 2019-04-30 PROCEDURE — G8988 SELF CARE GOAL STATUS: HCPCS

## 2019-04-30 PROCEDURE — G8987 SELF CARE CURRENT STATUS: HCPCS

## 2019-04-30 PROCEDURE — 97535 SELF CARE MNGMENT TRAINING: CPT

## 2019-04-30 PROCEDURE — 94640 AIRWAY INHALATION TREATMENT: CPT

## 2019-04-30 PROCEDURE — 85027 COMPLETE CBC AUTOMATED: CPT | Performed by: HOSPITALIST

## 2019-04-30 PROCEDURE — 84100 ASSAY OF PHOSPHORUS: CPT | Performed by: HOSPITALIST

## 2019-04-30 PROCEDURE — 82948 REAGENT STRIP/BLOOD GLUCOSE: CPT

## 2019-04-30 PROCEDURE — 97530 THERAPEUTIC ACTIVITIES: CPT

## 2019-04-30 PROCEDURE — 84484 ASSAY OF TROPONIN QUANT: CPT | Performed by: INTERNAL MEDICINE

## 2019-04-30 PROCEDURE — 83735 ASSAY OF MAGNESIUM: CPT | Performed by: HOSPITALIST

## 2019-04-30 PROCEDURE — 94760 N-INVAS EAR/PLS OXIMETRY 1: CPT

## 2019-04-30 PROCEDURE — 97163 PT EVAL HIGH COMPLEX 45 MIN: CPT

## 2019-04-30 PROCEDURE — 99225 PR SBSQ OBSERVATION CARE/DAY 25 MINUTES: CPT | Performed by: PHYSICIAN ASSISTANT

## 2019-04-30 PROCEDURE — G8980 MOBILITY D/C STATUS: HCPCS

## 2019-04-30 PROCEDURE — G8978 MOBILITY CURRENT STATUS: HCPCS

## 2019-04-30 PROCEDURE — 97165 OT EVAL LOW COMPLEX 30 MIN: CPT

## 2019-04-30 PROCEDURE — 80048 BASIC METABOLIC PNL TOTAL CA: CPT | Performed by: HOSPITALIST

## 2019-04-30 RX ORDER — HYDRALAZINE HYDROCHLORIDE 20 MG/ML
5 INJECTION INTRAMUSCULAR; INTRAVENOUS EVERY 6 HOURS PRN
Status: DISCONTINUED | OUTPATIENT
Start: 2019-04-30 | End: 2019-04-30

## 2019-04-30 RX ORDER — HYDRALAZINE HYDROCHLORIDE 20 MG/ML
5 INJECTION INTRAMUSCULAR; INTRAVENOUS EVERY 6 HOURS PRN
Status: DISCONTINUED | OUTPATIENT
Start: 2019-04-30 | End: 2019-05-01 | Stop reason: HOSPADM

## 2019-04-30 RX ORDER — SENNOSIDES 8.6 MG
1 TABLET ORAL
Status: DISCONTINUED | OUTPATIENT
Start: 2019-04-30 | End: 2019-05-01 | Stop reason: HOSPADM

## 2019-04-30 RX ORDER — BENZONATATE 100 MG/1
100 CAPSULE ORAL 3 TIMES DAILY PRN
Status: DISCONTINUED | OUTPATIENT
Start: 2019-04-30 | End: 2019-05-01 | Stop reason: HOSPADM

## 2019-04-30 RX ADMIN — HEPARIN SODIUM 5000 UNITS: 5000 INJECTION INTRAVENOUS; SUBCUTANEOUS at 06:10

## 2019-04-30 RX ADMIN — PYRIDOSTIGMINE BROMIDE 30 MG: 60 TABLET ORAL at 21:08

## 2019-04-30 RX ADMIN — ATORVASTATIN CALCIUM 80 MG: 40 TABLET, FILM COATED ORAL at 17:49

## 2019-04-30 RX ADMIN — CLOPIDOGREL BISULFATE 75 MG: 75 TABLET ORAL at 08:57

## 2019-04-30 RX ADMIN — INSULIN LISPRO 2 UNITS: 100 INJECTION, SOLUTION INTRAVENOUS; SUBCUTANEOUS at 12:21

## 2019-04-30 RX ADMIN — PANTOPRAZOLE SODIUM 40 MG: 40 TABLET, DELAYED RELEASE ORAL at 06:10

## 2019-04-30 RX ADMIN — ACETAMINOPHEN 650 MG: 325 TABLET, FILM COATED ORAL at 10:49

## 2019-04-30 RX ADMIN — VILAZODONE HYDROCHLORIDE 40 MG: 20 TABLET ORAL at 09:00

## 2019-04-30 RX ADMIN — DOCUSATE SODIUM 100 MG: 100 CAPSULE, LIQUID FILLED ORAL at 17:49

## 2019-04-30 RX ADMIN — ALBUTEROL SULFATE 2.5 MG: 2.5 SOLUTION RESPIRATORY (INHALATION) at 08:40

## 2019-04-30 RX ADMIN — ASPIRIN 325 MG ORAL TABLET 325 MG: 325 PILL ORAL at 08:57

## 2019-04-30 RX ADMIN — POLYETHYLENE GLYCOL 3350 17 G: 17 POWDER, FOR SOLUTION ORAL at 08:57

## 2019-04-30 RX ADMIN — VITAMIN D, TAB 1000IU (100/BT) 3000 UNITS: 25 TAB at 08:57

## 2019-04-30 RX ADMIN — Medication 100 MG: at 08:57

## 2019-04-30 RX ADMIN — ASCORBIC ACID TAB 250 MG 500 MG: 250 TAB at 08:57

## 2019-04-30 RX ADMIN — HEPARIN SODIUM 5000 UNITS: 5000 INJECTION INTRAVENOUS; SUBCUTANEOUS at 21:08

## 2019-04-30 RX ADMIN — RANOLAZINE 1000 MG: 500 TABLET, FILM COATED, EXTENDED RELEASE ORAL at 08:56

## 2019-04-30 RX ADMIN — DOCUSATE SODIUM 100 MG: 100 CAPSULE, LIQUID FILLED ORAL at 08:57

## 2019-04-30 RX ADMIN — RANOLAZINE 1000 MG: 500 TABLET, FILM COATED, EXTENDED RELEASE ORAL at 17:49

## 2019-04-30 RX ADMIN — INSULIN GLARGINE 60 UNITS: 100 INJECTION, SOLUTION SUBCUTANEOUS at 21:08

## 2019-04-30 RX ADMIN — CYANOCOBALAMIN TAB 500 MCG 500 MCG: 500 TAB at 08:57

## 2019-04-30 RX ADMIN — PYRIDOSTIGMINE BROMIDE 30 MG: 60 TABLET ORAL at 17:50

## 2019-04-30 RX ADMIN — BENZONATATE 100 MG: 100 CAPSULE ORAL at 01:57

## 2019-04-30 RX ADMIN — PANTOPRAZOLE SODIUM 40 MG: 40 TABLET, DELAYED RELEASE ORAL at 17:49

## 2019-04-30 RX ADMIN — PYRIDOSTIGMINE BROMIDE 30 MG: 60 TABLET ORAL at 09:01

## 2019-05-01 ENCOUNTER — TRANSITIONAL CARE MANAGEMENT (OUTPATIENT)
Dept: INTERNAL MEDICINE CLINIC | Facility: CLINIC | Age: 79
End: 2019-05-01

## 2019-05-01 VITALS
BODY MASS INDEX: 30.01 KG/M2 | RESPIRATION RATE: 18 BRPM | DIASTOLIC BLOOD PRESSURE: 79 MMHG | WEIGHT: 209.6 LBS | OXYGEN SATURATION: 96 % | SYSTOLIC BLOOD PRESSURE: 172 MMHG | HEIGHT: 70 IN | HEART RATE: 88 BPM | TEMPERATURE: 98.3 F

## 2019-05-01 LAB
GLUCOSE SERPL-MCNC: 168 MG/DL (ref 65–140)
GLUCOSE SERPL-MCNC: 216 MG/DL (ref 65–140)

## 2019-05-01 PROCEDURE — 97110 THERAPEUTIC EXERCISES: CPT

## 2019-05-01 PROCEDURE — 82948 REAGENT STRIP/BLOOD GLUCOSE: CPT

## 2019-05-01 PROCEDURE — 97116 GAIT TRAINING THERAPY: CPT

## 2019-05-01 PROCEDURE — 99217 PR OBSERVATION CARE DISCHARGE MANAGEMENT: CPT | Performed by: NURSE PRACTITIONER

## 2019-05-01 RX ORDER — BENZONATATE 100 MG/1
100 CAPSULE ORAL 3 TIMES DAILY PRN
Qty: 20 CAPSULE | Refills: 0 | Status: SHIPPED | OUTPATIENT
Start: 2019-05-01 | End: 2019-10-30

## 2019-05-01 RX ADMIN — POLYETHYLENE GLYCOL 3350 17 G: 17 POWDER, FOR SOLUTION ORAL at 08:53

## 2019-05-01 RX ADMIN — Medication 100 MG: at 08:53

## 2019-05-01 RX ADMIN — VILAZODONE HYDROCHLORIDE 40 MG: 20 TABLET ORAL at 08:55

## 2019-05-01 RX ADMIN — ASCORBIC ACID TAB 250 MG 500 MG: 250 TAB at 08:54

## 2019-05-01 RX ADMIN — PYRIDOSTIGMINE BROMIDE 30 MG: 60 TABLET ORAL at 08:55

## 2019-05-01 RX ADMIN — RANOLAZINE 1000 MG: 500 TABLET, FILM COATED, EXTENDED RELEASE ORAL at 08:53

## 2019-05-01 RX ADMIN — INSULIN LISPRO 2 UNITS: 100 INJECTION, SOLUTION INTRAVENOUS; SUBCUTANEOUS at 08:55

## 2019-05-01 RX ADMIN — PANTOPRAZOLE SODIUM 40 MG: 40 TABLET, DELAYED RELEASE ORAL at 05:48

## 2019-05-01 RX ADMIN — VITAMIN D, TAB 1000IU (100/BT) 3000 UNITS: 25 TAB at 08:54

## 2019-05-01 RX ADMIN — CLOPIDOGREL BISULFATE 75 MG: 75 TABLET ORAL at 08:53

## 2019-05-01 RX ADMIN — DOCUSATE SODIUM 100 MG: 100 CAPSULE, LIQUID FILLED ORAL at 08:53

## 2019-05-01 RX ADMIN — CYANOCOBALAMIN TAB 500 MCG 500 MCG: 500 TAB at 08:53

## 2019-05-01 RX ADMIN — HEPARIN SODIUM 5000 UNITS: 5000 INJECTION INTRAVENOUS; SUBCUTANEOUS at 05:48

## 2019-05-01 RX ADMIN — ASPIRIN 325 MG ORAL TABLET 325 MG: 325 PILL ORAL at 08:53

## 2019-05-03 ENCOUNTER — CONSULT (OUTPATIENT)
Dept: NEUROLOGY | Facility: CLINIC | Age: 79
End: 2019-05-03
Payer: MEDICARE

## 2019-05-03 ENCOUNTER — OFFICE VISIT (OUTPATIENT)
Dept: INTERNAL MEDICINE CLINIC | Facility: CLINIC | Age: 79
End: 2019-05-03
Payer: MEDICARE

## 2019-05-03 VITALS
OXYGEN SATURATION: 94 % | WEIGHT: 218.2 LBS | DIASTOLIC BLOOD PRESSURE: 74 MMHG | SYSTOLIC BLOOD PRESSURE: 132 MMHG | RESPIRATION RATE: 16 BRPM | HEART RATE: 84 BPM | HEIGHT: 70 IN | BODY MASS INDEX: 31.24 KG/M2

## 2019-05-03 VITALS
HEART RATE: 80 BPM | BODY MASS INDEX: 31.21 KG/M2 | SYSTOLIC BLOOD PRESSURE: 142 MMHG | DIASTOLIC BLOOD PRESSURE: 88 MMHG | WEIGHT: 218 LBS | HEIGHT: 70 IN

## 2019-05-03 DIAGNOSIS — I95.1 ORTHOSTATIC HYPOTENSION: ICD-10-CM

## 2019-05-03 DIAGNOSIS — R25.1 TREMOR: Primary | ICD-10-CM

## 2019-05-03 DIAGNOSIS — R53.1 WEAKNESS: ICD-10-CM

## 2019-05-03 DIAGNOSIS — G25.0 ESSENTIAL TREMOR: ICD-10-CM

## 2019-05-03 DIAGNOSIS — R06.2 WHEEZING: Primary | ICD-10-CM

## 2019-05-03 DIAGNOSIS — J06.9 VIRAL UPPER RESPIRATORY TRACT INFECTION: ICD-10-CM

## 2019-05-03 PROCEDURE — 99215 OFFICE O/P EST HI 40 MIN: CPT | Performed by: PSYCHIATRY & NEUROLOGY

## 2019-05-03 PROCEDURE — 99495 TRANSJ CARE MGMT MOD F2F 14D: CPT | Performed by: INTERNAL MEDICINE

## 2019-05-03 RX ORDER — PRIMIDONE 50 MG/1
TABLET ORAL 4 TIMES DAILY
COMMUNITY
End: 2019-05-03

## 2019-05-03 RX ORDER — FOLIC ACID/MULTIVIT,IRON,MINER .4-18-35
1 TABLET,CHEWABLE ORAL DAILY
COMMUNITY
End: 2019-10-30

## 2019-05-03 RX ORDER — GABAPENTIN 100 MG/1
400 CAPSULE ORAL 3 TIMES DAILY
Qty: 360 CAPSULE | Refills: 3 | Status: SHIPPED | OUTPATIENT
Start: 2019-05-03 | End: 2019-10-30

## 2019-05-03 RX ORDER — FLUTICASONE PROPIONATE 50 MCG
2 SPRAY, SUSPENSION (ML) NASAL DAILY
Qty: 16 G | Refills: 0 | Status: SHIPPED | OUTPATIENT
Start: 2019-05-03 | End: 2019-10-30

## 2019-05-03 RX ORDER — LEVALBUTEROL TARTRATE 45 UG/1
1 AEROSOL, METERED ORAL EVERY 6 HOURS PRN
Status: DISCONTINUED | OUTPATIENT
Start: 2019-05-03 | End: 2020-01-01

## 2019-05-04 LAB
BACTERIA BLD CULT: NORMAL
BACTERIA BLD CULT: NORMAL

## 2019-05-05 PROBLEM — J06.9 VIRAL UPPER RESPIRATORY TRACT INFECTION: Status: ACTIVE | Noted: 2019-05-05

## 2019-05-07 DIAGNOSIS — F41.9 ANXIETY: ICD-10-CM

## 2019-05-09 RX ORDER — LORAZEPAM 1 MG/1
TABLET ORAL
Qty: 60 TABLET | Refills: 0 | Status: SHIPPED | OUTPATIENT
Start: 2019-05-09 | End: 2019-06-06 | Stop reason: SDUPTHER

## 2019-05-26 DIAGNOSIS — I21.4 NON-STEMI (NON-ST ELEVATED MYOCARDIAL INFARCTION) (HCC): ICD-10-CM

## 2019-05-27 RX ORDER — CLOPIDOGREL BISULFATE 75 MG/1
TABLET ORAL
Qty: 90 TABLET | Refills: 1 | Status: SHIPPED | OUTPATIENT
Start: 2019-05-27 | End: 2020-01-01 | Stop reason: ALTCHOICE

## 2019-06-04 ENCOUNTER — OFFICE VISIT (OUTPATIENT)
Dept: INTERNAL MEDICINE CLINIC | Facility: CLINIC | Age: 79
End: 2019-06-04
Payer: MEDICARE

## 2019-06-04 VITALS
OXYGEN SATURATION: 94 % | WEIGHT: 219 LBS | HEIGHT: 70 IN | SYSTOLIC BLOOD PRESSURE: 128 MMHG | BODY MASS INDEX: 31.35 KG/M2 | RESPIRATION RATE: 16 BRPM | HEART RATE: 90 BPM | DIASTOLIC BLOOD PRESSURE: 70 MMHG

## 2019-06-04 DIAGNOSIS — Z11.59 ENCOUNTER FOR HEPATITIS C SCREENING TEST FOR LOW RISK PATIENT: Primary | ICD-10-CM

## 2019-06-04 DIAGNOSIS — R32 URINARY INCONTINENCE, UNSPECIFIED TYPE: ICD-10-CM

## 2019-06-04 DIAGNOSIS — I10 BENIGN ESSENTIAL HYPERTENSION: ICD-10-CM

## 2019-06-04 DIAGNOSIS — G25.0 ESSENTIAL TREMOR: ICD-10-CM

## 2019-06-04 DIAGNOSIS — L60.9 NAIL ABNORMALITY: ICD-10-CM

## 2019-06-04 DIAGNOSIS — Z12.5 SCREENING PSA (PROSTATE SPECIFIC ANTIGEN): ICD-10-CM

## 2019-06-04 DIAGNOSIS — E11.65 TYPE 2 DIABETES MELLITUS WITH HYPERGLYCEMIA, WITH LONG-TERM CURRENT USE OF INSULIN (HCC): ICD-10-CM

## 2019-06-04 DIAGNOSIS — Z00.00 MEDICARE ANNUAL WELLNESS VISIT, SUBSEQUENT: ICD-10-CM

## 2019-06-04 DIAGNOSIS — F41.9 ANXIETY: ICD-10-CM

## 2019-06-04 DIAGNOSIS — Z79.4 TYPE 2 DIABETES MELLITUS WITH HYPERGLYCEMIA, WITH LONG-TERM CURRENT USE OF INSULIN (HCC): ICD-10-CM

## 2019-06-04 PROCEDURE — 87102 FUNGUS ISOLATION CULTURE: CPT | Performed by: INTERNAL MEDICINE

## 2019-06-04 PROCEDURE — G0439 PPPS, SUBSEQ VISIT: HCPCS | Performed by: INTERNAL MEDICINE

## 2019-06-04 PROCEDURE — 99214 OFFICE O/P EST MOD 30 MIN: CPT | Performed by: INTERNAL MEDICINE

## 2019-06-06 DIAGNOSIS — I95.1 ORTHOSTATIC HYPOTENSION: ICD-10-CM

## 2019-06-06 DIAGNOSIS — F41.9 ANXIETY: ICD-10-CM

## 2019-06-07 RX ORDER — PYRIDOSTIGMINE BROMIDE 60 MG/1
30 TABLET ORAL 3 TIMES DAILY
Qty: 90 TABLET | Refills: 0 | OUTPATIENT
Start: 2019-06-07

## 2019-06-07 RX ORDER — LORAZEPAM 1 MG/1
TABLET ORAL
Qty: 60 TABLET | Refills: 0 | Status: SHIPPED | OUTPATIENT
Start: 2019-06-07 | End: 2019-07-11 | Stop reason: SDUPTHER

## 2019-06-11 DIAGNOSIS — I95.1 ORTHOSTATIC HYPOTENSION: ICD-10-CM

## 2019-06-11 RX ORDER — PYRIDOSTIGMINE BROMIDE 60 MG/1
30 TABLET ORAL 3 TIMES DAILY
Qty: 90 TABLET | Refills: 0 | OUTPATIENT
Start: 2019-06-11

## 2019-06-13 DIAGNOSIS — I95.1 ORTHOSTATIC HYPOTENSION: ICD-10-CM

## 2019-06-13 RX ORDER — PYRIDOSTIGMINE BROMIDE 60 MG/1
60 TABLET ORAL 3 TIMES DAILY
Qty: 90 TABLET | Refills: 0 | Status: SHIPPED | OUTPATIENT
Start: 2019-06-13 | End: 2019-06-18 | Stop reason: SDUPTHER

## 2019-06-17 DIAGNOSIS — I21.4 NON-STEMI (NON-ST ELEVATED MYOCARDIAL INFARCTION) (HCC): ICD-10-CM

## 2019-06-17 RX ORDER — METOPROLOL SUCCINATE 25 MG/1
TABLET, EXTENDED RELEASE ORAL
Qty: 90 TABLET | Refills: 1 | Status: SHIPPED | OUTPATIENT
Start: 2019-06-17 | End: 2019-07-30 | Stop reason: ALTCHOICE

## 2019-06-18 DIAGNOSIS — I95.1 ORTHOSTATIC HYPOTENSION: ICD-10-CM

## 2019-06-18 RX ORDER — PYRIDOSTIGMINE BROMIDE 60 MG/1
30 TABLET ORAL 3 TIMES DAILY
Qty: 90 TABLET | Refills: 0 | Status: SHIPPED | OUTPATIENT
Start: 2019-06-18 | End: 2019-08-13 | Stop reason: SDUPTHER

## 2019-06-24 ENCOUNTER — CONSULT (OUTPATIENT)
Dept: UROLOGY | Facility: AMBULATORY SURGERY CENTER | Age: 79
End: 2019-06-24
Payer: MEDICARE

## 2019-06-24 VITALS
WEIGHT: 220 LBS | BODY MASS INDEX: 31.5 KG/M2 | SYSTOLIC BLOOD PRESSURE: 136 MMHG | HEIGHT: 70 IN | DIASTOLIC BLOOD PRESSURE: 66 MMHG | HEART RATE: 89 BPM

## 2019-06-24 DIAGNOSIS — R32 URINARY INCONTINENCE, UNSPECIFIED TYPE: ICD-10-CM

## 2019-06-24 PROCEDURE — 99214 OFFICE O/P EST MOD 30 MIN: CPT | Performed by: UROLOGY

## 2019-06-24 RX ORDER — ERYTHROMYCIN 5 MG/G
OINTMENT OPHTHALMIC
Refills: 0 | COMMUNITY
Start: 2019-06-20 | End: 2019-10-30

## 2019-07-08 LAB — FUNGUS SPEC CULT: NORMAL

## 2019-07-09 DIAGNOSIS — E13.9 DIABETES 1.5, MANAGED AS TYPE 2 (HCC): ICD-10-CM

## 2019-07-09 RX ORDER — INSULIN ASPART 100 [IU]/ML
INJECTION, SOLUTION INTRAVENOUS; SUBCUTANEOUS
Qty: 60 ML | Refills: 1 | Status: SHIPPED | OUTPATIENT
Start: 2019-07-09 | End: 2019-11-29 | Stop reason: HOSPADM

## 2019-07-09 NOTE — PROGRESS NOTES
Cardiology Follow Up    Lisa Herrmann  1940  [id number]  [facility] [place] [facility] [address] [id number]    1  Coronary artery disease involving native coronary artery of native heart without angina pectoris     2  S/P CABG x 3     3  Benign essential hypertension     4  Orthostatic hypotension     5  Dyslipidemia     6  Right bundle-branch block     7  Aneurysm of infrarenal abdominal aorta (HCC)     8  Class 1 obesity due to excess calories with serious comorbidity and body mass index (BMI) of 31 0 to 31 9 in adult         Discussion/Summary:  Mr Tara Lee is a pleasant 18-year-old gentleman who presents to the office today for routine follow-up  His blood pressure is on the low side today  He denies any recurrent lightheadedness with standing although when he attempted to get out of the chair today he did feel lightheaded  I have asked him to remain well hydrated  He remains off his beta-blocker  He was placed on Mestinon by his primary care provider due to orthostatic hypotension  If he continues with symptoms Florinef or midodrine can be initiated  Most recent lipids were reviewed from earlier this year  His LDL is slightly suboptimal in the 70s  He has a low HDL  We did discuss some therapeutic lifestyle modifications which she will implement  If these are not successful Zetia can be added to his medication regimen  He underwent a repeat echocardiogram since his last revealing low-normal LV function with mild aortic stenosis      He is now under the care of vascular surgery regarding his infrarenal abdominal aortic aneurysm and at this time no intervention is necessary besides surveillance  He also underwent a CT scan of chest abdomen pelvis for unrelated reasons in December of 2018 which noted it was stable and unchanged in size      From a cardiac perspective there is no absolute contraindication to him undergoing urologic surgery  He can proceed at moderate risk without any further testing  He will follow-up in the office in three months or sooner if deemed necessary  Interval History:   Mr Darya Lacey is a pleasant 64-year-old male who presents to the office for follow-up  After his last visit he was sent for a tilt-table test by a neurologist   This was markedly positive  He was placed on Mestinon by his primary care provider and his metoprolol was completely discontinued  He also underwent repeat echocardiogram     He has not had any recurrent chest pain with exertion  He occasionally notes some shortness of breath with activity  He denies symptoms of heart failure including increasing lower extremity edema, paroxysmal nocturnal dyspnea, orthopnea, acute weight gain or increasing abdominal girth  He denies palpitations or symptoms of claudication  He also recently saw a urologist and it was recommended that he undergo implantation of an artificial urinary sphincter  Problem List     Non-STEMI (non-ST elevated myocardial infarction) ([facility] [address] )    Hypertensive urgency    [facility] (Chronic)    Mixed hyperlipidemia (Chronic)    Abdominal aneurysm (HCC) (Chronic)    Hx of CABG (Chronic)    Orthostasis    Hypertension (Chronic)    Obesity    Vitamin D deficiency    Subclinical iodine-deficiency hypothyroidism    Aneurysm of infrarenal abdominal aorta (HCC)    Arteriosclerosis of coronary artery    Overview Signed 2/21/2018  7:50 AM by Shena Mejia DO     Description: 50% distal left main, 75% proximal LAD, 90% 1st diagonal, 50% ostial circumflex, 99% proximal circumflex, 90% 2nd OM, 30 mid RCA , 90% right posterolateral - left heart catheterization December 2013           Dyslipidemia    Right bundle-branch block        Past Medical History:   Diagnosis Date    AAA (abdominal aortic aneurysm) ([facility] [address] )     Aneurysm of infrarenal abdominal aorta ([facility] [address] ) 10/23/2012    Anxiety     Benign essential hypertension 7/13/2017    CKD (chronic kidney disease) stage 2, GFR 60-89 ml/min 4/11/2018    Compression fracture of twelfth thoracic vertebra (HCC) 1/2/2019    Coronary artery disease involving native coronary artery of native heart without angina pectoris 1/6/2014    Description: 50% distal left main, 75% proximal LAD, 90% 1st diagonal, 50% ostial circumflex, 99% proximal circumflex, 90% 2nd OM, 30 mid RCA , 90% right posterolateral - left heart catheterization December 2013      CVD (cardiovascular disease)     Diabetes mellitus ([facility] [address] )     DVT, lower extremity ([facility] [address] )     Dyslipidemia 9/24/2012    Essential tremor 4/2/2019    Hyperlipidemia     Hypertension     NSTEMI (non-ST elevated myocardial infarction) ([facility] [address] )     Obesity 7/13/2017    Orthostatic hypotension     Pro[facility] cancer ([facility]     Right bundle branch block (RBBB)     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    LIMA to LAD, SVG to OM1, SVG to distal RCA    Skin cancer     Type 2 diabetes mellitus with hyperglycemia, with long-term current use of insulin ([facility] [address] ) 7/3/2017    Vitamin D deficiency 2/19/2018     Social History     Socioeconomic History    Marital status: /Civil Union     Spouse name: Not on file    Number of children: Not on file    Years of education: Not on file    Highest education level: Not on file   Occupational History    Not on file   Social Needs    Financial resource strain: Not on file    Food insecurity:     Worry: Not on file     Inability: Not on file    Transportation needs:     Medical: Not on file     Non-medical: Not on file   Tobacco Use    Smoking status: Former Smoker     Types: Cigarettes    Smokeless tobacco: Never Used    Tobacco comment: quit 1967   Substance and Sexual Activity    Alcohol use: Yes     Comment: seldom    Drug use: No    Sexual activity: Never   Lifestyle    Physical activity:     Days per week: Not on file     Minutes per session: Not on file    Stress: Not on file   Relationships    Social connections:     Talks on phone: Not on file     Gets together: Not on file     Attends Caodaism service: Not on file     Active member of club or organization: Not on file     Attends meetings of clubs or organizations: Not on file     Relationship status: Not on file    Intimate partner violence:     Fear of current or ex partner: Not on file     Emotionally abused: Not on file     Physically abused: Not on file     Forced sexual activity: Not on file   Other Topics Concern    Not on file   Social History Narrative    Not on file      Family History   Problem Relation Age of Onset    Crohn's disease Mother     Liver cancer Mother     Hypertension Mother     Crohn's disease Father     Liver cancer Father     Heart disease Father     Hypertension Father     Hyperlipidemia Father     Colon cancer Brother     Aortic aneurysm Brother         abdominal     Heart disease Brother         abdominal aortic aneurysm    Hypertension Brother     Hyperlipidemia Brother     Colon polyps Family     Stomach cancer Family     Hypertension Sister     Mitral valve prolapse Sister 80        valve replacment      Past Surgical History:   Procedure Laterality Date    ANKLE ARTHROSCOPY W/ OPEN REPAIR Left     CARDIAC SURGERY      CORONARY ARTERY BYPASS GRAFT  12/11/2013    CABG x3 with LIMA to LAD, SVG to OM-1, SVG to posterior lateral, LYSIS of pericardial adhesions   TN COLONOSCOPY FLX DX W/COLLJ SPEC WHEN PFRMD N/A 8/9/2018    Procedure: COLONOSCOPY;  Surgeon: Ronny Carlson MD;  Location: [facility]; Service: Gastroenterology    TN REPAIR UMBILICAL IQJY,5+Q/L,RYZTO N/A 3/10/2017    Procedure: REPAIR HERNIA UMBILICAL;  Surgeon: Omid Morales MD;  Location: [facility];  Service: General    PROSTATECTOMY         Current Outpatient Medications:     ACCU-CHEK FASTCLIX LANCETS MISC, Check blood sugar 4 times daily  , Disp: 306 each, Rfl: 1    ascorbic acid (VITAMIN C) 500 mg tablet, Take 500 mg by mouth daily, Disp: , Rfl:     aspirin 325 mg tablet, Take 1 tablet by mouth daily, Disp: , Rfl:     benzonatate (TESSALON PERLES) 100 mg capsule, Take 1 capsule (100 mg total) by mouth 3 (three) times a day as needed for cough, Disp: 20 capsule, Rfl: 0    Blood Glucose Monitoring Suppl (ACCU-CHEK DENYS PLUS) w/Device KIT, by Does not apply route Use to test blood sugars 4 times daily, Disp: , Rfl:     Calcium Acetate, Phos Binder, (CALCIUM ACETATE PO), Take 630 mg by mouth daily, Disp: , Rfl:     Cholecalciferol (VITAMIN D3 PO), Take 3,000 Units by mouth daily  , Disp: , Rfl:     clopidogrel (PLAVIX) 75 mg tablet, TAKE 1 TABLET DAILY, Disp: 90 tablet, Rfl: 1    Coenzyme Q10 (CO Q 10) 100 MG CAPS, Take 100 mg by mouth daily, Disp: , Rfl:     Continuous Blood Gluc Sensor (Agito NetworksSTYLE POLINA 14 DAY SENSOR) MISC, 1 each by Does not apply route continuous, Disp: 2 each, Rfl: 2    cyanocobalamin (VITAMIN B-12) 500 mcg tablet, Take 1 tablet by mouth daily, Disp: , Rfl:     docusate sodium (COLACE) 100 mg capsule, Take 100 mg by mouth 2 (two) times a day , Disp: , Rfl:     erythromycin (ILOTYCIN) ophthalmic ointment, APPLY 1 CM RIBBON TO THE BOTTOM RIGHT LID THREE TIMES DAILY, Disp: , Rfl: 0    fluticasone (FLONASE) 50 mcg/act nasal spray, 2 sprays into each nostril daily, Disp: 16 g, Rfl: 0    gabapentin (NEURONTIN) 100 mg capsule, Take 4 capsules (400 mg total) by mouth 3 (three) times a day Start with 1 capsule TID and increase as dir, Disp: 360 capsule, Rfl: 3    glucose blood (ACCU-CHEK GUIDE) test strip, Check blood sugar 4 times daily  , Disp: 400 each, Rfl: 1    Insulin Glargine (TOUJEO SOLOSTAR) 300 units/mL CONCETRATED U-300 injection pen, Inject 60 Units under the skin daily at bedtime, Disp: 4 pen, Rfl: 5    Insulin Pen Needle (NOVOFINE AUTOCOVER) 30G X 8 MM MISC, The patient test his blood sugars 4 times daily  , Disp: 100 each, Rfl: 4    LORazepam (ATIVAN) 1 mg tablet, TAKE 1 TABLET BY MOUTH TWO TIMES DAILY AS NEEDED FOR ANXIETY, Disp: 60 tablet, Rfl: 0    metFORMIN (GLUCOPHAGE) 500 mg tablet, TAKE 1 TABLET BY MOUTH TWO TIMES DAILY WITH MEALS, Disp: 60 tablet, Rfl: 2    metoprolol succinate (TOPROL-XL) 25 mg 24 hr tablet, TAKE 1 TABLET DAILY, Disp: 90 tablet, Rfl: 1    Multiple Vitamins-Minerals (CENTRUM SILVER) tablet, Take 1 tablet by mouth daily, Disp: , Rfl:     multivitamin-iron-minerals-folic acid (CENTRUM) chewable tablet, Chew 1 tablet daily, Disp: , Rfl:     nitroglycerin (NITROSTAT) 0 4 mg SL tablet, Place 1 tablet (0 4 mg total) under the tongue every 5 (five) minutes as needed for chest pain, Disp: 90 tablet, Rfl: 3    NOVOLOG FLEXPEN 100 units/mL injection pen, FOR DIRECTIONS ON HOW TO   TAKE THIS MEDICINE, READ   THE ENCLOSED MEDICATION    INFORMATION FORM, Disp: 60 mL, Rfl: 1    pantoprazole (PROTONIX) 40 mg tablet, TAKE 1 TABLET TWICE A DAY  30 MINUTES BEFORE BREAKFASTAND DINNER, Disp: 180 tablet, Rfl: 1    polyethylene glycol (MIRALAX) 17 g packet, Take 17 g by mouth daily, Disp: , Rfl:     pyridostigmine (MESTINON) 60 mg tablet, Take 0 5 tablets (30 mg total) by mouth 3 (three) times a day, Disp: 90 tablet, Rfl: 0    ranolazine (RANEXA) 1000 MG SR tablet, Take 1 tablet (1,000 mg total) by mouth 2 (two) times a day, Disp: 180 tablet, Rfl: 3    rosuvastatin (CRESTOR) 40 MG tablet, Take 1 tablet (40 mg total) by mouth daily (Patient taking differently: Take 20 mg by mouth daily  ), Disp: 90 tablet, Rfl: 3    vilazodone (VIIBRYD) 40 mg tablet, Take 1 tablet (40 mg total) by mouth daily, Disp: 30 tablet, Rfl: 5    Continuous Blood Gluc  (FREESTYLE POLINA 14 DAY READER) DMITRI, 1 Device by Does not apply route continuous for 30 days Scan at least 4 times a day to monitor blood glucose levels, Disp: 3 Device, Rfl: 0    Current Facility-Administered Medications:     levalbuterol (XOPENEX HFA) inhaler 1 puff, 1 puff, Inhalation, Q6H PRN, Gita Royal, DO  Allergies   Allergen Reactions    Adhesive [Medical Tape] Rash    Sulfa Antibiotics Other (See Comments)     Generalized redness       Labs:     Chemistry        Component Value Date/Time     10/13/2015 0823    K 3 8 04/30/2019 0440    K 4 3 10/13/2015 0823     04/30/2019 0440     10/13/2015 0823    CO2 29 04/30/2019 0440    CO2 34 (H) 10/13/2015 0823    BUN 17 04/30/2019 0440    BUN 11 10/13/2015 0823    CREATININE 1 02 04/30/2019 0440    CREATININE 1 0 10/13/2015 0823        Component Value Date/Time    CALCIUM 8 6 04/30/2019 0440    CALCIUM 9 2 10/13/2015 0823    ALKPHOS 72 04/29/2019 1100    ALKPHOS 74 10/13/2015 0823    AST 15 04/29/2019 1100    AST 19 10/13/2015 0823    ALT 21 04/29/2019 1100    ALT 25 10/13/2015 0823    BILITOT 0 4 10/13/2015 0823            Lab Results   Component Value Date    CHOL 117 (L) 10/13/2015    CHOL 121 04/11/2015    CHOL 156 04/24/2014     Lab Results   Component Value Date    HDL 29 (L) 04/01/2019    HDL 34 (L) 10/31/2018    HDL 38 (L) 02/16/2018     Lab Results   Component Value Date    LDLCALC 75 04/01/2019    LDLCALC 60 10/31/2018    LDLCALC 55 02/16/2018     Lab Results   Component Value Date    TRIG 109 04/01/2019    TRIG 141 10/31/2018    TRIG 89 02/16/2018     No results found for: CHOLHDL    Imaging: X-ray Chest 2 Views    Result Date: 1/27/2018  Narrative: CHEST - DUAL ENERGY INDICATION:  chest pain  History taken directly from the electronic ordering system  COMPARISON:  Chest x-ray performed on 7/13/2017 VIEWS:  PA (including soft tissue/bone algorithms) and lateral projections IMAGES:  4 FINDINGS:     Cardiac silhouette is top normal in size and changes of prior intervention  No focal airspace consolidation  No pleural effusion or pneumothorax  Visualized osseous structures appear within normal limits for the patient's age  Impression: No focal airspace consolidation   Workstation performed: [id number]      Abdominal Aorta    Result Date: 1/27/2018  Narrative: ABDOMINAL AORTIC ULTRASOUND INDICATION: Evaluate for aortic aneurysm  COMPARISON: CT performed on 7/13/2017 FINDINGS: Ultrasound of the abdominal aorta was performed in longitudinal and transverse planes with a curvilinear transducer  Signs of atherosclerotic disease  Proximal aorta:  2 19 x 2 11 cm Mid aorta:    2 39 x 2 24 cm Distal aorta:    3 5 x 3 3 cm Right common iliac origin:  1 6 x 1 4 cm Left common iliac origin:  1 6 x 1 4 cm No periaortic collections or adenopathy detected  Impression: Atherosclerotic disease  Infrarenal abdominal aorta measures 3 5 x 3 3 cm, marginally increased in caliber when compared to CT of 7/3/2017  Workstation performed: [id number]       Review of Systems   Cardiovascular: Positive for dyspnea on exertion and near-syncope  Negative for chest pain, claudication, cyanosis, irregular heartbeat, leg swelling, orthopnea and palpitations  Neurological: Positive for light-headedness and loss of balance  Vitals:    07/10/19 1439   BP: 90/50   Pulse:    SpO2:      Vitals:    07/10/19 1409   Weight: 100 kg (221 lb 4 8 oz)     Height: 5' 10" (177 8 cm)   Body mass index is 31 75 kg/m²      Physical Exam:  General:  Alert and cooperative, appears stated age  HEENT:  PERRLA, EOMI, no scleral icterus, no conjunctival pallor  Neck:  No lymphadenopathy, no thyromegaly, no carotid bruits, no elevated JVP  Heart:  Regular rate and rhythm, normal S1/S2, 2/6 early peaking systolic ejection murmur noted at the right upper sternal border without radiation  Abdomen:  Soft, non-tender, positive bowel sounds, no rebound or guarding,   no organomegaly   Extremities:  No clubbing, cyanosis or edema   Vascular:  2+ pedal pulses  Skin:  No rashes or lesions on exposed skin  Neurologic:  Cranial nerves II-XII grossly intact without focal deficits

## 2019-07-10 ENCOUNTER — OFFICE VISIT (OUTPATIENT)
Dept: CARDIOLOGY CLINIC | Facility: CLINIC | Age: 79
End: 2019-07-10
Payer: MEDICARE

## 2019-07-10 VITALS
WEIGHT: 221.3 LBS | SYSTOLIC BLOOD PRESSURE: 90 MMHG | BODY MASS INDEX: 31.68 KG/M2 | HEART RATE: 62 BPM | HEIGHT: 70 IN | OXYGEN SATURATION: 94 % | DIASTOLIC BLOOD PRESSURE: 50 MMHG

## 2019-07-10 DIAGNOSIS — I25.10 CORONARY ARTERY DISEASE INVOLVING NATIVE CORONARY ARTERY OF NATIVE HEART WITHOUT ANGINA PECTORIS: Primary | ICD-10-CM

## 2019-07-10 DIAGNOSIS — I10 BENIGN ESSENTIAL HYPERTENSION: ICD-10-CM

## 2019-07-10 DIAGNOSIS — E66.09 CLASS 1 OBESITY DUE TO EXCESS CALORIES WITH SERIOUS COMORBIDITY AND BODY MASS INDEX (BMI) OF 31.0 TO 31.9 IN ADULT: ICD-10-CM

## 2019-07-10 DIAGNOSIS — Z95.1 S/P CABG X 3: Chronic | ICD-10-CM

## 2019-07-10 DIAGNOSIS — I95.1 ORTHOSTATIC HYPOTENSION: ICD-10-CM

## 2019-07-10 DIAGNOSIS — E78.5 DYSLIPIDEMIA: ICD-10-CM

## 2019-07-10 DIAGNOSIS — I71.4 ANEURYSM OF INFRARENAL ABDOMINAL AORTA (HCC): ICD-10-CM

## 2019-07-10 DIAGNOSIS — I45.10 RIGHT BUNDLE-BRANCH BLOCK: ICD-10-CM

## 2019-07-10 PROCEDURE — 99214 OFFICE O/P EST MOD 30 MIN: CPT | Performed by: INTERNAL MEDICINE

## 2019-07-11 DIAGNOSIS — F41.9 ANXIETY: ICD-10-CM

## 2019-07-11 RX ORDER — LORAZEPAM 1 MG/1
1 TABLET ORAL 2 TIMES DAILY
Qty: 60 TABLET | Refills: 0 | Status: SHIPPED | OUTPATIENT
Start: 2019-07-11 | End: 2019-08-08 | Stop reason: SDUPTHER

## 2019-07-17 ENCOUNTER — TELEPHONE (OUTPATIENT)
Dept: ENDOCRINOLOGY | Facility: CLINIC | Age: 79
End: 2019-07-17

## 2019-07-17 NOTE — TELEPHONE ENCOUNTER
Spoke with spouse Josephine(on contact list) reminding her of Pathenrys appt and to have his blood work completed

## 2019-07-19 ENCOUNTER — LAB (OUTPATIENT)
Dept: LAB | Facility: CLINIC | Age: 79
End: 2019-07-19
Payer: MEDICARE

## 2019-07-19 DIAGNOSIS — Z79.4 TYPE 2 DIABETES MELLITUS WITH HYPERGLYCEMIA, WITH LONG-TERM CURRENT USE OF INSULIN (HCC): ICD-10-CM

## 2019-07-19 DIAGNOSIS — I10 BENIGN ESSENTIAL HYPERTENSION: ICD-10-CM

## 2019-07-19 DIAGNOSIS — E11.65 TYPE 2 DIABETES MELLITUS WITH HYPERGLYCEMIA, WITH LONG-TERM CURRENT USE OF INSULIN (HCC): ICD-10-CM

## 2019-07-19 DIAGNOSIS — R32 URINARY INCONTINENCE, UNSPECIFIED TYPE: ICD-10-CM

## 2019-07-19 LAB
ALBUMIN SERPL BCP-MCNC: 3.2 G/DL (ref 3.5–5)
ALP SERPL-CCNC: 72 U/L (ref 46–116)
ALT SERPL W P-5'-P-CCNC: 19 U/L (ref 12–78)
ANION GAP SERPL CALCULATED.3IONS-SCNC: 8 MMOL/L (ref 4–13)
AST SERPL W P-5'-P-CCNC: 17 U/L (ref 5–45)
BILIRUB SERPL-MCNC: 0.4 MG/DL (ref 0.2–1)
BUN SERPL-MCNC: 20 MG/DL (ref 5–25)
CALCIUM SERPL-MCNC: 9.1 MG/DL (ref 8.3–10.1)
CHLORIDE SERPL-SCNC: 100 MMOL/L (ref 100–108)
CHOLEST SERPL-MCNC: 131 MG/DL (ref 50–200)
CO2 SERPL-SCNC: 29 MMOL/L (ref 21–32)
CREAT SERPL-MCNC: 1.15 MG/DL (ref 0.6–1.3)
CREAT UR-MCNC: 196 MG/DL
EST. AVERAGE GLUCOSE BLD GHB EST-MCNC: 160 MG/DL
GFR SERPL CREATININE-BSD FRML MDRD: 60 ML/MIN/1.73SQ M
GLUCOSE P FAST SERPL-MCNC: 109 MG/DL (ref 65–99)
HBA1C MFR BLD: 7.2 % (ref 4.2–6.3)
HDLC SERPL-MCNC: 35 MG/DL (ref 40–60)
LDLC SERPL CALC-MCNC: 71 MG/DL (ref 0–100)
MICROALBUMIN UR-MCNC: 295 MG/L (ref 0–20)
MICROALBUMIN/CREAT 24H UR: 151 MG/G CREATININE (ref 0–30)
POTASSIUM SERPL-SCNC: 4.2 MMOL/L (ref 3.5–5.3)
PROT SERPL-MCNC: 8 G/DL (ref 6.4–8.2)
PSA SERPL-MCNC: <0.1 NG/ML (ref 0–4)
SODIUM SERPL-SCNC: 137 MMOL/L (ref 136–145)
TRIGL SERPL-MCNC: 127 MG/DL

## 2019-07-19 PROCEDURE — 82570 ASSAY OF URINE CREATININE: CPT

## 2019-07-19 PROCEDURE — 80061 LIPID PANEL: CPT

## 2019-07-19 PROCEDURE — 82043 UR ALBUMIN QUANTITATIVE: CPT

## 2019-07-19 PROCEDURE — 80053 COMPREHEN METABOLIC PANEL: CPT

## 2019-07-19 PROCEDURE — 83036 HEMOGLOBIN GLYCOSYLATED A1C: CPT

## 2019-07-19 PROCEDURE — 84153 ASSAY OF PSA TOTAL: CPT

## 2019-07-19 PROCEDURE — 36415 COLL VENOUS BLD VENIPUNCTURE: CPT

## 2019-07-23 NOTE — PATIENT INSTRUCTIONS
Addended by: STUART DE LOS SANTOS on: 7/23/2019 04:49 PM     Modules accepted: Orders     Aneurysm of infrarenal abdominal aorta (HCC)  Small 3 5 cm abdominal aortic aneurysm with a strong family history of aneurysm disease  We did discuss the pathophysiology of aneurysmal disease and the indications for further evaluation and treatment  At this time no further intervention is necessary other than annual duplex follow-up which has been scheduled  We did discuss the incidence of aneurysmal disease throughout the family for which I have advised screening for first-degree relatives    At this time I have advise no heavy lifting but have encouraged him to resume cardiac rehab

## 2019-07-30 ENCOUNTER — OFFICE VISIT (OUTPATIENT)
Dept: NEUROLOGY | Facility: CLINIC | Age: 79
End: 2019-07-30
Payer: MEDICARE

## 2019-07-30 VITALS
WEIGHT: 215 LBS | BODY MASS INDEX: 30.78 KG/M2 | DIASTOLIC BLOOD PRESSURE: 84 MMHG | HEIGHT: 70 IN | SYSTOLIC BLOOD PRESSURE: 122 MMHG

## 2019-07-30 DIAGNOSIS — R25.1 TREMOR: Primary | ICD-10-CM

## 2019-07-30 PROCEDURE — 99214 OFFICE O/P EST MOD 30 MIN: CPT | Performed by: PSYCHIATRY & NEUROLOGY

## 2019-07-30 RX ORDER — GABAPENTIN 400 MG/1
400 CAPSULE ORAL 3 TIMES DAILY
Qty: 90 CAPSULE | Refills: 3 | Status: SHIPPED | OUTPATIENT
Start: 2019-07-30 | End: 2019-11-18 | Stop reason: SDUPTHER

## 2019-07-30 NOTE — ASSESSMENT & PLAN NOTE
Good improvement in tremor with gabapentin  Will continue to make some small increases  Subtle left sided parkinsonism remains unchanged, will continue to monitor over time  OH is improved as well       - Increase gabapentin to 500mg TID

## 2019-07-30 NOTE — PROGRESS NOTES
Assessment/Plan:    Essential tremor  Good improvement in tremor with gabapentin  Will continue to make some small increases  Subtle left sided parkinsonism remains unchanged, will continue to monitor over time  OH is improved as well  - Increase gabapentin to 500mg TID       Diagnoses and all orders for this visit:    Tremor  -     gabapentin (NEURONTIN) 400 mg capsule; Take 1 capsule (400 mg total) by mouth 3 (three) times a day Taken in addition to the 100mg cap        Subjective:     Patient ID: Kayley Esparza is a [de-identified] y o  male  I had the pleasure of seeing your patient, Kayley Esparza in the [facility] at the [address] [place] [address],[address] for Neuroscience  Thad Mckeon is an 66-year-old man with coronary artery disease, diabetes and orthostatic hypotension who presents in follow up for essential tremor  The patient has had some tremor for as long as he can remember  He has no anosmia but does have questionable rare dream reenactment  On initial presentation the patient had a postural and kinetic tremor  He had no rest tremor  He did have slight rigidity in the left upper extremity and slight focal bradykinesia on the left  Unfortunately we can't use primidone due to drug drug interactions with his cardiac medications  We are unable to use propranolol due to his orthostatic hypotension  Patient has a sulfa allergy therefore we cannot use zonisamide  When he was last here we agreed on a trial of gabapentin 3 times daily  This may also help with his enhanced startle  Interval history:  Good tremor benefit  Taking 400mg TID  They deny any side effects  Still some trouble trimming his nails  Can shave with just one hand now  Needs to use two hands to steady the mouse  Positive til table test  Taken off of all of his BP meds  Sometimes uses the cane  Was started on mestinon  Very good symptomatic improvement  No dream reemactment  Does have vivid dreams       The following portions of the patient's history were reviewed and updated as appropriate: allergies, current medications, past family history, past medical history, past social history, past surgical history and problem list       Objective:  /84 (BP Location: Right arm, Patient Position: Sitting, Cuff Size: Standard)   Ht 5' 10" (1 778 m)   Wt 97 5 kg (215 lb)   BMI 30 85 kg/m²     Physical Exam    Neurological Exam    Head Tremor: 0  Face Tremor: 0  Voice Tremor: 0  Upper limb tremor       R outstretched posture: 1       L outstretched posture: 1 5cm       R Wing Beatin       L Wing Beatin 5       R Kinetic: 1       L Kinetic: 1 5  Archimedes Spirals:        R: obvious        L: part unrecognizable   Handwriting: large and messy   Rest tremor: 0  Bhupinder: no clear decrement, irregular rhythm   Rigidity: slight on the left     Motor Total:     Review of Systems   Constitutional: Negative  Negative for appetite change and fever  HENT: Negative  Negative for hearing loss, tinnitus, trouble swallowing and voice change  Eyes: Negative  Negative for photophobia and pain  Respiratory: Negative  Negative for shortness of breath  Cardiovascular: Negative  Negative for palpitations  Gastrointestinal: Negative  Negative for nausea and vomiting  Endocrine: Negative  Negative for cold intolerance and heat intolerance  Loss of sexual drive  Erection difficulties     Genitourinary: Positive for urgency  Negative for dysuria and frequency  Loss of bladder control     Musculoskeletal: Positive for gait problem (balance and some falls)  Negative for myalgias and neck pain  Skin: Negative  Negative for rash  Neurological: Positive for dizziness, tremors and headaches  Negative for seizures, syncope, facial asymmetry, speech difficulty, weakness, light-headedness and numbness  Memory problems     Hematological: Negative  Does not bruise/bleed easily     Psychiatric/Behavioral: Positive for confusion and sleep disturbance (waking up at night)  Negative for hallucinations  The above ROS was reviewed and updated  Sumaya Cat MD  Medical Director   [facility]  [facility]       Current Outpatient Medications on File Prior to Visit   Medication Sig Dispense Refill    ACCU-CHEK FASTCLIX LANCETS MISC Check blood sugar 4 times daily  306 each 1    ascorbic acid (VITAMIN C) 500 mg tablet Take 500 mg by mouth daily      aspirin 325 mg tablet Take 1 tablet by mouth daily      Blood Glucose Monitoring Suppl (ACCU-CHEK DENYS PLUS) w/Device KIT by Does not apply route Use to test blood sugars 4 times daily      Calcium Acetate, Phos Binder, (CALCIUM ACETATE PO) Take 630 mg by mouth daily      Cholecalciferol (VITAMIN D3 PO) Take 3,000 Units by mouth daily        clopidogrel (PLAVIX) 75 mg tablet TAKE 1 TABLET DAILY 90 tablet 1    Coenzyme Q10 (CO Q 10) 100 MG CAPS Take 100 mg by mouth daily      Continuous Blood Gluc Sensor (FREESTYLE POLINA 14 DAY SENSOR) MISC 1 each by Does not apply route continuous 2 each 2    cyanocobalamin (VITAMIN B-12) 500 mcg tablet Take 1 tablet by mouth daily      docusate sodium (COLACE) 100 mg capsule Take 100 mg by mouth 2 (two) times a day   gabapentin (NEURONTIN) 100 mg capsule Take 4 capsules (400 mg total) by mouth 3 (three) times a day Start with 1 capsule TID and increase as dir 360 capsule 3    glucose blood (ACCU-CHEK GUIDE) test strip Check blood sugar 4 times daily  400 each 1    Insulin Glargine (TOUJEO SOLOSTAR) 300 units/mL CONCETRATED U-300 injection pen Inject 60 Units under the skin daily at bedtime 4 pen 5    Insulin Pen Needle (NOVOFINE AUTOCOVER) 30G X 8 MM MISC The patient test his blood sugars 4 times daily   100 each 4    LORazepam (ATIVAN) 1 mg tablet Take 1 tablet (1 mg total) by mouth 2 (two) times a day 60 tablet 0    Multiple Vitamins-Minerals (CENTRUM SILVER) tablet Take 1 tablet by mouth daily      multivitamin-iron-minerals-folic acid (CENTRUM) chewable tablet Chew 1 tablet daily      nitroglycerin (NITROSTAT) 0 4 mg SL tablet Place 1 tablet (0 4 mg total) under the tongue every 5 (five) minutes as needed for chest pain 90 tablet 3    NOVOLOG FLEXPEN 100 units/mL injection pen FOR DIRECTIONS ON HOW TO   TAKE THIS MEDICINE, READ   THE ENCLOSED MEDICATION    INFORMATION FORM 60 mL 1    pantoprazole (PROTONIX) 40 mg tablet TAKE 1 TABLET TWICE A DAY  30 MINUTES BEFORE BREAKFASTAND DINNER 180 tablet 1    pyridostigmine (MESTINON) 60 mg tablet Take 0 5 tablets (30 mg total) by mouth 3 (three) times a day 90 tablet 0    ranolazine (RANEXA) 1000 MG SR tablet Take 1 tablet (1,000 mg total) by mouth 2 (two) times a day 180 tablet 3    rosuvastatin (CRESTOR) 40 MG tablet Take 1 tablet (40 mg total) by mouth daily (Patient taking differently: Take 20 mg by mouth daily  ) 90 tablet 3    vilazodone (VIIBRYD) 40 mg tablet Take 1 tablet (40 mg total) by mouth daily 30 tablet 5    benzonatate (TESSALON PERLES) 100 mg capsule Take 1 capsule (100 mg total) by mouth 3 (three) times a day as needed for cough (Patient not taking: Reported on 7/30/2019) 20 capsule 0    Continuous Blood Gluc  (FREESTYLE POLINA 14 DAY READER) DMITRI 1 Device by Does not apply route continuous for 30 days Scan at least 4 times a day to monitor blood glucose levels 3 Device 0    erythromycin (ILOTYCIN) ophthalmic ointment APPLY 1 CM RIBBON TO THE BOTTOM RIGHT LID THREE TIMES DAILY  0    fluticasone (FLONASE) 50 mcg/act nasal spray 2 sprays into each nostril daily (Patient not taking: Reported on 7/30/2019) 16 g 0    polyethylene glycol (MIRALAX) 17 g packet Take 17 g by mouth daily      [DISCONTINUED] metFORMIN (GLUCOPHAGE) 500 mg tablet TAKE 1 TABLET BY MOUTH TWO TIMES DAILY WITH MEALS (Patient not taking: Reported on 7/30/2019) 60 tablet 2    [DISCONTINUED] metoprolol succinate (TOPROL-XL) 25 mg 24 hr tablet TAKE 1 TABLET DAILY 90 tablet 1     Current Facility-Administered Medications on File Prior to Visit   Medication Dose Route Frequency Provider Last Rate Last Dose    levalbuterol (XOPENEX HFA) inhaler 1 puff  1 puff Inhalation Q6H PRN Sissy Garcia DO

## 2019-07-30 NOTE — PATIENT INSTRUCTIONS
We will increase the gabapentin dose to 500mg three times per day taken as a 400mg capsule with a 100mg capsule

## 2019-08-01 ENCOUNTER — PROCEDURE VISIT (OUTPATIENT)
Dept: UROLOGY | Facility: AMBULATORY SURGERY CENTER | Age: 79
End: 2019-08-01
Payer: MEDICARE

## 2019-08-01 VITALS
BODY MASS INDEX: 30.78 KG/M2 | WEIGHT: 215 LBS | HEIGHT: 70 IN | SYSTOLIC BLOOD PRESSURE: 140 MMHG | DIASTOLIC BLOOD PRESSURE: 78 MMHG | HEART RATE: 80 BPM

## 2019-08-01 DIAGNOSIS — R32 URINARY INCONTINENCE, UNSPECIFIED TYPE: Primary | ICD-10-CM

## 2019-08-01 LAB
SL AMB  POCT GLUCOSE, UA: NORMAL
SL AMB LEUKOCYTE ESTERASE,UA: NORMAL
SL AMB POCT BILIRUBIN,UA: NORMAL
SL AMB POCT BLOOD,UA: NORMAL
SL AMB POCT CLARITY,UA: CLEAR
SL AMB POCT COLOR,UA: YELLOW
SL AMB POCT KETONES,UA: NORMAL
SL AMB POCT NITRITE,UA: NORMAL
SL AMB POCT PH,UA: 5
SL AMB POCT SPECIFIC GRAVITY,UA: 1.02
SL AMB POCT URINE PROTEIN: NORMAL
SL AMB POCT UROBILINOGEN: 0.2

## 2019-08-01 PROCEDURE — 99213 OFFICE O/P EST LOW 20 MIN: CPT | Performed by: UROLOGY

## 2019-08-01 PROCEDURE — 81002 URINALYSIS NONAUTO W/O SCOPE: CPT | Performed by: UROLOGY

## 2019-08-01 PROCEDURE — 52000 CYSTOURETHROSCOPY: CPT | Performed by: UROLOGY

## 2019-08-01 PROCEDURE — 1124F ACP DISCUSS-NO DSCNMKR DOCD: CPT | Performed by: UROLOGY

## 2019-08-01 NOTE — PROGRESS NOTES
Cystoscopy  Date/Time: 8/1/2019 11:25 AM  Performed by: Leighann Morin MD  Authorized by: Leighann Morin MD     Procedure details: cystoscopy        Written Consent Obtained      Indications for Procedure:   stress incontinence with consideration for possible artificial urinary sphincter placement      Physical Exam     Constitutional   General appearance: No acute distress, well appearing and well nourished  Pulmonary   Respiratory effort: No increased work of breathing or signs of respiratory distress  Cardiovascular   Examination of extremities for edema and/or varicosities: Normal     Abdomen   Abdomen: Non-tender, no masses  Liver and spleen: No hepatomegaly or splenomegaly  Genitourinary   Normal phallus and meatus   Musculoskeletal   Gait and station: Normal     Skin   Skin and subcutaneous tissue: Normal without rashes or lesions  Lymphatic   Palpation of lymph nodes in groin: No lymphadenopathy  Additional Exam: Neuro exam nonfocal   The flexible cystoscope was introduced into the urethra and advanced into the bladder  URETHRA:  Normal,  without strictures or lesions  There is almost near coaptation of the sphincter  PROSTATE:  Surgically absent  TRIGONE & UOs:   Normal anatomy with efflux of clear urine  No mucosal lesions or subtrigonal masses  BLADDER MUCOSA:  Normal, without neoplasms or other lesions  DETRUSOR: Normal capacity, without flaccidity, without excessive compliance, without trabeculation or diverticula, without uninhibited bladder contractions on fillings  RETROFLEXED SCOPE VIEW: Normal bladder neck    After the patient's bladder was filled the cystoscope was removed and provocative coughing test were performed that showed significant stress incontinence  Plan:  I had a lengthy discussion with the patient regarding treatment options for his stress incontinence  We discussed Cunningham penile clamps    We again discussed that his incontinence was significant enough that I do not think a male sling would completely get him dry  We did discuss that he does need some hand coordination and strength to be able to activate an artificial urinary sphincter  The patient does have a essential tremor but has recently started medication for this and states that it is improving  He was able to demonstrate that he was able to squeeze his index finger and thumb together without difficulty  We again discussed artificial urinary sphincter placement in detail and he was consented for the procedure  We will schedule in the near future  In addition to the cystoscopy I spent over 15 minutes discussing treatment options and consenting him for surgery

## 2019-08-02 ENCOUNTER — TELEPHONE (OUTPATIENT)
Dept: UROLOGY | Facility: AMBULATORY SURGERY CENTER | Age: 79
End: 2019-08-02

## 2019-08-05 DIAGNOSIS — E08.00 DIABETES MELLITUS DUE TO UNDERLYING CONDITION WITH HYPEROSMOLARITY WITHOUT COMA, WITHOUT LONG-TERM CURRENT USE OF INSULIN (HCC): Chronic | ICD-10-CM

## 2019-08-06 ENCOUNTER — TELEPHONE (OUTPATIENT)
Dept: UROLOGY | Facility: AMBULATORY SURGERY CENTER | Age: 79
End: 2019-08-06

## 2019-08-06 NOTE — TELEPHONE ENCOUNTER
Spoke to Patient confirmed surgery 10/1/19 @ [facility]  Logan Wilkinson will be mailed after Cardiac Clearance is obtained

## 2019-08-06 NOTE — TELEPHONE ENCOUNTER
Patient scheduled for Artifical Urinary Sphincter with Dr Hannah Frost  10/1/19 @ [address]  He will need cardiac  clearance and hold on any blood thinners  Patient sees Dr Jane Early  Thank you!

## 2019-08-08 DIAGNOSIS — F41.9 ANXIETY: ICD-10-CM

## 2019-08-08 RX ORDER — LORAZEPAM 1 MG/1
1 TABLET ORAL 2 TIMES DAILY
Qty: 60 TABLET | Refills: 0 | Status: SHIPPED | OUTPATIENT
Start: 2019-08-08 | End: 2019-09-11 | Stop reason: SDUPTHER

## 2019-08-09 ENCOUNTER — TELEPHONE (OUTPATIENT)
Dept: CARDIOLOGY CLINIC | Facility: CLINIC | Age: 79
End: 2019-08-09

## 2019-08-13 DIAGNOSIS — I95.1 ORTHOSTATIC HYPOTENSION: ICD-10-CM

## 2019-08-13 RX ORDER — PYRIDOSTIGMINE BROMIDE 60 MG/1
30 TABLET ORAL 3 TIMES DAILY
Qty: 135 TABLET | Refills: 1 | Status: SHIPPED | OUTPATIENT
Start: 2019-08-13 | End: 2020-01-01 | Stop reason: ALTCHOICE

## 2019-09-04 DIAGNOSIS — E78.00 PURE HYPERCHOLESTEROLEMIA: ICD-10-CM

## 2019-09-04 RX ORDER — ROSUVASTATIN CALCIUM 40 MG/1
40 TABLET, COATED ORAL DAILY
Qty: 90 TABLET | Refills: 3 | Status: SHIPPED | OUTPATIENT
Start: 2019-09-04 | End: 2020-01-01

## 2019-09-06 ENCOUNTER — TELEPHONE (OUTPATIENT)
Dept: NEUROLOGY | Facility: CLINIC | Age: 79
End: 2019-09-06

## 2019-09-06 NOTE — TELEPHONE ENCOUNTER
Patient called in with medication related question  Patient seems to remember you saying something about increasing gabapentin to 600mg TID if the 500mg dose was not helping with tremors  Patient asking if it would be okay to increase dose  He has been on 500mg TIC for about 1 month now with no relief of symptoms  Please advise

## 2019-09-11 DIAGNOSIS — F41.9 ANXIETY: ICD-10-CM

## 2019-09-11 RX ORDER — LORAZEPAM 1 MG/1
1 TABLET ORAL 2 TIMES DAILY
Qty: 60 TABLET | Refills: 0 | Status: SHIPPED | OUTPATIENT
Start: 2019-09-11 | End: 2019-10-14 | Stop reason: SDUPTHER

## 2019-09-11 NOTE — TELEPHONE ENCOUNTER
PDMP CHECKED  LAST FILL: 8/9  QUANT: 60    Prescriptions   Filled  ID  Written  Drug  QTY  Days  Prescriber  Rx #  Pharmacy *  Refills  Daily Dose  Pymt Type      08/09/2019  1  08/08/2019  LORAZEPAM 1 MG TABLET  60 0  30  [facility]  [id number]  KEYA ([id number])  0   [place]  [place]

## 2019-09-13 ENCOUNTER — OFFICE VISIT (OUTPATIENT)
Dept: INTERNAL MEDICINE CLINIC | Facility: CLINIC | Age: 79
End: 2019-09-13
Payer: MEDICARE

## 2019-09-13 VITALS
SYSTOLIC BLOOD PRESSURE: 124 MMHG | WEIGHT: 222.2 LBS | BODY MASS INDEX: 31.81 KG/M2 | DIASTOLIC BLOOD PRESSURE: 78 MMHG | HEART RATE: 79 BPM | RESPIRATION RATE: 16 BRPM | HEIGHT: 70 IN | OXYGEN SATURATION: 95 %

## 2019-09-13 DIAGNOSIS — Z79.4 TYPE 2 DIABETES MELLITUS WITH HYPERGLYCEMIA, WITH LONG-TERM CURRENT USE OF INSULIN (HCC): ICD-10-CM
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
## 2019-09-13 DIAGNOSIS — R32 URINARY INCONTINENCE, UNSPECIFIED TYPE: Primary | ICD-10-CM

## 2019-09-13 DIAGNOSIS — R25.1 TREMOR: ICD-10-CM

## 2019-09-13 DIAGNOSIS — E11.65 TYPE 2 DIABETES MELLITUS WITH HYPERGLYCEMIA, WITH LONG-TERM CURRENT USE OF INSULIN (HCC): ICD-10-CM

## 2019-09-13 DIAGNOSIS — I10 BENIGN ESSENTIAL HYPERTENSION: ICD-10-CM

## 2019-09-13 DIAGNOSIS — I25.10 CORONARY ARTERY DISEASE INVOLVING NATIVE CORONARY ARTERY OF NATIVE HEART WITHOUT ANGINA PECTORIS: ICD-10-CM

## 2019-09-13 DIAGNOSIS — F41.9 ANXIETY: ICD-10-CM

## 2019-09-13 DIAGNOSIS — I95.1 POSTURAL HYPOTENSION: ICD-10-CM

## 2019-09-13 DIAGNOSIS — Z23 NEED FOR INFLUENZA VACCINATION: ICD-10-CM

## 2019-09-13 PROCEDURE — G0008 ADMIN INFLUENZA VIRUS VAC: HCPCS

## 2019-09-13 PROCEDURE — 90662 IIV NO PRSV INCREASED AG IM: CPT

## 2019-09-13 PROCEDURE — 99214 OFFICE O/P EST MOD 30 MIN: CPT | Performed by: INTERNAL MEDICINE

## 2019-09-13 NOTE — PROGRESS NOTES
BMI Counseling: Body mass index is 31 88 kg/m²  Discussed the patient's BMI with him  The BMI is above normal  Nutrition recommendations include reducing portion sizes  Assessment/Plan:    Type 2 diabetes mellitus with hyperglycemia, with long-term current use of insulin ([facility])  Lab Results   Component Value Date    HGBA1C 7 2 (H) 07/19/2019       No results for input(s): POCGLU in the last 72 hours  Blood Sugar Average: Last 72 hrs:  I have counselled the pt to follow a healthy and balanced diet ,and recommend routine exercise  I will be ordering diabetic laboratories including comprehensive metabolic panel, hemoglobin A1c, urine microalbumin, lipid panel  Annual eye examination recommended patient plans to start walking routinely when he moves in to his new residence      Benign essential hypertension  Off all medications secondary to postural hypotension currently stable will continue monitor    Coronary artery disease involving native coronary artery of native heart without angina pectoris  Clinically stable working with Cardiology    Postural hypotension  Clinically stable and doing well continue the current medical regiment will continue monitor  Anxiety  Ongoing anxiety no suicidal ideation daughter who has paranoid schizophrenia who is not taking medications , daughter does have Psychiatry; patient no suicidal ideation, no HI will continue with current medical regiment    Tremor  Working with Neurology, reviewed the Neurology refer record continue with current medical regiment will continue monitor  Urinary incontinence  Has been chronic since prostate cancer surgery he is interested in the artificial sphincter I will refer him to Urology Dr Cyndy Kidd  He does report me 1 fall last night without major injury he did develop a bruise on the arm but there is no pain of the wrist hand or forearm, no head injury, no acute injury no loss of consciousness and he is doing better    He reports me he tripped on something         Problem List Items Addressed This Visit        Endocrine    Type 2 diabetes mellitus with hyperglycemia, with long-term current use of insulin ([facility] [address] )     Lab Results   Component Value Date    HGBA1C 7 2 (H) 07/19/2019       No results for input(s): POCGLU in the last 72 hours  Blood Sugar Average: Last 72 hrs:  I have counselled the pt to follow a healthy and balanced diet ,and recommend routine exercise  I will be ordering diabetic laboratories including comprehensive metabolic panel, hemoglobin A1c, urine microalbumin, lipid panel  Annual eye examination recommended patient plans to start walking routinely when he moves in to his new residence           Relevant Orders    Comprehensive metabolic panel    Hemoglobin A1C    Lipid Panel with Direct LDL reflex    Microalbumin / creatinine urine ratio       Cardiovascular and Mediastinum    Benign essential hypertension     Off all medications secondary to postural hypotension currently stable will continue monitor         Coronary artery disease involving native coronary artery of native heart without angina pectoris     Clinically stable working with Cardiology         Postural hypotension     Clinically stable and doing well continue the current medical regiment will continue monitor  Other    Anxiety     Ongoing anxiety no suicidal ideation daughter who has paranoid schizophrenia who is not taking medications , daughter does have Psychiatry; patient no suicidal ideation, no HI will continue with current medical regiment         Urinary incontinence - Primary     Has been chronic since prostate cancer surgery he is interested in the artificial sphincter I will refer him to Urology Dr Ankit Hunt referral to Urology    Tremor     Working with Neurology, reviewed the Neurology refer record continue with current medical regiment will continue monitor           Fall     He does report me 1 fall last night without major injury he did develop a bruise on the arm but there is no pain of the wrist hand or forearm, no head injury, no acute injury no loss of consciousness and he is doing better  He reports me he tripped on something           Other Visit Diagnoses     Need for influenza vaccination        Relevant Orders    influenza vaccine, [id number], high-dose, PF 0 5 mL (FLUZONE HIGH-DOSE) (Completed)            Subjective:      Patient ID: Peter Harvey is a [de-identified] y o  male  HPI  [de-identified] old male coming in for a follow up visit regarding type 2 diabetes, urine incontinence, essential hypertension, postural hypotension, coronary artery disease, essential tremor; The patient reports me compliant taking medications without untoward side effects the  The patient is here to review his medical condition, update me on the medical condition and the patient reports me no hospitalizations and no ER visits  No injury recent fall no head injury he did develop a bruise on the arm but no pain of the arm and he has full function  No headache, no neck pain no loss of consciousness  Fall touched [employer], does report me severe anxiety, and no thought of suicide  He is interested in medical marijuana I will get in touch with Dr Nikki Royal the certifying doctor  Reports me that noises will make him feel more anxious he has very sensitive hearing  The following portions of the patient's history were reviewed and updated as appropriate: allergies, current medications, past family history, past medical history, past social history, past surgical history and problem list     Review of Systems   Constitutional: Negative for activity change, appetite change and unexpected weight change  HENT: Negative for congestion and postnasal drip  Eyes: Negative for visual disturbance  Respiratory: Negative for cough and shortness of breath  Cardiovascular: Negative for chest pain     Gastrointestinal: Negative for abdominal pain, diarrhea, nausea and vomiting  Neurological: Negative for dizziness, light-headedness and headaches  Hematological: Negative for adenopathy  Psychiatric/Behavioral: Negative for suicidal ideas  The patient is nervous/anxious  Objective:    No follow-ups on file  No results found  Allergies   Allergen Reactions    Adhesive [Medical Tape] Rash    Sulfa Antibiotics Other (See Comments)     Generalized redness       Past Medical History:   Diagnosis Date    AAA (abdominal aortic aneurysm) ([address] )     Aneurysm of infrarenal abdominal aorta ([address] ) 10/23/2012    Anxiety     Benign essential hypertension 7/13/2017    CKD (chronic kidney disease) stage 2, GFR 60-89 ml/min 4/11/2018    Compression fracture of twelfth thoracic vertebra ([address] ) 1/2/2019    Coronary artery disease involving native coronary artery of native heart without angina pectoris 1/6/2014    Description: 50% distal left main, 75% proximal LAD, 90% 1st diagonal, 50% ostial circumflex, 99% proximal circumflex, 90% 2nd OM, 30 mid RCA , 90% right posterolateral - left heart catheterization December 2013      CVD (cardiovascular disease)     Diabetes mellitus ([address] )     DVT, lower extremity ([address] )     Dyslipidemia 9/24/2012    Essential tremor 4/2/2019    Hyperlipidemia     Hypertension     NSTEMI (non-ST elevated myocardial infarction) ([address] )     Obesity 7/13/2017    Orthostatic hypotension     Prostate cancer (HCC)     Right bundle branch block (RBBB)     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    LIMA to LAD, SVG to OM1, SVG to distal RCA    Skin cancer     Type 2 diabetes mellitus with hyperglycemia, with long-term current use of insulin ([address] ) 7/3/2017    Vitamin D deficiency 2/19/2018     Past Surgical History:   Procedure Laterality Date    ANKLE ARTHROSCOPY W/ OPEN REPAIR Left     CARDIAC SURGERY      CORONARY ARTERY BYPASS GRAFT  12/11/2013    CABG x3 with LIMA to LAD, SVG to OM-1, SVG to posterior lateral, LYSIS of pericardial adhesions   IL COLONOSCOPY FLX DX W/COLLJ SPEC WHEN PFRMD N/A 8/9/2018    Procedure: COLONOSCOPY;  Surgeon: Compa Garcia MD;  [facility]: [facility]; Service: Gastroenterology    IL REPAIR UMBILICAL FGLQ,0+O/U,FQWRN N/A 3/10/2017    Procedure: REPAIR HERNIA UMBILICAL;  Surgeon: Joseph Hidalgo MD;  Location: [facility];  Service: General    PROSTATECTOMY       Current Outpatient Medications on File Prior to Visit   Medication Sig Dispense Refill    ACCU-CHEK FASTCLIX LANCETS MISC Check blood sugar 4 times daily  306 each 1    ascorbic acid (VITAMIN C) 500 mg tablet Take 500 mg by mouth daily      aspirin 325 mg tablet Take 1 tablet by mouth daily      benzonatate (TESSALON PERLES) 100 mg capsule Take 1 capsule (100 mg total) by mouth 3 (three) times a day as needed for cough 20 capsule 0    Blood Glucose Monitoring Suppl (ACCU-CHEK DENYS PLUS) w/Device KIT by Does not apply route Use to test blood sugars 4 times daily      Calcium Acetate, Phos Binder, (CALCIUM ACETATE PO) Take 630 mg by mouth daily      Cholecalciferol (VITAMIN D3 PO) Take 3,000 Units by mouth daily        clopidogrel (PLAVIX) 75 mg tablet TAKE 1 TABLET DAILY 90 tablet 1    Coenzyme Q10 (CO Q 10) 100 MG CAPS Take 100 mg by mouth daily      Continuous Blood Gluc Sensor (FREESTYLE POLINA 14 DAY SENSOR) MISC 1 each by Does not apply route continuous 2 each 2    cyanocobalamin (VITAMIN B-12) 500 mcg tablet Take 1 tablet by mouth daily      docusate sodium (COLACE) 100 mg capsule Take 100 mg by mouth 2 (two) times a day        erythromycin (ILOTYCIN) ophthalmic ointment APPLY 1 CM RIBBON TO THE BOTTOM RIGHT LID THREE TIMES DAILY  0    fluticasone (FLONASE) 50 mcg/act nasal spray 2 sprays into each nostril daily 16 g 0    gabapentin (NEURONTIN) 100 mg capsule Take 4 capsules (400 mg total) by mouth 3 (three) times a day Start with 1 capsule TID and increase as dir 360 capsule 3    gabapentin (NEURONTIN) 400 mg capsule Take 1 capsule (400 mg total) by mouth 3 (three) times a day Taken in addition to the 100mg cap 90 capsule 3    glucose blood (ACCU-CHEK GUIDE) test strip Check blood sugar 4 times daily  400 each 1    Insulin Glargine (TOUJEO SOLOSTAR) 300 units/mL CONCETRATED U-300 injection pen Inject 60 Units under the skin daily at bedtime 4 pen 5    Insulin Pen Needle (NOVOFINE AUTOCOVER) 30G X 8 MM MISC The patient test his blood sugars 4 times daily   100 each 4    LORazepam (ATIVAN) 1 mg tablet Take 1 tablet (1 mg total) by mouth 2 (two) times a day 60 tablet 0    metFORMIN (GLUCOPHAGE) 500 mg tablet TAKE 1 TABLET BY MOUTH TWO TIMES DAILY WITH MEALS 60 tablet 2    Multiple Vitamins-Minerals (CENTRUM SILVER) tablet Take 1 tablet by mouth daily      multivitamin-iron-minerals-folic acid (CENTRUM) chewable tablet Chew 1 tablet daily      nitroglycerin (NITROSTAT) 0 4 mg SL tablet Place 1 tablet (0 4 mg total) under the tongue every 5 (five) minutes as needed for chest pain 90 tablet 3    NOVOLOG FLEXPEN 100 units/mL injection pen FOR DIRECTIONS ON HOW TO   TAKE THIS MEDICINE, READ   THE ENCLOSED MEDICATION    INFORMATION FORM 60 mL 1    pantoprazole (PROTONIX) 40 mg tablet TAKE 1 TABLET TWICE A DAY  30 MINUTES BEFORE BREAKFASTAND DINNER 180 tablet 1    polyethylene glycol (MIRALAX) 17 g packet Take 17 g by mouth daily      pyridostigmine (MESTINON) 60 mg tablet Take 0 5 tablets (30 mg total) by mouth 3 (three) times a day 135 tablet 1    ranolazine (RANEXA) 1000 MG SR tablet Take 1 tablet (1,000 mg total) by mouth 2 (two) times a day 180 tablet 3    rosuvastatin (CRESTOR) 40 MG tablet Take 1 tablet (40 mg total) by mouth daily 90 tablet 3    vilazodone (VIIBRYD) 40 mg tablet Take 1 tablet (40 mg total) by mouth daily 30 tablet 5    Continuous Blood Gluc  (FREESTYLE POLINA 14 DAY READER) DMITRI 1 Device by Does not apply route continuous for 30 days Scan at least 4 times a day to monitor blood glucose levels 3 Device 0     Current Facility-Administered Medications on File Prior to Visit   Medication Dose Route Frequency Provider Last Rate Last Dose    levalbuterol (XOPENEX HFA) inhaler 1 puff  1 puff Inhalation Q6H PRN Bret Koch DO         Family History   Problem Relation Age of Onset    Crohn's disease Mother     Liver cancer Mother     Hypertension Mother     Crohn's disease Father     Liver cancer Father     Heart disease Father     Hypertension Father     Hyperlipidemia Father     Colon cancer Brother     Aortic aneurysm Brother         abdominal     Heart disease Brother         abdominal aortic aneurysm    Hypertension Brother     Hyperlipidemia Brother     Colon polyps Family     Stomach cancer Family     Hypertension Sister     Mitral valve prolapse Sister 80        valve replacment      Social History     Socioeconomic History    Marital status: /Civil Union     Spouse name: Not on file    Number of children: Not on file    Years of education: Not on file    Highest education level: Not on file   Occupational History    Not on file   Social Needs    Financial resource strain: Not on file    Food insecurity:     Worry: Not on file     Inability: Not on file    Transportation needs:     Medical: Not on file     Non-medical: Not on file   Tobacco Use    Smoking status: Former Smoker     Types: Cigarettes    Smokeless tobacco: Never Used    Tobacco comment: quit 1967   Substance and Sexual Activity    Alcohol use: Yes     Comment: rare    Drug use: No    Sexual activity: Never   Lifestyle    Physical activity:     Days per week: Not on file     Minutes per session: Not on file    Stress: Not on file   Relationships    Social connections:     Talks on phone: Not on file     Gets together: Not on file     Attends [employer] service: Not on file     Active member of club or organization: Not on file     Attends meetings of clubs or organizations: Not on file Relationship status: Not on file    Intimate partner violence:     Fear of current or ex partner: Not on file     Emotionally abused: Not on file     Physically abused: Not on file     Forced sexual activity: Not on file   Other Topics Concern    Not on file   Social History Narrative    Not on file     Vitals:    09/13/19 1320   BP: 124/78   Pulse: 79   Resp: 16   SpO2: 95%   Weight: 101 kg (222 lb 3 2 oz)   Height: 5' 10" (1 778 m)     Results for orders placed or performed in visit on 08/01/19   POCT urine dip   Result Value Ref Range    LEUKOCYTE ESTERASE,UA trace     NITRITE,UA -     SL AMB POCT UROBILINOGEN 0 2     POCT URINE PROTEIN +      PH,UA 5 0     BLOOD,UA -     SPECIFIC GRAVITY,UA 1 025     KETONES,UA -     BILIRUBIN,UA -     GLUCOSE, UA -      COLOR,UA yellow     CLARITY,UA clear      Weight (last 2 days)     Date/Time   Weight    09/13/19 1320   101 (222 2)            Body mass index is 31 88 kg/m²  BP      Temp      Pulse     Resp      SpO2        Vitals:    09/13/19 1320   Weight: 101 kg (222 lb 3 2 oz)     Vitals:    09/13/19 1320   Weight: 101 kg (222 lb 3 2 oz)       /78   Pulse 79   Resp 16   Ht 5' 10" (1 778 m)   Wt 101 kg (222 lb 3 2 oz)   SpO2 95%   BMI 31 88 kg/m²          Physical Exam   Constitutional: He appears well-developed and well-nourished  No distress  HENT:   Head: Normocephalic and atraumatic  Right Ear: External ear normal    Left Ear: External ear normal    Mouth/Throat: Oropharynx is clear and moist    Eyes: Pupils are equal, round, and reactive to light  Conjunctivae are normal  Right eye exhibits no discharge  Left eye exhibits no discharge  No scleral icterus  Neck: Neck supple  Cardiovascular: Normal rate, regular rhythm and normal heart sounds  Exam reveals no gallop and no friction rub  No murmur heard  Pulmonary/Chest: No respiratory distress  He has no wheezes  He has no rales  Abdominal: Soft   Bowel sounds are normal  He exhibits no distension and no mass  There is no tenderness  There is no rebound and no guarding  Mild chronic left lower quadrant abdominal discomfort for may years underwent extensive testing without revealing any problems  Possibly adhesions   Musculoskeletal: He exhibits no edema or deformity  Lymphadenopathy:     He has no cervical adenopathy  Neurological: He is alert  Skin: He is not diaphoretic  Psychiatric: His mood appears anxious  He does not exhibit a depressed mood  He expresses no suicidal ideation       bruise arm full range of motion of the wrist, hand and arm without pain left upper extremity

## 2019-09-15 PROBLEM — R25.1 TREMOR: Status: ACTIVE | Noted: 2019-09-15

## 2019-09-15 PROBLEM — I95.1 POSTURAL HYPOTENSION: Status: ACTIVE | Noted: 2019-09-15
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
## 2019-09-15 NOTE — ASSESSMENT & PLAN NOTE
He does report me 1 fall last night without major injury he did develop a bruise on the arm but there is no pain of the wrist hand or forearm, no head injury, no acute injury no loss of consciousness and he is doing better    He reports me he tripped on something

## 2019-09-15 NOTE — ASSESSMENT & PLAN NOTE
Working with Neurology, reviewed the Neurology refer record continue with current medical regiment will continue monitor

## 2019-09-15 NOTE — ASSESSMENT & PLAN NOTE
Ongoing anxiety no suicidal ideation daughter who has paranoid schizophrenia who is not taking medications , daughter does have Psychiatry; patient no suicidal ideation, no HI will continue with current medical regiment

## 2019-09-15 NOTE — ASSESSMENT & PLAN NOTE
Has been chronic since prostate cancer surgery he is interested in the artificial sphincter I will refer him to Urology Dr Denisa Arriola

## 2019-09-15 NOTE — ASSESSMENT & PLAN NOTE
Lab Results   Component Value Date    HGBA1C 7 2 (H) 07/19/2019       No results for input(s): POCGLU in the last 72 hours  Blood Sugar Average: Last 72 hrs:  I have counselled the pt to follow a healthy and balanced diet ,and recommend routine exercise  I will be ordering diabetic laboratories including comprehensive metabolic panel, hemoglobin A1c, urine microalbumin, lipid panel    Annual eye examination recommended patient plans to start walking routinely when he moves in to his new residence

## 2019-09-17 ENCOUNTER — TELEPHONE (OUTPATIENT)
Dept: INTERNAL MEDICINE CLINIC | Facility: CLINIC | Age: 79
End: 2019-09-17

## 2019-09-17 NOTE — TELEPHONE ENCOUNTER
Spoke to Patient he is cancelling his surgery due to the AUS having a manual pump  He is concerned he will not be able to do that  I spoke to Dr Abilio Newman he is aware

## 2019-09-17 NOTE — TELEPHONE ENCOUNTER
When patient was here on 9/13/19 you mentioned a Urologist who does artificial urinary sphincters, he wanted the name of this doctor? Patient canceled his appointment with Dr Ritchie Cristopher he would prefer to see this other Urologist     Please provide an order, name and contact information for the doctor discussed at his visit with you

## 2019-09-18 NOTE — TELEPHONE ENCOUNTER
Stefan Glover please call Urology to see who else does the artificial sphincter at [facility]

## 2019-09-18 NOTE — TELEPHONE ENCOUNTER
Spoke to patient and advised  He states that the nurses in his office stated that he does not do this procedure  The patient noted that you mentioned a different name  Can you find out who does this and let him know? Thank you

## 2019-09-20 DIAGNOSIS — K21.9 GASTROESOPHAGEAL REFLUX DISEASE WITHOUT ESOPHAGITIS: ICD-10-CM

## 2019-09-20 RX ORDER — PANTOPRAZOLE SODIUM 40 MG/1
TABLET, DELAYED RELEASE ORAL
Qty: 180 TABLET | Refills: 1 | Status: SHIPPED | OUTPATIENT
Start: 2019-09-20 | End: 2020-01-01 | Stop reason: ALTCHOICE

## 2019-09-20 NOTE — TELEPHONE ENCOUNTER
Jessicaeint was inquiring again about the electrical pump for th AUS  I explained that at this time our practice does not offer that option  Patient proceeded to explain in 2008 he was aware of someone who had the electric pump AUS from another hospital  I again told him that Dr Hannah Frost will not have that as an option  Reminded him to call in if we could be of further assistance or if he had symptoms to discuss

## 2019-09-20 NOTE — TELEPHONE ENCOUNTER
Patient calling to speak with surgery scheduler in regards to cancelled surgery      Asking for a return call at [phone]

## 2019-09-23 DIAGNOSIS — F41.9 ANXIETY: ICD-10-CM

## 2019-09-23 RX ORDER — VILAZODONE HYDROCHLORIDE 40 MG/1
TABLET ORAL
Qty: 30 TABLET | Refills: 5 | Status: SHIPPED | OUTPATIENT
Start: 2019-09-23 | End: 2020-01-01

## 2019-09-24 ENCOUNTER — TELEPHONE (OUTPATIENT)
Dept: UROLOGY | Facility: AMBULATORY SURGERY CENTER | Age: 79
End: 2019-09-24

## 2019-09-30 DIAGNOSIS — Z79.4 TYPE 2 DIABETES MELLITUS WITH COMPLICATION, WITH LONG-TERM CURRENT USE OF INSULIN (HCC): ICD-10-CM

## 2019-09-30 DIAGNOSIS — E11.8 TYPE 2 DIABETES MELLITUS WITH COMPLICATION, WITH LONG-TERM CURRENT USE OF INSULIN (HCC): ICD-10-CM

## 2019-09-30 RX ORDER — LANCETS
EACH MISCELLANEOUS
Qty: 306 EACH | Refills: 1 | Status: SHIPPED | OUTPATIENT
Start: 2019-09-30 | End: 2020-01-01

## 2019-10-04 ENCOUNTER — TELEPHONE (OUTPATIENT)
Dept: UROLOGY | Facility: AMBULATORY SURGERY CENTER | Age: 79
End: 2019-10-04

## 2019-10-08 ENCOUNTER — PREP FOR PROCEDURE (OUTPATIENT)
Dept: UROLOGY | Facility: HOSPITAL | Age: 79
End: 2019-10-08

## 2019-10-08 DIAGNOSIS — N39.3 STRESS INCONTINENCE: Primary | ICD-10-CM

## 2019-10-10 ENCOUNTER — TELEPHONE (OUTPATIENT)
Dept: UROLOGY | Facility: AMBULATORY SURGERY CENTER | Age: 79
End: 2019-10-10

## 2019-10-10 DIAGNOSIS — Z79.4 TYPE 2 DIABETES MELLITUS WITH COMPLICATION, WITH LONG-TERM CURRENT USE OF INSULIN (HCC): Primary | ICD-10-CM

## 2019-10-10 DIAGNOSIS — E11.8 TYPE 2 DIABETES MELLITUS WITH COMPLICATION, WITH LONG-TERM CURRENT USE OF INSULIN (HCC): Primary | ICD-10-CM

## 2019-10-10 PROBLEM — N39.3 STRESS INCONTINENCE: Status: ACTIVE | Noted: 2019-10-10

## 2019-10-10 RX ORDER — BLOOD-GLUCOSE METER
EACH MISCELLANEOUS 4 TIMES DAILY
Qty: 1 KIT | Refills: 0 | Status: SHIPPED | OUTPATIENT
Start: 2019-10-10 | End: 2020-01-01 | Stop reason: ALTCHOICE

## 2019-10-10 NOTE — TELEPHONE ENCOUNTER
Confirmed AUS 11/6/19 with Dr Mei Linares @ [facility]   Instructions and testing needed review PRE-OP/POST OP made  Paperwork mailed today

## 2019-10-10 NOTE — TELEPHONE ENCOUNTER
Patient interested in r/s AUS with Dr Wiley Ellington  Made him aware our practice still does NOT offer electrical pump he was requesting  He states he has inquired around the [place] and it is not offered anywhere, so he wants to go forward and r/s

## 2019-10-10 NOTE — TELEPHONE ENCOUNTER
Patient seen by Luna Sánchez is being rescheduled for Artificial Urinary Sphincter 11/6/19  Cardiac risk assessment was given  For cancelled surgery in August   Will Patient need to be seen now or can cardiac be updated? Patient needs to hold blood thinners/aspirin  Thank you!

## 2019-10-11 ENCOUNTER — TELEPHONE (OUTPATIENT)
Dept: INTERNAL MEDICINE CLINIC | Facility: CLINIC | Age: 79
End: 2019-10-11

## 2019-10-11 DIAGNOSIS — Z79.4 TYPE 2 DIABETES MELLITUS WITH COMPLICATION, WITH LONG-TERM CURRENT USE OF INSULIN (HCC): Primary | ICD-10-CM

## 2019-10-11 DIAGNOSIS — E11.8 TYPE 2 DIABETES MELLITUS WITH COMPLICATION, WITH LONG-TERM CURRENT USE OF INSULIN (HCC): Primary | ICD-10-CM

## 2019-10-11 NOTE — TELEPHONE ENCOUNTER
The patient was given a new glucose meter  It is the accucheck avia plus  Could you please order test strip for his new glucometer? The patient needs the strips as soon as possible  He is having surgery in November and needs to be able to monitor his sugar

## 2019-10-14 DIAGNOSIS — F41.9 ANXIETY: ICD-10-CM

## 2019-10-14 DIAGNOSIS — E13.9 DIABETES 1.5, MANAGED AS TYPE 2 (HCC): ICD-10-CM

## 2019-10-14 RX ORDER — LORAZEPAM 1 MG/1
1 TABLET ORAL 2 TIMES DAILY
Qty: 60 TABLET | Refills: 0 | Status: SHIPPED | OUTPATIENT
Start: 2019-10-14 | End: 2019-11-15 | Stop reason: SDUPTHER

## 2019-10-14 NOTE — TELEPHONE ENCOUNTER
Prescriptions    Filled  ID  Written  Drug  QTY  Days  Prescriber  Rx #  Pharmacy *  Refills  Daily Dose  Pymt Type      09/12/2019  1  09/11/2019  LORAZEPAM 1 MG TABLET  60 0  30  [facility]  [id number]  KEYA ([id number])  0   [place]

## 2019-10-16 NOTE — H&P (VIEW-ONLY)
10/18/2019  Jarrett Hagveronicas  1940  [id number]      Assessment  -Urinary incontinence   -Prostate cancer s/p prostatectomy (2008)    Discussion  Shravan Ross is a [de-identified] y o  male being managed by Dr Teresa Oseguera  Patient scheduled for placement of artifical urinary sphincter on 11/6/19  We reviewed the risks and benefits of this procedure including but not limited to cardiopulmonary complications, bleeding, infection, damage to nearby structures such as bladder/ureter/kidney, need for nephrostomy tube, need for additional surgeries  Patient verbalized understanding  Surgical consent completed and scanned in [facility]  He received cardiac clearance, and was instructed to hold Plavix and aspirin 7 days prior to procedure  All questions were answered  History of Present Illness  [de-identified] y o  male presents today to discuss his upcoming surgery  Patient with history of urinary stress incontinence  He underwent cystoscopic evaluation in August 2019 which did not show any abnormality in the bladder  It was decided to proceed with elective placement of artifical urinary sphincter  Additional PMH includes retropubic prostatectomy in 2008  Patient states PSAs had been undetectable  Last PSA 7/19/19 was <0 1  Review of Systems  Review of Systems   Constitutional: Negative  HENT: Negative  Respiratory: Negative  Cardiovascular: Negative  Gastrointestinal: Negative  Musculoskeletal: Negative  Skin: Negative  Neurological: Negative  Psychiatric/Behavioral: Negative          Past Medical History  Past Medical History:   Diagnosis Date    AAA [facility]abdominal aortic [address]) [address] [address]     Aneurysm of infrarenal abdominal aorta ([facility] [address] ) 10/23/2012    Anxiety     Benign essential hypertension 7/13/2017    CKD (chronic kidney disease) stage 2, GFR 60-89 ml/min 4/11/2018    Compression fracture of twelfth thoracic vertebra (HCC) 1/2/2019    Coronary artery disease involving native coronary artery of native heart without angina pectoris 1/6/2014    Description: 50% distal left main, 75% proximal LAD, 90% 1st diagonal, 50% ostial circumflex, 99% proximal circumflex, 90% 2nd OM, 30 mid RCA , 90% right posterolateral - left heart catheterization December 2013   CVD (cardiovascular disease)     Diabetes mellitus ([facility] [address] [facility]     DVT, lower extremity ([facility] [address] [facility]     Dyslipidemia 9/24/2012    Essential tremor 4/2/2019    Hyperlipidemia     Hypertension     NSTEMI (non-ST elevated myocardial infarction) ([facility] [address] [facility]     Obesity 7/13/2017    Orthostatic hypotension     Prostate cancer (HCC)     Right bundle branch block (RBBB)     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    LIMA to LAD, SVG to OM1, SVG to distal RCA    Skin cancer     Type 2 diabetes mellitus with hyperglycemia, with long-term current use of insulin ([facility] [address] ) 7/3/2017    Vitamin D deficiency 2/19/2018       Surgical History  Past Surgical History:   Procedure Laterality Date    ANKLE ARTHROSCOPY W/ OPEN REPAIR Left     CARDIAC SURGERY      CORONARY ARTERY BYPASS GRAFT  12/11/2013    CABG x3 with LIMA to LAD, SVG to OM-1, SVG to posterior lateral, LYSIS of pericardial adhesions   IL COLONOSCOPY FLX DX W/COLLJ SPEC WHEN PFRMD N/A 8/9/2018    Procedure: COLONOSCOPY;  Surgeon: Quentin Danielle MD;  [facility]: [facility];   Service: Gastroenterology    IL REPAIR UMBILICAL VQKN,6+W/J,FEJOW N/A 3/10/2017    Procedure: REPAIR HERNIA UMBILICAL;  Surgeon: Parul Terrazas MD;  Location: [facility];  Service: General    PROSTATECTOMY         Family History  Family History   Problem Relation Age of Onset    Crohn's disease Mother     Liver cancer Mother     Hypertension Mother     Crohn's disease Father     Liver cancer Father     Heart disease Father     Hypertension Father     Hyperlipidemia Father     Colon cancer Brother     Aortic aneurysm Brother         abdominal     Heart disease Brother         abdominal aortic aneurysm    Hypertension Brother     Hyperlipidemia Brother     Colon polyps Family     Stomach cancer Family     Hypertension Sister     Mitral valve prolapse Sister 80        valve replacment        Social History  Social History     Socioeconomic History    Marital status: /Civil Union     Spouse name: Not on file    Number of children: Not on file    Years of education: Not on file    Highest education level: Not on file   Occupational History    Not on file   Social Needs    Financial resource strain: Not on file    Food insecurity:     Worry: Not on file     Inability: Not on file    Transportation needs:     Medical: Not on file     Non-medical: Not on file   Tobacco Use    Smoking status: Former Smoker     Types: Cigarettes    Smokeless tobacco: Never Used    Tobacco comment: quit 1967   Substance and Sexual Activity    Alcohol use: Yes     Comment: rare    Drug use: No    Sexual activity: Never   Lifestyle    Physical activity:     Days per week: Not on file     Minutes per session: Not on file    Stress: Not on file   Relationships    Social connections:     Talks on phone: Not on file     Gets together: Not on file     Attends [employer] service: Not on file     Active member of club or organization: Not on file     Attends meetings of clubs or organizations: Not on file     Relationship status: Not on file    Intimate partner violence:     Fear of current or ex partner: Not on file     Emotionally abused: Not on file     Physically abused: Not on file     Forced sexual activity: Not on file   Other Topics Concern    Not on file   Social History Narrative    Not on file       Current Medications  Current Outpatient Medications   Medication Sig Dispense Refill    ACCU-CHEK FASTCLIX LANCETS MISC Check blood sugar 4 times daily   306 each 1    ascorbic acid (VITAMIN C) 500 mg tablet Take 500 mg by mouth daily      aspirin 325 mg tablet Take 1 tablet by mouth daily      benzonatate (TESSALON PERLES) 100 mg capsule Take 1 capsule (100 mg total) by mouth 3 (three) times a day as needed for cough 20 capsule 0    Blood Glucose Monitoring Suppl (ACCU-CHEK DENYS PLUS) w/Device KIT by Does not apply route 4 (four) times a day 1 kit 0    Calcium Acetate, Phos Binder, (CALCIUM ACETATE PO) Take 630 mg by mouth daily      Cholecalciferol (VITAMIN D3 PO) Take 3,000 Units by mouth daily        clopidogrel (PLAVIX) 75 mg tablet TAKE 1 TABLET DAILY 90 tablet 1    Coenzyme Q10 (CO Q 10) 100 MG CAPS Take 100 mg by mouth daily      Continuous Blood Gluc  (FREESTYLE POLINA 14 DAY READER) DMITRI 1 Device by Does not apply route continuous for 30 days Scan at least 4 times a day to monitor blood glucose levels 3 Device 0    Continuous Blood Gluc Sensor (FREESTYLE POLINA 14 DAY SENSOR) MISC 1 each by Does not apply route continuous 2 each 2    cyanocobalamin (VITAMIN B-12) 500 mcg tablet Take 1 tablet by mouth daily      docusate sodium (COLACE) 100 mg capsule Take 100 mg by mouth 2 (two) times a day   erythromycin (ILOTYCIN) ophthalmic ointment APPLY 1 CM RIBBON TO THE BOTTOM RIGHT LID THREE TIMES DAILY  0    fluticasone (FLONASE) 50 mcg/act nasal spray 2 sprays into each nostril daily 16 g 0    gabapentin (NEURONTIN) 100 mg capsule Take 4 capsules (400 mg total) by mouth 3 (three) times a day Start with 1 capsule TID and increase as dir 360 capsule 3    gabapentin (NEURONTIN) 400 mg capsule Take 1 capsule (400 mg total) by mouth 3 (three) times a day Taken in addition to the 100mg cap 90 capsule 3    glucose blood (ACCU-CHEK DENYS PLUS) test strip 1 each by Other route 4 (four) times a day Use as instructed 200 each 2    glucose blood (ACCU-CHEK GUIDE) test strip Check blood sugar 4 times daily  400 each 1    Insulin Glargine (TOUJEO SOLOSTAR) 300 units/mL CONCETRATED U-300 injection pen Inject 60 Units under the skin daily at bedtime 4 pen 5    Insulin Pen Needle (NOVOFINE AUTOCOVER) 30G X 8 MM MISC The patient test his blood sugars 4 times daily   100 each 4    Insulin Pen Needle (NOVOFINE AUTOCOVER) 30G X 8 MM MISC TEST BLOOD SUGAR 4 TIMES DAILY 100 each 4    LORazepam (ATIVAN) 1 mg tablet Take 1 tablet (1 mg total) by mouth 2 (two) times a day 60 tablet 0    metFORMIN (GLUCOPHAGE) 500 mg tablet TAKE 1 TABLET BY MOUTH TWO TIMES DAILY WITH MEALS 60 tablet 2    Multiple Vitamins-Minerals (CENTRUM SILVER) tablet Take 1 tablet by mouth daily      multivitamin-iron-minerals-folic acid (CENTRUM) chewable tablet Chew 1 tablet daily      nitroglycerin (NITROSTAT) 0 4 mg SL tablet Place 1 tablet (0 4 mg total) under the tongue every 5 (five) minutes as needed for chest pain 90 tablet 3    NOVOLOG FLEXPEN 100 units/mL injection pen FOR DIRECTIONS ON HOW TO   TAKE THIS MEDICINE, READ   THE ENCLOSED MEDICATION    INFORMATION FORM 60 mL 1    pantoprazole (PROTONIX) 40 mg tablet TAKE 1 TABLET TWICE A DAY  30 MINUTES BEFORE BREAKFASTAND DINNER 180 tablet 1    polyethylene glycol (MIRALAX) 17 g packet Take 17 g by mouth daily      pyridostigmine (MESTINON) 60 mg tablet Take 0 5 tablets (30 mg total) by mouth 3 (three) times a day 135 tablet 1    ranolazine (RANEXA) 1000 MG SR tablet Take 1 tablet (1,000 mg total) by mouth 2 (two) times a day 180 tablet 3    rosuvastatin (CRESTOR) 40 MG tablet Take 1 tablet (40 mg total) by mouth daily 90 tablet 3    VIIBRYD 40 MG tablet TAKE 1 TABLET BY MOUTH EVERY DAY 30 tablet 5    vilazodone (VIIBRYD) 40 mg tablet Take 1 tablet (40 mg total) by mouth daily 30 tablet 5     Current Facility-Administered Medications   Medication Dose Route Frequency Provider Last Rate Last Dose    levalbuterol (XOPENEX HFA) inhaler 1 puff  1 puff Inhalation Q6H PRN Hattie Dust, DO           Allergies  Allergies   Allergen Reactions    Adhesive [Medical Tape] Rash    Sulfa Antibiotics Other (See Comments)     Generalized redness       Vitals  There were no vitals filed for this visit      Physical Exam  Physical Exam   Constitutional: He is oriented to person, place, and time  He appears well-developed and well-nourished  HENT:   Head: Normocephalic  Eyes: Pupils are equal, round, and reactive to light  Neck: Normal range of motion  Cardiovascular: Normal rate, regular rhythm, normal heart sounds and intact distal pulses  Pulmonary/Chest: Effort normal and breath sounds normal  No stridor  No respiratory distress  He has no wheezes  He has no rales  He exhibits no tenderness  Abdominal: Soft  Normal appearance and bowel sounds are normal  He exhibits no distension  There is no tenderness  There is no CVA tenderness  Musculoskeletal: Normal range of motion  Neurological: He is alert and oriented to person, place, and time  Skin: Skin is warm and dry  Psychiatric: He has a normal mood and affect   His behavior is normal  Judgment and thought content normal

## 2019-10-16 NOTE — PROGRESS NOTES
10/18/2019  Trace Garcia  1940  [id number]      Assessment  -Urinary incontinence   -Prostate cancer s/p prostatectomy (2008)    Discussion  Kathy Reeves is a [de-identified] y o  male being managed by Dr Ibeth Sykes  Patient scheduled for placement of artifical urinary sphincter on 11/6/19  We reviewed the risks and benefits of this procedure including but not limited to cardiopulmonary complications, bleeding, infection, damage to nearby structures such as bladder/ureter/kidney, need for nephrostomy tube, need for additional surgeries  Patient verbalized understanding  Surgical consent completed and scanned in [facility]  He received cardiac clearance, and was instructed to hold Plavix and aspirin 7 days prior to procedure  All questions were answered  History of Present Illness  [de-identified] y o  male presents today to discuss his upcoming surgery  Patient with history of urinary stress incontinence  He underwent cystoscopic evaluation in August 2019 which did not show any abnormality in the bladder  It was decided to proceed with elective placement of artifical urinary sphincter  Additional PMH includes retropubic prostatectomy in 2008  Patient states PSAs had been undetectable  Last PSA 7/19/19 was <0 1  Review of Systems  Review of Systems   Constitutional: Negative  HENT: Negative  Respiratory: Negative  Cardiovascular: Negative  Gastrointestinal: Negative  Musculoskeletal: Negative  Skin: Negative  Neurological: Negative  Psychiatric/Behavioral: Negative          Past Medical History  Past Medical History:   Diagnosis Date    AAA [facility]ab[facility]) ([facility] [address] [facility]     Aneurysm of infrarenal abdominal aorta ([facility] [address] ) 10/23/2012    Anxiety     Benign essential hypertension 7/13/2017    CKD (chronic kidney disease) stage 2, GFR 60-89 ml/min 4/11/2018    Compression fracture of twelfth thoracic vertebra (HCC) 1/2/2019    Coronary artery disease involving native coronary artery of native heart without angina pectoris 1/6/2014    Description: 50% distal left main, 75% proximal LAD, 90% 1st diagonal, 50% ostial circumflex, 99% proximal circumflex, 90% 2nd OM, 30 mid RCA , 90% right posterolateral - left heart catheterization December 2013   CVD (cardiovascular disease)     Diabetes mellitus ([facility] [address] [facility]     DVT, lower extremity ([facility] [address] [facility]     Dyslipidemia 9/24/2012    Essential tremor 4/2/2019    Hyperlipidemia     Hypertension     NSTEMI (non-ST elevated myocardial infarction) ([facility] [address] [facility]     Obesity 7/13/2017    Orthostatic hypotension     Prostate cancer (HCC)     Right bundle branch block (RBBB)     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    LIMA to LAD, SVG to OM1, SVG to distal RCA    Skin cancer     Type 2 diabetes mellitus with hyperglycemia, with long-term current use of insulin ([facility] [address] ) 7/3/2017    Vitamin D deficiency 2/19/2018       Surgical History  Past Surgical History:   Procedure Laterality Date    ANKLE ARTHROSCOPY W/ OPEN REPAIR Left     CARDIAC SURGERY      CORONARY ARTERY BYPASS GRAFT  12/11/2013    CABG x3 with LIMA to LAD, SVG to OM-1, SVG to posterior lateral, LYSIS of pericardial adhesions   CT COLONOSCOPY FLX DX W/COLLJ SPEC WHEN PFRMD N/A 8/9/2018    Procedure: COLONOSCOPY;  Surgeon: Maia Erazo MD;  Location: [facility];   Service: Gastroenterology    CT REPAIR UMBILICAL QQSS,4+V/W,BBOTX N/A 3/10/2017    Procedure: REPAIR HERNIA UMBILICAL;  Surgeon: Zenaida Ascencio MD;  Location: [facility];  Service: General    PROSTATECTOMY         Family History  Family History   Problem Relation Age of Onset    Crohn's disease Mother     Liver cancer Mother     Hypertension Mother     Crohn's disease Father     Liver cancer Father     Heart disease Father     Hypertension Father     Hyperlipidemia Father     Colon cancer Brother     Aortic aneurysm Brother         abdominal     Heart disease Brother         abdominal aortic aneurysm    Hypertension Brother     Hyperlipidemia Brother     Colon polyps Family     Stomach cancer Family     Hypertension Sister     Mitral valve prolapse Sister 80        valve replacment        Social History  Social History     Socioeconomic History    Marital status: /Civil Union     Spouse name: Not on file    Number of children: Not on file    Years of education: Not on file    Highest education level: Not on file   Occupational History    Not on file   Social Needs    Financial resource strain: Not on file    Food insecurity:     Worry: Not on file     Inability: Not on file    Transportation needs:     Medical: Not on file     Non-medical: Not on file   Tobacco Use    Smoking status: Former Smoker     Types: Cigarettes    Smokeless tobacco: Never Used    Tobacco comment: quit 1967   Substance and Sexual Activity    Alcohol use: Yes     Comment: rare    Drug use: No    Sexual activity: Never   Lifestyle    Physical activity:     Days per week: Not on file     Minutes per session: Not on file    Stress: Not on file   Relationships    Social connections:     Talks on phone: Not on file     Gets together: Not on file     Attends Latter day service: Not on file     Active member of club or organization: Not on file     Attends meetings of clubs or organizations: Not on file     Relationship status: Not on file    Intimate partner violence:     Fear of current or ex partner: Not on file     Emotionally abused: Not on file     Physically abused: Not on file     Forced sexual activity: Not on file   Other Topics Concern    Not on file   Social History Narrative    Not on file       Current Medications  Current Outpatient Medications   Medication Sig Dispense Refill    ACCU-CHEK FASTCLIX LANCETS MISC Check blood sugar 4 times daily   306 each 1    ascorbic acid (VITAMIN C) 500 mg tablet Take 500 mg by mouth daily      aspirin 325 mg tablet Take 1 tablet by mouth daily      benzonatate (TESSALON PERLES) 100 mg capsule Take 1 capsule (100 mg total) by mouth 3 (three) times a day as needed for cough 20 capsule 0    Blood Glucose Monitoring Suppl (ACCU-CHEK DENYS PLUS) w/Device KIT by Does not apply route 4 (four) times a day 1 kit 0    Calcium Acetate, Phos Binder, (CALCIUM ACETATE PO) Take 630 mg by mouth daily      Cholecalciferol (VITAMIN D3 PO) Take 3,000 Units by mouth daily        clopidogrel (PLAVIX) 75 mg tablet TAKE 1 TABLET DAILY 90 tablet 1    Coenzyme Q10 (CO Q 10) 100 MG CAPS Take 100 mg by mouth daily      Continuous Blood Gluc  (FREESTYLE POLINA 14 DAY READER) DMITRI 1 Device by Does not apply route continuous for 30 days Scan at least 4 times a day to monitor blood glucose levels 3 Device 0    Continuous Blood Gluc Sensor (FREESTYLE POLINA 14 DAY SENSOR) MISC 1 each by Does not apply route continuous 2 each 2    cyanocobalamin (VITAMIN B-12) 500 mcg tablet Take 1 tablet by mouth daily      docusate sodium (COLACE) 100 mg capsule Take 100 mg by mouth 2 (two) times a day   erythromycin (ILOTYCIN) ophthalmic ointment APPLY 1 CM RIBBON TO THE BOTTOM RIGHT LID THREE TIMES DAILY  0    fluticasone (FLONASE) 50 mcg/act nasal spray 2 sprays into each nostril daily 16 g 0    gabapentin (NEURONTIN) 100 mg capsule Take 4 capsules (400 mg total) by mouth 3 (three) times a day Start with 1 capsule TID and increase as dir 360 capsule 3    gabapentin (NEURONTIN) 400 mg capsule Take 1 capsule (400 mg total) by mouth 3 (three) times a day Taken in addition to the 100mg cap 90 capsule 3    glucose blood (ACCU-CHEK DENYS PLUS) test strip 1 each by Other route 4 (four) times a day Use as instructed 200 each 2    glucose blood (ACCU-CHEK GUIDE) test strip Check blood sugar 4 times daily  400 each 1    Insulin Glargine (TOUJEO SOLOSTAR) 300 units/mL CONCETRATED U-300 injection pen Inject 60 Units under the skin daily at bedtime 4 pen 5    Insulin Pen Needle (NOVOFINE AUTOCOVER) 30G X 8 MM MISC The patient test his blood sugars 4 times daily   100 each 4    Insulin Pen Needle (NOVOFINE AUTOCOVER) 30G X 8 MM MISC TEST BLOOD SUGAR 4 TIMES DAILY 100 each 4    LORazepam (ATIVAN) 1 mg tablet Take 1 tablet (1 mg total) by mouth 2 (two) times a day 60 tablet 0    metFORMIN (GLUCOPHAGE) 500 mg tablet TAKE 1 TABLET BY MOUTH TWO TIMES DAILY WITH MEALS 60 tablet 2    Multiple Vitamins-Minerals (CENTRUM SILVER) tablet Take 1 tablet by mouth daily      multivitamin-iron-minerals-folic acid (CENTRUM) chewable tablet Chew 1 tablet daily      nitroglycerin (NITROSTAT) 0 4 mg SL tablet Place 1 tablet (0 4 mg total) under the tongue every 5 (five) minutes as needed for chest pain 90 tablet 3    NOVOLOG FLEXPEN 100 units/mL injection pen FOR DIRECTIONS ON HOW TO   TAKE THIS MEDICINE, READ   THE ENCLOSED MEDICATION    INFORMATION FORM 60 mL 1    pantoprazole (PROTONIX) 40 mg tablet TAKE 1 TABLET TWICE A DAY  30 MINUTES BEFORE BREAKFASTAND DINNER 180 tablet 1    polyethylene glycol (MIRALAX) 17 g packet Take 17 g by mouth daily      pyridostigmine (MESTINON) 60 mg tablet Take 0 5 tablets (30 mg total) by mouth 3 (three) times a day 135 tablet 1    ranolazine (RANEXA) 1000 MG SR tablet Take 1 tablet (1,000 mg total) by mouth 2 (two) times a day 180 tablet 3    rosuvastatin (CRESTOR) 40 MG tablet Take 1 tablet (40 mg total) by mouth daily 90 tablet 3    VIIBRYD 40 MG tablet TAKE 1 TABLET BY MOUTH EVERY DAY 30 tablet 5    vilazodone (VIIBRYD) 40 mg tablet Take 1 tablet (40 mg total) by mouth daily 30 tablet 5     Current Facility-Administered Medications   Medication Dose Route Frequency Provider Last Rate Last Dose    levalbuterol (XOPENEX HFA) inhaler 1 puff  1 puff Inhalation Q6H PRN Krystin Riser, DO           Allergies  Allergies   Allergen Reactions    Adhesive [Medical Tape] Rash    Sulfa Antibiotics Other (See Comments)     Generalized redness       Vitals  There were no vitals filed for this visit      Physical Exam  Physical Exam   Constitutional: He is oriented to person, place, and time  He appears well-developed and well-nourished  HENT:   Head: Normocephalic  Eyes: Pupils are equal, round, and reactive to light  Neck: Normal range of motion  Cardiovascular: Normal rate, regular rhythm, normal heart sounds and intact distal pulses  Pulmonary/Chest: Effort normal and breath sounds normal  No stridor  No respiratory distress  He has no wheezes  He has no rales  He exhibits no tenderness  Abdominal: Soft  Normal appearance and bowel sounds are normal  He exhibits no distension  There is no tenderness  There is no CVA tenderness  Musculoskeletal: Normal range of motion  Neurological: He is alert and oriented to person, place, and time  Skin: Skin is warm and dry  Psychiatric: He has a normal mood and affect   His behavior is normal  Judgment and thought content normal

## 2019-10-18 ENCOUNTER — OFFICE VISIT (OUTPATIENT)
Dept: UROLOGY | Facility: AMBULATORY SURGERY CENTER | Age: 79
End: 2019-10-18
Payer: MEDICARE

## 2019-10-18 ENCOUNTER — OFFICE VISIT (OUTPATIENT)
Dept: LAB | Facility: CLINIC | Age: 79
End: 2019-10-18
Payer: MEDICARE

## 2019-10-18 ENCOUNTER — APPOINTMENT (OUTPATIENT)
Dept: LAB | Facility: CLINIC | Age: 79
End: 2019-10-18
Payer: MEDICARE

## 2019-10-18 VITALS
SYSTOLIC BLOOD PRESSURE: 136 MMHG | DIASTOLIC BLOOD PRESSURE: 84 MMHG | WEIGHT: 221 LBS | HEART RATE: 58 BPM | HEIGHT: 70 IN | BODY MASS INDEX: 31.64 KG/M2

## 2019-10-18 DIAGNOSIS — R32 URINARY INCONTINENCE, UNSPECIFIED TYPE: ICD-10-CM

## 2019-10-18 DIAGNOSIS — R32 URINARY INCONTINENCE, UNSPECIFIED TYPE: Primary | ICD-10-CM

## 2019-10-18 LAB
ANION GAP SERPL CALCULATED.3IONS-SCNC: 8 MMOL/L (ref 4–13)
ATRIAL RATE: 81 BPM
BUN SERPL-MCNC: 29 MG/DL (ref 5–25)
CALCIUM SERPL-MCNC: 8.8 MG/DL (ref 8.3–10.1)
CHLORIDE SERPL-SCNC: 103 MMOL/L (ref 100–108)
CO2 SERPL-SCNC: 28 MMOL/L (ref 21–32)
CREAT SERPL-MCNC: 1.06 MG/DL (ref 0.6–1.3)
GFR SERPL CREATININE-BSD FRML MDRD: 66 ML/MIN/1.73SQ M
GLUCOSE P FAST SERPL-MCNC: 160 MG/DL (ref 65–99)
P AXIS: 49 DEGREES
POTASSIUM SERPL-SCNC: 4.5 MMOL/L (ref 3.5–5.3)
PR INTERVAL: 168 MS
QRS AXIS: -36 DEGREES
QRSD INTERVAL: 154 MS
QT INTERVAL: 450 MS
QTC INTERVAL: 522 MS
SODIUM SERPL-SCNC: 139 MMOL/L (ref 136–145)
T WAVE AXIS: 13 DEGREES
VENTRICULAR RATE: 81 BPM

## 2019-10-18 PROCEDURE — 99213 OFFICE O/P EST LOW 20 MIN: CPT | Performed by: NURSE PRACTITIONER

## 2019-10-18 PROCEDURE — 36415 COLL VENOUS BLD VENIPUNCTURE: CPT

## 2019-10-18 PROCEDURE — 87086 URINE CULTURE/COLONY COUNT: CPT

## 2019-10-18 PROCEDURE — 80048 BASIC METABOLIC PNL TOTAL CA: CPT

## 2019-10-18 PROCEDURE — 93005 ELECTROCARDIOGRAM TRACING: CPT

## 2019-10-18 PROCEDURE — 93010 ELECTROCARDIOGRAM REPORT: CPT | Performed by: INTERNAL MEDICINE

## 2019-10-19 LAB — BACTERIA UR CULT: NORMAL

## 2019-10-21 DIAGNOSIS — E08.00 DIABETES MELLITUS DUE TO UNDERLYING CONDITION WITH HYPEROSMOLARITY WITHOUT COMA, WITHOUT LONG-TERM CURRENT USE OF INSULIN (HCC): ICD-10-CM

## 2019-10-21 RX ORDER — INSULIN GLARGINE 300 U/ML
INJECTION, SOLUTION SUBCUTANEOUS
Qty: 6 ML | Refills: 5 | Status: SHIPPED | OUTPATIENT
Start: 2019-10-21 | End: 2019-11-29 | Stop reason: HOSPADM

## 2019-10-29 DIAGNOSIS — E08.00 DIABETES MELLITUS DUE TO UNDERLYING CONDITION WITH HYPEROSMOLARITY WITHOUT COMA, WITHOUT LONG-TERM CURRENT USE OF INSULIN (HCC): Chronic | ICD-10-CM

## 2019-10-30 NOTE — PRE-PROCEDURE INSTRUCTIONS
Pre-Surgery Instructions:   Medication Instructions    ascorbic acid (VITAMIN C) 500 mg tablet Instructed patient per Anesthesia Guidelines   aspirin 81 MG tablet Patient was instructed by Physician and understands   CALCIUM PO Instructed patient per Anesthesia Guidelines   Cholecalciferol (VITAMIN D3 PO) Instructed patient per Anesthesia Guidelines   clopidogrel (PLAVIX) 75 mg tablet Patient was instructed by Physician and understands   Coenzyme Q10 (CO Q 10) 100 MG CAPS Instructed patient per Anesthesia Guidelines   cyanocobalamin (VITAMIN B-12) 500 mcg tablet Instructed patient per Anesthesia Guidelines   docusate sodium (COLACE) 100 mg capsule Instructed patient per Anesthesia Guidelines   gabapentin (NEURONTIN) 400 mg capsule Instructed patient per Anesthesia Guidelines   LORazepam (ATIVAN) 1 mg tablet Instructed patient per Anesthesia Guidelines   metFORMIN (GLUCOPHAGE) 500 mg tablet Instructed patient per Anesthesia Guidelines   Multiple Vitamins-Minerals (CENTRUM SILVER) tablet Instructed patient per Anesthesia Guidelines   nitroglycerin (NITROSTAT) 0 4 mg SL tablet Instructed patient per Anesthesia Guidelines   NOVOLOG FLEXPEN 100 units/mL injection pen Instructed patient per Anesthesia Guidelines   pantoprazole (PROTONIX) 40 mg tablet Instructed patient per Anesthesia Guidelines   polyethylene glycol (MIRALAX) 17 g packet Instructed patient per Anesthesia Guidelines   pyridostigmine (MESTINON) 60 mg tablet Instructed patient per Anesthesia Guidelines   ranolazine (RANEXA) 1000 MG SR tablet Instructed patient per Anesthesia Guidelines   rosuvastatin (CRESTOR) 40 MG tablet Instructed patient per Anesthesia Guidelines   TOUJEO SOLOSTAR 300 units/mL CONCETRATED U-300 injection pen Instructed patient per Anesthesia Guidelines   VIIBRYD 40 MG tablet Instructed patient per Anesthesia Guidelines      Preop,medications and showering instructions using an antibacterial soap reviewed with patient and wife

## 2019-11-01 DIAGNOSIS — Z91.89 RISK FACTORS FOR OBSTRUCTIVE SLEEP APNEA: Primary | ICD-10-CM

## 2019-11-06 ENCOUNTER — ANESTHESIA EVENT (OUTPATIENT)
Dept: PERIOP | Facility: HOSPITAL | Age: 79
End: 2019-11-06
Payer: MEDICARE

## 2019-11-06 ENCOUNTER — HOSPITAL ENCOUNTER (OUTPATIENT)
Facility: HOSPITAL | Age: 79
Setting detail: OUTPATIENT SURGERY
Discharge: HOME | End: 2019-11-07
Attending: UROLOGY | Admitting: UROLOGY
Payer: MEDICARE

## 2019-11-06 ENCOUNTER — ANESTHESIA (OUTPATIENT)
Dept: PERIOP | Facility: HOSPITAL | Age: 79
End: 2019-11-06
Payer: MEDICARE

## 2019-11-06 DIAGNOSIS — R06.2 WHEEZING: ICD-10-CM

## 2019-11-06 DIAGNOSIS — N39.3 STRESS INCONTINENCE: Primary | ICD-10-CM

## 2019-11-06 LAB
GLUCOSE SERPL-MCNC: 136 MG/DL (ref 65–140)
GLUCOSE SERPL-MCNC: 136 MG/DL (ref 65–140)
GLUCOSE SERPL-MCNC: 143 MG/DL (ref 65–140)
GLUCOSE SERPL-MCNC: 172 MG/DL (ref 65–140)
GLUCOSE SERPL-MCNC: 234 MG/DL (ref 65–140)

## 2019-11-06 PROCEDURE — C1815 PROS, URINARY SPH, IMP: HCPCS | Performed by: UROLOGY

## 2019-11-06 PROCEDURE — 82948 REAGENT STRIP/BLOOD GLUCOSE: CPT

## 2019-11-06 PROCEDURE — 53445 INSERT URO/VES NCK SPHINCTER: CPT | Performed by: UROLOGY

## 2019-11-06 DEVICE — CONTROL PUMP WITH INHIBIZONE
Type: IMPLANTABLE DEVICE | Site: URETHRA | Status: FUNCTIONAL
Brand: AMS 800 ARTIFICIAL URINARY SPHINCTER

## 2019-11-06 RX ORDER — VILAZODONE HYDROCHLORIDE 20 MG/1
40 TABLET ORAL
Status: DISCONTINUED | OUTPATIENT
Start: 2019-11-07 | End: 2019-11-07 | Stop reason: HOSPADM

## 2019-11-06 RX ORDER — ALBUTEROL SULFATE 90 UG/1
1 AEROSOL, METERED RESPIRATORY (INHALATION) EVERY 6 HOURS PRN
Status: DISCONTINUED | OUTPATIENT
Start: 2019-11-06 | End: 2019-11-07 | Stop reason: HOSPADM

## 2019-11-06 RX ORDER — PYRIDOSTIGMINE BROMIDE 60 MG/1
30 TABLET ORAL 3 TIMES DAILY
Status: DISCONTINUED | OUTPATIENT
Start: 2019-11-06 | End: 2019-11-07 | Stop reason: HOSPADM

## 2019-11-06 RX ORDER — DOCUSATE SODIUM 100 MG/1
100 CAPSULE, LIQUID FILLED ORAL 2 TIMES DAILY
Status: DISCONTINUED | OUTPATIENT
Start: 2019-11-06 | End: 2019-11-07 | Stop reason: HOSPADM

## 2019-11-06 RX ORDER — RANOLAZINE 500 MG/1
1000 TABLET, EXTENDED RELEASE ORAL 2 TIMES DAILY
Status: DISCONTINUED | OUTPATIENT
Start: 2019-11-06 | End: 2019-11-07 | Stop reason: HOSPADM

## 2019-11-06 RX ORDER — ONDANSETRON 2 MG/ML
4 INJECTION INTRAMUSCULAR; INTRAVENOUS ONCE AS NEEDED
Status: DISCONTINUED | OUTPATIENT
Start: 2019-11-06 | End: 2019-11-06 | Stop reason: HOSPADM

## 2019-11-06 RX ORDER — SODIUM CHLORIDE, SODIUM LACTATE, POTASSIUM CHLORIDE, CALCIUM CHLORIDE 600; 310; 30; 20 MG/100ML; MG/100ML; MG/100ML; MG/100ML
INJECTION, SOLUTION INTRAVENOUS CONTINUOUS PRN
Status: DISCONTINUED | OUTPATIENT
Start: 2019-11-06 | End: 2019-11-06 | Stop reason: SURG

## 2019-11-06 RX ORDER — LIDOCAINE HYDROCHLORIDE 10 MG/ML
INJECTION, SOLUTION INFILTRATION; PERINEURAL AS NEEDED
Status: DISCONTINUED | OUTPATIENT
Start: 2019-11-06 | End: 2019-11-06 | Stop reason: SURG

## 2019-11-06 RX ORDER — HYDROCODONE BITARTRATE AND ACETAMINOPHEN 5; 325 MG/1; MG/1
2 TABLET ORAL EVERY 4 HOURS PRN
Status: DISCONTINUED | OUTPATIENT
Start: 2019-11-06 | End: 2019-11-07 | Stop reason: HOSPADM

## 2019-11-06 RX ORDER — SODIUM CHLORIDE, SODIUM LACTATE, POTASSIUM CHLORIDE, CALCIUM CHLORIDE 600; 310; 30; 20 MG/100ML; MG/100ML; MG/100ML; MG/100ML
75 INJECTION, SOLUTION INTRAVENOUS CONTINUOUS
Status: DISCONTINUED | OUTPATIENT
Start: 2019-11-06 | End: 2019-11-07

## 2019-11-06 RX ORDER — MAGNESIUM HYDROXIDE 1200 MG/15ML
LIQUID ORAL AS NEEDED
Status: DISCONTINUED | OUTPATIENT
Start: 2019-11-06 | End: 2019-11-06 | Stop reason: HOSPADM

## 2019-11-06 RX ORDER — HYDROCODONE BITARTRATE AND ACETAMINOPHEN 5; 325 MG/1; MG/1
1 TABLET ORAL EVERY 4 HOURS PRN
Status: DISCONTINUED | OUTPATIENT
Start: 2019-11-06 | End: 2019-11-07 | Stop reason: HOSPADM

## 2019-11-06 RX ORDER — ACETAMINOPHEN 325 MG/1
650 TABLET ORAL EVERY 6 HOURS SCHEDULED
Status: DISCONTINUED | OUTPATIENT
Start: 2019-11-06 | End: 2019-11-07 | Stop reason: HOSPADM

## 2019-11-06 RX ORDER — SODIUM CHLORIDE, SODIUM LACTATE, POTASSIUM CHLORIDE, CALCIUM CHLORIDE 600; 310; 30; 20 MG/100ML; MG/100ML; MG/100ML; MG/100ML
50 INJECTION, SOLUTION INTRAVENOUS CONTINUOUS
Status: DISCONTINUED | OUTPATIENT
Start: 2019-11-06 | End: 2019-11-07

## 2019-11-06 RX ORDER — PANTOPRAZOLE SODIUM 40 MG/1
40 TABLET, DELAYED RELEASE ORAL 2 TIMES DAILY
Status: DISCONTINUED | OUTPATIENT
Start: 2019-11-06 | End: 2019-11-07 | Stop reason: HOSPADM

## 2019-11-06 RX ORDER — FENTANYL CITRATE 50 UG/ML
INJECTION, SOLUTION INTRAMUSCULAR; INTRAVENOUS AS NEEDED
Status: DISCONTINUED | OUTPATIENT
Start: 2019-11-06 | End: 2019-11-06 | Stop reason: SURG

## 2019-11-06 RX ORDER — FENTANYL CITRATE/PF 50 MCG/ML
25 SYRINGE (ML) INJECTION
Status: DISCONTINUED | OUTPATIENT
Start: 2019-11-06 | End: 2019-11-06 | Stop reason: HOSPADM

## 2019-11-06 RX ORDER — ONDANSETRON 2 MG/ML
INJECTION INTRAMUSCULAR; INTRAVENOUS AS NEEDED
Status: DISCONTINUED | OUTPATIENT
Start: 2019-11-06 | End: 2019-11-06 | Stop reason: SURG

## 2019-11-06 RX ORDER — CLINDAMYCIN PHOSPHATE 900 MG/50ML
900 INJECTION INTRAVENOUS ONCE
Status: COMPLETED | OUTPATIENT
Start: 2019-11-06 | End: 2019-11-06

## 2019-11-06 RX ORDER — GABAPENTIN 300 MG/1
600 CAPSULE ORAL 3 TIMES DAILY
Status: DISCONTINUED | OUTPATIENT
Start: 2019-11-06 | End: 2019-11-07 | Stop reason: HOSPADM

## 2019-11-06 RX ORDER — METOCLOPRAMIDE HYDROCHLORIDE 5 MG/ML
10 INJECTION INTRAMUSCULAR; INTRAVENOUS ONCE AS NEEDED
Status: DISCONTINUED | OUTPATIENT
Start: 2019-11-06 | End: 2019-11-06 | Stop reason: HOSPADM

## 2019-11-06 RX ORDER — CLINDAMYCIN PHOSPHATE 600 MG/50ML
600 INJECTION INTRAVENOUS EVERY 8 HOURS
Status: COMPLETED | OUTPATIENT
Start: 2019-11-06 | End: 2019-11-07

## 2019-11-06 RX ORDER — HYDROMORPHONE HCL/PF 1 MG/ML
0.2 SYRINGE (ML) INJECTION
Status: DISCONTINUED | OUTPATIENT
Start: 2019-11-06 | End: 2019-11-06 | Stop reason: HOSPADM

## 2019-11-06 RX ORDER — ONDANSETRON 2 MG/ML
4 INJECTION INTRAMUSCULAR; INTRAVENOUS EVERY 6 HOURS PRN
Status: DISCONTINUED | OUTPATIENT
Start: 2019-11-06 | End: 2019-11-07 | Stop reason: HOSPADM

## 2019-11-06 RX ORDER — PROPOFOL 10 MG/ML
INJECTION, EMULSION INTRAVENOUS AS NEEDED
Status: DISCONTINUED | OUTPATIENT
Start: 2019-11-06 | End: 2019-11-06 | Stop reason: SURG

## 2019-11-06 RX ORDER — ATORVASTATIN CALCIUM 40 MG/1
80 TABLET, FILM COATED ORAL
Status: DISCONTINUED | OUTPATIENT
Start: 2019-11-06 | End: 2019-11-07 | Stop reason: HOSPADM

## 2019-11-06 RX ORDER — NITROGLYCERIN 0.4 MG/1
0.4 TABLET SUBLINGUAL
Status: DISCONTINUED | OUTPATIENT
Start: 2019-11-06 | End: 2019-11-07 | Stop reason: HOSPADM

## 2019-11-06 RX ADMIN — INSULIN LISPRO 3 UNITS: 100 INJECTION, SOLUTION INTRAVENOUS; SUBCUTANEOUS at 15:54

## 2019-11-06 RX ADMIN — RANOLAZINE 1000 MG: 500 TABLET, FILM COATED, EXTENDED RELEASE ORAL at 14:43

## 2019-11-06 RX ADMIN — PHENYLEPHRINE HYDROCHLORIDE 100 MCG: 10 INJECTION INTRAVENOUS at 08:55

## 2019-11-06 RX ADMIN — DOCUSATE SODIUM 100 MG: 100 CAPSULE, LIQUID FILLED ORAL at 21:07

## 2019-11-06 RX ADMIN — PHENYLEPHRINE HYDROCHLORIDE 100 MCG: 10 INJECTION INTRAVENOUS at 09:11

## 2019-11-06 RX ADMIN — PANTOPRAZOLE SODIUM 40 MG: 40 TABLET, DELAYED RELEASE ORAL at 21:07

## 2019-11-06 RX ADMIN — SODIUM CHLORIDE, SODIUM LACTATE, POTASSIUM CHLORIDE, AND CALCIUM CHLORIDE: .6; .31; .03; .02 INJECTION, SOLUTION INTRAVENOUS at 08:33

## 2019-11-06 RX ADMIN — ACETAMINOPHEN 650 MG: 325 TABLET, FILM COATED ORAL at 14:42

## 2019-11-06 RX ADMIN — FENTANYL CITRATE 25 MCG: 50 INJECTION INTRAMUSCULAR; INTRAVENOUS at 08:59

## 2019-11-06 RX ADMIN — HYDROCODONE BITARTRATE AND ACETAMINOPHEN 2 TABLET: 5; 325 TABLET ORAL at 12:38

## 2019-11-06 RX ADMIN — DOCUSATE SODIUM 100 MG: 100 CAPSULE, LIQUID FILLED ORAL at 14:43

## 2019-11-06 RX ADMIN — ACETAMINOPHEN 650 MG: 325 TABLET, FILM COATED ORAL at 21:08

## 2019-11-06 RX ADMIN — ATORVASTATIN CALCIUM 80 MG: 40 TABLET, FILM COATED ORAL at 15:53

## 2019-11-06 RX ADMIN — INSULIN LISPRO 1 UNITS: 100 INJECTION, SOLUTION INTRAVENOUS; SUBCUTANEOUS at 13:09

## 2019-11-06 RX ADMIN — RANOLAZINE 1000 MG: 500 TABLET, FILM COATED, EXTENDED RELEASE ORAL at 21:08

## 2019-11-06 RX ADMIN — LIDOCAINE HYDROCHLORIDE 50 MG: 10 INJECTION, SOLUTION INFILTRATION; PERINEURAL at 08:47

## 2019-11-06 RX ADMIN — GABAPENTIN 600 MG: 300 CAPSULE ORAL at 21:08

## 2019-11-06 RX ADMIN — SODIUM CHLORIDE, SODIUM LACTATE, POTASSIUM CHLORIDE, AND CALCIUM CHLORIDE 75 ML/HR: .6; .31; .03; .02 INJECTION, SOLUTION INTRAVENOUS at 14:14

## 2019-11-06 RX ADMIN — AZTREONAM 2000 MG: 2 INJECTION, POWDER, LYOPHILIZED, FOR SOLUTION INTRAMUSCULAR; INTRAVENOUS at 18:17

## 2019-11-06 RX ADMIN — AZTREONAM 2000 MG: 2 INJECTION, POWDER, LYOPHILIZED, FOR SOLUTION INTRAMUSCULAR; INTRAVENOUS at 08:33

## 2019-11-06 RX ADMIN — ONDANSETRON 4 MG: 2 INJECTION INTRAMUSCULAR; INTRAVENOUS at 10:07

## 2019-11-06 RX ADMIN — PROPOFOL 120 MG: 10 INJECTION, EMULSION INTRAVENOUS at 08:47

## 2019-11-06 RX ADMIN — FENTANYL CITRATE 25 MCG: 50 INJECTION INTRAMUSCULAR; INTRAVENOUS at 10:04

## 2019-11-06 RX ADMIN — CLINDAMYCIN PHOSPHATE 600 MG: 600 INJECTION, SOLUTION INTRAVENOUS at 15:47

## 2019-11-06 RX ADMIN — PHENYLEPHRINE HYDROCHLORIDE 50 MCG/MIN: 10 INJECTION INTRAVENOUS at 08:55

## 2019-11-06 RX ADMIN — PHENYLEPHRINE HYDROCHLORIDE 100 MCG: 10 INJECTION INTRAVENOUS at 09:05

## 2019-11-06 RX ADMIN — METFORMIN HYDROCHLORIDE 500 MG: 500 TABLET, FILM COATED ORAL at 15:53

## 2019-11-06 RX ADMIN — PHENYLEPHRINE HYDROCHLORIDE 100 MCG: 10 INJECTION INTRAVENOUS at 09:02

## 2019-11-06 RX ADMIN — PANTOPRAZOLE SODIUM 40 MG: 40 TABLET, DELAYED RELEASE ORAL at 14:44

## 2019-11-06 RX ADMIN — GABAPENTIN 600 MG: 300 CAPSULE ORAL at 14:43

## 2019-11-06 RX ADMIN — PYRIDOSTIGMINE BROMIDE 30 MG: 60 TABLET ORAL at 21:10

## 2019-11-06 RX ADMIN — CLINDAMYCIN PHOSPHATE 900 MG: 18 INJECTION, SOLUTION INTRAMUSCULAR; INTRAVENOUS at 08:38

## 2019-11-06 RX ADMIN — PHENYLEPHRINE HYDROCHLORIDE 100 MCG: 10 INJECTION INTRAVENOUS at 08:50

## 2019-11-06 RX ADMIN — PHENYLEPHRINE HYDROCHLORIDE 200 MCG: 10 INJECTION INTRAVENOUS at 08:47

## 2019-11-06 RX ADMIN — SODIUM CHLORIDE, SODIUM LACTATE, POTASSIUM CHLORIDE, AND CALCIUM CHLORIDE 75 ML/HR: .6; .31; .03; .02 INJECTION, SOLUTION INTRAVENOUS at 15:50

## 2019-11-06 NOTE — ANESTHESIA POSTPROCEDURE EVALUATION
Post-Op Assessment Note    CV Status:  Stable    Pain management: adequate     Mental Status:  Alert and awake   Hydration Status:  Euvolemic   PONV Controlled:  Controlled   Airway Patency:  Patent   Post Op Vitals Reviewed: Yes      Staff: CRNA           BP   134/62   Temp   98   Pulse  80   Resp 18   SpO2   100

## 2019-11-06 NOTE — PLAN OF CARE
Problem: PAIN - ADULT  Goal: Verbalizes/displays adequate comfort level or baseline comfort level  Description  Interventions:  - Encourage patient to monitor pain and request assistance  - Assess pain using appropriate pain scale  - Administer analgesics based on type and severity of pain and evaluate response  - Implement non-pharmacological measures as appropriate and evaluate response  - Consider cultural and social influences on pain and pain management  - Notify physician/advanced practitioner if interventions unsuccessful or patient reports new pain  Outcome: Progressing     Problem: DISCHARGE PLANNING  Goal: Discharge to home or other facility with appropriate resources  Description  INTERVENTIONS:  - Identify barriers to discharge w/patient and caregiver  - Arrange for needed discharge resources and transportation as appropriate  - Identify discharge learning needs (meds, wound care, etc )  - Arrange for interpretive services to assist at discharge as needed  - Refer to Case Management Department for coordinating discharge planning if the patient needs post-hospital services based on physician/advanced practitioner order or complex needs related to functional status, cognitive ability, or social support system  Outcome: Progressing     Problem: Knowledge Deficit  Goal: Patient/family/caregiver demonstrates understanding of disease process, treatment plan, medications, and discharge instructions  Description  Complete learning assessment and assess knowledge base    Interventions:  - Provide teaching at level of understanding  - Provide teaching via preferred learning methods  Outcome: Progressing     Problem: GENITOURINARY - ADULT  Goal: Maintains or returns to baseline urinary function  Description  INTERVENTIONS:  - Assess urinary function  - Encourage oral fluids to ensure adequate hydration if ordered  - Administer IV fluids as ordered to ensure adequate hydration  - Administer ordered medications as needed  - Offer frequent toileting  - Follow urinary retention protocol if ordered  Outcome: Progressing

## 2019-11-06 NOTE — OP NOTE
OPERATIVE REPORT  PATIENT NAME: Rajan Bal    :  1940  MRN: [id number]  Pt Location: AN OR ROOM 02    SURGERY DATE: 2019    Surgeon(s) and Role:     Lori Concepcion MD - Primary    Preop Diagnosis:  Stress incontinence [N39 3]    Post-Op Diagnosis Codes:     * Stress incontinence [N39 3]    Procedure(s) (LRB):  URINARY SPHINCTEROPLASTY (N/A)    Specimen(s):  * No specimens in log *    Estimated Blood Loss:   Minimal    Drains:  Urethral Catheter Latex 12 Fr  (Active)   Number of days: 0       [REMOVED] Urethral Catheter Latex 24 Fr  (Removed)   Number of days: 0       Anesthesia Type:   General    Operative Indications:  Stress incontinence [N39 3]      Operative Findings:  4 cm cuff placed  Complications:   None    Procedure and Technique:  After the patient was correctly identified and appropriately consented, he was taken to the operative suite and placed in the supine position  General anesthesia was administered by the anesthesia team  The patient was repositioned in dorsal lithotomy with feet in stirrups then clipped, prepped, and draped in the usual sterile fashion  All pressure points were well padded and the patient received a dose of perioperative antibiotics  The operative team paused for a preoperative time-out  The prep was allowed to dry to completion before starting the case  A midline perineal incision was made and was carried down through subcutaneous tissue as well as Colles fascia  The bulbospongiosus muscle was identified  Bulbospongiosi's muscle was then divided sharply and the underlying bulbous urethra was noted  The bulbous urethra was then cleared of its overlying tissue  The retractors were then replaced  Martínez's fascia was incised on either side of the bulbous urethra and using a right-angle clamp, we were able to place umbilical tape circumferentially around the urethra   The urethra was then mobilized proximally and distally for approximately 1 cm in order to allow for a cuff placement  The urethra was then measured using the umbilical tape  The urethra measured 4 cm and a 4 cm AUS cuff was chosen  The cuff was prepped in the back field  Next, I turned attention to the patient's abdomen  A right lower abdominal incision over the inguinal ligament was made  This was carried down through the subcutaneous tissue  Becca's fascia was identified and was incised  Dissection continued down until we reached the rectus fascia  The rectus fascia was incised  This exposed the preperitoneal fat beneath it  2 separate stay sutures of 2-0 Vicryl were placed and a 61-70 cm pressure balloon was then inserted into the preperitoneal space  It was filled with 25 mL of normal saline  Starting at the at the abdominal incision, I used a ring forcep tunnel underneath Becca's fascia, into the patient's right hemiscrotum  The pump was then placed into this position and held in place with a [place] clamp  I then went back to the perineal incision where after irrigation with antibiotic irrigation, our cuff was placed  The fit was noted to be excellent  All tubing was then tunneled into the patient's inguinal incision and connections were made using quick connect devices  Flexible cystoscopy performed that demonstrated excellent location with good coaptation of the cuff  There was a wide aperture through which the patient could void with the cuff deactivated  As such, the device was left in the deactivated position  The perineal incision was then again copiously irrigated with antibiotic laced irrigation  It was closed in several layers  The abdominal incision was also closed in several layers and sterile dressings were applied  A 12 [place] Hale was placed at the end of the case       I was present for the entire procedure    Patient Disposition:  PACU     SIGNATURE: Saeed Carcamo MD  DATE: November 6, 2019  TIME: 10:52 AM

## 2019-11-06 NOTE — ANESTHESIA PREPROCEDURE EVALUATION
Review of Systems/Medical History  Patient summary reviewed  Chart reviewed  No history of anesthetic complications     Cardiovascular  Exercise tolerance (METS): >4,  Hyperlipidemia, Hypertension on > 1 medication, Past MI , CAD , History of CABG, Dysrhythmias ,    Pulmonary  Smoker ex-smoker  ,   Comment: 1967 - quit smoking     GI/Hepatic  Negative GI/hepatic ROS   GERD ,        Negative  ROS        Endo/Other  Diabetes poorly controlled type 2 Insulin, History of thyroid disease , hypothyroidism,   Obesity    GYN  Negative gynecology ROS          Hematology  Negative hematology ROS      Musculoskeletal  Negative musculoskeletal ROS        Neurology  Negative neurology ROS      Psychology   Anxiety,              Physical Exam    Airway    Mallampati score: II  TM Distance: >3 FB  Neck ROM: full     Dental   No notable dental hx     Cardiovascular      Pulmonary      Other Findings        Anesthesia Plan  ASA Score- 3     Anesthesia Type-   Additional Monitors:   Airway Plan: LMA  Comment: General anesthesia, LMA; standard ASA monitors  Risks and benefits discussed with patient; patient consented and agrees to proceed  I saw and evaluated the patient  If seen with CRNA, we have discussed the anesthetic plan and I am in agreement that the plan is appropriate for the patient  Pt had to take NTG last week, this resolved his CP; he is s/p CABG 6y ago; seen by cardiology, I agree moderate risk and optimized - I discussed this increased risk with the patient and his wife - we will attempt to optimize myocardial O2 supply/demand - plan for LMA GA, phenylephrine gtt and boluses as needed for elevated afterload and reflexive drop in HR; EF low normal, RV ok, not concerning valve issues  Plan Factors- Patient instructed to abstain from smoking on day of procedure  Patient did not smoke on day of surgery      Induction-     Postoperative Plan-     Informed Consent- Anesthetic plan and risks discussed with patient  I personally reviewed this patient with the CRNA  Discussed and agreed on the Anesthesia Plan with the CRNA  Eva Cohen

## 2019-11-06 NOTE — INTERVAL H&P NOTE
H&P reviewed  After examining the patient I find no changes in the patients condition since the H&P had been written  Plan for AUS placement today  There were no vitals filed for this visit

## 2019-11-07 ENCOUNTER — TELEPHONE (OUTPATIENT)
Dept: UROLOGY | Facility: AMBULATORY SURGERY CENTER | Age: 79
End: 2019-11-07

## 2019-11-07 VITALS
DIASTOLIC BLOOD PRESSURE: 67 MMHG | HEIGHT: 70 IN | BODY MASS INDEX: 32.98 KG/M2 | SYSTOLIC BLOOD PRESSURE: 140 MMHG | HEART RATE: 84 BPM | WEIGHT: 230.38 LBS | RESPIRATION RATE: 20 BRPM | OXYGEN SATURATION: 95 % | TEMPERATURE: 98.3 F

## 2019-11-07 LAB
GLUCOSE SERPL-MCNC: 186 MG/DL (ref 65–140)
GLUCOSE SERPL-MCNC: 194 MG/DL (ref 65–140)

## 2019-11-07 PROCEDURE — 82948 REAGENT STRIP/BLOOD GLUCOSE: CPT

## 2019-11-07 PROCEDURE — 99024 POSTOP FOLLOW-UP VISIT: CPT | Performed by: NURSE PRACTITIONER

## 2019-11-07 RX ORDER — CEPHALEXIN 500 MG/1
500 CAPSULE ORAL EVERY 12 HOURS SCHEDULED
Qty: 14 CAPSULE | Refills: 0 | Status: SHIPPED | OUTPATIENT
Start: 2019-11-07 | End: 2019-11-14

## 2019-11-07 RX ORDER — HYDROCODONE BITARTRATE AND ACETAMINOPHEN 5; 325 MG/1; MG/1
1 TABLET ORAL EVERY 6 HOURS PRN
Qty: 40 TABLET | Refills: 0 | Status: SHIPPED | OUTPATIENT
Start: 2019-11-07 | End: 2019-11-17

## 2019-11-07 RX ADMIN — METFORMIN HYDROCHLORIDE 500 MG: 500 TABLET, FILM COATED ORAL at 08:07

## 2019-11-07 RX ADMIN — ACETAMINOPHEN 650 MG: 325 TABLET, FILM COATED ORAL at 05:00

## 2019-11-07 RX ADMIN — SODIUM CHLORIDE, SODIUM LACTATE, POTASSIUM CHLORIDE, AND CALCIUM CHLORIDE 75 ML/HR: .6; .31; .03; .02 INJECTION, SOLUTION INTRAVENOUS at 07:37

## 2019-11-07 RX ADMIN — INSULIN LISPRO 2 UNITS: 100 INJECTION, SOLUTION INTRAVENOUS; SUBCUTANEOUS at 08:08

## 2019-11-07 RX ADMIN — ACETAMINOPHEN 650 MG: 325 TABLET, FILM COATED ORAL at 11:29

## 2019-11-07 RX ADMIN — PANTOPRAZOLE SODIUM 40 MG: 40 TABLET, DELAYED RELEASE ORAL at 08:07

## 2019-11-07 RX ADMIN — DOCUSATE SODIUM 100 MG: 100 CAPSULE, LIQUID FILLED ORAL at 08:07

## 2019-11-07 RX ADMIN — VILAZODONE HYDROCHLORIDE 40 MG: 20 TABLET ORAL at 09:40

## 2019-11-07 RX ADMIN — INSULIN LISPRO 1 UNITS: 100 INJECTION, SOLUTION INTRAVENOUS; SUBCUTANEOUS at 11:30

## 2019-11-07 RX ADMIN — PYRIDOSTIGMINE BROMIDE 30 MG: 60 TABLET ORAL at 08:07

## 2019-11-07 RX ADMIN — CLINDAMYCIN PHOSPHATE 600 MG: 600 INJECTION, SOLUTION INTRAVENOUS at 00:19

## 2019-11-07 RX ADMIN — AZTREONAM 2000 MG: 2 INJECTION, POWDER, LYOPHILIZED, FOR SOLUTION INTRAMUSCULAR; INTRAVENOUS at 01:47

## 2019-11-07 RX ADMIN — RANOLAZINE 1000 MG: 500 TABLET, FILM COATED, EXTENDED RELEASE ORAL at 08:07

## 2019-11-07 RX ADMIN — GABAPENTIN 600 MG: 300 CAPSULE ORAL at 08:07

## 2019-11-07 NOTE — TREATMENT PLAN
Earline Lozoya is an 61-year-old male with history of prostate cancer, prostatectomy and resultant gross urinary incontinence not amenable to multiple lines of pharmacologic and non pharmacologic treatment modalities  He is now postoperative day 1 urinary sphincter of plasty  He remains afebrile, hemodynamically stable and remarkably pain controlled  Plan for this a m :  Patient is admitted for overnight observation  DC IV fluids  Administer regular breakfast and lunch  Provide extra Kerlix for fluff and scrotal supporter  Hale catheter removed personally  Patient will be incontinent  Please provide pads as well  Ambulate hallway once de -lined  Discharge at noon  Full assessment to follow with discharge summary

## 2019-11-07 NOTE — TELEPHONE ENCOUNTER
Post Op Note    Dimas Soliman is a [de-identified] y o  male s/p urinary sphincteroplasty performed 11/6/2019  Dimas Soliman is a patient of Dr Esequiel Anthony and is seen at the [facility] office  Patient not yet discharged, will call tomorrow to review post op status

## 2019-11-07 NOTE — PLAN OF CARE
Problem: PAIN - ADULT  Goal: Verbalizes/displays adequate comfort level or baseline comfort level  Description  Interventions:  - Encourage patient to monitor pain and request assistance  - Assess pain using appropriate pain scale  - Administer analgesics based on type and severity of pain and evaluate response  - Implement non-pharmacological measures as appropriate and evaluate response  - Consider cultural and social influences on pain and pain management  - Notify physician/advanced practitioner if interventions unsuccessful or patient reports new pain  Outcome: Progressing     Problem: DISCHARGE PLANNING  Goal: Discharge to home or other facility with appropriate resources  Description  INTERVENTIONS:  - Identify barriers to discharge w/patient and caregiver  - Arrange for needed discharge resources and transportation as appropriate  - Identify discharge learning needs (meds, wound care, etc )  - Arrange for interpretive services to assist at discharge as needed  - Refer to Case Management Department for coordinating discharge planning if the patient needs post-hospital services based on physician/advanced practitioner order or complex needs related to functional status, cognitive ability, or social support system  Outcome: Progressing     Problem: Knowledge Deficit  Goal: Patient/family/caregiver demonstrates understanding of disease process, treatment plan, medications, and discharge instructions  Description  Complete learning assessment and assess knowledge base    Interventions:  - Provide teaching at level of understanding  - Provide teaching via preferred learning methods  Outcome: Progressing     Problem: GENITOURINARY - ADULT  Goal: Maintains or returns to baseline urinary function  Description  INTERVENTIONS:  - Assess urinary function  - Encourage oral fluids to ensure adequate hydration if ordered  - Administer IV fluids as ordered to ensure adequate hydration  - Administer ordered medications as needed  - Offer frequent toileting  - Follow urinary retention protocol if ordered  Outcome: Progressing     Problem: Potential for Falls  Goal: Patient will remain free of falls  Description  INTERVENTIONS:  - Assess patient frequently for physical needs  -  Identify cognitive and physical deficits and behaviors that affect risk of falls    -  [place] fall precautions as indicated by assessment   - Educate patient/family on patient safety including physical limitations  - Instruct patient to call for assistance with activity based on assessment  - Modify environment to reduce risk of injury  - Consider OT/PT consult to assist with strengthening/mobility  Outcome: Progressing

## 2019-11-07 NOTE — DISCHARGE INSTRUCTIONS
Urinary Incontinence   WHAT YOU NEED TO KNOW:   What is urinary incontinence? Urinary incontinence (UI) is when you lose control of your bladder  What causes UI? UI occurs because your bladder cannot store or empty urine properly  The following are the most common types of UI:  · Stress incontinence  is when you leak urine due to increased bladder pressure  This may happen when you cough, sneeze, or exercise  · Urge incontinence  is when you feel the need to urinate right away and leak urine accidentally  · Mixed incontinence  is when you have both stress and urge UI  What are the signs and symptoms of UI?   · You feel like your bladder does not empty completely when you urinate  · You urinate often and need to urinate immediately  · You leak urine when you sleep, or you wake up with the urge to urinate  · You leak urine when you cough, sneeze, exercise, or laugh  How is UI diagnosed? Your healthcare provider will ask how often you leak urine and whether you have stress or urge symptoms  Tell him which medicines you take, how often you urinate, and how much liquid you drink each day  You may need any of the following tests:  · Urine tests  may show infection or kidney function  · A pelvic exam  may be done to check for blockages  A pelvic exam will also show if your bladder, uterus, or other organs have moved out of place  · An x-ray, ultrasound, or CT  may show problems with parts of your urinary system  You may be given contrast liquid to help your organs show up better in the pictures  Tell the healthcare provider if you have ever had an allergic reaction to contrast liquid  Do not enter the MRI room with anything metal  Metal can cause serious injury  Tell the healthcare provider if you have any metal in or on your body  · A bladder scan  will show how much urine is left in your bladder after you urinate   You will be asked to urinate and then healthcare providers will use a small ultrasound machine to check the urine left in your bladder  · Cystometry  is used to check the function of your urinary system  Your healthcare provider checks the pressure in your bladder while filling it with fluid  Your bladder pressure may also be tested when your bladder is full and while you urinate  How is UI treated? · Medicines  can help strengthen your bladder control  · Electrical stimulation  is used to send a small amount of electrical energy to your pelvic floor muscles  This helps control your bladder function  Electrodes may be placed outside your body or in your rectum  For women, the electrodes may be placed in the vagina  · A bulking agent  may be injected into the wall of your urethra to make it thicker  This helps keep your urethra closed and decreases urine leakage  · Devices  such as a clamp, pessary, or tampon may help stop urine leaks  Ask your healthcare provider for more information about these and other devices  · Surgery  may be needed if other treatments do not work  Several types of surgery can help improve your bladder control  Ask your healthcare provider for more information about the surgery you may need  How can I manage my symptoms? · Do pelvic muscle exercises often  Your pelvic muscles help you stop urinating  Squeeze these muscles tight for 5 seconds, then relax for 5 seconds  Gradually work up to squeezing for 10 seconds  Do 3 sets of 15 repetitions a day, or as directed  This will help strengthen your pelvic muscles and improve bladder control  · A catheter  may be used to help empty your bladder  A catheter is a tiny, plastic tube that is put into your bladder to drain your urine  Your healthcare provider may tell you to use a catheter to prevent your bladder from getting too full and leaking urine  · Keep a UI record  Write down how often you leak urine and how much you leak   Make a note of what you were doing when you leaked urine     · Train your bladder  Go to the bathroom at set times, such as every 2 hours, even if you do not feel the urge to go  You can also try to hold your urine when you feel the urge to go  For example, hold your urine for 5 minutes when you feel the urge to go  As that becomes easier, hold your urine for 10 minutes  · Drink liquids as directed  Ask your healthcare provider how much liquid to drink each day and which liquids are best for you  You may need to limit the amount of liquid you drink to help control your urine leakage  Limit or do not have drinks that contain caffeine or alcohol  Do not drink any liquid right before you go to bed  · Prevent constipation  Eat a variety of high-fiber foods  Good examples are high-fiber cereals, beans, vegetables, and whole-grain breads  Prune juice may help make your bowel movement softer  Walking is the best way to trigger your intestines to have a bowel movement  · Exercise regularly and maintain a healthy weight  Ask your healthcare provider how much you should weigh and about the best exercise plan for you  Weight loss and exercise will decrease pressure on your bladder and help you control your leakage  Ask him to help you create a weight loss plan if you are overweight  When should I seek immediate care? · You have severe pain  · You are confused or cannot think clearly  When should I contact my healthcare provider? · You have a fever  · You see blood in your urine  · You have pain when you urinate  · You have new or worse pain, even after treatment  · Your mouth feels dry or you have vision changes  · Your urine is cloudy or smells bad  · You have questions or concerns about your condition or care  CARE AGREEMENT:   You have the right to help plan your care  Learn about your health condition and how it may be treated  Discuss treatment options with your caregivers to decide what care you want to receive   You always have the right to refuse treatment  The above information is an  only  It is not intended as medical advice for individual conditions or treatments  Talk to your doctor, nurse or pharmacist before following any medical regimen to see if it is safe and effective for you  © 2017 [facility] Information is for End User's use only and may not be sold, redistributed or otherwise used for commercial purposes  All illustrations and images included in CareNotes® are the copyrighted property of A D A M , Inc  or [employer]

## 2019-11-07 NOTE — PLAN OF CARE
Problem: PAIN - ADULT  Goal: Verbalizes/displays adequate comfort level or baseline comfort level  Description  Interventions:  - Encourage patient to monitor pain and request assistance  - Assess pain using appropriate pain scale  - Administer analgesics based on type and severity of pain and evaluate response  - Implement non-pharmacological measures as appropriate and evaluate response  - Consider cultural and social influences on pain and pain management  - Notify physician/advanced practitioner if interventions unsuccessful or patient reports new pain  Outcome: Progressing     Problem: DISCHARGE PLANNING  Goal: Discharge to home or other facility with appropriate resources  Description  INTERVENTIONS:  - Identify barriers to discharge w/patient and caregiver  - Arrange for needed discharge resources and transportation as appropriate  - Identify discharge learning needs (meds, wound care, etc )  - Arrange for interpretive services to assist at discharge as needed  - Refer to Case Management Department for coordinating discharge planning if the patient needs post-hospital services based on physician/advanced practitioner order or complex needs related to functional status, cognitive ability, or social support system  Outcome: Progressing     Problem: Knowledge Deficit  Goal: Patient/family/caregiver demonstrates understanding of disease process, treatment plan, medications, and discharge instructions  Description  Complete learning assessment and assess knowledge base    Interventions:  - Provide teaching at level of understanding  - Provide teaching via preferred learning methods  Outcome: Progressing     Problem: GENITOURINARY - ADULT  Goal: Maintains or returns to baseline urinary function  Description  INTERVENTIONS:  - Assess urinary function  - Encourage oral fluids to ensure adequate hydration if ordered  - Administer IV fluids as ordered to ensure adequate hydration  - Administer ordered medications as needed  - Offer frequent toileting  - Follow urinary retention protocol if ordered  Outcome: Progressing     Problem: Potential for Falls  Goal: Patient will remain free of falls  Description  INTERVENTIONS:  - Assess patient frequently for physical needs  -  Identify cognitive and physical deficits and behaviors that affect risk of falls    -  [place] fall precautions as indicated by assessment   - Educate patient/family on patient safety including physical limitations  - Instruct patient to call for assistance with activity based on assessment  - Modify environment to reduce risk of injury  - Consider OT/PT consult to assist with strengthening/mobility  Outcome: Progressing

## 2019-11-07 NOTE — DISCHARGE SUMMARY
DISCHARGE SUMMARY    Patient Name: Earline Lozoya  Patient MRN: [id number]  Admitting Provider: Franny Johnson MD  Discharging Provider: Franny Johnson MD  Primary Care Physician at Discharge: Karol Galvez [place] [phone]   Admission Date: 11/6/2019       Discharge Date: 11/07/19      Admission Diagnosis   Stress incontinence [N39 3]    Discharge Diagnoses  Patient Active Problem List   Diagnosis    Non-STEMI (non-ST elevated myocardial infarction) ([place] [address] )    Type 2 diabetes mellitus with hyperglycemia, with long-term current use of insulin ([place] [address] )    S/P CABG x 3    Benign essential hypertension    Obesity    Vitamin D deficiency    Subclinical iodine-deficiency hypothyroidism    Aneurysm of infrarenal abdominal aorta ([place] [address] )    Coronary artery disease involving native coronary artery of native heart without angina pectoris    Dyslipidemia    Right bundle-branch block    CKD (chronic kidney disease) stage 2, GFR 60-89 ml/min    Change in bowel habits    History of colon polyps    Anxiety    Type 2 diabetes mellitus with microalbuminuria, with long-term current use of insulin (HCC)    Skin lesion of face    Tinea pedis of both feet    Orthostatic hypotension    Essential tremor    Malignant neoplasm of prostate (HCC)    Weakness    Chest congestion    Viral upper respiratory tract infection    Medicare annual wellness visit, subsequent    Encounter for hepatitis C screening test for low risk patient    Urinary incontinence    Screening PSA (prostate specific antigen)    Nail abnormality    Postural hypotension    Tremor    Fall    Stress incontinence         Hospital Course  Patient known to group for severe urinary incontinence, unmitigated by multiple lines of pharmacologic non pharmacologic treatment modalities    After explanation of Risks vs  Benefits and potential complications; patient was agreeable and provide informed consent for implantation of artificial urinary sphincter  Refer to operative report for details  There were no complications  Patient was admitted for overnight observation  He remained afebrile, hemodynamically stable and pain controlled throughout hospital stay  Incisions clean dry and intact  Hale catheter was removed postoperative day 1  Patient was voiding spontaneously with baseline urinary incontinence as expected  Device remain unactivated  Patient verbalized understanding of all instructions to include:  Medications, diet, activity restrictions/limitations, device information/base 6, signs and symptoms to report and follow-up  Patient will be seen in 2 weeks to activate device and begin hands on teaching        Medications    Current Discharge Medication List      CONTINUE these medications which have NOT CHANGED    Details   ascorbic acid (VITAMIN C) 500 mg tablet Take 500 mg by mouth daily      aspirin 81 MG tablet Take 1 tablet by mouth daily       CALCIUM PO Take 1 capsule by mouth daily      Cholecalciferol (VITAMIN D3 PO) Take 3,000 Units by mouth daily        clopidogrel (PLAVIX) 75 mg tablet TAKE 1 TABLET DAILY  Qty: 90 tablet, Refills: 1    Associated Diagnoses: Non-STEMI (non-ST elevated myocardial infarction) (HCC)      Coenzyme Q10 (CO Q 10) 100 MG CAPS Take 100 mg by mouth daily      cyanocobalamin (VITAMIN B-12) 500 mcg tablet Take 1 tablet by mouth daily      docusate sodium (COLACE) 100 mg capsule Take 100 mg by mouth 2 (two) times a day       gabapentin (NEURONTIN) 400 mg capsule Take 1 capsule (400 mg total) by mouth 3 (three) times a day Taken in addition to the 100mg cap  Qty: 90 capsule, Refills: 3    Associated Diagnoses: Tremor      LORazepam (ATIVAN) 1 mg tablet Take 1 tablet (1 mg total) by mouth 2 (two) times a day  Qty: 60 tablet, Refills: 0    Associated Diagnoses: Anxiety      Multiple Vitamins-Minerals (CENTRUM SILVER) tablet Take 1 tablet by mouth daily      nitroglycerin (NITROSTAT) 0 4 mg SL tablet Place 1 tablet (0 4 mg total) under the tongue every 5 (five) minutes as needed for chest pain  Qty: 90 tablet, Refills: 3    Associated Diagnoses: Coronary artery disease involving native coronary artery of native heart without angina pectoris      NOVOLOG FLEXPEN 100 units/mL injection pen FOR DIRECTIONS ON HOW TO   TAKE THIS MEDICINE, READ   THE ENCLOSED MEDICATION    INFORMATION FORM  Qty: 60 mL, Refills: 1    Comments: INJECT 20 UNITS BEFORE MEALS (INSULIN CARBOHYDRATE RATIO 1:4, INSULINSENSITIVITY FACTOR 1:15)  Associated Diagnoses: Diabetes 1 5, managed as type 2 ([facility])      pantoprazole (PROTONIX) 40 mg tablet TAKE 1 TABLET TWICE A DAY  30 MINUTES BEFORE BREAKFASTAND DINNER  Qty: 180 tablet, Refills: 1    Associated Diagnoses: Gastroesophageal reflux disease without esophagitis      pyridostigmine (MESTINON) 60 mg tablet Take 0 5 tablets (30 mg total) by mouth 3 (three) times a day  Qty: 135 tablet, Refills: 1    Associated Diagnoses: Orthostatic hypotension      ranolazine (RANEXA) 1000 MG SR tablet Take 1 tablet (1,000 mg total) by mouth 2 (two) times a day  Qty: 180 tablet, Refills: 3    Associated Diagnoses: Non-STEMI (non-ST elevated myocardial infarction) ([facility])      rosuvastatin (CRESTOR) 40 MG tablet Take 1 tablet (40 mg total) by mouth daily  Qty: 90 tablet, Refills: 3    Associated Diagnoses: Pure hypercholesterolemia      TOUJEO SOLOSTAR 300 units/mL CONCETRATED U-300 injection pen INJECT 60 UNITS SUBCUTANEOUSLY (UNDER THE SKIN) AT BEDTIME  Qty: 6 mL, Refills: 5    Associated Diagnoses: Diabetes mellitus due to underlying condition with hyperosmolarity without coma, without long-term current use of insulin ([facility])      VIIBRYD 40 MG tablet TAKE 1 TABLET BY MOUTH EVERY DAY  Qty: 30 tablet, Refills: 5    Associated Diagnoses: Anxiety      ACCU-CHEK FASTCLIX LANCETS MISC Check blood sugar 4 times daily    Qty: 306 each, Refills: 1    Associated Diagnoses: Type 2 diabetes mellitus with complication, with long-term current use of insulin ([facility])      Blood Glucose Monitoring Suppl (ACCU-CHEK DENYS PLUS) w/Device KIT by Does not apply route 4 (four) times a day  Qty: 1 kit, Refills: 0    Comments: DX: E11 8  Associated Diagnoses: Type 2 diabetes mellitus with complication, with long-term current use of insulin ([facility])      Continuous Blood Gluc Sensor (FREESTYLE POLINA 14 DAY SENSOR) MISC 1 each by Does not apply route continuous  Qty: 2 each, Refills: 2    Associated Diagnoses: Type 2 diabetes mellitus with hyperglycemia, with long-term current use of insulin ([address] )      ! ! glucose blood (ACCU-CHEK DENYS PLUS) test strip 1 each by Other route 4 (four) times a day Use as instructed  Qty: 200 each, Refills: 2    Associated Diagnoses: Type 2 diabetes mellitus with complication, with long-term current use of insulin ([address] )      ! ! glucose blood (ACCU-CHEK GUIDE) test strip Check blood sugar 4 times daily  Qty: 400 each, Refills: 1    Comments: Dx:  E11 65  Accu-Chek Denys Test strips  Associated Diagnoses: Uncontrolled type 2 diabetes mellitus with hyperglycemia ([address] )      ! ! Insulin Pen Needle (NOVOFINE AUTOCOVER) 30G X 8 MM MISC The patient test his blood sugars 4 times daily  Qty: 100 each, Refills: 4    Associated Diagnoses: Diabetes 1 5, managed as type 2 ([address] )      ! ! Insulin Pen Needle (NOVOFINE AUTOCOVER) 30G X 8 MM MISC TEST BLOOD SUGAR 4 TIMES DAILY  Qty: 100 each, Refills: 4    Associated Diagnoses: Diabetes 1 5, managed as type 2 ([facility])      metFORMIN (GLUCOPHAGE) 500 mg tablet Take 1 tablet (500 mg total) by mouth 2 (two) times a day with meals  Qty: 60 tablet, Refills: 2    Associated Diagnoses: Diabetes mellitus due to underlying condition with hyperosmolarity without coma, without long-term current use of insulin ([facility])      polyethylene glycol (MIRALAX) 17 g packet Take 17 g by mouth as needed        !! - Potential duplicate medications found  Please discuss with provider          Current Discharge Medication List START taking these medications    Details   cephalexin (KEFLEX) 500 mg capsule Take 1 capsule (500 mg total) by mouth every 12 (twelve) hours for 7 days  Qty: 14 capsule, Refills: 0    Associated Diagnoses: Stress incontinence      HYDROcodone-acetaminophen (NORCO) 5-325 mg per tablet Take 1 tablet by mouth every 6 (six) hours as needed for pain for up to 10 daysMax Daily Amount: 4 tablets  Qty: 40 tablet, Refills: 0    Associated Diagnoses: Stress incontinence                 Allergies  Allergies   Allergen Reactions    Adhesive [Medical Tape] Rash    Sulfa Antibiotics Other (See Comments)     Generalized redness       Outpatient Follow-Up  Future Appointments   Date Time Provider Fahad Draper   11/12/2019  2:00 PM Anton Lozoya MD NEURO [facility]   11/25/2019 10:30 AM Aniceto Nicholas MD URO [facility]   12/13/2019  1:00 PM Yonatan Oquendo DO [facility]       Active Issues Requiring Follow-Up  Real time Device teaching    Test Results Pending at Discharge        Discharge Disposition  Home     Treatments   Hale catheter --room    Consults   none    Procedures   None  Operative Procedures Performed  Procedure(s):  URINARY SPHINCTEROPLASTY    Pertinent Test Results   microbiology: urine culture: negative 10/19  Lab Results   Component Value Date    WBC 8 03 04/30/2019    HGB 12 3 04/30/2019    HCT 37 5 04/30/2019    MCV 95 04/30/2019     04/30/2019     Lab Results   Component Value Date    SODIUM 139 10/18/2019    K 4 5 10/18/2019     10/18/2019    CO2 28 10/18/2019    BUN 29 (H) 10/18/2019    CREATININE 1 06 10/18/2019    GLUC 67 04/30/2019    CALCIUM 8 8 10/18/2019         Physical Exam at Discharge  Condition of Patient on Discharge: good  /65 (BP Location: Left arm)   Pulse 79   Temp 97 6 °F (36 4 °C) (Oral)   Resp 18   Ht 5' 10" (1 778 m)   Wt 104 kg (230 lb 6 1 oz)   SpO2 94%   BMI 33 06 kg/m²   General appearance: alert and oriented, in no acute distress, appears stated age, cooperative, no distress and moderately obese  Head: Normocephalic, without obvious abnormality, atraumatic  Neck: no adenopathy, no carotid bruit, no JVD, supple, symmetrical, trachea midline and thyroid not enlarged, symmetric, no tenderness/mass/nodules  Lungs: diminished breath sounds  Heart: regular rate and rhythm, S1, S2 normal, no murmur, click, rub or gallop  Abdomen: soft, non-tender; bowel sounds normal; no masses,  no organomegaly  Extremities: extremities normal, warm and well-perfused; no cyanosis, clubbing, or edema and Positive tremors  Pulses: 2+ and symmetric  Neurologic: Grossly normal  Genitalia--uncircumcised male with fully retractable foreskin revealing normal glans penis, midline urethra  silicone Hale catheter removed  Draining clear yellow urine without evidence of hematuria  Bilaterally descended testicles  Inguinal and perineal incision clean dry and intact  Placed Kerlix fluff to scrotum and scrotal supporter  Changed prior to discharge  I/O last 3 completed shifts: In: 1176 3 [P O :460; I V :716 3]  Out: 700 [Urine:700]  I/O this shift: In: 480 [P O :480]  Out: -         Total time spent with patient 20minutes, >50% spent counseling and/or coordination of care         TANISHA Chau

## 2019-11-07 NOTE — SOCIAL WORK
CM sent referral to [facility] per patient's request for a roller walker  [facility] liaison delivered roller walker to the bedside

## 2019-11-08 ENCOUNTER — TELEPHONE (OUTPATIENT)
Dept: NEUROLOGY | Facility: CLINIC | Age: 79
End: 2019-11-08

## 2019-11-08 NOTE — TELEPHONE ENCOUNTER
Post Op Note    Onelia Woo is a [de-identified] y o  male s/p urinary sphincteroplasty performed 11/6/2019  Onelia Woo is a patient of Dr Jeana Brannon and is seen at the [place] office  How would you rate your pain on a scale from 1 to 10, 10 being the worst pain ever? 1  Have you had a fever? No  Have your bowel movements been regular? Yes  Do you have any difficulty urinating? No  If the patient has an incision- do you have any redness around the incision or any drainage from the incision? No      Do you have any other questions or concerns that I can address at this time? No  Confirmed post op appointment with Dr Jeana Branonn

## 2019-11-11 DIAGNOSIS — I21.4 NON-STEMI (NON-ST ELEVATED MYOCARDIAL INFARCTION) (HCC): ICD-10-CM

## 2019-11-11 RX ORDER — CLOPIDOGREL BISULFATE 75 MG/1
TABLET ORAL
Qty: 90 TABLET | Refills: 1 | Status: SHIPPED | OUTPATIENT
Start: 2019-11-11 | End: 2019-11-29 | Stop reason: HOSPADM

## 2019-11-12 ENCOUNTER — OFFICE VISIT (OUTPATIENT)
Dept: NEUROLOGY | Facility: CLINIC | Age: 79
End: 2019-11-12
Payer: MEDICARE

## 2019-11-12 VITALS — BODY MASS INDEX: 31.92 KG/M2 | HEIGHT: 70 IN | WEIGHT: 223 LBS

## 2019-11-12 DIAGNOSIS — I95.1 ORTHOSTATIC HYPOTENSION: Primary | ICD-10-CM

## 2019-11-12 DIAGNOSIS — G25.0 ESSENTIAL TREMOR: ICD-10-CM

## 2019-11-12 PROCEDURE — 99214 OFFICE O/P EST MOD 30 MIN: CPT | Performed by: PSYCHIATRY & NEUROLOGY

## 2019-11-12 NOTE — PROGRESS NOTES
Assessment/Plan:    Essential tremor  [de-identified]year-old man with essential tremor presents in followup  He has seen very good symptomatic benefit from gabapentin at 700mg TID without significant AE  He feels that the tremor is sufficiently well controlled at this point  We will keep the patient's gabapentin unchanged  In the future we could further increase the dose or initiate a trial of Topamax  Orthostatic hypotension remains well controlled with Mestinon  Diagnoses and all orders for this visit:    Orthostatic hypotension    Essential tremor        Subjective:     Patient ID: Dimas Soliman is a [de-identified] y o  male  I had the pleasure of seeing your patient, Dimas Soliman in the [facility] at the [address] [place] [address],[address] for Neuroscience  Ta Álvarez is an 17-year-old man with coronary artery disease, diabetes and orthostatic hypotension who presents in follow up for essential tremor  The patient has had some tremor for as long as he can remember  He has no anosmia but does have questionable rare dream reenactment  On initial presentation the patient had a postural and kinetic tremor  He had no rest tremor  He did have slight rigidity in the left upper extremity and slight focal bradykinesia on the left      Unfortunately we can't use primidone due to drug drug interactions with his cardiac medications  We are unable to use propranolol due to his orthostatic hypotension  Patient has a sulfa allergy therefore we cannot use zonisamide      When he was last here we agreed on a trial of gabapentin 3 times daily  This may also help with his enhanced startle  Current medications and timing:  Gabapentin 700mg TID    Interval history:  Legs have much less tremor  Only rare now  Tremor is overall much better controlled  Feels like things are well enough controlled at this point  Shaving with one hand still  Blood pressure is much better         The following portions of the patient's history were reviewed and updated as appropriate: allergies, current medications, past family history, past medical history, past social history, past surgical history and problem list       Objective:  Ht 5' 10" (1 778 m)   Wt 101 kg (223 lb)   BMI 32 00 kg/m²     Physical Exam    Neurological Exam     Head Tremor: 0  Face Tremor: rare   Voice Tremor: 0  Upper limb tremor       R outstretched posture: 1 5       L outstretched posture: 1 5       R Wing Beatin 5       L Wing Beatincm       R Kinetic: 1       L Kinetic: 1       R rest: 0       L rest: ?  Tone:       R: t       L: t  Bradykinesia: normal spontaneous movements        R: questionable  Irregular  No real decrement        L: more trouble on the left upper and lower   Gait: reduced step height and stride length  Slow to get up  Good arm swing  Review of Systems   Constitutional: Negative  Negative for appetite change and fever  HENT: Negative  Negative for hearing loss, tinnitus, trouble swallowing and voice change  Eyes: Negative  Negative for photophobia and pain  Respiratory: Negative  Negative for shortness of breath  Cardiovascular: Negative  Negative for palpitations  Gastrointestinal: Negative  Negative for nausea and vomiting  Endocrine: Negative  Negative for cold intolerance and heat intolerance  Genitourinary: Negative  Negative for dysuria, frequency and urgency  Musculoskeletal: Negative  Negative for myalgias and neck pain  Skin: Negative  Negative for rash  Neurological: Positive for tremors  Negative for dizziness, seizures, syncope, facial asymmetry, speech difficulty, weakness, light-headedness, numbness and headaches  Hematological: Negative  Does not bruise/bleed easily  Psychiatric/Behavioral: Negative  Negative for confusion, hallucinations and sleep disturbance  The above ROS was reviewed and updated       Alexandria Hall MD  Medical Director   [facility]  Movement and Memory Specialist       Current Outpatient Medications on File Prior to Visit   Medication Sig Dispense Refill    ACCU-CHEK FASTCLIX LANCETS MISC Check blood sugar 4 times daily  306 each 1    ascorbic acid (VITAMIN C) 500 mg tablet Take 500 mg by mouth daily      aspirin 81 MG tablet Take 1 tablet by mouth daily       Blood Glucose Monitoring Suppl (ACCU-CHEK DENYS PLUS) w/Device KIT by Does not apply route 4 (four) times a day 1 kit 0    CALCIUM PO Take 1 capsule by mouth daily      cephalexin (KEFLEX) 500 mg capsule Take 1 capsule (500 mg total) by mouth every 12 (twelve) hours for 7 days 14 capsule 0    Cholecalciferol (VITAMIN D3 PO) Take 3,000 Units by mouth daily        clopidogrel (PLAVIX) 75 mg tablet TAKE 1 TABLET DAILY (Patient taking differently: Take by mouth daily in the early morning ) 90 tablet 1    Coenzyme Q10 (CO Q 10) 100 MG CAPS Take 100 mg by mouth daily      cyanocobalamin (VITAMIN B-12) 500 mcg tablet Take 1 tablet by mouth daily      docusate sodium (COLACE) 100 mg capsule Take 100 mg by mouth 2 (two) times a day   gabapentin (NEURONTIN) 400 mg capsule Take 1 capsule (400 mg total) by mouth 3 (three) times a day Taken in addition to the 100mg cap (Patient taking differently: Take 600 mg by mouth 3 (three) times a day Increased to  600 mg TID) 90 capsule 3    glucose blood (ACCU-CHEK DENYS PLUS) test strip 1 each by Other route 4 (four) times a day Use as instructed 200 each 2    glucose blood (ACCU-CHEK GUIDE) test strip Check blood sugar 4 times daily  400 each 1    HYDROcodone-acetaminophen (NORCO) 5-325 mg per tablet Take 1 tablet by mouth every 6 (six) hours as needed for pain for up to 10 daysMax Daily Amount: 4 tablets 40 tablet 0    Insulin Pen Needle (NOVOFINE AUTOCOVER) 30G X 8 MM MISC The patient test his blood sugars 4 times daily   100 each 4    Insulin Pen Needle (NOVOFINE AUTOCOVER) 30G X 8 MM MISC TEST BLOOD SUGAR 4 TIMES DAILY 100 each 4    LORazepam (ATIVAN) 1 mg tablet Take 1 tablet (1 mg total) by mouth 2 (two) times a day 60 tablet 0    metFORMIN (GLUCOPHAGE) 500 mg tablet Take 1 tablet (500 mg total) by mouth 2 (two) times a day with meals 60 tablet 2    Multiple Vitamins-Minerals (CENTRUM SILVER) tablet Take 1 tablet by mouth daily      nitroglycerin (NITROSTAT) 0 4 mg SL tablet Place 1 tablet (0 4 mg total) under the tongue every 5 (five) minutes as needed for chest pain 90 tablet 3    NOVOLOG FLEXPEN 100 units/mL injection pen FOR DIRECTIONS ON HOW TO   TAKE THIS MEDICINE, READ   THE ENCLOSED MEDICATION    INFORMATION FORM (Patient taking differently: Inject under the skin 3 (three) times a day with meals Sliding Scale according to BS) 60 mL 1    pantoprazole (PROTONIX) 40 mg tablet TAKE 1 TABLET TWICE A DAY  30 MINUTES BEFORE BREAKFASTAND DINNER (Patient taking differently: Take 40 mg by mouth 2 (two) times a day ) 180 tablet 1    polyethylene glycol (MIRALAX) 17 g packet Take 17 g by mouth as needed       pyridostigmine (MESTINON) 60 mg tablet Take 0 5 tablets (30 mg total) by mouth 3 (three) times a day 135 tablet 1    ranolazine (RANEXA) 1000 MG SR tablet Take 1 tablet (1,000 mg total) by mouth 2 (two) times a day 180 tablet 3    rosuvastatin (CRESTOR) 40 MG tablet Take 1 tablet (40 mg total) by mouth daily (Patient taking differently: Take 40 mg by mouth daily in the early morning ) 90 tablet 3    TOUJEO SOLOSTAR 300 units/mL CONCETRATED U-300 injection pen INJECT 60 UNITS SUBCUTANEOUSLY (UNDER THE SKIN) AT BEDTIME 6 mL 5    VIIBRYD 40 MG tablet TAKE 1 TABLET BY MOUTH EVERY DAY (Patient taking differently: 40 mg daily with breakfast ) 30 tablet 5    clopidogrel (PLAVIX) 75 mg tablet TAKE 1 TABLET DAILY (Patient not taking: Reported on 11/12/2019) 90 tablet 1    Continuous Blood Gluc Sensor (FREESTYLE POLINA 14 DAY SENSOR) MISC 1 each by Does not apply route continuous (Patient not taking: Reported on 11/12/2019) 2 each 2     Current Facility-Administered Medications on File Prior to Visit   Medication Dose Route Frequency Provider Last Rate Last Dose    levalbuterol (XOPENEX HFA) inhaler 1 puff  1 puff Inhalation Q6H PRN Evan Signs, DO

## 2019-11-12 NOTE — ASSESSMENT & PLAN NOTE
[de-identified]year-old man with essential tremor presents in followup  He has seen very good symptomatic benefit from gabapentin at 700mg TID without significant AE  He feels that the tremor is sufficiently well controlled at this point  We will keep the patient's gabapentin unchanged  In the future we could further increase the dose or initiate a trial of Topamax  Orthostatic hypotension remains well controlled with Mestinon

## 2019-11-15 DIAGNOSIS — F41.9 ANXIETY: ICD-10-CM

## 2019-11-15 RX ORDER — LORAZEPAM 1 MG/1
TABLET ORAL
Qty: 60 TABLET | Refills: 0 | Status: ON HOLD | OUTPATIENT
Start: 2019-11-15 | End: 2019-11-29 | Stop reason: SDUPTHER

## 2019-11-15 NOTE — TELEPHONE ENCOUNTER
NEXT OV: 12/13  LAST OV:9/13    PDMP CHECKED  LAST FILL: 10/14  QUANT: 60    Prescriptions   Filled  ID  Written  Drug  QTY  Days  Prescriber  Rx #  Pharmacy *  Refills  Daily Dose  Pymt Type      11/07/2019  1  11/07/2019  HYDROCODONE-ACETAMIN 5-325 MG  40 0  10  [facility]  [id number]  WEPANDA ([id number])  0  20 0 MME  Private Pay  [place]    10/14/2019  1  10/14/2019  LORAZEPAM 1 MG TABLET  60 0  30  [facility]  [id number]  WEGMA ([id number])  0   [place]  [place]

## 2019-11-18 DIAGNOSIS — R25.1 TREMOR: ICD-10-CM

## 2019-11-18 RX ORDER — GABAPENTIN 400 MG/1
CAPSULE ORAL
Qty: 90 CAPSULE | Refills: 3 | Status: SHIPPED | OUTPATIENT
Start: 2019-11-18 | End: 2019-11-29 | Stop reason: HOSPADM

## 2019-11-25 ENCOUNTER — APPOINTMENT (EMERGENCY)
Dept: CT IMAGING | Facility: HOSPITAL | Age: 79
DRG: 872 | End: 2019-11-25
Payer: MEDICARE

## 2019-11-25 ENCOUNTER — HOSPITAL ENCOUNTER (INPATIENT)
Facility: HOSPITAL | Age: 79
LOS: 3 days | Discharge: NURSING, REHAB OR LONG-TERM CARE | DRG: 872 | End: 2019-11-29
Attending: EMERGENCY MEDICINE | Admitting: INTERNAL MEDICINE
Payer: MEDICARE

## 2019-11-25 ENCOUNTER — APPOINTMENT (EMERGENCY)
Dept: RADIOLOGY | Facility: HOSPITAL | Age: 79
DRG: 872 | End: 2019-11-25
Payer: MEDICARE

## 2019-11-25 DIAGNOSIS — F41.9 ANXIETY: ICD-10-CM

## 2019-11-25 DIAGNOSIS — A04.72 C. DIFFICILE DIARRHEA: ICD-10-CM

## 2019-11-25 DIAGNOSIS — Z79.4 TYPE 2 DIABETES MELLITUS WITH HYPOGLYCEMIA WITHOUT COMA, WITH LONG-TERM CURRENT USE OF INSULIN (HCC): ICD-10-CM

## 2019-11-25 DIAGNOSIS — E11.649 TYPE 2 DIABETES MELLITUS WITH HYPOGLYCEMIA WITHOUT COMA, WITH LONG-TERM CURRENT USE OF INSULIN (HCC): ICD-10-CM

## 2019-11-25 DIAGNOSIS — R55 SYNCOPE, UNSPECIFIED SYNCOPE TYPE: Primary | ICD-10-CM

## 2019-11-25 DIAGNOSIS — R77.8 ELEVATED TROPONIN: ICD-10-CM

## 2019-11-25 DIAGNOSIS — N17.9 AKI (ACUTE KIDNEY INJURY) (HCC): ICD-10-CM

## 2019-11-25 DIAGNOSIS — R06.2 WHEEZING: ICD-10-CM

## 2019-11-25 PROBLEM — I10 BENIGN ESSENTIAL HYPERTENSION: Status: RESOLVED | Noted: 2017-07-13 | Resolved: 2019-11-25

## 2019-11-25 PROBLEM — R10.33 ABDOMINAL PAIN, PERIUMBILICAL: Status: ACTIVE | Noted: 2019-11-25

## 2019-11-25 PROBLEM — N18.9 ACUTE KIDNEY INJURY SUPERIMPOSED ON CKD (HCC): Status: ACTIVE | Noted: 2018-04-11

## 2019-11-25 LAB
ALBUMIN SERPL BCP-MCNC: 3.1 G/DL (ref 3.5–5)
ALP SERPL-CCNC: 66 U/L (ref 46–116)
ALT SERPL W P-5'-P-CCNC: 27 U/L (ref 12–78)
ANION GAP SERPL CALCULATED.3IONS-SCNC: 8 MMOL/L (ref 4–13)
APTT PPP: 32 SECONDS (ref 23–37)
AST SERPL W P-5'-P-CCNC: 19 U/L (ref 5–45)
ATRIAL RATE: 101 BPM
BACTERIA UR QL AUTO: ABNORMAL /HPF
BASOPHILS # BLD AUTO: 0.05 THOUSANDS/ΜL (ref 0–0.1)
BASOPHILS NFR BLD AUTO: 0 % (ref 0–1)
BILIRUB SERPL-MCNC: 0.59 MG/DL (ref 0.2–1)
BILIRUB UR QL STRIP: NEGATIVE
BUN SERPL-MCNC: 26 MG/DL (ref 5–25)
CALCIUM SERPL-MCNC: 8.9 MG/DL (ref 8.3–10.1)
CHLORIDE SERPL-SCNC: 102 MMOL/L (ref 100–108)
CLARITY UR: CLEAR
CO2 SERPL-SCNC: 30 MMOL/L (ref 21–32)
COLOR UR: YELLOW
CREAT SERPL-MCNC: 1.35 MG/DL (ref 0.6–1.3)
EOSINOPHIL # BLD AUTO: 0.24 THOUSAND/ΜL (ref 0–0.61)
EOSINOPHIL NFR BLD AUTO: 1 % (ref 0–6)
ERYTHROCYTE [DISTWIDTH] IN BLOOD BY AUTOMATED COUNT: 14.8 % (ref 11.6–15.1)
EST. AVERAGE GLUCOSE BLD GHB EST-MCNC: 166 MG/DL
GFR SERPL CREATININE-BSD FRML MDRD: 49 ML/MIN/1.73SQ M
GLUCOSE SERPL-MCNC: 131 MG/DL (ref 65–140)
GLUCOSE SERPL-MCNC: 134 MG/DL (ref 65–140)
GLUCOSE SERPL-MCNC: 189 MG/DL (ref 65–140)
GLUCOSE SERPL-MCNC: 219 MG/DL (ref 65–140)
GLUCOSE SERPL-MCNC: 70 MG/DL (ref 65–140)
GLUCOSE UR STRIP-MCNC: NEGATIVE MG/DL
HBA1C MFR BLD: 7.4 % (ref 4.2–6.3)
HCT VFR BLD AUTO: 40.2 % (ref 36.5–49.3)
HGB BLD-MCNC: 12.9 G/DL (ref 12–17)
HGB UR QL STRIP.AUTO: NEGATIVE
IMM GRANULOCYTES # BLD AUTO: 0.15 THOUSAND/UL (ref 0–0.2)
IMM GRANULOCYTES NFR BLD AUTO: 1 % (ref 0–2)
INR PPP: 1.1 (ref 0.84–1.19)
KETONES UR STRIP-MCNC: NEGATIVE MG/DL
LACTATE SERPL-SCNC: 1.8 MMOL/L (ref 0.5–2)
LEUKOCYTE ESTERASE UR QL STRIP: NEGATIVE
LYMPHOCYTES # BLD AUTO: 0.55 THOUSANDS/ΜL (ref 0.6–4.47)
LYMPHOCYTES NFR BLD AUTO: 3 % (ref 14–44)
MCH RBC QN AUTO: 31.3 PG (ref 26.8–34.3)
MCHC RBC AUTO-ENTMCNC: 32.1 G/DL (ref 31.4–37.4)
MCV RBC AUTO: 98 FL (ref 82–98)
MONOCYTES # BLD AUTO: 1.27 THOUSAND/ΜL (ref 0.17–1.22)
MONOCYTES NFR BLD AUTO: 7 % (ref 4–12)
NEUTROPHILS # BLD AUTO: 15.38 THOUSANDS/ΜL (ref 1.85–7.62)
NEUTS SEG NFR BLD AUTO: 88 % (ref 43–75)
NITRITE UR QL STRIP: NEGATIVE
NON-SQ EPI CELLS URNS QL MICRO: ABNORMAL /HPF
NRBC BLD AUTO-RTO: 0 /100 WBCS
P AXIS: 36 DEGREES
PH UR STRIP.AUTO: 5 [PH]
PLATELET # BLD AUTO: 292 THOUSANDS/UL (ref 149–390)
PMV BLD AUTO: 10.2 FL (ref 8.9–12.7)
POTASSIUM SERPL-SCNC: 4.6 MMOL/L (ref 3.5–5.3)
PR INTERVAL: 156 MS
PROT SERPL-MCNC: 8.2 G/DL (ref 6.4–8.2)
PROT UR STRIP-MCNC: ABNORMAL MG/DL
PROTHROMBIN TIME: 13.6 SECONDS (ref 11.6–14.5)
QRS AXIS: -51 DEGREES
QRSD INTERVAL: 160 MS
QT INTERVAL: 410 MS
QTC INTERVAL: 519 MS
RBC # BLD AUTO: 4.12 MILLION/UL (ref 3.88–5.62)
RBC #/AREA URNS AUTO: ABNORMAL /HPF
SODIUM SERPL-SCNC: 140 MMOL/L (ref 136–145)
SP GR UR STRIP.AUTO: 1.01 (ref 1–1.03)
T WAVE AXIS: 50 DEGREES
TROPONIN I SERPL-MCNC: 0.07 NG/ML
UROBILINOGEN UR QL STRIP.AUTO: 0.2 E.U./DL
VENTRICULAR RATE: 96 BPM
WBC # BLD AUTO: 17.64 THOUSAND/UL (ref 4.31–10.16)
WBC #/AREA URNS AUTO: ABNORMAL /HPF

## 2019-11-25 PROCEDURE — 72125 CT NECK SPINE W/O DYE: CPT

## 2019-11-25 PROCEDURE — 87040 BLOOD CULTURE FOR BACTERIA: CPT | Performed by: EMERGENCY MEDICINE

## 2019-11-25 PROCEDURE — 87493 C DIFF AMPLIFIED PROBE: CPT | Performed by: PHYSICIAN ASSISTANT

## 2019-11-25 PROCEDURE — 83605 ASSAY OF LACTIC ACID: CPT | Performed by: EMERGENCY MEDICINE

## 2019-11-25 PROCEDURE — 80053 COMPREHEN METABOLIC PANEL: CPT | Performed by: EMERGENCY MEDICINE

## 2019-11-25 PROCEDURE — 71046 X-RAY EXAM CHEST 2 VIEWS: CPT

## 2019-11-25 PROCEDURE — 36415 COLL VENOUS BLD VENIPUNCTURE: CPT | Performed by: EMERGENCY MEDICINE

## 2019-11-25 PROCEDURE — 83036 HEMOGLOBIN GLYCOSYLATED A1C: CPT | Performed by: INTERNAL MEDICINE

## 2019-11-25 PROCEDURE — 81001 URINALYSIS AUTO W/SCOPE: CPT | Performed by: EMERGENCY MEDICINE

## 2019-11-25 PROCEDURE — 93010 ELECTROCARDIOGRAM REPORT: CPT | Performed by: INTERNAL MEDICINE

## 2019-11-25 PROCEDURE — 70450 CT HEAD/BRAIN W/O DYE: CPT

## 2019-11-25 PROCEDURE — 74177 CT ABD & PELVIS W/CONTRAST: CPT

## 2019-11-25 PROCEDURE — 87505 NFCT AGENT DETECTION GI: CPT | Performed by: INTERNAL MEDICINE

## 2019-11-25 PROCEDURE — 99219 PR INITIAL OBSERVATION CARE/DAY 50 MINUTES: CPT | Performed by: INTERNAL MEDICINE

## 2019-11-25 PROCEDURE — 84484 ASSAY OF TROPONIN QUANT: CPT | Performed by: EMERGENCY MEDICINE

## 2019-11-25 PROCEDURE — 99285 EMERGENCY DEPT VISIT HI MDM: CPT | Performed by: EMERGENCY MEDICINE

## 2019-11-25 PROCEDURE — 93005 ELECTROCARDIOGRAM TRACING: CPT

## 2019-11-25 PROCEDURE — 85610 PROTHROMBIN TIME: CPT | Performed by: EMERGENCY MEDICINE

## 2019-11-25 PROCEDURE — 85730 THROMBOPLASTIN TIME PARTIAL: CPT | Performed by: EMERGENCY MEDICINE

## 2019-11-25 PROCEDURE — 82948 REAGENT STRIP/BLOOD GLUCOSE: CPT

## 2019-11-25 PROCEDURE — 85025 COMPLETE CBC W/AUTO DIFF WBC: CPT | Performed by: EMERGENCY MEDICINE

## 2019-11-25 PROCEDURE — 99285 EMERGENCY DEPT VISIT HI MDM: CPT

## 2019-11-25 RX ORDER — ASPIRIN 81 MG/1
81 TABLET, CHEWABLE ORAL DAILY
Status: DISCONTINUED | OUTPATIENT
Start: 2019-11-26 | End: 2019-11-29 | Stop reason: HOSPADM

## 2019-11-25 RX ORDER — RANOLAZINE 500 MG/1
1000 TABLET, EXTENDED RELEASE ORAL 2 TIMES DAILY
Status: DISCONTINUED | OUTPATIENT
Start: 2019-11-25 | End: 2019-11-29 | Stop reason: HOSPADM

## 2019-11-25 RX ORDER — RANOLAZINE 500 MG/1
1000 TABLET, EXTENDED RELEASE ORAL 2 TIMES DAILY
Status: DISCONTINUED | OUTPATIENT
Start: 2019-11-25 | End: 2019-11-25

## 2019-11-25 RX ORDER — ONDANSETRON 4 MG/1
4 TABLET, ORALLY DISINTEGRATING ORAL EVERY 6 HOURS PRN
Status: DISCONTINUED | OUTPATIENT
Start: 2019-11-25 | End: 2019-11-29 | Stop reason: HOSPADM

## 2019-11-25 RX ORDER — CLOPIDOGREL BISULFATE 75 MG/1
75 TABLET ORAL DAILY
Status: DISCONTINUED | OUTPATIENT
Start: 2019-11-25 | End: 2019-11-29 | Stop reason: HOSPADM

## 2019-11-25 RX ORDER — LORAZEPAM 1 MG/1
1 TABLET ORAL 2 TIMES DAILY
Status: DISCONTINUED | OUTPATIENT
Start: 2019-11-25 | End: 2019-11-25

## 2019-11-25 RX ORDER — ACETAMINOPHEN 325 MG/1
650 TABLET ORAL EVERY 6 HOURS PRN
Status: DISCONTINUED | OUTPATIENT
Start: 2019-11-25 | End: 2019-11-29 | Stop reason: HOSPADM

## 2019-11-25 RX ORDER — PYRIDOSTIGMINE BROMIDE 60 MG/1
30 TABLET ORAL 3 TIMES DAILY
Status: DISCONTINUED | OUTPATIENT
Start: 2019-11-25 | End: 2019-11-29 | Stop reason: HOSPADM

## 2019-11-25 RX ORDER — ATORVASTATIN CALCIUM 40 MG/1
80 TABLET, FILM COATED ORAL
Status: DISCONTINUED | OUTPATIENT
Start: 2019-11-25 | End: 2019-11-29 | Stop reason: HOSPADM

## 2019-11-25 RX ORDER — SODIUM CHLORIDE 9 MG/ML
75 INJECTION, SOLUTION INTRAVENOUS CONTINUOUS
Status: DISCONTINUED | OUTPATIENT
Start: 2019-11-25 | End: 2019-11-27

## 2019-11-25 RX ORDER — PANTOPRAZOLE SODIUM 40 MG/1
40 TABLET, DELAYED RELEASE ORAL 2 TIMES DAILY
Status: DISCONTINUED | OUTPATIENT
Start: 2019-11-25 | End: 2019-11-29 | Stop reason: HOSPADM

## 2019-11-25 RX ORDER — ALBUTEROL SULFATE 90 UG/1
2 AEROSOL, METERED RESPIRATORY (INHALATION) EVERY 6 HOURS PRN
Status: DISCONTINUED | OUTPATIENT
Start: 2019-11-25 | End: 2019-11-29 | Stop reason: HOSPADM

## 2019-11-25 RX ORDER — LORAZEPAM 1 MG/1
1 TABLET ORAL 2 TIMES DAILY
Status: DISCONTINUED | OUTPATIENT
Start: 2019-11-25 | End: 2019-11-29 | Stop reason: HOSPADM

## 2019-11-25 RX ORDER — INSULIN GLARGINE 100 [IU]/ML
50 INJECTION, SOLUTION SUBCUTANEOUS EVERY MORNING
Status: DISCONTINUED | OUTPATIENT
Start: 2019-11-26 | End: 2019-11-28

## 2019-11-25 RX ORDER — VILAZODONE HYDROCHLORIDE 20 MG/1
40 TABLET ORAL DAILY
Status: DISCONTINUED | OUTPATIENT
Start: 2019-11-25 | End: 2019-11-29 | Stop reason: HOSPADM

## 2019-11-25 RX ORDER — NITROGLYCERIN 0.4 MG/1
0.4 TABLET SUBLINGUAL
Status: DISCONTINUED | OUTPATIENT
Start: 2019-11-25 | End: 2019-11-29 | Stop reason: HOSPADM

## 2019-11-25 RX ADMIN — ATORVASTATIN CALCIUM 80 MG: 40 TABLET, FILM COATED ORAL at 17:25

## 2019-11-25 RX ADMIN — ENOXAPARIN SODIUM 40 MG: 40 INJECTION SUBCUTANEOUS at 17:24

## 2019-11-25 RX ADMIN — VILAZODONE HYDROCHLORIDE 40 MG: 20 TABLET ORAL at 17:26

## 2019-11-25 RX ADMIN — INSULIN LISPRO 2 UNITS: 100 INJECTION, SOLUTION INTRAVENOUS; SUBCUTANEOUS at 14:32

## 2019-11-25 RX ADMIN — GABAPENTIN 700 MG: 400 CAPSULE ORAL at 22:00

## 2019-11-25 RX ADMIN — CLOPIDOGREL BISULFATE 75 MG: 75 TABLET ORAL at 17:25

## 2019-11-25 RX ADMIN — SODIUM CHLORIDE 75 ML/HR: 0.9 INJECTION, SOLUTION INTRAVENOUS at 12:54

## 2019-11-25 RX ADMIN — LORAZEPAM 1 MG: 1 TABLET ORAL at 22:00

## 2019-11-25 RX ADMIN — RANOLAZINE 1000 MG: 500 TABLET, FILM COATED, EXTENDED RELEASE ORAL at 22:00

## 2019-11-25 RX ADMIN — PYRIDOSTIGMINE BROMIDE 30 MG: 60 TABLET ORAL at 17:25

## 2019-11-25 RX ADMIN — IODIXANOL 85 ML: 320 INJECTION, SOLUTION INTRAVASCULAR at 08:52

## 2019-11-25 RX ADMIN — PYRIDOSTIGMINE BROMIDE 30 MG: 60 TABLET ORAL at 22:00

## 2019-11-25 RX ADMIN — INSULIN LISPRO 15 UNITS: 100 INJECTION, SOLUTION INTRAVENOUS; SUBCUTANEOUS at 17:23

## 2019-11-25 RX ADMIN — GABAPENTIN 700 MG: 400 CAPSULE ORAL at 17:24

## 2019-11-25 RX ADMIN — ACETAMINOPHEN 650 MG: 325 TABLET, FILM COATED ORAL at 22:39

## 2019-11-25 NOTE — ASSESSMENT & PLAN NOTE
Baseline Cr appears for be 1 0-1 1  Cr 1 37 today with eGFR 49  Patient reports decreased PO intake x3 days   Does appear to be dehydrated  Encourage PO fluids, 1-time NS bolus in ED, continue NS 75ml/h IVF  Will reassess BMP in AM   Avoid nephrotoxic agents

## 2019-11-25 NOTE — ASSESSMENT & PLAN NOTE
Lab Results   Component Value Date    HGBA1C 7 2 (H) 07/19/2019       No results for input(s): POCGLU in the last 72 hours      Blood Sugar Average: Last 72 hrs:     Discontinue oral hypoglycemics during admission  Adjusted home dosing to 50U long-acting basal dose and 15U rapid before meal  Continue POC glucose checks QAM and before meals  Last HgA1C in July 2019, will recheck this admission  Continue diabetic diet

## 2019-11-25 NOTE — ED PROVIDER NOTES
History  Chief Complaint   Patient presents with    Fall     per EMS"pt had a fall this mornig when he got out of bed, pt had loss of consciousness but denies a head strike, pt is on aspirin and plavix "     Patient is an 78-year-old male with a history of diabetes, hypertension, chronic kidney disease and CAD who presents with a fall  Patient states that he was trying to get out of bed and kept falling back onto the bed because of weakness in his legs  He eventually stood up but then fell onto the carpeted floor  He was unable to get up and called for his wife  She was able to get him to his knees but then he seemed to lose consciousness  He regained consciousness within seconds and returned to baseline  When EMS arrived, patient had a systolic blood pressure in the 90s  Patient denies pain at this time  He does admit to generalized weakness  He admits to a recent GI illness consisting of nausea, decreased p o  Intake and diarrhea  Patient has no other complaints at this time  History provided by:  Patient and spouse  Fall   Mechanism of injury: fall    Incident location:  Home  Arrived directly from scene: yes    Fall:     Fall occurred:  Standing    Impact surface:  Carpet    Point of impact:  Unable to specify  Suspicion of alcohol use: no    Suspicion of drug use: no    Tetanus status:  Unknown  Associated symptoms: loss of consciousness    Associated symptoms: no abdominal pain, no back pain, no chest pain, no headaches, no nausea, no neck pain and no vomiting        Prior to Admission Medications   Prescriptions Last Dose Informant Patient Reported? Taking? ACCU-CHEK FASTCLIX LANCETS MISC   No Yes   Sig: Check blood sugar 4 times daily     Blood Glucose Monitoring Suppl (ACCU-CHEK DENYS PLUS) w/Device KIT   No Yes   Sig: by Does not apply route 4 (four) times a day   CALCIUM PO   Yes Yes   Sig: Take 1 capsule by mouth daily   Cholecalciferol (VITAMIN D3 PO)  Self Yes Yes   Sig: Take 3,000 Units by mouth daily     Coenzyme Q10 (CO Q 10) 100 MG CAPS  Self Yes Yes   Sig: Take 100 mg by mouth daily   Continuous Blood Gluc Sensor (FREESTYLE POLINA 14 DAY SENSOR) MISC  Self No Yes   Si each by Does not apply route continuous   Insulin Pen Needle (NOVOFINE AUTOCOVER) 30G X 8 MM MISC  Self No Yes   Sig: The patient test his blood sugars 4 times daily  Insulin Pen Needle (NOVOFINE AUTOCOVER) 30G X 8 MM MISC   No Yes   Sig: TEST BLOOD SUGAR 4 TIMES DAILY   LORazepam (ATIVAN) 1 mg tablet   No Yes   Sig: TAKE 1 TABLET BY MOUTH TWO TIMES DAILY   Multiple Vitamins-Minerals (CENTRUM SILVER) tablet  Self Yes Yes   Sig: Take 1 tablet by mouth daily   NOVOLOG FLEXPEN 100 units/mL injection pen  Self No Yes   Sig: FOR DIRECTIONS ON HOW TO   TAKE THIS MEDICINE, READ   THE ENCLOSED MEDICATION    INFORMATION FORM   Patient taking differently: Inject under the skin 3 (three) times a day with meals Sliding Scale according to BS   TOUJEO SOLOSTAR 300 units/mL CONCETRATED U-300 injection pen   No Yes   Sig: INJECT 60 UNITS SUBCUTANEOUSLY (UNDER THE SKIN) AT BEDTIME   VIIBRYD 40 MG tablet   No Yes   Sig: TAKE 1 TABLET BY MOUTH EVERY DAY   Patient taking differently: 40 mg daily with breakfast    ascorbic acid (VITAMIN C) 500 mg tablet  Self Yes Yes   Sig: Take 500 mg by mouth daily   aspirin 81 MG tablet  Self Yes Yes   Sig: Take 1 tablet by mouth daily    clopidogrel (PLAVIX) 75 mg tablet  Self No Yes   Sig: TAKE 1 TABLET DAILY   Patient taking differently: Take by mouth daily in the early morning    clopidogrel (PLAVIX) 75 mg tablet   No Yes   Sig: TAKE 1 TABLET DAILY   cyanocobalamin (VITAMIN B-12) 500 mcg tablet  Self Yes Yes   Sig: Take 1 tablet by mouth daily   docusate sodium (COLACE) 100 mg capsule  Self Yes Yes   Sig: Take 100 mg by mouth 2 (two) times a day    gabapentin (NEURONTIN) 400 mg capsule   No Yes   Sig: TAKE 1 CAPSULE BY MOUTH THREE TIMES DAILY; TAKE IN ADDITION TO  MG CAP     glucose blood (ACCU-CHEK DENYS PLUS) test strip   No Yes   Si each by Other route 4 (four) times a day Use as instructed   glucose blood (ACCU-CHEK GUIDE) test strip  Self No Yes   Sig: Check blood sugar 4 times daily     metFORMIN (GLUCOPHAGE) 500 mg tablet   No Yes   Sig: Take 1 tablet (500 mg total) by mouth 2 (two) times a day with meals   nitroglycerin (NITROSTAT) 0 4 mg SL tablet  Self No Yes   Sig: Place 1 tablet (0 4 mg total) under the tongue every 5 (five) minutes as needed for chest pain   pantoprazole (PROTONIX) 40 mg tablet   No Yes   Sig: TAKE 1 TABLET TWICE A DAY  30 MINUTES BEFORE BREAKFASTAND DINNER   Patient taking differently: Take 40 mg by mouth 2 (two) times a day    polyethylene glycol (MIRALAX) 17 g packet  Self Yes Yes   Sig: Take 17 g by mouth as needed    pyridostigmine (MESTINON) 60 mg tablet   No Yes   Sig: Take 0 5 tablets (30 mg total) by mouth 3 (three) times a day   ranolazine (RANEXA) 1000 MG SR tablet  Self No Yes   Sig: Take 1 tablet (1,000 mg total) by mouth 2 (two) times a day   rosuvastatin (CRESTOR) 40 MG tablet   No Yes   Sig: Take 1 tablet (40 mg total) by mouth daily   Patient taking differently: Take 40 mg by mouth daily in the early morning       Facility-Administered Medications Last Administration Doses Remaining   levalbuterol (XOPENEX HFA) inhaler 1 puff None recorded           Past Medical History:   Diagnosis Date    AAA [facility]abdominal aortic aneurysm) ([facility])     Aneurysm of infrarenal abdominal aorta ([facility]) 10/23/2012    Anxiety     Benign essential hypertension     CKD (chronic kidney disease) stage 2, GFR 60-89 ml/min 2018    Compression fracture of twelfth thoracic vertebra (HCC) 2019    Coronary artery disease involving native coronary artery of native heart without angina pectoris 2014    Description: 50% distal left main, 75% proximal LAD, 90% 1st diagonal, 50% ostial circumflex, 99% proximal circumflex, 90% 2nd OM, 30 mid RCA , 90% right posterolateral - left heart catheterization December 2013   Diabetes mellitus [facility]     [facility]     Dyslipidemia 9/24/2012    Essential tremor 4/2/2019    GERD (gastroesophageal reflux disease)     Hyperlipidemia     Hypertension     Stopped his Medications because of Orthostatic hypotension    NSTEMI (non-ST elevated myocardial infarction) ([facility] )     Obesity 7/13/2017    Orthostatic hypotension     Prostate cancer (HCC)     Right bundle branch block (RBBB)     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    LIMA to LAD, SVG to OM1, SVG to distal RCA    Skin cancer     Type 2 diabetes mellitus with hyperglycemia, with long-term current use of insulin ([facility] ) 7/3/2017    Vitamin D deficiency 2/19/2018       Past Surgical History:   Procedure Laterality Date    ANKLE ARTHROSCOPY W/ OPEN REPAIR Left     CARDIAC SURGERY      CORONARY ARTERY BYPASS GRAFT  12/11/2013    CABG x3 with LIMA to LAD, SVG to OM-1, SVG to posterior lateral, LYSIS of pericardial adhesions   HI COLONOSCOPY FLX DX W/COLLJ SPEC WHEN PFRMD N/A 8/9/2018    Procedure: COLONOSCOPY;  Surgeon: Quentin Danielle MD;  [facility]: [facility];   Service: Gastroenterology    HI REPAIR UMBILICAL MFRQ,9+G/P,XPJBF N/A 3/10/2017    Procedure: REPAIR HERNIA UMBILICAL;  Surgeon: Parul Terrazas MD;  [facility];  Service: General    PROSTATECTOMY  2008    SPHINCTEROPLASTY URINARY N/A 11/6/2019    Procedure: URINARY SPHINCTEROPLASTY;  Surgeon: Shimon Paz MD;  Location[facility];  Service: Urology       Family History   Problem Relation Age of Onset    Crohn's disease Mother     Liver cancer Mother     Hypertension Mother     Crohn's disease Father     Liver cancer Father     Heart disease Father     Hypertension Father     Hyperlipidemia Father     Colon cancer Brother     Aortic aneurysm Brother         abdominal     Heart disease Brother         abdominal aortic aneurysm    Hypertension Brother     Hyperlipidemia Brother     Colon polyps Family     Stomach cancer Family     Hypertension Sister     Mitral valve prolapse Sister 80        valve replacment      I have reviewed and agree with the history as documented  Social History     Tobacco Use    Smoking status: Former Smoker     Types: Cigarettes    Smokeless tobacco: Never Used    Tobacco comment: quit 1967   Substance Use Topics    Alcohol use: Yes     Frequency: Monthly or less     Drinks per session: 1 or 2     Binge frequency: Never     Comment: rare socially    Drug use: No        Review of Systems   Constitutional: Negative for chills, diaphoresis and fever  HENT: Negative for nosebleeds, sore throat and trouble swallowing  Eyes: Negative for photophobia, pain and visual disturbance  Respiratory: Negative for cough, chest tightness and shortness of breath  Cardiovascular: Negative for chest pain, palpitations and leg swelling  Gastrointestinal: Negative for abdominal pain, constipation, diarrhea, nausea and vomiting  Endocrine: Negative for polydipsia and polyuria  Genitourinary: Negative for difficulty urinating, dysuria and hematuria  Musculoskeletal: Negative for back pain, neck pain and neck stiffness  Skin: Negative for pallor and rash  Neurological: Positive for loss of consciousness  Negative for dizziness, syncope, light-headedness and headaches  All other systems reviewed and are negative  Physical Exam  Physical Exam   Constitutional: He is oriented to person, place, and time  He appears well-developed and well-nourished  No distress  HENT:   Head: Normocephalic and atraumatic  Mouth/Throat: Oropharynx is clear and moist  Mucous membranes are dry (mildly)  Eyes: Pupils are equal, round, and reactive to light  EOM are normal    Neck: Normal range of motion  Neck supple  Cardiovascular: Normal rate, regular rhythm, intact distal pulses and normal pulses  Murmur heard  Systolic murmur is present    Pulmonary/Chest: Effort normal and breath sounds normal  No respiratory distress  Abdominal: Soft  He exhibits no distension  There is tenderness in the left lower quadrant  There is no rigidity, no rebound and no guarding  Musculoskeletal: Normal range of motion  He exhibits no edema or tenderness  Lymphadenopathy:     He has no cervical adenopathy  Neurological: He is alert and oriented to person, place, and time  He has normal strength  No cranial nerve deficit or sensory deficit  Skin: Skin is warm and dry  Capillary refill takes less than 2 seconds  Psychiatric: He has a normal mood and affect  Nursing note and vitals reviewed        Vital Signs  ED Triage Vitals [11/25/19 0625]   Temperature Pulse Respirations Blood Pressure SpO2   99 9 °F (37 7 °C) 99 20 117/56 91 %      Temp Source Heart Rate Source Patient Position - Orthostatic VS BP Location FiO2 (%)   Oral Monitor Lying Right arm --      Pain Score       No Pain           Vitals:    11/25/19 0625 11/25/19 1102 11/25/19 1500   BP: 117/56 153/64 146/68   Pulse: 99 95 91   Patient Position - Orthostatic VS: Lying Lying Lying         Visual Acuity  Visual Acuity      Most Recent Value   L Pupil Size (mm)  5   R Pupil Size (mm)  5          ED Medications  Medications   sodium chloride 0 9 % bolus 1,000 mL (has no administration in time range)   ondansetron (ZOFRAN-ODT) dispersible tablet 4 mg (has no administration in time range)   insulin lispro (HumaLOG) 100 units/mL subcutaneous injection 1-5 Units (1 Units Subcutaneous Not Given 11/25/19 1720)   insulin lispro (HumaLOG) 100 units/mL subcutaneous injection 1-5 Units (has no administration in time range)   sodium chloride 0 9 % infusion (75 mL/hr Intravenous New Bag 11/25/19 1254)   pyridostigmine (MESTINON) tablet 30 mg (30 mg Oral Given 11/25/19 1725)   atorvastatin (LIPITOR) tablet 80 mg (80 mg Oral Given 11/25/19 1725)   nitroglycerin (NITROSTAT) SL tablet 0 4 mg (has no administration in time range)   clopidogrel (PLAVIX) tablet 75 mg (75 mg Oral Given 11/25/19 1725)   pantoprazole (PROTONIX) EC tablet 40 mg (40 mg Oral Refused 11/25/19 1721)   vilazodone (VIIBRYD) tablet 40 mg (40 mg Oral Given 11/25/19 1726)   insulin glargine (LANTUS) subcutaneous injection 50 Units 0 5 mL (has no administration in time range)   insulin lispro (HumaLOG) 100 units/mL subcutaneous injection 15 Units (15 Units Subcutaneous Given 11/25/19 1723)   gabapentin (NEURONTIN) capsule 700 mg (700 mg Oral Given 11/25/19 1724)   albuterol (PROVENTIL HFA,VENTOLIN HFA) inhaler 2 puff (has no administration in time range)   enoxaparin (LOVENOX) subcutaneous injection 40 mg (40 mg Subcutaneous Given 11/25/19 1724)   aspirin chewable tablet 81 mg (has no administration in time range)   LORazepam (ATIVAN) tablet 1 mg (has no administration in time range)   ranolazine (RANEXA) 12 hr tablet 1,000 mg (has no administration in time range)   iodixanol (VISIPAQUE) 320 MG/ML injection 85 mL (85 mL Intravenous Given 11/25/19 0852)       Diagnostic Studies  Results Reviewed     Procedure Component Value Units Date/Time    Hemoglobin A1c w/EAG Estimation (Orders if not completed within the last 90 days) [[id number]]  (Abnormal) Collected:  11/25/19 0726    Lab Status:  Final result Specimen:  Blood from Arm, Right Updated:  11/25/19 1927     Hemoglobin A1C 7 4 %       mg/dl     Blood culture #1 [[id number]] Collected:  11/25/19 0813    Lab Status:  Preliminary result Specimen:  Blood from Arm, Left Updated:  11/25/19 1301     Blood Culture Received in Microbiology Lab  Culture in Progress  Blood culture #2 [[id number]] Collected:  11/25/19 0726    Lab Status:  Preliminary result Specimen:  Blood from Arm, Right Updated:  11/25/19 1301     Blood Culture Received in Microbiology Lab  Culture in Progress      UA w Reflex to Microscopic w Reflex to Culture [[id number]]  (Abnormal) Collected:  11/25/19 1125    Lab Status:  Final result Specimen:  Urine, Clean Catch Updated: 11/25/19 1133     Color, UA Yellow     Clarity, UA Clear     Specific Gravity, UA 1 010     pH, UA 5 0     Leukocytes, UA Negative     Nitrite, UA Negative     Protein, UA 30 (1+) mg/dl      Glucose, UA Negative mg/dl      Ketones, UA Negative mg/dl      Urobilinogen, UA 0 2 E U /dl      Bilirubin, UA Negative     Blood, UA Negative    Lactic acid, plasma x2 [[id number]]  (Normal) Collected:  11/25/19 0727    Lab Status:  Final result Specimen:  Blood from Arm, Right Updated:  11/25/19 0757     LACTIC ACID 1 8 mmol/L     Narrative:       Result may be elevated if tourniquet was used during collection      Troponin I [[id number]]  (Abnormal) Collected:  11/25/19 0726    Lab Status:  Final result Specimen:  Blood from Arm, Right Updated:  11/25/19 0754     Troponin I 0 07 ng/mL     Comprehensive metabolic panel [[id number]]  (Abnormal) Collected:  11/25/19 0726    Lab Status:  Final result Specimen:  Blood from Arm, Right Updated:  11/25/19 0752     Sodium 140 mmol/L      Potassium 4 6 mmol/L      Chloride 102 mmol/L      CO2 30 mmol/L      ANION GAP 8 mmol/L      BUN 26 mg/dL      Creatinine 1 35 mg/dL      Glucose 189 mg/dL      Calcium 8 9 mg/dL      AST 19 U/L      ALT 27 U/L      Alkaline Phosphatase 66 U/L      Total Protein 8 2 g/dL      Albumin 3 1 g/dL      Total Bilirubin 0 59 mg/dL      eGFR 49 ml/min/1 73sq m     Narrative:       Steven guidelines for Chronic Kidney Disease (CKD):     Stage 1 with normal or high GFR (GFR > 90 mL/min/1 73 square meters)    Stage 2 Mild CKD (GFR = 60-89 mL/min/1 73 square meters)    Stage 3A Moderate CKD (GFR = 45-59 mL/min/1 73 square meters)    Stage 3B Moderate CKD (GFR = 30-44 mL/min/1 73 square meters)    Stage 4 Severe CKD (GFR = 15-29 mL/min/1 73 square meters)    Stage 5 End Stage CKD (GFR <15 mL/min/1 73 square meters)  Note: GFR calculation is accurate only with a steady state creatinine    Protime-INR [[id number]]  (Normal) Collected: 11/25/19 0726    Lab Status:  Final result Specimen:  Blood from Arm, Right Updated:  11/25/19 0746     Protime 13 6 seconds      INR 1 10    APTT [[id number]]  (Normal) Collected:  11/25/19 0726    Lab Status:  Final result Specimen:  Blood from Arm, Right Updated:  11/25/19 0746     PTT 32 seconds     CBC and differential [[id number]]  (Abnormal) Collected:  11/25/19 0726    Lab Status:  Final result Specimen:  Blood from Arm, Right Updated:  11/25/19 0737     WBC 17 64 Thousand/uL      RBC 4 12 Million/uL      Hemoglobin 12 9 g/dL      Hematocrit 40 2 %      MCV 98 fL      MCH 31 3 pg      MCHC 32 1 g/dL      RDW 14 8 %      MPV 10 2 fL      Platelets 520 Thousands/uL      nRBC 0 /100 WBCs      Neutrophils Relative 88 %      Immat GRANS % 1 %      Lymphocytes Relative 3 %      Monocytes Relative 7 %      Eosinophils Relative 1 %      Basophils Relative 0 %      Neutrophils Absolute 15 38 Thousands/µL      Immature Grans Absolute 0 15 Thousand/uL      Lymphocytes Absolute 0 55 Thousands/µL      Monocytes Absolute 1 27 Thousand/µL      Eosinophils Absolute 0 24 Thousand/µL      Basophils Absolute 0 05 Thousands/µL     Lactic acid, plasma x2 [[id number]]     Lab Status:  No result Specimen:  Blood                  CT abdomen pelvis with contrast   Final Result by Betzaida Zapata MD (11/25 1470)         1  No acute abnormality in the abdomen or pelvis  2   Stable 3 5 cm infrarenal abdominal aortic aneurysm              Workstation performed: [id number]         CT head without contrast   Final Result by Kei Rizvi MD (11/25 9337)      No acute intracranial hemorrhage seen   No extra-axial collection   No mass effect or midline shift                  Workstation performed: [id number]         CT spine cervical without contrast   Final Result by Kei Rizvi MD (11/25 8953)      No acute compression collapse of the vertebra                   Workstation performed: [id number]         XR chest 2 views   ED Interpretation by Darell Hernandez DO (11/25 8814)   Small bilateral pleural effusions  Previous sternotomy  Final Result by Jak Chicas MD (55/68 9870)      No acute cardiopulmonary disease  Workstation performed: [id number]                    Procedures  ECG 12 Lead Documentation Only  Date/Time: 11/25/2019 8:02 AM  Performed by: Darell Hernandez DO  Authorized by: Darell Hernandez DO     ECG reviewed by me, the ED Provider: yes    Patient location:  ED  Previous ECG:     Previous ECG:  Compared to current    Similarity:  No change    Comparison to cardiac monitor: Yes    Comments:      Normal sinus rhythm at a rate of 96 beats per minute  Right bundle branch block  No ST T wave abnormalities  Similar to previous from 10/18/2019  ED Course  ED Course as of Nov 25 2040 Mon Nov 25, 2019   0809 Troponin mildly elevated  Patient has had similar troponin elevations previously  Troponin I(!)                   Initial Sepsis Screening     Row Name 11/25/19 2793                Is the patient's history suggestive of a new or worsening infection? (!) Yes (Proceed)  -CD        Suspected source of infection  suspect infection, source unknown  -CD        Are two or more of the following signs & symptoms of infection both present and new to the patient? (!) Yes (Proceed)  -CD        Indicate SIRS criteria  Tachycardia > 90 bpm;Leukocytosis (WBC > 47632 IJL)  -CD        If the answer is yes to both questions, suspicion of sepsis is present          If severe sepsis is present AND tissue hypoperfusion perists in the hour after fluid resuscitation or lactate > 4, the patient meets criteria for SEPTIC SHOCK          Are any of the following organ dysfunction criteria present within 6 hours of suspected infection and SIRS criteria that are NOT considered to be chronic conditions?           Organ dysfunction          Date of presentation of severe sepsis          Time of presentation of severe sepsis          Tissue hypoperfusion persists in the hour after crystalloid fluid administration, evidenced, by either:          Was hypotension present within one hour of the conclusion of crystalloid fluid administration?         Date of presentation of septic shock          Time of presentation of septic shock            User Key  (r) = Recorded By, (t) = Taken By, (c) = Cosigned By    [address] Name Provider Type    MAL Ramires DO Physician                  MDM  Number of Diagnoses or Management Options  DEZ (acute kidney injury) [facility]): new and requires workup  Elevated troponin: new and requires workup  Syncope, unspecified syncope type: new and requires workup  Diagnosis management comments: Patient presents with an episode of syncope this morning  Patient also notes generalized weakness and decreased p o  intake  Patient has had diarrhea for the last several days and describes lower abdominal pain  CT abdomen and pelvis reveals no acute pathology  Labs reveal acute kidney injury  He also has an elevated troponin  However he does not have chest pain and EKG is nonischemic  Will hospitalize for serial troponins  Will also continue IV fluids and cardiac monitoring  Patient is hemodynamically stable upon admission         Amount and/or Complexity of Data Reviewed  Clinical lab tests: ordered and reviewed  Tests in the radiology section of CPT®: reviewed and ordered  Tests in the medicine section of CPT®: ordered and reviewed  Review and summarize past medical records: yes  Discuss the patient with other providers: yes  Independent visualization of images, tracings, or specimens: yes    Risk of Complications, Morbidity, and/or Mortality  Presenting problems: high  Diagnostic procedures: high  Management options: moderate    Patient Progress  Patient progress: stable      Disposition  Final diagnoses:   Syncope, unspecified syncope type   DEZ (acute kidney injury) ([facility] )   Elevated troponin     Time reflects when diagnosis was documented in both MDM as applicable and the Disposition within this note     Time User Action Codes Description Comment    11/25/2019 10:17 AM Kaycee Mings L Add [R55] Syncope, unspecified syncope type     11/25/2019 10:18 AM Kaycee Mings L Add [N17 9] DEZ (acute kidney injury) ([facility] )     11/25/2019 10:18 AM Moises Art Add [R79 89] Elevated troponin     11/25/2019  2:14 PM Latosha Kraus Add [R06 2] Wheezing       ED Disposition     ED Disposition Condition Date/Time Comment    Admit Stable Mon Nov 25, 2019 10:17 AM Case was discussed with Dr Tali Soriano and the patient's admission status was agreed to be Admission Status: observation status to the service of Dr Tali Soriano   Follow-up Information    None         Current Discharge Medication List      CONTINUE these medications which have NOT CHANGED    Details   ACCU-CHEK FASTCLIX LANCETS MISC Check blood sugar 4 times daily  Qty: 306 each, Refills: 1    Associated Diagnoses: Type 2 diabetes mellitus with complication, with long-term current use of insulin ([facility])      ascorbic acid (VITAMIN C) 500 mg tablet Take 500 mg by mouth daily      aspirin 81 MG tablet Take 1 tablet by mouth daily       Blood Glucose Monitoring Suppl (ACCU-CHEK DENYS PLUS) w/Device KIT by Does not apply route 4 (four) times a day  Qty: 1 kit, Refills: 0    Comments: DX: E11 8  Associated Diagnoses: Type 2 diabetes mellitus with complication, with long-term current use of insulin ([facility])      CALCIUM PO Take 1 capsule by mouth daily      Cholecalciferol (VITAMIN D3 PO) Take 3,000 Units by mouth daily        !! clopidogrel (PLAVIX) 75 mg tablet TAKE 1 TABLET DAILY  Qty: 90 tablet, Refills: 1    Associated Diagnoses: Non-STEMI (non-ST elevated myocardial infarction) ([facility] )      ! ! clopidogrel (PLAVIX) 75 mg tablet TAKE 1 TABLET DAILY  Qty: 90 tablet, Refills: 1    Associated Diagnoses: Non-STEMI (non-ST elevated myocardial infarction) ([place] [id number] ) Coenzyme Q10 (CO Q 10) 100 MG CAPS Take 100 mg by mouth daily      Continuous Blood Gluc Sensor (FREESTYLE POLINA 14 DAY SENSOR) MISC 1 each by Does not apply route continuous  Qty: 2 each, Refills: 2    Associated Diagnoses: Type 2 diabetes mellitus with hyperglycemia, with long-term current use of insulin ([facility])      cyanocobalamin (VITAMIN B-12) 500 mcg tablet Take 1 tablet by mouth daily      docusate sodium (COLACE) 100 mg capsule Take 100 mg by mouth 2 (two) times a day       gabapentin (NEURONTIN) 400 mg capsule TAKE 1 CAPSULE BY MOUTH THREE TIMES DAILY; TAKE IN ADDITION TO  MG CAP  Qty: 90 capsule, Refills: 3    Associated Diagnoses: Tremor      !! glucose blood (ACCU-CHEK DENYS PLUS) test strip 1 each by Other route 4 (four) times a day Use as instructed  Qty: 200 each, Refills: 2    Associated Diagnoses: Type 2 diabetes mellitus with complication, with long-term current use of insulin ([address] )      ! ! glucose blood (ACCU-CHEK GUIDE) test strip Check blood sugar 4 times daily  Qty: 400 each, Refills: 1    Comments: Dx:  E11 65  Accu-Chek Denys Test strips  Associated Diagnoses: Uncontrolled type 2 diabetes mellitus with hyperglycemia ([address] )      ! ! Insulin Pen Needle (NOVOFINE AUTOCOVER) 30G X 8 MM MISC The patient test his blood sugars 4 times daily  Qty: 100 each, Refills: 4    Associated Diagnoses: Diabetes 1 5, managed as type 2 ([address] )      ! !  Insulin Pen Needle (NOVOFINE AUTOCOVER) 30G X 8 MM MISC TEST BLOOD SUGAR 4 TIMES DAILY  Qty: 100 each, Refills: 4    Associated Diagnoses: Diabetes 1 5, managed as type 2 ([facility])      LORazepam (ATIVAN) 1 mg tablet TAKE 1 TABLET BY MOUTH TWO TIMES DAILY  Qty: 60 tablet, Refills: 0    Associated Diagnoses: Anxiety      metFORMIN (GLUCOPHAGE) 500 mg tablet Take 1 tablet (500 mg total) by mouth 2 (two) times a day with meals  Qty: 60 tablet, Refills: 2    Associated Diagnoses: Diabetes mellitus due to underlying condition with hyperosmolarity without coma, without long-term current use of insulin ([facility])      Multiple Vitamins-Minerals (CENTRUM SILVER) tablet Take 1 tablet by mouth daily      nitroglycerin (NITROSTAT) 0 4 mg SL tablet Place 1 tablet (0 4 mg total) under the tongue every 5 (five) minutes as needed for chest pain  Qty: 90 tablet, Refills: 3    Associated Diagnoses: Coronary artery disease involving native coronary artery of native heart without angina pectoris      NOVOLOG FLEXPEN 100 units/mL injection pen FOR DIRECTIONS ON HOW TO   TAKE THIS MEDICINE, READ   THE ENCLOSED MEDICATION    INFORMATION FORM  Qty: 60 mL, Refills: 1    Comments: INJECT 20 UNITS BEFORE MEALS (INSULIN CARBOHYDRATE RATIO 1:4, INSULINSENSITIVITY FACTOR 1:15)  Associated Diagnoses: Diabetes 1 5, managed as type 2 ([facility])      pantoprazole (PROTONIX) 40 mg tablet TAKE 1 TABLET TWICE A DAY  30 MINUTES BEFORE BREAKFASTAND DINNER  Qty: 180 tablet, Refills: 1    Associated Diagnoses: Gastroesophageal reflux disease without esophagitis      polyethylene glycol (MIRALAX) 17 g packet Take 17 g by mouth as needed       pyridostigmine (MESTINON) 60 mg tablet Take 0 5 tablets (30 mg total) by mouth 3 (three) times a day  Qty: 135 tablet, Refills: 1    Associated Diagnoses: Orthostatic hypotension      ranolazine (RANEXA) 1000 MG SR tablet Take 1 tablet (1,000 mg total) by mouth 2 (two) times a day  Qty: 180 tablet, Refills: 3    Associated Diagnoses: Non-STEMI (non-ST elevated myocardial infarction) ([facility])      rosuvastatin (CRESTOR) 40 MG tablet Take 1 tablet (40 mg total) by mouth daily  Qty: 90 tablet, Refills: 3    Associated Diagnoses: Pure hypercholesterolemia      TOUJEO SOLOSTAR 300 units/mL CONCETRATED U-300 injection pen INJECT 60 UNITS SUBCUTANEOUSLY (UNDER THE SKIN) AT BEDTIME  Qty: 6 mL, Refills: 5    Associated Diagnoses: Diabetes mellitus due to underlying condition with hyperosmolarity without coma, without long-term current use of insulin ([facility])      VIIBRYD 40 MG tablet TAKE 1 TABLET BY MOUTH EVERY DAY  Qty: 30 tablet, Refills: 5    Associated Diagnoses: Anxiety       ! ! - Potential duplicate medications found  Please discuss with provider  No discharge procedures on file      ED Provider  Electronically Signed by           Darell Hernandez DO  11/25/19 2040

## 2019-11-25 NOTE — ASSESSMENT & PLAN NOTE
Discuss diet and lifestyle modifications, managing risk factors  Continue outpatient follow up with PCP

## 2019-11-25 NOTE — ASSESSMENT & PLAN NOTE
Patient reports about 4 days of periumbilical abdominal pain, starting as a cramping feeling now a dull aching pain  Patient was experiencing 2-3 episodes of diarrhea starting the same day he noticed abdominal pain, pain relieved with BM  Pain 9/10 on 11/24 before admission, has not experienced the same pain since then  Now reporting no abdominal pain but TTP in periumbilical region and nausea     CT abd/pelvis 11/25: 1  No acute abnormality in the abdomen or pelvis  2   Stable 3 5 cm infrarenal abdominal aortic aneurysm      Monitor I/Os  Adding PRN zofran for nausea  PO cardiac/diabetic diet as tolerated, encourage PO fluids

## 2019-11-25 NOTE — ASSESSMENT & PLAN NOTE
Pt syncopized this AM, transported by EMS who report SBP of 90  History of orthostatic hypotension treated with Mestinon  Prior ED evals for orthostasis related weakness in April, May  Echo in April ordered due to similar symptoms,   ED eval:  · Troponin 0 27, will continue to trend with consideration of chronic non-specific troponin elevation  · EKG: Normal sinus rhythm at a rate of 96 beats per minute   Right bundle   branch block   No ST T wave abnormalities   Similar to previous from   10/18/2019    · Vitals stable  · CT head/neck: Without abnormalities  · Low clinical suspicion for neuro/cardiac causes of syncope due to lack of symptoms prior to syncope and history of orthostasis and recent GI illness    Concern for orthostatic hypotension in the setting of dehydration  · 4 days of nausea and diarrhea with 2-3 BM/day  · Reports decrease in PO intake    Given 1 time bolus NS in ED, will continue to encourage clear liquids and advance diet as tolerated
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
## 2019-11-26 PROBLEM — A41.9 SEPSIS (HCC): Status: ACTIVE | Noted: 2019-11-26

## 2019-11-26 PROBLEM — A04.72 C. DIFFICILE DIARRHEA: Status: ACTIVE | Noted: 2019-11-26

## 2019-11-26 LAB
ANION GAP SERPL CALCULATED.3IONS-SCNC: 9 MMOL/L (ref 4–13)
BASOPHILS # BLD AUTO: 0.05 THOUSANDS/ΜL (ref 0–0.1)
BASOPHILS NFR BLD AUTO: 0 % (ref 0–1)
BUN SERPL-MCNC: 21 MG/DL (ref 5–25)
C DIFF TOX GENS STL QL NAA+PROBE: ABNORMAL
CALCIUM SERPL-MCNC: 8.1 MG/DL (ref 8.3–10.1)
CAMPYLOBACTER DNA SPEC NAA+PROBE: NORMAL
CHLORIDE SERPL-SCNC: 102 MMOL/L (ref 100–108)
CO2 SERPL-SCNC: 25 MMOL/L (ref 21–32)
CREAT SERPL-MCNC: 1.16 MG/DL (ref 0.6–1.3)
EOSINOPHIL # BLD AUTO: 0.37 THOUSAND/ΜL (ref 0–0.61)
EOSINOPHIL NFR BLD AUTO: 3 % (ref 0–6)
ERYTHROCYTE [DISTWIDTH] IN BLOOD BY AUTOMATED COUNT: 14.7 % (ref 11.6–15.1)
GFR SERPL CREATININE-BSD FRML MDRD: 59 ML/MIN/1.73SQ M
GLUCOSE P FAST SERPL-MCNC: 140 MG/DL (ref 65–99)
GLUCOSE SERPL-MCNC: 126 MG/DL (ref 65–140)
GLUCOSE SERPL-MCNC: 134 MG/DL (ref 65–140)
GLUCOSE SERPL-MCNC: 140 MG/DL (ref 65–140)
GLUCOSE SERPL-MCNC: 170 MG/DL (ref 65–140)
GLUCOSE SERPL-MCNC: 191 MG/DL (ref 65–140)
HCT VFR BLD AUTO: 35 % (ref 36.5–49.3)
HGB BLD-MCNC: 11.4 G/DL (ref 12–17)
IMM GRANULOCYTES # BLD AUTO: 0.15 THOUSAND/UL (ref 0–0.2)
IMM GRANULOCYTES NFR BLD AUTO: 1 % (ref 0–2)
LYMPHOCYTES # BLD AUTO: 0.94 THOUSANDS/ΜL (ref 0.6–4.47)
LYMPHOCYTES NFR BLD AUTO: 6 % (ref 14–44)
MCH RBC QN AUTO: 31.9 PG (ref 26.8–34.3)
MCHC RBC AUTO-ENTMCNC: 32.6 G/DL (ref 31.4–37.4)
MCV RBC AUTO: 98 FL (ref 82–98)
MONOCYTES # BLD AUTO: 1.35 THOUSAND/ΜL (ref 0.17–1.22)
MONOCYTES NFR BLD AUTO: 9 % (ref 4–12)
NEUTROPHILS # BLD AUTO: 12.02 THOUSANDS/ΜL (ref 1.85–7.62)
NEUTS SEG NFR BLD AUTO: 81 % (ref 43–75)
NRBC BLD AUTO-RTO: 0 /100 WBCS
PLATELET # BLD AUTO: 247 THOUSANDS/UL (ref 149–390)
PMV BLD AUTO: 10.5 FL (ref 8.9–12.7)
POTASSIUM SERPL-SCNC: 3.8 MMOL/L (ref 3.5–5.3)
RBC # BLD AUTO: 3.57 MILLION/UL (ref 3.88–5.62)
SALMONELLA DNA SPEC QL NAA+PROBE: NORMAL
SHIGA TOXIN STX GENE SPEC NAA+PROBE: NORMAL
SHIGELLA DNA SPEC QL NAA+PROBE: NORMAL
SODIUM SERPL-SCNC: 136 MMOL/L (ref 136–145)
WBC # BLD AUTO: 14.88 THOUSAND/UL (ref 4.31–10.16)

## 2019-11-26 PROCEDURE — 99232 SBSQ HOSP IP/OBS MODERATE 35: CPT | Performed by: PHYSICIAN ASSISTANT

## 2019-11-26 PROCEDURE — 82948 REAGENT STRIP/BLOOD GLUCOSE: CPT

## 2019-11-26 PROCEDURE — 80048 BASIC METABOLIC PNL TOTAL CA: CPT | Performed by: PHYSICIAN ASSISTANT

## 2019-11-26 PROCEDURE — 85025 COMPLETE CBC W/AUTO DIFF WBC: CPT | Performed by: PHYSICIAN ASSISTANT

## 2019-11-26 RX ADMIN — INSULIN GLARGINE 50 UNITS: 100 INJECTION, SOLUTION SUBCUTANEOUS at 09:14

## 2019-11-26 RX ADMIN — GABAPENTIN 700 MG: 400 CAPSULE ORAL at 09:09

## 2019-11-26 RX ADMIN — PANTOPRAZOLE SODIUM 40 MG: 40 TABLET, DELAYED RELEASE ORAL at 09:09

## 2019-11-26 RX ADMIN — PYRIDOSTIGMINE BROMIDE 30 MG: 60 TABLET ORAL at 17:13

## 2019-11-26 RX ADMIN — PANTOPRAZOLE SODIUM 40 MG: 40 TABLET, DELAYED RELEASE ORAL at 17:11

## 2019-11-26 RX ADMIN — RANOLAZINE 1000 MG: 500 TABLET, FILM COATED, EXTENDED RELEASE ORAL at 22:00

## 2019-11-26 RX ADMIN — PYRIDOSTIGMINE BROMIDE 30 MG: 60 TABLET ORAL at 10:22

## 2019-11-26 RX ADMIN — INSULIN LISPRO 1 UNITS: 100 INJECTION, SOLUTION INTRAVENOUS; SUBCUTANEOUS at 14:32

## 2019-11-26 RX ADMIN — LORAZEPAM 1 MG: 1 TABLET ORAL at 09:09

## 2019-11-26 RX ADMIN — INSULIN LISPRO 15 UNITS: 100 INJECTION, SOLUTION INTRAVENOUS; SUBCUTANEOUS at 14:32

## 2019-11-26 RX ADMIN — INSULIN LISPRO 1 UNITS: 100 INJECTION, SOLUTION INTRAVENOUS; SUBCUTANEOUS at 17:12

## 2019-11-26 RX ADMIN — GABAPENTIN 700 MG: 400 CAPSULE ORAL at 17:11

## 2019-11-26 RX ADMIN — INSULIN LISPRO 15 UNITS: 100 INJECTION, SOLUTION INTRAVENOUS; SUBCUTANEOUS at 17:13

## 2019-11-26 RX ADMIN — VILAZODONE HYDROCHLORIDE 40 MG: 20 TABLET ORAL at 10:23

## 2019-11-26 RX ADMIN — ENOXAPARIN SODIUM 40 MG: 40 INJECTION SUBCUTANEOUS at 09:09

## 2019-11-26 RX ADMIN — LORAZEPAM 1 MG: 1 TABLET ORAL at 22:00

## 2019-11-26 RX ADMIN — PYRIDOSTIGMINE BROMIDE 30 MG: 60 TABLET ORAL at 22:00

## 2019-11-26 RX ADMIN — GABAPENTIN 700 MG: 400 CAPSULE ORAL at 22:00

## 2019-11-26 RX ADMIN — ATORVASTATIN CALCIUM 80 MG: 40 TABLET, FILM COATED ORAL at 17:11

## 2019-11-26 RX ADMIN — RANOLAZINE 1000 MG: 500 TABLET, FILM COATED, EXTENDED RELEASE ORAL at 09:09

## 2019-11-26 RX ADMIN — CLOPIDOGREL BISULFATE 75 MG: 75 TABLET ORAL at 09:09

## 2019-11-26 RX ADMIN — SODIUM CHLORIDE 75 ML/HR: 0.9 INJECTION, SOLUTION INTRAVENOUS at 04:01

## 2019-11-26 RX ADMIN — INSULIN LISPRO 15 UNITS: 100 INJECTION, SOLUTION INTRAVENOUS; SUBCUTANEOUS at 09:17

## 2019-11-26 RX ADMIN — Medication 125 MG: at 17:24

## 2019-11-26 RX ADMIN — Medication 125 MG: at 14:02

## 2019-11-26 RX ADMIN — ASPIRIN 81 MG 81 MG: 81 TABLET ORAL at 09:08

## 2019-11-26 NOTE — ASSESSMENT & PLAN NOTE
Patient reports about 4 days of periumbilical abdominal pain, starting as a cramping feeling now a dull aching pain  Patient was experiencing 2-3 episodes of diarrhea starting the same day he noticed abdominal pain, pain relieved with BM  Pain 9/10 on 11/24 before admission, has not experienced the same pain since then   Continues to report periumbilical abdominal pain that is relieved with BM rated 3/10    CT abd/pelvis 11/25: 1  No acute abnormality in the abdomen or pelvis  2   Stable 3 5 cm infrarenal abdominal aortic aneurysm      See plan for C  diff

## 2019-11-26 NOTE — ASSESSMENT & PLAN NOTE
Pt syncopized AM of 11/25, transported by EMS who report SBP of 90  History of orthostatic hypotension treated with Mestinon  Prior ED evals for orthostasis related weakness in April, May  Echo in April ordered due to similar symptoms,   ED eval:  · Troponin 0 27, will continue to trend with consideration of chronic non-specific troponin elevation  · EKG: Normal sinus rhythm at a rate of 96 beats per minute   Right bundle   branch block   No ST T wave abnormalities   Similar to previous from   10/18/2019  · Vitals stable  · CT head/neck: Without abnormalities  · Low clinical suspicion for neuro/cardiac causes of syncope due to lack of symptoms prior to syncope and history of orthostasis and recent GI illness    Concern for orthostatic hypotension in the setting of dehydration  · 5 days of nausea and diarrhea with 2-3 BM/day  · Reports decrease in PO intake    Continue gentle IVF, regular DM diet as tolerated  Dehydration status improving  Continue to encourage PO fluid intake  Positive C  Diff -- started PO vanco 125mg Q6 x10 days, see plan below  Improvement in WBC to 11 88, down from 14 yesterday  No longer appears dry on exam, no further concern for acute kidney injury  Stable for discharge with continuation of p o   Antibiotics outpatient
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
## 2019-11-26 NOTE — ASSESSMENT & PLAN NOTE
Pt syncopized AM of 11/25, transported by EMS who report SBP of 90  History of orthostatic hypotension treated with Mestinon  Prior ED evals for orthostasis related weakness in April, May  Echo in April ordered due to similar symptoms,   ED eval:  · Troponin 0 27, will continue to trend with consideration of chronic non-specific troponin elevation  · EKG: Normal sinus rhythm at a rate of 96 beats per minute   Right bundle   branch block   No ST T wave abnormalities   Similar to previous from   10/18/2019  · Vitals stable  · CT head/neck: Without abnormalities  · Low clinical suspicion for neuro/cardiac causes of syncope due to lack of symptoms prior to syncope and history of orthostasis and recent GI illness    Concern for orthostatic hypotension in the setting of dehydration  · 5 days of nausea and diarrhea with 2-3 BM/day  · Reports decrease in PO intake    Continue gentle IVF, full DM diet as tolerated  Dehydration status improving  Continue to encourage PO fluid intake  Positive C   Diff -- started PO vanco 125mg Q6 x10 days, see plan below  Goal of discharge in 24h with improvement in dehydration status and pending C diff results

## 2019-11-26 NOTE — ASSESSMENT & PLAN NOTE
Lab Results   Component Value Date    HGBA1C 7 4 (H) 11/25/2019       Recent Labs     11/25/19 2053 11/25/19  2146 11/26/19  0807 11/26/19  1125   POCGLU 70 131 134 191*       Blood Sugar Average: Last 72 hrs:  (P) 146 5   Discontinue oral hypoglycemics during admission  Adjusted home dosing to 50U long-acting basal dose and 15U rapid with meal  Plan to resume home dose of 100U basal dose, but will consider maintaining the mealtime 15U due to overall decreased p o  Intake at meals    Will recommend close follow-up with PCP to adjust mealtime dosing as needed  Continue POC glucose checks QAM and before meals  Hemoglobin A1c this admission 7 4  Continue diabetic diet

## 2019-11-26 NOTE — PROGRESS NOTES
[address] [facility]  Progress Note - Rajan Bal 1940, [address] y o  male MRN: [id number]  Unit/Bed#: S -[place] Encounter: [id number]  Primary Care Provider: Bess Uriostegui DO   Date and time admitted to hospital: 11/25/2019  6:18 AM      Syncope and collapse  Assessment & Plan  Pt syncopized AM of 11/25, transported by EMS who report SBP of 90  History of orthostatic hypotension treated with Mestinon  Prior ED evals for orthostasis related weakness in April, May  Echo in April ordered due to similar symptoms,   ED eval:  · Troponin 0 27, will continue to trend with consideration of chronic non-specific troponin elevation  · EKG: Normal sinus rhythm at a rate of 96 beats per minute   Right bundle   branch block   No ST T wave abnormalities   Similar to previous from   10/18/2019  · Vitals stable  · CT head/neck: Without abnormalities  · Low clinical suspicion for neuro/cardiac causes of syncope due to lack of symptoms prior to syncope and history of orthostasis and recent GI illness    Concern for orthostatic hypotension in the setting of dehydration  · 5 days of nausea and diarrhea with 2-3 BM/day  · Reports decrease in PO intake    Continue gentle IVF, full DM diet as tolerated  Dehydration status improving  Continue to encourage PO fluid intake  Positive C  Diff -- started PO vanco 125mg Q6 x10 days, see plan below  Goal of discharge in 24h with improvement in dehydration status and pending C diff results       * C  difficile diarrhea  Assessment & Plan  C diff positive  Day one of PO vanco 125mg Q6 x10d  11/26: Will need additional stay for initiation of C  Diff treatment to monitor his response and hydration status  If continued improvement of WBC and few episodes of diarrhea, may consider discharge in 24h    Monitor I/Os  Added PRN zofran for nausea  PO cardiac/diabetic diet as tolerated, encourage PO fluids    Sepsis ([facility] [address] )  Assessment & Plan  Meets sepsis criteria: HR >90s since admission, WBC > 12,000, history of 3-4 days of diarrhea, C diff (+)  - Blood culture: no growth at 24h, will follow updates   - WBC improving, 11/26: 14,000  Will continue to monitor during admission   - Vancomycin added after C diff positive result, will continue 125mg PO Q6 x 10d  - Continue IVF NS 75mL/hr        Acute kidney injury superimposed on CKD [facility])  Assessment & Plan  Baseline Cr appears for be 1 0-1 1  Cr 1 16 today with eGFR 59, both improved from yesterday  Patient reports decreased PO intake x3 days prior to admit  Does appear to be dehydrated  Encourage PO fluids, continue NS 75ml/h IVF  Avoid nephrotoxic agents    Will continue to monitor BMP while admitted        Abdominal pain, periumbilical  Assessment & Plan  Patient reports about 4 days of periumbilical abdominal pain, starting as a cramping feeling now a dull aching pain  Patient was experiencing 2-3 episodes of diarrhea starting the same day he noticed abdominal pain, pain relieved with BM  Pain 9/10 on 11/24 before admission, has not experienced the same pain since then   Continues to report periumbilical abdominal pain that is relieved with BM rated 3/10    CT abd/pelvis 11/25: 1  No acute abnormality in the abdomen or pelvis  2   Stable 3 5 cm infrarenal abdominal aortic aneurysm      See plan for C  diff      Type 2 diabetes mellitus with hyperglycemia, with long-term current use of insulin [facility])  Assessment & Plan  Lab Results   Component Value Date    HGBA1C 7 4 (H) 11/25/2019       Recent Labs     11/25/19  2053 11/25/19  2146 11/26/19  0807 11/26/19  1125   POCGLU 70 131 134 191*       Blood Sugar Average: Last 72 hrs:  (P) 146 5   Discontinue oral hypoglycemics during admission  Adjusted home dosing to 50U long-acting basal dose and 15U rapid before meal  Continue POC glucose checks QAM and before meals  Last HgA1C in July 2019, will recheck this admission  Continue diabetic diet    Obesity  Assessment & Plan  Discuss diet and lifestyle modifications, managing risk factors  Continue outpatient follow up with PCP         VTE Pharmacologic Prophylaxis:   Pharmacologic: Enoxaparin (Lovenox)  Mechanical VTE Prophylaxis in Place: No    Patient Centered Rounds: I have performed bedside rounds with nursing staff today  Discussions with Specialists or Other Care Team Provider:  Discussed with case management the need to involve PT and OT, as patient was noted to be unsteady on his feet when ambulating with nursing assistance  Education and Discussions with Family / Patient: Discussed goals of care with patient  Reviewed why C diff test was ordered  Discussed that we will continue gentle IV fluids and encouraging p o  Intake to improve with hydration status  Discussed improvement of Cr from yesterday  He denies feeling dizzy or lightheaded at rest or with ambulation  We discussed the need for additional inpatient treatment due to his C diff positive result, and that we will start antibiotics here and monitor his response with the possibility of continuing outpatient antibiotic therapy if hydration status improved and white count continues to go down  Time Spent for Care: 20 minutes  More than 50% of total time spent on counseling and coordination of care as described above  Current Length of Stay: 0 day(s)    Current Patient Status: Inpatient   Certification Statement: The patient, admitted on an observation basis, will now require > 2 midnight hospital stay due to Inpatient management of C  diff infection, sepsis, and IVF for dehydration    Discharge Plan: Patient came from home, plan to return home  :  Discharge within the next 24 hours, pending improvement of hydration status and resolution of DEZ  Code Status: Level 1 - Full Code      Subjective:   Patient reports overall improvement from yesterday    He had no problems eating dinner last night, although he is still hesitant to eat because he continues to feel nauseous  His abdominal pain is improved from yesterday, he rates it as a 3/10 and still relieved with BM  He had 1 episode of diarrhea last evening and denies blood or mucus in the stool  He has had no issues ambulating with nursing assistance, and denies lightheadedness or dizziness at rest or with activity  Objective:     Vitals:   Temp (24hrs), Av 1 °F (36 7 °C), Min:98 °F (36 7 °C), Max:98 2 °F (36 8 °C)    Temp:  [98 °F (36 7 °C)-98 2 °F (36 8 °C)] 98 °F (36 7 °C)  HR:  [88-94] 88  Resp:  [16-18] 16  BP: ()/(50-63) 98/50  SpO2:  [93 %-94 %] 94 %  Body mass index is 32 kg/m²  Input and Output Summary (last 24 hours):     No intake or output data in the 24 hours ending 19 1613    Physical Exam:     Physical Exam   Constitutional: He is oriented to person, place, and time  He appears well-developed and well-nourished  HENT:   Head: Normocephalic and atraumatic  Neck: Normal range of motion  Cardiovascular: Normal rate, regular rhythm, normal heart sounds and intact distal pulses  Pulmonary/Chest: Effort normal and breath sounds normal  No respiratory distress  He has no wheezes  He has no rales  Abdominal: Soft  Bowel sounds are normal  He exhibits no distension  There is tenderness (Mildly TTP in periumbilical region)  There is no rebound and no guarding  Musculoskeletal: He exhibits no edema or tenderness  Lymphadenopathy:     He has no cervical adenopathy  Neurological: He is alert and oriented to person, place, and time  Skin: Skin is warm and dry  Capillary refill takes less than 2 seconds  No rash noted  No erythema  Psychiatric: He has a normal mood and affect           Additional Data:     Labs:    Results from last 7 days   Lab Units 19  0518   WBC Thousand/uL 14 88*   HEMOGLOBIN g/dL 11 4*   HEMATOCRIT % 35 0*   PLATELETS Thousands/uL 247   NEUTROS PCT % 81*   LYMPHS PCT % 6*   MONOS PCT % 9   EOS PCT % 3 Results from last 7 days   Lab Units 11/26/19  0518 11/25/19  0726   SODIUM mmol/L 136 140   POTASSIUM mmol/L 3 8 4 6   CHLORIDE mmol/L 102 102   CO2 mmol/L 25 30   BUN mg/dL 21 26*   CREATININE mg/dL 1 16 1 35*   ANION GAP mmol/L 9 8   CALCIUM mg/dL 8 1* 8 9   ALBUMIN g/dL  --  3 1*   TOTAL BILIRUBIN mg/dL  --  0 59   ALK PHOS U/L  --  66   ALT U/L  --  27   AST U/L  --  19   GLUCOSE RANDOM mg/dL 140 189*     Results from last 7 days   Lab Units 11/25/19  0726   INR  1 10     Results from last 7 days   Lab Units 11/26/19  1125 11/26/19  0807 11/25/19  2146 11/25/19  2053 11/25/19  1547 11/25/19  1410   POC GLUCOSE mg/dl 191* 134 131 70 134 219*     Results from last 7 days   Lab Units 11/25/19  0726   HEMOGLOBIN A1C % 7 4*     Results from last 7 days   Lab Units 11/25/19  0727   LACTIC ACID mmol/L 1 8           * I Have Reviewed All Lab Data Listed Above  * Additional Pertinent Lab Tests Reviewed: [address] Admission Reviewed    Imaging:    Imaging Reports Reviewed Today Include: None  Imaging Personally Reviewed by Myself Includes:  None    Recent Cultures (last 7 days):     Results from last 7 days   Lab Units 11/25/19  1247 11/25/19  0813 11/25/19  0726   BLOOD CULTURE   --  No Growth at 24 hrs  No Growth at 24 hrs  C DIFF TOXIN B  POSITIVE for C difficle toxin by PCR  *  --   --        Last 24 Hours Medication List:     Current Facility-Administered Medications:  acetaminophen 650 mg Oral Q6H PRN TANISHA Martel    albuterol 2 puff Inhalation Q6H PRN Benjamin Bladimir, PA-C    aspirin 81 mg Oral Daily Chantal Cuvea MD    atorvastatin 80 mg Oral Daily With Dinner Benjamin Bladimir, PA-C    clopidogrel 75 mg Oral Daily [place] Bladimir, PA-C    enoxaparin 40 mg Subcutaneous Daily Benjamin Bladimir, PA-C    gabapentin 700 mg Oral TID Benjamin Bladimir, PA-C    insulin glargine 50 Units Subcutaneous QAM Benjamin Bladimir, PA-C    insulin lispro 1-5 Units Subcutaneous TID AC [place] Bladimir, PA-C    insulin lispro 1-5 Units Subcutaneous HS Saskia BrennanTRAM live    insulin lispro 15 Units Subcutaneous TID With Meals Saskia Amin PA-C    LORazepam 1 mg Oral BID Teri Nice MD    nitroglycerin 0 4 mg Sublingual Q5 Min PRN Al[place] Brennan, TRAM    ondansetron 4 mg Oral Q6H PRN Alfornia MemphisTRAM live    pantoprazole 40 mg Oral BID Alfornia BrennanTRAM live    pyridostigmine 30 mg Oral TID Alfornia BrennanTRAM live    ranolazine 1,000 mg Oral BID Teri Nice MD    sodium chloride 1,000 mL Intravenous Once Saskia Amin PA-C    sodium chloride 75 mL/hr Intravenous Continuous Saskia Amin PA-C Last Rate: 75 mL/hr (11/26/19 0401)   vancomycin 125 mg Oral Q6H [facility] HOME Saskia Amin PA-C    vilazodone 40 mg Oral Daily Saskia Amin PA-C         Today, Patient Was Seen By: Saskia Amni PA-C    ** Please Note: Dictation voice to text software may have been used in the creation of this document   **    ** Addendum 11/26 @ 4:07pm: Included documentation for pt meeting sepsis criteria

## 2019-11-26 NOTE — SOCIAL WORK
LOS: 0  Readmission: No  Bundle: No    Consult(s): None at this time  Met with pt to introduce Cm services and complete assessment  Obs status explained to patient, patient signed obs form, copy given to pt and copy sent to medical records for scanning  Pt reports emergency contacts are as follows and verbalized agreement with staff calling them as needed:  1  Wife Juana Blair cell [phone]    Pt reported the following:     Lives with/where: wife in their house in [place], but they just bought a new house 5 miles away in same town and closing is on 12/20/19 so they will be moving next month   House/apt and PETE: current house is 2 story with 3 PETE; new home will be a ranch  [employer]: currently both on 2nd floor and pt able to use steps holding the railing; new home will be all on same 1st floor level   Independent PTA: Yes, able to complete all ADLs on his own   Support and help at home from: wife is supportive   DME at home: YES - has walker and reports using it for ambulation  Denies any other DME   Current or hx of VNA: YES - hx VNA 6 years ago s/p open heart surgery but can't recall provider; denies any current in home services or need for services   Current or hx of PT/rehab: YES - hx IP rehab s/p open heart surgery but can't recall facility   Transportation: he and wife both drive and have cars  She is able to transport upon d/c    Mental health: YES - currently treated for anxiety by PCP   Substance use: denies   PCP: Alvaro Gipson   Work/school/retired: Retired   Rx benefits: Yes and can afford all Rx copays   Preferred pharmacy: [facility] in [address] [place] [address]: Denies and declined info  Identified his wife as health care rep at this time       CM reviewed discharge planning process including the following: identifying caregivers at home, preference for d/c planning needs, availability of [facility] to [facility] program, availability of treatment team to discuss questions or concerns patient and/or family may have regarding diagnosis, plan of care, old or new medications and discharge planning  CM will continue to follow for care coordination and update assessment as appropriate  PT/OT eval pending  CM dept will f/u with pt regarding recommendations

## 2019-11-26 NOTE — ASSESSMENT & PLAN NOTE
C diff positive  Day 2 of PO vanco 125mg Q6 x10d  11/27:  Improvement in white count, no further concern for acute kidney injury, does not appear dry on exam    Appropriate to continue antibiotic treatment as outpatient, stable for discharge today  Continue to encourage fluid intake

## 2019-11-26 NOTE — ASSESSMENT & PLAN NOTE
Baseline Cr appears for be 1 0-1 1  CR 1 1 today  Improvement with increased p o   Fluid intake and gentle IVF

## 2019-11-26 NOTE — ASSESSMENT & PLAN NOTE
Met sepsis criteria: HR >90s since admission, WBC > 12,000, history of 3-4 days of diarrhea, C diff (+)    - Blood culture: no growth at 24h    - WBC improving, 11/27:  11 88  - Vancomycin added after C diff positive result, will continue 125mg PO Q6 x 10d  - Will discontinue IV fluid

## 2019-11-26 NOTE — ASSESSMENT & PLAN NOTE
Baseline Cr appears for be 1 0-1 1  Cr 1 16 today with eGFR 59, both improved from yesterday  Patient reports decreased PO intake x3 days prior to admit  Does appear to be dehydrated  Encourage PO fluids, continue NS 75ml/h IVF  Avoid nephrotoxic agents    Will continue to monitor BMP while admitted

## 2019-11-26 NOTE — ASSESSMENT & PLAN NOTE
Patient reports about 4 days of periumbilical abdominal pain, starting as a cramping feeling now a dull aching pain  Patient was experiencing 2-3 episodes of diarrhea starting the same day he noticed abdominal pain, pain relieved with BM  Pain 9/10 on 11/24 before admission, has not experienced the same pain since then   Continues to report mild cramping relieved with BM    CT abd/pelvis 11/25: 1  No acute abnormality in the abdomen or pelvis  2   Stable 3 5 cm infrarenal abdominal aortic aneurysm      See plan for C  diff

## 2019-11-26 NOTE — ASSESSMENT & PLAN NOTE
Lab Results   Component Value Date    HGBA1C 7 4 (H) 11/25/2019       Recent Labs     11/25/19  1547 11/25/19  2053 11/25/19  2146 11/26/19  0807   POCGLU 134 70 131 134       Blood Sugar Average: Last 72 hrs:  (P) 137 6   Discontinue oral hypoglycemics during admission  Adjusted home dosing to 50U long-acting basal dose and 15U rapid before meal  Continue POC glucose checks QAM and before meals  Last HgA1C in July 2019, will recheck this admission  Continue diabetic diet

## 2019-11-26 NOTE — ASSESSMENT & PLAN NOTE
C diff positive  Day one of PO vanco 125mg Q6 x10d  11/26: Will need additional stay for initiation of C  Diff treatment to monitor his response and hydration status  If continued improvement of WBC and few episodes of diarrhea, may consider discharge in 24h    Monitor I/Os  Added PRN zofran for nausea  PO cardiac/diabetic diet as tolerated, encourage PO fluids

## 2019-11-27 PROBLEM — N17.9 ACUTE KIDNEY INJURY SUPERIMPOSED ON CKD (HCC): Status: RESOLVED | Noted: 2018-04-11 | Resolved: 2019-11-27

## 2019-11-27 PROBLEM — N18.9 ACUTE KIDNEY INJURY SUPERIMPOSED ON CKD (HCC): Status: RESOLVED | Noted: 2018-04-11 | Resolved: 2019-11-27

## 2019-11-27 LAB
ANION GAP SERPL CALCULATED.3IONS-SCNC: 8 MMOL/L (ref 4–13)
BUN SERPL-MCNC: 21 MG/DL (ref 5–25)
CALCIUM SERPL-MCNC: 7.7 MG/DL (ref 8.3–10.1)
CHLORIDE SERPL-SCNC: 109 MMOL/L (ref 100–108)
CO2 SERPL-SCNC: 24 MMOL/L (ref 21–32)
CREAT SERPL-MCNC: 1.11 MG/DL (ref 0.6–1.3)
ERYTHROCYTE [DISTWIDTH] IN BLOOD BY AUTOMATED COUNT: 14.7 % (ref 11.6–15.1)
GFR SERPL CREATININE-BSD FRML MDRD: 62 ML/MIN/1.73SQ M
GLUCOSE SERPL-MCNC: 169 MG/DL (ref 65–140)
GLUCOSE SERPL-MCNC: 196 MG/DL (ref 65–140)
GLUCOSE SERPL-MCNC: 71 MG/DL (ref 65–140)
GLUCOSE SERPL-MCNC: 79 MG/DL (ref 65–140)
GLUCOSE SERPL-MCNC: 81 MG/DL (ref 65–140)
HCT VFR BLD AUTO: 34.2 % (ref 36.5–49.3)
HGB BLD-MCNC: 10.9 G/DL (ref 12–17)
MCH RBC QN AUTO: 31.5 PG (ref 26.8–34.3)
MCHC RBC AUTO-ENTMCNC: 31.9 G/DL (ref 31.4–37.4)
MCV RBC AUTO: 99 FL (ref 82–98)
PLATELET # BLD AUTO: 232 THOUSANDS/UL (ref 149–390)
PMV BLD AUTO: 10.3 FL (ref 8.9–12.7)
POTASSIUM SERPL-SCNC: 3.9 MMOL/L (ref 3.5–5.3)
RBC # BLD AUTO: 3.46 MILLION/UL (ref 3.88–5.62)
SODIUM SERPL-SCNC: 141 MMOL/L (ref 136–145)
WBC # BLD AUTO: 11.88 THOUSAND/UL (ref 4.31–10.16)

## 2019-11-27 PROCEDURE — G8987 SELF CARE CURRENT STATUS: HCPCS

## 2019-11-27 PROCEDURE — 97163 PT EVAL HIGH COMPLEX 45 MIN: CPT

## 2019-11-27 PROCEDURE — G8988 SELF CARE GOAL STATUS: HCPCS

## 2019-11-27 PROCEDURE — 85027 COMPLETE CBC AUTOMATED: CPT | Performed by: PHYSICIAN ASSISTANT

## 2019-11-27 PROCEDURE — 82948 REAGENT STRIP/BLOOD GLUCOSE: CPT

## 2019-11-27 PROCEDURE — 99232 SBSQ HOSP IP/OBS MODERATE 35: CPT | Performed by: PHYSICIAN ASSISTANT

## 2019-11-27 PROCEDURE — G8979 MOBILITY GOAL STATUS: HCPCS

## 2019-11-27 PROCEDURE — 97110 THERAPEUTIC EXERCISES: CPT

## 2019-11-27 PROCEDURE — G8978 MOBILITY CURRENT STATUS: HCPCS

## 2019-11-27 PROCEDURE — 97167 OT EVAL HIGH COMPLEX 60 MIN: CPT

## 2019-11-27 PROCEDURE — 80048 BASIC METABOLIC PNL TOTAL CA: CPT | Performed by: PHYSICIAN ASSISTANT

## 2019-11-27 RX ADMIN — LORAZEPAM 1 MG: 1 TABLET ORAL at 20:19

## 2019-11-27 RX ADMIN — GABAPENTIN 700 MG: 400 CAPSULE ORAL at 16:33

## 2019-11-27 RX ADMIN — INSULIN LISPRO 15 UNITS: 100 INJECTION, SOLUTION INTRAVENOUS; SUBCUTANEOUS at 17:28

## 2019-11-27 RX ADMIN — Medication 125 MG: at 23:38

## 2019-11-27 RX ADMIN — Medication 125 MG: at 06:19

## 2019-11-27 RX ADMIN — CLOPIDOGREL BISULFATE 75 MG: 75 TABLET ORAL at 07:57

## 2019-11-27 RX ADMIN — GABAPENTIN 700 MG: 400 CAPSULE ORAL at 20:19

## 2019-11-27 RX ADMIN — ACETAMINOPHEN 650 MG: 325 TABLET, FILM COATED ORAL at 16:32

## 2019-11-27 RX ADMIN — RANOLAZINE 1000 MG: 500 TABLET, FILM COATED, EXTENDED RELEASE ORAL at 20:19

## 2019-11-27 RX ADMIN — SODIUM CHLORIDE 75 ML/HR: 0.9 INJECTION, SOLUTION INTRAVENOUS at 06:19

## 2019-11-27 RX ADMIN — RANOLAZINE 1000 MG: 500 TABLET, FILM COATED, EXTENDED RELEASE ORAL at 07:57

## 2019-11-27 RX ADMIN — ENOXAPARIN SODIUM 40 MG: 40 INJECTION SUBCUTANEOUS at 07:58

## 2019-11-27 RX ADMIN — Medication 125 MG: at 17:46

## 2019-11-27 RX ADMIN — Medication 125 MG: at 12:23

## 2019-11-27 RX ADMIN — ASPIRIN 81 MG 81 MG: 81 TABLET ORAL at 07:57

## 2019-11-27 RX ADMIN — PANTOPRAZOLE SODIUM 40 MG: 40 TABLET, DELAYED RELEASE ORAL at 17:46

## 2019-11-27 RX ADMIN — ATORVASTATIN CALCIUM 80 MG: 40 TABLET, FILM COATED ORAL at 16:32

## 2019-11-27 RX ADMIN — PYRIDOSTIGMINE BROMIDE 30 MG: 60 TABLET ORAL at 16:34

## 2019-11-27 RX ADMIN — PYRIDOSTIGMINE BROMIDE 30 MG: 60 TABLET ORAL at 20:20

## 2019-11-27 RX ADMIN — PANTOPRAZOLE SODIUM 40 MG: 40 TABLET, DELAYED RELEASE ORAL at 07:57

## 2019-11-27 RX ADMIN — PYRIDOSTIGMINE BROMIDE 30 MG: 60 TABLET ORAL at 07:56

## 2019-11-27 RX ADMIN — INSULIN LISPRO 1 UNITS: 100 INJECTION, SOLUTION INTRAVENOUS; SUBCUTANEOUS at 12:24

## 2019-11-27 RX ADMIN — VILAZODONE HYDROCHLORIDE 40 MG: 20 TABLET ORAL at 07:57

## 2019-11-27 RX ADMIN — LORAZEPAM 1 MG: 1 TABLET ORAL at 07:56

## 2019-11-27 RX ADMIN — INSULIN LISPRO 1 UNITS: 100 INJECTION, SOLUTION INTRAVENOUS; SUBCUTANEOUS at 17:29

## 2019-11-27 RX ADMIN — INSULIN LISPRO 15 UNITS: 100 INJECTION, SOLUTION INTRAVENOUS; SUBCUTANEOUS at 12:24

## 2019-11-27 RX ADMIN — Medication 125 MG: at 01:00

## 2019-11-27 RX ADMIN — INSULIN GLARGINE 50 UNITS: 100 INJECTION, SOLUTION SUBCUTANEOUS at 07:58

## 2019-11-27 RX ADMIN — GABAPENTIN 700 MG: 400 CAPSULE ORAL at 07:57

## 2019-11-27 NOTE — PLAN OF CARE
Problem: PHYSICAL THERAPY ADULT  Goal: Performs mobility at highest level of function for planned discharge setting  See evaluation for individualized goals  Description  Treatment/Interventions: ADL retraining, Functional transfer training, LE strengthening/ROM, Elevations, Therapeutic exercise, Endurance training, Patient/family training, Equipment eval/education, Bed mobility, Gait training  Equipment Recommended: Other (Comment)(rolling walker, commode)       See flowsheet documentation for full assessment, interventions and recommendations  Note:   Prognosis: Fair  Problem List: Decreased strength, Decreased endurance, Impaired balance, Decreased mobility, Decreased coordination, Decreased safety awareness, Obesity  Assessment: Pt tolerated treatment well  Required use of commode during session, to which pt was able to self-correct hand placement for sit to stand transfers with use of rolling walker  Pt provided both verbal instruction and demonstration for therapeutic exercises, which patient was able to demonstrate properly back without verbal or tactile cues  Mr Collins Mai continues to be appropriate to received skilled inpatient PT until time of DC  Recommend in upcoming sessions to continue therapeutic exercises, transfer, and gait training in order to decrease risk of falls and improve level of independence  Barriers to Discharge Comments: Inaccessible home environment, required physical A for all phases of mobility, decreased safety awareness of deficits  Recommendation: Short-term skilled PT          See flowsheet documentation for full assessment

## 2019-11-27 NOTE — SOCIAL WORK
CM was notified by [facility] that patient is medically cleared for discharge, pending PT/OT Eval     PT recommends STR  CM met with patient and spouse Oscar Mendoza at bedside to discuss the PT recommendations for short term rehab  Spouse states that they are in the process of downsizing from a 2 story home to a 1 story home, move in date is scheduled for the first week in December  Patient and spouse in agreement with the PT recommendation for STR  CM provided patient and spouse with a list of facilities for short term rehab  Patient reports a history at [facility], states "the food is good" and chooses [facility] as 1st choice  After reviewing the list, patient and spouse choose [address] second choice and [facility] third choice  Referrals placed in Allscripts per patient/spouse request   Awaiting responses  CM will continue to follow

## 2019-11-27 NOTE — SOCIAL WORK
CM met with patient and spouse at bedside, patient and spouse [id number] upon a recommendation from a friend    CM placed referral in Allscripts per patient/spouse request

## 2019-11-27 NOTE — SOCIAL WORK
CM checked notifications in Allscripts regarding patient's choices for STR   1st choice [facility] currently does not have male bed availability, possibly by Saturday they will have an opening  2nd choice [address] has not responded  Third choice [facility] is unable to accept due to the Cdiff precautions  CM met again with spouse and patient to discuss and to gather additional choices, and after reviewing the list of SNF provided, spouse/patient choose [facility] as 4th choice and [facility] as final choice  Both are currently reviewing  CM will continue to follow and assist through discharge

## 2019-11-27 NOTE — PHYSICAL THERAPY NOTE
PHYSICAL THERAPY EVALUATION NOTE    Patient Name: Poonam Fairbanks  RQKPE'S Date: 11/27/2019    AGE:   [de-identified] y o  Mrn:   [id number]  ADMIT DX:  Injury [T14 90XA]  Elevated troponin [R79 89]  DEZ (acute kidney injury) ([facility] ) [N17 9]  Syncope, unspecified syncope type [R55]    Past Medical History:   Diagnosis Date    AAA (abdominal aortic aneurysm) ([place] [address] )     Aneurysm of infrarenal abdominal aorta ([place] [address] ) 10/23/2012    Anxiety     Benign essential hypertension     CKD (chronic kidney disease) stage 2, GFR 60-89 ml/min 4/11/2018    Compression fracture of twelfth thoracic vertebra ([place] [address] ) 1/2/2019    Coronary artery disease involving native coronary artery of native heart without angina pectoris 1/6/2014    Description: 50% distal left main, 75% proximal LAD, 90% 1st diagonal, 50% ostial circumflex, 99% proximal circumflex, 90% 2nd OM, 30 mid RCA , 90% right posterolateral - left heart catheterization December 2013      Diabetes mellitus ([place] [address] )     DVT, lower extremity ([place] [address] )     Dyslipidemia 9/24/2012    Essential tremor 4/2/2019    GERD (gastroesophageal reflux disease)     Hyperlipidemia     Hypertension     Stopped his Medications because of Orthostatic hypotension    NSTEMI (non-ST elevated myocardial infarction) ([place] [address] )     Obesity 7/13/2017    Orthostatic hypotension     Prostate cancer (HCC)     Right bundle branch block (RBBB)     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    LIMA to LAD, SVG to OM1, SVG to distal RCA    Skin cancer     Type 2 diabetes mellitus with hyperglycemia, with long-term current use of insulin ([place] [address] ) 7/3/2017    Vitamin D deficiency 2/19/2018     Length Of Stay: 1  PHYSICAL THERAPY EVALUATION :        11/27/19 1137   Pain Assessment   Pain Assessment No/denies pain   Pain Score No Pain   Home Living   Type of [address] [facility] Two level;Stairs to enter without rails  (pt and wife report unable to live on 1st floor (no bed))   Home Equipment Other (Comment)  (rolling walker)   Additional Comments Pt and wife are moving to ranch home with no PETE in 2-3 weeks   Prior Function   Level of [place] Independent with ADLs and functional mobility   Lives With Spouse   ADL Assistance Independent   IADLs Independent   Falls in the last 6 months 1 to 4  (see below)   Vocational Retired   Comments Pt and wife (+) drive  Pt admitted for fall  Pt and wife both unable to recall number of falls in the last 6 months  Wife stated with fall leading to admit, she was unable to physically assist pt off of floor  Restrictions/Precautions   Other Precautions Contact/isolation; Chair Alarm; Bed Alarm;Multiple lines; Fall Risk   General   Additional Pertinent History Pt admitted s/p fall and (+) GI symptoms  Dx w/ C diff    Family/Caregiver Present Yes  (Wife arrived [facility] through session)   Cognition   Arousal/Participation Cooperative   Orientation Level Oriented X4   Following Commands Follows one step commands without difficulty   Comments Pt identified by full name and   He is agreeable to PT session  RUE Assessment   RUE Assessment WFL   RUE Strength   R Shoulder Flexion 3+/5   R Elbow Flexion 3+/5   R Elbow Extension 3+/5   LUE Assessment   LUE Assessment WFL   RLE Assessment   RLE Assessment WFL   Strength RLE   RLE Overall Strength 4-/5   LLE Assessment   LLE Assessment WFL   Strength LLE   LLE Overall Strength 4-/5   Coordination   Movements are Fluid and Coordinated 0   Coordination and Movement Description Noted B UE tremor with gripping PT's hands, patient also took increased time to perform this task   Light Touch   RLE Light Touch Grossly intact   LLE Light Touch Grossly intact   Bed Mobility   Supine to Sit 3  Moderate assistance   Additional items Assist x 1;HOB elevated; Bedrails; Increased time required;Verbal cues  (required A at trunk )   Transfers   Sit to Stand 4  Minimal assistance   Additional items Assist x 1; Increased time required;Verbal cues  (for hand placement (push from bed))   Stand to Sit 4  Minimal assistance   Additional items Assist x 1; Increased time required;Verbal cues  (for hand placement)   Additional Comments In static standing with walker in front for 3 min, pt demonstrated slight retropulsion ~5 times  Pt able to self-correct occasionally, but required PT and B UEs on walker to steady most times  Pt required mod A to perform marches x 3 B and use of rolling walker for B UE support  Noted retropulsion for half of time in single leg stance, pt able to occasionally self correct, but otherwise required A from PT to correct  Ambulation/Elevation   Gait pattern Retropulsion;Narrow WILD; Decreased foot clearance; Foward flexed; Short stride; Step to;Excessively slow   Gait Assistance 4  Minimal assist   Additional items Assist x 1;Verbal cues   Assistive Device Rolling walker   Distance 5 ft   Stair Management Assistance 3  Moderate assist   Additional items Assist x 1;Verbal cues; Tactile cues  (retropulsion noted)   Number of Stairs   (see comments above)   Balance   Static Sitting Fair -   Static Standing Poor +   Ambulatory Poor +  (w/ rolling walker)   Endurance Deficit   Endurance Deficit Yes   Activity Tolerance   Activity Tolerance Patient limited by fatigue   Medical Staff Made Aware Spoke to Jackie 20   Nurse Made Aware Spoke to Derril Holstein, RN   Assessment   Prognosis Fair   Problem List Decreased strength;Decreased endurance; Impaired balance;Decreased mobility; Decreased coordination;Decreased safety awareness; Obesity   Assessment Camryn Ortega is an [de-identified] y/o male who [facility] s/p fall and (+) GI symptoms  Dx of C difficile diarrhea   Order placed for PT eval and tx, w/ activity order of up and OOB as tolerated, up w/ A and ambulate patient and contact, fall risk precautions   Pt presents w/ comorbidities of DM II w/ long term insulin use, HTN, CKD, CAD, GERD, dyslipidemia, AAA, hx of compression fx of T12 (1/2019), NSTEMI, AAA, prostate cancer, CABG x 3 (2017), and most recently sphincteroplasty urinary (11/6/2019) and personal factors of advanced age, inaccessible home environment, living in 2 story house, mobilizing w/ assistive device, stair(s) to enter home, positive fall history and anxiety  Pt presents w/ weakness, decreased endurance, impaired balance, gait deviations, impaired coordination, decreased safety awareness and fall risk  These impairments are evident in findings from physical examination (weakness), mobility assessment (need for mod to min assist w/ all phases of mobility when usually mobilizing independently, tolerance to only 5 feet of ambulation and need for cueing for mobility technique), and Barthel Index: 40/100  Pt needed input for mobility technique/safety  Pt is at risk for falls due to physical and safety awareness deficits  Pt's clinical presentation is unstable/unpredictable (evident in need for assist w/ all phases of mobility when usually mobilizing independently, tolerance to only 5 feet of ambulation and need for input for mobility technique/safety)  Pt needs inpatient PT tx to improve mobility deficits  Discharge recommendation is for inpatient rehab in order to reduce fall risk and maximize level of functional independence  Recommend OT consult for abovementioned deficits   Barriers to Discharge Comments Inaccessible home environment, required physical A for all phases of mobility, decreased safety awareness of deficits   Goals   Patient Goals "to go home"   STG Expiration Date 12/07/19   Short Term Goal #1 Patient will:  Increase bilateral LE strength 1/2 grade to facilitate independent mobility, Perform all bed mobility tasks independently to decrease fall risk factors, Perform all transfers independently to improve independence, Ambulate at least 100 ft  with roller walker modified independent w/o LOB, Navigate 4 PETE + 1 flight stairs w/ modified independent with unilateral handrail with no retropulsion to facilitate return to previous living environment, Increase all balance 1/2 grade to decrease risk for falls, Complete exercise program independently, Tolerate 3 hr OOB to faciliate upright tolerance, Tolerate standing at least 10 minutes independently with no retropulsion to facilitate functional task performance and Improve Barthel Index score to 65 or greater to facilitate independence   Plan   Treatment/Interventions ADL retraining;Functional transfer training;LE strengthening/ROM; Elevations; Therapeutic exercise; Endurance training;Patient/family training;Equipment eval/education;Gait training;Bed mobility   PT Frequency 5x/wk   Recommendation   Recommendation Short-term skilled PT;OT consult   Equipment Recommended Other (Comment)  (rolling walker, commode)   Barthel Index   Feeding 10   Bathing 0   Grooming Score 0   Dressing Score 5   Bladder Score 5   Bowels Score 5   Toilet Use Score 5   Transfers (Bed/Chair) Score 10   Mobility (Level Surface) Score 0   Stairs Score 0   Barthel Index Score 40     Skilled PT recommended while in hospital and upon DC to progress pt toward treatment goals       Aliyah Potter, PT Developed acute hypercapnic resp failure 5/8 and reintubated on step-down unit likely 2/2 progressive neuromuscular weakness w/ differential described in below problems; now in MICU and remains intubated but awake  - continue CCM management for respiratory failure  - patient currently tolerating CPAP w/ 10cm H2O of pressure support

## 2019-11-27 NOTE — DISCHARGE SUMMARY
* See previous progress note for information regarding physical exam  Discharging Physician / Practitioner: Terese Lopez PA-C  PCP: [facility] AT [facility]   Admission Date:   Admission Orders (From admission, onward)     Ordered        11/26/19 1341  Inpatient Admission  Once         11/25/19 1018  Place in Observation  Once                   Discharge Date: 11/29/19    Resolved Problems  Date Reviewed: 11/27/2019          Resolved    Benign essential hypertension 11/25/2019     Resolved by  Terese Lopez PA-C    Acute kidney injury superimposed on CKD ([address] ) 11/27/2019     Resolved by  Terese Lopez PA-C          Consultations During Hospital Stay:  · PT/OT    Procedures Performed:   · None    Significant Findings / Test Results:   · C diff (+)  · CT abdomen pelvis with contrast:  No acute abnormalities  Stable 3 5 cm infrarenal abdominal aortic aneurysm noted  · CT head without contrast:  No acute intracranial hemorrhage seen  No mass effect or midline shift  · CT spine cervical without contrast:  No acute compression collapse of the vertebra  · Chest x-ray:  No acute cardiopulmonary disease  · EKG:  Normal sinus rhythm at a rate of 96 beats per minute  Right bundle branch block  No ST/T-wave abnormalities  Similar to previous EKG from 10/18/2019  Incidental Findings:   · None    Test Results Pending at Discharge (will require follow up): · Blood culture beyond 48 hours still pending  No growth at 24 hours  Outpatient Tests Requested:  · None    Complications:  None    Reason for Admission:  Workup of possible syncope and collapse  Hospital Course:     Jose Mccullough is a [de-identified] y o  male patient who originally presented to the hospital on 11/25/2019 due to syncope and collapse at home  He was attempting to get out of bed and generally felt weak  He slumped over on the bed and fell to the floor  Did not hit his head    Wife attempted to help him get up off the ground but she reports he went unconscious at that time  He was brought to the ER via EMS who noted his systolic blood pressure to be in the 90s  His wife reported that he was eating and drinking poorly in the 3-4 days leading up to this event and that he was experiencing 2-3 episodes of diarrhea per day  He complained of abdominal pain/cramping  Workup in the ER included CT of head cervical spine and abdomen and pelvis all without acute findings  He was admitted to the Internal Medicine service for further workup of syncope and collapse in the setting of dehydration and GI illness  He met sepsis criteria with increased white blood cell count and heart rate over 90  On admission he had a mild DEZ which resolved with gentle IV fluid and p o  Fluid support  Clinical suspicion for C diff due to recent hospitalization at the beginning of the month  C diff came back positive and vancomycin 125 mg p o  Q 6 hours started  His WBC and hydration status continued to improve  PT/OT consulted due to patient being unsteady on his feet while using a walker, recommending STR and was approved for [employer]  Clinically stable for discharge, and will continue p o  Antibiotics outpatient  Will require close follow-up with PCP for monitoring improvement  The patient, initially admitted to the hospital as inpatient, was discharged earlier than expected given the following: Improvement following initiation of vancomycin for C diff infection and improvement hydration status       Please see above list of diagnoses and related plan for additional information  Condition at Discharge: stable     Discharge Day Visit / Exam:     * Please refer to separate progress note for these details *    Discussion with Family:  Discussed with patient's wife the need for close PCP follow-up outpatient and continued antibiotic regimen  We discussed the importance of hand washing at home and monitoring for signs of improvement      Discharge instructions/Information to patient and family:   See after visit summary for information provided to patient and family  Provisions for Follow-Up Care:  See after visit summary for information related to follow-up care and any pertinent home health orders  Disposition:     Acute Rehab at [facility] to [facility]:   · [facility], spoke with nurse     Planned Readmission:  None     Discharge Statement:  I spent 30 minutes discharging the patient  This time was spent on the day of discharge  I had direct contact with the patient on the day of discharge  Greater than 50% of the total time was spent examining patient, answering all patient questions, arranging and discussing plan of care with patient as well as directly providing post-discharge instructions  Additional time then spent on discharge activities  Discharge Medications:  See after visit summary for reconciled discharge medications provided to patient and family        ** Please Note: This note has been constructed using a voice recognition system **

## 2019-11-27 NOTE — OCCUPATIONAL THERAPY NOTE
[address] Evaluation     Patient Name: Tameka Vasquez  LQNZL'B Date: 11/27/2019  Problem List  Principal Problem:    C  difficile diarrhea  Active Problems:    Type 2 diabetes mellitus with hyperglycemia, with long-term current use of insulin ([facility])    Obesity    Abdominal pain, periumbilical    Syncope and collapse    Sepsis ([facility] )    Past Medical History  Past Medical History:   Diagnosis Date    AAA (abdominal aortic aneurysm) ([facility] )     Aneurysm of infrarenal abdominal aorta ([facility] ) 10/23/2012    Anxiety     Benign essential hypertension     CKD (chronic kidney disease) stage 2, GFR 60-89 ml/min 4/11/2018    Compression fracture of twelfth thoracic vertebra ([facility] ) 1/2/2019    Coronary artery disease involving native coronary artery of native heart without angina pectoris 1/6/2014    Description: 50% distal left main, 75% proximal LAD, 90% 1st diagonal, 50% ostial circumflex, 99% proximal circumflex, 90% 2nd OM, 30 mid RCA , 90% right posterolateral - left heart catheterization December 2013      Diabetes mellitus ([facility] )     DVT, lower extremity ([facility] )     Dyslipidemia 9/24/2012    Essential tremor 4/2/2019    GERD (gastroesophageal reflux disease)     Hyperlipidemia     Hypertension     Stopped his Medications because of Orthostatic hypotension    NSTEMI (non-ST elevated myocardial infarction) ([facility] )     Obesity 7/13/2017    Orthostatic hypotension     Prostate cancer ([facility])     Right bundle branch block (RBBB)     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    LIMA to LAD, SVG to OM1, SVG to distal RCA    Skin cancer     Type 2 diabetes mellitus with hyperglycemia, with long-term current use of insulin ([facility] ) 7/3/2017    Vitamin D deficiency 2/19/2018     Past Surgical History  Past Surgical History:   Procedure Laterality Date    ANKLE ARTHROSCOPY W/ OPEN REPAIR Left     CARDIAC SURGERY      CORONARY ARTERY BYPASS GRAFT  12/11/2013    CABG x3 with LIMA to LAD, SVG to OM-1, SVG to posterior lateral, LYSIS of pericardial adhesions   TN COLONOSCOPY FLX DX W/COLLJ SPEC WHEN PFRMD N/A 8/9/2018    Procedure: COLONOSCOPY;  Surgeon: Sanford Roland MD;  [facility]: [facility]; Service: Gastroenterology    TN REPAIR UMBILICAL [facility],6+M/C,EREXI N/A 3/10/2017    Procedure: REPAIR HERNIA UMBILICAL;  Surgeon: Allan Cruz MD;  [facility]: [facility];  Service: General    PROSTATECTOMY  2008    SPHINCTEROPLASTY URINARY N/A 11/6/2019    Procedure: URINARY SPHINCTEROPLASTY;  Surgeon: Aniceto Nicholas MD;  Location: [facility];  Service: Urology           11/27/19 1252   Note Type   Note type Eval only   Restrictions/Precautions   Weight Bearing Precautions Per Order No   Other Precautions Contact/isolation; Chair Alarm; Bed Alarm;Multiple lines; Fall Risk   Pain Assessment   Pain Assessment No/denies pain   Pain Score No Pain   Home Living   Type of [address] [place] [address] Two level;Stairs to enter without rails   Bathroom Shower/Tub Walk-in shower   Bathroom Toilet Raised   Bathroom Equipment Grab bars in shower   Bathroom Accessibility Accessible   Home Equipment Walker;Cane   Additional Comments Spouse reports that pt and wife are moving into ranch home with 2+1 PETE in 2-3 weeks  However at this time live in [address] [facility] with full flight of steps to bed/bath   Prior Function   Level of [place] Independent with ADLs and functional mobility   Lives With Spouse   Receives Help From Family   ADL Assistance Independent   IADLs Needs assistance   Falls in the last 6 months 1 to 4  (positive falls hx; 1 fall leading to admission )   Vocational Retired   Comments Pt still drives  Spouse completes meal prep, cleaning, laundry and med management for pt  Per previous OT eval 4/2019, pt has history of multiple falls     Lifestyle   Autonomy Pt was INDEP with ADLs, required A for IADLs, and INDEP with funcitonal mobility   Reciprocal Relationships Pt has supportive spouse   Service to Others Pt is retired   Intrinsic Gratification Pt enjoys watching TV   Psychosocial   Psychosocial (WDL) WDL   Subjective   Subjective Pt is pleasant and cooperative   ADL   Eating Assistance 5  Supervision/Setup   Eating Deficit Setup   Grooming Assistance 5  Supervision/Setup   Grooming Deficit Setup;Supervision/safety   UB Bathing Assistance 4  Minimal Assistance   UB Bathing Deficit Other (Comment)  (simulated)   LB Bathing Assistance 3  Moderate Assistance   LB Bathing Deficit Other (Comment)  (simulated)   UB Dressing Assistance 4  Minimal Assistance   UB Dressing Deficit Other (Comment)  (to don/doff hospital gown)   LB Dressing Assistance 3  Moderate Assistance   LB Dressing Deficit Other (Comment)  (simulated)   Toileting Assistance  2  Maximal Assistance   Toileting Deficit Perineal hygiene   Bed Mobility   Additional Comments Pt seated in b/s chair at start of session and agreeable to OT  Pt seated in b/s chair at end of session with all needs met, call bell within reach, and chair alarm active  Transfers   Sit to Stand 4  Minimal assistance   Additional items Assist x 1; Increased time required;Verbal cues; Impulsive;Armrests  (VCs for safe hand placement and slow pacing)   Stand to Sit 4  Minimal assistance   Additional items Assist x 1; Increased time required;Verbal cues; Impulsive;Armrests  (VCs for safe hand placement and slow pacing)   Toilet transfer 4  Minimal assistance   Additional items Assist x 1; Increased time required;Commode; Impulsive;Verbal cues  (VCs for safe hand placement and slow pacing)   Additional Comments RW used for UE support   Functional Mobility   Functional Mobility 4  Minimal assistance   Additional Comments Ax1 using RW to complete functional mobility short distances in room <> bedside commode; no overt LOB noted, however pt required VCs to maintain body positioning within RW and to slower pacing due to impulsivity     Balance   Static Sitting Fair -   Static Standing Poor +   Dynamic Standing Poor +   Ambulatory Poor +   Activity Tolerance   Activity Tolerance Patient limited by fatigue   Nurse Made Aware Dorothy RN verbalized pt appropriate for OT eval  Notified of IV that was found on floor at start of session  RUE Assessment   RUE Assessment WFL   RUE Overall AROM   R Shoulder Flexion WFL   R Elbow Flexion WFL   R Wrist Flexion WFL   R Mass Grasp WFL   RUE Strength   RUE Overall Strength Deficits   R Shoulder Flexion 4+/5   R Elbow Extension 4+/5   LUE Assessment   LUE Assessment WFL   LUE Overall AROM   L Shoulder Flexion WFL   L Elbow Flexion WFL   L Wrist Flexion WFL   L Mass Grasp WFL   LUE Strength   LUE Overall Strength Deficits   L Shoulder Flexion 5/5   L Elbow Extension 5/5   Hand Function   Gross Motor Coordination Functional  (slight tremor observed with finger to nose)   Fine Motor Coordination Impaired  (reports difficulty with buttons)   Sensation   Additional Comments slight numbness in fingertips which impacts ability to button clothing   Vision-Basic Assessment   Current Vision Wears glasses all the time   Patient Visual Report Other (Comment)  (denies acute visual changes)   Vision - Complex Assessment   Acuity Able to read clock/calendar on wall without difficulty   Cognition   Overall Cognitive Status WFL  (questionable short term memory deficit)   Arousal/Participation Alert; Responsive;Arousable; Cooperative   Attention Within functional limits   Orientation Level Oriented X4  (generally oriented to time: month/year)   Memory Decreased short term memory   Following Commands Follows one step commands without difficulty   Comments Pt able to identify self by full name and birthdate  Pt reports typically not being oriented to date and has to ask wife for this  Inquired with pt if he recalled being [facility] in April, pt stated he does not  Reviewed reason for previous admission  Later during this session, pt asked again, "so why was I here in April?" Despite having been told earlier   Questionable short term memory deficit - in need of further assessment  Assessment   Limitation Decreased ADL status; Decreased UE strength;Decreased Safe judgement during ADL;Decreased cognition;Decreased endurance;Decreased high-level ADLs; Decreased self-care trans;Decreased fine motor control   Assessment Pt is a [de-identified] y o  male seen for OT evaluation (time 5169-5871) s/p admit to [facility] on 11/25/2019 w/ C  difficile diarrhea  Comorbidities affecting pt's functional performance at time of assessment include: DM2, obesity, abdominal pain, syncope and collapse, sepsis, hx of anxiety  Evaluation required a review of medical and/ or therapy records and extensive additional review of physical, cognitive, or psychosocial history related to current functional performance    Personal factors affecting pt at time of IE include:steps to enter environment, behavioral pattern, difficulty performing ADLS, difficulty performing IADLS , decreased initiation and engagement , health management  and environment  Prior to admission, pt was INDEP with ADLs, required A for IADLs, and INDEP with funcitonal mobility  Upon evaluation: Pt requires set-up for eating, SUP for grooming, Min A for UB ADLs, Mod A for LB ADLs, Max A for toileting (hygiene), Min A x 1 for sit<>stand transfers with RW, Min A x 1 for functional mobility using RW for short distance <> bedside commode 2* the following deficits impacting occupational performance: weakness, decreased strength, decreased balance, decreased tolerance, impaired FMC, impaired memory, impulsivity, decreased safety awareness and decreased coping skills  Cognition appears to be questionably impaired - will continue to assess  Vision is [facility]  Development of pt POC requires clinical decision making of high analytic complexity  Significant modification of tasks or assistance (e g , physical or verbal) is necessary to enable patient to complete evaluation component     Pt to benefit from continued skilled OT tx while in the hospital to address deficits as defined above and maximize level of functional independence w ADL's and functional mobility  Occupational Performance areas to address include: grooming, bathing/shower, toilet hygiene, dressing, socialization, health maintenance, functional mobility, community mobility and social participation  From OT standpoint, recommendation at time of d/c would be post acute rehab    Goals   Patient Goals "to get my issue taken care of" (referring to C-diff)   Plan   Treatment Interventions ADL retraining;Functional transfer training;UE strengthening/ROM; Endurance training;Patient/family training;Equipment evaluation/education; Compensatory technique education;Continued evaluation; Energy conservation; Activityengagement   Goal Expiration Date 12/07/19   OT Frequency 3-5x/wk   Recommendation   OT Discharge Recommendation Other (Comment)  (to post acute rehab)   Equipment Recommended Bedside commode;Tub seat with back   OT - OK to Discharge   (once medically cleared)   Barthel Index   Feeding 10   Bathing 0   Grooming Score 5   Dressing Score 5   Bladder Score 5   Bowels Score 5   Toilet Use Score 5   Transfers (Bed/Chair) Score 10   Mobility (Level Surface) Score 0   Stairs Score 0   Barthel Index Score 45   Modified [place] Scale   Modified [place] Scale 4     Goals to be met within 10 days:      Pt will complete grooming/oral hygiene tasks independently while standing at sink    Pt will complete UB bathing/dressing Mod I while seated    Pt will complete LB bathing/dressing Mod I while seated     Pt will improve functional activity tolerance while standing at sink to 10+ minutes in order to increase safety and independence during functional transfers and ADL tasks  Pt will improve dynamic sitting/standing balance to good to increase safety and independence during functional transfers and ADL and decrease risk for falls      Pt will increase independence during STS transfers with least restrictive AD Mod I Pt will complete transfer to raised toilet with grab bars with Mod I     Pt will complete toileting tasks (clothing management up/down, hygiene) Mod I     Pt will complete shower stall transfer  without LOB with Supervision after set-up using shower chair    Pt will attend to continued cognitive assessment 100% of the time in order to provide most appropriate recommendations for d/c plans  Pt will identify and implement at least 3 healthy coping strategies to increase participation in meaningful activities and decrease risk for readmission      Quang Hickey, OTR/L

## 2019-11-27 NOTE — PLAN OF CARE
Problem: Potential for Falls  Goal: Patient will remain free of falls  Description  INTERVENTIONS:  - Assess patient frequently for physical needs  -  Identify cognitive and physical deficits and behaviors that affect risk of falls  -  [place] fall precautions as indicated by assessment   - Educate patient/family on patient safety including physical limitations  - Instruct patient to call for assistance with activity based on assessment  - Modify environment to reduce risk of injury  - Consider OT/PT consult to assist with strengthening/mobility  Outcome: Progressing     Problem: Nutrition/Hydration-ADULT  Goal: Nutrient/Hydration intake appropriate for improving, restoring or maintaining nutritional needs  Description  Monitor and assess patient's nutrition/hydration status for malnutrition  Collaborate with interdisciplinary team and initiate plan and interventions as ordered  Monitor patient's weight and dietary intake as ordered or per policy  Utilize nutrition screening tool and intervene as necessary  Determine patient's food preferences and provide high-protein, high-caloric foods as appropriate       INTERVENTIONS:  - Monitor oral intake, urinary output, labs, and treatment plans  - Assess nutrition and hydration status and recommend course of action  - Evaluate amount of meals eaten  - Assist patient with eating if necessary   - Allow adequate time for meals  - Recommend/ encourage appropriate diets, oral nutritional supplements, and vitamin/mineral supplements  - Order, calculate, and assess calorie counts as needed  - Recommend, monitor, and adjust tube feedings and TPN/PPN based on assessed needs  - Assess need for intravenous fluids  - Provide specific nutrition/hydration education as appropriate  - Include patient/family/caregiver in decisions related to nutrition  Outcome: Progressing     Problem: Prexisting or High Potential for Compromised Skin Integrity  Goal: Skin integrity is maintained or improved  Description  INTERVENTIONS:  - Identify patients at risk for skin breakdown  - Assess and monitor skin integrity  - Assess and monitor nutrition and hydration status  - Monitor labs   - Assess for incontinence   - Turn and reposition patient  - Assist with mobility/ambulation  - Relieve pressure over bony prominences  - Avoid friction and shearing  - Provide appropriate hygiene as needed including keeping skin clean and dry  - Evaluate need for skin moisturizer/barrier cream  - Collaborate with interdisciplinary team   - Patient/family teaching  - Consider wound care consult   Outcome: Progressing     Problem: PAIN - ADULT  Goal: Verbalizes/displays adequate comfort level or baseline comfort level  Description  Interventions:  - Encourage patient to monitor pain and request assistance  - Assess pain using appropriate pain scale  - Administer analgesics based on type and severity of pain and evaluate response  - Implement non-pharmacological measures as appropriate and evaluate response  - Consider cultural and social influences on pain and pain management  - Notify physician/advanced practitioner if interventions unsuccessful or patient reports new pain  Outcome: Progressing     Problem: INFECTION - ADULT  Goal: Absence or prevention of progression during hospitalization  Description  INTERVENTIONS:  - Assess and monitor for signs and symptoms of infection  - Monitor lab/diagnostic results  - Monitor all insertion sites, i e  indwelling lines, tubes, and drains  - Monitor endotracheal if appropriate and nasal secretions for changes in amount and color  - [place] appropriate cooling/warming therapies per order  - Administer medications as ordered  - Instruct and encourage patient and family to use good hand hygiene technique  - Identify and instruct in appropriate isolation precautions for identified infection/condition  Outcome: Progressing     Problem: SAFETY ADULT  Goal: Patient will remain free of falls  Description  INTERVENTIONS:  - Assess patient frequently for physical needs  -  Identify cognitive and physical deficits and behaviors that affect risk of falls    -  [place] fall precautions as indicated by assessment   - Educate patient/family on patient safety including physical limitations  - Instruct patient to call for assistance with activity based on assessment  - Modify environment to reduce risk of injury  - Consider OT/PT consult to assist with strengthening/mobility  Outcome: Progressing  Goal: Maintain or return to baseline ADL function  Description  INTERVENTIONS:  -  Assess patient's ability to carry out ADLs; assess patient's baseline for ADL function and identify physical deficits which impact ability to perform ADLs (bathing, care of mouth/teeth, toileting, grooming, dressing, etc )  - Assess/evaluate cause of self-care deficits   - Assess range of motion  - Assess patient's mobility; develop plan if impaired  - Assess patient's need for assistive devices and provide as appropriate  - Encourage maximum independence but intervene and supervise when necessary  - Involve family in performance of ADLs  - Assess for home care needs following discharge   - Consider OT consult to assist with ADL evaluation and planning for discharge  - Provide patient education as appropriate  Outcome: Progressing  Goal: Maintain or return mobility status to optimal level  Description  INTERVENTIONS:  - Assess patient's baseline mobility status (ambulation, transfers, stairs, etc )    - Identify cognitive and physical deficits and behaviors that affect mobility  - Identify mobility aids required to assist with transfers and/or ambulation (gait belt, sit-to-stand, lift, walker, cane, etc )  - [place] fall precautions as indicated by assessment  - Record patient progress and toleration of activity level on Mobility SBAR; progress patient to next Phase/Stage  - Instruct patient to call for assistance with activity based on assessment  - Consider rehabilitation consult to assist with strengthening/weightbearing, etc   Outcome: Progressing     Problem: DISCHARGE PLANNING  Goal: Discharge to home or other facility with appropriate resources  Description  INTERVENTIONS:  - Identify barriers to discharge w/patient and caregiver  - Arrange for needed discharge resources and transportation as appropriate  - Identify discharge learning needs (meds, wound care, etc )  - Arrange for interpretive services to assist at discharge as needed  - Refer to Case Management Department for coordinating discharge planning if the patient needs post-hospital services based on physician/advanced practitioner order or complex needs related to functional status, cognitive ability, or social support system  Outcome: Progressing     Problem: NEUROSENSORY - ADULT  Goal: Achieves stable or improved neurological status  Description  INTERVENTIONS  - Monitor and report changes in neurological status  - Monitor vital signs such as temperature, blood pressure, glucose, and any other labs ordered   - Initiate measures to prevent increased intracranial pressure  - Monitor for seizure activity and implement precautions if appropriate      Outcome: Progressing  Goal: Achieves maximal functionality and self care  Description  INTERVENTIONS  - Monitor swallowing and airway patency with patient fatigue and changes in neurological status  - Encourage and assist patient to increase activity and self care     - Encourage visually impaired, hearing impaired and aphasic patients to use assistive/communication devices  Outcome: Progressing     Problem: CARDIOVASCULAR - ADULT  Goal: Maintains optimal cardiac output and hemodynamic stability  Description  INTERVENTIONS:  - Monitor I/O, vital signs and rhythm  - Monitor for S/S and trends of decreased cardiac output  - Administer and titrate ordered vasoactive medications to optimize hemodynamic stability  - Assess quality of pulses, skin color and temperature  - Assess for signs of decreased coronary artery perfusion  - Instruct patient to report change in severity of symptoms  Outcome: Progressing  Goal: Absence of cardiac dysrhythmias or at baseline rhythm  Description  INTERVENTIONS:  - Continuous cardiac monitoring, vital signs, obtain 12 lead EKG if ordered  - Administer antiarrhythmic and heart rate control medications as ordered  - Monitor electrolytes and administer replacement therapy as ordered  Outcome: Progressing     Problem: GASTROINTESTINAL - ADULT  Goal: Minimal or absence of nausea and/or vomiting  Description  INTERVENTIONS:  - Administer IV fluids if ordered to ensure adequate hydration  - Maintain NPO status until nausea and vomiting are resolved  - Nasogastric tube if ordered  - Administer ordered antiemetic medications as needed  - Provide nonpharmacologic comfort measures as appropriate  - Advance diet as tolerated, if ordered  - Consider nutrition services referral to assist patient with adequate nutrition and appropriate food choices  Outcome: Progressing  Goal: Maintains or returns to baseline bowel function  Description  INTERVENTIONS:  - Assess bowel function  - Encourage oral fluids to ensure adequate hydration  - Administer IV fluids if ordered to ensure adequate hydration  - Administer ordered medications as needed  - Encourage mobilization and activity  - Consider nutritional services referral to assist patient with adequate nutrition and appropriate food choices  Outcome: Progressing  Goal: Maintains adequate nutritional intake  Description  INTERVENTIONS:  - Monitor percentage of each meal consumed  - Identify factors contributing to decreased intake, treat as appropriate  - Assist with meals as needed  - Monitor I&O, weight, and lab values if indicated  - Obtain nutrition services referral as needed  Outcome: Progressing     Problem: GENITOURINARY - ADULT  Goal: Maintains or returns to baseline urinary function  Description  INTERVENTIONS:  - Assess urinary function  - Encourage oral fluids to ensure adequate hydration if ordered  - Administer IV fluids as ordered to ensure adequate hydration  - Administer ordered medications as needed  - Offer frequent toileting  - Follow urinary retention protocol if ordered  Outcome: Progressing     Problem: METABOLIC, FLUID AND ELECTROLYTES - ADULT  Goal: Electrolytes maintained within normal limits  Description  INTERVENTIONS:  - Monitor labs and assess patient for signs and symptoms of electrolyte imbalances  - Administer electrolyte replacement as ordered  - Monitor response to electrolyte replacements, including repeat lab results as appropriate  - Instruct patient on fluid and nutrition as appropriate  Outcome: Progressing  Goal: Fluid balance maintained  Description  INTERVENTIONS:  - Monitor labs   - Monitor I/O and WT  - Instruct patient on fluid and nutrition as appropriate  - Assess for signs & symptoms of volume excess or deficit  Outcome: Progressing  Goal: Glucose maintained within target range  Description  INTERVENTIONS:  - Monitor Blood Glucose as ordered  - Assess for signs and symptoms of hyperglycemia and hypoglycemia  - Administer ordered medications to maintain glucose within target range  - Assess nutritional intake and initiate nutrition service referral as needed  Outcome: Progressing     Problem: SKIN/TISSUE INTEGRITY - ADULT  Goal: Skin integrity remains intact  Description  INTERVENTIONS  - Identify patients at risk for skin breakdown  - Assess and monitor skin integrity  - Assess and monitor nutrition and hydration status  - Monitor labs (i e  albumin)  - Assess for incontinence   - Turn and reposition patient  - Assist with mobility/ambulation  - Relieve pressure over bony prominences  - Avoid friction and shearing  - Provide appropriate hygiene as needed including keeping skin clean and dry  - Evaluate need for skin moisturizer/barrier cream  - Collaborate with interdisciplinary team (i e  Nutrition, Rehabilitation, etc )   - Patient/family teaching  Outcome: Progressing  Goal: Oral mucous membranes remain intact  Description  INTERVENTIONS  - Assess oral mucosa and hygiene practices  - Implement preventative oral hygiene regimen  - Implement oral medicated treatments as ordered  - Initiate Nutrition services referral as needed  Outcome: Progressing     Problem: MUSCULOSKELETAL - ADULT  Goal: Maintain or return mobility to safest level of function  Description  INTERVENTIONS:  - Assess patient's ability to carry out ADLs; assess patient's baseline for ADL function and identify physical deficits which impact ability to perform ADLs (bathing, care of mouth/teeth, toileting, grooming, dressing, etc )  - Assess/evaluate cause of self-care deficits   - Assess range of motion  - Assess patient's mobility  - Assess patient's need for assistive devices and provide as appropriate  - Encourage maximum independence but intervene and supervise when necessary  - Involve family in performance of ADLs  - Assess for home care needs following discharge   - Consider OT consult to assist with ADL evaluation and planning for discharge  - Provide patient education as appropriate  Outcome: Progressing  Goal: Maintain proper alignment of affected body part  Description  INTERVENTIONS:  - Support, maintain and protect limb and body alignment  - Provide patient/ family with appropriate education  Outcome: Progressing

## 2019-11-27 NOTE — PLAN OF CARE
Problem: OCCUPATIONAL THERAPY ADULT  Goal: Performs self-care activities at highest level of function for planned discharge setting  See evaluation for individualized goals  Description  Treatment Interventions: ADL retraining, Functional transfer training, UE strengthening/ROM, Endurance training, Patient/family training, Equipment evaluation/education, Compensatory technique education, Continued evaluation, Energy conservation, Activityengagement  Equipment Recommended: Bedside commode, Tub seat with back       See flowsheet documentation for full assessment, interventions and recommendations  Note:   Limitation: Decreased ADL status, Decreased UE strength, Decreased Safe judgement during ADL, Decreased cognition, Decreased endurance, Decreased high-level ADLs, Decreased self-care trans, Decreased fine motor control     Assessment: Pt is a [de-identified] y o  male seen for OT evaluation (time 1036-8250) s/p [facility] 11/25/2019 w/ C  difficile diarrhea  Comorbidities affecting pt's functional performance at time of assessment include: DM2, obesity, abdominal pain, syncope and collapse, sepsis, hx of anxiety  Evaluation required a review of medical and/ or therapy records and extensive additional review of physical, cognitive, or psychosocial history related to current functional performance    Personal factors affecting pt at time of IE include:steps to enter environment, behavioral pattern, difficulty performing ADLS, difficulty performing IADLS , decreased initiation and engagement , health management  and environment  Prior to admission, pt was INDEP with ADLs, required A for IADLs, and INDEP with funcitonal mobility   Upon evaluation: Pt requires set-up for eating, SUP for grooming, Min A for UB ADLs, Mod A for LB ADLs, Max A for toileting (hygiene), Min A x 1 for sit<>stand transfers with RW, Min A x 1 for functional mobility using RW for short distance <> bedside commode 2* the following deficits impacting occupational performance: weakness, decreased strength, decreased balance, decreased tolerance, impaired [address], impaired memory, impulsivity, decreased safety awareness and decreased coping skills  Cognition appears to be questionably impaired - will continue to assess  [facility]  Development of pt POC requires clinical decision making of high analytic complexity  Significant modification of tasks or assistance (e g , physical or verbal) is necessary to enable patient to complete evaluation component    Pt to benefit from continued skilled OT tx while in the hospital to address deficits as defined above and maximize level of functional independence w ADL's and functional mobility  Occupational Performance areas to address include: grooming, bathing/shower, toilet hygiene, dressing, socialization, health maintenance, functional mobility, community mobility and social participation   From OT standpoint, recommendation at time of d/c would be post acute rehab      OT Discharge Recommendation: Other (Comment)(to post acute rehab)  OT - OK to Discharge: (once medically cleared)

## 2019-11-27 NOTE — PROGRESS NOTES
[facility]  Progress Note - Sameul Kussmaul 1940, [de-identified] y o  male MRN: [id number]  Unit/Bed#: S -[facility] Encounter: [id number]  Primary Care Provider: Fatima Romberg, DO   Date and time admitted to hospital: 11/25/2019  6:18 AM     Syncope and collapse  Assessment & Plan  Pt syncopized AM of 11/25, transported by EMS who report SBP of 90  History of orthostatic hypotension treated with Mestinon  Prior ED evals for orthostasis related weakness in April, May  Echo in April ordered due to similar symptoms,   ED eval:  · Troponin 0 27, will continue to trend with consideration of chronic non-specific troponin elevation  · EKG: Normal sinus rhythm at a rate of 96 beats per minute   Right bundle   branch block   No ST T wave abnormalities   Similar to previous from   10/18/2019  · Vitals stable  · CT head/neck: Without abnormalities  · Low clinical suspicion for neuro/cardiac causes of syncope due to lack of symptoms prior to syncope and history of orthostasis and recent GI illness    Concern for orthostatic hypotension in the setting of dehydration  · 5 days of nausea and diarrhea with 2-3 BM/day  · Reports decrease in PO intake    Continue gentle IVF, regular DM diet as tolerated  Dehydration status improving  Continue to encourage PO fluid intake  Positive C  Diff -- started PO vanco 125mg Q6 x10 days, see plan below  Improvement in WBC to 11 88, down from 14 yesterday  No longer appears dry on exam, no further concern for acute kidney injury  Stable for discharge with continuation of p o   Antibiotics outpatient    * C  difficile diarrhea  Assessment & Plan  C diff positive  Day 2 of PO vanco 125mg Q6 x10d  11/27:  Improvement in white count, no further concern for acute kidney injury, does not appear dry on exam    Appropriate to continue antibiotic treatment as outpatient, stable for discharge today  Continue to encourage fluid intake    Sepsis ([facility] [address] )  Assessment & Plan  Met sepsis criteria: HR >90s since admission, WBC > 12,000, history of 3-4 days of diarrhea, C diff (+)  - Blood culture: no growth at 24h    - WBC improving, 11/27:  11 88  - Vancomycin added after C diff positive result, will continue 125mg PO Q6 x 10d  - Will discontinue IV fluid        Acute kidney injury superimposed on CKD (HCC)resolved as of 11/27/2019  Assessment & Plan  Baseline Cr appears for be 1 0-1 1  CR 1 1 today  Improvement with increased p o  Fluid intake and gentle IVF      Abdominal pain, periumbilical  Assessment & Plan  Patient reports about 4 days of periumbilical abdominal pain, starting as a cramping feeling now a dull aching pain  Patient was experiencing 2-3 episodes of diarrhea starting the same day he noticed abdominal pain, pain relieved with BM  Pain 9/10 on 11/24 before admission, has not experienced the same pain since then   Continues to report mild cramping relieved with BM    CT abd/pelvis 11/25: 1  No acute abnormality in the abdomen or pelvis  2   Stable 3 5 cm infrarenal abdominal aortic aneurysm  See plan for C  diff      Type 2 diabetes mellitus with hyperglycemia, with long-term current use of insulin [facility])  Assessment & Plan  Lab Results   Component Value Date    HGBA1C 7 4 (H) 11/25/2019       Recent Labs     11/25/19  2053 11/25/19  2146 11/26/19  0807 11/26/19  1125   POCGLU 70 131 134 191*       Blood Sugar Average: Last 72 hrs:  (P) 146 5   Discontinue oral hypoglycemics during admission  Adjusted home dosing to 50U long-acting basal dose and 15U rapid with meal  Plan to resume home dose of 100U basal dose, but will consider maintaining the mealtime 15U due to overall decreased p o  Intake at meals    Will recommend close follow-up with PCP to adjust mealtime dosing as needed  Continue POC glucose checks QAM and before meals  Hemoglobin A1c this admission 7 4  Continue diabetic diet    Obesity  Assessment & Plan  Discuss diet and lifestyle modifications, managing risk factors  Continue outpatient follow up with PCP     VTE Pharmacologic Prophylaxis:   Pharmacologic: Enoxaparin (Lovenox)  Mechanical VTE Prophylaxis in Place: No    Patient Centered Rounds: I have performed bedside rounds with nursing staff today  Discussions with Specialists or Other Care Team Provider:  Discussed with case management the need for PT OT eval prior to discharge  Appreciate PT OT recommendations for any further home needs  Otherwise goal of discharge today   Education and Discussions with Family / Patient:  Spoke with patient about goals for discharge today  Reviewed the need for PT OT eval for recommendations of any possible further home needs  We discussed the need for continued outpatient antibiotic therapy for management of C diff  Time Spent for Care: 20 minutes  More than 50% of total time spent on counseling and coordination of care as described above  Current Length of Stay: 1 day(s)    Current Patient Status: Inpatient   Certification Statement: The patient, admitted on an observation basis, will now require > 2 midnight hospital stay due to No longer being septic with improvement of WBC and hydration status  Discharge Plan:  Patient came from home, plan to return home :  Discharge today pending a PT OT eval     Code Status: Level 1 - Full Code      Subjective:   Patient reports he is overall doing better today  Continues to have 1-2 episodes of diarrhea with cramping that is relieved by BM  He reports that he is eating and drinking well with no issue  He denies lightheadedness or dizziness and no longer feels nauseous  Objective:     Vitals:   Temp (24hrs), Av °F (36 7 °C), Min:97 9 °F (36 6 °C), Max:98 °F (36 7 °C)    Temp:  [97 9 °F (36 6 °C)-98 °F (36 7 °C)] 97 9 °F (36 6 °C)  HR:  [75-88] 75  Resp:  [16-18] 18  BP: ()/(50-62) 113/53  SpO2:  [94 %-98 %] 96 %  Body mass index is 32 kg/m²       Input and Output Summary (last 24 hours):     No intake or output data in the 24 hours ending 11/27/19 1796    Physical Exam:     Physical Exam   Constitutional: He is oriented to person, place, and time  He appears well-developed and well-nourished  HENT:   Head: Normocephalic and atraumatic  Eyes: Pupils are equal, round, and reactive to light  EOM are normal    Cardiovascular: Normal rate, regular rhythm, normal heart sounds and intact distal pulses  Exam reveals no friction rub  No murmur heard  Pulmonary/Chest: Effort normal and breath sounds normal  No respiratory distress  He has no wheezes  He has no rales  Abdominal: Soft  Bowel sounds are normal  He exhibits no distension  There is tenderness (Mildly TTP in periumbilical region)  There is no guarding  Musculoskeletal: He exhibits no edema or tenderness  Lymphadenopathy:     He has no cervical adenopathy  Neurological: He is alert and oriented to person, place, and time  Skin: Skin is warm and dry  No rash noted  No erythema  Psychiatric: He has a normal mood and affect  Additional Data:     Labs:    Results from last 7 days   Lab Units 11/27/19  0431 11/26/19  0518   WBC Thousand/uL 11 88* 14 88*   HEMOGLOBIN g/dL 10 9* 11 4*   HEMATOCRIT % 34 2* 35 0*   PLATELETS Thousands/uL 232 247   NEUTROS PCT %  --  81*   LYMPHS PCT %  --  6*   MONOS PCT %  --  9   EOS PCT %  --  3     Results from last 7 days   Lab Units 11/27/19  0431 11/25/19  0726   SODIUM mmol/L 141   < > 140   POTASSIUM mmol/L 3 9   < > 4 6   CHLORIDE mmol/L 109*   < > 102   CO2 mmol/L 24   < > 30   BUN mg/dL 21   < > 26*   CREATININE mg/dL 1 11   < > 1 35*   ANION GAP mmol/L 8   < > 8   CALCIUM mg/dL 7 7*   < > 8 9   ALBUMIN g/dL  --   --  3 1*   TOTAL BILIRUBIN mg/dL  --   --  0 59   ALK PHOS U/L  --   --  66   ALT U/L  --   --  27   AST U/L  --   --  19   GLUCOSE RANDOM mg/dL 79   < > 189*    < > = values in this interval not displayed       Results from last 7 days   Lab Units 11/25/19 0726 INR  1 10     Results from last 7 days   Lab Units 11/27/19  0723 11/26/19  2156 11/26/19  1545 11/26/19  1125 11/26/19  0807 11/25/19  2146 11/25/19  2053 11/25/19  1547 11/25/19  1410   POC GLUCOSE mg/dl 81 126 170* 191* 134 131 70 134 219*     Results from last 7 days   Lab Units 11/25/19  0726   HEMOGLOBIN A1C % 7 4*     Results from last 7 days   Lab Units 11/25/19  0727   LACTIC ACID mmol/L 1 8           * I Have Reviewed All Lab Data Listed Above  * Additional Pertinent Lab Tests Reviewed: All Labs Within Last 24 Hours Reviewed    Imaging:    Imaging Reports Reviewed Today Include:  None, no new imaging since yesterday 11/26  Imaging Personally Reviewed by Myself Includes:  None    Recent Cultures (last 7 days):     Results from last 7 days   Lab Units 11/25/19  1247 11/25/19  0813 11/25/19  0726   BLOOD CULTURE   --  No Growth at 24 hrs  No Growth at 24 hrs  C DIFF TOXIN B  POSITIVE for C difficle toxin by PCR  *  --   --        Last 24 Hours Medication List:     Current Facility-Administered Medications:  acetaminophen 650 mg Oral Q6H PRN TANISHA Dawkins    albuterol 2 puff Inhalation Q6H PRN Sonja Neil PA-C    aspirin 81 mg Oral Daily Naida Benz MD    atorvastatin 80 mg Oral Daily With Shilpa Lynne PA-C    clopidogrel 75 mg Oral Daily Sonja Neil PA-C    enoxaparin 40 mg Subcutaneous Daily Sonja Neil PA-C    gabapentin 700 mg Oral TID Sonja Neil PA-C    insulin glargine 50 Units Subcutaneous QAM Sonja Neil PA-C    insulin lispro 1-5 Units Subcutaneous TID AC Sonja Neil PA-C    insulin lispro 1-5 Units Subcutaneous HS Sonja Neil PA-C    insulin lispro 15 Units Subcutaneous TID With Meals Sonja Neil PA-C    LORazepam 1 mg Oral BID Naida Bnez MD    nitroglycerin 0 4 mg Sublingual Q5 Min PRN Sonja Neil PA-C    ondansetron 4 mg Oral Q6H PRN Sonja Neil PA-C    pantoprazole 40 mg Oral BID Sonja Neil PA-C    pyridostigmine 30 mg Oral TID Candance Brooklyn Davon Marcus PA-C    ranolazine 1,000 mg Oral BID Leti Mattson MD    sodium chloride 1,000 mL Intravenous Once Delma Hernandez PA-C    sodium chloride 75 mL/hr Intravenous Continuous Delma Hernandez PA-C Last Rate: 75 mL/hr (11/27/19 2135)   vancomycin 125 mg Oral Q6H [address] Delma Hernandez PA-C    vilazodone 40 mg Oral Daily Delma Hernandez PA-C         Today, Patient Was Seen By: Delma Hernandez PA-C    ** Please Note: Dictation voice to text software may have been used in the creation of this document   **

## 2019-11-27 NOTE — PHYSICAL THERAPY NOTE
PHYSICAL THERAPY TREATMENT NOTE    Patient Name: Tameka Vasquez  [id number] Date: 2019 1150   Pain Assessment   Pain Assessment No/denies pain   Pain Score No Pain   Restrictions/Precautions   Other Precautions Contact/isolation; Chair Alarm; Bed Alarm;Multiple lines; Fall Risk   General   Family/Caregiver Present Yes  (Wife present for session)   Cognition   Orientation Level Oriented X4   Following Commands Follows one step commands without difficulty   Comments Pt identified by full name and   He is agreeable to PT session  Transfers   Sit to Stand 4  Minimal assistance   Additional items Assist x 1; Armrests; Increased time required;Verbal cues   Stand to Sit 4  Minimal assistance   Additional items Assist x 1; Armrests; Increased time required;Verbal cues   Stand pivot 4  Minimal assistance  (w/ rolling walker)   Additional items Assist x 1; Armrests; Increased time required;Verbal cues  (from recliner to commode)   Additional Comments Pt able to demonstrate carryover of education for proper hand placement for transfers (verbalized and physical)    Balance   Static Sitting Fair -   Static Standing Poor +   Ambulatory Poor +   Activity Tolerance   Activity Tolerance Patient limited by fatigue   Medical Staff Made Aware Spoke to [address]   Nurse Made Aware Spoke to Uriel Blanca RN   Assessment   Prognosis Fair   Problem List Decreased strength;Decreased endurance; Impaired balance;Decreased mobility; Decreased coordination;Decreased safety awareness; Obesity   Assessment Pt tolerated treatment well  Required use of commode during session, to which pt was able to self-correct hand placement for sit to stand transfers with use of rolling walker  Pt provided both verbal instruction and demonstration for therapeutic exercises, which patient was able to demonstrate properly back without verbal or tactile cues   Mr  Ryan Riddles continues to be appropriate to received skilled inpatient PT until time of DC  Recommend in upcoming sessions to continue therapeutic exercises, transfer, and gait training in order to decrease risk of falls and improve level of independence  Goals   Patient Goals "to go home"   [facility] Expiration Date 12/07/19   Short Term Goal #1 Patient will: Increase bilateral LE strength 1/2 grade to facilitate independent mobility, Perform all bed mobility tasks independently to decrease fall risk factors, Perform all transfers independently to improve independence, Ambulate at least 100 ft  with roller walker modified independent w/o LOB, Navigate 4 PETE + 1 flight stairs w/ modified independent with unilateral handrail with no retropulsion to facilitate return to previous living environment, Increase all balance 1/2 grade to decrease risk for falls, Complete exercise program independently, Tolerate 3 hr OOB to faciliate upright tolerance, Tolerate standing at least 10 minutes independently with no retropulsion to facilitate functional task performance and Improve Barthel Index score to 65 or greater to facilitate independence   PT Treatment Day 1   Plan   Treatment/Interventions ADL retraining;Functional transfer training;LE strengthening/ROM; Elevations; Therapeutic exercise; Endurance training;Patient/family training;Equipment eval/education; Bed mobility;Gait training   PT Frequency 5x/wk   Recommendation   Recommendation Short-term skilled PT   Equipment Recommended Other (Comment)  (su kim)     Smita Bowers, PT

## 2019-11-27 NOTE — PLAN OF CARE
Problem: Potential for Falls  Goal: Patient will remain free of falls  Description  INTERVENTIONS:  - Assess patient frequently for physical needs  -  Identify cognitive and physical deficits and behaviors that affect risk of falls  -  [place] fall precautions as indicated by assessment   - Educate patient/family on patient safety including physical limitations  - Instruct patient to call for assistance with activity based on assessment  - Modify environment to reduce risk of injury  - Consider OT/PT consult to assist with strengthening/mobility  Outcome: Progressing     Problem: Nutrition/Hydration-ADULT  Goal: Nutrient/Hydration intake appropriate for improving, restoring or maintaining nutritional needs  Description  Monitor and assess patient's nutrition/hydration status for malnutrition  Collaborate with interdisciplinary team and initiate plan and interventions as ordered  Monitor patient's weight and dietary intake as ordered or per policy  Utilize nutrition screening tool and intervene as necessary  Determine patient's food preferences and provide high-protein, high-caloric foods as appropriate       INTERVENTIONS:  - Monitor oral intake, urinary output, labs, and treatment plans  - Assess nutrition and hydration status and recommend course of action  - Evaluate amount of meals eaten  - Assist patient with eating if necessary   - Allow adequate time for meals  - Recommend/ encourage appropriate diets, oral nutritional supplements, and vitamin/mineral supplements  - Order, calculate, and assess calorie counts as needed  - Recommend, monitor, and adjust tube feedings and TPN/PPN based on assessed needs  - Assess need for intravenous fluids  - Provide specific nutrition/hydration education as appropriate  - Include patient/family/caregiver in decisions related to nutrition  Outcome: Progressing     Problem: Prexisting or High Potential for Compromised Skin Integrity  Goal: Skin integrity is maintained or improved  Description  INTERVENTIONS:  - Identify patients at risk for skin breakdown  - Assess and monitor skin integrity  - Assess and monitor nutrition and hydration status  - Monitor labs   - Assess for incontinence   - Turn and reposition patient  - Assist with mobility/ambulation  - Relieve pressure over bony prominences  - Avoid friction and shearing  - Provide appropriate hygiene as needed including keeping skin clean and dry  - Evaluate need for skin moisturizer/barrier cream  - Collaborate with interdisciplinary team   - Patient/family teaching  - Consider wound care consult   Outcome: Progressing     Problem: PAIN - ADULT  Goal: Verbalizes/displays adequate comfort level or baseline comfort level  Description  Interventions:  - Encourage patient to monitor pain and request assistance  - Assess pain using appropriate pain scale  - Administer analgesics based on type and severity of pain and evaluate response  - Implement non-pharmacological measures as appropriate and evaluate response  - Consider cultural and social influences on pain and pain management  - Notify physician/advanced practitioner if interventions unsuccessful or patient reports new pain  Outcome: Progressing     Problem: INFECTION - ADULT  Goal: Absence or prevention of progression during hospitalization  Description  INTERVENTIONS:  - Assess and monitor for signs and symptoms of infection  - Monitor lab/diagnostic results  - Monitor all insertion sites, i e  indwelling lines, tubes, and drains  - Monitor endotracheal if appropriate and nasal secretions for changes in amount and color  - [place] appropriate cooling/warming therapies per order  - Administer medications as ordered  - Instruct and encourage patient and family to use good hand hygiene technique  - Identify and instruct in appropriate isolation precautions for identified infection/condition  Outcome: Progressing     Problem: SAFETY ADULT  Goal: Patient will remain free of falls  Description  INTERVENTIONS:  - Assess patient frequently for physical needs  -  Identify cognitive and physical deficits and behaviors that affect risk of falls    -  [place] fall precautions as indicated by assessment   - Educate patient/family on patient safety including physical limitations  - Instruct patient to call for assistance with activity based on assessment  - Modify environment to reduce risk of injury  - Consider OT/PT consult to assist with strengthening/mobility  Outcome: Progressing  Goal: Maintain or return to baseline ADL function  Description  INTERVENTIONS:  -  Assess patient's ability to carry out ADLs; assess patient's baseline for ADL function and identify physical deficits which impact ability to perform ADLs (bathing, care of mouth/teeth, toileting, grooming, dressing, etc )  - Assess/evaluate cause of self-care deficits   - Assess range of motion  - Assess patient's mobility; develop plan if impaired  - Assess patient's need for assistive devices and provide as appropriate  - Encourage maximum independence but intervene and supervise when necessary  - Involve family in performance of ADLs  - Assess for home care needs following discharge   - Consider OT consult to assist with ADL evaluation and planning for discharge  - Provide patient education as appropriate  Outcome: Progressing  Goal: Maintain or return mobility status to optimal level  Description  INTERVENTIONS:  - Assess patient's baseline mobility status (ambulation, transfers, stairs, etc )    - Identify cognitive and physical deficits and behaviors that affect mobility  - Identify mobility aids required to assist with transfers and/or ambulation (gait belt, sit-to-stand, lift, walker, cane, etc )  - [place] fall precautions as indicated by assessment  - Record patient progress and toleration of activity level on Mobility SBAR; progress patient to next Phase/Stage  - Instruct patient to call for assistance with activity based on assessment  - Consider rehabilitation consult to assist with strengthening/weightbearing, etc   Outcome: Progressing     Problem: DISCHARGE PLANNING  Goal: Discharge to home or other facility with appropriate resources  Description  INTERVENTIONS:  - Identify barriers to discharge w/patient and caregiver  - Arrange for needed discharge resources and transportation as appropriate  - Identify discharge learning needs (meds, wound care, etc )  - Arrange for interpretive services to assist at discharge as needed  - Refer to Case Management Department for coordinating discharge planning if the patient needs post-hospital services based on physician/advanced practitioner order or complex needs related to functional status, cognitive ability, or social support system  Outcome: Progressing     Problem: GASTROINTESTINAL - ADULT  Goal: Maintains or returns to baseline bowel function  Description  INTERVENTIONS:  - Assess bowel function  - Encourage oral fluids to ensure adequate hydration  - Administer IV fluids if ordered to ensure adequate hydration  - Administer ordered medications as needed  - Encourage mobilization and activity  - Consider nutritional services referral to assist patient with adequate nutrition and appropriate food choices  Outcome: Progressing  Goal: Maintains adequate nutritional intake  Description  INTERVENTIONS:  - Monitor percentage of each meal consumed  - Identify factors contributing to decreased intake, treat as appropriate  - Assist with meals as needed  - Monitor I&O, weight, and lab values if indicated  - Obtain nutrition services referral as needed  Outcome: Progressing     Problem: METABOLIC, FLUID AND ELECTROLYTES - ADULT  Goal: Electrolytes maintained within normal limits  Description  INTERVENTIONS:  - Monitor labs and assess patient for signs and symptoms of electrolyte imbalances  - Administer electrolyte replacement as ordered  - Monitor response to electrolyte replacements, including repeat lab results as appropriate  - Instruct patient on fluid and nutrition as appropriate  Outcome: Progressing  Goal: Fluid balance maintained  Description  INTERVENTIONS:  - Monitor labs   - Monitor I/O and WT  - Instruct patient on fluid and nutrition as appropriate  - Assess for signs & symptoms of volume excess or deficit  Outcome: Progressing  Goal: Glucose maintained within target range  Description  INTERVENTIONS:  - Monitor Blood Glucose as ordered  - Assess for signs and symptoms of hyperglycemia and hypoglycemia  - Administer ordered medications to maintain glucose within target range  - Assess nutritional intake and initiate nutrition service referral as needed  Outcome: Progressing     Problem: SKIN/TISSUE INTEGRITY - ADULT  Goal: Skin integrity remains intact  Description  INTERVENTIONS  - Identify patients at risk for skin breakdown  - Assess and monitor skin integrity  - Assess and monitor nutrition and hydration status  - Monitor labs (i e  albumin)  - Assess for incontinence   - Turn and reposition patient  - Assist with mobility/ambulation  - Relieve pressure over bony prominences  - Avoid friction and shearing  - Provide appropriate hygiene as needed including keeping skin clean and dry  - Evaluate need for skin moisturizer/barrier cream  - Collaborate with interdisciplinary team (i e  Nutrition, Rehabilitation, etc )   - Patient/family teaching  Outcome: Progressing  Goal: Oral mucous membranes remain intact  Description  INTERVENTIONS  - Assess oral mucosa and hygiene practices  - Implement preventative oral hygiene regimen  - Implement oral medicated treatments as ordered  - Initiate Nutrition services referral as needed  Outcome: Progressing

## 2019-11-27 NOTE — PLAN OF CARE
Problem: PHYSICAL THERAPY ADULT  Goal: Performs mobility at highest level of function for planned discharge setting  See evaluation for individualized goals  Description  Treatment/Interventions: ADL retraining, Functional transfer training, LE strengthening/ROM, Elevations, Therapeutic exercise, Endurance training, Patient/family training, Equipment eval/education, Bed mobility, Gait training  Equipment Recommended: Other (Comment)(rolling walker, commode)       See flowsheet documentation for full assessment, interventions and recommendations  11/27/2019 1238 by Cosmo Bunn PT  Outcome: Progressing  Note:   Prognosis: Fair  Problem List: Decreased strength, Decreased endurance, Impaired balance, Decreased mobility, Decreased coordination, Decreased safety awareness, Obesity  Assessment: Pt tolerated treatment well  Required use of commode during session, to which pt was able to self-correct hand placement for sit to stand transfers with use of rolling walker  Pt provided both verbal instruction and demonstration for therapeutic exercises, which patient was able to demonstrate properly back without verbal or tactile cues  Mr Rajendra Granados continues to be appropriate to received skilled inpatient PT until time of DC  Recommend in upcoming sessions to continue therapeutic exercises, transfer, and gait training in order to decrease risk of falls and improve level of independence  Barriers to Discharge Comments: Inaccessible home environment, required physical A for all phases of mobility, decreased safety awareness of deficits  Recommendation: Short-term skilled PT          See flowsheet documentation for full assessment

## 2019-11-28 PROBLEM — E11.649 TYPE 2 DIABETES MELLITUS WITH HYPOGLYCEMIA, WITH LONG-TERM CURRENT USE OF INSULIN (HCC): Status: ACTIVE | Noted: 2017-07-03

## 2019-11-28 LAB
ANION GAP SERPL CALCULATED.3IONS-SCNC: 8 MMOL/L (ref 4–13)
BASOPHILS # BLD AUTO: 0.06 THOUSANDS/ΜL (ref 0–0.1)
BASOPHILS NFR BLD AUTO: 1 % (ref 0–1)
BUN SERPL-MCNC: 22 MG/DL (ref 5–25)
CALCIUM SERPL-MCNC: 8.2 MG/DL (ref 8.3–10.1)
CHLORIDE SERPL-SCNC: 109 MMOL/L (ref 100–108)
CO2 SERPL-SCNC: 26 MMOL/L (ref 21–32)
CREAT SERPL-MCNC: 1.1 MG/DL (ref 0.6–1.3)
EOSINOPHIL # BLD AUTO: 0.71 THOUSAND/ΜL (ref 0–0.61)
EOSINOPHIL NFR BLD AUTO: 7 % (ref 0–6)
ERYTHROCYTE [DISTWIDTH] IN BLOOD BY AUTOMATED COUNT: 14.6 % (ref 11.6–15.1)
GFR SERPL CREATININE-BSD FRML MDRD: 63 ML/MIN/1.73SQ M
GLUCOSE SERPL-MCNC: 113 MG/DL (ref 65–140)
GLUCOSE SERPL-MCNC: 132 MG/DL (ref 65–140)
GLUCOSE SERPL-MCNC: 147 MG/DL (ref 65–140)
GLUCOSE SERPL-MCNC: 150 MG/DL (ref 65–140)
GLUCOSE SERPL-MCNC: 226 MG/DL (ref 65–140)
GLUCOSE SERPL-MCNC: 62 MG/DL (ref 65–140)
GLUCOSE SERPL-MCNC: >500 MG/DL (ref 65–140)
HCT VFR BLD AUTO: 34.3 % (ref 36.5–49.3)
HGB BLD-MCNC: 11 G/DL (ref 12–17)
IMM GRANULOCYTES # BLD AUTO: 0.19 THOUSAND/UL (ref 0–0.2)
IMM GRANULOCYTES NFR BLD AUTO: 2 % (ref 0–2)
LYMPHOCYTES # BLD AUTO: 1.14 THOUSANDS/ΜL (ref 0.6–4.47)
LYMPHOCYTES NFR BLD AUTO: 11 % (ref 14–44)
MCH RBC QN AUTO: 31.5 PG (ref 26.8–34.3)
MCHC RBC AUTO-ENTMCNC: 32.1 G/DL (ref 31.4–37.4)
MCV RBC AUTO: 98 FL (ref 82–98)
MONOCYTES # BLD AUTO: 0.86 THOUSAND/ΜL (ref 0.17–1.22)
MONOCYTES NFR BLD AUTO: 8 % (ref 4–12)
NEUTROPHILS # BLD AUTO: 7.65 THOUSANDS/ΜL (ref 1.85–7.62)
NEUTS SEG NFR BLD AUTO: 71 % (ref 43–75)
NRBC BLD AUTO-RTO: 0 /100 WBCS
PLATELET # BLD AUTO: 272 THOUSANDS/UL (ref 149–390)
PMV BLD AUTO: 10.5 FL (ref 8.9–12.7)
POTASSIUM SERPL-SCNC: 3.7 MMOL/L (ref 3.5–5.3)
RBC # BLD AUTO: 3.49 MILLION/UL (ref 3.88–5.62)
SODIUM SERPL-SCNC: 143 MMOL/L (ref 136–145)
WBC # BLD AUTO: 10.61 THOUSAND/UL (ref 4.31–10.16)

## 2019-11-28 PROCEDURE — 99232 SBSQ HOSP IP/OBS MODERATE 35: CPT | Performed by: PHYSICIAN ASSISTANT

## 2019-11-28 PROCEDURE — 85025 COMPLETE CBC W/AUTO DIFF WBC: CPT | Performed by: PHYSICIAN ASSISTANT

## 2019-11-28 PROCEDURE — 80048 BASIC METABOLIC PNL TOTAL CA: CPT | Performed by: PHYSICIAN ASSISTANT

## 2019-11-28 PROCEDURE — 82948 REAGENT STRIP/BLOOD GLUCOSE: CPT

## 2019-11-28 RX ORDER — INSULIN GLARGINE 100 [IU]/ML
45 INJECTION, SOLUTION SUBCUTANEOUS EVERY MORNING
Status: DISCONTINUED | OUTPATIENT
Start: 2019-11-29 | End: 2019-11-29 | Stop reason: HOSPADM

## 2019-11-28 RX ORDER — SACCHAROMYCES BOULARDII 250 MG
250 CAPSULE ORAL 2 TIMES DAILY
Status: DISCONTINUED | OUTPATIENT
Start: 2019-11-28 | End: 2019-11-29 | Stop reason: HOSPADM

## 2019-11-28 RX ADMIN — PYRIDOSTIGMINE BROMIDE 30 MG: 60 TABLET ORAL at 09:17

## 2019-11-28 RX ADMIN — GABAPENTIN 700 MG: 400 CAPSULE ORAL at 09:15

## 2019-11-28 RX ADMIN — LORAZEPAM 1 MG: 1 TABLET ORAL at 20:12

## 2019-11-28 RX ADMIN — PANTOPRAZOLE SODIUM 40 MG: 40 TABLET, DELAYED RELEASE ORAL at 17:29

## 2019-11-28 RX ADMIN — ENOXAPARIN SODIUM 40 MG: 40 INJECTION SUBCUTANEOUS at 09:15

## 2019-11-28 RX ADMIN — PYRIDOSTIGMINE BROMIDE 30 MG: 60 TABLET ORAL at 20:12

## 2019-11-28 RX ADMIN — Medication 125 MG: at 17:30

## 2019-11-28 RX ADMIN — INSULIN GLARGINE 50 UNITS: 100 INJECTION, SOLUTION SUBCUTANEOUS at 09:16

## 2019-11-28 RX ADMIN — PYRIDOSTIGMINE BROMIDE 30 MG: 60 TABLET ORAL at 16:20

## 2019-11-28 RX ADMIN — Medication 125 MG: at 23:11

## 2019-11-28 RX ADMIN — ASPIRIN 81 MG 81 MG: 81 TABLET ORAL at 09:15

## 2019-11-28 RX ADMIN — ATORVASTATIN CALCIUM 80 MG: 40 TABLET, FILM COATED ORAL at 16:20

## 2019-11-28 RX ADMIN — INSULIN LISPRO 15 UNITS: 100 INJECTION, SOLUTION INTRAVENOUS; SUBCUTANEOUS at 09:51

## 2019-11-28 RX ADMIN — VILAZODONE HYDROCHLORIDE 40 MG: 20 TABLET ORAL at 09:16

## 2019-11-28 RX ADMIN — INSULIN LISPRO 1 UNITS: 100 INJECTION, SOLUTION INTRAVENOUS; SUBCUTANEOUS at 17:24

## 2019-11-28 RX ADMIN — PANTOPRAZOLE SODIUM 40 MG: 40 TABLET, DELAYED RELEASE ORAL at 09:15

## 2019-11-28 RX ADMIN — Medication 125 MG: at 12:51

## 2019-11-28 RX ADMIN — INSULIN LISPRO 2 UNITS: 100 INJECTION, SOLUTION INTRAVENOUS; SUBCUTANEOUS at 12:49

## 2019-11-28 RX ADMIN — LORAZEPAM 1 MG: 1 TABLET ORAL at 09:15

## 2019-11-28 RX ADMIN — GABAPENTIN 700 MG: 400 CAPSULE ORAL at 20:12

## 2019-11-28 RX ADMIN — Medication 250 MG: at 17:29

## 2019-11-28 RX ADMIN — RANOLAZINE 1000 MG: 500 TABLET, FILM COATED, EXTENDED RELEASE ORAL at 20:12

## 2019-11-28 RX ADMIN — Medication 125 MG: at 05:19

## 2019-11-28 RX ADMIN — Medication 250 MG: at 12:44

## 2019-11-28 RX ADMIN — RANOLAZINE 1000 MG: 500 TABLET, FILM COATED, EXTENDED RELEASE ORAL at 09:30

## 2019-11-28 RX ADMIN — GABAPENTIN 700 MG: 400 CAPSULE ORAL at 16:19

## 2019-11-28 RX ADMIN — CLOPIDOGREL BISULFATE 75 MG: 75 TABLET ORAL at 09:14

## 2019-11-28 NOTE — PLAN OF CARE
Problem: Potential for Falls  Goal: Patient will remain free of falls  Description  INTERVENTIONS:  - Assess patient frequently for physical needs  -  Identify cognitive and physical deficits and behaviors that affect risk of falls  -  Lithia fall precautions as indicated by assessment   - Educate patient/family on patient safety including physical limitations  - Instruct patient to call for assistance with activity based on assessment  - Modify environment to reduce risk of injury  - Consider OT/PT consult to assist with strengthening/mobility  Outcome: Progressing     Problem: Nutrition/Hydration-ADULT  Goal: Nutrient/Hydration intake appropriate for improving, restoring or maintaining nutritional needs  Description  Monitor and assess patient's nutrition/hydration status for malnutrition  Collaborate with interdisciplinary team and initiate plan and interventions as ordered  Monitor patient's weight and dietary intake as ordered or per policy  Utilize nutrition screening tool and intervene as necessary  Determine patient's food preferences and provide high-protein, high-caloric foods as appropriate       INTERVENTIONS:  - Monitor oral intake, urinary output, labs, and treatment plans  - Assess nutrition and hydration status and recommend course of action  - Evaluate amount of meals eaten  - Assist patient with eating if necessary   - Allow adequate time for meals  - Recommend/ encourage appropriate diets, oral nutritional supplements, and vitamin/mineral supplements  - Order, calculate, and assess calorie counts as needed  - Recommend, monitor, and adjust tube feedings and TPN/PPN based on assessed needs  - Assess need for intravenous fluids  - Provide specific nutrition/hydration education as appropriate  - Include patient/family/caregiver in decisions related to nutrition  Outcome: Progressing     Problem: Prexisting or High Potential for Compromised Skin Integrity  Goal: Skin integrity is maintained or improved  Description  INTERVENTIONS:  - Identify patients at risk for skin breakdown  - Assess and monitor skin integrity  - Assess and monitor nutrition and hydration status  - Monitor labs   - Assess for incontinence   - Turn and reposition patient  - Assist with mobility/ambulation  - Relieve pressure over bony prominences  - Avoid friction and shearing  - Provide appropriate hygiene as needed including keeping skin clean and dry  - Evaluate need for skin moisturizer/barrier cream  - Collaborate with interdisciplinary team   - Patient/family teaching  - Consider wound care consult   Outcome: Progressing     Problem: PAIN - ADULT  Goal: Verbalizes/displays adequate comfort level or baseline comfort level  Description  Interventions:  - Encourage patient to monitor pain and request assistance  - Assess pain using appropriate pain scale  - Administer analgesics based on type and severity of pain and evaluate response  - Implement non-pharmacological measures as appropriate and evaluate response  - Consider cultural and social influences on pain and pain management  - Notify physician/advanced practitioner if interventions unsuccessful or patient reports new pain  Outcome: Progressing     Problem: INFECTION - ADULT  Goal: Absence or prevention of progression during hospitalization  Description  INTERVENTIONS:  - Assess and monitor for signs and symptoms of infection  - Monitor lab/diagnostic results  - Monitor all insertion sites, i e  indwelling lines, tubes, and drains  - Monitor endotracheal if appropriate and nasal secretions for changes in amount and color  - Olar appropriate cooling/warming therapies per order  - Administer medications as ordered  - Instruct and encourage patient and family to use good hand hygiene technique  - Identify and instruct in appropriate isolation precautions for identified infection/condition  Outcome: Progressing     Problem: SAFETY ADULT  Goal: Patient will remain free of falls  Description  INTERVENTIONS:  - Assess patient frequently for physical needs  -  Identify cognitive and physical deficits and behaviors that affect risk of falls    -  [place] fall precautions as indicated by assessment   - Educate patient/family on patient safety including physical limitations  - Instruct patient to call for assistance with activity based on assessment  - Modify environment to reduce risk of injury  - Consider OT/PT consult to assist with strengthening/mobility  Outcome: Progressing  Goal: Maintain or return to baseline ADL function  Description  INTERVENTIONS:  -  Assess patient's ability to carry out ADLs; assess patient's baseline for ADL function and identify physical deficits which impact ability to perform ADLs (bathing, care of mouth/teeth, toileting, grooming, dressing, etc )  - Assess/evaluate cause of self-care deficits   - Assess range of motion  - Assess patient's mobility; develop plan if impaired  - Assess patient's need for assistive devices and provide as appropriate  - Encourage maximum independence but intervene and supervise when necessary  - Involve family in performance of ADLs  - Assess for home care needs following discharge   - Consider OT consult to assist with ADL evaluation and planning for discharge  - Provide patient education as appropriate  Outcome: Progressing  Goal: Maintain or return mobility status to optimal level  Description  INTERVENTIONS:  - Assess patient's baseline mobility status (ambulation, transfers, stairs, etc )    - Identify cognitive and physical deficits and behaviors that affect mobility  - Identify mobility aids required to assist with transfers and/or ambulation (gait belt, sit-to-stand, lift, walker, cane, etc )  - [place] fall precautions as indicated by assessment  - Record patient progress and toleration of activity level on Mobility SBAR; progress patient to next Phase/Stage  - Instruct patient to call for assistance with activity based on assessment  - Consider rehabilitation consult to assist with strengthening/weightbearing, etc   Outcome: Progressing     Problem: DISCHARGE PLANNING  Goal: Discharge to home or other facility with appropriate resources  Description  INTERVENTIONS:  - Identify barriers to discharge w/patient and caregiver  - Arrange for needed discharge resources and transportation as appropriate  - Identify discharge learning needs (meds, wound care, etc )  - Arrange for interpretive services to assist at discharge as needed  - Refer to Case Management Department for coordinating discharge planning if the patient needs post-hospital services based on physician/advanced practitioner order or complex needs related to functional status, cognitive ability, or social support system  Outcome: Progressing     Problem: NEUROSENSORY - ADULT  Goal: Achieves stable or improved neurological status  Description  INTERVENTIONS  - Monitor and report changes in neurological status  - Monitor vital signs such as temperature, blood pressure, glucose, and any other labs ordered   - Initiate measures to prevent increased intracranial pressure  - Monitor for seizure activity and implement precautions if appropriate      Outcome: Progressing  Goal: Achieves maximal functionality and self care  Description  INTERVENTIONS  - Monitor swallowing and airway patency with patient fatigue and changes in neurological status  - Encourage and assist patient to increase activity and self care     - Encourage visually impaired, hearing impaired and aphasic patients to use assistive/communication devices  Outcome: Progressing     Problem: CARDIOVASCULAR - ADULT  Goal: Maintains optimal cardiac output and hemodynamic stability  Description  INTERVENTIONS:  - Monitor I/O, vital signs and rhythm  - Monitor for S/S and trends of decreased cardiac output  - Administer and titrate ordered vasoactive medications to optimize hemodynamic stability  - Assess quality of pulses, skin color and temperature  - Assess for signs of decreased coronary artery perfusion  - Instruct patient to report change in severity of symptoms  Outcome: Progressing  Goal: Absence of cardiac dysrhythmias or at baseline rhythm  Description  INTERVENTIONS:  - Continuous cardiac monitoring, vital signs, obtain 12 lead EKG if ordered  - Administer antiarrhythmic and heart rate control medications as ordered  - Monitor electrolytes and administer replacement therapy as ordered  Outcome: Progressing     Problem: GASTROINTESTINAL - ADULT  Goal: Minimal or absence of nausea and/or vomiting  Description  INTERVENTIONS:  - Administer IV fluids if ordered to ensure adequate hydration  - Maintain NPO status until nausea and vomiting are resolved  - Nasogastric tube if ordered  - Administer ordered antiemetic medications as needed  - Provide nonpharmacologic comfort measures as appropriate  - Advance diet as tolerated, if ordered  - Consider nutrition services referral to assist patient with adequate nutrition and appropriate food choices  Outcome: Progressing  Goal: Maintains or returns to baseline bowel function  Description  INTERVENTIONS:  - Assess bowel function  - Encourage oral fluids to ensure adequate hydration  - Administer IV fluids if ordered to ensure adequate hydration  - Administer ordered medications as needed  - Encourage mobilization and activity  - Consider nutritional services referral to assist patient with adequate nutrition and appropriate food choices  Outcome: Progressing  Goal: Maintains adequate nutritional intake  Description  INTERVENTIONS:  - Monitor percentage of each meal consumed  - Identify factors contributing to decreased intake, treat as appropriate  - Assist with meals as needed  - Monitor I&O, weight, and lab values if indicated  - Obtain nutrition services referral as needed  Outcome: Progressing     Problem: GENITOURINARY - ADULT  Goal: Maintains or returns to baseline urinary function  Description  INTERVENTIONS:  - Assess urinary function  - Encourage oral fluids to ensure adequate hydration if ordered  - Administer IV fluids as ordered to ensure adequate hydration  - Administer ordered medications as needed  - Offer frequent toileting  - Follow urinary retention protocol if ordered  Outcome: Progressing     Problem: SKIN/TISSUE INTEGRITY - ADULT  Goal: Skin integrity remains intact  Description  INTERVENTIONS  - Identify patients at risk for skin breakdown  - Assess and monitor skin integrity  - Assess and monitor nutrition and hydration status  - Monitor labs (i e  albumin)  - Assess for incontinence   - Turn and reposition patient  - Assist with mobility/ambulation  - Relieve pressure over bony prominences  - Avoid friction and shearing  - Provide appropriate hygiene as needed including keeping skin clean and dry  - Evaluate need for skin moisturizer/barrier cream  - Collaborate with interdisciplinary team (i e  Nutrition, Rehabilitation, etc )   - Patient/family teaching  Outcome: Progressing  Goal: Oral mucous membranes remain intact  Description  INTERVENTIONS  - Assess oral mucosa and hygiene practices  - Implement preventative oral hygiene regimen  - Implement oral medicated treatments as ordered  - Initiate Nutrition services referral as needed  Outcome: Progressing     Problem: METABOLIC, FLUID AND ELECTROLYTES - ADULT  Goal: Electrolytes maintained within normal limits  Description  INTERVENTIONS:  - Monitor labs and assess patient for signs and symptoms of electrolyte imbalances  - Administer electrolyte replacement as ordered  - Monitor response to electrolyte replacements, including repeat lab results as appropriate  - Instruct patient on fluid and nutrition as appropriate  Outcome: Progressing  Goal: Fluid balance maintained  Description  INTERVENTIONS:  - Monitor labs   - Monitor I/O and WT  - Instruct patient on fluid and nutrition as appropriate  - Assess for signs & symptoms of volume excess or deficit  Outcome: Progressing  Goal: Glucose maintained within target range  Description  INTERVENTIONS:  - Monitor Blood Glucose as ordered  - Assess for signs and symptoms of hyperglycemia and hypoglycemia  - Administer ordered medications to maintain glucose within target range  - Assess nutritional intake and initiate nutrition service referral as needed  Outcome: Progressing     Problem: MUSCULOSKELETAL - ADULT  Goal: Maintain or return mobility to safest level of function  Description  INTERVENTIONS:  - Assess patient's ability to carry out ADLs; assess patient's baseline for ADL function and identify physical deficits which impact ability to perform ADLs (bathing, care of mouth/teeth, toileting, grooming, dressing, etc )  - Assess/evaluate cause of self-care deficits   - Assess range of motion  - Assess patient's mobility  - Assess patient's need for assistive devices and provide as appropriate  - Encourage maximum independence but intervene and supervise when necessary  - Involve family in performance of ADLs  - Assess for home care needs following discharge   - Consider OT consult to assist with ADL evaluation and planning for discharge  - Provide patient education as appropriate  Outcome: Progressing  Goal: Maintain proper alignment of affected body part  Description  INTERVENTIONS:  - Support, maintain and protect limb and body alignment  - Provide patient/ family with appropriate education  Outcome: Progressing

## 2019-11-28 NOTE — ASSESSMENT & PLAN NOTE
Lab Results   Component Value Date    HGBA1C 7 4 (H) 11/25/2019       Recent Labs     11/27/19  1605 11/27/19  2056 11/28/19  0803 11/28/19  0910   POCGLU 196* 71 132 113       Blood Sugar Average: Last 72 hrs:  (P) 138 [id number]   · Continue Accu-Cheks with sliding scale coverage   · Decrease doses of Lantus and mealtime lispro to prevent further events of hypoglycemia

## 2019-11-28 NOTE — ASSESSMENT & PLAN NOTE
Met sepsis criteria on admission: HR >90s, leukocytsos, history of 3-4 days of diarrhea, C diff (+)    Now resolving

## 2019-11-28 NOTE — ASSESSMENT & PLAN NOTE
· Pt syncopized AM of 11/25, transported by EMS who report SBP of 90, blood pressure is now improved  · Patient was provided IV fluids  · Suspect this event was related to dehydration from C diff colitis superimposed upon his known history of orthostatic hypotension, treated with Mestinon  · Continue thigh high TEDS/abdominal binder when up  · Last echocardiogram on file was unremarkable  · Please note there was an error in my colleague's note, patient's troponin was never 0 27    It was 0 07 and has chronically been mildly elevated within this range without any concern for acute cardiac event

## 2019-11-28 NOTE — ASSESSMENT & PLAN NOTE
· Likely prerenal  Baseline Cr appears for be 1 0-1 1, admission creat 1 35   Now resolved after IVF

## 2019-11-28 NOTE — PLAN OF CARE
Problem: Potential for Falls  Goal: Patient will remain free of falls  Description  INTERVENTIONS:  - Assess patient frequently for physical needs  -  Identify cognitive and physical deficits and behaviors that affect risk of falls  -  [place] fall precautions as indicated by assessment   - Educate patient/family on patient safety including physical limitations  - Instruct patient to call for assistance with activity based on assessment  - Modify environment to reduce risk of injury  - Consider OT/PT consult to assist with strengthening/mobility  Outcome: Progressing     Problem: Nutrition/Hydration-ADULT  Goal: Nutrient/Hydration intake appropriate for improving, restoring or maintaining nutritional needs  Description  Monitor and assess patient's nutrition/hydration status for malnutrition  Collaborate with interdisciplinary team and initiate plan and interventions as ordered  Monitor patient's weight and dietary intake as ordered or per policy  Utilize nutrition screening tool and intervene as necessary  Determine patient's food preferences and provide high-protein, high-caloric foods as appropriate       INTERVENTIONS:  - Monitor oral intake, urinary output, labs, and treatment plans  - Assess nutrition and hydration status and recommend course of action  - Evaluate amount of meals eaten  - Assist patient with eating if necessary   - Allow adequate time for meals  - Recommend/ encourage appropriate diets, oral nutritional supplements, and vitamin/mineral supplements  - Order, calculate, and assess calorie counts as needed  - Recommend, monitor, and adjust tube feedings and TPN/PPN based on assessed needs  - Assess need for intravenous fluids  - Provide specific nutrition/hydration education as appropriate  - Include patient/family/caregiver in decisions related to nutrition  Outcome: Progressing     Problem: Prexisting or High Potential for Compromised Skin Integrity  Goal: Skin integrity is maintained or improved  Description  INTERVENTIONS:  - Identify patients at risk for skin breakdown  - Assess and monitor skin integrity  - Assess and monitor nutrition and hydration status  - Monitor labs   - Assess for incontinence   - Turn and reposition patient  - Assist with mobility/ambulation  - Relieve pressure over bony prominences  - Avoid friction and shearing  - Provide appropriate hygiene as needed including keeping skin clean and dry  - Evaluate need for skin moisturizer/barrier cream  - Collaborate with interdisciplinary team   - Patient/family teaching  - Consider wound care consult   Outcome: Progressing     Problem: PAIN - ADULT  Goal: Verbalizes/displays adequate comfort level or baseline comfort level  Description  Interventions:  - Encourage patient to monitor pain and request assistance  - Assess pain using appropriate pain scale  - Administer analgesics based on type and severity of pain and evaluate response  - Implement non-pharmacological measures as appropriate and evaluate response  - Consider cultural and social influences on pain and pain management  - Notify physician/advanced practitioner if interventions unsuccessful or patient reports new pain  Outcome: Progressing     Problem: INFECTION - ADULT  Goal: Absence or prevention of progression during hospitalization  Description  INTERVENTIONS:  - Assess and monitor for signs and symptoms of infection  - Monitor lab/diagnostic results  - Monitor all insertion sites, i e  indwelling lines, tubes, and drains  - Monitor endotracheal if appropriate and nasal secretions for changes in amount and color  - [place] appropriate cooling/warming therapies per order  - Administer medications as ordered  - Instruct and encourage patient and family to use good hand hygiene technique  - Identify and instruct in appropriate isolation precautions for identified infection/condition  Outcome: Progressing     Problem: SAFETY ADULT  Goal: Patient will remain free of falls  Description  INTERVENTIONS:  - Assess patient frequently for physical needs  -  Identify cognitive and physical deficits and behaviors that affect risk of falls    -  [place] fall precautions as indicated by assessment   - Educate patient/family on patient safety including physical limitations  - Instruct patient to call for assistance with activity based on assessment  - Modify environment to reduce risk of injury  - Consider OT/PT consult to assist with strengthening/mobility  Outcome: Progressing  Goal: Maintain or return to baseline ADL function  Description  INTERVENTIONS:  -  Assess patient's ability to carry out ADLs; assess patient's baseline for ADL function and identify physical deficits which impact ability to perform ADLs (bathing, care of mouth/teeth, toileting, grooming, dressing, etc )  - Assess/evaluate cause of self-care deficits   - Assess range of motion  - Assess patient's mobility; develop plan if impaired  - Assess patient's need for assistive devices and provide as appropriate  - Encourage maximum independence but intervene and supervise when necessary  - Involve family in performance of ADLs  - Assess for home care needs following discharge   - Consider OT consult to assist with ADL evaluation and planning for discharge  - Provide patient education as appropriate  Outcome: Progressing  Goal: Maintain or return mobility status to optimal level  Description  INTERVENTIONS:  - Assess patient's baseline mobility status (ambulation, transfers, stairs, etc )    - Identify cognitive and physical deficits and behaviors that affect mobility  - Identify mobility aids required to assist with transfers and/or ambulation (gait belt, sit-to-stand, lift, walker, cane, etc )  - [place] fall precautions as indicated by assessment  - Record patient progress and toleration of activity level on Mobility SBAR; progress patient to next Phase/Stage  - Instruct patient to call for assistance with activity based on assessment  - Consider rehabilitation consult to assist with strengthening/weightbearing, etc   Outcome: Progressing     Problem: DISCHARGE PLANNING  Goal: Discharge to home or other facility with appropriate resources  Description  INTERVENTIONS:  - Identify barriers to discharge w/patient and caregiver  - Arrange for needed discharge resources and transportation as appropriate  - Identify discharge learning needs (meds, wound care, etc )  - Arrange for interpretive services to assist at discharge as needed  - Refer to Case Management Department for coordinating discharge planning if the patient needs post-hospital services based on physician/advanced practitioner order or complex needs related to functional status, cognitive ability, or social support system  Outcome: Progressing     Problem: NEUROSENSORY - ADULT  Goal: Achieves stable or improved neurological status  Description  INTERVENTIONS  - Monitor and report changes in neurological status  - Monitor vital signs such as temperature, blood pressure, glucose, and any other labs ordered   - Initiate measures to prevent increased intracranial pressure  - Monitor for seizure activity and implement precautions if appropriate      Outcome: Progressing  Goal: Achieves maximal functionality and self care  Description  INTERVENTIONS  - Monitor swallowing and airway patency with patient fatigue and changes in neurological status  - Encourage and assist patient to increase activity and self care     - Encourage visually impaired, hearing impaired and aphasic patients to use assistive/communication devices  Outcome: Progressing     Problem: CARDIOVASCULAR - ADULT  Goal: Maintains optimal cardiac output and hemodynamic stability  Description  INTERVENTIONS:  - Monitor I/O, vital signs and rhythm  - Monitor for S/S and trends of decreased cardiac output  - Administer and titrate ordered vasoactive medications to optimize hemodynamic stability  - Assess quality of pulses, skin color and temperature  - Assess for signs of decreased coronary artery perfusion  - Instruct patient to report change in severity of symptoms  Outcome: Progressing  Goal: Absence of cardiac dysrhythmias or at baseline rhythm  Description  INTERVENTIONS:  - Continuous cardiac monitoring, vital signs, obtain 12 lead EKG if ordered  - Administer antiarrhythmic and heart rate control medications as ordered  - Monitor electrolytes and administer replacement therapy as ordered  Outcome: Progressing     Problem: GASTROINTESTINAL - ADULT  Goal: Minimal or absence of nausea and/or vomiting  Description  INTERVENTIONS:  - Administer IV fluids if ordered to ensure adequate hydration  - Maintain NPO status until nausea and vomiting are resolved  - Nasogastric tube if ordered  - Administer ordered antiemetic medications as needed  - Provide nonpharmacologic comfort measures as appropriate  - Advance diet as tolerated, if ordered  - Consider nutrition services referral to assist patient with adequate nutrition and appropriate food choices  Outcome: Progressing  Goal: Maintains or returns to baseline bowel function  Description  INTERVENTIONS:  - Assess bowel function  - Encourage oral fluids to ensure adequate hydration  - Administer IV fluids if ordered to ensure adequate hydration  - Administer ordered medications as needed  - Encourage mobilization and activity  - Consider nutritional services referral to assist patient with adequate nutrition and appropriate food choices  Outcome: Progressing  Goal: Maintains adequate nutritional intake  Description  INTERVENTIONS:  - Monitor percentage of each meal consumed  - Identify factors contributing to decreased intake, treat as appropriate  - Assist with meals as needed  - Monitor I&O, weight, and lab values if indicated  - Obtain nutrition services referral as needed  Outcome: Progressing     Problem: GENITOURINARY - ADULT  Goal: Maintains or returns to baseline urinary function  Description  INTERVENTIONS:  - Assess urinary function  - Encourage oral fluids to ensure adequate hydration if ordered  - Administer IV fluids as ordered to ensure adequate hydration  - Administer ordered medications as needed  - Offer frequent toileting  - Follow urinary retention protocol if ordered  Outcome: Progressing     Problem: METABOLIC, FLUID AND ELECTROLYTES - ADULT  Goal: Electrolytes maintained within normal limits  Description  INTERVENTIONS:  - Monitor labs and assess patient for signs and symptoms of electrolyte imbalances  - Administer electrolyte replacement as ordered  - Monitor response to electrolyte replacements, including repeat lab results as appropriate  - Instruct patient on fluid and nutrition as appropriate  Outcome: Progressing  Goal: Fluid balance maintained  Description  INTERVENTIONS:  - Monitor labs   - Monitor I/O and WT  - Instruct patient on fluid and nutrition as appropriate  - Assess for signs & symptoms of volume excess or deficit  Outcome: Progressing  Goal: Glucose maintained within target range  Description  INTERVENTIONS:  - Monitor Blood Glucose as ordered  - Assess for signs and symptoms of hyperglycemia and hypoglycemia  - Administer ordered medications to maintain glucose within target range  - Assess nutritional intake and initiate nutrition service referral as needed  Outcome: Progressing     Problem: SKIN/TISSUE INTEGRITY - ADULT  Goal: Skin integrity remains intact  Description  INTERVENTIONS  - Identify patients at risk for skin breakdown  - Assess and monitor skin integrity  - Assess and monitor nutrition and hydration status  - Monitor labs (i e  albumin)  - Assess for incontinence   - Turn and reposition patient  - Assist with mobility/ambulation  - Relieve pressure over bony prominences  - Avoid friction and shearing  - Provide appropriate hygiene as needed including keeping skin clean and dry  - Evaluate need for skin moisturizer/barrier cream  - Collaborate with interdisciplinary team (i e  Nutrition, Rehabilitation, etc )   - Patient/family teaching  Outcome: Progressing  Goal: Oral mucous membranes remain intact  Description  INTERVENTIONS  - Assess oral mucosa and hygiene practices  - Implement preventative oral hygiene regimen  - Implement oral medicated treatments as ordered  - Initiate Nutrition services referral as needed  Outcome: Progressing     Problem: MUSCULOSKELETAL - ADULT  Goal: Maintain or return mobility to safest level of function  Description  INTERVENTIONS:  - Assess patient's ability to carry out ADLs; assess patient's baseline for ADL function and identify physical deficits which impact ability to perform ADLs (bathing, care of mouth/teeth, toileting, grooming, dressing, etc )  - Assess/evaluate cause of self-care deficits   - Assess range of motion  - Assess patient's mobility  - Assess patient's need for assistive devices and provide as appropriate  - Encourage maximum independence but intervene and supervise when necessary  - Involve family in performance of ADLs  - Assess for home care needs following discharge   - Consider OT consult to assist with ADL evaluation and planning for discharge  - Provide patient education as appropriate  Outcome: Progressing  Goal: Maintain proper alignment of affected body part  Description  INTERVENTIONS:  - Support, maintain and protect limb and body alignment  - Provide patient/ family with appropriate education  Outcome: Progressing

## 2019-11-28 NOTE — ASSESSMENT & PLAN NOTE
· C diff positive  · Day 3 of PO vanco 125mg Q6 x10d  · Add flroastor  · Track BMs; starting to improve

## 2019-11-28 NOTE — PROGRESS NOTES
Progress Note - Harjeet Curry 1940, [de-identified] y o  male MRN: [id number]    Unit/Bed#: S -01 Encounter: [id number]    Primary Care Provider: Ronda Seymour DO   Date and time admitted to hospital: 11/25/2019  6:18 AM  * Syncope and collapse  Assessment & Plan  · Pt syncopized AM of 11/25, transported by EMS who report SBP of 90, blood pressure is now improved  · Patient was provided IV fluids  · Suspect this event was related to dehydration from C diff colitis superimposed upon his known history of orthostatic hypotension, treated with Mestinon  · Continue thigh high TEDS/abdominal binder when up  · Last echocardiogram on file was unremarkable  · Please note there was an error in my colleague's note, patient's troponin was never 0 27  It was 0 07 and has chronically been mildly elevated within this range without any concern for acute cardiac event        C  difficile diarrhea  Assessment & Plan  · C diff positive  · Day 3 of PO vanco 125mg Q6 x10d  · Add flroastor  · Track BMs; starting to improve      Sepsis [facility])  Assessment & Plan  Met sepsis criteria on admission: HR >90s, leukocytsos, history of 3-4 days of diarrhea, C diff (+)  Now resolving          Acute kidney injury superimposed on CKD (HCC)resolved as of 11/27/2019  Assessment & Plan  · Likely prerenal  Baseline Cr appears for be 1 0-1 1, admission creat 1 35   Now resolved after IVF        Type 2 diabetes mellitus with hypoglycemia, with long-term current use of insulin [facility])  Assessment & Plan  Lab Results   Component Value Date    HGBA1C 7 4 (H) 11/25/2019       Recent Labs     11/27/19  1605 11/27/19  2056 11/28/19  0803 11/28/19  0910   POCGLU 196* 71 132 113       Blood Sugar Average: Last 72 hrs:  (P) 138 [id number]   · Continue Accu-Cheks with sliding scale coverage   · Decrease doses of Lantus and mealtime lispro to prevent further events of hypoglycemia    Obesity  Assessment & Plan  bmi 32    VTE Pharmacologic Prophylaxis: Pharmacologic: Enoxaparin (Lovenox)  Mechanical VTE Prophylaxis in Place: No    Patient Centered Rounds: spoke with RN    Discussions with Specialists or Other Care Team Provider:     Education and Discussions with Family / Patient: wife at bedtime    Time Spent for Care: 25 min  More than 50% of total time spent on counseling and coordination of care as described above  Current Length of Stay: 2 day(s)    Current Patient Status: Inpatient   Certification Statement: The patient will continue to require additional inpatient hospital stay due to awaiting placement    Discharge Plan: medically stable for dc when bed available hopefully tomorrow    Code Status: Level 1 - Full Code    Subjective:   Patient woke up a bit disoriented from sleep (wife reports this is not unusual and he has very vivid dreams  ) He noted a scratch on his face with some blood but isn't sure how he did it  His diarrhea is slowing down and he denies any pain  TEDS are uncomfortable and he takes them off at times  No dizziness  No other new events or concerns    Objective:     Vitals:   Temp (24hrs), Av 2 °F (36 8 °C), Min:98 °F (36 7 °C), Max:98 7 °F (37 1 °C)    Temp:  [98 °F (36 7 °C)-98 7 °F (37 1 °C)] 98 7 °F (37 1 °C)  HR:  [71-77] 76  Resp:  [18] 18  BP: (120-150)/(60-75) 150/70  SpO2:  [95 %-98 %] 98 %  Body mass index is 32 kg/m²  Input and Output Summary (last 24 hours): Intake/Output Summary (Last 24 hours) at 2019 1042  Last data filed at 2019 1001  Gross per 24 hour   Intake 960 ml   Output 200 ml   Net 760 ml       Physical Exam:     Physical Exam   Constitutional: He appears well-developed and well-nourished  No distress  HENT:   Head: Normocephalic  Eyes: Conjunctivae are normal  Right eye exhibits no discharge  Left eye exhibits no discharge  No scleral icterus  Cardiovascular: Normal rate, regular rhythm and normal heart sounds  Exam reveals no friction rub     No murmur heard   Pulmonary/Chest: Effort normal and breath sounds normal  No stridor  No respiratory distress  He has no wheezes  Abdominal: Soft  Bowel sounds are normal  He exhibits no distension  There is no tenderness  There is no guarding  obese   Musculoskeletal: He exhibits no edema  TEDS on   Neurological: He is alert  Awake alert interactive  Rapid speech, somewhat stuttering  Mild tremor  No confusion   Skin: Skin is warm and dry  No rash noted  He is not diaphoretic  No erythema  No pallor  Psychiatric: He has a normal mood and affect  His behavior is normal    Vitals reviewed  Additional Data:     Labs:    Results from last 7 days   Lab Units 11/28/19 0445   WBC Thousand/uL 10 61*   HEMOGLOBIN g/dL 11 0*   HEMATOCRIT % 34 3*   PLATELETS Thousands/uL 272   NEUTROS PCT % 71   LYMPHS PCT % 11*   MONOS PCT % 8   EOS PCT % 7*     Results from last 7 days   Lab Units 11/28/19 0445 11/25/19  0726   SODIUM mmol/L 143   < > 140   POTASSIUM mmol/L 3 7   < > 4 6   CHLORIDE mmol/L 109*   < > 102   CO2 mmol/L 26   < > 30   BUN mg/dL 22   < > 26*   CREATININE mg/dL 1 10   < > 1 35*   ANION GAP mmol/L 8   < > 8   CALCIUM mg/dL 8 2*   < > 8 9   ALBUMIN g/dL  --   --  3 1*   TOTAL BILIRUBIN mg/dL  --   --  0 59   ALK PHOS U/L  --   --  66   ALT U/L  --   --  27   AST U/L  --   --  19   GLUCOSE RANDOM mg/dL 62*   < > 189*    < > = values in this interval not displayed       Results from last 7 days   Lab Units 11/25/19  0726   INR  1 10     Results from last 7 days   Lab Units 11/28/19  0910 11/28/19  0803 11/27/19  2056 11/27/19  1605 11/27/19  1105 11/27/19  0723 11/26/19  2156 11/26/19  1545 11/26/19  1125 11/26/19  0807 11/25/19  2146 11/25/19 2053   POC GLUCOSE mg/dl 113 132 71 196* 169* 81 126 170* 191* 134 131 70     Results from last 7 days   Lab Units 11/25/19  0726   HEMOGLOBIN A1C % 7 4*     Results from last 7 days   Lab Units 11/25/19  0727   LACTIC ACID mmol/L 1 8     * I Have Reviewed All Lab Data Listed Above  * Additional Pertinent Lab Tests Reviewed: [place] 64 Admission Reviewed    Imaging:    Imaging Reports Reviewed Today Include:   Imaging Personally Reviewed by Myself Includes:      Recent Cultures (last 7 days):     Results from last 7 days   Lab Units 11/25/19  1247 11/25/19  0813 11/25/19  0726   BLOOD CULTURE   --  No Growth at 48 hrs  No Growth at 48 hrs  C DIFF TOXIN B  POSITIVE for C difficle toxin by PCR  *  --   --        Last 24 Hours Medication List:     Current Facility-Administered Medications:  acetaminophen 650 mg Oral Q6H PRN TANISHA Smith   albuterol 2 puff Inhalation Q6H PRN Gareth Sood PA-C   aspirin 81 mg Oral Daily Shiv Kapoor MD   atorvastatin 80 mg Oral Daily With Naina Tan PA-C   clopidogrel 75 mg Oral Daily Gareth Sood PA-C   enoxaparin 40 mg Subcutaneous Daily Gareth Sood PA-C   gabapentin 700 mg Oral TID Gareth Sood PA-C   [START ON 11/29/2019] insulin glargine 45 Units Subcutaneous QAM Jazmyne Vasquez PA-C   insulin lispro 1-5 Units Subcutaneous TID AC Gareth Sood PA-C   insulin lispro 1-5 Units Subcutaneous HS Gareth Sood PA-C   insulin lispro 12 Units Subcutaneous TID With Meals Jazmyne Vasquez PA-C   LORazepam 1 mg Oral BID hSiv Kapoor MD   nitroglycerin 0 4 mg Sublingual Q5 Min PRN Gareth Sood PA-C   ondansetron 4 mg Oral Q6H PRN Gareth Sood PA-C   pantoprazole 40 mg Oral BID Gareth Sood PA-C   pyridostigmine 30 mg Oral TID Gareth Sood PA-C   ranolazine 1,000 mg Oral BID Shiv Kapoor MD   saccharomyces boulardii 250 mg Oral BID Jazmyne Vasquez PA-C   sodium chloride 1,000 mL Intravenous Once Gareth Sood PA-C   vancomycin 125 mg Oral Q6H [address] Gareth Sood PA-C   vilazodone 40 mg Oral Daily Gareth Sood PA-C        Today, Patient Was Seen By: Foreign Mathis PA-C    ** Please Note: Dictation voice to text software may have been used in the creation of this document   **

## 2019-11-29 VITALS
OXYGEN SATURATION: 98 % | HEART RATE: 83 BPM | DIASTOLIC BLOOD PRESSURE: 68 MMHG | TEMPERATURE: 98.1 F | SYSTOLIC BLOOD PRESSURE: 130 MMHG | RESPIRATION RATE: 18 BRPM | BODY MASS INDEX: 31.92 KG/M2 | WEIGHT: 223 LBS | HEIGHT: 70 IN

## 2019-11-29 LAB
GLUCOSE SERPL-MCNC: 107 MG/DL (ref 65–140)
GLUCOSE SERPL-MCNC: 151 MG/DL (ref 65–140)

## 2019-11-29 PROCEDURE — 99239 HOSP IP/OBS DSCHRG MGMT >30: CPT | Performed by: PHYSICIAN ASSISTANT

## 2019-11-29 PROCEDURE — 82948 REAGENT STRIP/BLOOD GLUCOSE: CPT

## 2019-11-29 RX ORDER — GABAPENTIN 100 MG/1
700 CAPSULE ORAL 3 TIMES DAILY
Qty: 90 CAPSULE | Refills: 0 | Status: SHIPPED | OUTPATIENT
Start: 2019-11-29 | End: 2020-01-01

## 2019-11-29 RX ORDER — LORAZEPAM 1 MG/1
1 TABLET ORAL 2 TIMES DAILY
Qty: 20 TABLET | Refills: 0 | Status: SHIPPED | OUTPATIENT
Start: 2019-11-29 | End: 2019-01-01 | Stop reason: SDUPTHER

## 2019-11-29 RX ORDER — SACCHAROMYCES BOULARDII 250 MG
250 CAPSULE ORAL 2 TIMES DAILY
Qty: 60 CAPSULE | Refills: 0 | Status: SHIPPED | OUTPATIENT
Start: 2019-11-29 | End: 2020-01-01 | Stop reason: ALTCHOICE

## 2019-11-29 RX ORDER — INSULIN GLARGINE 100 [IU]/ML
45 INJECTION, SOLUTION SUBCUTANEOUS EVERY MORNING
Refills: 0
Start: 2019-11-30 | End: 2019-01-01

## 2019-11-29 RX ADMIN — PANTOPRAZOLE SODIUM 40 MG: 40 TABLET, DELAYED RELEASE ORAL at 09:20

## 2019-11-29 RX ADMIN — ENOXAPARIN SODIUM 40 MG: 40 INJECTION SUBCUTANEOUS at 09:20

## 2019-11-29 RX ADMIN — Medication 125 MG: at 05:20

## 2019-11-29 RX ADMIN — INSULIN GLARGINE 45 UNITS: 100 INJECTION, SOLUTION SUBCUTANEOUS at 09:21

## 2019-11-29 RX ADMIN — VILAZODONE HYDROCHLORIDE 40 MG: 20 TABLET ORAL at 09:23

## 2019-11-29 RX ADMIN — PYRIDOSTIGMINE BROMIDE 30 MG: 60 TABLET ORAL at 09:23

## 2019-11-29 RX ADMIN — Medication 125 MG: at 13:08

## 2019-11-29 RX ADMIN — RANOLAZINE 1000 MG: 500 TABLET, FILM COATED, EXTENDED RELEASE ORAL at 09:20

## 2019-11-29 RX ADMIN — INSULIN LISPRO 1 UNITS: 100 INJECTION, SOLUTION INTRAVENOUS; SUBCUTANEOUS at 13:09

## 2019-11-29 RX ADMIN — CLOPIDOGREL BISULFATE 75 MG: 75 TABLET ORAL at 09:19

## 2019-11-29 RX ADMIN — ASPIRIN 81 MG 81 MG: 81 TABLET ORAL at 09:19

## 2019-11-29 RX ADMIN — LORAZEPAM 1 MG: 1 TABLET ORAL at 09:20

## 2019-11-29 RX ADMIN — Medication 250 MG: at 09:20

## 2019-11-29 RX ADMIN — GABAPENTIN 700 MG: 400 CAPSULE ORAL at 09:19

## 2019-11-29 NOTE — SOCIAL WORK
CM met with patient and spouse at bedside to review SNF responses  Patient would like to discharge to [facility] today and his spouse will transport at 3 pm  IMM reviewed and copy provided  LINDA VITALE, facility all aware of discharge plan

## 2019-11-29 NOTE — ASSESSMENT & PLAN NOTE
Lab Results   Component Value Date    HGBA1C 7 4 (H) 11/25/2019       Recent Labs     11/28/19  1101 11/28/19  1605 11/28/19  2101 11/29/19  0756   POCGLU 226* 150* 147* 107       Blood Sugar Average: Last 72 hrs:  (P) 764 [id number]   · Continue Accu-Cheks with sliding scale coverage   · Decrease doses of Lantus and mealtime lispro to prevent further events of hypoglycemia

## 2019-11-29 NOTE — PLAN OF CARE
Problem: Potential for Falls  Goal: Patient will remain free of falls  Description  INTERVENTIONS:  - Assess patient frequently for physical needs  -  Identify cognitive and physical deficits and behaviors that affect risk of falls  -  [place] fall precautions as indicated by assessment   - Educate patient/family on patient safety including physical limitations  - Instruct patient to call for assistance with activity based on assessment  - Modify environment to reduce risk of injury  - Consider OT/PT consult to assist with strengthening/mobility  Outcome: Progressing     Problem: Nutrition/Hydration-ADULT  Goal: Nutrient/Hydration intake appropriate for improving, restoring or maintaining nutritional needs  Description  Monitor and assess patient's nutrition/hydration status for malnutrition  Collaborate with interdisciplinary team and initiate plan and interventions as ordered  Monitor patient's weight and dietary intake as ordered or per policy  Utilize nutrition screening tool and intervene as necessary  Determine patient's food preferences and provide high-protein, high-caloric foods as appropriate       INTERVENTIONS:  - Monitor oral intake, urinary output, labs, and treatment plans  - Assess nutrition and hydration status and recommend course of action  - Evaluate amount of meals eaten  - Assist patient with eating if necessary   - Allow adequate time for meals  - Recommend/ encourage appropriate diets, oral nutritional supplements, and vitamin/mineral supplements  - Order, calculate, and assess calorie counts as needed  - Recommend, monitor, and adjust tube feedings and TPN/PPN based on assessed needs  - Assess need for intravenous fluids  - Provide specific nutrition/hydration education as appropriate  - Include patient/family/caregiver in decisions related to nutrition  Outcome: Progressing     Problem: Prexisting or High Potential for Compromised Skin Integrity  Goal: Skin integrity is maintained or improved  Description  INTERVENTIONS:  - Identify patients at risk for skin breakdown  - Assess and monitor skin integrity  - Assess and monitor nutrition and hydration status  - Monitor labs   - Assess for incontinence   - Turn and reposition patient  - Assist with mobility/ambulation  - Relieve pressure over bony prominences  - Avoid friction and shearing  - Provide appropriate hygiene as needed including keeping skin clean and dry  - Evaluate need for skin moisturizer/barrier cream  - Collaborate with interdisciplinary team   - Patient/family teaching  - Consider wound care consult   Outcome: Progressing     Problem: PAIN - ADULT  Goal: Verbalizes/displays adequate comfort level or baseline comfort level  Description  Interventions:  - Encourage patient to monitor pain and request assistance  - Assess pain using appropriate pain scale  - Administer analgesics based on type and severity of pain and evaluate response  - Implement non-pharmacological measures as appropriate and evaluate response  - Consider cultural and social influences on pain and pain management  - Notify physician/advanced practitioner if interventions unsuccessful or patient reports new pain  Outcome: Progressing     Problem: INFECTION - ADULT  Goal: Absence or prevention of progression during hospitalization  Description  INTERVENTIONS:  - Assess and monitor for signs and symptoms of infection  - Monitor lab/diagnostic results  - Monitor all insertion sites, i e  indwelling lines, tubes, and drains  - Monitor endotracheal if appropriate and nasal secretions for changes in amount and color  - [place] appropriate cooling/warming therapies per order  - Administer medications as ordered  - Instruct and encourage patient and family to use good hand hygiene technique  - Identify and instruct in appropriate isolation precautions for identified infection/condition  Outcome: Progressing     Problem: SAFETY ADULT  Goal: Patient will remain free of falls  Description  INTERVENTIONS:  - Assess patient frequently for physical needs  -  Identify cognitive and physical deficits and behaviors that affect risk of falls    -  [place] fall precautions as indicated by assessment   - Educate patient/family on patient safety including physical limitations  - Instruct patient to call for assistance with activity based on assessment  - Modify environment to reduce risk of injury  - Consider OT/PT consult to assist with strengthening/mobility  Outcome: Progressing  Goal: Maintain or return to baseline ADL function  Description  INTERVENTIONS:  -  Assess patient's ability to carry out ADLs; assess patient's baseline for ADL function and identify physical deficits which impact ability to perform ADLs (bathing, care of mouth/teeth, toileting, grooming, dressing, etc )  - Assess/evaluate cause of self-care deficits   - Assess range of motion  - Assess patient's mobility; develop plan if impaired  - Assess patient's need for assistive devices and provide as appropriate  - Encourage maximum independence but intervene and supervise when necessary  - Involve family in performance of ADLs  - Assess for home care needs following discharge   - Consider OT consult to assist with ADL evaluation and planning for discharge  - Provide patient education as appropriate  Outcome: Progressing  Goal: Maintain or return mobility status to optimal level  Description  INTERVENTIONS:  - Assess patient's baseline mobility status (ambulation, transfers, stairs, etc )    - Identify cognitive and physical deficits and behaviors that affect mobility  - Identify mobility aids required to assist with transfers and/or ambulation (gait belt, sit-to-stand, lift, walker, cane, etc )  - [place] fall precautions as indicated by assessment  - Record patient progress and toleration of activity level on Mobility SBAR; progress patient to next Phase/Stage  - Instruct patient to call for assistance with activity based on assessment  - Consider rehabilitation consult to assist with strengthening/weightbearing, etc   Outcome: Progressing     Problem: DISCHARGE PLANNING  Goal: Discharge to home or other facility with appropriate resources  Description  INTERVENTIONS:  - Identify barriers to discharge w/patient and caregiver  - Arrange for needed discharge resources and transportation as appropriate  - Identify discharge learning needs (meds, wound care, etc )  - Arrange for interpretive services to assist at discharge as needed  - Refer to Case Management Department for coordinating discharge planning if the patient needs post-hospital services based on physician/advanced practitioner order or complex needs related to functional status, cognitive ability, or social support system  Outcome: Progressing     Problem: NEUROSENSORY - ADULT  Goal: Achieves stable or improved neurological status  Description  INTERVENTIONS  - Monitor and report changes in neurological status  - Monitor vital signs such as temperature, blood pressure, glucose, and any other labs ordered   - Initiate measures to prevent increased intracranial pressure  - Monitor for seizure activity and implement precautions if appropriate      Outcome: Progressing  Goal: Achieves maximal functionality and self care  Description  INTERVENTIONS  - Monitor swallowing and airway patency with patient fatigue and changes in neurological status  - Encourage and assist patient to increase activity and self care     - Encourage visually impaired, hearing impaired and aphasic patients to use assistive/communication devices  Outcome: Progressing     Problem: CARDIOVASCULAR - ADULT  Goal: Maintains optimal cardiac output and hemodynamic stability  Description  INTERVENTIONS:  - Monitor I/O, vital signs and rhythm  - Monitor for S/S and trends of decreased cardiac output  - Administer and titrate ordered vasoactive medications to optimize hemodynamic stability  - Assess quality of pulses, skin color and temperature  - Assess for signs of decreased coronary artery perfusion  - Instruct patient to report change in severity of symptoms  Outcome: Progressing  Goal: Absence of cardiac dysrhythmias or at baseline rhythm  Description  INTERVENTIONS:  - Continuous cardiac monitoring, vital signs, obtain 12 lead EKG if ordered  - Administer antiarrhythmic and heart rate control medications as ordered  - Monitor electrolytes and administer replacement therapy as ordered  Outcome: Progressing     Problem: GASTROINTESTINAL - ADULT  Goal: Minimal or absence of nausea and/or vomiting  Description  INTERVENTIONS:  - Administer IV fluids if ordered to ensure adequate hydration  - Maintain NPO status until nausea and vomiting are resolved  - Nasogastric tube if ordered  - Administer ordered antiemetic medications as needed  - Provide nonpharmacologic comfort measures as appropriate  - Advance diet as tolerated, if ordered  - Consider nutrition services referral to assist patient with adequate nutrition and appropriate food choices  Outcome: Progressing  Goal: Maintains or returns to baseline bowel function  Description  INTERVENTIONS:  - Assess bowel function  - Encourage oral fluids to ensure adequate hydration  - Administer IV fluids if ordered to ensure adequate hydration  - Administer ordered medications as needed  - Encourage mobilization and activity  - Consider nutritional services referral to assist patient with adequate nutrition and appropriate food choices  Outcome: Progressing  Goal: Maintains adequate nutritional intake  Description  INTERVENTIONS:  - Monitor percentage of each meal consumed  - Identify factors contributing to decreased intake, treat as appropriate  - Assist with meals as needed  - Monitor I&O, weight, and lab values if indicated  - Obtain nutrition services referral as needed  Outcome: Progressing     Problem: GENITOURINARY - ADULT  Goal: Maintains or returns to baseline urinary function  Description  INTERVENTIONS:  - Assess urinary function  - Encourage oral fluids to ensure adequate hydration if ordered  - Administer IV fluids as ordered to ensure adequate hydration  - Administer ordered medications as needed  - Offer frequent toileting  - Follow urinary retention protocol if ordered  Outcome: Progressing     Problem: METABOLIC, FLUID AND ELECTROLYTES - ADULT  Goal: Electrolytes maintained within normal limits  Description  INTERVENTIONS:  - Monitor labs and assess patient for signs and symptoms of electrolyte imbalances  - Administer electrolyte replacement as ordered  - Monitor response to electrolyte replacements, including repeat lab results as appropriate  - Instruct patient on fluid and nutrition as appropriate  Outcome: Progressing  Goal: Fluid balance maintained  Description  INTERVENTIONS:  - Monitor labs   - Monitor I/O and WT  - Instruct patient on fluid and nutrition as appropriate  - Assess for signs & symptoms of volume excess or deficit  Outcome: Progressing  Goal: Glucose maintained within target range  Description  INTERVENTIONS:  - Monitor Blood Glucose as ordered  - Assess for signs and symptoms of hyperglycemia and hypoglycemia  - Administer ordered medications to maintain glucose within target range  - Assess nutritional intake and initiate nutrition service referral as needed  Outcome: Progressing     Problem: SKIN/TISSUE INTEGRITY - ADULT  Goal: Skin integrity remains intact  Description  INTERVENTIONS  - Identify patients at risk for skin breakdown  - Assess and monitor skin integrity  - Assess and monitor nutrition and hydration status  - Monitor labs (i e  albumin)  - Assess for incontinence   - Turn and reposition patient  - Assist with mobility/ambulation  - Relieve pressure over bony prominences  - Avoid friction and shearing  - Provide appropriate hygiene as needed including keeping skin clean and dry  - Evaluate need for skin moisturizer/barrier cream  - Collaborate with interdisciplinary team (i e  Nutrition, Rehabilitation, etc )   - Patient/family teaching  Outcome: Progressing  Goal: Oral mucous membranes remain intact  Description  INTERVENTIONS  - Assess oral mucosa and hygiene practices  - Implement preventative oral hygiene regimen  - Implement oral medicated treatments as ordered  - Initiate Nutrition services referral as needed  Outcome: Progressing     Problem: MUSCULOSKELETAL - ADULT  Goal: Maintain or return mobility to safest level of function  Description  INTERVENTIONS:  - Assess patient's ability to carry out ADLs; assess patient's baseline for ADL function and identify physical deficits which impact ability to perform ADLs (bathing, care of mouth/teeth, toileting, grooming, dressing, etc )  - Assess/evaluate cause of self-care deficits   - Assess range of motion  - Assess patient's mobility  - Assess patient's need for assistive devices and provide as appropriate  - Encourage maximum independence but intervene and supervise when necessary  - Involve family in performance of ADLs  - Assess for home care needs following discharge   - Consider OT consult to assist with ADL evaluation and planning for discharge  - Provide patient education as appropriate  Outcome: Progressing  Goal: Maintain proper alignment of affected body part  Description  INTERVENTIONS:  - Support, maintain and protect limb and body alignment  - Provide patient/ family with appropriate education  Outcome: Progressing

## 2019-11-29 NOTE — PLAN OF CARE
Problem: Potential for Falls  Goal: Patient will remain free of falls  Description  INTERVENTIONS:  - Assess patient frequently for physical needs  -  Identify cognitive and physical deficits and behaviors that affect risk of falls  -  [place] fall precautions as indicated by assessment   - Educate patient/family on patient safety including physical limitations  - Instruct patient to call for assistance with activity based on assessment  - Modify environment to reduce risk of injury  - Consider OT/PT consult to assist with strengthening/mobility  Outcome: Progressing     Problem: Nutrition/Hydration-ADULT  Goal: Nutrient/Hydration intake appropriate for improving, restoring or maintaining nutritional needs  Description  Monitor and assess patient's nutrition/hydration status for malnutrition  Collaborate with interdisciplinary team and initiate plan and interventions as ordered  Monitor patient's weight and dietary intake as ordered or per policy  Utilize nutrition screening tool and intervene as necessary  Determine patient's food preferences and provide high-protein, high-caloric foods as appropriate       INTERVENTIONS:  - Monitor oral intake, urinary output, labs, and treatment plans  - Assess nutrition and hydration status and recommend course of action  - Evaluate amount of meals eaten  - Assist patient with eating if necessary   - Allow adequate time for meals  - Recommend/ encourage appropriate diets, oral nutritional supplements, and vitamin/mineral supplements  - Order, calculate, and assess calorie counts as needed  - Recommend, monitor, and adjust tube feedings and TPN/PPN based on assessed needs  - Assess need for intravenous fluids  - Provide specific nutrition/hydration education as appropriate  - Include patient/family/caregiver in decisions related to nutrition  Outcome: Progressing     Problem: Prexisting or High Potential for Compromised Skin Integrity  Goal: Skin integrity is maintained or improved  Description  INTERVENTIONS:  - Identify patients at risk for skin breakdown  - Assess and monitor skin integrity  - Assess and monitor nutrition and hydration status  - Monitor labs   - Assess for incontinence   - Turn and reposition patient  - Assist with mobility/ambulation  - Relieve pressure over bony prominences  - Avoid friction and shearing  - Provide appropriate hygiene as needed including keeping skin clean and dry  - Evaluate need for skin moisturizer/barrier cream  - Collaborate with interdisciplinary team   - Patient/family teaching  - Consider wound care consult   Outcome: Progressing     Problem: PAIN - ADULT  Goal: Verbalizes/displays adequate comfort level or baseline comfort level  Description  Interventions:  - Encourage patient to monitor pain and request assistance  - Assess pain using appropriate pain scale  - Administer analgesics based on type and severity of pain and evaluate response  - Implement non-pharmacological measures as appropriate and evaluate response  - Consider cultural and social influences on pain and pain management  - Notify physician/advanced practitioner if interventions unsuccessful or patient reports new pain  Outcome: Progressing     Problem: INFECTION - ADULT  Goal: Absence or prevention of progression during hospitalization  Description  INTERVENTIONS:  - Assess and monitor for signs and symptoms of infection  - Monitor lab/diagnostic results  - Monitor all insertion sites, i e  indwelling lines, tubes, and drains  - Monitor endotracheal if appropriate and nasal secretions for changes in amount and color  - [place] appropriate cooling/warming therapies per order  - Administer medications as ordered  - Instruct and encourage patient and family to use good hand hygiene technique  - Identify and instruct in appropriate isolation precautions for identified infection/condition  Outcome: Progressing     Problem: SAFETY ADULT  Goal: Patient will remain free of falls  Description  INTERVENTIONS:  - Assess patient frequently for physical needs  -  Identify cognitive and physical deficits and behaviors that affect risk of falls    -  [place] fall precautions as indicated by assessment   - Educate patient/family on patient safety including physical limitations  - Instruct patient to call for assistance with activity based on assessment  - Modify environment to reduce risk of injury  - Consider OT/PT consult to assist with strengthening/mobility  Outcome: Progressing  Goal: Maintain or return to baseline ADL function  Description  INTERVENTIONS:  -  Assess patient's ability to carry out ADLs; assess patient's baseline for ADL function and identify physical deficits which impact ability to perform ADLs (bathing, care of mouth/teeth, toileting, grooming, dressing, etc )  - Assess/evaluate cause of self-care deficits   - Assess range of motion  - Assess patient's mobility; develop plan if impaired  - Assess patient's need for assistive devices and provide as appropriate  - Encourage maximum independence but intervene and supervise when necessary  - Involve family in performance of ADLs  - Assess for home care needs following discharge   - Consider OT consult to assist with ADL evaluation and planning for discharge  - Provide patient education as appropriate  Outcome: Progressing  Goal: Maintain or return mobility status to optimal level  Description  INTERVENTIONS:  - Assess patient's baseline mobility status (ambulation, transfers, stairs, etc )    - Identify cognitive and physical deficits and behaviors that affect mobility  - Identify mobility aids required to assist with transfers and/or ambulation (gait belt, sit-to-stand, lift, walker, cane, etc )  - [place] fall precautions as indicated by assessment  - Record patient progress and toleration of activity level on Mobility SBAR; progress patient to next Phase/Stage  - Instruct patient to call for assistance with activity based on assessment  - Consider rehabilitation consult to assist with strengthening/weightbearing, etc   Outcome: Progressing     Problem: DISCHARGE PLANNING  Goal: Discharge to home or other facility with appropriate resources  Description  INTERVENTIONS:  - Identify barriers to discharge w/patient and caregiver  - Arrange for needed discharge resources and transportation as appropriate  - Identify discharge learning needs (meds, wound care, etc )  - Arrange for interpretive services to assist at discharge as needed  - Refer to Case Management Department for coordinating discharge planning if the patient needs post-hospital services based on physician/advanced practitioner order or complex needs related to functional status, cognitive ability, or social support system  Outcome: Progressing     Problem: GASTROINTESTINAL - ADULT  Goal: Maintains or returns to baseline bowel function  Description  INTERVENTIONS:  - Assess bowel function  - Encourage oral fluids to ensure adequate hydration  - Administer IV fluids if ordered to ensure adequate hydration  - Administer ordered medications as needed  - Encourage mobilization and activity  - Consider nutritional services referral to assist patient with adequate nutrition and appropriate food choices  Outcome: Progressing     Problem: METABOLIC, FLUID AND ELECTROLYTES - ADULT  Goal: Electrolytes maintained within normal limits  Description  INTERVENTIONS:  - Monitor labs and assess patient for signs and symptoms of electrolyte imbalances  - Administer electrolyte replacement as ordered  - Monitor response to electrolyte replacements, including repeat lab results as appropriate  - Instruct patient on fluid and nutrition as appropriate  Outcome: Progressing  Goal: Fluid balance maintained  Description  INTERVENTIONS:  - Monitor labs   - Monitor I/O and WT  - Instruct patient on fluid and nutrition as appropriate  - Assess for signs & symptoms of volume excess or deficit  Outcome: Progressing  Goal: Glucose maintained within target range  Description  INTERVENTIONS:  - Monitor Blood Glucose as ordered  - Assess for signs and symptoms of hyperglycemia and hypoglycemia  - Administer ordered medications to maintain glucose within target range  - Assess nutritional intake and initiate nutrition service referral as needed  Outcome: Progressing     Problem: SKIN/TISSUE INTEGRITY - ADULT  Goal: Skin integrity remains intact  Description  INTERVENTIONS  - Identify patients at risk for skin breakdown  - Assess and monitor skin integrity  - Assess and monitor nutrition and hydration status  - Monitor labs (i e  albumin)  - Assess for incontinence   - Turn and reposition patient  - Assist with mobility/ambulation  - Relieve pressure over bony prominences  - Avoid friction and shearing  - Provide appropriate hygiene as needed including keeping skin clean and dry  - Evaluate need for skin moisturizer/barrier cream  - Collaborate with interdisciplinary team (i e  Nutrition, Rehabilitation, etc )   - Patient/family teaching  Outcome: Progressing  Goal: Oral mucous membranes remain intact  Description  INTERVENTIONS  - Assess oral mucosa and hygiene practices  - Implement preventative oral hygiene regimen  - Implement oral medicated treatments as ordered  - Initiate Nutrition services referral as needed  Outcome: Progressing

## 2019-11-29 NOTE — PROGRESS NOTES
[facility]  Progress Note - Cheyanne Salas 1940, [de-identified] y o  male MRN: [id number]  Unit/Bed#: S -01 Encounter: [id number]  Primary Care Provider: Franki Faria DO   Date and time admitted to hospital: 11/25/2019  6:18 AM    C  difficile diarrhea  Assessment & Plan  · C diff positive  · Day 4 of PO vanco 125mg Q6 x10d  · Add florastor  · Track BMs; no episodes of diarrhea in >24h      * Syncope and collapse  Assessment & Plan  · Pt syncopized AM of 11/25, transported by EMS who report SBP of 90, blood pressure is now improved  · Patient was provided IV fluids  · Suspect this event was related to dehydration from C diff colitis superimposed upon his known history of orthostatic hypotension, treated with Mestinon  · Continue thigh high TEDS/abdominal binder when up  · Last echocardiogram on file was unremarkable  · Please note there was an error in my previous note, patient's troponin was never 0 27  It was 0 07 and has chronically been mildly elevated within this range without any concern for acute cardiac event        Sepsis [facility])  Assessment & Plan  Present on admission  HR >90s, leukocytpsis, history of 3-4 days of diarrhea, C diff (+)    Now resolving  Blood cultures show no growth at 72h         Type 2 diabetes mellitus with hypoglycemia, with long-term current use of insulin [facility])  Assessment & Plan  Lab Results   Component Value Date    HGBA1C 7 4 (H) 11/25/2019       Recent Labs     11/28/19  1605 11/28/19  2101 11/29/19  0756 11/29/19  1143   POCGLU 150* 147* 107 151*       Blood Sugar Average: Last 72 hrs:  (P) 594 [id number]   · Continue Accu-Cheks with sliding scale coverage   · Decrease doses of Lantus and mealtime lispro to prevent further events of hypoglycemia    Obesity  Assessment & Plan  bmi 32    VTE Pharmacologic Prophylaxis:   Pharmacologic: Enoxaparin (Lovenox)  Mechanical VTE Prophylaxis in Place: No    Patient Centered Rounds: I have performed bedside rounds with nursing staff today  Discussions with Specialists or Other Care Team Provider:     Education and Discussions with Family / Patient: Discussed goals of care with patient and need for STR  Will meet with pt's wife when she arrives later today  Time Spent for Care: 30 minutes  More than 50% of total time spent on counseling and coordination of care as described above  Current Length of Stay: 3 day(s)    Current Patient Status: Inpatient   Certification Statement: The patient will continue to require additional inpatient hospital stay due to medically stable, pending STR placement      Discharge Plan: Medically stable for discharge when STR bed becomes available  Hopeful for discharge today  Code Status: Level 1 - Full Code      Subjective:   Patient was awake and sitting in his armchair this morning  He reports he feels great today and that he is ready to be discharged  He does not recall working with PT/OT but is still agreeable to d/c to a STR facility  He denies any difficulty with ambulation and reports he is using his walker  He reports he has not had any episodes of diarrhea in > 24h and no longer has any abdominal cramping  Denies dizziness and lightheadedness, does not complain of any pain  No overnight events or concerns  Objective:     Vitals:   Temp (24hrs), Av 7 °F (36 5 °C), Min:97 5 °F (36 4 °C), Max:98 1 °F (36 7 °C)    Temp:  [97 5 °F (36 4 °C)-98 1 °F (36 7 °C)] 98 1 °F (36 7 °C)  HR:  [76-84] 83  Resp:  [18] 18  BP: (130-171)/(68-79) 130/68  SpO2:  [97 %-98 %] 98 %  Body mass index is 32 kg/m²  Input and Output Summary (last 24 hours): Intake/Output Summary (Last 24 hours) at 2019 1659  Last data filed at 2019 1100  Gross per 24 hour   Intake 0 ml   Output 0 ml   Net 0 ml       Physical Exam:     Physical Exam   Constitutional: He is oriented to person, place, and time  He appears well-developed and well-nourished     HENT:   Head: Normocephalic and atraumatic  Eyes: Pupils are equal, round, and reactive to light  EOM are normal    Cardiovascular: Normal rate, regular rhythm, normal heart sounds and intact distal pulses  Exam reveals no friction rub  No murmur heard  Pulmonary/Chest: Effort normal and breath sounds normal  No respiratory distress  He has no wheezes  He has no rales  Abdominal: Soft  Bowel sounds are normal  He exhibits no distension  There is no tenderness  There is no guarding  Musculoskeletal: He exhibits no edema or tenderness  Teds in place   Lymphadenopathy:     He has no cervical adenopathy  Neurological: He is alert and oriented to person, place, and time  Skin: Skin is warm and dry  No rash noted  No erythema  Psychiatric: He has a normal mood and affect  Additional Data:     Labs:    Results from last 7 days   Lab Units 11/28/19  0445   WBC Thousand/uL 10 61*   HEMOGLOBIN g/dL 11 0*   HEMATOCRIT % 34 3*   PLATELETS Thousands/uL 272   NEUTROS PCT % 71   LYMPHS PCT % 11*   MONOS PCT % 8   EOS PCT % 7*     Results from last 7 days   Lab Units 11/28/19  0445  11/25/19  0726   SODIUM mmol/L 143   < > 140   POTASSIUM mmol/L 3 7   < > 4 6   CHLORIDE mmol/L 109*   < > 102   CO2 mmol/L 26   < > 30   BUN mg/dL 22   < > 26*   CREATININE mg/dL 1 10   < > 1 35*   ANION GAP mmol/L 8   < > 8   CALCIUM mg/dL 8 2*   < > 8 9   ALBUMIN g/dL  --   --  3 1*   TOTAL BILIRUBIN mg/dL  --   --  0 59   ALK PHOS U/L  --   --  66   ALT U/L  --   --  27   AST U/L  --   --  19   GLUCOSE RANDOM mg/dL 62*   < > 189*    < > = values in this interval not displayed       Results from last 7 days   Lab Units 11/25/19  0726   INR  1 10     Results from last 7 days   Lab Units 11/29/19  1143 11/29/19  0756 11/28/19  2101 11/28/19  1605 11/28/19  1101 11/28/19  1058 11/28/19  0910 11/28/19  0803 11/27/19  2056 11/27/19  1605 11/27/19  1105 11/27/19  0723   POC GLUCOSE mg/dl 151* 107 147* 150* 226* >500* 113 132 71 196* 169* 81     Results from last 7 days   Lab Units 11/25/19  0726   HEMOGLOBIN A1C % 7 4*     Results from last 7 days   Lab Units 11/25/19  0727   LACTIC ACID mmol/L 1 8           * I Have Reviewed All Lab Data Listed Above  * Additional Pertinent Lab Tests Reviewed: All Labs Within Last 24 Hours Reviewed    Imaging:    Imaging Reports Reviewed Today Include: None   Imaging Personally Reviewed by Myself Includes:  None    Recent Cultures (last 7 days):     Results from last 7 days   Lab Units 11/25/19  1247 11/25/19  0813 11/25/19  0726   BLOOD CULTURE   --  No Growth After 4 Days  No Growth After 4 Days  C DIFF TOXIN B  POSITIVE for C difficle toxin by PCR  *  --   --        Last 24 Hours Medication List:     Current Facility-Administered Medications:  acetaminophen 650 mg Oral Q6H PRN TANISHA Okeefe   albuterol 2 puff Inhalation Q6H PRN Nereida RamonTRAM   aspirin 81 mg Oral Daily Opal Santiago MD   atorvastatin 80 mg Oral Daily With Nat Bolanos PA-C   clopidogrel 75 mg Oral Daily Nereida RamonTRAM   enoxaparin 40 mg Subcutaneous Daily Nereida RamonTRAM   gabapentin 700 mg Oral TID Nereida RamonTRAM   insulin glargine 45 Units Subcutaneous QAM Jazmyne Vasquez PA-C   insulin lispro 1-5 Units Subcutaneous TID AC Nereida RamonTRAM   insulin lispro 1-5 Units Subcutaneous HS Nereida Ramon, TRAM   insulin lispro 12 Units Subcutaneous TID With Meals Jazmyne Vasquez PA-C   levalbuterol 1 puff Inhalation Q6H PRN Suzanne Alcantara DO   LORazepam 1 mg Oral BID Opal Santiago MD   nitroglycerin 0 4 mg Sublingual Q5 Min PRN Nereida RamonTRAM   ondansetron 4 mg Oral Q6H PRN Nereida Ramon, TRAM   pantoprazole 40 mg Oral BID Nereida Ramon, TRAM   pyridostigmine 30 mg Oral TID Nereida Ramon, TRAM   ranolazine 1,000 mg Oral BID Opal Santiago MD   saccharomyces boulardii 250 mg Oral BID Jazmyne Vasquez, TRAM   sodium chloride 1,000 mL Intravenous Once Nereida RamonTRAM   vancomycin 125 mg Oral Q6H [address] Nereida TRAM Ramon vilazodone 40 mg Oral Daily Mary Shahid PA-C        Today, Patient Was Seen By: Mary Shahid PA-C    ** Please Note: Dictation voice to text software may have been used in the creation of this document   **

## 2019-11-29 NOTE — ASSESSMENT & PLAN NOTE
Met sepsis criteria on admission: HR >90s, leukocytpsis, history of 3-4 days of diarrhea, C diff (+)    Now resolving  Blood cultures show no growth at 72h

## 2019-11-29 NOTE — ASSESSMENT & PLAN NOTE
· Pt syncopized AM of 11/25, transported by EMS who report SBP of 90, blood pressure is now improved  · Patient was provided IV fluids  · Suspect this event was related to dehydration from C diff colitis superimposed upon his known history of orthostatic hypotension, treated with Mestinon  · Continue thigh high TEDS/abdominal binder when up  · Last echocardiogram on file was unremarkable  · Please note there was an error in my previous note, patient's troponin was never 0 27    It was 0 07 and has chronically been mildly elevated within this range without any concern for acute cardiac event

## 2019-11-29 NOTE — ASSESSMENT & PLAN NOTE
Lab Results   Component Value Date    HGBA1C 7 4 (H) 11/25/2019       Recent Labs     11/28/19  1605 11/28/19  2101 11/29/19  0756 11/29/19  1143   POCGLU 150* 147* 107 151*       Blood Sugar Average: Last 72 hrs:  (P) 273 [id number]   · Continue Accu-Cheks with sliding scale coverage   · Decrease doses of Lantus and mealtime lispro to prevent further events of hypoglycemia

## 2019-11-29 NOTE — ASSESSMENT & PLAN NOTE
· C diff positive  · Day 4 of PO vanco 125mg Q6 x10d  · Add florastor  · Track BMs; no episodes of diarrhea in >24h

## 2019-11-29 NOTE — DISCHARGE INSTRUCTIONS
Mr Dominic Rudd,    · Continue taking vancomycin (antibiotic) as prescribed to complete a full 10 day course  · Take probiotic (Florastor) as instructed  · Follow-up with your PCP within 1 week following your discharge  · You may resume taking docusate and or MiraLax as needed after C diff infection has resolved  Clostridium Difficile Infection   WHAT YOU NEED TO KNOW:   Clostridium difficile infection, or C  difficile, is an infection in your colon caused by bacteria  Different types of bacteria live inside the colon, creating a healthy balance between good and bad bacteria  If the C  difficile bacteria grow rapidly, this can disrupt the healthy balance of the colon  This can cause the lining of the colon to swell, which leads to an infection  DISCHARGE INSTRUCTIONS:   Medicines:   · Antibiotics: This medicine is given to keep the C  difficile bacteria from growing and allow the normal bacteria in your intestines to grow  Always take your antibiotics exactly as ordered by your healthcare provider  Do not stop taking your medicine unless directed by your healthcare provider  Never save antibiotics or take leftover antibiotics that were given to you for another illness  · Take your medicine as directed  Contact your healthcare provider if you think your medicine is not helping or if you have side effects  Tell him or her if you are allergic to any medicine  Keep a list of the medicines, vitamins, and herbs you take  Include the amounts, and when and why you take them  Bring the list or the pill bottles to follow-up visits  Carry your medicine list with you in case of an emergency  Follow up with your healthcare provider within 1 to 2 days:  Write down your questions so you remember to ask them during your visits  Self care:   · Drink liquids to prevent dehydration:  Ask how much liquid you should drink to prevent dehydration caused by diarrhea   Most adults should drink between 9 and 13 eight-ounce cups of liquid every day  For most people, good liquids to drink are water, juice, and broth  · Wash your hands:  Wash your hands often with germ-killing soap and warm, running water  Alcohol-based hand rubs do not kill C  difficile bacteria  Always wash your hands well after you use the toilet, diaper a child, and before you prepare or serve food  Tell anyone who touches you to wear gloves and wash their hands  · Clean surfaces with bleach:  Clean tabletops, desks, and other surfaces before anyone else touches or uses them  Clean with chlorine-based disinfectants, such as household bleach  · Avoid the spread of C  difficile:  Do not share any items with other people  Use as many disposable items, such as paper plates, as you can  Do this until your diarrhea has gone away  Contact your healthcare provider if:   · You have a fever  · Your signs and symptoms do not go away, or they come back, even after treatment  · You have questions or concerns about your condition or care  Seek care immediately or call 911 if:   · Your diarrhea and stomach cramps get worse  · Your abdomen is hard or feels swollen  · You have black or bright red stools  · You vomit blood  · You cannot eat or drink  · You are short of breath, or feel like you are going to faint  · You have 1 or more of the following signs of dehydration:     ¨ Dizziness or weakness, or extreme sleepiness    ¨ Dry mouth, cracked lips, or you feel very thirsty    ¨ Fast heartbeat or rapid breathing    ¨ More sleepy than usual    ¨ Very little urine or no urine    ¨ Sunken eyes     ¨ A child may be more irritable or fussy than usual  The soft spot on a baby's head may look sunken in   © 2017 [address] is for End User's use only and may not be sold, redistributed or otherwise used for commercial purposes   All illustrations and images included in CareNotes® are the copyrighted property of [employer]  or [employer]  The above information is an  only  It is not intended as medical advice for individual conditions or treatments  Talk to your doctor, nurse or pharmacist before following any medical regimen to see if it is safe and effective for you

## 2019-11-29 NOTE — SOCIAL WORK
CM informed by [facility] that patient is medically stable for dc to SNF today  At this time, [facility], [address] are all able to [address], [address] [facility] are not able to accept  CM placed call to patient's spouse to review choices; she reports she'll be at [facility] in 15-20 minutes and asked CM to meet with her and spouse at that time to review discharge plan  CM agreed to do so and will follow up with patient and spouse at bedside

## 2019-11-30 LAB
BACTERIA BLD CULT: NORMAL
BACTERIA BLD CULT: NORMAL

## 2019-12-02 ENCOUNTER — PATIENT OUTREACH (OUTPATIENT)
Dept: CASE MANAGEMENT | Facility: OTHER | Age: 79
End: 2019-12-02

## 2019-12-02 ENCOUNTER — NURSING HOME VISIT (OUTPATIENT)
Dept: GERIATRICS | Facility: OTHER | Age: 79
End: 2019-12-02
Payer: MEDICARE

## 2019-12-02 DIAGNOSIS — E66.09 CLASS 1 OBESITY DUE TO EXCESS CALORIES WITH SERIOUS COMORBIDITY AND BODY MASS INDEX (BMI) OF 31.0 TO 31.9 IN ADULT: ICD-10-CM

## 2019-12-02 DIAGNOSIS — Z71.89 COMPLEX CARE COORDINATION: Primary | ICD-10-CM

## 2019-12-02 DIAGNOSIS — G25.0 ESSENTIAL TREMOR: ICD-10-CM

## 2019-12-02 DIAGNOSIS — E11.649 TYPE 2 DIABETES MELLITUS WITH HYPOGLYCEMIA WITHOUT COMA, WITH LONG-TERM CURRENT USE OF INSULIN (HCC): ICD-10-CM

## 2019-12-02 DIAGNOSIS — E78.5 DYSLIPIDEMIA: ICD-10-CM

## 2019-12-02 DIAGNOSIS — I95.1 ORTHOSTATIC HYPOTENSION: Primary | ICD-10-CM

## 2019-12-02 DIAGNOSIS — Z79.4 TYPE 2 DIABETES MELLITUS WITH HYPOGLYCEMIA WITHOUT COMA, WITH LONG-TERM CURRENT USE OF INSULIN (HCC): ICD-10-CM

## 2019-12-02 DIAGNOSIS — E55.9 VITAMIN D DEFICIENCY: ICD-10-CM

## 2019-12-02 DIAGNOSIS — F41.9 ANXIETY: ICD-10-CM

## 2019-12-02 DIAGNOSIS — A41.9 SEPSIS WITHOUT ACUTE ORGAN DYSFUNCTION, DUE TO UNSPECIFIED ORGANISM (HCC): ICD-10-CM

## 2019-12-02 DIAGNOSIS — I71.4 ANEURYSM OF INFRARENAL ABDOMINAL AORTA (HCC): ICD-10-CM

## 2019-12-02 DIAGNOSIS — I25.10 CORONARY ARTERY DISEASE INVOLVING NATIVE CORONARY ARTERY OF NATIVE HEART WITHOUT ANGINA PECTORIS: ICD-10-CM

## 2019-12-02 DIAGNOSIS — R32 URINARY INCONTINENCE, UNSPECIFIED TYPE: ICD-10-CM

## 2019-12-02 DIAGNOSIS — R55 SYNCOPE AND COLLAPSE: ICD-10-CM

## 2019-12-02 DIAGNOSIS — R53.81 PHYSICAL DECONDITIONING: ICD-10-CM

## 2019-12-02 DIAGNOSIS — A04.72 C. DIFFICILE DIARRHEA: ICD-10-CM

## 2019-12-02 PROBLEM — I21.4 NON-STEMI (NON-ST ELEVATED MYOCARDIAL INFARCTION) (HCC): Status: RESOLVED | Noted: 2017-07-03 | Resolved: 2019-12-02

## 2019-12-02 PROBLEM — R09.89 CHEST CONGESTION: Status: RESOLVED | Noted: 2019-04-30 | Resolved: 2019-12-02

## 2019-12-02 PROBLEM — J06.9 VIRAL UPPER RESPIRATORY TRACT INFECTION: Status: RESOLVED | Noted: 2019-05-05 | Resolved: 2019-12-02

## 2019-12-02 PROBLEM — R10.33 ABDOMINAL PAIN, PERIUMBILICAL: Status: RESOLVED | Noted: 2019-11-25 | Resolved: 2019-12-02

## 2019-12-02 PROCEDURE — 99306 1ST NF CARE HIGH MDM 50: CPT | Performed by: FAMILY MEDICINE

## 2019-12-02 NOTE — ASSESSMENT & PLAN NOTE
· Patient met sepsis criteria upon admission to the hospital with leukocytosis and tachycardia  · His temperature has been in the 99s since admission, last checked this morning improved to 98  3  · Leukocytosis still present but improving  · Will check CBC in AM to monitor WBC  · Monitor for fever and hemodynamic instability

## 2019-12-02 NOTE — PROGRESS NOTES
[address]  [address] [facility]  [phone]    [facility] PHYSICAL        NAME: Dorris Habermann  AGE: [de-identified] y o  SEX: male  : 1940  DATE OF ENCOUNTER: 19  POS: 31 (SNF)  PCP: Mely Kothari DO    Chief Complaint     Seen and examined today for admission to short term rehabilitation unit at [facility]  History of Present Illness     [de-identified] yo M with PMHx of CAD, s/p CABGx3, DM2, Orthostatic hypotension, HTN, HLD, RBBB, mild AS,Aneurysm of infrarenal abdominal aorta, CKD 2, Anxiety, Class 1 obesity, essential tremor, hx of stage T2b adenocarcinoma of the prostate s/p radical retropubic prostatectomy with bilateral pelvic lymphadenectomy (3/13/2018), Urinary incontinence s/p urinary sphincteroplasty (19)  He was admitted to [facility] - after a syncopal episode at home  He was attempting to get out of bed, felt weak and collapsed on the bed and then fell to the floor  He was transported to the ED via EMS who noted his SBP was low in the 90s  He had been drinking poorly 3-4 days prior to hospital admission and had been experiencing diarrhea  Upon initial evaluation in the ED he complained f abdominal pain/cramping  Initial work up unremarkable in the ED  Admitted due to dehydration and acute GI illness, given that patient was meeting sepsis criteria with leukocytosis and tachycardia  He also was noted to have DEZ on admission to the hospital    He tested positive for CDiff and was started on Vancomycin PO for a total of 10 days with improvement of his overall condition  He was evaluated by PT/OT and recommendation was made for sub acute rehab services  Once considered medically stable, he was discharged to [facility] for short term rehab  Today, he was seen and examined for admission to STR unit at [facility]  He is resting comfortably in bed, reports feeling well  He denies any dizziness, lightheadedness   Denies any fever/chills, chest pain/shortness of breath, abdominal discomfort, nausea/vomiting, diarrhea/constipation or dysuria  Review of Systems     Review of Systems   Constitutional: Positive for fatigue  Negative for activity change, appetite change and unexpected weight change  HENT: Negative for congestion, dental problem, hearing loss, mouth sores, trouble swallowing and voice change  Eyes: Negative for visual disturbance  Respiratory: Negative for cough, chest tightness and shortness of breath  Cardiovascular: Negative for chest pain, palpitations and leg swelling  Gastrointestinal: Negative for abdominal distention, abdominal pain, constipation, diarrhea, nausea and vomiting  Genitourinary: Negative for dysuria  Musculoskeletal: Positive for gait problem  Negative for arthralgias and back pain  Skin: Negative for color change, pallor, rash and wound  Neurological: Negative for dizziness, weakness and light-headedness  All other systems reviewed and are negative  History     Allergies   Allergen Reactions    Adhesive [Medical Tape] Rash    Sulfa Antibiotics Other (See Comments)     Generalized redness       Past Medical History:   Diagnosis Date    [facility]ab[facility]) [facility]     Aneurysm of infrarenal abdominal aorta (HCC) 10/23/2012    Anxiety     Benign essential hypertension     CKD (chronic kidney disease) stage 2, GFR 60-89 ml/min 4/11/2018    Compression fracture of twelfth thoracic vertebra ([facility] ) 1/2/2019    Coronary artery disease involving native coronary artery of native heart without angina pectoris 1/6/2014    Description: 50% distal left main, 75% proximal LAD, 90% 1st diagonal, 50% ostial circumflex, 99% proximal circumflex, 90% 2nd OM, 30 mid RCA , 90% right posterolateral - left heart catheterization December 2013      Diabetes mellitus ([facility] )     DVT, lower extremity ([facility] )     Dyslipidemia 9/24/2012    Essential tremor 4/2/2019    GERD (gastroesophageal reflux disease)     Hyperlipidemia     Hypertension     Stopped his Medications because of Orthostatic hypotension    NSTEMI (non-ST elevated myocardial infarction) ([facility] )     Obesity 7/13/2017    Orthostatic hypotension     Prostate cancer (HCC)     Right bundle branch block (RBBB)     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    LIMA to LAD, SVG to OM1, SVG to distal RCA    Skin cancer     Type 2 diabetes mellitus with hyperglycemia, with long-term current use of insulin [facility] 7/3/2017    Vitamin D deficiency 2/19/2018     Past Surgical History:   Procedure Laterality Date    ANKLE ARTHROSCOPY W/ OPEN REPAIR Left     CARDIAC SURGERY      CORONARY ARTERY BYPASS GRAFT  12/11/2013    CABG x3 with LIMA to LAD, SVG to OM-1, SVG to posterior lateral, LYSIS of pericardial adhesions   MA COLONOSCOPY FLX DX W/COLLJ SPEC WHEN PFRMD N/A 8/9/2018    Procedure: COLONOSCOPY;  Surgeon: Bladimir sU MD;  Location: [facility] GI LAB; Service: Gastroenterology    MA REPAIR UMBILICAL ZTVW,6+S/N,IXKUW N/A 3/10/2017    Procedure: REPAIR HERNIA UMBILICAL;  Surgeon: Elsy Chandra MD;  Location: [facility] MAIN OR;  Service: General    PROSTATECTOMY  2008    SPHINCTEROPLASTY URINARY N/A 11/6/2019    Procedure: URINARY SPHINCTEROPLASTY;  Surgeon: Ruben Darnell MD;  Location: [facility] Main OR;  Service: Urology       Family History: non-contributory  Social History     Tobacco Use    Smoking status: Former Smoker     Types: Cigarettes    Smokeless tobacco: Never Used    Tobacco comment: quit 1967   Substance Use Topics    Alcohol use: Yes     Frequency: Monthly or less     Drinks per session: 1 or 2     Binge frequency: Never     Comment: rare socially    Drug use: No         He lives at home with wife who assists with ADLs when needed but at baseline independent with ADLs  He was driving independently prior to hospitalization   Currently in a 2 story home but per records reviewed, they just bought a ranch home and will move in at the end of this month  Objective   Vital Signs   BP: 129/55    HR:80    T:98 3    RR:20    O2Sat:95% RA     W:227 1 lb    Physical Exam   Constitutional: He is oriented to person, place, and time  He appears well-developed  No distress  Obese     HENT:   Head: Normocephalic and atraumatic  Right Ear: External ear normal    Left Ear: External ear normal    Mouth/Throat: Oropharynx is clear and moist  No oropharyngeal exudate  Eyes: Pupils are equal, round, and reactive to light  Conjunctivae and EOM are normal  No scleral icterus  Neck: Neck supple  No JVD present  Cardiovascular: Normal rate and regular rhythm  Murmur heard  Pulmonary/Chest: Effort normal and breath sounds normal  No respiratory distress  Abdominal: Soft  Bowel sounds are normal  He exhibits no distension  There is no tenderness  Musculoskeletal: Normal range of motion  He exhibits no edema, tenderness or deformity  Neurological: He is alert and oriented to person, place, and time  He has normal reflexes  He displays normal reflexes  No cranial nerve deficit  He exhibits normal muscle tone  + tremor on upper extremities bilaterally  Worsens with intentional movement  Skin: Skin is warm and dry  No rash noted  He is not diaphoretic  No erythema  No pallor  Psychiatric: He has a normal mood and affect   His behavior is normal  Judgment and thought content normal          Pertinent Laboratory/Diagnostic Studies:     Lab Results   Component Value Date    WBC 10 61 (H) 11/28/2019    HGB 11 0 (L) 11/28/2019    HCT 34 3 (L) 11/28/2019    MCV 98 11/28/2019     11/28/2019     Lab Results   Component Value Date    GLUCOSE 145 (H) 10/13/2015    CALCIUM 8 2 (L) 11/28/2019     10/13/2015    K 3 7 11/28/2019    CO2 26 11/28/2019     (H) 11/28/2019    BUN 22 11/28/2019    CREATININE 1 10 11/28/2019     Lab Results   Component Value Date    DHI5BJFMPWTA 1 529 04/29/2019     Lab Results   Component Value Date    HGBA1C 7 4 (H) 11/25/2019     · C diff (+)  ·  CT abdomen pelvis with contrast:  No acute abnormalities  Stable 3 5 cm infrarenal abdominal aortic aneurysm noted  ·  CT head without contrast:  No acute intracranial hemorrhage seen  No mass effect or midline shift  ·  CT spine cervical without contrast:  No acute compression collapse of the vertebra  ·  Chest x-ray:  No acute cardiopulmonary disease  ·  EKG:  Normal sinus rhythm at a rate of 96 beats per minute  Right bundle branch block  No ST/T-wave abnormalities  Similar to previous EKG from 10/18/2019  Current Medications   Vitamin D 3 3000U daily  Lorazepam 1 mg BID  Metformin 500 mg BID  Gabapentin 700mg TID  B12 500 mcg qd  Calcium 600 mg BID  Pyridostigmine Bromide 30 mg TID  Insulin Glargine 45 U QD  Probiotics daily  CoQ10 100 mg daily  Vancomycin 125 ml Q 6 hrs  Rosuvastatin 40 mg QD  Ascorbic Acid 500 mg QD  Plavix 75 mg daily  ASA 81 mg daily  Centrum silver QD  Ranolazine ER 1000 mg po BID  Viibryd 40 mg QD  Insulin Lispro 12 U TID WM  Nitroglycerin SL PRN  Protonix 40 mg QD  PRN Tylenol  PRN bowel regimen      All medications reviewed and updated in [facility] EMR/Chart  Assessment and Plan     Syncope and collapse  · One episode 11/25/19  Attributed to dehydration in the setting of underlying orthostatic hypotension and CDiff infection with diarrhea and poor oral intake  · Continue fall precautions and monitor orthostatic BPs  Orthostatic hypotension  · History of orthostatic hypotension, had syncopal episode on 11/25 resulting in collapse with brief LOC  · No acute injury noted  · Patient with 3-4 day hx of poor oral intake and diarrhea, found to be dehydrated, BP responded well to IVF  · Continue to monitor orthostatics  · Continue pyridostigmine for orthostasis  C  difficile diarrhea  · On PO Vanco 125 mg po Q6 hrs x 10 days  · Complete course of antibiotics through 12/5/19  · Continue Florastor    · No diarrhea reported today   · His temperature has been in the low 99s since admission, he is non toxic appearing  His abdominal pain has now resolved  · Check CBC to monitor WBC, check BMP to monitor renal function and electrolytes  Sepsis ([facility] )  · Patient met sepsis criteria upon admission to the hospital with leukocytosis and tachycardia  · His temperature has been in the 99s since admission, last checked this morning improved to 98  3  · Leukocytosis still present but improving  · Will check CBC in AM to monitor WBC  · Monitor for fever and hemodynamic instability  Type 2 diabetes mellitus with hypoglycemia, with long-term current use of insulin ([facility])    Lab Results   Component Value Date    HGBA1C 7 4 (H) 11/25/2019     · Per records reviewed, patient had episodes of hypoglycemia in the hospital and his doses of long and short acting insulins were decreased  · BS have been ranging from 60s to 160s since admission to [facility]  · Will decrease his basal insulin dose to 35 U QD and continue to monitor  · Will continue TID Novolog with meals  · Add insulin sliding scale and re-evaluate need to readjust insulin at next visit  Physical deconditioning  · PT/OT to evaluate and treat as appropriate  · Fall precautions recommended  Aneurysm of infrarenal abdominal aorta (HCC)  · 3 5 cm infrarenal stable aneurysm  · Follows with Vascular Surgery  Coronary artery disease involving native coronary artery of native heart without angina pectoris  · Currently asymptomatic  · Continue ASA, Plavix, statin  Not on beta blocker due to underlying orthostatic hypotension  Essential tremor  · Follows with Neurology, currently stable on Gabapentin 700 mg po TID  Obesity  · Lifestyle modifications encouraged  Vitamin D deficiency  · Continue Vitamin D supplements  Dyslipidemia  · Continue Rosuvastatin  · Follow up with Cardiology  Anxiety  · Currently on lorazepam 1 mg po BID    · In the setting of advanced age, underlying orthostatic hypotension and recent syncopal episode,would recommend slow taper with goal to D/C  · Consider SSRIs to treat anxiety  Urinary incontinence  · S/p sphincteroplasty 11/6/2019  · Continue to follow with Urology  Eve Mckeon MD  Geriatric Medicine  12/02/19    Please note:  Voice-recognition software may have been used in the preparation of this document  Occasional wrong word or "sound-alike" substitutions may have occurred due to the inherent limitations of voice recognition software  Interpretation should be guided by context

## 2019-12-02 NOTE — ASSESSMENT & PLAN NOTE
· Currently on lorazepam 1 mg po BID  · In the setting of advanced age, underlying orthostatic hypotension and recent syncopal episode,would recommend slow taper with goal to D/C  · Consider SSRIs to treat anxiety

## 2019-12-02 NOTE — ASSESSMENT & PLAN NOTE
· History of orthostatic hypotension, had syncopal episode on 11/25 resulting in collapse with brief LOC  · No acute injury noted  · Patient with 3-4 day hx of poor oral intake and diarrhea, found to be dehydrated, BP responded well to IVF  · Continue to monitor orthostatics  · Continue pyridostigmine for orthostasis

## 2019-12-02 NOTE — ASSESSMENT & PLAN NOTE
· Currently asymptomatic  · Continue ASA, Plavix, statin  Not on beta blocker due to underlying orthostatic hypotension

## 2019-12-02 NOTE — ASSESSMENT & PLAN NOTE
· On PO Vanco 125 mg po Q6 hrs x 10 days  · Complete course of antibiotics through 12/5/19  · Continue Florastor  · No diarrhea reported today  · His temperature has been in the low 99s since admission, he is non toxic appearing  His abdominal pain has now resolved  · Check CBC to monitor WBC, check BMP to monitor renal function and electrolytes

## 2019-12-02 NOTE — ASSESSMENT & PLAN NOTE
Lab Results   Component Value Date    HGBA1C 7 4 (H) 11/25/2019     · Per records reviewed, patient had episodes of hypoglycemia in the hospital and his doses of long and short acting insulins were decreased  · BS have been ranging from 60s to 160s since admission to [facility]  · Will decrease his basal insulin dose to 35 U QD and continue to monitor  · Will continue TID Novolog with meals  · Add insulin sliding scale and re-evaluate need to readjust insulin at next visit

## 2019-12-02 NOTE — ASSESSMENT & PLAN NOTE
· One episode 11/25/19  Attributed to dehydration in the setting of underlying orthostatic hypotension and CDiff infection with diarrhea and poor oral intake  · Continue fall precautions and monitor orthostatic BPs

## 2019-12-05 NOTE — ASSESSMENT & PLAN NOTE
- Multifactorial related to multiple co-morbidities  - Monitor skin integrity  - Encourage PO food and fluid intake  - Fall precautions advised

## 2019-12-05 NOTE — PROGRESS NOTES
[address] [facility]  [address] [facility]  [place] [address]  [phone]  [address] [place] [address]   Progress Note  POS 31      NAME: Earline Lozoya  AGE: [de-identified] y o  SEX: male  :  1940  DATE OF ENCOUNTER: 2019    Chief Complaint   Patient seen and examined for follow up on chronic conditions  History of Present Illness     Hortensia Carrillo is a [de-identified]year old male resident of [address] [place] [address] term rehab seen and examined to follow-up on acute and chronic medical conditions  He is lying in bed, calm, cooperative and in no acute distress  He denies chest pain, sob, abdominal pain, nausea, vomiting, diarrhea, headache, dizziness or blurred vision  He is making progress with PT/OT services  He ambulates with a roller walker  His appetite is good and he is sleeping well at night  Nursing reports that he has been having low fasting blood sugars with no signs or symptoms of hypogylcemia reported  The following portions of the patient's history were reviewed and updated as appropriate: allergies, current medications, past family history, past medical history, past social history, past surgical history and problem list     Review of Systems     A  review of systems was performed  All negative, except as per HPI  History     Past Medical History:   Diagnosis Date    AAA [facility]ab[facility]) ([facility]     Aneurysm of infrarenal abdominal aorta (HCC) 10/23/2012    Anxiety     Benign essential hypertension     CKD (chronic kidney disease) stage 2, GFR 60-89 ml/min 2018    Compression fracture of twelfth thoracic vertebra (HCC) 2019    Coronary artery disease involving native coronary artery of native heart without angina pectoris 2014    Description: 50% distal left main, 75% proximal LAD, 90% 1st diagonal, 50% ostial circumflex, 99% proximal circumflex, 90% 2nd OM, 30 mid RCA , 90% right posterolateral - left heart catheterization 2013      Diabetes mellitus ([facility] )  [facility], [facility]     Dyslipidemia 9/24/2012    Essential tremor 4/2/2019    GERD (gastroesophageal reflux disease)     Hyperlipidemia     Hypertension     Stopped his Medications because of Orthostatic hypotension    NSTEMI (non-ST elevated myocardial infarction) ([facility]     Obesity 7/13/2017    Orthostatic hypotension     Prostate cancer (HCC)     Right bundle branch block (RBBB)     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    LIMA to LAD, SVG to OM1, SVG to distal RCA    Skin cancer     Type 2 diabetes mellitus with hyperglycemia, with long-term current use of insulin ([facility] 7/3/2017    Vitamin D deficiency 2/19/2018     Past Surgical History:   Procedure Laterality Date    ANKLE ARTHROSCOPY W/ OPEN REPAIR Left     CARDIAC SURGERY      CORONARY ARTERY BYPASS GRAFT  12/11/2013    CABG x3 with LIMA to LAD, SVG to OM-1, SVG to posterior lateral, LYSIS of pericardial adhesions   ME COLONOSCOPY FLX DX W/COLLJ SPEC WHEN PFRMD N/A 8/9/2018    Procedure: COLONOSCOPY;  Surgeon: Socorro Linton MD;  Location: [facility];   Service: Gastroenterology    ME REPAIR UMBILICAL QAQV,8+J/P,VUPXZ N/A 3/10/2017    Procedure: REPAIR HERNIA UMBILICAL;  Surgeon: Mackenzie Pnieda MD;  Location: [facility] OR;  Service: General    PROSTATECTOMY  2008    SPHINCTEROPLASTY URINARY N/A 11/6/2019    Procedure: URINARY SPHINCTEROPLASTY;  Surgeon: Karma Herrera MD;  Location: [facility] OR;  Service: Urology     Family History   Problem Relation Age of Onset    Crohn's disease Mother     Liver cancer Mother     Hypertension Mother     Crohn's disease Father     Liver cancer Father     Heart disease Father     Hypertension Father     Hyperlipidemia Father     Colon cancer Brother     Aortic aneurysm Brother         abdominal     Heart disease Brother         abdominal aortic aneurysm    Hypertension Brother     Hyperlipidemia Brother     Colon polyps Family     Stomach cancer Family     Hypertension Sister     Mitral valve prolapse Sister 80        valve replacment      Social History     Socioeconomic History    Marital status: /Civil Union     Spouse name: Not on file    Number of children: Not on file    Years of education: Not on file    Highest education level: Not on file   Occupational History    Not on file   Social Needs    Financial resource strain: Not on file    Food insecurity:     Worry: Not on file     Inability: Not on file    Transportation needs:     Medical: Not on file     Non-medical: Not on file   Tobacco Use    Smoking status: Former Smoker     Types: Cigarettes    Smokeless tobacco: Never Used    Tobacco comment: quit 1967   Substance and Sexual Activity    Alcohol use: Yes     Frequency: Monthly or less     Drinks per session: 1 or 2     Binge frequency: Never     Comment: rare socially    Drug use: No    Sexual activity: Never   Lifestyle    Physical activity:     Days per week: Not on file     Minutes per session: Not on file    Stress: Not on file   Relationships    Social connections:     Talks on phone: Not on file     Gets together: Not on file     Attends Latter day service: Not on file     Active member of club or organization: Not on file     Attends meetings of clubs or organizations: Not on file     Relationship status: Not on file    Intimate partner violence:     Fear of current or ex partner: Not on file     Emotionally abused: Not on file     Physically abused: Not on file     Forced sexual activity: Not on file   Other Topics Concern    Not on file   Social History Narrative    Not on file     Allergies   Allergen Reactions    Adhesive [Medical Tape] Rash    Sulfa Antibiotics Other (See Comments)     Generalized redness       Objective     Vital Signs  BP: 145/63      HR: 88 T 98 2   RR: 16 O2Sat: 95% RA W:222 5 lbs  General: NAD, well nourished, well developed  Oral: Oropharynx  clear  Neck: Supple, +ROM  CV: S1, S2, regular rate, regular rhythm, + murmur  Pulmonary: Lung sounds clear to air, no wheezing, rhonchi, rales  Abdominal:BS + x4 in all quadrants, soft, no mass, no tenderness  Extremities: No edema, +ROM, +Strength  Neurological: CN 2-12 intact, VIRA, positive for resting tremor to left arm  Psych: Alert and oriented times 3, no mood, no affect, good judgement    Pertinent Laboratory/Diagnostic Studies:  12/03/2019: Glucose 135, BUN 13, Creatinine 1 01, Sodium 139, Potassium 4 5, H/H 10 8/21 1, WBC 8 0, Platelet Count 660      Current Medications     Current Medications Reviewed and updated in Nursing Home EMR      Assessment and Plan     Syncope and collapse  - S/P hospitalization on 11/25/2019 for syncope that was attributed to dehydration in the setting of orthostatic hypotension and D-Diff infection with diarrhea and poor PO intake  - No syncopal events reported  - Will continue to monitor for hypotension   - Continue fall precautions    Type 2 diabetes mellitus with hypoglycemia, with long-term current use of insulin ([facility])    Lab Results   Component Value Date    HGBA1C 7 4 (H) 11/25/2019   - Hypoglycemia reported in the hospital, FBS continues to be low in the 60's  - Will hold today's dose of lantus and decrease lantus to 25 units, hold if bs less than or equal to 150  - Will discontinue night time insulin coverage    C  difficile diarrhea  - Stable, patient afebrile, no complaint of diarrhea reported  - Continue PO Vanco and Florastor  - Will monitor CBC with diff and temperatures    Physical deconditioning  - Multifactorial related to multiple co-morbidities  - Monitor skin integrity  - Encourage PO food and fluid intake  - Fall precautions advised      [address] [facility]  12/04/2019

## 2019-12-05 NOTE — ASSESSMENT & PLAN NOTE
Lab Results   Component Value Date    HGBA1C 7 4 (H) 11/25/2019   - Hypoglycemia reported in the hospital, FBS continues to be low in the 60's  - Will hold today's dose of lantus and decrease lantus to 25 units, hold if bs less than or equal to 150  - Will discontinue night time insulin coverage

## 2019-12-05 NOTE — ASSESSMENT & PLAN NOTE
- Stable, patient afebrile, no complaint of diarrhea reported  - Continue PO Vanco and Florastor  - Will monitor CBC with diff and temperatures

## 2019-12-05 NOTE — ASSESSMENT & PLAN NOTE
- S/P hospitalization on 11/25/2019 for syncope that was attributed to dehydration in the setting of orthostatic hypotension and D-Diff infection with diarrhea and poor PO intake  - No syncopal events reported  - Will continue to monitor for hypotension   - Continue fall precautions

## 2019-12-07 PROBLEM — S10.91XA NECK ABRASION: Status: ACTIVE | Noted: 2019-01-01

## 2019-12-07 NOTE — PROGRESS NOTES
[address] [facility]  [address] [facility] [address]  [phone]  [address] [place] [address]   Progress Note  POS 31      NAME: Rajan Bal  AGE: [de-identified] y o  SEX: male  :  1940  DATE OF ENCOUNTER: 2019    Chief Complaint   Patient seen and examined for left neck abrasion    History of Present Illness     Camryn Ortega is a [de-identified]year old male patient seen and examined per nursing request to examine a left neck abrasion  He states that last night, he felt something pinching his left neck area and started to scratch it  This left a open area that caused bleeding  The bleeding now stopped and a scab formed  He denies fever, chills, pain, pruritis currently  The following portions of the patient's history were reviewed and updated as appropriate: allergies, current medications, past family history, past medical history, past social history, past surgical history and problem list     Review of Systems     A  review of systems was performed  All negative, except as per HPI  History     Past Medical History:   Diagnosis Date    AAA (abdominal aortic aneurysm) ([facility] [address] )     Aneurysm of infrarenal abdominal aorta (HCC) 10/23/2012    Anxiety     Benign essential hypertension     CKD (chronic kidney disease) stage 2, GFR 60-89 ml/min 2018    Compression fracture of twelfth thoracic vertebra (HCC) 2019    Coronary artery disease involving native coronary artery of native heart without angina pectoris 2014    Description: 50% distal left main, 75% proximal LAD, 90% 1st diagonal, 50% ostial circumflex, 99% proximal circumflex, 90% 2nd OM, 30 mid RCA , 90% right posterolateral - left heart catheterization 2013      Diabetes mellitus ([address] )     DVT, lower extremity ([address] )     Dyslipidemia 2012    Essential tremor 2019    GERD (gastroesophageal reflux disease)     Hyperlipidemia     Hypertension     Stopped his Medications because of Orthostatic hypotension    NSTEMI (non-ST elevated myocardial infarction) ([facility] [address] )     Obesity 7/13/2017    Orthostatic hypotension     Prostate cancer (HCC)     Right bundle branch block (RBBB)     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    LIMA to LAD, SVG to OM1, SVG to distal RCA    Skin cancer     Type 2 diabetes mellitus with hyperglycemia, with long-term current use of insulin ([facility] [address] ) 7/3/2017    Vitamin D deficiency 2/19/2018     Past Surgical History:   Procedure Laterality Date    ANKLE ARTHROSCOPY W/ OPEN REPAIR Left     CARDIAC SURGERY      CORONARY ARTERY BYPASS GRAFT  12/11/2013    CABG x3 with LIMA to LAD, SVG to OM-1, SVG to posterior lateral, LYSIS of pericardial adhesions   CO COLONOSCOPY FLX DX W/COLLJ SPEC WHEN PFRMD N/A 8/9/2018    Procedure: COLONOSCOPY;  Surgeon: Cat Alvares MD;  [facility];   Service: Gastroenterology    CO REPAIR UMBILICAL YFUN,9+F/D,KRXPI N/A 3/10/2017    Procedure: REPAIR HERNIA UMBILICAL;  Surgeon: Toyin Silva MD;  Location[facility];  Service: General    PROSTATECTOMY  2008    SPHINCTEROPLASTY URINARY N/A 11/6/2019    Procedure: URINARY SPHINCTEROPLASTY;  Surgeon: Chico Bustamante MD;  Location[facility];  Service: Urology     Family History   Problem Relation Age of Onset    Crohn's disease Mother     Liver cancer Mother     Hypertension Mother     Crohn's disease Father     Liver cancer Father     Heart disease Father     Hypertension Father     Hyperlipidemia Father     Colon cancer Brother     Aortic aneurysm Brother         abdominal     Heart disease Brother         abdominal aortic aneurysm    Hypertension Brother     Hyperlipidemia Brother     Colon polyps Family     Stomach cancer Family     Hypertension Sister     Mitral valve prolapse Sister 80        valve replacment      Social History     Socioeconomic History    Marital status: /Civil Union     Spouse name: Not on file    Number of children: Not on file    Years of education: Not on file   [facility] education level: Not on file   Occupational History    Not on file   Social Needs    Financial resource strain: Not on file    Food insecurity:     Worry: Not on file     Inability: Not on file    Transportation needs:     Medical: Not on file     Non-medical: Not on file   Tobacco Use    Smoking status: Former Smoker     Types: Cigarettes    Smokeless tobacco: Never Used    Tobacco comment: quit 1967   Substance and Sexual Activity    Alcohol use: Yes     Frequency: Monthly or less     Drinks per session: 1 or 2     Binge frequency: Never     Comment: rare socially    Drug use: No    Sexual activity: Never   Lifestyle    Physical activity:     Days per week: Not on file     Minutes per session: Not on file    Stress: Not on file   Relationships    Social connections:     Talks on phone: Not on file     Gets together: Not on file     Attends Pentecostal service: Not on file     Active member of club or organization: Not on file     Attends meetings of clubs or organizations: Not on file     Relationship status: Not on file    Intimate partner violence:     Fear of current or ex partner: Not on file     Emotionally abused: Not on file     Physically abused: Not on file     Forced sexual activity: Not on file   Other Topics Concern    Not on file   Social History Narrative    Not on file     Allergies   Allergen Reactions    Adhesive [Medical Tape] Rash    Sulfa Antibiotics Other (See Comments)     Generalized redness       Objective     Vital Signs  BP: 119/55     HR:89 T:97 4    RR: 20 O2Sat: 96% RA W:223 4 lbs  General: NAD, well nourished, well developed  Oral: Oropharynx  clear  Neck: Supple, +ROM  Skin: Small abrasion to left lateral neck with scab present, no erythema warmth or drainage noted  Psych: Alert and oriented times 3, no mood, no affect, good judgement    Pertinent Laboratory/Diagnostic Studies:  Reviewed in nursing home medical chart        Current Medications     Current Medications Reviewed and updated in Nursing Home EMR  Assessment and Plan     Neck abrasion  - Related to scratching neck area  - Site cleansed with NSS, a dressing was applied, however it would not stick due to facial hair  - No signs or symptoms of infection present, may leave open to air  - Cleanse site daily and monitor for signs ans symptoms of infection      Anxiety  - Patient scratching may be anxiety related  - Continue lorazepam 1 mg PO bid, do not recommend taper at this time  - Continue supportive care  - Will continue to monitor      [facility]  12/05/2019

## 2019-12-07 NOTE — ASSESSMENT & PLAN NOTE
- Related to scratching neck area  - Site cleansed with NSS, a dressing was applied, however it would not stick due to facial hair  - No signs or symptoms of infection present, may leave open to air  - Cleanse site daily and monitor for signs ans symptoms of infection

## 2019-12-07 NOTE — ASSESSMENT & PLAN NOTE
- Patient scratching may be anxiety related  - Continue lorazepam 1 mg PO bid, do not recommend taper at this time  - Continue supportive care  - Will continue to monitor

## 2019-12-09 NOTE — PROGRESS NOTES
[address] [address]  [phone]  [address] [place] [address]   Progress Note  POS 31      NAME: Ekaterina Tai  AGE: [de-identified] y o  SEX: male  :  1940  DATE OF ENCOUNTER: 2019    Chief Complaint   Patient seen and examined for follow up on chronic conditions  History of Present Illness     [de-identified]year old male seen and examined to follow-up on acute and chronic medical conditions in short term rehab  He is examined at bedside with no complaints  He denies chest pain, sob, abdominal pain, nausea, vomiting, diarrhea, headache, dizziness or blurred vision  He ambulates with roller walker 1000+ with supervision  He is a SBA with upper body and lower body ADL's and contact guard with toileting  Nursing reports that is blood sugars have improved with the reduction in his insulin dosage  The following portions of the patient's history were reviewed and updated as appropriate: allergies, current medications, past family history, past medical history, past social history, past surgical history and problem list     Review of Systems     A  review of systems was performed  All negative, except as per HPI  History     Past Medical History:   Diagnosis Date    AAA (abdominal aortic aneurysm) ([address] )     Aneurysm of infrarenal abdominal aorta (HCC) 10/23/2012    Anxiety     Benign essential hypertension     CKD (chronic kidney disease) stage 2, GFR 60-89 ml/min 2018    Compression fracture of twelfth thoracic vertebra (HCC) 2019    Coronary artery disease involving native coronary artery of native heart without angina pectoris 2014    Description: 50% distal left main, 75% proximal LAD, 90% 1st diagonal, 50% ostial circumflex, 99% proximal circumflex, 90% 2nd OM, 30 mid RCA , 90% right posterolateral - left heart catheterization 2013      Diabetes mellitus ([address] )     DVT, lower extremity ([address] )     Dyslipidemia 2012    Essential tremor 4/2/2019    GERD (gastroesophageal reflux disease)     Hyperlipidemia     Hypertension     Stopped his Medications because of Orthostatic hypotension    NSTEMI (non-ST elevated myocardial infarction) ([facility] [address] )     Obesity 7/13/2017    Orthostatic hypotension     Prostate cancer (HCC)     Right bundle branch block (RBBB)     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    LIMA to LAD, SVG to OM1, SVG to distal RCA    Skin cancer     Type 2 diabetes mellitus with hyperglycemia, with long-term current use of insulin ([facility] [address] ) 7/3/2017    Vitamin D deficiency 2/19/2018     Past Surgical History:   Procedure Laterality Date    ANKLE ARTHROSCOPY W/ OPEN REPAIR Left     CARDIAC SURGERY      CORONARY ARTERY BYPASS GRAFT  12/11/2013    CABG x3 with LIMA to LAD, SVG to OM-1, SVG to posterior lateral, LYSIS of pericardial adhesions   OK COLONOSCOPY FLX DX W/COLLJ SPEC WHEN PFRMD N/A 8/9/2018    Procedure: COLONOSCOPY;  Surgeon: Parvin Landa MD;  [facility];   Service: Gastroenterology    OK REPAIR UMBILICAL IVWL,6+S/V,QCSEB N/A 3/10/2017    Procedure: REPAIR HERNIA UMBILICAL;  Surgeon: Charlie Cuadra MD;  [facility];  Service: General    PROSTATECTOMY  2008    SPHINCTEROPLASTY URINARY N/A 11/6/2019    Procedure: URINARY SPHINCTEROPLASTY;  Surgeon: Kamila Medrano MD;  [facility];  Service: Urology     Family History   Problem Relation Age of Onset    Crohn's disease Mother     Liver cancer Mother     Hypertension Mother     Crohn's disease Father     Liver cancer Father     Heart disease Father     Hypertension Father     Hyperlipidemia Father     Colon cancer Brother     Aortic aneurysm Brother         abdominal     Heart disease Brother         abdominal aortic aneurysm    Hypertension Brother     Hyperlipidemia Brother     Colon polyps Family     Stomach cancer Family     Hypertension Sister     Mitral valve prolapse Sister 80        valve replacment      Social History     Socioeconomic History    Marital status: /Civil Union     Spouse name: Not on file    Number of children: Not on file    Years of education: Not on file    Highest education level: Not on file   Occupational History    Not on file   Social Needs    Financial resource strain: Not on file    Food insecurity:     Worry: Not on file     Inability: Not on file    Transportation needs:     Medical: Not on file     Non-medical: Not on file   Tobacco Use    Smoking status: Former Smoker     Types: Cigarettes    Smokeless tobacco: Never Used    Tobacco comment: quit 1967   Substance and Sexual Activity    Alcohol use: Yes     Frequency: Monthly or less     Drinks per session: 1 or 2     Binge frequency: Never     Comment: rare socially    Drug use: No    Sexual activity: Never   Lifestyle    Physical activity:     Days per week: Not on file     Minutes per session: Not on file    Stress: Not on file   Relationships    Social connections:     Talks on phone: Not on file     Gets together: Not on file     Attends Jain service: Not on file     Active member of club or organization: Not on file     Attends meetings of clubs or organizations: Not on file     Relationship status: Not on file    Intimate partner violence:     Fear of current or ex partner: Not on file     Emotionally abused: Not on file     Physically abused: Not on file     Forced sexual activity: Not on file   Other Topics Concern    Not on file   Social History Narrative    Not on file     Allergies   Allergen Reactions    Adhesive [Medical Tape] Rash    Sulfa Antibiotics Other (See Comments)     Generalized redness       Objective     Vital Signs  BP: 129/57     HR:81 T: 98 4    RR: 16 O2Sat: 96% RA W:220 4 lbs  General: NAD, well nourished, well developed  Oral: Oropharynx  clear  Neck: Supple, +ROM  CV: S1, S2, +  Murmur appreciated  Pulmonary: Lung sounds clear to air, no wheezing, rhonchi, rales  Abdominal:BS + x4 in all quadrants, soft, no mass, no tenderness  Extremities: No edema, +ROM, +Strength  Neurological: CN 2-12 intact, VIRA, resting tremors to bilateral upper extremities  Psych: Alert and oriented times 3, no mood, no affect, good judgement    Pertinent Laboratory/Diagnostic Studies:  12/03/2019: Glucose 135, BUN 13, Creatinine 1 01, Sodium 139, Potassium 4 5, Chloride 104, CO2 31, GFR 70, H/H 10 8/32 1, WBC 8 0, Platelet Count 606      Current Medications     Current Medications Reviewed and updated in Nursing Home EMR      Assessment and Plan     Type 2 diabetes mellitus with hypoglycemia, with long-term current use of insulin ([facility])    Lab Results   Component Value Date    HGBA1C 7 4 (H) 11/25/2019   - FBS improved s/p reduction in insulin dose  - Lantus decreased to 25 units daily with no meal time short acting insulin  coverage  - Continue Metformin and Lantus   - Will decrease accu checks to QID with no SSI coverage, notify if blood sugar is less than 70 or greater than 400   - Patient states that he gives himself his insulin at home  - Nursing to have him administer his own insulin here at the facility    C  difficile diarrhea  - Stable, patient afebrile, no complaint of diarrhea reported  - Continue PO Vanco and Florastor  - Will monitor CBC with diff     Anxiety  - Patient taking Ativan 1 mg PO BID since July of 2018  - Symptoms persist, he states that he still feels anxious frequently  - He states that he is working with his pcp to taper off of Ativan and possibly try medical marijuana  - Will continue Ativan during stay in [facility]  - Follow-up with PCP    Orthostatic hypotension  - Currently stable, hypotensive episodes reported last week  - Continue pyridostigmine for orthostasis  - Will continue to monitor for hypo and hypertension  - BMP reviewed and is unremarkable      [address] [facility]  12/09/2019

## 2019-12-10 NOTE — ASSESSMENT & PLAN NOTE
Lab Results   Component Value Date    HGBA1C 7 4 (H) 11/25/2019   - FBS improved s/p reduction in insulin dose  - Lantus decreased to 25 units daily with no meal time short acting insulin  coverage  - Continue Metformin and Lantus   - Will decrease accu checks to QID with no SSI coverage, notify if blood sugar is less than 70 or greater than 400   - Patient states that he gives himself his insulin at home  - Nursing to have him administer his own insulin here at the facility

## 2019-12-10 NOTE — ASSESSMENT & PLAN NOTE
- Currently stable, hypotensive episodes reported last week  - Continue pyridostigmine for orthostasis  - Will continue to monitor for hypo and hypertension  - BMP reviewed and is unremarkable

## 2019-12-10 NOTE — ASSESSMENT & PLAN NOTE
- Stable, patient afebrile, no complaint of diarrhea reported  - Continue PO Vanco and Florastor  - Will monitor CBC with diff

## 2019-12-10 NOTE — ASSESSMENT & PLAN NOTE
- Patient taking Ativan 1 mg PO BID since July of 2018  - Symptoms persist, he states that he still feels anxious frequently  - He states that he is working with his pcp to taper off of Ativan and possibly try medical marijuana  - Will continue Ativan during stay in [facility]  - Follow-up with PCP

## 2019-12-12 NOTE — PROGRESS NOTES
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 adenocarcinoma of hte prostate s/p retropubic prostatectomy with bilateral pelvic lymphadenectomy  On 2018, Urinary incontinence s/p urinary sphincteroplasty on 2019  He is  seen and examined today for discharge planning today  He is s/p hospitalization from 2019 to 2019 after suffering a syncopal episode at home  He was attempting to get out of bed, he felt weak and collapsed on the bed and then fell to the floor  His blood pressure was noted to be low in the 90's in the ED  He had been drinking poorly and had been experiencing diarrhea 3 to 4 days prior to admission  Upon initial evaluation in the ED, he complained of abdominal pain and cramping  He was admitted for dehydration, DEZ  and acute GI illness given that the patient met sepsis criteria  He is examined at bedside with no complaints  He denies chest pain, sob, abdominal pain, nausea, vomiting, diarrhea, myalgia, arthralgia, fever, chills,  headache, dizziness or blurred vision  He ambulates with roller walker 1000+ with supervision  He is a SBA with upper body and lower body ADL's and contact guard with toileting  Nursing reports that is blood sugars have improved with the reduction in his insulin dosage          The following portions of the patient's history were reviewed and updated as appropriate: allergies, current medications, past family history, past medical history, past social history, past surgical history and problem list     Review of Systems     A  review of systems was performed  All negative, except as per HPI  History     Past Medical History:   Diagnosis Date    AAA (abdominal aortic aneurysm) ([facility] )     Aneurysm of infrarenal abdominal aorta ([facility]) 10/23/2012    Anxiety     Benign essential hypertension     CKD (chronic kidney disease) stage 2, GFR 60-89 ml/min 4/11/2018    Compression fracture of twelfth thoracic vertebra ([facility]) 1/2/2019    Coronary artery disease involving native coronary artery of native heart without angina pectoris 1/6/2014    Description: 50% distal left main, 75% proximal LAD, 90% 1st diagonal, 50% ostial circumflex, 99% proximal circumflex, 90% 2nd OM, 30 mid RCA , 90% right posterolateral - left heart catheterization December 2013   Diabetes mellitus ([facility] )     DVT, lower extremity ([facility] )     Dyslipidemia 9/24/2012    Essential tremor 4/2/2019    GERD (gastroesophageal reflux disease)     Hyperlipidemia     Hypertension     Stopped his Medications because of Orthostatic hypotension    NSTEMI (non-ST elevated myocardial infarction) ([facility] )     Obesity 7/13/2017    Orthostatic hypotension     Prostate cancer ([facility])     Right bundle branch block (RBBB)     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    LIMA to LAD, SVG to OM1, SVG to distal RCA    Skin cancer     Type 2 diabetes mellitus with hyperglycemia, with long-term current use of insulin ([facility] ) 7/3/2017    Vitamin D deficiency 2/19/2018     Past Surgical History:   Procedure Laterality Date    ANKLE ARTHROSCOPY W/ OPEN REPAIR Left     CARDIAC SURGERY      CORONARY ARTERY BYPASS GRAFT  12/11/2013    CABG x3 with LIMA to LAD, SVG to OM-1, SVG to posterior lateral, LYSIS of pericardial adhesions      IL COLONOSCOPY FLX DX W/COLLJ SPEC WHEN PFRMD N/A 8/9/2018    Procedure: COLONOSCOPY;  Surgeon: Teresa Domínguez MD; Location: [facility];   Service: Gastroenterology    OK REPAIR UMBILICAL CQFU,2+W/A,LIOAI N/A 3/10/2017    Procedure: REPAIR HERNIA UMBILICAL;  Surgeon: Beverly Wilkinson MD;  [facility]: [facility];  Service: General    PROSTATECTOMY  2008    SPHINCTEROPLASTY URINARY N/A 11/6/2019    Procedure: URINARY SPHINCTEROPLASTY;  Surgeon: Luis Leo MD;  Location: [facility];  Service: Urology     Family History   Problem Relation Age of Onset    Crohn's disease Mother     Liver cancer Mother     Hypertension Mother     Crohn's disease Father     Liver cancer Father     Heart disease Father    Anu Sang Hypertension Father     Hyperlipidemia Father     Colon cancer Brother     Aortic aneurysm Brother         abdominal     Heart disease Brother         abdominal aortic aneurysm    Hypertension Brother     Hyperlipidemia Brother     Colon polyps Family     Stomach cancer Family     Hypertension Sister     Mitral valve prolapse Sister 80        valve replacment      Social History     Socioeconomic History    Marital status: /Civil Union     Spouse name: Not on file    Number of children: Not on file    Years of education: Not on file    Highest education level: Not on file   Occupational History    Not on file   Social Needs    Financial resource strain: Not on file    Food insecurity:     Worry: Not on file     Inability: Not on file    Transportation needs:     Medical: Not on file     Non-medical: Not on file   Tobacco Use    Smoking status: Former Smoker     Types: Cigarettes    Smokeless tobacco: Never Used    Tobacco comment: quit 1967   Substance and Sexual Activity    Alcohol use: Yes     Frequency: Monthly or less     Drinks per session: 1 or 2     Binge frequency: Never     Comment: rare socially    Drug use: No    Sexual activity: Never   Lifestyle    Physical activity:     Days per week: Not on file     Minutes per session: Not on file    Stress: Not on file   Relationships    Social connections:     Talks on phone: Not on file     Gets together: Not on file     Attends Sikh service: Not on file     Active member of club or organization: Not on file     Attends meetings of clubs or organizations: Not on file     Relationship status: Not on file    Intimate partner violence:     Fear of current or ex partner: Not on file     Emotionally abused: Not on file     Physically abused: Not on file     Forced sexual activity: Not on file   Other Topics Concern    Not on file   Social History Narrative    Not on file     Allergies   Allergen Reactions    Adhesive [Medical Tape] Rash    Sulfa Antibiotics Other (See Comments)     Generalized redness       Objective     Vital Signs  BP: 116/49    HR: 80 T: 98 2   RR: 20 O2Sat: 95% RA W: 221 6 lbs  General: NAD, Well Nourished, Well Developed  Oral: Oropharynx  clear  Neck: Supple, +ROM  CV: S1, S2, +  Murmur   Pulmonary: Lung sounds clear to air, no wheezing, rhonchi, rales  Abdominal:BS + x4 in all quadrants, soft, no mass, no tenderness  Extremities: No edema, +ROM, +Strength  Neurological: CN 2-12 intact, VIRA, + resting tremors bilateral upper extremities  Psych: Alert and oriented times 3, no mood, no affect, good judgement    Pertinent Laboratory/Diagnostic Studies:  12/03/2019: Glucose 135, BUN 13, Creatinine 1 01, Sodium 139, Potassium 4 5, Chloride 104, CO2 31, Calcium 8 4, H/H 10 8/32 1, WBC 8 0, RBC 3 39, Platelet Count 940      Current Medications     Current Medications Reviewed and updated in Nursing Home EMR      Assessment and Plan     Type 2 diabetes mellitus with hypoglycemia, with long-term current use of insulin ([facility])    Lab Results   Component Value Date    HGBA1C 7 4 (H) 11/25/2019   - FBS improved s/p reduction in insulin dose  - Lantus decreased to 25 units daily with no meal time short acting insulin  coverage  - Continue Metformin and Lantus   - Accu checks checked QID with no SSI coverage, nursing to notify if blood sugar is less than 70 or greater than 400   - Patient states that he gives himself his insulin at home  - Nursing educated own administration of insulin here at the facility  - Follow-up with PCP    C  difficile diarrhea  - Stable, patient afebrile, no complaint of diarrhea reported  - S/P PO Vancomycin  - Follow-up as outpatient with PCP    Aneurysm of infrarenal abdominal aorta (HCC)  - 3 5 cm infrarenal stable aneurysm  - Follows with Vascular Surgery    Coronary artery disease involving native coronary artery of native heart without angina pectoris  - Patient has been asymptomatic  - Continue ASA, Plavix and statin  - Not on beta blocker due to underlying orthostatic hypotension    Orthostatic hypotension  - Currently stable, hypotensive episodes reported last week  - Continue pyridostigmine for orthostasis  - Orthostatic blood pressures performed with positive results  - BMP reviewed and is unremarkable  - Follow-up with PCP    Syncope and collapse  - S/P hospitalization on 11/25/2019 for syncope that was attributed to dehydration in the setting of orthostatic hypotension, hypoglycemia and D-Diff infection with diarrhea and poor PO intake     - No syncopal events reported in rehab  - Continue fall precautions at home  - Follow-up with PCP    Essential tremor  - Controlled, continue Gabapentin  - Follow-up with Neurology    Anxiety  - Patient taking Ativan 1 mg PO BID since July of 2018  - Symptoms persist, he states that he still feels anxious frequently  - He states that he is working with his pcp to taper off of Ativan and possibly try medical marijuana  - Ativan continued in short term rehab  - Follow-up with PCP    Dyslipidemia  - Continue Rosuvastatin  - Follow-up with Cardiology    Obesity  - Lifestyle modifications encouraged    Physical deconditioning  - Multifactorial related to multiple co-morbidities  - Monitor skin integrity  - Encourage PO food and fluid intake  - Fall precautions advised    Urinary incontinence  - S/P sphincteroplasty 11/06/2019  - Follow-up with Urology    Vitamin D deficiency  - Continue vitamin D supplementation      [id number]  12/12/2019    Time Spent: Greater than 30 minutes

## 2019-12-13 NOTE — ASSESSMENT & PLAN NOTE
- Patient has been asymptomatic  - Continue ASA, Plavix and statin  - Not on beta blocker due to underlying orthostatic hypotension

## 2019-12-13 NOTE — ASSESSMENT & PLAN NOTE
- S/P hospitalization on 11/25/2019 for syncope that was attributed to dehydration in the setting of orthostatic hypotension, hypoglycemia and D-Diff infection with diarrhea and poor PO intake     - No syncopal events reported in rehab  - Continue fall precautions at home  - Follow-up with PCP

## 2019-12-13 NOTE — ASSESSMENT & PLAN NOTE
- Stable, patient afebrile, no complaint of diarrhea reported  - S/P PO Vancomycin  - Follow-up as outpatient with PCP

## 2019-12-13 NOTE — ASSESSMENT & PLAN NOTE
- Patient taking Ativan 1 mg PO BID since July of 2018  - Symptoms persist, he states that he still feels anxious frequently  - He states that he is working with his pcp to taper off of Ativan and possibly try medical marijuana  - Ativan continued in short term rehab  - Follow-up with PCP

## 2019-12-13 NOTE — ASSESSMENT & PLAN NOTE
Lab Results   Component Value Date    HGBA1C 7 4 (H) 11/25/2019   - FBS improved s/p reduction in insulin dose  - Lantus decreased to 25 units daily with no meal time short acting insulin  coverage  - Continue Metformin and Lantus   - Accu checks checked QID with no SSI coverage, nursing to notify if blood sugar is less than 70 or greater than 400   - Patient states that he gives himself his insulin at home  - Nursing educated own administration of insulin here at the facility  - Follow-up with PCP

## 2019-12-13 NOTE — ASSESSMENT & PLAN NOTE
- Currently stable, hypotensive episodes reported last week  - Continue pyridostigmine for orthostasis  - Orthostatic blood pressures performed with positive results  - BMP reviewed and is unremarkable  - Follow-up with PCP

## 2019-12-17 NOTE — PROGRESS NOTES
Outreach TC to patient for initial care management assessment  Spoke with CG, spouse, Joshua Avendano  CG reports that patient is feeling well  Patient denies any  s/sx of DEZ including weight gain, edema, change in urinary status,increased weakness, cough or increased SOB  CG reports that patient is independent with ADL's  Patient does need assist with some IADLs  Patient resides with spouse  who is able to assist as needed  Patient did make a f/u appointment with PCP for 12/18/19 and has a routine f/u with cardiology on 1/29/20  CG educated on keeping all appointments  Reviewed medications including dose, schedule, purpose & side effects of lorazepam, Lantus insulin, neurontin and Humalog insulin with CG  CG verbalized understanding  Reviewed future appointments, s/sx to report and how/when to report symptoms to provider vs  911  CG verbalizes understanding  Educated on daily weights & to report weight gain greater than 3 lbs overnight or 5 lbs in 1 week    Educated on low sodium diet including label reading  CG verbalizes understanding    CG educated on role of CM, contact information for CM and complex CM  Agrees to additional calls

## 2019-12-18 NOTE — PROGRESS NOTES
Assessment/Plan:    Upper respiratory tract infection  Start amoxicillin  Drink plenty of fluids and rest  May take over the counter mucinex and cough syrup/cough drops  Call if worsening  Diagnoses and all orders for this visit:    Upper respiratory tract infection, unspecified type  -     amoxicillin (AMOXIL) 500 mg capsule; Take 1 capsule (500 mg total) by mouth every 8 (eight) hours for 7 days  -     albuterol (PROAIR HFA) 90 mcg/act inhaler; Inhale 2 puffs every 6 (six) hours as needed for wheezing    Type 2 diabetes mellitus with hypoglycemia without coma, with long-term current use of insulin (HCC)  -     insulin glargine (LANTUS) 100 units/mL subcutaneous injection; 25 units hold if less than 150    Other orders  -     insulin glargine (BASAGLAR KWIKPEN) 100 units/mL injection pen; Inject under the skin daily          Subjective:      Patient ID: Nicola Jamison is a [de-identified] y o  male  Patient is here for cough/wheezing for a week with yellow phlegm  No fever or chills      The following portions of the patient's history were reviewed and updated as appropriate: allergies, current medications, past family history, past medical history, past social history, past surgical history and problem list     Review of Systems   Constitutional: Positive for fatigue  HENT: Positive for postnasal drip and rhinorrhea  Eyes: Negative  Respiratory: Positive for cough and wheezing  Cardiovascular: Negative  Gastrointestinal: Negative  Musculoskeletal: Negative  Neurological: Negative  Objective:      /60   Pulse 90   Temp 98 1 °F (36 7 °C)   Ht 5' 10" (1 778 m)   Wt 99 2 kg (218 lb 9 6 oz)   SpO2 90%   BMI 31 37 kg/m²          Physical Exam   Constitutional: He is oriented to person, place, and time  He appears well-developed and well-nourished  HENT:   Head: Normocephalic and atraumatic     Right Ear: External ear normal    Left Ear: External ear normal    Nose: Nose normal  Mouth/Throat: Oropharynx is clear and moist    Eyes: Pupils are equal, round, and reactive to light  Conjunctivae are normal    Neck: Normal range of motion  Neck supple  Cardiovascular: Normal rate and regular rhythm  Pulmonary/Chest: Effort normal and breath sounds normal    Abdominal: Soft  Bowel sounds are normal    Musculoskeletal: Normal range of motion  Feet:   Right Foot:   Skin Integrity: Negative for ulcer, skin breakdown, erythema, warmth, callus or dry skin  Left Foot:   Skin Integrity: Negative for ulcer, skin breakdown, erythema, warmth, callus or dry skin  Neurological: He is alert and oriented to person, place, and time  Skin: Skin is warm and dry  Nursing note and vitals reviewed  Diabetic Foot Exam    Patient's shoes and socks removed  Right Foot/Ankle   Right Foot Inspection  Skin Exam: skin normal and skin intact no dry skin, no warmth, no callus, no erythema, no maceration, no abnormal color, no pre-ulcer, no ulcer and no callus                          Toe Exam: ROM and strength within normal limits  Sensory       Monofilament testing: intact      Left Foot/Ankle  Left Foot Inspection  Skin Exam: skin normal and skin intactno dry skin, no warmth, no erythema, no maceration, normal color, no pre-ulcer, no ulcer and no callus                         Toe Exam: ROM and strength within normal limits                   Sensory       Monofilament: intact

## 2019-12-19 NOTE — TELEPHONE ENCOUNTER
VALARIE ERNST calling in stating pt told her at his appt yesterday there was discussion about using an inhaler, none was prescribed and the note is not finished so she is unable to determine if he was to have one or not; please finish note and advise if pt should be using an inhaler/sent to pharmacy    Leonor: [phone]

## 2019-12-20 NOTE — ASSESSMENT & PLAN NOTE
Start amoxicillin  Drink plenty of fluids and rest  May take over the counter mucinex and cough syrup/cough drops  Call if worsening

## 2019-12-23 NOTE — PROGRESS NOTES
Outreach TC to patient for CM assessment  Spoke with patient's spouse, Christy Noel  CG states that patient is doing well  CG states that patient still has fatigue but is participating in home PT as well as performing his ADL's  Patient is not always compliant with checking his BS  This AM there was no sample  CG is trying to remind patient  Patient and CG relocated during the patient's stay at [facility]  Due to this, CG has been very busy with financial and legal tasks related to relocation  Patient did complete his EILEEN appointment but did not obtain BMP blood work  CG stated that they would obtain BMP on 12/26 or 12/27/19  CG denies any changes to  system  Patient has not had any new complaints or symptoms  Patient does have c/o chest congestion still  CG reports that patient is using Mucinex and Pro-Air as prescribed  Patient is compliant with his metformin and is only using his Lantus if FBS is >150  Reviewed not using NSAIDs  CG verbalized understanding  Reviewed home medications, s/sx of hypoglycemia, s/sx to report, future appointments, and how/when to report symptoms to provider vs 911  CG verbalizes understanding  Agrees to additional calls

## 2020-01-01 ENCOUNTER — TELEPHONE (OUTPATIENT)
Dept: INTERNAL MEDICINE CLINIC | Facility: CLINIC | Age: 80
End: 2020-01-01

## 2020-01-01 ENCOUNTER — NURSING HOME VISIT (OUTPATIENT)
Dept: GERIATRICS | Facility: OTHER | Age: 80
End: 2020-01-01
Payer: MEDICARE

## 2020-01-01 ENCOUNTER — TELEPHONE (OUTPATIENT)
Dept: ENDOCRINOLOGY | Facility: CLINIC | Age: 80
End: 2020-01-01

## 2020-01-01 ENCOUNTER — TRANSITIONAL CARE MANAGEMENT (OUTPATIENT)
Dept: INTERNAL MEDICINE CLINIC | Facility: CLINIC | Age: 80
End: 2020-01-01

## 2020-01-01 ENCOUNTER — APPOINTMENT (OUTPATIENT)
Dept: LAB | Facility: CLINIC | Age: 80
End: 2020-01-01
Payer: MEDICARE

## 2020-01-01 ENCOUNTER — OFFICE VISIT (OUTPATIENT)
Dept: UROLOGY | Facility: AMBULATORY SURGERY CENTER | Age: 80
End: 2020-01-01
Payer: MEDICARE

## 2020-01-01 ENCOUNTER — APPOINTMENT (INPATIENT)
Dept: CT IMAGING | Facility: HOSPITAL | Age: 80
DRG: 870 | End: 2020-01-01
Payer: MEDICARE

## 2020-01-01 ENCOUNTER — HOSPITAL ENCOUNTER (INPATIENT)
Facility: HOSPITAL | Age: 80
LOS: 3 days | Discharge: NURSING, REHAB OR LONG-TERM CARE | DRG: 641 | End: 2020-11-09
Attending: EMERGENCY MEDICINE | Admitting: INTERNAL MEDICINE
Payer: MEDICARE

## 2020-01-01 ENCOUNTER — TRANSCRIBE ORDERS (OUTPATIENT)
Dept: LAB | Facility: CLINIC | Age: 80
End: 2020-01-01

## 2020-01-01 ENCOUNTER — TELEMEDICINE (OUTPATIENT)
Dept: NEUROLOGY | Facility: CLINIC | Age: 80
End: 2020-01-01
Payer: MEDICARE

## 2020-01-01 ENCOUNTER — EVALUATION (OUTPATIENT)
Dept: PHYSICAL THERAPY | Facility: CLINIC | Age: 80
End: 2020-01-01
Payer: MEDICARE

## 2020-01-01 ENCOUNTER — HOSPITAL ENCOUNTER (EMERGENCY)
Facility: HOSPITAL | Age: 80
Discharge: HOME | End: 2020-03-07
Attending: EMERGENCY MEDICINE
Payer: MEDICARE

## 2020-01-01 ENCOUNTER — TELEPHONE (OUTPATIENT)
Dept: OTHER | Facility: OTHER | Age: 80
End: 2020-01-01

## 2020-01-01 ENCOUNTER — APPOINTMENT (EMERGENCY)
Dept: RADIOLOGY | Facility: HOSPITAL | Age: 80
DRG: 641 | End: 2020-01-01
Payer: MEDICARE

## 2020-01-01 ENCOUNTER — TELEPHONE (OUTPATIENT)
Dept: NEUROLOGY | Facility: CLINIC | Age: 80
End: 2020-01-01

## 2020-01-01 ENCOUNTER — APPOINTMENT (OUTPATIENT)
Dept: PHYSICAL THERAPY | Facility: CLINIC | Age: 80
End: 2020-01-01
Payer: MEDICARE

## 2020-01-01 ENCOUNTER — APPOINTMENT (EMERGENCY)
Dept: RADIOLOGY | Facility: HOSPITAL | Age: 80
DRG: 291 | End: 2020-01-01
Payer: MEDICARE

## 2020-01-01 ENCOUNTER — APPOINTMENT (INPATIENT)
Dept: MRI IMAGING | Facility: HOSPITAL | Age: 80
DRG: 870 | End: 2020-01-01
Payer: MEDICARE

## 2020-01-01 ENCOUNTER — APPOINTMENT (INPATIENT)
Dept: RADIOLOGY | Facility: HOSPITAL | Age: 80
DRG: 870 | End: 2020-01-01
Payer: MEDICARE

## 2020-01-01 ENCOUNTER — APPOINTMENT (EMERGENCY)
Dept: RADIOLOGY | Facility: HOSPITAL | Age: 80
DRG: 870 | End: 2020-01-01
Payer: MEDICARE

## 2020-01-01 ENCOUNTER — DOCUMENTATION (OUTPATIENT)
Dept: INTERNAL MEDICINE CLINIC | Facility: CLINIC | Age: 80
End: 2020-01-01

## 2020-01-01 ENCOUNTER — HOSPITAL ENCOUNTER (INPATIENT)
Facility: HOSPITAL | Age: 80
LOS: 6 days | DRG: 870 | End: 2020-12-04
Attending: EMERGENCY MEDICINE | Admitting: INTERNAL MEDICINE
Payer: MEDICARE

## 2020-01-01 ENCOUNTER — OFFICE VISIT (OUTPATIENT)
Dept: INTERNAL MEDICINE CLINIC | Facility: CLINIC | Age: 80
End: 2020-01-01
Payer: MEDICARE

## 2020-01-01 ENCOUNTER — PATIENT OUTREACH (OUTPATIENT)
Dept: INTERNAL MEDICINE CLINIC | Facility: CLINIC | Age: 80
End: 2020-01-01

## 2020-01-01 ENCOUNTER — TELEMEDICINE (OUTPATIENT)
Dept: ENDOCRINOLOGY | Facility: CLINIC | Age: 80
End: 2020-01-01
Payer: MEDICARE

## 2020-01-01 ENCOUNTER — APPOINTMENT (INPATIENT)
Dept: NEUROLOGY | Facility: HOSPITAL | Age: 80
DRG: 870 | End: 2020-01-01
Payer: MEDICARE

## 2020-01-01 ENCOUNTER — APPOINTMENT (EMERGENCY)
Dept: CT IMAGING | Facility: HOSPITAL | Age: 80
DRG: 870 | End: 2020-01-01
Payer: MEDICARE

## 2020-01-01 ENCOUNTER — OFFICE VISIT (OUTPATIENT)
Dept: CARDIOLOGY CLINIC | Facility: CLINIC | Age: 80
End: 2020-01-01
Payer: MEDICARE

## 2020-01-01 ENCOUNTER — OFFICE VISIT (OUTPATIENT)
Dept: GERIATRICS | Facility: CLINIC | Age: 80
End: 2020-01-01
Payer: MEDICARE

## 2020-01-01 ENCOUNTER — TELEMEDICINE (OUTPATIENT)
Dept: CARDIOLOGY CLINIC | Facility: CLINIC | Age: 80
End: 2020-01-01
Payer: MEDICARE

## 2020-01-01 ENCOUNTER — TELEMEDICINE (OUTPATIENT)
Dept: INTERNAL MEDICINE CLINIC | Facility: CLINIC | Age: 80
End: 2020-01-01
Payer: MEDICARE

## 2020-01-01 ENCOUNTER — TELEPHONE (OUTPATIENT)
Dept: UROLOGY | Facility: AMBULATORY SURGERY CENTER | Age: 80
End: 2020-01-01

## 2020-01-01 ENCOUNTER — TELEPHONE (OUTPATIENT)
Dept: PHYSICAL THERAPY | Facility: CLINIC | Age: 80
End: 2020-01-01

## 2020-01-01 ENCOUNTER — APPOINTMENT (EMERGENCY)
Dept: RADIOLOGY | Facility: HOSPITAL | Age: 80
End: 2020-01-01
Payer: MEDICARE

## 2020-01-01 ENCOUNTER — OFFICE VISIT (OUTPATIENT)
Dept: ENDOCRINOLOGY | Facility: CLINIC | Age: 80
End: 2020-01-01
Payer: MEDICARE

## 2020-01-01 ENCOUNTER — LAB REQUISITION (OUTPATIENT)
Dept: LAB | Facility: HOSPITAL | Age: 80
End: 2020-01-01
Payer: MEDICARE

## 2020-01-01 ENCOUNTER — HOSPITAL ENCOUNTER (EMERGENCY)
Facility: HOSPITAL | Age: 80
Discharge: HOME | End: 2020-01-09
Attending: EMERGENCY MEDICINE | Admitting: EMERGENCY MEDICINE
Payer: MEDICARE

## 2020-01-01 ENCOUNTER — APPOINTMENT (INPATIENT)
Dept: NON INVASIVE DIAGNOSTICS | Facility: HOSPITAL | Age: 80
DRG: 291 | End: 2020-01-01
Payer: MEDICARE

## 2020-01-01 ENCOUNTER — HOSPITAL ENCOUNTER (INPATIENT)
Facility: HOSPITAL | Age: 80
LOS: 3 days | Discharge: NURSING, REHAB OR LONG-TERM CARE | DRG: 291 | End: 2020-10-07
Attending: EMERGENCY MEDICINE | Admitting: INTERNAL MEDICINE
Payer: MEDICARE

## 2020-01-01 ENCOUNTER — TELEPHONE (OUTPATIENT)
Dept: CARDIOLOGY CLINIC | Facility: CLINIC | Age: 80
End: 2020-01-01

## 2020-01-01 ENCOUNTER — CLINICAL SUPPORT (OUTPATIENT)
Dept: INTERNAL MEDICINE CLINIC | Facility: CLINIC | Age: 80
End: 2020-01-01

## 2020-01-01 VITALS
BODY MASS INDEX: 30.96 KG/M2 | OXYGEN SATURATION: 92 % | SYSTOLIC BLOOD PRESSURE: 149 MMHG | RESPIRATION RATE: 20 BRPM | HEART RATE: 90 BPM | DIASTOLIC BLOOD PRESSURE: 70 MMHG | WEIGHT: 215.8 LBS | TEMPERATURE: 97.4 F

## 2020-01-01 VITALS
OXYGEN SATURATION: 95 % | DIASTOLIC BLOOD PRESSURE: 72 MMHG | BODY MASS INDEX: 31.34 KG/M2 | WEIGHT: 218.4 LBS | TEMPERATURE: 96.8 F | HEART RATE: 82 BPM | RESPIRATION RATE: 20 BRPM | SYSTOLIC BLOOD PRESSURE: 161 MMHG

## 2020-01-01 VITALS
RESPIRATION RATE: 20 BRPM | SYSTOLIC BLOOD PRESSURE: 150 MMHG | DIASTOLIC BLOOD PRESSURE: 102 MMHG | TEMPERATURE: 97.3 F | BODY MASS INDEX: 30.68 KG/M2 | WEIGHT: 214.29 LBS | HEART RATE: 77 BPM | OXYGEN SATURATION: 97 % | HEIGHT: 70 IN

## 2020-01-01 VITALS
WEIGHT: 225 LBS | BODY MASS INDEX: 32.21 KG/M2 | DIASTOLIC BLOOD PRESSURE: 88 MMHG | HEIGHT: 70 IN | TEMPERATURE: 98.4 F | SYSTOLIC BLOOD PRESSURE: 140 MMHG | HEART RATE: 80 BPM

## 2020-01-01 VITALS
BODY MASS INDEX: 31.5 KG/M2 | DIASTOLIC BLOOD PRESSURE: 70 MMHG | WEIGHT: 220 LBS | TEMPERATURE: 96.8 F | SYSTOLIC BLOOD PRESSURE: 142 MMHG | HEART RATE: 94 BPM | HEIGHT: 70 IN

## 2020-01-01 VITALS
WEIGHT: 222 LBS | BODY MASS INDEX: 31.78 KG/M2 | HEIGHT: 70 IN | SYSTOLIC BLOOD PRESSURE: 88 MMHG | DIASTOLIC BLOOD PRESSURE: 54 MMHG | OXYGEN SATURATION: 96 % | HEART RATE: 65 BPM

## 2020-01-01 VITALS
OXYGEN SATURATION: 97 % | DIASTOLIC BLOOD PRESSURE: 84 MMHG | BODY MASS INDEX: 32.07 KG/M2 | RESPIRATION RATE: 16 BRPM | HEART RATE: 72 BPM | WEIGHT: 224 LBS | SYSTOLIC BLOOD PRESSURE: 130 MMHG | HEIGHT: 70 IN | TEMPERATURE: 97.8 F

## 2020-01-01 VITALS
TEMPERATURE: 97.7 F | HEART RATE: 97 BPM | OXYGEN SATURATION: 93 % | DIASTOLIC BLOOD PRESSURE: 54 MMHG | SYSTOLIC BLOOD PRESSURE: 90 MMHG | RESPIRATION RATE: 20 BRPM

## 2020-01-01 VITALS
OXYGEN SATURATION: 98 % | BODY MASS INDEX: 30.82 KG/M2 | WEIGHT: 214.8 LBS | TEMPERATURE: 97.7 F | DIASTOLIC BLOOD PRESSURE: 65 MMHG | RESPIRATION RATE: 18 BRPM | HEART RATE: 86 BPM | SYSTOLIC BLOOD PRESSURE: 144 MMHG

## 2020-01-01 VITALS
HEART RATE: 75 BPM | RESPIRATION RATE: 16 BRPM | TEMPERATURE: 98.1 F | SYSTOLIC BLOOD PRESSURE: 184 MMHG | DIASTOLIC BLOOD PRESSURE: 84 MMHG | OXYGEN SATURATION: 97 %

## 2020-01-01 VITALS — SYSTOLIC BLOOD PRESSURE: 87 MMHG | HEART RATE: 92 BPM | DIASTOLIC BLOOD PRESSURE: 60 MMHG

## 2020-01-01 VITALS
WEIGHT: 226 LBS | DIASTOLIC BLOOD PRESSURE: 68 MMHG | HEART RATE: 83 BPM | HEIGHT: 70 IN | BODY MASS INDEX: 32.35 KG/M2 | SYSTOLIC BLOOD PRESSURE: 120 MMHG

## 2020-01-01 VITALS
BODY MASS INDEX: 31.41 KG/M2 | WEIGHT: 219.4 LBS | TEMPERATURE: 98.2 F | OXYGEN SATURATION: 92 % | RESPIRATION RATE: 17 BRPM | HEIGHT: 70 IN | SYSTOLIC BLOOD PRESSURE: 128 MMHG | DIASTOLIC BLOOD PRESSURE: 58 MMHG | HEART RATE: 71 BPM

## 2020-01-01 VITALS
DIASTOLIC BLOOD PRESSURE: 54 MMHG | HEART RATE: 89 BPM | BODY MASS INDEX: 31.85 KG/M2 | OXYGEN SATURATION: 96 % | TEMPERATURE: 98.6 F | HEIGHT: 70 IN | SYSTOLIC BLOOD PRESSURE: 118 MMHG

## 2020-01-01 VITALS
HEIGHT: 70 IN | SYSTOLIC BLOOD PRESSURE: 130 MMHG | HEART RATE: 62 BPM | WEIGHT: 223 LBS | BODY MASS INDEX: 31.92 KG/M2 | DIASTOLIC BLOOD PRESSURE: 70 MMHG

## 2020-01-01 VITALS
WEIGHT: 222 LBS | DIASTOLIC BLOOD PRESSURE: 58 MMHG | SYSTOLIC BLOOD PRESSURE: 126 MMHG | BODY MASS INDEX: 31.78 KG/M2 | HEART RATE: 88 BPM | HEIGHT: 70 IN

## 2020-01-01 VITALS
RESPIRATION RATE: 18 BRPM | WEIGHT: 228.4 LBS | BODY MASS INDEX: 32.77 KG/M2 | OXYGEN SATURATION: 98 % | DIASTOLIC BLOOD PRESSURE: 60 MMHG | TEMPERATURE: 97.8 F | SYSTOLIC BLOOD PRESSURE: 110 MMHG | HEART RATE: 80 BPM

## 2020-01-01 VITALS
DIASTOLIC BLOOD PRESSURE: 79 MMHG | SYSTOLIC BLOOD PRESSURE: 135 MMHG | HEART RATE: 88 BPM | HEIGHT: 70 IN | WEIGHT: 212 LBS | BODY MASS INDEX: 30.35 KG/M2

## 2020-01-01 VITALS — WEIGHT: 212 LBS | BODY MASS INDEX: 30.42 KG/M2

## 2020-01-01 VITALS — HEIGHT: 70 IN | BODY MASS INDEX: 30.78 KG/M2 | WEIGHT: 215 LBS

## 2020-01-01 DIAGNOSIS — D64.9 ANEMIA: ICD-10-CM

## 2020-01-01 DIAGNOSIS — R53.81 PHYSICAL DECONDITIONING: ICD-10-CM

## 2020-01-01 DIAGNOSIS — R41.82 ALTERED MENTAL STATUS: Primary | ICD-10-CM

## 2020-01-01 DIAGNOSIS — G20 PARKINSONISM, UNSPECIFIED PARKINSONISM TYPE (HCC): ICD-10-CM

## 2020-01-01 DIAGNOSIS — E11.649 TYPE 2 DIABETES MELLITUS WITH HYPOGLYCEMIA WITHOUT COMA, WITH LONG-TERM CURRENT USE OF INSULIN (HCC): Primary | ICD-10-CM

## 2020-01-01 DIAGNOSIS — I95.1 ORTHOSTATIC HYPOTENSION: Primary | ICD-10-CM

## 2020-01-01 DIAGNOSIS — E11.59 TYPE 2 DIABETES MELLITUS WITH CARDIAC COMPLICATION (HCC): ICD-10-CM

## 2020-01-01 DIAGNOSIS — I25.810 CORONARY ARTERY DISEASE INVOLVING CORONARY BYPASS GRAFT OF NATIVE HEART WITHOUT ANGINA PECTORIS: ICD-10-CM

## 2020-01-01 DIAGNOSIS — Z71.89 COMPLEX CARE COORDINATION: Primary | ICD-10-CM

## 2020-01-01 DIAGNOSIS — I71.4 ANEURYSM OF INFRARENAL ABDOMINAL AORTA (HCC): ICD-10-CM

## 2020-01-01 DIAGNOSIS — E11.65 TYPE 2 DIABETES MELLITUS WITH HYPERGLYCEMIA, WITHOUT LONG-TERM CURRENT USE OF INSULIN (HCC): ICD-10-CM

## 2020-01-01 DIAGNOSIS — C61 MALIGNANT NEOPLASM OF PROSTATE (HCC): ICD-10-CM

## 2020-01-01 DIAGNOSIS — F41.9 ANXIETY: ICD-10-CM

## 2020-01-01 DIAGNOSIS — S69.92XA FINGER INJURY, LEFT, INITIAL ENCOUNTER: ICD-10-CM

## 2020-01-01 DIAGNOSIS — R26.2 AMBULATORY DYSFUNCTION: ICD-10-CM

## 2020-01-01 DIAGNOSIS — G25.0 ESSENTIAL TREMOR: ICD-10-CM

## 2020-01-01 DIAGNOSIS — R26.89 BALANCE DISORDER: Primary | ICD-10-CM

## 2020-01-01 DIAGNOSIS — N39.3 STRESS INCONTINENCE: Primary | ICD-10-CM

## 2020-01-01 DIAGNOSIS — F41.9 ANXIETY: Primary | ICD-10-CM

## 2020-01-01 DIAGNOSIS — E78.5 DYSLIPIDEMIA: ICD-10-CM

## 2020-01-01 DIAGNOSIS — I45.10 RIGHT BUNDLE-BRANCH BLOCK: Primary | ICD-10-CM

## 2020-01-01 DIAGNOSIS — K59.01 SLOW TRANSIT CONSTIPATION: ICD-10-CM

## 2020-01-01 DIAGNOSIS — E78.00 PURE HYPERCHOLESTEROLEMIA: ICD-10-CM

## 2020-01-01 DIAGNOSIS — I50.33 ACUTE ON CHRONIC DIASTOLIC (CONGESTIVE) HEART FAILURE (HCC): ICD-10-CM

## 2020-01-01 DIAGNOSIS — I25.10 CORONARY ARTERY DISEASE INVOLVING NATIVE CORONARY ARTERY OF NATIVE HEART WITHOUT ANGINA PECTORIS: Primary | ICD-10-CM

## 2020-01-01 DIAGNOSIS — I95.1 ORTHOSTATIC HYPOTENSION: ICD-10-CM

## 2020-01-01 DIAGNOSIS — I21.4 NON-STEMI (NON-ST ELEVATED MYOCARDIAL INFARCTION) (HCC): ICD-10-CM

## 2020-01-01 DIAGNOSIS — Z79.4 TYPE 2 DIABETES MELLITUS WITH HYPOGLYCEMIA WITHOUT COMA, WITH LONG-TERM CURRENT USE OF INSULIN (HCC): Primary | ICD-10-CM

## 2020-01-01 DIAGNOSIS — Z95.1 S/P CABG X 3: Chronic | ICD-10-CM

## 2020-01-01 DIAGNOSIS — I10 BENIGN ESSENTIAL HYPERTENSION: ICD-10-CM

## 2020-01-01 DIAGNOSIS — I50.32 CHRONIC DIASTOLIC HEART FAILURE (HCC): ICD-10-CM

## 2020-01-01 DIAGNOSIS — D50.9 IRON DEFICIENCY ANEMIA, UNSPECIFIED IRON DEFICIENCY ANEMIA TYPE: ICD-10-CM

## 2020-01-01 DIAGNOSIS — Z12.5 SCREENING PSA (PROSTATE SPECIFIC ANTIGEN): ICD-10-CM

## 2020-01-01 DIAGNOSIS — N39.3 STRESS INCONTINENCE: ICD-10-CM
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
## 2020-01-01 DIAGNOSIS — N18.2 CKD (CHRONIC KIDNEY DISEASE) STAGE 2, GFR 60-89 ML/MIN: ICD-10-CM

## 2020-01-01 DIAGNOSIS — M25.562 ACUTE PAIN OF LEFT KNEE: ICD-10-CM
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
## 2020-01-01 DIAGNOSIS — R32 URINARY INCONTINENCE, UNSPECIFIED TYPE: Primary | ICD-10-CM

## 2020-01-01 DIAGNOSIS — I25.10 ATHEROSCLEROSIS OF NATIVE CORONARY ARTERY, ANGINA PRESENCE UNSPECIFIED, UNSPECIFIED WHETHER NATIVE OR TRANSPLANTED HEART: ICD-10-CM

## 2020-01-01 DIAGNOSIS — R77.8 ELEVATED TROPONIN: ICD-10-CM

## 2020-01-01 DIAGNOSIS — E11.649 TYPE 2 DIABETES MELLITUS WITH HYPOGLYCEMIA WITHOUT COMA, WITH LONG-TERM CURRENT USE OF INSULIN (HCC): ICD-10-CM
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
## 2020-01-01 DIAGNOSIS — I50.33 ACUTE ON CHRONIC DIASTOLIC (CONGESTIVE) HEART FAILURE (HCC): Primary | ICD-10-CM

## 2020-01-01 DIAGNOSIS — Z71.89 GOALS OF CARE, COUNSELING/DISCUSSION: ICD-10-CM

## 2020-01-01 DIAGNOSIS — E11.65 TYPE 2 DIABETES MELLITUS WITH HYPERGLYCEMIA, WITHOUT LONG-TERM CURRENT USE OF INSULIN (HCC): Primary | ICD-10-CM

## 2020-01-01 DIAGNOSIS — U07.1 COVID-19 VIRUS INFECTION: ICD-10-CM

## 2020-01-01 DIAGNOSIS — R41.89 COGNITIVE IMPAIRMENT: ICD-10-CM

## 2020-01-01 DIAGNOSIS — R25.1 TREMOR: Primary | ICD-10-CM

## 2020-01-01 DIAGNOSIS — N18.2 CHRONIC KIDNEY DISEASE, STAGE 2 (MILD): ICD-10-CM

## 2020-01-01 DIAGNOSIS — Z11.59 ENCOUNTER FOR HEPATITIS C SCREENING TEST FOR LOW RISK PATIENT: ICD-10-CM

## 2020-01-01 DIAGNOSIS — R53.1 GENERALIZED WEAKNESS: ICD-10-CM

## 2020-01-01 DIAGNOSIS — Z79.899 POLYPHARMACY: ICD-10-CM

## 2020-01-01 DIAGNOSIS — I35.9 AORTIC VALVE DISORDER: ICD-10-CM

## 2020-01-01 DIAGNOSIS — I25.10 CORONARY ARTERY DISEASE INVOLVING NATIVE CORONARY ARTERY OF NATIVE HEART WITHOUT ANGINA PECTORIS: ICD-10-CM

## 2020-01-01 DIAGNOSIS — E11.649 TYPE 2 DIABETES MELLITUS WITH HYPOGLYCEMIA WITHOUT COMA (HCC): ICD-10-CM

## 2020-01-01 DIAGNOSIS — G25.0 ESSENTIAL TREMOR: Primary | ICD-10-CM

## 2020-01-01 DIAGNOSIS — N18.31 STAGE 3A CHRONIC KIDNEY DISEASE (HCC): ICD-10-CM

## 2020-01-01 DIAGNOSIS — M79.89 SWOLLEN FINGER: ICD-10-CM

## 2020-01-01 DIAGNOSIS — R00.2 PALPITATIONS: ICD-10-CM

## 2020-01-01 DIAGNOSIS — R06.00 DYSPNEA ON EXERTION: ICD-10-CM

## 2020-01-01 DIAGNOSIS — R77.8 ELEVATED TROPONIN: Chronic | ICD-10-CM

## 2020-01-01 DIAGNOSIS — Z00.00 HEALTHCARE MAINTENANCE: Primary | ICD-10-CM

## 2020-01-01 DIAGNOSIS — Z23 NEED FOR VACCINATION: ICD-10-CM

## 2020-01-01 DIAGNOSIS — F32.A ANXIETY AND DEPRESSION: ICD-10-CM

## 2020-01-01 DIAGNOSIS — S69.92XA INJURY OF FINGER OF LEFT HAND, INITIAL ENCOUNTER: ICD-10-CM

## 2020-01-01 DIAGNOSIS — I46.9 CARDIAC ARREST (HCC): ICD-10-CM

## 2020-01-01 DIAGNOSIS — Z79.4 TYPE 2 DIABETES MELLITUS WITH HYPOGLYCEMIA WITHOUT COMA, WITH LONG-TERM CURRENT USE OF INSULIN (HCC): ICD-10-CM

## 2020-01-01 DIAGNOSIS — E66.09 CLASS 1 OBESITY DUE TO EXCESS CALORIES WITH SERIOUS COMORBIDITY AND BODY MASS INDEX (BMI) OF 31.0 TO 31.9 IN ADULT: ICD-10-CM

## 2020-01-01 DIAGNOSIS — R29.6 FREQUENT FALLS: Primary | ICD-10-CM

## 2020-01-01 DIAGNOSIS — I50.32 CHRONIC DIASTOLIC HEART FAILURE (HCC): Primary | ICD-10-CM

## 2020-01-01 DIAGNOSIS — G25.3 MYOCLONUS: ICD-10-CM
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
## 2020-01-01 DIAGNOSIS — I45.10 RIGHT BUNDLE-BRANCH BLOCK: ICD-10-CM

## 2020-01-01 DIAGNOSIS — R32 URINARY INCONTINENCE, UNSPECIFIED TYPE: ICD-10-CM
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
## 2020-01-01 DIAGNOSIS — F41.9 ANXIETY AND DEPRESSION: ICD-10-CM

## 2020-01-01 DIAGNOSIS — R29.898 MUSCULAR DECONDITIONING: ICD-10-CM

## 2020-01-01 DIAGNOSIS — A04.72 C. DIFFICILE DIARRHEA: ICD-10-CM

## 2020-01-01 DIAGNOSIS — R39.198 DIFFICULTY URINATING: Primary | ICD-10-CM

## 2020-01-01 DIAGNOSIS — R79.89 ELEVATED BRAIN NATRIURETIC PEPTIDE (BNP) LEVEL: ICD-10-CM

## 2020-01-01 DIAGNOSIS — Z79.4 TYPE 2 DIABETES MELLITUS WITH HYPERGLYCEMIA, WITH LONG-TERM CURRENT USE OF INSULIN (HCC): ICD-10-CM

## 2020-01-01 DIAGNOSIS — E78.2 MIXED HYPERLIPIDEMIA: ICD-10-CM

## 2020-01-01 DIAGNOSIS — E11.65 TYPE 2 DIABETES MELLITUS WITH HYPERGLYCEMIA, WITH LONG-TERM CURRENT USE OF INSULIN (HCC): ICD-10-CM

## 2020-01-01 DIAGNOSIS — R50.9 FEVER: ICD-10-CM

## 2020-01-01 DIAGNOSIS — R26.9 GAIT ABNORMALITY: ICD-10-CM

## 2020-01-01 DIAGNOSIS — M79.89 SWELLING OF LEFT RING FINGER: ICD-10-CM

## 2020-01-01 DIAGNOSIS — E87.5 HYPERKALEMIA: ICD-10-CM

## 2020-01-01 LAB
ABO GROUP BLD: NORMAL
ABO GROUP BLD: NORMAL
ALBUMIN SERPL BCP-MCNC: 1.7 G/DL (ref 3.5–5)
ALBUMIN SERPL BCP-MCNC: 2 G/DL (ref 3.5–5)
ALBUMIN SERPL BCP-MCNC: 2 G/DL (ref 3.5–5)
ALBUMIN SERPL BCP-MCNC: 2.1 G/DL (ref 3.5–5)
ALBUMIN SERPL BCP-MCNC: 2.2 G/DL (ref 3.5–5)
ALBUMIN SERPL BCP-MCNC: 2.2 G/DL (ref 3.5–5)
ALBUMIN SERPL BCP-MCNC: 2.5 G/DL (ref 3.5–5)
ALBUMIN SERPL BCP-MCNC: 2.7 G/DL (ref 3.5–5)
ALBUMIN SERPL BCP-MCNC: 2.8 G/DL (ref 3.5–5)
ALBUMIN SERPL BCP-MCNC: 2.8 G/DL (ref 3.5–5)
ALBUMIN SERPL BCP-MCNC: 2.9 G/DL (ref 3.5–5)
ALBUMIN SERPL BCP-MCNC: 3 G/DL (ref 3.5–5)
ALBUMIN SERPL BCP-MCNC: 3.1 G/DL (ref 3.5–5)
ALP SERPL-CCNC: 46 U/L (ref 46–116)
ALP SERPL-CCNC: 49 U/L (ref 46–116)
ALP SERPL-CCNC: 50 U/L (ref 46–116)
ALP SERPL-CCNC: 51 U/L (ref 46–116)
ALP SERPL-CCNC: 57 U/L (ref 46–116)
ALP SERPL-CCNC: 62 U/L (ref 46–116)
ALP SERPL-CCNC: 62 U/L (ref 46–116)
ALP SERPL-CCNC: 65 U/L (ref 46–116)
ALP SERPL-CCNC: 75 U/L (ref 46–116)
ALP SERPL-CCNC: 77 U/L (ref 46–116)
ALP SERPL-CCNC: 80 U/L (ref 46–116)
ALP SERPL-CCNC: 81 U/L (ref 46–116)
ALP SERPL-CCNC: 87 U/L (ref 46–116)
ALT SERPL W P-5'-P-CCNC: 107 U/L (ref 12–78)
ALT SERPL W P-5'-P-CCNC: 132 U/L (ref 12–78)
ALT SERPL W P-5'-P-CCNC: 20 U/L (ref 12–78)
ALT SERPL W P-5'-P-CCNC: 21 U/L (ref 12–78)
ALT SERPL W P-5'-P-CCNC: 22 U/L (ref 12–78)
ALT SERPL W P-5'-P-CCNC: 22 U/L (ref 12–78)
ALT SERPL W P-5'-P-CCNC: 23 U/L (ref 12–78)
ALT SERPL W P-5'-P-CCNC: 23 U/L (ref 12–78)
ALT SERPL W P-5'-P-CCNC: 24 U/L (ref 12–78)
ALT SERPL W P-5'-P-CCNC: 58 U/L (ref 12–78)
ALT SERPL W P-5'-P-CCNC: 61 U/L (ref 12–78)
ALT SERPL W P-5'-P-CCNC: 66 U/L (ref 12–78)
ALT SERPL W P-5'-P-CCNC: 80 U/L (ref 12–78)
ANION GAP SERPL CALCULATED.3IONS-SCNC: 1 MMOL/L (ref 4–13)
ANION GAP SERPL CALCULATED.3IONS-SCNC: 10 MMOL/L (ref 4–13)
ANION GAP SERPL CALCULATED.3IONS-SCNC: 12 MMOL/L (ref 4–13)
ANION GAP SERPL CALCULATED.3IONS-SCNC: 13 MMOL/L (ref 4–13)
ANION GAP SERPL CALCULATED.3IONS-SCNC: 14 MMOL/L (ref 4–13)
ANION GAP SERPL CALCULATED.3IONS-SCNC: 15 MMOL/L (ref 4–13)
ANION GAP SERPL CALCULATED.3IONS-SCNC: 2 MMOL/L (ref 4–13)
ANION GAP SERPL CALCULATED.3IONS-SCNC: 3 MMOL/L (ref 4–13)
ANION GAP SERPL CALCULATED.3IONS-SCNC: 5 MMOL/L (ref 4–13)
ANION GAP SERPL CALCULATED.3IONS-SCNC: 5 MMOL/L (ref 4–13)
ANION GAP SERPL CALCULATED.3IONS-SCNC: 6 MMOL/L (ref 4–13)
ANION GAP SERPL CALCULATED.3IONS-SCNC: 6 MMOL/L (ref 4–13)
ANION GAP SERPL CALCULATED.3IONS-SCNC: 7 MMOL/L (ref 4–13)
ANION GAP SERPL CALCULATED.3IONS-SCNC: 8 MMOL/L (ref 4–13)
ANION GAP SERPL CALCULATED.3IONS-SCNC: 8 MMOL/L (ref 4–13)
ANISOCYTOSIS BLD QL SMEAR: PRESENT
ANISOCYTOSIS BLD QL SMEAR: PRESENT
APTT PPP: 161 SECONDS (ref 23–37)
APTT PPP: 195 SECONDS (ref 23–37)
APTT PPP: 36 SECONDS (ref 23–37)
APTT PPP: 39 SECONDS (ref 23–37)
APTT PPP: 48 SECONDS (ref 23–37)
APTT PPP: 50 SECONDS (ref 23–37)
APTT PPP: >210 SECONDS (ref 23–37)
ARTERIAL PATENCY WRIST A: ABNORMAL
AST SERPL W P-5'-P-CCNC: 105 U/L (ref 5–45)
AST SERPL W P-5'-P-CCNC: 16 U/L (ref 5–45)
AST SERPL W P-5'-P-CCNC: 166 U/L (ref 5–45)
AST SERPL W P-5'-P-CCNC: 19 U/L (ref 5–45)
AST SERPL W P-5'-P-CCNC: 191 U/L (ref 5–45)
AST SERPL W P-5'-P-CCNC: 41 U/L (ref 5–45)
AST SERPL W P-5'-P-CCNC: 77 U/L (ref 5–45)
AST SERPL W P-5'-P-CCNC: 79 U/L (ref 5–45)
AST SERPL W P-5'-P-CCNC: 91 U/L (ref 5–45)
ATRIAL RATE: 106 BPM
ATRIAL RATE: 110 BPM
ATRIAL RATE: 110 BPM
ATRIAL RATE: 77 BPM
ATRIAL RATE: 82 BPM
ATRIAL RATE: 83 BPM
ATRIAL RATE: 83 BPM
ATRIAL RATE: 85 BPM
ATRIAL RATE: 99 BPM
ATRIAL RATE: 99 BPM
BACTERIA BLD CULT: NORMAL
BACTERIA BLD CULT: NORMAL
BACTERIA SPT RESP CULT: ABNORMAL
BACTERIA SPT RESP CULT: ABNORMAL
BACTERIA UR QL AUTO: NORMAL /HPF
BASE EX.OXY STD BLDV CALC-SCNC: 96.9 % (ref 60–80)
BASE EXCESS BLDA CALC-SCNC: -9 MMOL/L (ref -2–3)
BASE EXCESS BLDA CALC-SCNC: 0.7 MMOL/L
BASE EXCESS BLDA CALC-SCNC: 2.2 MMOL/L
BASE EXCESS BLDV CALC-SCNC: -5.6 MMOL/L
BASOPHILS # BLD AUTO: 0.02 THOUSANDS/ΜL (ref 0–0.1)
BASOPHILS # BLD AUTO: 0.03 THOUSANDS/ΜL (ref 0–0.1)
BASOPHILS # BLD AUTO: 0.05 THOUSANDS/ΜL (ref 0–0.1)
BASOPHILS # BLD MANUAL: 0 THOUSAND/UL (ref 0–0.1)
BASOPHILS NFR BLD AUTO: 0 % (ref 0–1)
BASOPHILS NFR BLD AUTO: 1 % (ref 0–1)
BASOPHILS NFR MAR MANUAL: 0 % (ref 0–1)
BILIRUB DIRECT SERPL-MCNC: 0.22 MG/DL (ref 0–0.2)
BILIRUB SERPL-MCNC: 0.22 MG/DL (ref 0.2–1)
BILIRUB SERPL-MCNC: 0.25 MG/DL (ref 0.2–1)
BILIRUB SERPL-MCNC: 0.28 MG/DL (ref 0.2–1)
BILIRUB SERPL-MCNC: 0.28 MG/DL (ref 0.2–1)
BILIRUB SERPL-MCNC: 0.32 MG/DL (ref 0.2–1)
BILIRUB SERPL-MCNC: 0.33 MG/DL (ref 0.2–1)
BILIRUB SERPL-MCNC: 0.33 MG/DL (ref 0.2–1)
BILIRUB SERPL-MCNC: 0.37 MG/DL (ref 0.2–1)
BILIRUB SERPL-MCNC: 0.38 MG/DL (ref 0.2–1)
BILIRUB SERPL-MCNC: 0.39 MG/DL (ref 0.2–1)
BILIRUB SERPL-MCNC: 0.4 MG/DL (ref 0.2–1)
BILIRUB SERPL-MCNC: 0.41 MG/DL (ref 0.2–1)
BILIRUB SERPL-MCNC: 0.45 MG/DL (ref 0.2–1)
BILIRUB UR QL STRIP: NEGATIVE
BILIRUB UR QL STRIP: NEGATIVE
BLD GP AB SCN SERPL QL: NEGATIVE
BUN SERPL-MCNC: 22 MG/DL (ref 5–25)
BUN SERPL-MCNC: 24 MG/DL (ref 5–25)
BUN SERPL-MCNC: 25 MG/DL (ref 5–25)
BUN SERPL-MCNC: 25 MG/DL (ref 5–25)
BUN SERPL-MCNC: 26 MG/DL (ref 5–25)
BUN SERPL-MCNC: 27 MG/DL (ref 5–25)
BUN SERPL-MCNC: 29 MG/DL (ref 5–25)
BUN SERPL-MCNC: 30 MG/DL (ref 5–25)
BUN SERPL-MCNC: 32 MG/DL (ref 5–25)
BUN SERPL-MCNC: 32 MG/DL (ref 5–25)
BUN SERPL-MCNC: 41 MG/DL (ref 5–25)
BUN SERPL-MCNC: 43 MG/DL (ref 5–25)
BUN SERPL-MCNC: 51 MG/DL (ref 5–25)
BUN SERPL-MCNC: 62 MG/DL (ref 5–25)
BUN SERPL-MCNC: 77 MG/DL (ref 5–25)
CA-I BLD-SCNC: 1.03 MMOL/L (ref 1.12–1.32)
CA-I BLD-SCNC: 1.1 MMOL/L (ref 1.12–1.32)
CA-I BLD-SCNC: 1.24 MMOL/L (ref 1.12–1.32)
CA-I BLD-SCNC: 1.35 MMOL/L (ref 1.12–1.32)
CALCIUM ALBUM COR SERPL-MCNC: 10 MG/DL (ref 8.3–10.1)
CALCIUM ALBUM COR SERPL-MCNC: 10.1 MG/DL (ref 8.3–10.1)
CALCIUM ALBUM COR SERPL-MCNC: 10.2 MG/DL (ref 8.3–10.1)
CALCIUM ALBUM COR SERPL-MCNC: 10.2 MG/DL (ref 8.3–10.1)
CALCIUM ALBUM COR SERPL-MCNC: 10.8 MG/DL (ref 8.3–10.1)
CALCIUM ALBUM COR SERPL-MCNC: 9.4 MG/DL (ref 8.3–10.1)
CALCIUM ALBUM COR SERPL-MCNC: 9.6 MG/DL (ref 8.3–10.1)
CALCIUM ALBUM COR SERPL-MCNC: 9.7 MG/DL (ref 8.3–10.1)
CALCIUM ALBUM COR SERPL-MCNC: 9.7 MG/DL (ref 8.3–10.1)
CALCIUM ALBUM COR SERPL-MCNC: 9.8 MG/DL (ref 8.3–10.1)
CALCIUM ALBUM COR SERPL-MCNC: 9.8 MG/DL (ref 8.3–10.1)
CALCIUM SERPL-MCNC: 8.1 MG/DL (ref 8.3–10.1)
CALCIUM SERPL-MCNC: 8.2 MG/DL (ref 8.3–10.1)
CALCIUM SERPL-MCNC: 8.3 MG/DL (ref 8.3–10.1)
CALCIUM SERPL-MCNC: 8.4 MG/DL (ref 8.3–10.1)
CALCIUM SERPL-MCNC: 8.4 MG/DL (ref 8.3–10.1)
CALCIUM SERPL-MCNC: 8.5 MG/DL (ref 8.3–10.1)
CALCIUM SERPL-MCNC: 8.5 MG/DL (ref 8.3–10.1)
CALCIUM SERPL-MCNC: 8.6 MG/DL (ref 8.3–10.1)
CALCIUM SERPL-MCNC: 8.7 MG/DL (ref 8.3–10.1)
CALCIUM SERPL-MCNC: 8.9 MG/DL (ref 8.3–10.1)
CALCIUM SERPL-MCNC: 9 MG/DL (ref 8.3–10.1)
CALCIUM SERPL-MCNC: 9 MG/DL (ref 8.3–10.1)
CALCIUM SERPL-MCNC: 9.1 MG/DL (ref 8.3–10.1)
CALCIUM SERPL-MCNC: 9.2 MG/DL (ref 8.3–10.1)
CALCIUM SERPL-MCNC: 9.3 MG/DL (ref 8.3–10.1)
CALCIUM SERPL-MCNC: 9.6 MG/DL (ref 8.3–10.1)
CALCIUM SERPL-MCNC: 9.8 MG/DL (ref 8.3–10.1)
CHLORIDE SERPL-SCNC: 100 MMOL/L (ref 100–108)
CHLORIDE SERPL-SCNC: 101 MMOL/L (ref 100–108)
CHLORIDE SERPL-SCNC: 101 MMOL/L (ref 100–108)
CHLORIDE SERPL-SCNC: 102 MMOL/L (ref 100–108)
CHLORIDE SERPL-SCNC: 103 MMOL/L (ref 100–108)
CHLORIDE SERPL-SCNC: 105 MMOL/L (ref 100–108)
CHLORIDE SERPL-SCNC: 97 MMOL/L (ref 100–108)
CHLORIDE SERPL-SCNC: 98 MMOL/L (ref 100–108)
CHLORIDE SERPL-SCNC: 99 MMOL/L (ref 100–108)
CHOLEST SERPL-MCNC: 113 MG/DL (ref 50–200)
CLARITY UR: ABNORMAL
CLARITY UR: CLEAR
CO2 SERPL-SCNC: 19 MMOL/L (ref 21–32)
CO2 SERPL-SCNC: 20 MMOL/L (ref 21–32)
CO2 SERPL-SCNC: 20 MMOL/L (ref 21–32)
CO2 SERPL-SCNC: 21 MMOL/L (ref 21–32)
CO2 SERPL-SCNC: 23 MMOL/L (ref 21–32)
CO2 SERPL-SCNC: 25 MMOL/L (ref 21–32)
CO2 SERPL-SCNC: 25 MMOL/L (ref 21–32)
CO2 SERPL-SCNC: 26 MMOL/L (ref 21–32)
CO2 SERPL-SCNC: 29 MMOL/L (ref 21–32)
CO2 SERPL-SCNC: 29 MMOL/L (ref 21–32)
CO2 SERPL-SCNC: 30 MMOL/L (ref 21–32)
CO2 SERPL-SCNC: 31 MMOL/L (ref 21–32)
CO2 SERPL-SCNC: 32 MMOL/L (ref 21–32)
CO2 SERPL-SCNC: 32 MMOL/L (ref 21–32)
COLOR UR: YELLOW
COLOR UR: YELLOW
CREAT SERPL-MCNC: 1.03 MG/DL (ref 0.6–1.3)
CREAT SERPL-MCNC: 1.13 MG/DL (ref 0.6–1.3)
CREAT SERPL-MCNC: 1.19 MG/DL (ref 0.6–1.3)
CREAT SERPL-MCNC: 1.22 MG/DL (ref 0.6–1.3)
CREAT SERPL-MCNC: 1.22 MG/DL (ref 0.6–1.3)
CREAT SERPL-MCNC: 1.25 MG/DL (ref 0.6–1.3)
CREAT SERPL-MCNC: 1.26 MG/DL (ref 0.6–1.3)
CREAT SERPL-MCNC: 1.33 MG/DL (ref 0.6–1.3)
CREAT SERPL-MCNC: 1.37 MG/DL (ref 0.6–1.3)
CREAT SERPL-MCNC: 1.38 MG/DL (ref 0.6–1.3)
CREAT SERPL-MCNC: 1.4 MG/DL (ref 0.6–1.3)
CREAT SERPL-MCNC: 1.47 MG/DL (ref 0.6–1.3)
CREAT SERPL-MCNC: 1.57 MG/DL (ref 0.6–1.3)
CREAT SERPL-MCNC: 1.86 MG/DL (ref 0.6–1.3)
CREAT SERPL-MCNC: 1.93 MG/DL (ref 0.6–1.3)
CREAT SERPL-MCNC: 2.06 MG/DL (ref 0.6–1.3)
CREAT SERPL-MCNC: 2.73 MG/DL (ref 0.6–1.3)
CREAT SERPL-MCNC: 3.12 MG/DL (ref 0.6–1.3)
CREAT SERPL-MCNC: 3.61 MG/DL (ref 0.6–1.3)
CREAT UR-MCNC: 152 MG/DL
CREAT UR-MCNC: 77.7 MG/DL
CRP SERPL QL: 182.1 MG/L
CRP SERPL QL: 196.4 MG/L
CRP SERPL QL: 30.1 MG/L
CRP SERPL QL: 30.8 MG/L
D DIMER PPP FEU-MCNC: 1.23 UG/ML FEU
D DIMER PPP FEU-MCNC: 2.29 UG/ML FEU
D DIMER PPP FEU-MCNC: 2.44 UG/ML FEU
D DIMER PPP FEU-MCNC: 2.83 UG/ML FEU
DACRYOCYTES BLD QL SMEAR: PRESENT
DS:DELIVERY SYSTEM: AC
EOSINOPHIL # BLD AUTO: 0 THOUSAND/ΜL (ref 0–0.61)
EOSINOPHIL # BLD AUTO: 0.03 THOUSAND/ΜL (ref 0–0.61)
EOSINOPHIL # BLD AUTO: 0.16 THOUSAND/ΜL (ref 0–0.61)
EOSINOPHIL # BLD AUTO: 0.28 THOUSAND/ΜL (ref 0–0.61)
EOSINOPHIL # BLD AUTO: 0.39 THOUSAND/ΜL (ref 0–0.61)
EOSINOPHIL # BLD AUTO: 0.39 THOUSAND/ΜL (ref 0–0.61)
EOSINOPHIL # BLD AUTO: 0.41 THOUSAND/ΜL (ref 0–0.61)
EOSINOPHIL # BLD AUTO: 0.55 THOUSAND/ΜL (ref 0–0.61)
EOSINOPHIL # BLD MANUAL: 0 THOUSAND/UL (ref 0–0.4)
EOSINOPHIL # BLD MANUAL: 0 THOUSAND/UL (ref 0–0.4)
EOSINOPHIL # BLD MANUAL: 0.14 THOUSAND/UL (ref 0–0.4)
EOSINOPHIL # BLD MANUAL: 0.16 THOUSAND/UL (ref 0–0.4)
EOSINOPHIL NFR BLD AUTO: 0 % (ref 0–6)
EOSINOPHIL NFR BLD AUTO: 1 % (ref 0–6)
EOSINOPHIL NFR BLD AUTO: 2 % (ref 0–6)
EOSINOPHIL NFR BLD AUTO: 3 % (ref 0–6)
EOSINOPHIL NFR BLD AUTO: 5 % (ref 0–6)
EOSINOPHIL NFR BLD AUTO: 5 % (ref 0–6)
EOSINOPHIL NFR BLD AUTO: 6 % (ref 0–6)
EOSINOPHIL NFR BLD AUTO: 8 % (ref 0–6)
EOSINOPHIL NFR BLD MANUAL: 0 % (ref 0–6)
EOSINOPHIL NFR BLD MANUAL: 0 % (ref 0–6)
EOSINOPHIL NFR BLD MANUAL: 1 % (ref 0–6)
EOSINOPHIL NFR BLD MANUAL: 1 % (ref 0–6)
ERYTHROCYTE [DISTWIDTH] IN BLOOD BY AUTOMATED COUNT: 15 % (ref 11.6–15.1)
ERYTHROCYTE [DISTWIDTH] IN BLOOD BY AUTOMATED COUNT: 15.4 % (ref 11.6–15.1)
ERYTHROCYTE [DISTWIDTH] IN BLOOD BY AUTOMATED COUNT: 15.6 % (ref 11.6–15.1)
ERYTHROCYTE [DISTWIDTH] IN BLOOD BY AUTOMATED COUNT: 15.7 % (ref 11.6–15.1)
ERYTHROCYTE [DISTWIDTH] IN BLOOD BY AUTOMATED COUNT: 15.7 % (ref 11.6–15.1)
ERYTHROCYTE [DISTWIDTH] IN BLOOD BY AUTOMATED COUNT: 15.9 % (ref 11.6–15.1)
ERYTHROCYTE [DISTWIDTH] IN BLOOD BY AUTOMATED COUNT: 15.9 % (ref 11.6–15.1)
ERYTHROCYTE [DISTWIDTH] IN BLOOD BY AUTOMATED COUNT: 16 % (ref 11.6–15.1)
ERYTHROCYTE [DISTWIDTH] IN BLOOD BY AUTOMATED COUNT: 16.1 % (ref 11.6–15.1)
ERYTHROCYTE [DISTWIDTH] IN BLOOD BY AUTOMATED COUNT: 16.2 % (ref 11.6–15.1)
ERYTHROCYTE [DISTWIDTH] IN BLOOD BY AUTOMATED COUNT: 16.3 % (ref 11.6–15.1)
EST. AVERAGE GLUCOSE BLD GHB EST-MCNC: 140 MG/DL
EST. AVERAGE GLUCOSE BLD GHB EST-MCNC: 146 MG/DL
EXT SARS-COV-2: NEGATIVE
FERRITIN SERPL-MCNC: 100 NG/ML (ref 8–388)
FERRITIN SERPL-MCNC: 1022 NG/ML (ref 8–388)
FERRITIN SERPL-MCNC: 132 NG/ML (ref 8–388)
FERRITIN SERPL-MCNC: 241 NG/ML (ref 8–388)
FERRITIN SERPL-MCNC: 826 NG/ML (ref 8–388)
FERRITIN SERPL-MCNC: 91 NG/ML (ref 8–388)
FIO2 GAS DIL.REBREATH: 100 L
FLUAV RNA RESP QL NAA+PROBE: NEGATIVE
FLUBV RNA RESP QL NAA+PROBE: NEGATIVE
FOLATE SERPL-MCNC: >20 NG/ML (ref 3.1–17.5)
GFR SERPL CREATININE-BSD FRML MDRD: 15 ML/MIN/1.73SQ M
GFR SERPL CREATININE-BSD FRML MDRD: 18 ML/MIN/1.73SQ M
GFR SERPL CREATININE-BSD FRML MDRD: 21 ML/MIN/1.73SQ M
GFR SERPL CREATININE-BSD FRML MDRD: 29 ML/MIN/1.73SQ M
GFR SERPL CREATININE-BSD FRML MDRD: 32 ML/MIN/1.73SQ M
GFR SERPL CREATININE-BSD FRML MDRD: 33 ML/MIN/1.73SQ M
GFR SERPL CREATININE-BSD FRML MDRD: 41 ML/MIN/1.73SQ M
GFR SERPL CREATININE-BSD FRML MDRD: 44 ML/MIN/1.73SQ M
GFR SERPL CREATININE-BSD FRML MDRD: 47 ML/MIN/1.73SQ M
GFR SERPL CREATININE-BSD FRML MDRD: 48 ML/MIN/1.73SQ M
GFR SERPL CREATININE-BSD FRML MDRD: 48 ML/MIN/1.73SQ M
GFR SERPL CREATININE-BSD FRML MDRD: 50 ML/MIN/1.73SQ M
GFR SERPL CREATININE-BSD FRML MDRD: 53 ML/MIN/1.73SQ M
GFR SERPL CREATININE-BSD FRML MDRD: 54 ML/MIN/1.73SQ M
GFR SERPL CREATININE-BSD FRML MDRD: 55 ML/MIN/1.73SQ M
GFR SERPL CREATININE-BSD FRML MDRD: 55 ML/MIN/1.73SQ M
GFR SERPL CREATININE-BSD FRML MDRD: 57 ML/MIN/1.73SQ M
GFR SERPL CREATININE-BSD FRML MDRD: 61 ML/MIN/1.73SQ M
GFR SERPL CREATININE-BSD FRML MDRD: 68 ML/MIN/1.73SQ M
GLUCOSE SERPL-MCNC: 107 MG/DL (ref 65–140)
GLUCOSE SERPL-MCNC: 110 MG/DL (ref 65–140)
GLUCOSE SERPL-MCNC: 111 MG/DL (ref 65–140)
GLUCOSE SERPL-MCNC: 112 MG/DL (ref 65–140)
GLUCOSE SERPL-MCNC: 112 MG/DL (ref 65–140)
GLUCOSE SERPL-MCNC: 113 MG/DL (ref 65–140)
GLUCOSE SERPL-MCNC: 115 MG/DL (ref 65–140)
GLUCOSE SERPL-MCNC: 117 MG/DL (ref 65–140)
GLUCOSE SERPL-MCNC: 119 MG/DL (ref 65–140)
GLUCOSE SERPL-MCNC: 121 MG/DL (ref 65–140)
GLUCOSE SERPL-MCNC: 122 MG/DL (ref 65–140)
GLUCOSE SERPL-MCNC: 123 MG/DL (ref 65–140)
GLUCOSE SERPL-MCNC: 124 MG/DL (ref 65–140)
GLUCOSE SERPL-MCNC: 126 MG/DL (ref 65–140)
GLUCOSE SERPL-MCNC: 130 MG/DL (ref 65–140)
GLUCOSE SERPL-MCNC: 131 MG/DL (ref 65–140)
GLUCOSE SERPL-MCNC: 132 MG/DL (ref 65–140)
GLUCOSE SERPL-MCNC: 133 MG/DL (ref 65–140)
GLUCOSE SERPL-MCNC: 134 MG/DL (ref 65–140)
GLUCOSE SERPL-MCNC: 136 MG/DL (ref 65–140)
GLUCOSE SERPL-MCNC: 137 MG/DL (ref 65–140)
GLUCOSE SERPL-MCNC: 141 MG/DL (ref 65–140)
GLUCOSE SERPL-MCNC: 142 MG/DL (ref 65–140)
GLUCOSE SERPL-MCNC: 143 MG/DL (ref 65–140)
GLUCOSE SERPL-MCNC: 143 MG/DL (ref 65–140)
GLUCOSE SERPL-MCNC: 146 MG/DL (ref 65–140)
GLUCOSE SERPL-MCNC: 148 MG/DL (ref 65–140)
GLUCOSE SERPL-MCNC: 150 MG/DL (ref 65–140)
GLUCOSE SERPL-MCNC: 152 MG/DL (ref 65–140)
GLUCOSE SERPL-MCNC: 152 MG/DL (ref 65–140)
GLUCOSE SERPL-MCNC: 153 MG/DL (ref 65–140)
GLUCOSE SERPL-MCNC: 156 MG/DL (ref 65–140)
GLUCOSE SERPL-MCNC: 159 MG/DL (ref 65–140)
GLUCOSE SERPL-MCNC: 162 MG/DL (ref 65–140)
GLUCOSE SERPL-MCNC: 163 MG/DL (ref 65–140)
GLUCOSE SERPL-MCNC: 163 MG/DL (ref 65–140)
GLUCOSE SERPL-MCNC: 164 MG/DL (ref 65–140)
GLUCOSE SERPL-MCNC: 166 MG/DL (ref 65–140)
GLUCOSE SERPL-MCNC: 167 MG/DL (ref 65–140)
GLUCOSE SERPL-MCNC: 168 MG/DL (ref 65–140)
GLUCOSE SERPL-MCNC: 169 MG/DL (ref 65–140)
GLUCOSE SERPL-MCNC: 172 MG/DL (ref 65–140)
GLUCOSE SERPL-MCNC: 172 MG/DL (ref 65–140)
GLUCOSE SERPL-MCNC: 173 MG/DL (ref 65–140)
GLUCOSE SERPL-MCNC: 174 MG/DL (ref 65–140)
GLUCOSE SERPL-MCNC: 175 MG/DL (ref 65–140)
GLUCOSE SERPL-MCNC: 178 MG/DL (ref 65–140)
GLUCOSE SERPL-MCNC: 182 MG/DL (ref 65–140)
GLUCOSE SERPL-MCNC: 183 MG/DL (ref 65–140)
GLUCOSE SERPL-MCNC: 184 MG/DL (ref 65–140)
GLUCOSE SERPL-MCNC: 184 MG/DL (ref 65–140)
GLUCOSE SERPL-MCNC: 186 MG/DL (ref 65–140)
GLUCOSE SERPL-MCNC: 188 MG/DL (ref 65–140)
GLUCOSE SERPL-MCNC: 191 MG/DL (ref 65–140)
GLUCOSE SERPL-MCNC: 192 MG/DL (ref 65–140)
GLUCOSE SERPL-MCNC: 202 MG/DL (ref 65–140)
GLUCOSE SERPL-MCNC: 204 MG/DL (ref 65–140)
GLUCOSE SERPL-MCNC: 210 MG/DL (ref 65–140)
GLUCOSE SERPL-MCNC: 212 MG/DL (ref 65–140)
GLUCOSE SERPL-MCNC: 220 MG/DL (ref 65–140)
GLUCOSE SERPL-MCNC: 225 MG/DL (ref 65–140)
GLUCOSE SERPL-MCNC: 232 MG/DL (ref 65–140)
GLUCOSE SERPL-MCNC: 235 MG/DL (ref 65–140)
GLUCOSE SERPL-MCNC: 239 MG/DL (ref 65–140)
GLUCOSE SERPL-MCNC: 243 MG/DL (ref 65–140)
GLUCOSE SERPL-MCNC: 243 MG/DL (ref 65–140)
GLUCOSE SERPL-MCNC: 249 MG/DL (ref 65–140)
GLUCOSE SERPL-MCNC: 251 MG/DL (ref 65–140)
GLUCOSE SERPL-MCNC: 264 MG/DL (ref 65–140)
GLUCOSE SERPL-MCNC: 272 MG/DL (ref 65–140)
GLUCOSE SERPL-MCNC: 276 MG/DL (ref 65–140)
GLUCOSE SERPL-MCNC: 279 MG/DL (ref 65–140)
GLUCOSE SERPL-MCNC: 285 MG/DL (ref 65–140)
GLUCOSE SERPL-MCNC: 289 MG/DL (ref 65–140)
GLUCOSE SERPL-MCNC: 311 MG/DL (ref 65–140)
GLUCOSE SERPL-MCNC: 86 MG/DL (ref 65–140)
GLUCOSE SERPL-MCNC: 88 MG/DL (ref 65–140)
GLUCOSE SERPL-MCNC: 95 MG/DL (ref 65–140)
GLUCOSE SERPL-MCNC: 98 MG/DL (ref 65–140)
GLUCOSE SERPL-MCNC: 98 MG/DL (ref 65–140)
GLUCOSE SERPL-MCNC: 99 MG/DL (ref 65–140)
GLUCOSE UR STRIP-MCNC: NEGATIVE MG/DL
GLUCOSE UR STRIP-MCNC: NEGATIVE MG/DL
GRAM STN SPEC: ABNORMAL
HBA1C MFR BLD HPLC: 6.3 %
HBA1C MFR BLD: 6.5 % (ref 4.2–6.3)
HBA1C MFR BLD: 6.7 % (ref 4.2–6.3)
HBV CORE AB SER QL: NORMAL
HBV CORE IGM SER QL: NORMAL
HBV SURFACE AG SER QL: NORMAL
HCO3 BLDA-SCNC: 20.1 MMOL/L (ref 22–28)
HCO3 BLDA-SCNC: 24.2 MMOL/L (ref 22–28)
HCO3 BLDA-SCNC: 25.5 MMOL/L (ref 22–28)
HCO3 BLDV-SCNC: 19.9 MMOL/L (ref 24–30)
HCT VFR BLD AUTO: 24.2 % (ref 36.5–49.3)
HCT VFR BLD AUTO: 25.2 % (ref 36.5–49.3)
HCT VFR BLD AUTO: 25.7 % (ref 36.5–49.3)
HCT VFR BLD AUTO: 26.1 % (ref 36.5–49.3)
HCT VFR BLD AUTO: 29.9 % (ref 36.5–49.3)
HCT VFR BLD AUTO: 30.8 % (ref 36.5–49.3)
HCT VFR BLD AUTO: 31 % (ref 36.5–49.3)
HCT VFR BLD AUTO: 31.4 % (ref 36.5–49.3)
HCT VFR BLD AUTO: 32.1 % (ref 36.5–49.3)
HCT VFR BLD AUTO: 32.2 % (ref 36.5–49.3)
HCT VFR BLD AUTO: 32.3 % (ref 36.5–49.3)
HCT VFR BLD AUTO: 33.1 % (ref 36.5–49.3)
HCT VFR BLD AUTO: 33.3 % (ref 36.5–49.3)
HCT VFR BLD CALC: 30 % (ref 36.5–49.3)
HCV AB SER QL: NORMAL
HCV AB SER QL: NORMAL
HDLC SERPL-MCNC: 35 MG/DL
HGB BLD-MCNC: 10.1 G/DL (ref 12–17)
HGB BLD-MCNC: 10.2 G/DL (ref 12–17)
HGB BLD-MCNC: 10.2 G/DL (ref 12–17)
HGB BLD-MCNC: 10.3 G/DL (ref 12–17)
HGB BLD-MCNC: 10.5 G/DL (ref 12–17)
HGB BLD-MCNC: 10.6 G/DL (ref 12–17)
HGB BLD-MCNC: 8.2 G/DL (ref 12–17)
HGB BLD-MCNC: 8.3 G/DL (ref 12–17)
HGB BLD-MCNC: 8.6 G/DL (ref 12–17)
HGB BLD-MCNC: 8.6 G/DL (ref 12–17)
HGB BLD-MCNC: 9.5 G/DL (ref 12–17)
HGB BLD-MCNC: 9.7 G/DL (ref 12–17)
HGB BLD-MCNC: 9.7 G/DL (ref 12–17)
HGB BLDA-MCNC: 10.2 G/DL (ref 12–17)
HGB UR QL STRIP.AUTO: NEGATIVE
HGB UR QL STRIP.AUTO: NEGATIVE
HIV 1+2 AB+HIV1 P24 AG SERPL QL IA: NORMAL
HIV1 P24 AG SER QL: NORMAL
HOROWITZ INDEX BLDA+IHG-RTO: 40 MM[HG]
HOROWITZ INDEX BLDA+IHG-RTO: 40 MM[HG]
HOROWITZ INDEX BLDA+IHG-RTO: 6 MM[HG]
IL6 SERPL-MCNC: 9.8 PG/ML (ref 0–13)
IMM GRANULOCYTES # BLD AUTO: 0.04 THOUSAND/UL (ref 0–0.2)
IMM GRANULOCYTES # BLD AUTO: 0.04 THOUSAND/UL (ref 0–0.2)
IMM GRANULOCYTES # BLD AUTO: 0.05 THOUSAND/UL (ref 0–0.2)
IMM GRANULOCYTES # BLD AUTO: 0.07 THOUSAND/UL (ref 0–0.2)
IMM GRANULOCYTES # BLD AUTO: 0.07 THOUSAND/UL (ref 0–0.2)
IMM GRANULOCYTES # BLD AUTO: 0.3 THOUSAND/UL (ref 0–0.2)
IMM GRANULOCYTES NFR BLD AUTO: 1 % (ref 0–2)
IMM GRANULOCYTES NFR BLD AUTO: 2 % (ref 0–2)
INR PPP: 1.1 (ref 0.84–1.19)
INR PPP: 1.15 (ref 0.84–1.19)
INR PPP: 1.28 (ref 0.84–1.19)
IRON SATN MFR SERPL: 15 %
IRON SATN MFR SERPL: 20 %
IRON SERPL-MCNC: 37 UG/DL (ref 65–175)
IRON SERPL-MCNC: 49 UG/DL (ref 65–175)
KETONES UR STRIP-MCNC: NEGATIVE MG/DL
KETONES UR STRIP-MCNC: NEGATIVE MG/DL
LACTATE SERPL-SCNC: 1.7 MMOL/L (ref 0.5–2)
LACTATE SERPL-SCNC: 2 MMOL/L (ref 0.5–2)
LACTATE SERPL-SCNC: 2.7 MMOL/L (ref 0.5–2)
LACTATE SERPL-SCNC: 8.1 MMOL/L (ref 0.5–2)
LDLC SERPL CALC-MCNC: 62 MG/DL (ref 0–100)
LEFT EYE DIABETIC RETINOPATHY: NORMAL
LEUKOCYTE ESTERASE UR QL STRIP: NEGATIVE
LEUKOCYTE ESTERASE UR QL STRIP: NEGATIVE
LYMPHOCYTES # BLD AUTO: 0.26 THOUSANDS/ΜL (ref 0.6–4.47)
LYMPHOCYTES # BLD AUTO: 0.33 THOUSAND/UL (ref 0.6–4.47)
LYMPHOCYTES # BLD AUTO: 0.36 THOUSANDS/ΜL (ref 0.6–4.47)
LYMPHOCYTES # BLD AUTO: 0.47 THOUSAND/UL (ref 0.6–4.47)
LYMPHOCYTES # BLD AUTO: 0.53 THOUSANDS/ΜL (ref 0.6–4.47)
LYMPHOCYTES # BLD AUTO: 0.7 THOUSANDS/ΜL (ref 0.6–4.47)
LYMPHOCYTES # BLD AUTO: 0.71 THOUSANDS/ΜL (ref 0.6–4.47)
LYMPHOCYTES # BLD AUTO: 0.97 THOUSANDS/ΜL (ref 0.6–4.47)
LYMPHOCYTES # BLD AUTO: 1 THOUSANDS/ΜL (ref 0.6–4.47)
LYMPHOCYTES # BLD AUTO: 1.13 THOUSANDS/ΜL (ref 0.6–4.47)
LYMPHOCYTES # BLD AUTO: 1.15 THOUSAND/UL (ref 0.6–4.47)
LYMPHOCYTES # BLD AUTO: 1.98 THOUSAND/UL (ref 0.6–4.47)
LYMPHOCYTES # BLD AUTO: 11 % (ref 14–44)
LYMPHOCYTES # BLD AUTO: 2 % (ref 14–44)
LYMPHOCYTES # BLD AUTO: 3 % (ref 14–44)
LYMPHOCYTES # BLD AUTO: 8 % (ref 14–44)
LYMPHOCYTES NFR BLD AUTO: 15 % (ref 14–44)
LYMPHOCYTES NFR BLD AUTO: 15 % (ref 14–44)
LYMPHOCYTES NFR BLD AUTO: 16 % (ref 14–44)
LYMPHOCYTES NFR BLD AUTO: 2 % (ref 14–44)
LYMPHOCYTES NFR BLD AUTO: 4 % (ref 14–44)
LYMPHOCYTES NFR BLD AUTO: 7 % (ref 14–44)
LYMPHOCYTES NFR BLD AUTO: 9 % (ref 14–44)
LYMPHOCYTES NFR BLD AUTO: 9 % (ref 14–44)
MAGNESIUM SERPL-MCNC: 1.8 MG/DL (ref 1.6–2.6)
MAGNESIUM SERPL-MCNC: 1.9 MG/DL (ref 1.6–2.6)
MAGNESIUM SERPL-MCNC: 2 MG/DL (ref 1.6–2.6)
MAGNESIUM SERPL-MCNC: 2 MG/DL (ref 1.6–2.6)
MAGNESIUM SERPL-MCNC: 2.2 MG/DL (ref 1.6–2.6)
MAGNESIUM SERPL-MCNC: 2.2 MG/DL (ref 1.6–2.6)
MAGNESIUM SERPL-MCNC: 2.7 MG/DL (ref 1.6–2.6)
MCH RBC QN AUTO: 31.6 PG (ref 26.8–34.3)
MCH RBC QN AUTO: 31.7 PG (ref 26.8–34.3)
MCH RBC QN AUTO: 31.7 PG (ref 26.8–34.3)
MCH RBC QN AUTO: 31.8 PG (ref 26.8–34.3)
MCH RBC QN AUTO: 32 PG (ref 26.8–34.3)
MCH RBC QN AUTO: 32.1 PG (ref 26.8–34.3)
MCH RBC QN AUTO: 32.1 PG (ref 26.8–34.3)
MCH RBC QN AUTO: 32.2 PG (ref 26.8–34.3)
MCH RBC QN AUTO: 32.2 PG (ref 26.8–34.3)
MCH RBC QN AUTO: 32.3 PG (ref 26.8–34.3)
MCHC RBC AUTO-ENTMCNC: 31.3 G/DL (ref 31.4–37.4)
MCHC RBC AUTO-ENTMCNC: 31.5 G/DL (ref 31.4–37.4)
MCHC RBC AUTO-ENTMCNC: 31.5 G/DL (ref 31.4–37.4)
MCHC RBC AUTO-ENTMCNC: 31.7 G/DL (ref 31.4–37.4)
MCHC RBC AUTO-ENTMCNC: 31.8 G/DL (ref 31.4–37.4)
MCHC RBC AUTO-ENTMCNC: 31.8 G/DL (ref 31.4–37.4)
MCHC RBC AUTO-ENTMCNC: 31.9 G/DL (ref 31.4–37.4)
MCHC RBC AUTO-ENTMCNC: 32 G/DL (ref 31.4–37.4)
MCHC RBC AUTO-ENTMCNC: 32.2 G/DL (ref 31.4–37.4)
MCHC RBC AUTO-ENTMCNC: 32.9 G/DL (ref 31.4–37.4)
MCHC RBC AUTO-ENTMCNC: 33 G/DL (ref 31.4–37.4)
MCHC RBC AUTO-ENTMCNC: 33.5 G/DL (ref 31.4–37.4)
MCHC RBC AUTO-ENTMCNC: 33.9 G/DL (ref 31.4–37.4)
MCV RBC AUTO: 100 FL (ref 82–98)
MCV RBC AUTO: 100 FL (ref 82–98)
MCV RBC AUTO: 101 FL (ref 82–98)
MCV RBC AUTO: 102 FL (ref 82–98)
MCV RBC AUTO: 102 FL (ref 82–98)
MCV RBC AUTO: 95 FL (ref 82–98)
MCV RBC AUTO: 95 FL (ref 82–98)
MCV RBC AUTO: 96 FL (ref 82–98)
MCV RBC AUTO: 97 FL (ref 82–98)
METAMYELOCYTES NFR BLD MANUAL: 1 % (ref 0–1)
METAMYELOCYTES NFR BLD MANUAL: 1 % (ref 0–1)
METAMYELOCYTES NFR BLD MANUAL: 3 % (ref 0–1)
MICROALBUMIN UR-MCNC: 43.5 MG/L (ref 0–20)
MICROALBUMIN UR-MCNC: 74.3 MG/L (ref 0–20)
MICROALBUMIN/CREAT 24H UR: 49 MG/G CREATININE (ref 0–30)
MICROALBUMIN/CREAT 24H UR: 56 MG/G CREATININE (ref 0–30)
MONOCYTES # BLD AUTO: 0 THOUSAND/UL (ref 0–1.22)
MONOCYTES # BLD AUTO: 0.14 THOUSAND/UL (ref 0–1.22)
MONOCYTES # BLD AUTO: 0.16 THOUSAND/UL (ref 0–1.22)
MONOCYTES # BLD AUTO: 0.4 THOUSAND/ΜL (ref 0.17–1.22)
MONOCYTES # BLD AUTO: 0.56 THOUSAND/ΜL (ref 0.17–1.22)
MONOCYTES # BLD AUTO: 0.64 THOUSAND/ΜL (ref 0.17–1.22)
MONOCYTES # BLD AUTO: 0.7 THOUSAND/ΜL (ref 0.17–1.22)
MONOCYTES # BLD AUTO: 0.75 THOUSAND/ΜL (ref 0.17–1.22)
MONOCYTES # BLD AUTO: 0.78 THOUSAND/ΜL (ref 0.17–1.22)
MONOCYTES # BLD AUTO: 0.9 THOUSAND/UL (ref 0–1.22)
MONOCYTES # BLD AUTO: 1.15 THOUSAND/ΜL (ref 0.17–1.22)
MONOCYTES # BLD AUTO: 1.34 THOUSAND/ΜL (ref 0.17–1.22)
MONOCYTES NFR BLD AUTO: 10 % (ref 4–12)
MONOCYTES NFR BLD AUTO: 12 % (ref 4–12)
MONOCYTES NFR BLD AUTO: 15 % (ref 4–12)
MONOCYTES NFR BLD AUTO: 16 % (ref 4–12)
MONOCYTES NFR BLD AUTO: 3 % (ref 4–12)
MONOCYTES NFR BLD AUTO: 9 % (ref 4–12)
MONOCYTES NFR BLD: 0 % (ref 4–12)
MONOCYTES NFR BLD: 1 % (ref 4–12)
MONOCYTES NFR BLD: 1 % (ref 4–12)
MONOCYTES NFR BLD: 5 % (ref 4–12)
MRSA NOSE QL CULT: NORMAL
NEUTROPHILS # BLD AUTO: 14.72 THOUSANDS/ΜL (ref 1.85–7.62)
NEUTROPHILS # BLD AUTO: 4.48 THOUSANDS/ΜL (ref 1.85–7.62)
NEUTROPHILS # BLD AUTO: 4.5 THOUSANDS/ΜL (ref 1.85–7.62)
NEUTROPHILS # BLD AUTO: 4.55 THOUSANDS/ΜL (ref 1.85–7.62)
NEUTROPHILS # BLD AUTO: 5.52 THOUSANDS/ΜL (ref 1.85–7.62)
NEUTROPHILS # BLD AUTO: 5.83 THOUSANDS/ΜL (ref 1.85–7.62)
NEUTROPHILS # BLD AUTO: 6.01 THOUSANDS/ΜL (ref 1.85–7.62)
NEUTROPHILS # BLD AUTO: 6.85 THOUSANDS/ΜL (ref 1.85–7.62)
NEUTROPHILS # BLD MANUAL: 12.96 THOUSAND/UL (ref 1.85–7.62)
NEUTROPHILS # BLD MANUAL: 14.57 THOUSAND/UL (ref 1.85–7.62)
NEUTROPHILS # BLD MANUAL: 14.72 THOUSAND/UL (ref 1.85–7.62)
NEUTROPHILS # BLD MANUAL: 15.99 THOUSAND/UL (ref 1.85–7.62)
NEUTS BAND NFR BLD MANUAL: 10 % (ref 0–8)
NEUTS BAND NFR BLD MANUAL: 14 % (ref 0–8)
NEUTS BAND NFR BLD MANUAL: 15 % (ref 0–8)
NEUTS BAND NFR BLD MANUAL: 29 % (ref 0–8)
NEUTS SEG NFR BLD AUTO: 52 % (ref 43–75)
NEUTS SEG NFR BLD AUTO: 66 % (ref 43–75)
NEUTS SEG NFR BLD AUTO: 69 % (ref 43–75)
NEUTS SEG NFR BLD AUTO: 70 % (ref 43–75)
NEUTS SEG NFR BLD AUTO: 74 % (ref 43–75)
NEUTS SEG NFR BLD AUTO: 75 % (ref 43–75)
NEUTS SEG NFR BLD AUTO: 75 % (ref 43–75)
NEUTS SEG NFR BLD AUTO: 76 % (ref 43–75)
NEUTS SEG NFR BLD AUTO: 77 % (ref 43–75)
NEUTS SEG NFR BLD AUTO: 80 % (ref 43–75)
NEUTS SEG NFR BLD AUTO: 87 % (ref 43–75)
NEUTS SEG NFR BLD AUTO: 93 % (ref 43–75)
NITRITE UR QL STRIP: NEGATIVE
NITRITE UR QL STRIP: NEGATIVE
NON-SQ EPI CELLS URNS QL MICRO: NORMAL /HPF
NONHDLC SERPL-MCNC: 78 MG/DL
NRBC BLD AUTO-RTO: 0 /100 WBCS
NT-PROBNP SERPL-MCNC: 4688 PG/ML
NT-PROBNP SERPL-MCNC: ABNORMAL PG/ML
O2 CT BLDA-SCNC: 12.7 ML/DL (ref 16–23)
O2 CT BLDA-SCNC: 13.7 ML/DL (ref 16–23)
O2 CT BLDV-SCNC: 14.9 ML/DL
OSMOLALITY UR/SERPL-RTO: 296 MMOL/KG (ref 282–298)
OSMOLALITY UR: 412 MMOL/KG
OXYHGB MFR BLDA: 96.3 % (ref 94–97)
OXYHGB MFR BLDA: 96.9 % (ref 94–97)
P AXIS: 24 DEGREES
P AXIS: 44 DEGREES
P AXIS: 47 DEGREES
P AXIS: 60 DEGREES
P AXIS: 61 DEGREES
P AXIS: 63 DEGREES
P AXIS: 66 DEGREES
P AXIS: 84 DEGREES
PCO2 BLD: 22 MMOL/L (ref 21–32)
PCO2 BLD: 58.4 MM HG (ref 36–44)
PCO2 BLDA: 34.3 MM HG (ref 36–44)
PCO2 BLDA: 34.4 MM HG (ref 36–44)
PCO2 BLDV: 39 MM HG (ref 42–50)
PEEP RESPIRATORY: 100 CM[H2O]
PEEP RESPIRATORY: 6 CM[H2O]
PEEP RESPIRATORY: 6 CM[H2O]
PH BLD: 7.14 [PH] (ref 7.35–7.45)
PH BLDA: 7.47 [PH] (ref 7.35–7.45)
PH BLDA: 7.49 [PH] (ref 7.35–7.45)
PH BLDV: 7.33 [PH] (ref 7.3–7.4)
PH UR STRIP.AUTO: 5.5 [PH]
PH UR STRIP.AUTO: 6 [PH]
PHOSPHATE SERPL-MCNC: 3.1 MG/DL (ref 2.3–4.1)
PHOSPHATE SERPL-MCNC: 4.3 MG/DL (ref 2.3–4.1)
PHOSPHATE SERPL-MCNC: 5 MG/DL (ref 2.3–4.1)
PHOSPHATE SERPL-MCNC: 5.9 MG/DL (ref 2.3–4.1)
PHOSPHATE SERPL-MCNC: 8.5 MG/DL (ref 2.3–4.1)
PLATELET # BLD AUTO: 194 THOUSANDS/UL (ref 149–390)
PLATELET # BLD AUTO: 204 THOUSANDS/UL (ref 149–390)
PLATELET # BLD AUTO: 213 THOUSANDS/UL (ref 149–390)
PLATELET # BLD AUTO: 215 THOUSANDS/UL (ref 149–390)
PLATELET # BLD AUTO: 225 THOUSANDS/UL (ref 149–390)
PLATELET # BLD AUTO: 247 THOUSANDS/UL (ref 149–390)
PLATELET # BLD AUTO: 251 THOUSANDS/UL (ref 149–390)
PLATELET # BLD AUTO: 274 THOUSANDS/UL (ref 149–390)
PLATELET # BLD AUTO: 274 THOUSANDS/UL (ref 149–390)
PLATELET # BLD AUTO: 277 THOUSANDS/UL (ref 149–390)
PLATELET # BLD AUTO: 283 THOUSANDS/UL (ref 149–390)
PLATELET # BLD AUTO: 291 THOUSANDS/UL (ref 149–390)
PLATELET # BLD AUTO: 291 THOUSANDS/UL (ref 149–390)
PLATELET # BLD AUTO: 297 THOUSANDS/UL (ref 149–390)
PLATELET BLD QL SMEAR: ADEQUATE
PMV BLD AUTO: 10.3 FL (ref 8.9–12.7)
PMV BLD AUTO: 10.4 FL (ref 8.9–12.7)
PMV BLD AUTO: 10.5 FL (ref 8.9–12.7)
PMV BLD AUTO: 10.6 FL (ref 8.9–12.7)
PMV BLD AUTO: 10.7 FL (ref 8.9–12.7)
PMV BLD AUTO: 10.7 FL (ref 8.9–12.7)
PMV BLD AUTO: 10.9 FL (ref 8.9–12.7)
PMV BLD AUTO: 11.2 FL (ref 8.9–12.7)
PMV BLD AUTO: 11.2 FL (ref 8.9–12.7)
PMV BLD AUTO: 11.3 FL (ref 8.9–12.7)
PO2 BLD: 101 MM HG (ref 75–129)
PO2 BLDA: 103.3 MM HG (ref 75–129)
PO2 BLDA: 90 MM HG (ref 75–129)
PO2 BLDV: 234 MM HG (ref 35–45)
POST-VOID RESIDUAL VOLUME, ML POC: 103 ML
POST-VOID RESIDUAL VOLUME, ML POC: 18 ML
POST-VOID RESIDUAL VOLUME, ML POC: 68 ML
POTASSIUM BLD-SCNC: 5.1 MMOL/L (ref 3.5–5.3)
POTASSIUM SERPL-SCNC: 3.5 MMOL/L (ref 3.5–5.3)
POTASSIUM SERPL-SCNC: 3.5 MMOL/L (ref 3.5–5.3)
POTASSIUM SERPL-SCNC: 3.7 MMOL/L (ref 3.5–5.3)
POTASSIUM SERPL-SCNC: 3.7 MMOL/L (ref 3.5–5.3)
POTASSIUM SERPL-SCNC: 3.9 MMOL/L (ref 3.5–5.3)
POTASSIUM SERPL-SCNC: 4.1 MMOL/L (ref 3.5–5.3)
POTASSIUM SERPL-SCNC: 4.3 MMOL/L (ref 3.5–5.3)
POTASSIUM SERPL-SCNC: 4.4 MMOL/L (ref 3.5–5.3)
POTASSIUM SERPL-SCNC: 4.7 MMOL/L (ref 3.5–5.3)
POTASSIUM SERPL-SCNC: 4.7 MMOL/L (ref 3.5–5.3)
POTASSIUM SERPL-SCNC: 4.9 MMOL/L (ref 3.5–5.3)
POTASSIUM SERPL-SCNC: 4.9 MMOL/L (ref 3.5–5.3)
POTASSIUM SERPL-SCNC: 5.4 MMOL/L (ref 3.5–5.3)
PR INTERVAL: 112 MS
PR INTERVAL: 154 MS
PR INTERVAL: 162 MS
PR INTERVAL: 164 MS
PR INTERVAL: 168 MS
PR INTERVAL: 170 MS
PR INTERVAL: 170 MS
PR INTERVAL: 180 MS
PR INTERVAL: 180 MS
PR INTERVAL: 184 MS
PROCALCITONIN SERPL-MCNC: 0.12 NG/ML
PROCALCITONIN SERPL-MCNC: 11.14 NG/ML
PROCALCITONIN SERPL-MCNC: 16.6 NG/ML
PROCALCITONIN SERPL-MCNC: 5.36 NG/ML
PROCALCITONIN SERPL-MCNC: 9.14 NG/ML
PROT SERPL-MCNC: 6.4 G/DL (ref 6.4–8.2)
PROT SERPL-MCNC: 6.6 G/DL (ref 6.4–8.2)
PROT SERPL-MCNC: 6.8 G/DL (ref 6.4–8.2)
PROT SERPL-MCNC: 6.9 G/DL (ref 6.4–8.2)
PROT SERPL-MCNC: 7.2 G/DL (ref 6.4–8.2)
PROT SERPL-MCNC: 7.4 G/DL (ref 6.4–8.2)
PROT SERPL-MCNC: 7.5 G/DL (ref 6.4–8.2)
PROT SERPL-MCNC: 7.6 G/DL (ref 6.4–8.2)
PROT SERPL-MCNC: 7.8 G/DL (ref 6.4–8.2)
PROT SERPL-MCNC: 7.8 G/DL (ref 6.4–8.2)
PROT SERPL-MCNC: 7.9 G/DL (ref 6.4–8.2)
PROT UR STRIP-MCNC: ABNORMAL MG/DL
PROT UR STRIP-MCNC: NEGATIVE MG/DL
PROTHROMBIN TIME: 14.3 SECONDS (ref 11.6–14.5)
PROTHROMBIN TIME: 14.8 SECONDS (ref 11.6–14.5)
PROTHROMBIN TIME: 16.1 SECONDS (ref 11.6–14.5)
PSA SERPL-MCNC: <0.1 NG/ML (ref 0–4)
QRS AXIS: -40 DEGREES
QRS AXIS: -51 DEGREES
QRS AXIS: -58 DEGREES
QRS AXIS: -66 DEGREES
QRS AXIS: -67 DEGREES
QRS AXIS: -70 DEGREES
QRS AXIS: -71 DEGREES
QRS AXIS: -72 DEGREES
QRS AXIS: -77 DEGREES
QRS AXIS: 0 DEGREES
QRSD INTERVAL: 140 MS
QRSD INTERVAL: 144 MS
QRSD INTERVAL: 146 MS
QRSD INTERVAL: 146 MS
QRSD INTERVAL: 148 MS
QRSD INTERVAL: 148 MS
QRSD INTERVAL: 150 MS
QRSD INTERVAL: 152 MS
QRSD INTERVAL: 178 MS
QRSD INTERVAL: 180 MS
QT INTERVAL: 378 MS
QT INTERVAL: 388 MS
QT INTERVAL: 400 MS
QT INTERVAL: 412 MS
QT INTERVAL: 416 MS
QT INTERVAL: 428 MS
QT INTERVAL: 432 MS
QT INTERVAL: 440 MS
QT INTERVAL: 440 MS
QT INTERVAL: 464 MS
QTC INTERVAL: 484 MS
QTC INTERVAL: 497 MS
QTC INTERVAL: 502 MS
QTC INTERVAL: 510 MS
QTC INTERVAL: 511 MS
QTC INTERVAL: 516 MS
QTC INTERVAL: 531 MS
QTC INTERVAL: 533 MS
QTC INTERVAL: 542 MS
QTC INTERVAL: 564 MS
RBC # BLD AUTO: 2.56 MILLION/UL (ref 3.88–5.62)
RBC # BLD AUTO: 2.59 MILLION/UL (ref 3.88–5.62)
RBC # BLD AUTO: 2.71 MILLION/UL (ref 3.88–5.62)
RBC # BLD AUTO: 2.71 MILLION/UL (ref 3.88–5.62)
RBC # BLD AUTO: 2.94 MILLION/UL (ref 3.88–5.62)
RBC # BLD AUTO: 3.03 MILLION/UL (ref 3.88–5.62)
RBC # BLD AUTO: 3.07 MILLION/UL (ref 3.88–5.62)
RBC # BLD AUTO: 3.15 MILLION/UL (ref 3.88–5.62)
RBC # BLD AUTO: 3.17 MILLION/UL (ref 3.88–5.62)
RBC # BLD AUTO: 3.19 MILLION/UL (ref 3.88–5.62)
RBC # BLD AUTO: 3.2 MILLION/UL (ref 3.88–5.62)
RBC # BLD AUTO: 3.3 MILLION/UL (ref 3.88–5.62)
RBC # BLD AUTO: 3.3 MILLION/UL (ref 3.88–5.62)
RBC #/AREA URNS AUTO: NORMAL /HPF
RBC MORPH BLD: NORMAL
RBC MORPH BLD: PRESENT
RESPIRATORY RATE: 16
RH BLD: POSITIVE
RH BLD: POSITIVE
RIGHT EYE DIABETIC RETINOPATHY: NORMAL
RSV RNA RESP QL NAA+PROBE: NEGATIVE
SAMPLE SITE: ABNORMAL
SAO2 % BLD FROM PO2: 95 % (ref 60–85)
SARS-COV-2 RNA RESP QL NAA+PROBE: NEGATIVE
SARS-COV-2 RNA RESP QL NAA+PROBE: POSITIVE
SODIUM BLD-SCNC: 133 MMOL/L (ref 136–145)
SODIUM SERPL-SCNC: 132 MMOL/L (ref 136–145)
SODIUM SERPL-SCNC: 132 MMOL/L (ref 136–145)
SODIUM SERPL-SCNC: 133 MMOL/L (ref 136–145)
SODIUM SERPL-SCNC: 134 MMOL/L (ref 136–145)
SODIUM SERPL-SCNC: 135 MMOL/L (ref 136–145)
SODIUM SERPL-SCNC: 135 MMOL/L (ref 136–145)
SODIUM SERPL-SCNC: 136 MMOL/L (ref 136–145)
SODIUM SERPL-SCNC: 137 MMOL/L (ref 136–145)
SODIUM SERPL-SCNC: 138 MMOL/L (ref 136–145)
SODIUM SERPL-SCNC: 140 MMOL/L (ref 136–145)
SP GR UR STRIP.AUTO: 1.02 (ref 1–1.03)
SP GR UR STRIP.AUTO: >=1.03 (ref 1–1.03)
SPECIMEN EXPIRATION DATE: NORMAL
SPECIMEN SOURCE: ABNORMAL
T WAVE AXIS: -8 DEGREES
T WAVE AXIS: 2 DEGREES
T WAVE AXIS: 215 DEGREES
T WAVE AXIS: 242 DEGREES
T WAVE AXIS: 266 DEGREES
T WAVE AXIS: 51 DEGREES
T WAVE AXIS: 56 DEGREES
T WAVE AXIS: 58 DEGREES
T WAVE AXIS: 68 DEGREES
T WAVE AXIS: 83 DEGREES
TIBC SERPL-MCNC: 242 UG/DL (ref 250–450)
TIBC SERPL-MCNC: 247 UG/DL (ref 250–450)
TOTAL CELLS COUNTED SPEC: 100
TOXIC GRANULES BLD QL SMEAR: PRESENT
TRIGL SERPL-MCNC: 81 MG/DL
TROPONIN I SERPL-MCNC: 0.08 NG/ML
TROPONIN I SERPL-MCNC: 0.43 NG/ML
TROPONIN I SERPL-MCNC: 0.48 NG/ML
TROPONIN I SERPL-MCNC: 0.53 NG/ML
TROPONIN I SERPL-MCNC: 0.56 NG/ML
TROPONIN I SERPL-MCNC: 0.6 NG/ML
TROPONIN I SERPL-MCNC: 0.65 NG/ML
TROPONIN I SERPL-MCNC: 0.67 NG/ML
TROPONIN I SERPL-MCNC: 1.44 NG/ML
TROPONIN I SERPL-MCNC: 4.04 NG/ML
TROPONIN I SERPL-MCNC: 6.82 NG/ML
TROPONIN I SERPL-MCNC: 9.2 NG/ML
TSH SERPL DL<=0.05 MIU/L-ACNC: 1.6 UIU/ML (ref 0.36–3.74)
UROBILINOGEN UR QL STRIP.AUTO: 0.2 E.U./DL
UROBILINOGEN UR QL STRIP.AUTO: 0.2 E.U./DL
VANCOMYCIN SERPL-MCNC: 20.5 UG/ML
VANCOMYCIN SERPL-MCNC: 22.8 UG/ML
VANCOMYCIN SERPL-MCNC: 25.9 UG/ML
VANCOMYCIN TROUGH SERPL-MCNC: 10.1 UG/ML (ref 10–20)
VANCOMYCIN TROUGH SERPL-MCNC: 14.9 UG/ML (ref 10–20)
VENT AC: 18
VENT AC: 18
VENT AC: 24
VENT TYPE: ABNORMAL
VENT- AC: AC
VENTILATION VALUE: 450
VENTRICULAR RATE: 106 BPM
VENTRICULAR RATE: 106 BPM
VENTRICULAR RATE: 110 BPM
VENTRICULAR RATE: 77 BPM
VENTRICULAR RATE: 82 BPM
VENTRICULAR RATE: 83 BPM
VENTRICULAR RATE: 83 BPM
VENTRICULAR RATE: 84 BPM
VENTRICULAR RATE: 99 BPM
VENTRICULAR RATE: 99 BPM
VIT B12 SERPL-MCNC: 425 PG/ML (ref 100–900)
VT SETTING VENT: 45 ML
VT SETTING VENT: 450 ML
VT SETTING VENT: 450 ML
WBC # BLD AUTO: 14.4 THOUSAND/UL (ref 4.31–10.16)
WBC # BLD AUTO: 15.66 THOUSAND/UL (ref 4.31–10.16)
WBC # BLD AUTO: 15.73 THOUSAND/UL (ref 4.31–10.16)
WBC # BLD AUTO: 16.48 THOUSAND/UL (ref 4.31–10.16)
WBC # BLD AUTO: 17.99 THOUSAND/UL (ref 4.31–10.16)
WBC # BLD AUTO: 6.33 THOUSAND/UL (ref 4.31–10.16)
WBC # BLD AUTO: 6.51 THOUSAND/UL (ref 4.31–10.16)
WBC # BLD AUTO: 6.95 THOUSAND/UL (ref 4.31–10.16)
WBC # BLD AUTO: 7.38 THOUSAND/UL (ref 4.31–10.16)
WBC # BLD AUTO: 7.43 THOUSAND/UL (ref 4.31–10.16)
WBC # BLD AUTO: 7.69 THOUSAND/UL (ref 4.31–10.16)
WBC # BLD AUTO: 7.98 THOUSAND/UL (ref 4.31–10.16)
WBC # BLD AUTO: 8.93 THOUSAND/UL (ref 4.31–10.16)
WBC #/AREA URNS AUTO: NORMAL /HPF

## 2020-01-01 PROCEDURE — 85007 BL SMEAR W/DIFF WBC COUNT: CPT | Performed by: NURSE PRACTITIONER

## 2020-01-01 PROCEDURE — 82330 ASSAY OF CALCIUM: CPT | Performed by: PHYSICIAN ASSISTANT

## 2020-01-01 PROCEDURE — 93005 ELECTROCARDIOGRAM TRACING: CPT

## 2020-01-01 PROCEDURE — 99232 SBSQ HOSP IP/OBS MODERATE 35: CPT | Performed by: INTERNAL MEDICINE

## 2020-01-01 PROCEDURE — 85027 COMPLETE CBC AUTOMATED: CPT | Performed by: PHYSICIAN ASSISTANT

## 2020-01-01 PROCEDURE — 3079F DIAST BP 80-89 MM HG: CPT | Performed by: UROLOGY

## 2020-01-01 PROCEDURE — 85025 COMPLETE CBC W/AUTO DIFF WBC: CPT | Performed by: EMERGENCY MEDICINE

## 2020-01-01 PROCEDURE — 99291 CRITICAL CARE FIRST HOUR: CPT | Performed by: INTERNAL MEDICINE

## 2020-01-01 PROCEDURE — 84484 ASSAY OF TROPONIN QUANT: CPT | Performed by: NURSE PRACTITIONER

## 2020-01-01 PROCEDURE — 99214 OFFICE O/P EST MOD 30 MIN: CPT | Performed by: GENERAL ACUTE CARE HOSPITAL

## 2020-01-01 PROCEDURE — 83930 ASSAY OF BLOOD OSMOLALITY: CPT | Performed by: NURSE PRACTITIONER

## 2020-01-01 PROCEDURE — 3078F DIAST BP <80 MM HG: CPT | Performed by: INTERNAL MEDICINE

## 2020-01-01 PROCEDURE — 96361 HYDRATE IV INFUSION ADD-ON: CPT

## 2020-01-01 PROCEDURE — 99306 1ST NF CARE HIGH MDM 50: CPT | Performed by: INTERNAL MEDICINE

## 2020-01-01 PROCEDURE — 74018 RADEX ABDOMEN 1 VIEW: CPT

## 2020-01-01 PROCEDURE — 93010 ELECTROCARDIOGRAM REPORT: CPT | Performed by: INTERNAL MEDICINE

## 2020-01-01 PROCEDURE — 95816 EEG AWAKE AND DROWSY: CPT | Performed by: PSYCHIATRY & NEUROLOGY

## 2020-01-01 PROCEDURE — 3077F SYST BP >= 140 MM HG: CPT | Performed by: UROLOGY

## 2020-01-01 PROCEDURE — 82948 REAGENT STRIP/BLOOD GLUCOSE: CPT

## 2020-01-01 PROCEDURE — 97163 PT EVAL HIGH COMPLEX 45 MIN: CPT

## 2020-01-01 PROCEDURE — 83550 IRON BINDING TEST: CPT | Performed by: INTERNAL MEDICINE

## 2020-01-01 PROCEDURE — 84145 PROCALCITONIN (PCT): CPT | Performed by: NURSE PRACTITIONER

## 2020-01-01 PROCEDURE — 4040F PNEUMOC VAC/ADMIN/RCVD: CPT | Performed by: INTERNAL MEDICINE

## 2020-01-01 PROCEDURE — 84484 ASSAY OF TROPONIN QUANT: CPT | Performed by: INTERNAL MEDICINE

## 2020-01-01 PROCEDURE — 86803 HEPATITIS C AB TEST: CPT

## 2020-01-01 PROCEDURE — 99316 NF DSCHRG MGMT 30 MIN+: CPT | Performed by: FAMILY MEDICINE

## 2020-01-01 PROCEDURE — 80048 BASIC METABOLIC PNL TOTAL CA: CPT | Performed by: PHYSICIAN ASSISTANT

## 2020-01-01 PROCEDURE — 3074F SYST BP LT 130 MM HG: CPT | Performed by: INTERNAL MEDICINE

## 2020-01-01 PROCEDURE — 94760 N-INVAS EAR/PLS OXIMETRY 1: CPT

## 2020-01-01 PROCEDURE — 94150 VITAL CAPACITY TEST: CPT

## 2020-01-01 PROCEDURE — 3074F SYST BP LT 130 MM HG: CPT | Performed by: UROLOGY

## 2020-01-01 PROCEDURE — 86704 HEP B CORE ANTIBODY TOTAL: CPT | Performed by: NURSE PRACTITIONER

## 2020-01-01 PROCEDURE — 1123F ACP DISCUSS/DSCN MKR DOCD: CPT | Performed by: EMERGENCY MEDICINE

## 2020-01-01 PROCEDURE — 4A133J1 MONITORING OF ARTERIAL PULSE, PERIPHERAL, PERCUTANEOUS APPROACH: ICD-10-PCS | Performed by: INTERNAL MEDICINE

## 2020-01-01 PROCEDURE — 99292 CRITICAL CARE ADDL 30 MIN: CPT | Performed by: INTERNAL MEDICINE

## 2020-01-01 PROCEDURE — 87070 CULTURE OTHR SPECIMN AEROBIC: CPT | Performed by: NURSE PRACTITIONER

## 2020-01-01 PROCEDURE — 4040F PNEUMOC VAC/ADMIN/RCVD: CPT | Performed by: UROLOGY

## 2020-01-01 PROCEDURE — 2022F DILAT RTA XM EVC RTNOPTHY: CPT | Performed by: UROLOGY

## 2020-01-01 PROCEDURE — 1160F RVW MEDS BY RX/DR IN RCRD: CPT | Performed by: UROLOGY

## 2020-01-01 PROCEDURE — 80053 COMPREHEN METABOLIC PANEL: CPT | Performed by: NURSE PRACTITIONER

## 2020-01-01 PROCEDURE — 87106 FUNGI IDENTIFICATION YEAST: CPT | Performed by: NURSE PRACTITIONER

## 2020-01-01 PROCEDURE — 85025 COMPLETE CBC W/AUTO DIFF WBC: CPT | Performed by: NURSE PRACTITIONER

## 2020-01-01 PROCEDURE — 92950 HEART/LUNG RESUSCITATION CPR: CPT

## 2020-01-01 PROCEDURE — G1004 CDSM NDSC: HCPCS

## 2020-01-01 PROCEDURE — 80053 COMPREHEN METABOLIC PANEL: CPT | Performed by: EMERGENCY MEDICINE

## 2020-01-01 PROCEDURE — 83550 IRON BINDING TEST: CPT | Performed by: PSYCHIATRY & NEUROLOGY

## 2020-01-01 PROCEDURE — TCMXX

## 2020-01-01 PROCEDURE — 99232 SBSQ HOSP IP/OBS MODERATE 35: CPT | Performed by: NURSE PRACTITIONER

## 2020-01-01 PROCEDURE — 84100 ASSAY OF PHOSPHORUS: CPT | Performed by: PHYSICIAN ASSISTANT

## 2020-01-01 PROCEDURE — 80048 BASIC METABOLIC PNL TOTAL CA: CPT | Performed by: INTERNAL MEDICINE

## 2020-01-01 PROCEDURE — 99223 1ST HOSP IP/OBS HIGH 75: CPT | Performed by: INTERNAL MEDICINE

## 2020-01-01 PROCEDURE — 3078F DIAST BP <80 MM HG: CPT | Performed by: UROLOGY

## 2020-01-01 PROCEDURE — 87806 HIV AG W/HIV1&2 ANTB W/OPTIC: CPT | Performed by: NURSE PRACTITIONER

## 2020-01-01 PROCEDURE — 99309 SBSQ NF CARE MODERATE MDM 30: CPT | Performed by: NURSE PRACTITIONER

## 2020-01-01 PROCEDURE — 84100 ASSAY OF PHOSPHORUS: CPT | Performed by: INTERNAL MEDICINE

## 2020-01-01 PROCEDURE — 84484 ASSAY OF TROPONIN QUANT: CPT | Performed by: EMERGENCY MEDICINE

## 2020-01-01 PROCEDURE — 99214 OFFICE O/P EST MOD 30 MIN: CPT | Performed by: UROLOGY

## 2020-01-01 PROCEDURE — 82607 VITAMIN B-12: CPT | Performed by: INTERNAL MEDICINE

## 2020-01-01 PROCEDURE — 99309 SBSQ NF CARE MODERATE MDM 30: CPT | Performed by: FAMILY MEDICINE

## 2020-01-01 PROCEDURE — 96374 THER/PROPH/DIAG INJ IV PUSH: CPT

## 2020-01-01 PROCEDURE — 99213 OFFICE O/P EST LOW 20 MIN: CPT | Performed by: UROLOGY

## 2020-01-01 PROCEDURE — 84100 ASSAY OF PHOSPHORUS: CPT | Performed by: NURSE PRACTITIONER

## 2020-01-01 PROCEDURE — 2022F DILAT RTA XM EVC RTNOPTHY: CPT | Performed by: INTERNAL MEDICINE

## 2020-01-01 PROCEDURE — 85027 COMPLETE CBC AUTOMATED: CPT | Performed by: NURSE PRACTITIONER

## 2020-01-01 PROCEDURE — 94640 AIRWAY INHALATION TREATMENT: CPT

## 2020-01-01 PROCEDURE — 36415 COLL VENOUS BLD VENIPUNCTURE: CPT

## 2020-01-01 PROCEDURE — NC001 PR NO CHARGE: Performed by: PHYSICIAN ASSISTANT

## 2020-01-01 PROCEDURE — 70551 MRI BRAIN STEM W/O DYE: CPT

## 2020-01-01 PROCEDURE — 82330 ASSAY OF CALCIUM: CPT | Performed by: NURSE PRACTITIONER

## 2020-01-01 PROCEDURE — 99285 EMERGENCY DEPT VISIT HI MDM: CPT

## 2020-01-01 PROCEDURE — 86140 C-REACTIVE PROTEIN: CPT | Performed by: NURSE PRACTITIONER

## 2020-01-01 PROCEDURE — 84145 PROCALCITONIN (PCT): CPT | Performed by: INTERNAL MEDICINE

## 2020-01-01 PROCEDURE — 83880 ASSAY OF NATRIURETIC PEPTIDE: CPT | Performed by: NURSE PRACTITIONER

## 2020-01-01 PROCEDURE — 99283 EMERGENCY DEPT VISIT LOW MDM: CPT | Performed by: EMERGENCY MEDICINE

## 2020-01-01 PROCEDURE — 85379 FIBRIN DEGRADATION QUANT: CPT | Performed by: NURSE PRACTITIONER

## 2020-01-01 PROCEDURE — 03HY32Z INSERTION OF MONITORING DEVICE INTO UPPER ARTERY, PERCUTANEOUS APPROACH: ICD-10-PCS | Performed by: INTERNAL MEDICINE

## 2020-01-01 PROCEDURE — 83735 ASSAY OF MAGNESIUM: CPT | Performed by: INTERNAL MEDICINE

## 2020-01-01 PROCEDURE — 84295 ASSAY OF SERUM SODIUM: CPT

## 2020-01-01 PROCEDURE — 99442 PR PHYS/QHP TELEPHONE EVALUATION 11-20 MIN: CPT | Performed by: PSYCHIATRY & NEUROLOGY

## 2020-01-01 PROCEDURE — 80061 LIPID PANEL: CPT

## 2020-01-01 PROCEDURE — 93306 TTE W/DOPPLER COMPLETE: CPT | Performed by: INTERNAL MEDICINE

## 2020-01-01 PROCEDURE — 86900 BLOOD TYPING SEROLOGIC ABO: CPT | Performed by: NURSE PRACTITIONER

## 2020-01-01 PROCEDURE — 99239 HOSP IP/OBS DSCHRG MGMT >30: CPT | Performed by: INTERNAL MEDICINE

## 2020-01-01 PROCEDURE — 83540 ASSAY OF IRON: CPT | Performed by: INTERNAL MEDICINE

## 2020-01-01 PROCEDURE — 84145 PROCALCITONIN (PCT): CPT | Performed by: EMERGENCY MEDICINE

## 2020-01-01 PROCEDURE — 97530 THERAPEUTIC ACTIVITIES: CPT

## 2020-01-01 PROCEDURE — 83605 ASSAY OF LACTIC ACID: CPT | Performed by: EMERGENCY MEDICINE

## 2020-01-01 PROCEDURE — 82728 ASSAY OF FERRITIN: CPT | Performed by: INTERNAL MEDICINE

## 2020-01-01 PROCEDURE — 70450 CT HEAD/BRAIN W/O DYE: CPT

## 2020-01-01 PROCEDURE — 82570 ASSAY OF URINE CREATININE: CPT

## 2020-01-01 PROCEDURE — 80053 COMPREHEN METABOLIC PANEL: CPT | Performed by: INTERNAL MEDICINE

## 2020-01-01 PROCEDURE — 86705 HEP B CORE ANTIBODY IGM: CPT | Performed by: NURSE PRACTITIONER

## 2020-01-01 PROCEDURE — 1036F TOBACCO NON-USER: CPT | Performed by: UROLOGY

## 2020-01-01 PROCEDURE — 83735 ASSAY OF MAGNESIUM: CPT | Performed by: NURSE PRACTITIONER

## 2020-01-01 PROCEDURE — 85049 AUTOMATED PLATELET COUNT: CPT | Performed by: INTERNAL MEDICINE

## 2020-01-01 PROCEDURE — 86850 RBC ANTIBODY SCREEN: CPT | Performed by: NURSE PRACTITIONER

## 2020-01-01 PROCEDURE — 5A1955Z RESPIRATORY VENTILATION, GREATER THAN 96 CONSECUTIVE HOURS: ICD-10-PCS | Performed by: INTERNAL MEDICINE

## 2020-01-01 PROCEDURE — 99231 SBSQ HOSP IP/OBS SF/LOW 25: CPT | Performed by: INTERNAL MEDICINE

## 2020-01-01 PROCEDURE — 80048 BASIC METABOLIC PNL TOTAL CA: CPT | Performed by: NURSE PRACTITIONER

## 2020-01-01 PROCEDURE — 84484 ASSAY OF TROPONIN QUANT: CPT | Performed by: PHYSICIAN ASSISTANT

## 2020-01-01 PROCEDURE — 84132 ASSAY OF SERUM POTASSIUM: CPT

## 2020-01-01 PROCEDURE — 87040 BLOOD CULTURE FOR BACTERIA: CPT | Performed by: EMERGENCY MEDICINE

## 2020-01-01 PROCEDURE — 99291 CRITICAL CARE FIRST HOUR: CPT | Performed by: EMERGENCY MEDICINE

## 2020-01-01 PROCEDURE — U0003 INFECTIOUS AGENT DETECTION BY NUCLEIC ACID (DNA OR RNA); SEVERE ACUTE RESPIRATORY SYNDROME CORONAVIRUS 2 (SARS-COV-2) (CORONAVIRUS DISEASE [COVID-19]), AMPLIFIED PROBE TECHNIQUE, MAKING USE OF HIGH THROUGHPUT TECHNOLOGIES AS DESCRIBED BY CMS-2020-01-R: HCPCS | Performed by: INTERNAL MEDICINE

## 2020-01-01 PROCEDURE — 1160F RVW MEDS BY RX/DR IN RCRD: CPT | Performed by: INTERNAL MEDICINE

## 2020-01-01 PROCEDURE — 51798 US URINE CAPACITY MEASURE: CPT | Performed by: UROLOGY

## 2020-01-01 PROCEDURE — 81001 URINALYSIS AUTO W/SCOPE: CPT | Performed by: EMERGENCY MEDICINE

## 2020-01-01 PROCEDURE — 85730 THROMBOPLASTIN TIME PARTIAL: CPT | Performed by: EMERGENCY MEDICINE

## 2020-01-01 PROCEDURE — 83605 ASSAY OF LACTIC ACID: CPT | Performed by: NURSE PRACTITIONER

## 2020-01-01 PROCEDURE — 71046 X-RAY EXAM CHEST 2 VIEWS: CPT

## 2020-01-01 PROCEDURE — 82043 UR ALBUMIN QUANTITATIVE: CPT

## 2020-01-01 PROCEDURE — 86140 C-REACTIVE PROTEIN: CPT | Performed by: INTERNAL MEDICINE

## 2020-01-01 PROCEDURE — 94762 N-INVAS EAR/PLS OXIMTRY CONT: CPT

## 2020-01-01 PROCEDURE — 83880 ASSAY OF NATRIURETIC PEPTIDE: CPT | Performed by: EMERGENCY MEDICINE

## 2020-01-01 PROCEDURE — 99214 OFFICE O/P EST MOD 30 MIN: CPT | Performed by: PHYSICIAN ASSISTANT

## 2020-01-01 PROCEDURE — 0BH17EZ INSERTION OF ENDOTRACHEAL AIRWAY INTO TRACHEA, VIA NATURAL OR ARTIFICIAL OPENING: ICD-10-PCS | Performed by: INTERNAL MEDICINE

## 2020-01-01 PROCEDURE — 95816 EEG AWAKE AND DROWSY: CPT

## 2020-01-01 PROCEDURE — 99283 EMERGENCY DEPT VISIT LOW MDM: CPT

## 2020-01-01 PROCEDURE — 99222 1ST HOSP IP/OBS MODERATE 55: CPT | Performed by: INTERNAL MEDICINE

## 2020-01-01 PROCEDURE — 94003 VENT MGMT INPAT SUBQ DAY: CPT

## 2020-01-01 PROCEDURE — G0008 ADMIN INFLUENZA VIRUS VAC: HCPCS

## 2020-01-01 PROCEDURE — 99291 CRITICAL CARE FIRST HOUR: CPT | Performed by: NURSE PRACTITIONER

## 2020-01-01 PROCEDURE — 71045 X-RAY EXAM CHEST 1 VIEW: CPT

## 2020-01-01 PROCEDURE — 87635 SARS-COV-2 COVID-19 AMP PRB: CPT | Performed by: EMERGENCY MEDICINE

## 2020-01-01 PROCEDURE — G0103 PSA SCREENING: HCPCS

## 2020-01-01 PROCEDURE — 99214 OFFICE O/P EST MOD 30 MIN: CPT | Performed by: INTERNAL MEDICINE

## 2020-01-01 PROCEDURE — 80053 COMPREHEN METABOLIC PANEL: CPT

## 2020-01-01 PROCEDURE — 80202 ASSAY OF VANCOMYCIN: CPT | Performed by: INTERNAL MEDICINE

## 2020-01-01 PROCEDURE — 96365 THER/PROPH/DIAG IV INF INIT: CPT

## 2020-01-01 PROCEDURE — 3008F BODY MASS INDEX DOCD: CPT | Performed by: UROLOGY

## 2020-01-01 PROCEDURE — NC001 PR NO CHARGE: Performed by: NURSE PRACTITIONER

## 2020-01-01 PROCEDURE — 85730 THROMBOPLASTIN TIME PARTIAL: CPT | Performed by: INTERNAL MEDICINE

## 2020-01-01 PROCEDURE — 85014 HEMATOCRIT: CPT

## 2020-01-01 PROCEDURE — 87340 HEPATITIS B SURFACE AG IA: CPT | Performed by: NURSE PRACTITIONER

## 2020-01-01 PROCEDURE — 82805 BLOOD GASES W/O2 SATURATION: CPT | Performed by: NURSE PRACTITIONER

## 2020-01-01 PROCEDURE — 87081 CULTURE SCREEN ONLY: CPT | Performed by: NURSE PRACTITIONER

## 2020-01-01 PROCEDURE — 82330 ASSAY OF CALCIUM: CPT

## 2020-01-01 PROCEDURE — 82746 ASSAY OF FOLIC ACID SERUM: CPT | Performed by: INTERNAL MEDICINE

## 2020-01-01 PROCEDURE — 99233 SBSQ HOSP IP/OBS HIGH 50: CPT | Performed by: INTERNAL MEDICINE

## 2020-01-01 PROCEDURE — 85025 COMPLETE CBC W/AUTO DIFF WBC: CPT | Performed by: PSYCHIATRY & NEUROLOGY

## 2020-01-01 PROCEDURE — 36556 INSERT NON-TUNNEL CV CATH: CPT | Performed by: NURSE PRACTITIONER

## 2020-01-01 PROCEDURE — 3075F SYST BP GE 130 - 139MM HG: CPT | Performed by: UROLOGY

## 2020-01-01 PROCEDURE — 97163 PT EVAL HIGH COMPLEX 45 MIN: CPT | Performed by: PHYSICAL THERAPIST

## 2020-01-01 PROCEDURE — 36600 WITHDRAWAL OF ARTERIAL BLOOD: CPT

## 2020-01-01 PROCEDURE — 36415 COLL VENOUS BLD VENIPUNCTURE: CPT | Performed by: EMERGENCY MEDICINE

## 2020-01-01 PROCEDURE — 36620 INSERTION CATHETER ARTERY: CPT | Performed by: NURSE PRACTITIONER

## 2020-01-01 PROCEDURE — 82728 ASSAY OF FERRITIN: CPT | Performed by: NURSE PRACTITIONER

## 2020-01-01 PROCEDURE — 86901 BLOOD TYPING SEROLOGIC RH(D): CPT | Performed by: NURSE PRACTITIONER

## 2020-01-01 PROCEDURE — 94002 VENT MGMT INPAT INIT DAY: CPT

## 2020-01-01 PROCEDURE — 99316 NF DSCHRG MGMT 30 MIN+: CPT | Performed by: NURSE PRACTITIONER

## 2020-01-01 PROCEDURE — 83520 IMMUNOASSAY QUANT NOS NONAB: CPT | Performed by: NURSE PRACTITIONER

## 2020-01-01 PROCEDURE — 02HV33Z INSERTION OF INFUSION DEVICE INTO SUPERIOR VENA CAVA, PERCUTANEOUS APPROACH: ICD-10-PCS | Performed by: INTERNAL MEDICINE

## 2020-01-01 PROCEDURE — 83036 HEMOGLOBIN GLYCOSYLATED A1C: CPT

## 2020-01-01 PROCEDURE — 99214 OFFICE O/P EST MOD 30 MIN: CPT | Performed by: PSYCHIATRY & NEUROLOGY

## 2020-01-01 PROCEDURE — 90662 IIV NO PRSV INCREASED AG IM: CPT

## 2020-01-01 PROCEDURE — 82803 BLOOD GASES ANY COMBINATION: CPT

## 2020-01-01 PROCEDURE — 97110 THERAPEUTIC EXERCISES: CPT

## 2020-01-01 PROCEDURE — 80053 COMPREHEN METABOLIC PANEL: CPT | Performed by: PHYSICIAN ASSISTANT

## 2020-01-01 PROCEDURE — 87205 SMEAR GRAM STAIN: CPT | Performed by: NURSE PRACTITIONER

## 2020-01-01 PROCEDURE — 82947 ASSAY GLUCOSE BLOOD QUANT: CPT

## 2020-01-01 PROCEDURE — 80048 BASIC METABOLIC PNL TOTAL CA: CPT | Performed by: PSYCHIATRY & NEUROLOGY

## 2020-01-01 PROCEDURE — 80076 HEPATIC FUNCTION PANEL: CPT | Performed by: PHYSICIAN ASSISTANT

## 2020-01-01 PROCEDURE — G0438 PPPS, INITIAL VISIT: HCPCS | Performed by: NURSE PRACTITIONER

## 2020-01-01 PROCEDURE — 83036 HEMOGLOBIN GLYCOSYLATED A1C: CPT | Performed by: INTERNAL MEDICINE

## 2020-01-01 PROCEDURE — 83540 ASSAY OF IRON: CPT | Performed by: PSYCHIATRY & NEUROLOGY

## 2020-01-01 PROCEDURE — 99306 1ST NF CARE HIGH MDM 50: CPT | Performed by: STUDENT IN AN ORGANIZED HEALTH CARE EDUCATION/TRAINING PROGRAM

## 2020-01-01 PROCEDURE — 83735 ASSAY OF MAGNESIUM: CPT | Performed by: PHYSICIAN ASSISTANT

## 2020-01-01 PROCEDURE — 85025 COMPLETE CBC W/AUTO DIFF WBC: CPT | Performed by: INTERNAL MEDICINE

## 2020-01-01 PROCEDURE — 80048 BASIC METABOLIC PNL TOTAL CA: CPT

## 2020-01-01 PROCEDURE — 85610 PROTHROMBIN TIME: CPT | Performed by: EMERGENCY MEDICINE

## 2020-01-01 PROCEDURE — 82728 ASSAY OF FERRITIN: CPT | Performed by: PSYCHIATRY & NEUROLOGY

## 2020-01-01 PROCEDURE — 73140 X-RAY EXAM OF FINGER(S): CPT

## 2020-01-01 PROCEDURE — 31500 INSERT EMERGENCY AIRWAY: CPT

## 2020-01-01 PROCEDURE — 86803 HEPATITIS C AB TEST: CPT | Performed by: NURSE PRACTITIONER

## 2020-01-01 PROCEDURE — 97167 OT EVAL HIGH COMPLEX 60 MIN: CPT

## 2020-01-01 PROCEDURE — 97116 GAIT TRAINING THERAPY: CPT

## 2020-01-01 PROCEDURE — 0241U HB NFCT DS VIR RESP RNA 4 TRGT: CPT | Performed by: EMERGENCY MEDICINE

## 2020-01-01 PROCEDURE — C8929 TTE W OR WO FOL WCON,DOPPLER: HCPCS

## 2020-01-01 PROCEDURE — 99285 EMERGENCY DEPT VISIT HI MDM: CPT | Performed by: EMERGENCY MEDICINE

## 2020-01-01 PROCEDURE — 81003 URINALYSIS AUTO W/O SCOPE: CPT | Performed by: EMERGENCY MEDICINE

## 2020-01-01 PROCEDURE — 85730 THROMBOPLASTIN TIME PARTIAL: CPT | Performed by: NURSE PRACTITIONER

## 2020-01-01 PROCEDURE — 80202 ASSAY OF VANCOMYCIN: CPT | Performed by: NURSE PRACTITIONER

## 2020-01-01 PROCEDURE — 84443 ASSAY THYROID STIM HORMONE: CPT | Performed by: EMERGENCY MEDICINE

## 2020-01-01 PROCEDURE — 99284 EMERGENCY DEPT VISIT MOD MDM: CPT | Performed by: EMERGENCY MEDICINE

## 2020-01-01 PROCEDURE — 31500 INSERT EMERGENCY AIRWAY: CPT | Performed by: NURSE PRACTITIONER

## 2020-01-01 PROCEDURE — 83935 ASSAY OF URINE OSMOLALITY: CPT | Performed by: NURSE PRACTITIONER

## 2020-01-01 PROCEDURE — 1036F TOBACCO NON-USER: CPT | Performed by: INTERNAL MEDICINE

## 2020-01-01 PROCEDURE — 94664 DEMO&/EVAL PT USE INHALER: CPT

## 2020-01-01 PROCEDURE — 4A133B1 MONITORING OF ARTERIAL PRESSURE, PERIPHERAL, PERCUTANEOUS APPROACH: ICD-10-PCS | Performed by: INTERNAL MEDICINE

## 2020-01-01 PROCEDURE — 85610 PROTHROMBIN TIME: CPT | Performed by: NURSE PRACTITIONER

## 2020-01-01 PROCEDURE — 83605 ASSAY OF LACTIC ACID: CPT | Performed by: PHYSICIAN ASSISTANT

## 2020-01-01 PROCEDURE — 85027 COMPLETE CBC AUTOMATED: CPT | Performed by: INTERNAL MEDICINE

## 2020-01-01 PROCEDURE — 87635 SARS-COV-2 COVID-19 AMP PRB: CPT | Performed by: PSYCHIATRY & NEUROLOGY

## 2020-01-01 PROCEDURE — 85007 BL SMEAR W/DIFF WBC COUNT: CPT | Performed by: PHYSICIAN ASSISTANT

## 2020-01-01 PROCEDURE — 85730 THROMBOPLASTIN TIME PARTIAL: CPT | Performed by: PHYSICIAN ASSISTANT

## 2020-01-01 RX ORDER — VILAZODONE HYDROCHLORIDE 20 MG/1
40 TABLET ORAL DAILY
Status: DISCONTINUED | OUTPATIENT
Start: 2020-01-01 | End: 2020-01-01 | Stop reason: HOSPADM

## 2020-01-01 RX ORDER — LORAZEPAM 1 MG/1
TABLET ORAL
Qty: 45 TABLET | Refills: 0 | Status: SHIPPED | OUTPATIENT
Start: 2020-01-01 | End: 2020-01-01

## 2020-01-01 RX ORDER — OXYBUTYNIN CHLORIDE 5 MG/1
10 TABLET, EXTENDED RELEASE ORAL DAILY
Status: DISCONTINUED | OUTPATIENT
Start: 2020-01-01 | End: 2020-01-01 | Stop reason: HOSPADM

## 2020-01-01 RX ORDER — DOCUSATE SODIUM 100 MG/1
100 CAPSULE, LIQUID FILLED ORAL 2 TIMES DAILY
COMMUNITY

## 2020-01-01 RX ORDER — LIDOCAINE HYDROCHLORIDE 20 MG/ML
1 JELLY TOPICAL ONCE
Status: COMPLETED | OUTPATIENT
Start: 2020-01-01 | End: 2020-01-01

## 2020-01-01 RX ORDER — LORAZEPAM 1 MG/1
TABLET ORAL
Qty: 45 TABLET | Refills: 0
Start: 2020-01-01 | End: 2020-01-01 | Stop reason: SDUPTHER

## 2020-01-01 RX ORDER — ACETAMINOPHEN 160 MG/5ML
650 SUSPENSION, ORAL (FINAL DOSE FORM) ORAL EVERY 6 HOURS
Status: DISCONTINUED | OUTPATIENT
Start: 2020-01-01 | End: 2020-01-01

## 2020-01-01 RX ORDER — GABAPENTIN 400 MG/1
CAPSULE ORAL
COMMUNITY
Start: 2020-01-01 | End: 2020-01-01 | Stop reason: SDUPTHER

## 2020-01-01 RX ORDER — MULTIVIT WITH MINERALS/LUTEIN
500 TABLET ORAL DAILY
Status: DISCONTINUED | OUTPATIENT
Start: 2020-01-01 | End: 2020-01-01 | Stop reason: HOSPADM

## 2020-01-01 RX ORDER — MIDODRINE HYDROCHLORIDE 5 MG/1
5 TABLET ORAL 3 TIMES DAILY
Status: DISCONTINUED | OUTPATIENT
Start: 2020-01-01 | End: 2020-01-01 | Stop reason: HOSPADM

## 2020-01-01 RX ORDER — CHOLECALCIFEROL (VITAMIN D3) 125 MCG
100 CAPSULE ORAL DAILY
Status: DISCONTINUED | OUTPATIENT
Start: 2020-01-01 | End: 2020-01-01 | Stop reason: HOSPADM

## 2020-01-01 RX ORDER — HEPARIN SODIUM 1000 [USP'U]/ML
2000 INJECTION, SOLUTION INTRAVENOUS; SUBCUTANEOUS
Status: DISCONTINUED | OUTPATIENT
Start: 2020-01-01 | End: 2020-01-01

## 2020-01-01 RX ORDER — CHLORHEXIDINE GLUCONATE 0.12 MG/ML
15 RINSE ORAL EVERY 12 HOURS SCHEDULED
Status: DISCONTINUED | OUTPATIENT
Start: 2020-01-01 | End: 2020-01-01

## 2020-01-01 RX ORDER — MIRABEGRON 50 MG/1
TABLET, FILM COATED, EXTENDED RELEASE ORAL
Qty: 30 TABLET | Refills: 1 | Status: SHIPPED | OUTPATIENT
Start: 2020-01-01 | End: 2020-01-01 | Stop reason: SDUPTHER

## 2020-01-01 RX ORDER — FUROSEMIDE 10 MG/ML
20 INJECTION INTRAMUSCULAR; INTRAVENOUS ONCE
Status: COMPLETED | OUTPATIENT
Start: 2020-01-01 | End: 2020-01-01

## 2020-01-01 RX ORDER — DOCUSATE SODIUM 100 MG/1
100 CAPSULE, LIQUID FILLED ORAL 2 TIMES DAILY
Status: DISCONTINUED | OUTPATIENT
Start: 2020-01-01 | End: 2020-01-01 | Stop reason: SDUPTHER

## 2020-01-01 RX ORDER — FUROSEMIDE 40 MG/1
20 TABLET ORAL DAILY PRN
Qty: 15 TABLET | Refills: 0
Start: 2020-01-01 | End: 2020-01-01 | Stop reason: SDUPTHER

## 2020-01-01 RX ORDER — FERROUS SULFATE TAB EC 324 MG (65 MG FE EQUIVALENT) 324 (65 FE) MG
324 TABLET DELAYED RESPONSE ORAL
Qty: 60 TABLET | Refills: 0 | Status: SHIPPED | OUTPATIENT
Start: 2020-01-01 | End: 2020-01-01

## 2020-01-01 RX ORDER — HEPARIN SODIUM 1000 [USP'U]/ML
4000 INJECTION, SOLUTION INTRAVENOUS; SUBCUTANEOUS
Status: DISCONTINUED | OUTPATIENT
Start: 2020-01-01 | End: 2020-01-01

## 2020-01-01 RX ORDER — LORAZEPAM 1 MG/1
TABLET ORAL
Qty: 60 TABLET | Refills: 0 | OUTPATIENT
Start: 2020-01-01

## 2020-01-01 RX ORDER — POLYETHYLENE GLYCOL 3350 17 G/17G
17 POWDER, FOR SOLUTION ORAL DAILY
Status: DISCONTINUED | OUTPATIENT
Start: 2020-01-01 | End: 2020-01-01 | Stop reason: HOSPADM

## 2020-01-01 RX ORDER — ASCORBIC ACID 500 MG
1000 TABLET ORAL DAILY
Status: DISCONTINUED | OUTPATIENT
Start: 2020-01-01 | End: 2020-01-01

## 2020-01-01 RX ORDER — ASPIRIN 81 MG/1
81 TABLET, CHEWABLE ORAL DAILY
Status: DISCONTINUED | OUTPATIENT
Start: 2020-01-01 | End: 2020-01-01

## 2020-01-01 RX ORDER — CALCIUM GLUCONATE 20 MG/ML
INJECTION, SOLUTION INTRAVENOUS
Status: COMPLETED | OUTPATIENT
Start: 2020-01-01 | End: 2020-01-01

## 2020-01-01 RX ORDER — GABAPENTIN 300 MG/1
900 CAPSULE ORAL 3 TIMES DAILY
Status: DISCONTINUED | OUTPATIENT
Start: 2020-01-01 | End: 2020-01-01 | Stop reason: HOSPADM

## 2020-01-01 RX ORDER — MIDODRINE HYDROCHLORIDE 5 MG/1
5 TABLET ORAL 3 TIMES DAILY
Qty: 90 TABLET | Refills: 1 | OUTPATIENT
Start: 2020-01-01

## 2020-01-01 RX ORDER — ACETAMINOPHEN 325 MG/1
650 TABLET ORAL ONCE
Status: COMPLETED | OUTPATIENT
Start: 2020-01-01 | End: 2020-01-01

## 2020-01-01 RX ORDER — LORAZEPAM 1 MG/1
TABLET ORAL
Qty: 45 TABLET | Refills: 0 | Status: SHIPPED | OUTPATIENT
Start: 2020-01-01 | End: 2020-01-01 | Stop reason: SDUPTHER

## 2020-01-01 RX ORDER — MIDODRINE HYDROCHLORIDE 5 MG/1
TABLET ORAL
Qty: 90 TABLET | Refills: 1 | Status: SHIPPED | OUTPATIENT
Start: 2020-01-01 | End: 2020-01-01

## 2020-01-01 RX ORDER — FERROUS SULFATE TAB EC 324 MG (65 MG FE EQUIVALENT) 324 (65 FE) MG
324 TABLET DELAYED RESPONSE ORAL
Qty: 30 TABLET | Refills: 0
Start: 2020-01-01 | End: 2020-01-01 | Stop reason: SDUPTHER

## 2020-01-01 RX ORDER — ZINC SULFATE 50(220)MG
220 CAPSULE ORAL DAILY
Status: DISCONTINUED | OUTPATIENT
Start: 2020-01-01 | End: 2020-01-01

## 2020-01-01 RX ORDER — POTASSIUM CHLORIDE 20MEQ/15ML
20 LIQUID (ML) ORAL ONCE
Status: COMPLETED | OUTPATIENT
Start: 2020-01-01 | End: 2020-01-01

## 2020-01-01 RX ORDER — MELATONIN
3000 DAILY
Status: DISCONTINUED | OUTPATIENT
Start: 2020-01-01 | End: 2020-01-01

## 2020-01-01 RX ORDER — NITROGLYCERIN 0.4 MG/1
0.4 TABLET SUBLINGUAL
Status: DISCONTINUED | OUTPATIENT
Start: 2020-01-01 | End: 2020-01-01 | Stop reason: HOSPADM

## 2020-01-01 RX ORDER — DOCUSATE SODIUM 100 MG/1
100 CAPSULE, LIQUID FILLED ORAL 2 TIMES DAILY
Status: DISCONTINUED | OUTPATIENT
Start: 2020-01-01 | End: 2020-01-01

## 2020-01-01 RX ORDER — LORAZEPAM 2 MG/ML
2 INJECTION INTRAMUSCULAR ONCE
Status: COMPLETED | OUTPATIENT
Start: 2020-01-01 | End: 2020-01-01

## 2020-01-01 RX ORDER — ATORVASTATIN CALCIUM 40 MG/1
40 TABLET, FILM COATED ORAL
Status: DISCONTINUED | OUTPATIENT
Start: 2020-01-01 | End: 2020-01-01 | Stop reason: HOSPADM

## 2020-01-01 RX ORDER — VILAZODONE HYDROCHLORIDE 40 MG/1
TABLET ORAL
Qty: 30 TABLET | Refills: 5 | Status: SHIPPED | OUTPATIENT
Start: 2020-01-01

## 2020-01-01 RX ORDER — HEPARIN SODIUM 5000 [USP'U]/ML
5000 INJECTION, SOLUTION INTRAVENOUS; SUBCUTANEOUS EVERY 8 HOURS SCHEDULED
Status: DISCONTINUED | OUTPATIENT
Start: 2020-01-01 | End: 2020-01-01

## 2020-01-01 RX ORDER — LORAZEPAM 1 MG/1
1 TABLET ORAL
Status: DISCONTINUED | OUTPATIENT
Start: 2020-01-01 | End: 2020-01-01 | Stop reason: HOSPADM

## 2020-01-01 RX ORDER — PROPOFOL 10 MG/ML
INJECTION, EMULSION INTRAVENOUS
Status: DISPENSED
Start: 2020-01-01 | End: 2020-01-01

## 2020-01-01 RX ORDER — SENNOSIDES 8.6 MG
1 TABLET ORAL 2 TIMES DAILY
Status: DISCONTINUED | OUTPATIENT
Start: 2020-01-01 | End: 2020-12-04 | Stop reason: HOSPADM

## 2020-01-01 RX ORDER — METHYLPREDNISOLONE SODIUM SUCCINATE 125 MG/2ML
100 INJECTION, POWDER, LYOPHILIZED, FOR SOLUTION INTRAMUSCULAR; INTRAVENOUS DAILY
Status: DISCONTINUED | OUTPATIENT
Start: 2020-01-01 | End: 2020-01-01

## 2020-01-01 RX ORDER — DOCUSATE SODIUM 100 MG/1
100 CAPSULE, LIQUID FILLED ORAL 2 TIMES DAILY
Status: DISCONTINUED | OUTPATIENT
Start: 2020-01-01 | End: 2020-01-01 | Stop reason: HOSPADM

## 2020-01-01 RX ORDER — LEVALBUTEROL 1.25 MG/.5ML
1.25 SOLUTION, CONCENTRATE RESPIRATORY (INHALATION)
Status: DISCONTINUED | OUTPATIENT
Start: 2020-01-01 | End: 2020-01-01

## 2020-01-01 RX ORDER — GABAPENTIN 100 MG/1
CAPSULE ORAL
Qty: 360 CAPSULE | Refills: 0 | Status: SHIPPED | OUTPATIENT
Start: 2020-01-01 | End: 2020-01-01

## 2020-01-01 RX ORDER — ACETAMINOPHEN 325 MG/1
650 TABLET ORAL EVERY 6 HOURS PRN
Status: DISCONTINUED | OUTPATIENT
Start: 2020-01-01 | End: 2020-01-01

## 2020-01-01 RX ORDER — B-COMPLEX WITH VITAMIN C
1 TABLET ORAL
Status: DISCONTINUED | OUTPATIENT
Start: 2020-01-01 | End: 2020-01-01

## 2020-01-01 RX ORDER — RANOLAZINE 1000 MG/1
1000 TABLET, EXTENDED RELEASE ORAL 2 TIMES DAILY
Qty: 180 TABLET | Refills: 2 | Status: SHIPPED | OUTPATIENT
Start: 2020-01-01

## 2020-01-01 RX ORDER — SODIUM CHLORIDE 30 MG/ML INHALATION SOLUTION 30 MG/ML
4 SOLUTION INHALANT
Status: DISCONTINUED | OUTPATIENT
Start: 2020-01-01 | End: 2020-01-01

## 2020-01-01 RX ORDER — OXYBUTYNIN CHLORIDE 5 MG/1
5 TABLET, EXTENDED RELEASE ORAL DAILY
Status: DISCONTINUED | OUTPATIENT
Start: 2020-01-01 | End: 2020-01-01 | Stop reason: HOSPADM

## 2020-01-01 RX ORDER — LORAZEPAM 1 MG/1
1 TABLET ORAL
Qty: 6 TABLET | Refills: 0 | Status: SHIPPED | OUTPATIENT
Start: 2020-01-01 | End: 2020-01-01

## 2020-01-01 RX ORDER — EPINEPHRINE 0.1 MG/ML
SYRINGE (ML) INJECTION CODE/TRAUMA/SEDATION MEDICATION
Status: COMPLETED | OUTPATIENT
Start: 2020-01-01 | End: 2020-01-01

## 2020-01-01 RX ORDER — ACETAMINOPHEN 325 MG/1
650 TABLET ORAL EVERY 6 HOURS PRN
COMMUNITY

## 2020-01-01 RX ORDER — ACETAMINOPHEN 160 MG/5ML
1 SUSPENSION, ORAL (FINAL DOSE FORM) ORAL ONCE
Status: COMPLETED | OUTPATIENT
Start: 2020-01-01 | End: 2020-01-01

## 2020-01-01 RX ORDER — SENNOSIDES 8.6 MG
1 TABLET ORAL DAILY
Status: DISCONTINUED | OUTPATIENT
Start: 2020-01-01 | End: 2020-01-01 | Stop reason: HOSPADM

## 2020-01-01 RX ORDER — MAGNESIUM HYDROXIDE/ALUMINUM HYDROXICE/SIMETHICONE 120; 1200; 1200 MG/30ML; MG/30ML; MG/30ML
30 SUSPENSION ORAL EVERY 6 HOURS PRN
Status: DISCONTINUED | OUTPATIENT
Start: 2020-01-01 | End: 2020-01-01

## 2020-01-01 RX ORDER — OXYBUTYNIN CHLORIDE 5 MG/1
10 TABLET, EXTENDED RELEASE ORAL DAILY
Status: DISCONTINUED | OUTPATIENT
Start: 2020-01-01 | End: 2020-01-01

## 2020-01-01 RX ORDER — ACETAMINOPHEN 325 MG/1
650 TABLET ORAL EVERY 6 HOURS PRN
Status: DISCONTINUED | OUTPATIENT
Start: 2020-01-01 | End: 2020-01-01 | Stop reason: HOSPADM

## 2020-01-01 RX ORDER — MIDODRINE HYDROCHLORIDE 2.5 MG/1
10 TABLET ORAL 3 TIMES DAILY
Status: DISCONTINUED | OUTPATIENT
Start: 2020-01-01 | End: 2020-01-01

## 2020-01-01 RX ORDER — PROPOFOL 10 MG/ML
5-50 INJECTION, EMULSION INTRAVENOUS
Status: DISCONTINUED | OUTPATIENT
Start: 2020-01-01 | End: 2020-01-01

## 2020-01-01 RX ORDER — ASCORBIC ACID 500 MG
500 TABLET ORAL DAILY
Status: DISCONTINUED | OUTPATIENT
Start: 2020-01-01 | End: 2020-01-01 | Stop reason: HOSPADM

## 2020-01-01 RX ORDER — FUROSEMIDE 40 MG/1
40 TABLET ORAL DAILY
Qty: 30 TABLET | Refills: 0 | Status: SHIPPED | OUTPATIENT
Start: 2020-01-01 | End: 2020-01-01

## 2020-01-01 RX ORDER — ATORVASTATIN CALCIUM 40 MG/1
80 TABLET, FILM COATED ORAL
Status: DISCONTINUED | OUTPATIENT
Start: 2020-01-01 | End: 2020-01-01

## 2020-01-01 RX ORDER — RANOLAZINE 500 MG/1
1000 TABLET, EXTENDED RELEASE ORAL 2 TIMES DAILY
Status: DISCONTINUED | OUTPATIENT
Start: 2020-01-01 | End: 2020-01-01

## 2020-01-01 RX ORDER — ACETAMINOPHEN 160 MG/5ML
650 SUSPENSION, ORAL (FINAL DOSE FORM) ORAL EVERY 6 HOURS PRN
Status: DISCONTINUED | OUTPATIENT
Start: 2020-01-01 | End: 2020-01-01

## 2020-01-01 RX ORDER — LORAZEPAM 0.5 MG/1
0.5 TABLET ORAL 2 TIMES DAILY
Status: DISCONTINUED | OUTPATIENT
Start: 2020-01-01 | End: 2020-01-01 | Stop reason: HOSPADM

## 2020-01-01 RX ORDER — CALCIUM GLUCONATE 20 MG/ML
1 INJECTION, SOLUTION INTRAVENOUS ONCE
Status: COMPLETED | OUTPATIENT
Start: 2020-01-01 | End: 2020-01-01

## 2020-01-01 RX ORDER — CALCIUM CARBONATE 500(1250)
1 TABLET ORAL
Status: DISCONTINUED | OUTPATIENT
Start: 2020-01-01 | End: 2020-01-01 | Stop reason: HOSPADM

## 2020-01-01 RX ORDER — LORAZEPAM 2 MG/ML
INJECTION INTRAMUSCULAR
Status: DISPENSED
Start: 2020-01-01 | End: 2020-01-01

## 2020-01-01 RX ORDER — ACETAMINOPHEN 160 MG/5ML
650 SUSPENSION, ORAL (FINAL DOSE FORM) ORAL EVERY 4 HOURS PRN
Status: DISCONTINUED | OUTPATIENT
Start: 2020-01-01 | End: 2020-01-01

## 2020-01-01 RX ORDER — ASPIRIN 81 MG/1
81 TABLET ORAL DAILY
Status: DISCONTINUED | OUTPATIENT
Start: 2020-01-01 | End: 2020-01-01 | Stop reason: HOSPADM

## 2020-01-01 RX ORDER — LORAZEPAM 1 MG/1
TABLET ORAL
Qty: 45 TABLET | Refills: 0 | Status: ON HOLD | OUTPATIENT
Start: 2020-01-01 | End: 2020-01-01 | Stop reason: SDUPTHER

## 2020-01-01 RX ORDER — FERROUS SULFATE 325(65) MG
325 TABLET ORAL DAILY
Status: DISCONTINUED | OUTPATIENT
Start: 2020-01-01 | End: 2020-01-01 | Stop reason: HOSPADM

## 2020-01-01 RX ORDER — GABAPENTIN 100 MG/1
CAPSULE ORAL
Qty: 360 CAPSULE | Refills: 0 | OUTPATIENT
Start: 2020-01-01

## 2020-01-01 RX ORDER — ALBUTEROL SULFATE 90 UG/1
2 AEROSOL, METERED RESPIRATORY (INHALATION) EVERY 6 HOURS PRN
Status: DISCONTINUED | OUTPATIENT
Start: 2020-01-01 | End: 2020-01-01 | Stop reason: HOSPADM

## 2020-01-01 RX ORDER — OXYMETAZOLINE HYDROCHLORIDE 0.05 G/100ML
3 SPRAY NASAL EVERY 12 HOURS SCHEDULED
Status: DISCONTINUED | OUTPATIENT
Start: 2020-01-01 | End: 2020-01-01

## 2020-01-01 RX ORDER — FENTANYL CITRATE 50 UG/ML
50 INJECTION, SOLUTION INTRAMUSCULAR; INTRAVENOUS
Status: DISCONTINUED | OUTPATIENT
Start: 2020-01-01 | End: 2020-01-01

## 2020-01-01 RX ORDER — LORAZEPAM 1 MG/1
TABLET ORAL
Qty: 5 TABLET | Refills: 0 | Status: SHIPPED | OUTPATIENT
Start: 2020-01-01 | End: 2020-01-01

## 2020-01-01 RX ORDER — DOCUSATE SODIUM 100 MG/1
100 CAPSULE, LIQUID FILLED ORAL 2 TIMES DAILY PRN
Status: DISCONTINUED | OUTPATIENT
Start: 2020-01-01 | End: 2020-01-01

## 2020-01-01 RX ORDER — GABAPENTIN 400 MG/1
800 CAPSULE ORAL 3 TIMES DAILY
Qty: 540 CAPSULE | Refills: 3 | Status: SHIPPED | OUTPATIENT
Start: 2020-01-01 | End: 2020-01-01 | Stop reason: ALTCHOICE

## 2020-01-01 RX ORDER — GABAPENTIN 300 MG/1
900 CAPSULE ORAL 3 TIMES DAILY
Status: DISCONTINUED | OUTPATIENT
Start: 2020-01-01 | End: 2020-01-01

## 2020-01-01 RX ORDER — ACETAMINOPHEN 325 MG/1
975 TABLET ORAL EVERY 6 HOURS
Status: DISCONTINUED | OUTPATIENT
Start: 2020-01-01 | End: 2020-01-01

## 2020-01-01 RX ORDER — ACETAMINOPHEN 650 MG/1
975 SUPPOSITORY RECTAL EVERY 6 HOURS
Status: DISCONTINUED | OUTPATIENT
Start: 2020-01-01 | End: 2020-01-01

## 2020-01-01 RX ORDER — ONDANSETRON 2 MG/ML
4 INJECTION INTRAMUSCULAR; INTRAVENOUS EVERY 6 HOURS PRN
Status: DISCONTINUED | OUTPATIENT
Start: 2020-01-01 | End: 2020-01-01

## 2020-01-01 RX ORDER — GABAPENTIN 300 MG/1
900 CAPSULE ORAL 3 TIMES DAILY
Qty: 270 CAPSULE | Refills: 3 | Status: SHIPPED | OUTPATIENT
Start: 2020-01-01 | End: 2020-01-01

## 2020-01-01 RX ORDER — VILAZODONE HYDROCHLORIDE 20 MG/1
40 TABLET ORAL DAILY
Status: DISCONTINUED | OUTPATIENT
Start: 2020-01-01 | End: 2020-01-01

## 2020-01-01 RX ORDER — LABETALOL 20 MG/4 ML (5 MG/ML) INTRAVENOUS SYRINGE
10 EVERY 6 HOURS PRN
Status: DISCONTINUED | OUTPATIENT
Start: 2020-01-01 | End: 2020-01-01

## 2020-01-01 RX ORDER — MAGNESIUM HYDROXIDE/ALUMINUM HYDROXICE/SIMETHICONE 120; 1200; 1200 MG/30ML; MG/30ML; MG/30ML
30 SUSPENSION ORAL EVERY 6 HOURS PRN
Status: DISCONTINUED | OUTPATIENT
Start: 2020-01-01 | End: 2020-01-01 | Stop reason: HOSPADM

## 2020-01-01 RX ORDER — GABAPENTIN 300 MG/1
CAPSULE ORAL
Qty: 270 CAPSULE | Refills: 3 | Status: SHIPPED | OUTPATIENT
Start: 2020-01-01

## 2020-01-01 RX ORDER — HEPARIN SODIUM 10000 [USP'U]/100ML
3-20 INJECTION, SOLUTION INTRAVENOUS
Status: DISCONTINUED | OUTPATIENT
Start: 2020-01-01 | End: 2020-01-01

## 2020-01-01 RX ORDER — LORAZEPAM 1 MG/1
1 TABLET ORAL 2 TIMES DAILY PRN
Qty: 60 TABLET | Refills: 0 | Status: SHIPPED | OUTPATIENT
Start: 2020-01-01 | End: 2020-01-01 | Stop reason: SDUPTHER

## 2020-01-01 RX ORDER — ASPIRIN 81 MG/1
81 TABLET ORAL DAILY
Status: DISCONTINUED | OUTPATIENT
Start: 2020-01-01 | End: 2020-01-01

## 2020-01-01 RX ORDER — SODIUM CHLORIDE, SODIUM GLUCONATE, SODIUM ACETATE, POTASSIUM CHLORIDE, MAGNESIUM CHLORIDE, SODIUM PHOSPHATE, DIBASIC, AND POTASSIUM PHOSPHATE .53; .5; .37; .037; .03; .012; .00082 G/100ML; G/100ML; G/100ML; G/100ML; G/100ML; G/100ML; G/100ML
50 INJECTION, SOLUTION INTRAVENOUS CONTINUOUS
Status: DISCONTINUED | OUTPATIENT
Start: 2020-01-01 | End: 2020-01-01

## 2020-01-01 RX ORDER — FUROSEMIDE 40 MG/1
20 TABLET ORAL DAILY PRN
Qty: 15 TABLET | Refills: 1 | Status: SHIPPED | OUTPATIENT
Start: 2020-01-01 | End: 2020-01-01

## 2020-01-01 RX ORDER — ONDANSETRON 2 MG/ML
4 INJECTION INTRAMUSCULAR; INTRAVENOUS EVERY 6 HOURS PRN
Status: DISCONTINUED | OUTPATIENT
Start: 2020-01-01 | End: 2020-01-01 | Stop reason: HOSPADM

## 2020-01-01 RX ORDER — VILAZODONE HYDROCHLORIDE 40 MG/1
TABLET ORAL
Qty: 30 TABLET | Refills: 5 | Status: SHIPPED | OUTPATIENT
Start: 2020-01-01 | End: 2020-01-01

## 2020-01-01 RX ORDER — BUSPIRONE HYDROCHLORIDE 10 MG/1
30 TABLET ORAL EVERY 8 HOURS
Status: DISCONTINUED | OUTPATIENT
Start: 2020-01-01 | End: 2020-01-01

## 2020-01-01 RX ORDER — FENTANYL CITRATE 50 UG/ML
50 INJECTION, SOLUTION INTRAMUSCULAR; INTRAVENOUS
Status: DISCONTINUED | OUTPATIENT
Start: 2020-01-01 | End: 2020-12-04 | Stop reason: HOSPADM

## 2020-01-01 RX ORDER — MIDODRINE HYDROCHLORIDE 2.5 MG/1
2.5 TABLET ORAL 3 TIMES DAILY
Qty: 15 TABLET | Refills: 0 | Status: SHIPPED | OUTPATIENT
Start: 2020-01-01 | End: 2020-01-01 | Stop reason: ALTCHOICE

## 2020-01-01 RX ORDER — ACETAMINOPHEN 325 MG/1
975 TABLET ORAL EVERY 8 HOURS
COMMUNITY
Start: 2020-01-01 | End: 2020-01-01

## 2020-01-01 RX ORDER — ATORVASTATIN CALCIUM 40 MG/1
80 TABLET, FILM COATED ORAL
Status: DISCONTINUED | OUTPATIENT
Start: 2020-01-01 | End: 2020-01-01 | Stop reason: HOSPADM

## 2020-01-01 RX ORDER — CYANOCOBALAMIN (VITAMIN B-12) 1000 MCG
1 TABLET, EXTENDED RELEASE ORAL DAILY
COMMUNITY

## 2020-01-01 RX ORDER — MIDODRINE HYDROCHLORIDE 5 MG/1
5 TABLET ORAL 3 TIMES DAILY
Qty: 90 TABLET | Refills: 1 | Status: SHIPPED | OUTPATIENT
Start: 2020-01-01 | End: 2020-01-01

## 2020-01-01 RX ORDER — LORAZEPAM 1 MG/1
1 TABLET ORAL
Status: ON HOLD
Start: 2020-01-01 | End: 2020-01-01 | Stop reason: SDUPTHER

## 2020-01-01 RX ORDER — GABAPENTIN 100 MG/1
CAPSULE ORAL
Qty: 100 CAPSULE | Refills: 0 | Status: SHIPPED | OUTPATIENT
Start: 2020-01-01 | End: 2020-01-01 | Stop reason: HOSPADM

## 2020-01-01 RX ORDER — LORAZEPAM 1 MG/1
1 TABLET ORAL 2 TIMES DAILY PRN
Qty: 60 TABLET | Refills: 0 | Status: CANCELLED | OUTPATIENT
Start: 2020-01-01 | End: 2020-01-01

## 2020-01-01 RX ORDER — GABAPENTIN 100 MG/1
100 CAPSULE ORAL DAILY
Status: DISCONTINUED | OUTPATIENT
Start: 2020-01-01 | End: 2020-01-01

## 2020-01-01 RX ORDER — FERROUS SULFATE TAB EC 324 MG (65 MG FE EQUIVALENT) 324 (65 FE) MG
324 TABLET DELAYED RESPONSE ORAL
Qty: 30 TABLET | Refills: 1 | Status: SHIPPED | OUTPATIENT
Start: 2020-01-01 | End: 2020-12-28

## 2020-01-01 RX ORDER — GABAPENTIN 100 MG/1
100 CAPSULE ORAL 3 TIMES DAILY
Qty: 100 CAPSULE | Refills: 0 | Status: SHIPPED | OUTPATIENT
Start: 2020-01-01 | End: 2020-01-01

## 2020-01-01 RX ORDER — SODIUM BICARBONATE 84 MG/ML
INJECTION, SOLUTION INTRAVENOUS CODE/TRAUMA/SEDATION MEDICATION
Status: COMPLETED | OUTPATIENT
Start: 2020-01-01 | End: 2020-01-01

## 2020-01-01 RX ORDER — FUROSEMIDE 40 MG/1
20 TABLET ORAL DAILY
Qty: 15 TABLET | Refills: 0
Start: 2020-01-01 | End: 2020-01-01

## 2020-01-01 RX ORDER — MIDODRINE HYDROCHLORIDE 5 MG/1
TABLET ORAL
Qty: 90 TABLET | Refills: 1 | Status: SHIPPED | OUTPATIENT
Start: 2020-01-01

## 2020-01-01 RX ORDER — FERROUS SULFATE 325(65) MG
325 TABLET ORAL
Status: DISCONTINUED | OUTPATIENT
Start: 2020-01-01 | End: 2020-01-01

## 2020-01-01 RX ORDER — FUROSEMIDE 40 MG/1
40 TABLET ORAL DAILY
Status: DISCONTINUED | OUTPATIENT
Start: 2020-01-01 | End: 2020-01-01 | Stop reason: HOSPADM

## 2020-01-01 RX ORDER — GABAPENTIN 400 MG/1
800 CAPSULE ORAL 3 TIMES DAILY
Qty: 540 CAPSULE | Refills: 3 | Status: SHIPPED | OUTPATIENT
Start: 2020-01-01 | End: 2020-01-01

## 2020-01-01 RX ORDER — FUROSEMIDE 10 MG/ML
20 INJECTION INTRAMUSCULAR; INTRAVENOUS ONCE
Status: DISCONTINUED | OUTPATIENT
Start: 2020-01-01 | End: 2020-01-01

## 2020-01-01 RX ORDER — ROSUVASTATIN CALCIUM 40 MG/1
TABLET, COATED ORAL
Qty: 90 TABLET | Refills: 3 | Status: SHIPPED | OUTPATIENT
Start: 2020-01-01

## 2020-01-01 RX ORDER — RANOLAZINE 500 MG/1
1000 TABLET, EXTENDED RELEASE ORAL 2 TIMES DAILY
Status: DISCONTINUED | OUTPATIENT
Start: 2020-01-01 | End: 2020-01-01 | Stop reason: HOSPADM

## 2020-01-01 RX ORDER — MIRABEGRON 50 MG/1
1 TABLET, FILM COATED, EXTENDED RELEASE ORAL DAILY
Qty: 90 TABLET | Refills: 3 | Status: SHIPPED | OUTPATIENT
Start: 2020-01-01

## 2020-01-01 RX ORDER — METOPROLOL TARTRATE 5 MG/5ML
5 INJECTION INTRAVENOUS ONCE
Status: COMPLETED | OUTPATIENT
Start: 2020-01-01 | End: 2020-01-01

## 2020-01-01 RX ORDER — ASPIRIN 81 MG/1
81 TABLET, CHEWABLE ORAL DAILY
Status: DISCONTINUED | OUTPATIENT
Start: 2020-01-01 | End: 2020-01-01 | Stop reason: HOSPADM

## 2020-01-01 RX ADMIN — VILAZODONE HYDROCHLORIDE 40 MG: 20 TABLET ORAL at 08:45

## 2020-01-01 RX ADMIN — CYANOCOBALAMIN TAB 500 MCG 500 MCG: 500 TAB at 08:00

## 2020-01-01 RX ADMIN — ASPIRIN 81 MG CHEWABLE TABLET 81 MG: 81 TABLET CHEWABLE at 08:15

## 2020-01-01 RX ADMIN — METOPROLOL TARTRATE 25 MG: 25 TABLET, FILM COATED ORAL at 08:29

## 2020-01-01 RX ADMIN — MIDODRINE HYDROCHLORIDE 5 MG: 5 TABLET ORAL at 09:02

## 2020-01-01 RX ADMIN — ENOXAPARIN SODIUM 100 MG: 100 INJECTION SUBCUTANEOUS at 18:35

## 2020-01-01 RX ADMIN — FERROUS SULFATE TAB 325 MG (65 MG ELEMENTAL FE) 325 MG: 325 (65 FE) TAB at 09:10

## 2020-01-01 RX ADMIN — DESMOPRESSIN ACETATE 80 MG: 0.2 TABLET ORAL at 16:04

## 2020-01-01 RX ADMIN — CALCIUM 1 TABLET: 500 TABLET ORAL at 09:12

## 2020-01-01 RX ADMIN — Medication 1000 MG: at 15:20

## 2020-01-01 RX ADMIN — LORAZEPAM 0.5 MG: 0.5 TABLET ORAL at 17:24

## 2020-01-01 RX ADMIN — RANOLAZINE 1000 MG: 500 TABLET, FILM COATED, EXTENDED RELEASE ORAL at 22:42

## 2020-01-01 RX ADMIN — GABAPENTIN 900 MG: 300 CAPSULE ORAL at 17:06

## 2020-01-01 RX ADMIN — LEVALBUTEROL HYDROCHLORIDE 1.25 MG: 1.25 SOLUTION, CONCENTRATE RESPIRATORY (INHALATION) at 19:27

## 2020-01-01 RX ADMIN — BUSPIRONE HYDROCHLORIDE 30 MG: 10 TABLET ORAL at 21:24

## 2020-01-01 RX ADMIN — PROPOFOL 50 MCG/KG/MIN: 10 INJECTION, EMULSION INTRAVENOUS at 20:08

## 2020-01-01 RX ADMIN — STANDARDIZED SENNA CONCENTRATE 8.6 MG: 8.6 TABLET ORAL at 09:30

## 2020-01-01 RX ADMIN — MIDODRINE HYDROCHLORIDE 5 MG: 5 TABLET ORAL at 21:58

## 2020-01-01 RX ADMIN — INSULIN LISPRO 4 UNITS: 100 INJECTION, SOLUTION INTRAVENOUS; SUBCUTANEOUS at 00:31

## 2020-01-01 RX ADMIN — ACETAMINOPHEN 650 MG: 650 SUSPENSION ORAL at 16:05

## 2020-01-01 RX ADMIN — SENNOSIDES 8.6 MG: 8.6 TABLET, FILM COATED ORAL at 08:00

## 2020-01-01 RX ADMIN — FERROUS SULFATE TAB 325 MG (65 MG ELEMENTAL FE) 325 MG: 325 (65 FE) TAB at 08:04

## 2020-01-01 RX ADMIN — DOCUSATE SODIUM 100 MG: 100 CAPSULE ORAL at 22:42

## 2020-01-01 RX ADMIN — MIDODRINE HYDROCHLORIDE 5 MG: 5 TABLET ORAL at 08:43

## 2020-01-01 RX ADMIN — HEPARIN SODIUM 11.1 UNITS/KG/HR: 10000 INJECTION, SOLUTION INTRAVENOUS at 11:13

## 2020-01-01 RX ADMIN — INSULIN LISPRO 12 UNITS: 100 INJECTION, SOLUTION INTRAVENOUS; SUBCUTANEOUS at 12:31

## 2020-01-01 RX ADMIN — Medication 3000 UNITS: at 09:58

## 2020-01-01 RX ADMIN — DOCUSATE SODIUM 100 MG: 100 CAPSULE, LIQUID FILLED ORAL at 09:25

## 2020-01-01 RX ADMIN — CALCIUM 1 TABLET: 500 TABLET ORAL at 09:02

## 2020-01-01 RX ADMIN — Medication 500 MG: at 09:27

## 2020-01-01 RX ADMIN — GABAPENTIN 900 MG: 300 CAPSULE ORAL at 21:11

## 2020-01-01 RX ADMIN — ASPIRIN 81 MG CHEWABLE TABLET 81 MG: 81 TABLET CHEWABLE at 09:30

## 2020-01-01 RX ADMIN — DOCUSATE SODIUM 100 MG: 100 CAPSULE, LIQUID FILLED ORAL at 23:33

## 2020-01-01 RX ADMIN — PROPOFOL 40 MCG/KG/MIN: 10 INJECTION, EMULSION INTRAVENOUS at 17:51

## 2020-01-01 RX ADMIN — GABAPENTIN 900 MG: 300 CAPSULE ORAL at 17:13

## 2020-01-01 RX ADMIN — RANOLAZINE 1000 MG: 500 TABLET, FILM COATED, EXTENDED RELEASE ORAL at 17:07

## 2020-01-01 RX ADMIN — RANOLAZINE 1000 MG: 500 TABLET, FILM COATED, EXTENDED RELEASE ORAL at 18:35

## 2020-01-01 RX ADMIN — DOCUSATE SODIUM 100 MG: 100 CAPSULE, LIQUID FILLED ORAL at 08:08

## 2020-01-01 RX ADMIN — FUROSEMIDE 20 MG: 10 INJECTION, SOLUTION INTRAMUSCULAR; INTRAVENOUS at 17:12

## 2020-01-01 RX ADMIN — Medication 12.5 MG: at 07:38

## 2020-01-01 RX ADMIN — ENOXAPARIN SODIUM 100 MG: 100 INJECTION SUBCUTANEOUS at 09:55

## 2020-01-01 RX ADMIN — Medication 12.5 MG: at 12:51

## 2020-01-01 RX ADMIN — Medication 125 MG: at 20:06

## 2020-01-01 RX ADMIN — GABAPENTIN 900 MG: 300 CAPSULE ORAL at 08:08

## 2020-01-01 RX ADMIN — OXYCODONE HYDROCHLORIDE AND ACETAMINOPHEN 1000 MG: 500 TABLET ORAL at 08:00

## 2020-01-01 RX ADMIN — RANOLAZINE 1000 MG: 500 TABLET, FILM COATED, EXTENDED RELEASE ORAL at 09:25

## 2020-01-01 RX ADMIN — VANCOMYCIN HYDROCHLORIDE 750 MG: 750 INJECTION, SOLUTION INTRAVENOUS at 14:32

## 2020-01-01 RX ADMIN — ASPIRIN 81 MG CHEWABLE TABLET 81 MG: 81 TABLET CHEWABLE at 08:00

## 2020-01-01 RX ADMIN — PROPOFOL 50 MCG/KG/MIN: 10 INJECTION, EMULSION INTRAVENOUS at 06:04

## 2020-01-01 RX ADMIN — LEVALBUTEROL HYDROCHLORIDE 1.25 MG: 1.25 SOLUTION, CONCENTRATE RESPIRATORY (INHALATION) at 13:14

## 2020-01-01 RX ADMIN — Medication 125 MG: at 20:09

## 2020-01-01 RX ADMIN — Medication 1 TABLET: at 09:30

## 2020-01-01 RX ADMIN — ZINC SULFATE 220 MG (50 MG) CAPSULE 220 MG: CAPSULE at 08:03

## 2020-01-01 RX ADMIN — OXYBUTYNIN 10 MG: 5 TABLET, FILM COATED, EXTENDED RELEASE ORAL at 08:08

## 2020-01-01 RX ADMIN — Medication 125 MG: at 07:40

## 2020-01-01 RX ADMIN — DOCUSATE SODIUM 100 MG: 100 CAPSULE, LIQUID FILLED ORAL at 07:38

## 2020-01-01 RX ADMIN — MIDODRINE HYDROCHLORIDE 5 MG: 5 TABLET ORAL at 00:08

## 2020-01-01 RX ADMIN — Medication 1 TABLET: at 09:54

## 2020-01-01 RX ADMIN — INSULIN LISPRO 1 UNITS: 100 INJECTION, SOLUTION INTRAVENOUS; SUBCUTANEOUS at 16:46

## 2020-01-01 RX ADMIN — METOPROLOL TARTRATE 25 MG: 25 TABLET, FILM COATED ORAL at 20:07

## 2020-01-01 RX ADMIN — OXYBUTYNIN CHLORIDE 5 MG: 5 TABLET, EXTENDED RELEASE ORAL at 09:10

## 2020-01-01 RX ADMIN — RANOLAZINE 1000 MG: 500 TABLET, FILM COATED, EXTENDED RELEASE ORAL at 09:12

## 2020-01-01 RX ADMIN — METOPROLOL TARTRATE 25 MG: 25 TABLET, FILM COATED ORAL at 12:13

## 2020-01-01 RX ADMIN — LEVALBUTEROL HYDROCHLORIDE 1.25 MG: 1.25 SOLUTION, CONCENTRATE RESPIRATORY (INHALATION) at 19:44

## 2020-01-01 RX ADMIN — FERROUS SULFATE TAB 325 MG (65 MG ELEMENTAL FE) 325 MG: 325 (65 FE) TAB at 09:30

## 2020-01-01 RX ADMIN — LORAZEPAM 1 MG: 1 TABLET ORAL at 21:50

## 2020-01-01 RX ADMIN — SENNOSIDES 8.6 MG: 8.6 TABLET, FILM COATED ORAL at 17:55

## 2020-01-01 RX ADMIN — FUROSEMIDE 20 MG: 10 INJECTION, SOLUTION INTRAMUSCULAR; INTRAVENOUS at 14:48

## 2020-01-01 RX ADMIN — ZINC SULFATE 220 MG (50 MG) CAPSULE 220 MG: CAPSULE at 07:40

## 2020-01-01 RX ADMIN — Medication 3000 UNITS: at 08:14

## 2020-01-01 RX ADMIN — SODIUM CHLORIDE SOLN NEBU 3% 4 ML: 3 NEBU SOLN at 20:47

## 2020-01-01 RX ADMIN — CEFEPIME HYDROCHLORIDE 2000 MG: 2 INJECTION, POWDER, FOR SOLUTION INTRAVENOUS at 01:59

## 2020-01-01 RX ADMIN — DOCUSATE SODIUM 100 MG: 100 CAPSULE, LIQUID FILLED ORAL at 17:13

## 2020-01-01 RX ADMIN — SODIUM CHLORIDE SOLN NEBU 3% 4 ML: 3 NEBU SOLN at 13:05

## 2020-01-01 RX ADMIN — DOCUSATE SODIUM 100 MG: 100 CAPSULE ORAL at 17:05

## 2020-01-01 RX ADMIN — PROPOFOL 40 MCG/KG/MIN: 10 INJECTION, EMULSION INTRAVENOUS at 09:59

## 2020-01-01 RX ADMIN — SODIUM CHLORIDE 4 UNITS/HR: 9 INJECTION, SOLUTION INTRAVENOUS at 19:57

## 2020-01-01 RX ADMIN — RANOLAZINE 1000 MG: 500 TABLET, FILM COATED, EXTENDED RELEASE ORAL at 08:43

## 2020-01-01 RX ADMIN — LEVALBUTEROL HYDROCHLORIDE 1.25 MG: 1.25 SOLUTION, CONCENTRATE RESPIRATORY (INHALATION) at 13:34

## 2020-01-01 RX ADMIN — MIDODRINE HYDROCHLORIDE 10 MG: 5 TABLET ORAL at 20:09

## 2020-01-01 RX ADMIN — ASPIRIN 81 MG: 81 TABLET, COATED ORAL at 08:08

## 2020-01-01 RX ADMIN — MIDODRINE HYDROCHLORIDE 5 MG: 5 TABLET ORAL at 09:24

## 2020-01-01 RX ADMIN — RANOLAZINE 1000 MG: 500 TABLET, FILM COATED, EXTENDED RELEASE ORAL at 09:31

## 2020-01-01 RX ADMIN — SODIUM CHLORIDE 500 ML: 0.9 INJECTION, SOLUTION INTRAVENOUS at 01:14

## 2020-01-01 RX ADMIN — METOPROLOL TARTRATE 5 MG: 1 INJECTION, SOLUTION INTRAVENOUS at 07:53

## 2020-01-01 RX ADMIN — PROPOFOL 50 MCG/KG/MIN: 10 INJECTION, EMULSION INTRAVENOUS at 03:49

## 2020-01-01 RX ADMIN — OXYBUTYNIN CHLORIDE 5 MG: 5 TABLET, EXTENDED RELEASE ORAL at 09:00

## 2020-01-01 RX ADMIN — RANOLAZINE 1000 MG: 500 TABLET, FILM COATED, EXTENDED RELEASE ORAL at 00:09

## 2020-01-01 RX ADMIN — EPINEPHRINE 1 MG: 0.1 INJECTION, SOLUTION ENDOTRACHEAL; INTRACARDIAC; INTRAVENOUS at 08:27

## 2020-01-01 RX ADMIN — ACETAMINOPHEN 650 MG: 650 SUSPENSION ORAL at 16:18

## 2020-01-01 RX ADMIN — DOCUSATE SODIUM 100 MG: 100 CAPSULE, LIQUID FILLED ORAL at 09:54

## 2020-01-01 RX ADMIN — ACETAMINOPHEN 650 MG: 325 TABLET, FILM COATED ORAL at 21:11

## 2020-01-01 RX ADMIN — SODIUM CHLORIDE SOLN NEBU 3% 4 ML: 3 NEBU SOLN at 08:23

## 2020-01-01 RX ADMIN — VILAZODONE HYDROCHLORIDE 40 MG: 20 TABLET ORAL at 09:27

## 2020-01-01 RX ADMIN — Medication 1 TABLET: at 08:03

## 2020-01-01 RX ADMIN — LORAZEPAM 2 MG: 2 INJECTION INTRAMUSCULAR; INTRAVENOUS at 06:15

## 2020-01-01 RX ADMIN — VILAZODONE HYDROCHLORIDE 40 MG: 20 TABLET ORAL at 09:56

## 2020-01-01 RX ADMIN — FAMOTIDINE 20 MG: 10 INJECTION INTRAVENOUS at 17:55

## 2020-01-01 RX ADMIN — LABETALOL 20 MG/4 ML (5 MG/ML) INTRAVENOUS SYRINGE 10 MG: at 03:21

## 2020-01-01 RX ADMIN — FAMOTIDINE 20 MG: 10 INJECTION INTRAVENOUS at 09:58

## 2020-01-01 RX ADMIN — ENOXAPARIN SODIUM 40 MG: 40 INJECTION SUBCUTANEOUS at 08:07

## 2020-01-01 RX ADMIN — CHLORHEXIDINE GLUCONATE 0.12% ORAL RINSE 15 ML: 1.2 LIQUID ORAL at 08:15

## 2020-01-01 RX ADMIN — Medication 125 MG: at 10:21

## 2020-01-01 RX ADMIN — MIDODRINE HYDROCHLORIDE 10 MG: 5 TABLET ORAL at 16:04

## 2020-01-01 RX ADMIN — LEVALBUTEROL HYDROCHLORIDE 1.25 MG: 1.25 SOLUTION, CONCENTRATE RESPIRATORY (INHALATION) at 13:41

## 2020-01-01 RX ADMIN — CYANOCOBALAMIN TAB 500 MCG 500 MCG: 500 TAB at 09:31

## 2020-01-01 RX ADMIN — Medication 500 MG: at 08:09

## 2020-01-01 RX ADMIN — METHYLPREDNISOLONE SODIUM SUCCINATE 100 MG: 125 INJECTION, POWDER, FOR SOLUTION INTRAMUSCULAR; INTRAVENOUS at 09:59

## 2020-01-01 RX ADMIN — POTASSIUM CHLORIDE 20 MEQ: 20 SOLUTION ORAL at 21:55

## 2020-01-01 RX ADMIN — FAMOTIDINE 20 MG: 10 INJECTION INTRAVENOUS at 17:18

## 2020-01-01 RX ADMIN — ACETAMINOPHEN 650 MG: 650 SUSPENSION ORAL at 22:03

## 2020-01-01 RX ADMIN — OXYMETAZOLINE HYDROCHLORIDE 3 SPRAY: 0.05 SPRAY NASAL at 20:07

## 2020-01-01 RX ADMIN — GABAPENTIN 900 MG: 300 CAPSULE ORAL at 07:39

## 2020-01-01 RX ADMIN — FUROSEMIDE 40 MG: 40 TABLET ORAL at 09:23

## 2020-01-01 RX ADMIN — EPINEPHRINE 1 MG: 0.1 INJECTION, SOLUTION ENDOTRACHEAL; INTRACARDIAC; INTRAVENOUS at 08:31

## 2020-01-01 RX ADMIN — ACETAMINOPHEN 650 MG: 650 SUSPENSION ORAL at 04:57

## 2020-01-01 RX ADMIN — OXYCODONE HYDROCHLORIDE AND ACETAMINOPHEN 1000 MG: 500 TABLET ORAL at 09:58

## 2020-01-01 RX ADMIN — ACETAMINOPHEN 975 MG: 650 SUPPOSITORY RECTAL at 15:34

## 2020-01-01 RX ADMIN — FENTANYL CITRATE 50 MCG: 50 INJECTION INTRAMUSCULAR; INTRAVENOUS at 23:13

## 2020-01-01 RX ADMIN — RANOLAZINE 1000 MG: 500 TABLET, FILM COATED, EXTENDED RELEASE ORAL at 17:13

## 2020-01-01 RX ADMIN — GABAPENTIN 900 MG: 300 CAPSULE ORAL at 21:50

## 2020-01-01 RX ADMIN — CYANOCOBALAMIN TAB 500 MCG 500 MCG: 500 TAB at 08:15

## 2020-01-01 RX ADMIN — NOREPINEPHRINE BITARTRATE 4 MCG/MIN: 1 INJECTION, SOLUTION, CONCENTRATE INTRAVENOUS at 21:17

## 2020-01-01 RX ADMIN — Medication 125 MG: at 21:23

## 2020-01-01 RX ADMIN — GABAPENTIN 900 MG: 300 CAPSULE ORAL at 21:58

## 2020-01-01 RX ADMIN — INSULIN LISPRO 4 UNITS: 100 INJECTION, SOLUTION INTRAVENOUS; SUBCUTANEOUS at 22:01

## 2020-01-01 RX ADMIN — OXYBUTYNIN 10 MG: 5 TABLET, FILM COATED, EXTENDED RELEASE ORAL at 07:39

## 2020-01-01 RX ADMIN — MIDODRINE HYDROCHLORIDE 10 MG: 5 TABLET ORAL at 16:40

## 2020-01-01 RX ADMIN — METHYLPREDNISOLONE SODIUM SUCCINATE 100 MG: 125 INJECTION, POWDER, FOR SOLUTION INTRAMUSCULAR; INTRAVENOUS at 08:04

## 2020-01-01 RX ADMIN — FERROUS SULFATE TAB 325 MG (65 MG ELEMENTAL FE) 325 MG: 325 (65 FE) TAB at 09:00

## 2020-01-01 RX ADMIN — FERROUS SULFATE TAB 325 MG (65 MG ELEMENTAL FE) 325 MG: 325 (65 FE) TAB at 09:54

## 2020-01-01 RX ADMIN — PROPOFOL 50 MCG/KG/MIN: 10 INJECTION, EMULSION INTRAVENOUS at 00:28

## 2020-01-01 RX ADMIN — FENTANYL CITRATE 50 MCG: 50 INJECTION INTRAMUSCULAR; INTRAVENOUS at 05:39

## 2020-01-01 RX ADMIN — MIDODRINE HYDROCHLORIDE 5 MG: 5 TABLET ORAL at 17:24

## 2020-01-01 RX ADMIN — FAMOTIDINE 20 MG: 10 INJECTION INTRAVENOUS at 17:51

## 2020-01-01 RX ADMIN — LIDOCAINE HYDROCHLORIDE 1 APPLICATION: 20 JELLY TOPICAL at 22:18

## 2020-01-01 RX ADMIN — OXYCODONE HYDROCHLORIDE AND ACETAMINOPHEN 500 MG: 500 TABLET ORAL at 09:31

## 2020-01-01 RX ADMIN — PROPOFOL 40 MCG/KG/MIN: 10 INJECTION, EMULSION INTRAVENOUS at 04:04

## 2020-01-01 RX ADMIN — FENTANYL CITRATE 50 MCG: 50 INJECTION INTRAMUSCULAR; INTRAVENOUS at 07:28

## 2020-01-01 RX ADMIN — GABAPENTIN 900 MG: 300 CAPSULE ORAL at 16:40

## 2020-01-01 RX ADMIN — Medication 100 MG: at 09:11

## 2020-01-01 RX ADMIN — PROPOFOL 40 MCG/KG/MIN: 10 INJECTION, EMULSION INTRAVENOUS at 00:01

## 2020-01-01 RX ADMIN — Medication 100 MG: at 07:41

## 2020-01-01 RX ADMIN — Medication 100 MG: at 09:56

## 2020-01-01 RX ADMIN — ACETAMINOPHEN 650 MG: 650 SUSPENSION ORAL at 10:35

## 2020-01-01 RX ADMIN — CEFEPIME HYDROCHLORIDE 2000 MG: 2 INJECTION, POWDER, FOR SOLUTION INTRAVENOUS at 15:00

## 2020-01-01 RX ADMIN — SODIUM BICARBONATE 50 MEQ: 84 INJECTION, SOLUTION INTRAVENOUS at 08:25

## 2020-01-01 RX ADMIN — FAMOTIDINE 20 MG: 10 INJECTION INTRAVENOUS at 08:15

## 2020-01-01 RX ADMIN — Medication 3000 UNITS: at 07:40

## 2020-01-01 RX ADMIN — SODIUM CHLORIDE SOLN NEBU 3% 4 ML: 3 NEBU SOLN at 13:39

## 2020-01-01 RX ADMIN — FENTANYL CITRATE 50 MCG: 50 INJECTION INTRAMUSCULAR; INTRAVENOUS at 03:22

## 2020-01-01 RX ADMIN — CHLORHEXIDINE GLUCONATE 0.12% ORAL RINSE 15 ML: 1.2 LIQUID ORAL at 20:07

## 2020-01-01 RX ADMIN — LORAZEPAM 0.5 MG: 0.5 TABLET ORAL at 08:08

## 2020-01-01 RX ADMIN — MIDODRINE HYDROCHLORIDE 5 MG: 5 TABLET ORAL at 17:07

## 2020-01-01 RX ADMIN — Medication 3000 UNITS: at 08:04

## 2020-01-01 RX ADMIN — MIDODRINE HYDROCHLORIDE 10 MG: 5 TABLET ORAL at 21:16

## 2020-01-01 RX ADMIN — FUROSEMIDE 20 MG: 10 INJECTION, SOLUTION INTRAMUSCULAR; INTRAVENOUS at 13:50

## 2020-01-01 RX ADMIN — ATORVASTATIN CALCIUM 80 MG: 40 TABLET, FILM COATED ORAL at 17:24

## 2020-01-01 RX ADMIN — INSULIN LISPRO 2 UNITS: 100 INJECTION, SOLUTION INTRAVENOUS; SUBCUTANEOUS at 12:32

## 2020-01-01 RX ADMIN — ACETAMINOPHEN 650 MG: 650 SUSPENSION ORAL at 04:21

## 2020-01-01 RX ADMIN — LEVALBUTEROL HYDROCHLORIDE 1.25 MG: 1.25 SOLUTION, CONCENTRATE RESPIRATORY (INHALATION) at 13:39

## 2020-01-01 RX ADMIN — CHLORHEXIDINE GLUCONATE 0.12% ORAL RINSE 15 ML: 1.2 LIQUID ORAL at 08:03

## 2020-01-01 RX ADMIN — BUSPIRONE HYDROCHLORIDE 30 MG: 10 TABLET ORAL at 04:22

## 2020-01-01 RX ADMIN — SENNOSIDES 8.6 MG: 8.6 TABLET, FILM COATED ORAL at 11:17

## 2020-01-01 RX ADMIN — FERROUS SULFATE TAB 325 MG (65 MG ELEMENTAL FE) 325 MG: 325 (65 FE) TAB at 07:38

## 2020-01-01 RX ADMIN — DOCUSATE SODIUM 100 MG: 100 CAPSULE, LIQUID FILLED ORAL at 08:43

## 2020-01-01 RX ADMIN — INSULIN LISPRO 4 UNITS: 100 INJECTION, SOLUTION INTRAVENOUS; SUBCUTANEOUS at 11:38

## 2020-01-01 RX ADMIN — MIDODRINE HYDROCHLORIDE 10 MG: 5 TABLET ORAL at 09:00

## 2020-01-01 RX ADMIN — VANCOMYCIN HYDROCHLORIDE 1500 MG: 5 INJECTION, POWDER, LYOPHILIZED, FOR SOLUTION INTRAVENOUS at 02:24

## 2020-01-01 RX ADMIN — CEFEPIME HYDROCHLORIDE 2000 MG: 2 INJECTION, POWDER, FOR SOLUTION INTRAVENOUS at 14:32

## 2020-01-01 RX ADMIN — GABAPENTIN 900 MG: 300 CAPSULE ORAL at 09:22

## 2020-01-01 RX ADMIN — FENTANYL CITRATE 50 MCG: 50 INJECTION INTRAMUSCULAR; INTRAVENOUS at 21:48

## 2020-01-01 RX ADMIN — HEPARIN SODIUM 5.1 UNITS/KG/HR: 10000 INJECTION, SOLUTION INTRAVENOUS at 00:26

## 2020-01-01 RX ADMIN — INSULIN LISPRO 1 UNITS: 100 INJECTION, SOLUTION INTRAVENOUS; SUBCUTANEOUS at 21:17

## 2020-01-01 RX ADMIN — PROPOFOL 10 MCG/KG/MIN: 10 INJECTION, EMULSION INTRAVENOUS at 22:34

## 2020-01-01 RX ADMIN — VILAZODONE HYDROCHLORIDE 40 MG: 20 TABLET ORAL at 08:09

## 2020-01-01 RX ADMIN — LORAZEPAM 2 MG: 2 INJECTION INTRAMUSCULAR; INTRAVENOUS at 09:14

## 2020-01-01 RX ADMIN — PERFLUTREN 0.6 ML/MIN: 6.52 INJECTION, SUSPENSION INTRAVENOUS at 13:23

## 2020-01-01 RX ADMIN — LORAZEPAM 1 MG: 1 TABLET ORAL at 22:43

## 2020-01-01 RX ADMIN — Medication 1000 MG: at 14:08

## 2020-01-01 RX ADMIN — PROPOFOL 45 MCG/KG/MIN: 10 INJECTION, EMULSION INTRAVENOUS at 14:26

## 2020-01-01 RX ADMIN — ASPIRIN 81 MG CHEWABLE TABLET 81 MG: 81 TABLET CHEWABLE at 09:10

## 2020-01-01 RX ADMIN — RANOLAZINE 1000 MG: 500 TABLET, FILM COATED, EXTENDED RELEASE ORAL at 17:05

## 2020-01-01 RX ADMIN — SENNOSIDES 8.6 MG: 8.6 TABLET, FILM COATED ORAL at 17:18

## 2020-01-01 RX ADMIN — SODIUM CHLORIDE SOLN NEBU 3% 4 ML: 3 NEBU SOLN at 19:27

## 2020-01-01 RX ADMIN — FUROSEMIDE 40 MG: 40 TABLET ORAL at 08:08

## 2020-01-01 RX ADMIN — CYANOCOBALAMIN TAB 500 MCG 500 MCG: 500 TAB at 09:23

## 2020-01-01 RX ADMIN — LORAZEPAM 0.5 MG: 0.5 TABLET ORAL at 23:33

## 2020-01-01 RX ADMIN — OYSTER SHELL CALCIUM WITH VITAMIN D 1 TABLET: 500; 200 TABLET, FILM COATED ORAL at 08:04

## 2020-01-01 RX ADMIN — RANOLAZINE 1000 MG: 500 TABLET, FILM COATED, EXTENDED RELEASE ORAL at 17:23

## 2020-01-01 RX ADMIN — MIDODRINE HYDROCHLORIDE 5 MG: 5 TABLET ORAL at 22:41

## 2020-01-01 RX ADMIN — VILAZODONE HYDROCHLORIDE 40 MG: 20 TABLET ORAL at 07:42

## 2020-01-01 RX ADMIN — ZINC SULFATE 220 MG (50 MG) CAPSULE 220 MG: CAPSULE at 09:58

## 2020-01-01 RX ADMIN — SODIUM CHLORIDE SOLN NEBU 3% 4 ML: 3 NEBU SOLN at 19:43

## 2020-01-01 RX ADMIN — OYSTER SHELL CALCIUM WITH VITAMIN D 1 TABLET: 500; 200 TABLET, FILM COATED ORAL at 09:56

## 2020-01-01 RX ADMIN — Medication 1000 MG: at 14:20

## 2020-01-01 RX ADMIN — LEVALBUTEROL HYDROCHLORIDE 1.25 MG: 1.25 SOLUTION, CONCENTRATE RESPIRATORY (INHALATION) at 20:47

## 2020-01-01 RX ADMIN — Medication 125 MG: at 21:16

## 2020-01-01 RX ADMIN — SODIUM CHLORIDE SOLN NEBU 3% 4 ML: 3 NEBU SOLN at 20:11

## 2020-01-01 RX ADMIN — PROPOFOL 50 MCG/KG/MIN: 10 INJECTION, EMULSION INTRAVENOUS at 11:08

## 2020-01-01 RX ADMIN — GABAPENTIN 900 MG: 300 CAPSULE ORAL at 21:16

## 2020-01-01 RX ADMIN — ZINC SULFATE 220 MG (50 MG) CAPSULE 220 MG: CAPSULE at 09:54

## 2020-01-01 RX ADMIN — MIDODRINE HYDROCHLORIDE 5 MG: 5 TABLET ORAL at 09:31

## 2020-01-01 RX ADMIN — ENOXAPARIN SODIUM 40 MG: 40 INJECTION SUBCUTANEOUS at 09:32

## 2020-01-01 RX ADMIN — Medication 1 TABLET: at 08:00

## 2020-01-01 RX ADMIN — SODIUM CHLORIDE SOLN NEBU 3% 4 ML: 3 NEBU SOLN at 07:19

## 2020-01-01 RX ADMIN — GABAPENTIN 900 MG: 300 CAPSULE ORAL at 17:05

## 2020-01-01 RX ADMIN — ACETAMINOPHEN 650 MG: 650 SUSPENSION ORAL at 16:04

## 2020-01-01 RX ADMIN — CYANOCOBALAMIN TAB 500 MCG 500 MCG: 500 TAB at 08:59

## 2020-01-01 RX ADMIN — CHLORHEXIDINE GLUCONATE 0.12% ORAL RINSE 15 ML: 1.2 LIQUID ORAL at 20:01

## 2020-01-01 RX ADMIN — CEFEPIME HYDROCHLORIDE 2000 MG: 2 INJECTION, POWDER, FOR SOLUTION INTRAVENOUS at 02:23

## 2020-01-01 RX ADMIN — FAMOTIDINE 20 MG: 10 INJECTION INTRAVENOUS at 08:01

## 2020-01-01 RX ADMIN — Medication 1 TABLET: at 09:57

## 2020-01-01 RX ADMIN — ACETAMINOPHEN 650 MG: 650 SUSPENSION ORAL at 00:14

## 2020-01-01 RX ADMIN — Medication 125 MG: at 08:03

## 2020-01-01 RX ADMIN — STANDARDIZED SENNA CONCENTRATE 8.6 MG: 8.6 TABLET ORAL at 09:00

## 2020-01-01 RX ADMIN — Medication 125 MG: at 20:01

## 2020-01-01 RX ADMIN — ZINC SULFATE 220 MG (50 MG) CAPSULE 220 MG: CAPSULE at 08:15

## 2020-01-01 RX ADMIN — LEVALBUTEROL HYDROCHLORIDE 1.25 MG: 1.25 SOLUTION, CONCENTRATE RESPIRATORY (INHALATION) at 13:05

## 2020-01-01 RX ADMIN — GABAPENTIN 900 MG: 300 CAPSULE ORAL at 22:42

## 2020-01-01 RX ADMIN — METOPROLOL TARTRATE 25 MG: 25 TABLET, FILM COATED ORAL at 10:38

## 2020-01-01 RX ADMIN — OXYMETAZOLINE HYDROCHLORIDE 3 SPRAY: 0.05 SPRAY NASAL at 21:15

## 2020-01-01 RX ADMIN — PROPOFOL 50 MCG/KG/MIN: 10 INJECTION, EMULSION INTRAVENOUS at 12:34

## 2020-01-01 RX ADMIN — RANOLAZINE 1000 MG: 500 TABLET, FILM COATED, EXTENDED RELEASE ORAL at 08:08

## 2020-01-01 RX ADMIN — LORAZEPAM 0.5 MG: 0.5 TABLET ORAL at 09:23

## 2020-01-01 RX ADMIN — INSULIN LISPRO 3 UNITS: 100 INJECTION, SOLUTION INTRAVENOUS; SUBCUTANEOUS at 13:50

## 2020-01-01 RX ADMIN — CYANOCOBALAMIN TAB 500 MCG 500 MCG: 500 TAB at 09:58

## 2020-01-01 RX ADMIN — LORAZEPAM 0.5 MG: 0.5 TABLET ORAL at 17:13

## 2020-01-01 RX ADMIN — DESMOPRESSIN ACETATE 80 MG: 0.2 TABLET ORAL at 15:30

## 2020-01-01 RX ADMIN — DESMOPRESSIN ACETATE 80 MG: 0.2 TABLET ORAL at 16:18

## 2020-01-01 RX ADMIN — PROPOFOL 50 MCG/KG/MIN: 10 INJECTION, EMULSION INTRAVENOUS at 18:14

## 2020-01-01 RX ADMIN — CEFEPIME HYDROCHLORIDE 2000 MG: 2 INJECTION, POWDER, FOR SOLUTION INTRAVENOUS at 03:00

## 2020-01-01 RX ADMIN — GABAPENTIN 900 MG: 300 CAPSULE ORAL at 23:32

## 2020-01-01 RX ADMIN — GABAPENTIN 100 MG: 100 CAPSULE ORAL at 09:00

## 2020-01-01 RX ADMIN — Medication 1000 MG: at 02:10

## 2020-01-01 RX ADMIN — SENNOSIDES 8.6 MG: 8.6 TABLET, FILM COATED ORAL at 08:15

## 2020-01-01 RX ADMIN — GABAPENTIN 900 MG: 300 CAPSULE ORAL at 08:43

## 2020-01-01 RX ADMIN — METHYLPREDNISOLONE SODIUM SUCCINATE 100 MG: 125 INJECTION, POWDER, FOR SOLUTION INTRAMUSCULAR; INTRAVENOUS at 08:01

## 2020-01-01 RX ADMIN — INSULIN LISPRO 3 UNITS: 100 INJECTION, SOLUTION INTRAVENOUS; SUBCUTANEOUS at 12:51

## 2020-01-01 RX ADMIN — DOCUSATE SODIUM 100 MG: 100 CAPSULE, LIQUID FILLED ORAL at 18:35

## 2020-01-01 RX ADMIN — CHLORHEXIDINE GLUCONATE 0.12% ORAL RINSE 15 ML: 1.2 LIQUID ORAL at 20:09

## 2020-01-01 RX ADMIN — OXYCODONE HYDROCHLORIDE AND ACETAMINOPHEN 1000 MG: 500 TABLET ORAL at 09:54

## 2020-01-01 RX ADMIN — ASPIRIN 81 MG: 81 TABLET, COATED ORAL at 07:38

## 2020-01-01 RX ADMIN — DOCUSATE SODIUM 100 MG: 100 CAPSULE ORAL at 09:30

## 2020-01-01 RX ADMIN — SODIUM CHLORIDE 6 UNITS/HR: 9 INJECTION, SOLUTION INTRAVENOUS at 07:07

## 2020-01-01 RX ADMIN — VANCOMYCIN HYDROCHLORIDE 1250 MG: 5 INJECTION, POWDER, LYOPHILIZED, FOR SOLUTION INTRAVENOUS at 16:35

## 2020-01-01 RX ADMIN — ENOXAPARIN SODIUM 40 MG: 40 INJECTION SUBCUTANEOUS at 08:42

## 2020-01-01 RX ADMIN — OXYCODONE HYDROCHLORIDE AND ACETAMINOPHEN 1000 MG: 500 TABLET ORAL at 08:03

## 2020-01-01 RX ADMIN — ENOXAPARIN SODIUM 100 MG: 100 INJECTION SUBCUTANEOUS at 07:46

## 2020-01-01 RX ADMIN — NOREPINEPHRINE BITARTRATE 5 MCG/MIN: 1 INJECTION, SOLUTION, CONCENTRATE INTRAVENOUS at 11:08

## 2020-01-01 RX ADMIN — RANOLAZINE 1000 MG: 500 TABLET, FILM COATED, EXTENDED RELEASE ORAL at 07:39

## 2020-01-01 RX ADMIN — VANCOMYCIN HYDROCHLORIDE 750 MG: 750 INJECTION, SOLUTION INTRAVENOUS at 03:43

## 2020-01-01 RX ADMIN — ASPIRIN 81 MG CHEWABLE TABLET 81 MG: 81 TABLET CHEWABLE at 09:58

## 2020-01-01 RX ADMIN — CALCIUM 1 TABLET: 500 TABLET ORAL at 09:31

## 2020-01-01 RX ADMIN — ACETAMINOPHEN 650 MG: 650 SUSPENSION ORAL at 11:17

## 2020-01-01 RX ADMIN — OXYBUTYNIN 10 MG: 5 TABLET, FILM COATED, EXTENDED RELEASE ORAL at 08:43

## 2020-01-01 RX ADMIN — Medication 500 MG: at 08:45

## 2020-01-01 RX ADMIN — SODIUM CHLORIDE SOLN NEBU 3% 4 ML: 3 NEBU SOLN at 07:22

## 2020-01-01 RX ADMIN — CHLORHEXIDINE GLUCONATE 0.12% ORAL RINSE 15 ML: 1.2 LIQUID ORAL at 21:24

## 2020-01-01 RX ADMIN — GABAPENTIN 900 MG: 300 CAPSULE ORAL at 09:35

## 2020-01-01 RX ADMIN — Medication 1000 MG: at 02:08

## 2020-01-01 RX ADMIN — Medication 12.5 MG: at 21:16

## 2020-01-01 RX ADMIN — ACETAMINOPHEN 650 MG: 650 SUSPENSION ORAL at 23:09

## 2020-01-01 RX ADMIN — ACETAMINOPHEN 650 MG: 650 SUSPENSION ORAL at 05:39

## 2020-01-01 RX ADMIN — Medication 1 TABLET: at 07:40

## 2020-01-01 RX ADMIN — MIDODRINE HYDROCHLORIDE 5 MG: 5 TABLET ORAL at 21:32

## 2020-01-01 RX ADMIN — OXYBUTYNIN 10 MG: 5 TABLET, FILM COATED, EXTENDED RELEASE ORAL at 09:30

## 2020-01-01 RX ADMIN — GABAPENTIN 900 MG: 300 CAPSULE ORAL at 09:01

## 2020-01-01 RX ADMIN — INSULIN LISPRO 4 UNITS: 100 INJECTION, SOLUTION INTRAVENOUS; SUBCUTANEOUS at 06:19

## 2020-01-01 RX ADMIN — Medication 1 TABLET: at 08:14

## 2020-01-01 RX ADMIN — GABAPENTIN 900 MG: 300 CAPSULE ORAL at 21:31

## 2020-01-01 RX ADMIN — ACETAMINOPHEN 650 MG: 650 SUSPENSION ORAL at 10:09

## 2020-01-01 RX ADMIN — OYSTER SHELL CALCIUM WITH VITAMIN D 1 TABLET: 500; 200 TABLET, FILM COATED ORAL at 07:46

## 2020-01-01 RX ADMIN — Medication 3000 UNITS: at 08:00

## 2020-01-01 RX ADMIN — Medication 125 MG: at 08:00

## 2020-01-01 RX ADMIN — Medication 100 MG: at 09:03

## 2020-01-01 RX ADMIN — MIDODRINE HYDROCHLORIDE 10 MG: 5 TABLET ORAL at 21:24

## 2020-01-01 RX ADMIN — ASPIRIN 81 MG: 81 TABLET, COATED ORAL at 09:55

## 2020-01-01 RX ADMIN — Medication 1000 MG: at 02:00

## 2020-01-01 RX ADMIN — RANOLAZINE 1000 MG: 500 TABLET, FILM COATED, EXTENDED RELEASE ORAL at 09:02

## 2020-01-01 RX ADMIN — EPINEPHRINE 1 MG: 0.1 INJECTION, SOLUTION ENDOTRACHEAL; INTRACARDIAC; INTRAVENOUS at 08:29

## 2020-01-01 RX ADMIN — PROPOFOL 50 MCG/KG/MIN: 10 INJECTION, EMULSION INTRAVENOUS at 16:02

## 2020-01-01 RX ADMIN — FAMOTIDINE 20 MG: 10 INJECTION INTRAVENOUS at 17:19

## 2020-01-01 RX ADMIN — MIDODRINE HYDROCHLORIDE 5 MG: 5 TABLET ORAL at 08:08

## 2020-01-01 RX ADMIN — METHYLPREDNISOLONE SODIUM SUCCINATE 100 MG: 125 INJECTION, POWDER, FOR SOLUTION INTRAMUSCULAR; INTRAVENOUS at 08:15

## 2020-01-01 RX ADMIN — SODIUM CHLORIDE, SODIUM GLUCONATE, SODIUM ACETATE, POTASSIUM CHLORIDE, MAGNESIUM CHLORIDE, SODIUM PHOSPHATE, DIBASIC, AND POTASSIUM PHOSPHATE 50 ML/HR: .53; .5; .37; .037; .03; .012; .00082 INJECTION, SOLUTION INTRAVENOUS at 17:51

## 2020-01-01 RX ADMIN — LEVALBUTEROL HYDROCHLORIDE 1.25 MG: 1.25 SOLUTION, CONCENTRATE RESPIRATORY (INHALATION) at 08:24

## 2020-01-01 RX ADMIN — GABAPENTIN 900 MG: 300 CAPSULE ORAL at 09:54

## 2020-01-01 RX ADMIN — GABAPENTIN 900 MG: 300 CAPSULE ORAL at 09:10

## 2020-01-01 RX ADMIN — ZINC SULFATE 220 MG (50 MG) CAPSULE 220 MG: CAPSULE at 08:00

## 2020-01-01 RX ADMIN — LEVALBUTEROL HYDROCHLORIDE 1.25 MG: 1.25 SOLUTION, CONCENTRATE RESPIRATORY (INHALATION) at 20:11

## 2020-01-01 RX ADMIN — ACETAMINOPHEN 650 MG: 650 SUSPENSION ORAL at 16:00

## 2020-01-01 RX ADMIN — CYANOCOBALAMIN TAB 500 MCG 500 MCG: 500 TAB at 08:03

## 2020-01-01 RX ADMIN — GABAPENTIN 100 MG: 100 CAPSULE ORAL at 09:32

## 2020-01-01 RX ADMIN — MIDODRINE HYDROCHLORIDE 10 MG: 5 TABLET ORAL at 15:19

## 2020-01-01 RX ADMIN — CYANOCOBALAMIN TAB 500 MCG 500 MCG: 500 TAB at 09:54

## 2020-01-01 RX ADMIN — CHLORHEXIDINE GLUCONATE 0.12% ORAL RINSE 15 ML: 1.2 LIQUID ORAL at 09:58

## 2020-01-01 RX ADMIN — LEVALBUTEROL HYDROCHLORIDE 1.25 MG: 1.25 SOLUTION, CONCENTRATE RESPIRATORY (INHALATION) at 07:50

## 2020-01-01 RX ADMIN — CALCIUM GLUCONATE 1 G: 20 INJECTION, SOLUTION INTRAVENOUS at 07:35

## 2020-01-01 RX ADMIN — SENNOSIDES 8.6 MG: 8.6 TABLET, FILM COATED ORAL at 09:58

## 2020-01-01 RX ADMIN — PROPOFOL 50 MCG/KG/MIN: 10 INJECTION, EMULSION INTRAVENOUS at 16:15

## 2020-01-01 RX ADMIN — SODIUM CHLORIDE SOLN NEBU 3% 4 ML: 3 NEBU SOLN at 13:14

## 2020-01-01 RX ADMIN — LEVALBUTEROL HYDROCHLORIDE 1.25 MG: 1.25 SOLUTION, CONCENTRATE RESPIRATORY (INHALATION) at 19:43

## 2020-01-01 RX ADMIN — LORAZEPAM 1 MG: 1 TABLET ORAL at 21:58

## 2020-01-01 RX ADMIN — DESMOPRESSIN ACETATE 80 MG: 0.2 TABLET ORAL at 16:40

## 2020-01-01 RX ADMIN — MIDODRINE HYDROCHLORIDE 5 MG: 5 TABLET ORAL at 21:50

## 2020-01-01 RX ADMIN — OXYBUTYNIN 10 MG: 5 TABLET, FILM COATED, EXTENDED RELEASE ORAL at 09:55

## 2020-01-01 RX ADMIN — MIDODRINE HYDROCHLORIDE 5 MG: 5 TABLET ORAL at 21:11

## 2020-01-01 RX ADMIN — GABAPENTIN 900 MG: 300 CAPSULE ORAL at 17:23

## 2020-01-01 RX ADMIN — PROPOFOL 50 MCG/KG/MIN: 10 INJECTION, EMULSION INTRAVENOUS at 10:03

## 2020-01-01 RX ADMIN — MIDODRINE HYDROCHLORIDE 5 MG: 5 TABLET ORAL at 17:05

## 2020-01-01 RX ADMIN — SODIUM CHLORIDE SOLN NEBU 3% 4 ML: 3 NEBU SOLN at 10:14

## 2020-01-01 RX ADMIN — SODIUM CHLORIDE SOLN NEBU 3% 4 ML: 3 NEBU SOLN at 13:41

## 2020-01-01 RX ADMIN — MIDODRINE HYDROCHLORIDE 10 MG: 5 TABLET ORAL at 20:01

## 2020-01-01 RX ADMIN — RANOLAZINE 1000 MG: 500 TABLET, FILM COATED, EXTENDED RELEASE ORAL at 09:54

## 2020-01-01 RX ADMIN — Medication 100 MG: at 09:31

## 2020-01-01 RX ADMIN — OXYCODONE HYDROCHLORIDE AND ACETAMINOPHEN 1000 MG: 500 TABLET ORAL at 07:38

## 2020-01-01 RX ADMIN — CYANOCOBALAMIN TAB 500 MCG 500 MCG: 500 TAB at 09:10

## 2020-01-01 RX ADMIN — FAMOTIDINE 20 MG: 10 INJECTION INTRAVENOUS at 08:03

## 2020-01-01 RX ADMIN — ENOXAPARIN SODIUM 40 MG: 40 INJECTION SUBCUTANEOUS at 09:00

## 2020-01-01 RX ADMIN — CHLORHEXIDINE GLUCONATE 0.12% ORAL RINSE 15 ML: 1.2 LIQUID ORAL at 08:01

## 2020-01-01 RX ADMIN — Medication 125 MG: at 08:30

## 2020-01-01 RX ADMIN — LORAZEPAM 0.5 MG: 0.5 TABLET ORAL at 08:43

## 2020-01-01 RX ADMIN — ASPIRIN 81 MG: 81 TABLET, COATED ORAL at 09:23

## 2020-01-01 RX ADMIN — OXYCODONE HYDROCHLORIDE AND ACETAMINOPHEN 500 MG: 500 TABLET ORAL at 09:00

## 2020-01-01 RX ADMIN — MIDODRINE HYDROCHLORIDE 10 MG: 5 TABLET ORAL at 09:55

## 2020-01-01 RX ADMIN — ASPIRIN 81 MG CHEWABLE TABLET 81 MG: 81 TABLET CHEWABLE at 09:00

## 2020-01-01 RX ADMIN — MIDODRINE HYDROCHLORIDE 10 MG: 5 TABLET ORAL at 07:41

## 2020-01-01 RX ADMIN — EPINEPHRINE 8 MCG/MIN: 1 INJECTION, SOLUTION, CONCENTRATE INTRAVENOUS at 09:00

## 2020-01-01 RX ADMIN — LEVALBUTEROL HYDROCHLORIDE 1.25 MG: 1.25 SOLUTION, CONCENTRATE RESPIRATORY (INHALATION) at 10:14

## 2020-01-01 RX ADMIN — SODIUM CHLORIDE SOLN NEBU 3% 4 ML: 3 NEBU SOLN at 19:44

## 2020-01-01 RX ADMIN — MIDODRINE HYDROCHLORIDE 5 MG: 5 TABLET ORAL at 09:12

## 2020-01-01 RX ADMIN — INSULIN LISPRO 1 UNITS: 100 INJECTION, SOLUTION INTRAVENOUS; SUBCUTANEOUS at 12:52

## 2020-01-01 RX ADMIN — OXYBUTYNIN CHLORIDE 5 MG: 5 TABLET, EXTENDED RELEASE ORAL at 09:31

## 2020-01-01 RX ADMIN — CYANOCOBALAMIN TAB 500 MCG 500 MCG: 500 TAB at 08:43

## 2020-01-01 RX ADMIN — LEVALBUTEROL HYDROCHLORIDE 1.25 MG: 1.25 SOLUTION, CONCENTRATE RESPIRATORY (INHALATION) at 07:19

## 2020-01-01 RX ADMIN — POLYETHYLENE GLYCOL 3350 17 G: 17 POWDER, FOR SOLUTION ORAL at 10:05

## 2020-01-01 RX ADMIN — ATORVASTATIN CALCIUM 40 MG: 40 TABLET, FILM COATED ORAL at 17:05

## 2020-01-01 RX ADMIN — Medication 1000 MG: at 13:03

## 2020-01-01 RX ADMIN — ACETAMINOPHEN 650 MG: 325 TABLET, FILM COATED ORAL at 01:18

## 2020-01-01 RX ADMIN — ACETAMINOPHEN 650 MG: 650 SUSPENSION ORAL at 10:25

## 2020-01-01 RX ADMIN — STANDARDIZED SENNA CONCENTRATE 8.6 MG: 8.6 TABLET ORAL at 09:10

## 2020-01-01 RX ADMIN — ACETAMINOPHEN 650 MG: 650 SUSPENSION ORAL at 04:01

## 2020-01-01 RX ADMIN — LEVALBUTEROL HYDROCHLORIDE 1.25 MG: 1.25 SOLUTION, CONCENTRATE RESPIRATORY (INHALATION) at 07:22

## 2020-01-01 RX ADMIN — ATORVASTATIN CALCIUM 80 MG: 40 TABLET, FILM COATED ORAL at 17:13

## 2020-01-01 RX ADMIN — ATORVASTATIN CALCIUM 40 MG: 40 TABLET, FILM COATED ORAL at 17:06

## 2020-01-01 RX ADMIN — DOCUSATE SODIUM 100 MG: 100 CAPSULE ORAL at 09:10

## 2020-01-01 RX ADMIN — OXYMETAZOLINE HYDROCHLORIDE 3 SPRAY: 0.05 SPRAY NASAL at 09:00

## 2020-01-01 RX ADMIN — SODIUM CHLORIDE SOLN NEBU 3% 4 ML: 3 NEBU SOLN at 07:50

## 2020-01-01 RX ADMIN — DOCUSATE SODIUM 100 MG: 100 CAPSULE ORAL at 17:07

## 2020-01-01 RX ADMIN — DOCUSATE SODIUM 100 MG: 100 CAPSULE ORAL at 08:59

## 2020-01-01 RX ADMIN — PROPOFOL 50 MCG/KG/MIN: 10 INJECTION, EMULSION INTRAVENOUS at 21:16

## 2020-01-01 RX ADMIN — ENOXAPARIN SODIUM 40 MG: 40 INJECTION SUBCUTANEOUS at 09:25

## 2020-01-01 RX ADMIN — Medication 1 TABLET: at 09:00

## 2020-01-01 RX ADMIN — SODIUM CHLORIDE SOLN NEBU 3% 4 ML: 3 NEBU SOLN at 13:34

## 2020-01-01 RX ADMIN — OXYCODONE HYDROCHLORIDE AND ACETAMINOPHEN 500 MG: 500 TABLET ORAL at 09:10

## 2020-01-01 RX ADMIN — ASPIRIN 81 MG CHEWABLE TABLET 81 MG: 81 TABLET CHEWABLE at 11:17

## 2020-01-01 RX ADMIN — Medication 1 TABLET: at 09:10

## 2020-01-01 RX ADMIN — CALCIUM GLUCONATE 1 G: 20 INJECTION, SOLUTION INTRAVENOUS at 08:30

## 2020-01-01 RX ADMIN — DOCUSATE SODIUM 100 MG: 100 CAPSULE, LIQUID FILLED ORAL at 17:23

## 2020-01-01 RX ADMIN — CYANOCOBALAMIN TAB 500 MCG 500 MCG: 500 TAB at 08:08

## 2020-01-01 RX ADMIN — MIDODRINE HYDROCHLORIDE 5 MG: 5 TABLET ORAL at 17:14

## 2020-01-01 RX ADMIN — FUROSEMIDE 20 MG: 10 INJECTION, SOLUTION INTRAMUSCULAR; INTRAVENOUS at 19:34

## 2020-01-01 RX ADMIN — Medication 3000 UNITS: at 09:55

## 2020-01-01 RX ADMIN — OXYCODONE HYDROCHLORIDE AND ACETAMINOPHEN 1000 MG: 500 TABLET ORAL at 08:14

## 2020-01-01 RX ADMIN — PROPOFOL 50 MCG/KG/MIN: 10 INJECTION, EMULSION INTRAVENOUS at 13:03

## 2020-01-01 RX ADMIN — ENOXAPARIN SODIUM 40 MG: 40 INJECTION SUBCUTANEOUS at 09:10

## 2020-01-01 RX ADMIN — PROPOFOL 40 MCG/KG/MIN: 10 INJECTION, EMULSION INTRAVENOUS at 03:53

## 2020-01-01 RX ADMIN — SENNOSIDES 8.6 MG: 8.6 TABLET, FILM COATED ORAL at 17:51

## 2020-01-01 RX ADMIN — Medication 125 MG: at 12:51

## 2020-01-01 RX ADMIN — BUSPIRONE HYDROCHLORIDE 30 MG: 10 TABLET ORAL at 11:17

## 2020-01-01 RX ADMIN — ASPIRIN 81 MG: 81 TABLET, COATED ORAL at 08:43

## 2020-01-01 RX ADMIN — CYANOCOBALAMIN TAB 500 MCG 500 MCG: 500 TAB at 07:39

## 2020-01-01 RX ADMIN — VANCOMYCIN HYDROCHLORIDE 1250 MG: 5 INJECTION, POWDER, LYOPHILIZED, FOR SOLUTION INTRAVENOUS at 20:01

## 2020-01-07 NOTE — TELEPHONE ENCOUNTER
Patient requesting gabapentin refill  He reports he is still experiencing tremors, especially in his hands when he is trying to do work around the house  He is questioning if he can increase his gabapentin, denies any SE from current 700 mg TID dose  Please send refills to [facility] on file  He is also questioning if he should come in for a sooner appt to discuss, denies any new or worsening sxs  Please advise

## 2020-01-07 NOTE — PROGRESS NOTES
Outreach TC to patient for CM assessment  Spoke with CG, spouse, Anuel Hoang  CG reports that patient had a fall on 1/3/2020 after getting out of the car  Patient became very weak and slid down the car landing on his buttocks  CG was unable to help the patient up  911 was called and patient was assisted up and to a seated position in the car  Patient had an assessment completed with no injuries found and vital signs were stable and blood sugar was WNL  Patient continues to have difficulty with these postural hypotensive episodes  Patient does have an appointment with cardiology at the end of the month  Patient continues to check FBS via glucometer and reading have been 110-140's  Patient has not needed to take insulin glargine in about 3 weeks  Patient continues to take his metformin 500 mg po 2x day  Patient denies any changes to  system  No edema, decrease in urine output, weight gain, increase in fatigue or confusion  Patient has not followed up and obtained BMP due to holidays, moving etc  Cg report to this CM that patient will try to obtain blood work this week    Reviewed home medications, s/sx to report, future appointments and how/when to report symptoms to provider vs 911  CG verbalized understanding and agrees to additional calls

## 2020-01-08 NOTE — TELEPHONE ENCOUNTER
Spoke to the patient's wife and the patient the phone today  Agreed with a slow further increase in his gabapentin  Will increase by 100 mg every 2 weeks up to a 1000 if needed  Will call with updates as he goes

## 2020-01-09 PROBLEM — S69.92XA INJURY OF FINGER OF LEFT HAND: Status: ACTIVE | Noted: 2020-01-01

## 2020-01-09 PROBLEM — Z79.899 POLYPHARMACY: Status: ACTIVE | Noted: 2020-01-01

## 2020-01-09 NOTE — PATIENT INSTRUCTIONS
Please make appointment with podiatry, eye doctor, endocrinologist    Please get your blood word when you have time  We'll call you with a plan of orthostatic hypotension medication  We will start to taper your ativan (cut to half in the morning)

## 2020-01-09 NOTE — ASSESSMENT & PLAN NOTE
Lab Results   Component Value Date    HGBA1C 7 4 (H) 11/25/2019     Foot exam done today  Well controlled  Last A1c 7 4  Insulin regimen was adjusted in hospital  Now takes metformin, lantus only when glucose above 150  Counseled on self checking foot daily, avoid bare foot walking, fit shoes  Needs to reschedule his eye exam and endocrinology appointment  Refer to podiatry  Will have our pharmacist review treatment plan with him

## 2020-01-09 NOTE — PROGRESS NOTES
Assessment/Plan:    Injury of finger of left hand  Needs to go to ED to have the ring cut off from his swollen left finger  Xray left hand  Ice  Polypharmacy  Reviewed his medications  Discussed taper ativan  It's prescribed as needed but he has been taking it twice a day everyday, for sleep and anxiety  Cut the morning dose to 0 5mg now, continue to monitor and taper  Will have our pharmacist review med list with patient  Needs to discuss the necessity of PPI in another visit (given recent c diff)  Fall  No other injury except left finger  Multifactorial  Will manage his polypharmacy, orthostatic hypotension as mentioned seperately  Anxiety  Continue current meds  Taper ativan  Pt agrees  Essential tremor  Recently increased gabapentin to 800mg  Counseled pt on the side effects of gabapentin including dizziness, drowsiness that he is experiencing  He needs to monitor these symptoms if more dizzy on higher dose of gabapentin he should hold off  Orthostatic hypotension  On pyridostigmine  Will discuss with pharmacist regarding: switching to midodrine vs  Increase dose  Encourage pt to hydrate well  Counseled on slow moving and change position to prevent symptoms  Type 2 diabetes mellitus with hypoglycemia, with long-term current use of insulin ([facility])    Lab Results   Component Value Date    HGBA1C 7 4 (H) 11/25/2019     Foot exam done today  Well controlled  Last A1c 7 4  Insulin regimen was adjusted in hospital  Now takes metformin, lantus only when glucose above 150  Counseled on self checking foot daily, avoid bare foot walking, fit shoes  Needs to reschedule his eye exam and endocrinology appointment  Refer to podiatry  Will have our pharmacist review treatment plan with him            Diagnoses and all orders for this visit:    Type 2 diabetes mellitus with hypoglycemia without coma, with long-term current use of insulin ([facility]  -     Ambulatory referral to Podiatry; Future  -     Ambulatory referral to Endocrinology; Future    Anxiety  Comments:  I have refilled the patient's Ativan 1 mg b i d  p r n  I have increased the dose to b i d  his daughter is being released from prison     Injury of finger of left hand, initial encounter  -     Transfer to other facility  -     XR hand 3+ vw left; Future    Polypharmacy  -     Ambulatory referral to clinical pharmacy; Future    Orthostatic hypotension    Essential tremor    Fall, initial encounter    Other orders  -     Cancel: LORazepam (ATIVAN) 1 mg tablet; Take 1 tablet (1 mg total) by mouth 2 (two) times a day as needed for anxiety Take 0 5mg in the morning, 1mg at night as needed          Subjective:      Patient ID: Harjeet Curry is a 78 y o  male  HPI    Patient is here today to follow up several chronic issues  He is accompanied by wife  He had a fall 2 days ago, while walking down the 2 steps outside his house  Tripped and soft landed on buttock and left hand  No pain on buttock now but left ring finger is purple and swollen now  Has a ring on it needs to take off  Chronic dizziness  He states it's from his orthostatic hypotension  Also hasn't been drinking well  Had several falls  Another day when he was getting out of the car felt dizzy and lowered self to ground  Takes pyridostigmine doesn't feel much improvement  Compliant with metformin and insulin  Insulin regimen was adjusted in hospital  Now only uses lantus if glucose above 150  Essential tremor  [address] [facility]  Recently increased dose of gabapentin to 800mg  The following portions of the patient's history were reviewed and updated as appropriate: allergies, past family history, past social history and past surgical history  Review of Systems   Constitutional: Negative for chills, fatigue and fever  HENT: Negative for congestion, nosebleeds, postnasal drip, sneezing, sore throat and trouble swallowing      Eyes: Negative for pain  Respiratory: Negative for cough, chest tightness, shortness of breath and wheezing  Cardiovascular: Negative for chest pain, palpitations and leg swelling  Gastrointestinal: Negative for abdominal pain, constipation, diarrhea, nausea and vomiting  Endocrine: Negative for cold intolerance, heat intolerance, polydipsia and polyuria  Genitourinary: Negative for difficulty urinating, dysuria, flank pain and hematuria  Musculoskeletal: Negative for arthralgias and myalgias  Skin: Negative for rash  Neurological: Positive for dizziness and tremors  Negative for weakness and headaches  Hematological: Does not bruise/bleed easily  Psychiatric/Behavioral: Negative for confusion and dysphoric mood  The patient is not nervous/anxious  Objective:      /84 (BP Location: Left arm, Patient Position: Sitting, Cuff Size: Large)   Pulse 72   Temp 97 8 °F (36 6 °C) (Oral)   Resp 16   Ht 5' 10" (1 778 m)   Wt 102 kg (224 lb)   SpO2 97%   BMI 32 14 kg/m²          Physical Exam   Constitutional: He is oriented to person, place, and time  Cardiovascular: Normal rate and regular rhythm  Pulses are no weak pulses  Murmur heard  Pulses:       Dorsalis pedis pulses are 2+ on the right side, and 2+ on the left side  Posterior tibial pulses are 2+ on the right side, and 2+ on the left side  Pulmonary/Chest: Effort normal and breath sounds normal    Abdominal: Soft  Bowel sounds are normal    Musculoskeletal:        Feet:    Feet:   Right Foot:   Skin Integrity: Negative for ulcer, skin breakdown, erythema, warmth, callus or dry skin  Left Foot:   Skin Integrity: Negative for ulcer, skin breakdown, erythema, warmth, callus or dry skin  Neurological: He is alert and oriented to person, place, and time  Skin: Skin is warm and dry  Vitals reviewed  Patient's shoes and socks removed  Right Foot/Ankle   Right Foot Inspection  Skin Exam: skin normal and skin intact no dry skin, no warmth, no callus, no erythema, no maceration, no abnormal color, no pre-ulcer, no ulcer and no callus                          Toe Exam: ROM and strength within normal limits  Sensory   Vibration: intact  Proprioception: intact   Monofilament testing: diminished  Vascular    The right DP pulse is 2+  The right PT pulse is 2+  Left Foot/Ankle  Left Foot Inspection  Skin Exam: skin normal and skin intactno dry skin, no warmth, no erythema, no maceration, normal color, no pre-ulcer, no ulcer and no callus                         Toe Exam: ROM and strength within normal limits                   Sensory   Vibration: intact  Proprioception: intact  Monofilament: diminished  Vascular    The left DP pulse is 2+  The left PT pulse is 2+  Assign Risk Category:  No deformity present; Loss of protective sensation;  No weak pulses       Risk: 1

## 2020-01-09 NOTE — ASSESSMENT & PLAN NOTE
Recently increased gabapentin to 800mg  Counseled pt on the side effects of gabapentin including dizziness, drowsiness that he is experiencing  He needs to monitor these symptoms if more dizzy on higher dose of gabapentin he should hold off

## 2020-01-09 NOTE — PROGRESS NOTES
[facility]  [place], PharmD, BCACP      The following is per review of patient's pertinent medical/medication history:    Reason for documentation: Per Dr Loreto Marley request, reviewing pyridostigmine vs midodrine for orthostatic hypotension    Patient's insurance coverage: Payor: MEDICARE / Plan: MEDICARE A AND B / Product Type: Medicare A & B Fee for Service /     Findings:     Pyridostigmine: acetylcholinesterase inhibitor, off-label use for orthostatic hypotension  In small studies, this rx does appears less efficacious compared to midodrine  Midodrine: peripheral selective alpha-1-adrenergic agonist, FDA approve for orthostatic hypotension, no previous trial noted however previously recommended by several providers to be added  Contraindicated in severe heart disease  Recommendations: No compelling indication for pyridostigmine  Patient may benefit from converting pyridostigmine to midodrine 10mg TID       *Will contact patient to schedule appt for medication review/DM mgmt at later date d/t today's ED admission*    Demographics  Interaction Method: Virtual    Topic(s) Addressed  Medication Management    Intervention(s) Made  Pharmacologic: Medication Discontinuation and Medication Initiation    Tool(s) Used  Not Applicable    Time Spent in Direct Patient Care Activities and Care Coordination: 31-45 Minutes    Recommendation(s) Accepted by the Patient/Caregiver: Not Applicable

## 2020-01-09 NOTE — ASSESSMENT & PLAN NOTE
Reviewed his medications  Discussed taper ativan  It's prescribed as needed but he has been taking it twice a day everyday, for sleep and anxiety  Cut the morning dose to 0 5mg now, continue to monitor and taper  Will have our pharmacist review med list with patient  Needs to discuss the necessity of PPI in another visit (given recent c diff)

## 2020-01-09 NOTE — ASSESSMENT & PLAN NOTE
No other injury except left finger  Multifactorial  Will manage his polypharmacy, orthostatic hypotension as mentioned seperately

## 2020-01-09 NOTE — ED NOTES
X-ray at bedside    Chanel Vyas RN  01/09/20 [address] [place] RN  01/09/20 [id number]

## 2020-01-09 NOTE — DISCHARGE INSTRUCTIONS
You can use heat packs or ice packs on your finger  Using anti-inflammatory medications like ibuprofen to decrease the swelling and the pain  Please make sure to follow up with her primary care doctor in 1 week for recheck evaluation and management  If he develops any concerning or worsening symptoms, please return to the emergency department as these could be signs of worsening illness

## 2020-01-09 NOTE — ASSESSMENT & PLAN NOTE
On pyridostigmine  Will discuss with pharmacist regarding: switching to midodrine vs  Increase dose  Encourage pt to hydrate well  Counseled on slow moving and change position to prevent symptoms  ---  After counseling pharmacist, will switch pyridostigmine to midodrine  Start 2 5mg tid, gradually increase to 5mg tid  In his next visit with PCP, if tolerates well, could further increase to 10mg tid

## 2020-01-10 NOTE — ED PROVIDER NOTES
History  Chief Complaint   Patient presents with   Mix Loyd     pt states tripped on steps injuryed L ring finger     HPI  64-year-old male with history of hypertension, diabetes, CAD, NSTEMI, dyslipidemia, DVT presenting to the emergency department after a fall  Patient states that he slipped and fell onto his left hand  The patient denies hitting his head  Denies LOC  Denies neck pain  Patient states that he initially did not have any pain on his hand but later noticed that day that he had swelling in the left ring finger  Patient was encouraged by his physical therapist to come the emergency department because purple discoloration and swelling in the area distal to his wedding rings  Concern for constriction  Otherwise, patient denies any headache, visual changes, chest pain, shortness of breath, nausea, vomiting, abdominal pain other extremity pain  Prior to Admission Medications   Prescriptions Last Dose Informant Patient Reported? Taking? ACCU-CHEK FASTCLIX LANCETS MISC   No No   Sig: Check blood sugar 4 times daily  Blood Glucose Monitoring Suppl (ACCU-CHEK DENYS PLUS) w/Device KIT   No No   Sig: by Does not apply route 4 (four) times a day   CALCIUM PO   Yes No   Sig: Take 1 capsule by mouth daily   Cholecalciferol (VITAMIN D3 PO)  Self Yes No   Sig: Take 3,000 Units by mouth daily     Coenzyme Q10 (CO Q 10) 100 MG CAPS  Self Yes No   Sig: Take 100 mg by mouth daily   Continuous Blood Gluc Sensor (FREESTYLE POLINA 14 DAY SENSOR) MISC  Self No No   Si each by Does not apply route continuous   Insulin Pen Needle (NOVOFINE AUTOCOVER) 30G X 8 MM MISC  Self No No   Sig: The patient test his blood sugars 4 times daily     Insulin Pen Needle (NOVOFINE AUTOCOVER) 30G X 8 MM MISC   No No   Sig: TEST BLOOD SUGAR 4 TIMES DAILY   LORazepam (ATIVAN) 1 mg tablet   No No   Sig: Take 1 tablet (1 mg total) by mouth 2 (two) times a day as needed for anxiety   Multiple Vitamins-Minerals (CENTRUM SILVER) tablet Self Yes No   Sig: Take 1 tablet by mouth daily   VIIBRYD 40 MG tablet   No No   Sig: TAKE 1 TABLET BY MOUTH EVERY DAY   Patient taking differently: 40 mg daily with breakfast    albuterol (PROAIR HFA) 90 mcg/act inhaler   No No   Sig: Inhale 2 puffs every 6 (six) hours as needed for wheezing   ascorbic acid (VITAMIN C) 500 mg tablet  Self Yes No   Sig: Take 500 mg by mouth daily   aspirin 81 MG tablet  Self Yes No   Sig: Take 1 tablet by mouth daily    clopidogrel (PLAVIX) 75 mg tablet  Self No No   Sig: TAKE 1 TABLET DAILY   Patient taking differently: Take by mouth daily in the early morning    cyanocobalamin (VITAMIN B-12) 500 mcg tablet  Self Yes No   Sig: Take 1 tablet by mouth daily   gabapentin (NEURONTIN) 100 mg capsule   No No   Sig: TAKE FOUR CAPSULES BY MOUTH THREE TIMES A DAY  START WITH 1 CAPSULE THREE TIMES DAILY AND INCREASE AS DIRECTED   glucose blood (ACCU-CHEK DENYS PLUS) test strip   No No   Si each by Other route 4 (four) times a day Use as instructed   glucose blood (ACCU-CHEK GUIDE) test strip  Self No No   Sig: Check blood sugar 4 times daily  insulin glargine (BASAGLAR KWIKPEN) 100 units/mL injection pen   Yes No   Sig: Inject under the skin daily   insulin glargine (LANTUS) 100 units/mL subcutaneous injection   No No   Si units hold if less than 150   metFORMIN (GLUCOPHAGE) 500 mg tablet   No No   Sig: Take 1 tablet (500 mg total) by mouth 2 (two) times a day with meals   midodrine (PROAMATINE) 2 5 mg tablet   No No   Sig: Take 1 tablet (2 5 mg total) by mouth 3 (three) times a day for 5 days After 5 days, if tolerates well, start 5mg three times a day     midodrine (PROAMATINE) 5 mg tablet   No No   Sig: Take 1 tablet (5 mg total) by mouth 3 (three) times a day   nitroglycerin (NITROSTAT) 0 4 mg SL tablet  Self No No   Sig: Place 1 tablet (0 4 mg total) under the tongue every 5 (five) minutes as needed for chest pain   pantoprazole (PROTONIX) 40 mg tablet   No No   Sig: TAKE 1 TABLET TWICE A DAY  30 MINUTES BEFORE BREAKFASTAND DINNER   Patient taking differently: Take 40 mg by mouth 2 (two) times a day    ranolazine (RANEXA) 1000 MG SR tablet  Self No No   Sig: Take 1 tablet (1,000 mg total) by mouth 2 (two) times a day   rosuvastatin (CRESTOR) 40 MG tablet   No No   Sig: Take 1 tablet (40 mg total) by mouth daily   Patient taking differently: Take 40 mg by mouth daily in the early morning    saccharomyces boulardii (FLORASTOR) 250 mg capsule   No No   Sig: Take 1 capsule (250 mg total) by mouth 2 (two) times a day   vancomycin (VANCOCIN) 50mg/mL SOLN   No No   Sig: Take 2 5 mL (125 mg total) by mouth every 6 (six) hours      Facility-Administered Medications Last Administration Doses Remaining   levalbuterol (XOPENEX HFA) inhaler 1 puff None recorded           Past Medical History:   Diagnosis Date    AAA [facility]abdominal [facility] aneurysm) ([facility])     Aneurysm of infrarenal abdominal aorta ([facility]) 10/23/2012    Anxiety     Benign essential hypertension     CKD (chronic kidney disease) stage 2, GFR 60-89 ml/min 4/11/2018    Compression fracture of twelfth thoracic vertebra ([facility] [address] ) 1/2/2019    Coronary artery disease involving native coronary artery of native heart without angina pectoris 1/6/2014    Description: 50% distal left main, 75% proximal LAD, 90% 1st diagonal, 50% ostial circumflex, 99% proximal circumflex, 90% 2nd OM, 30 mid RCA , 90% right posterolateral - left heart catheterization December 2013      Diabetes mellitus ([facility] [address] )     DVT, lower extremity ([address] )     Dyslipidemia 9/24/2012    Essential tremor 4/2/2019    GERD (gastroesophageal reflux disease)     Hyperlipidemia     Hypertension     Stopped his Medications because of Orthostatic hypotension    NSTEMI (non-ST elevated myocardial infarction) ([facility] [address] )     Obesity 7/13/2017    Orthostatic hypotension     Prostate cancer (HCC)     Right bundle branch block (RBBB)     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 [address] ) 7/3/2017    Vitamin D deficiency 2/19/2018       Past Surgical History:   Procedure Laterality Date    ANKLE ARTHROSCOPY W/ OPEN REPAIR Left     CARDIAC SURGERY      CORONARY ARTERY BYPASS GRAFT  12/11/2013    CABG x3 with LIMA to LAD, SVG to OM-1, SVG to posterior lateral, LYSIS of pericardial adhesions   AZ COLONOSCOPY FLX DX W/COLLJ SPEC WHEN PFRMD N/A 8/9/2018    Procedure: COLONOSCOPY;  Surgeon: Cindy Stevens MD;  Location: [facility]; Service: Gastroenterology    AZ REPAIR UMBILICAL YOEV,9+Q/N,THYLW N/A 3/10/2017    Procedure: REPAIR HERNIA UMBILICAL;  Surgeon: Vincent Simmons MD;  Location: [facility];  Service: General    PROSTATECTOMY  2008    SPHINCTEROPLASTY URINARY N/A 11/6/2019    Procedure: URINARY SPHINCTEROPLASTY;  Surgeon: Nettie Celaya MD;  Location[facility];  Service: Urology       Family History   Problem Relation Age of Onset    Crohn's disease Mother     Liver cancer Mother     Hypertension Mother     Crohn's disease Father     Liver cancer Father     Heart disease Father     Hypertension Father     Hyperlipidemia Father     Colon cancer Brother     Aortic aneurysm Brother         abdominal     Heart disease Brother         abdominal aortic aneurysm    Hypertension Brother     Hyperlipidemia Brother     Colon polyps Family     Stomach cancer Family     Hypertension Sister     Mitral valve prolapse Sister 80        valve replacment      I have reviewed and agree with the history as documented      Social History     Tobacco Use    Smoking status: Former Smoker     Types: Cigarettes    Smokeless tobacco: Never Used    Tobacco comment: quit 1967   Substance Use Topics    Alcohol use: Yes     Frequency: Monthly or less     Drinks per session: 1 or 2     Binge frequency: Never     Comment: rare socially    Drug use: No        Review of Systems Constitutional: Negative for activity change, chills and fever  HENT: Negative for sneezing and sore throat  Eyes: Negative for pain  Respiratory: Negative for apnea, cough, choking, chest tightness, shortness of breath, wheezing and stridor  Cardiovascular: Negative for chest pain, palpitations and leg swelling  Gastrointestinal: Negative for abdominal distention, abdominal pain, anal bleeding, blood in stool, constipation, diarrhea, nausea, rectal pain and vomiting  Genitourinary: Negative for dysuria and hematuria  Musculoskeletal: Negative for back pain, gait problem, myalgias, neck pain and neck stiffness  Finger pain and swelling   Skin: Negative for color change, pallor, rash and wound  Neurological: Negative for dizziness, tremors, seizures, syncope, facial asymmetry, speech difficulty, weakness, light-headedness, numbness and headaches  Physical Exam  Physical Exam   Constitutional: He is oriented to person, place, and time  He appears well-developed and well-nourished  No distress  HENT:   Head: Normocephalic and atraumatic  Right Ear: External ear normal    Left Ear: External ear normal    Nose: Nose normal    Mouth/Throat: Oropharynx is clear and moist    Eyes: Pupils are equal, round, and reactive to light  Conjunctivae and EOM are normal  Right eye exhibits no discharge  Left eye exhibits no discharge  Neck: Normal range of motion  Neck supple  Cardiovascular: Normal rate, regular rhythm, normal heart sounds and intact distal pulses  Exam reveals no gallop and no friction rub  Pulmonary/Chest: Effort normal and breath sounds normal  No stridor  No respiratory distress  He has no wheezes  He has no rales  He exhibits no tenderness  Abdominal: Soft  Bowel sounds are normal  There is no tenderness  Musculoskeletal: Normal range of motion   He exhibits tenderness (Mild tenderness in left ring knuckle, range of motion intact, swelling with ecchymosis extending along the finger  Wedding ring is loose and able to be with cold around however cannot be slipped over the swollen knuckle  )  He exhibits no edema or deformity  Neurological: He is alert and oriented to person, place, and time  Skin: Skin is warm and dry  Capillary refill takes less than 2 seconds  He is not diaphoretic  Psychiatric: He has a normal mood and affect  Nursing note and vitals reviewed  Vital Signs  ED Triage Vitals [01/09/20 1447]   Temperature Pulse Respirations Blood Pressure SpO2   98 1 °F (36 7 °C) 75 16 (!) 184/84 97 %      Temp Source Heart Rate Source Patient Position - Orthostatic VS BP Location FiO2 (%)   Oral -- -- -- --      Pain Score       2           Vitals:    01/09/20 1447   BP: (!) 184/84   Pulse: 75         Visual Acuity      ED Medications  Medications - No data to display    Diagnostic Studies  Results Reviewed     None                 XR finger fourth digit-ring LEFT   Final Result by Haris Gomez MD (01/09 1641)      No acute osseous abnormality  Degenerative changes as described  Central erosive changes at the 2nd through 5th digit DIP joint raises suspicion for inflammatory osteoarthritis              Workstation performed: [id number]                    Procedures  General Procedure  Date/Time: 1/9/2020 9:44 PM  Performed by: Ismael Funes MD  Authorized by: Ismael Funes MD     Patient location:  ED and bedside  Assisting Provider(s): Yes (comment)    Consent:     Consent obtained:  Verbal    Consent given by:  Patient    Risks discussed:  Bleeding and pain    Alternatives discussed:  No treatment  Universal protocol:     Procedure explained and questions answered to patient or proxy's satisfaction: yes      Patient identity confirmed:  Verbally with patient, arm band and hospital-assigned identification number  Indications:     Indications:  Possible external constriction left digit due to wedding band  Anesthesia (see MAR for exact dosages): Anesthesia method:  None  Procedure Detail:     Equipment used:  Dremel  Post-procedure details:     Patient tolerance of procedure: Tolerated well, no immediate complications             ED Course  ED Course as of Jan 09 2139   Thu Jan 09, 2020   1454 Vital signs upon arrival notable for hypertension  Blood Pressure(!): 184/84   1455 Physical exam notable for swelling of the left 4th digit around the knuckle with bruising  Patient has a wedding band on that finger that is loose, not constricting circulation, but is unable to be removed because of the swelling at the knuckle  Tenderness on palpation of the knuckle, range of motion in the finger intact  X-ray ordered  1705 IMPRESSION:     No acute osseous abnormality      Degenerative changes as described  Central erosive changes at the 2nd through 5th digit DIP joint raises suspicion for inflammatory osteoarthritis  XR finger fourth digit-ring LEFT      dremel used to excise ring off of finger  Upon removal, patient's pain has improved  Coloring has improved  Patient remained hemodynamically and clinically stable in the emergency department for 2 5 hours without evidence of impending cardiopulmonary instability  Strict return precautions given  Recommended that patient follow up with primary care doctor for further evaluation of finger in the future  Patient verbalized understanding and will follow up as an outpatient  Upon discharge, patient was fully alert, oriented, GCS 15, ambulatory at his baseline                            MDM      Disposition  Final diagnoses:   Fall, initial encounter   Finger injury, left, initial encounter   Swelling of left ring finger   Swollen finger   Ring or other jewelry causing external constriction, initial encounter     Time reflects when diagnosis was documented in both MDM as applicable and the Disposition within this note     Time User Action Codes Description Comment    1/9/2020  5:06 PM Vijay High Malka Harrison Fall, initial encounter     1/9/2020  5:06 PM Wes Life Add [X43 43MR] Finger injury, left, initial encounter     1/9/2020  5:07 PM Wes Life Add [M79 89] Swelling of left ring finger     1/9/2020  5:08 PM Wes Life Add [M79 89] Swollen finger     1/9/2020  5:08 PM Wse Life Add [W81 26YR] Ring or other jewelry causing external constriction, initial encounter       ED Disposition     ED Disposition Condition Date/Time Comment    Discharge Stable Thu Jan 9, 2020  5:06 PM Zhang Leon discharge to home/self care  Follow-up Information     Follow up With Specialties Details Why [address]  [facility], DO Internal Medicine   [address]  [address]  [phone]            Discharge Medication List as of 1/9/2020  5:10 PM      START taking these medications    Details   !! midodrine (PROAMATINE) 2 5 mg tablet Take 1 tablet (2 5 mg total) by mouth 3 (three) times a day for 5 days After 5 days, if tolerates well, start 5mg three times a day , Starting Thu 1/9/2020, Until Tue 1/14/2020, Normal      !! midodrine (PROAMATINE) 5 mg tablet Take 1 tablet (5 mg total) by mouth 3 (three) times a day, Starting Thu 1/9/2020, Until Mon 3/9/2020, Normal       !! - Potential duplicate medications found  Please discuss with provider  CONTINUE these medications which have NOT CHANGED    Details   ACCU-CHEK FASTCLIX LANCETS MISC Check blood sugar 4 times daily  , Normal      albuterol (PROAIR HFA) 90 mcg/act inhaler Inhale 2 puffs every 6 (six) hours as needed for wheezing, Starting Thu 12/19/2019, Normal      ascorbic acid (VITAMIN C) 500 mg tablet Take 500 mg by mouth daily, Historical Med      aspirin 81 MG tablet Take 1 tablet by mouth daily , Starting Fri 1/3/2014, Historical Med      Blood Glucose Monitoring Suppl (ACCU-CHEK DENYS PLUS) w/Device KIT by Does not apply route 4 (four) times a day, Starting Thu 10/10/2019, Normal      CALCIUM PO Take 1 capsule by mouth daily, Historical Med      Cholecalciferol (VITAMIN D3 PO) Take 3,000 Units by mouth daily  , Historical Med      clopidogrel (PLAVIX) 75 mg tablet TAKE 1 TABLET DAILY, Normal      Coenzyme Q10 (CO Q 10) 100 MG CAPS Take 100 mg by mouth daily, Historical Med      Continuous Blood Gluc Sensor (FREESTYLE POLINA 14 DAY SENSOR) MISC 1 each by Does not apply route continuous, Starting Wed 12/12/2018, Normal      cyanocobalamin (VITAMIN B-12) 500 mcg tablet Take 1 tablet by mouth daily, Starting Mon 7/10/2017, Historical Med      gabapentin (NEURONTIN) 100 mg capsule TAKE FOUR CAPSULES BY MOUTH THREE TIMES A DAY  START WITH 1 CAPSULE THREE TIMES DAILY AND INCREASE AS DIRECTED, Normal      !! glucose blood (ACCU-CHEK DENYS PLUS) test strip 1 each by Other route 4 (four) times a day Use as instructed, Starting Fri 10/11/2019, Normal      !! glucose blood (ACCU-CHEK GUIDE) test strip Check blood sugar 4 times daily  , Normal      insulin glargine (BASAGLAR KWIKPEN) 100 units/mL injection pen Inject under the skin daily, Historical Med      insulin glargine (LANTUS) 100 units/mL subcutaneous injection 25 units hold if less than 150, No Print      !! Insulin Pen Needle (NOVOFINE AUTOCOVER) 30G X 8 MM MISC The patient test his blood sugars 4 times daily  , Normal      !!  Insulin Pen Needle (NOVOFINE AUTOCOVER) 30G X 8 MM MISC TEST BLOOD SUGAR 4 TIMES DAILY, Normal      LORazepam (ATIVAN) 1 mg tablet Take 1 tablet (1 mg total) by mouth 2 (two) times a day as needed for anxiety, Starting Wed 1/8/2020, Until Fri 2/7/2020, Normal      metFORMIN (GLUCOPHAGE) 500 mg tablet Take 1 tablet (500 mg total) by mouth 2 (two) times a day with meals, Starting Tue 10/29/2019, Normal      Multiple Vitamins-Minerals (CENTRUM SILVER) tablet Take 1 tablet by mouth daily, Starting Thu 10/5/2017, Historical Med      nitroglycerin (NITROSTAT) 0 4 mg SL tablet Place 1 tablet (0 4 mg total) under the tongue every 5 (five) minutes as needed for chest pain, Starting Tue 5/8/2018, Normal      pantoprazole (PROTONIX) 40 mg tablet TAKE 1 TABLET TWICE A DAY  30 MINUTES BEFORE BREAKFASTAND DINNER, Normal      ranolazine (RANEXA) 1000 MG SR tablet Take 1 tablet (1,000 mg total) by mouth 2 (two) times a day, Starting Mon 4/8/2019, Normal      rosuvastatin (CRESTOR) 40 MG tablet Take 1 tablet (40 mg total) by mouth daily, Starting Wed 9/4/2019, Normal      saccharomyces boulardii (FLORASTOR) 250 mg capsule Take 1 capsule (250 mg total) by mouth 2 (two) times a day, Starting Fri 11/29/2019, Normal      vancomycin (VANCOCIN) 50mg/mL SOLN Take 2 5 mL (125 mg total) by mouth every 6 (six) hours, Starting Fri 11/29/2019, No Print      VIIBRYD 40 MG tablet TAKE 1 TABLET BY MOUTH EVERY DAY, Normal       !! - Potential duplicate medications found  Please discuss with provider  No discharge procedures on file      ED Provider  Electronically Signed by           Arian Alejandre MD  01/09/20 8056       Arian Alejandre MD  01/09/20 0117

## 2020-01-13 NOTE — PROGRESS NOTES
Narciso Palmer 78 y o  male MRN: [id number]    Encounter: [id number]      Assessment/Plan     Assessment: This is a 78y o -year-old male with diabetes with hyperglycemia  Plan:    Diagnoses and all orders for this visit:    Type 2 diabetes mellitus with hypoglycemia without coma, with long-term current use of insulin ([facility] [address] )  Lab Results   Component Value Date    HGBA1C 7 4 (H) 11/25/2019    A1c 7 4% which is at goal for his age  He is due for another A1c in February 2019  As per blood sugars are usually in 100-150 range, sometimes in 200 range  Discussed to check blood sugar twice daily, also have increase metformin to 1000 mg twice a day, discussed to send the log in 2 weeks  Also will repeat basic metabolic profile to assess kidney function in 3-4 weeks after starting new dose of metformin  Educated to call if he notices any diarrhea after increasing dose of metformin  Will follow-up in 3 months        -     Ambulatory referral to Endocrinology  -     Basic metabolic panel; Future  -     Hemoglobin A1C; Future  -     Microalbumin / creatinine urine ratio; Future  -     Basic metabolic panel; Future    Type 2 diabetes mellitus with cardiac complication (HCC)  Dose was increased  Because of his cardiac history it is very important to prevent hypoglycemic episodes  Continue to hold off on insulin regimen  Follow-up with cardiology    -     metFORMIN (GLUCOPHAGE) 500 mg tablet;  Take 2 tablets twice a day    Mixed hyperlipidemia  Lab Results   Component Value Date    LDLCALC 71 07/19/2019    Continue statins      Orthostatic hypotension  Currently is off of the medications for high blood pressure, and managed on matter drain for orthostatics hypertension  Follow-up with cardiology      CC: Diabetes    History of Present Illness     HPI:    Narciso Palmer is 17-year-old gentleman with past medical history of type 2 diabetes with long-term insulin with coronary artery disease is here for follow-up appointment  He was recently admitted in November for diarrhea, at that time he was noted to have severe hypoglycemic episodes, he was taken off the insulin regimen, and was asked to take long-acting insulin only if his blood sugar is more than 150 at bedtime  He has been checking blood sugar once or twice daily and they are usually in 100-150 range, he has not taken Lantus for last 2 months  He admits compliance to the diet and tries to avoid free sugars    He does have history of coronary artery disease and status post CABG surgery, he was started on insulin regimen after his surgery, previously was managed on oral medications      Current regimen toujeo is 25 units at bedtime, if blood sugars is >150 at bedtime, he also takes metformin 500 mg twice a day  His last GFR was 60  He denies diarrhea currently    For hyperlipidemia his currently taking Crestor 40 mg daily  For vitamin-D deficiency his currently taking vitamin-D 3, 3000 International Units daily        Lab Results   Component Value Date    HGBA1C 7 4 (H) 11/25/2019          Review of Systems   Constitutional: Negative for activity change, diaphoresis, fatigue, fever and unexpected weight change  HENT: Negative  Eyes: Negative for visual disturbance  Respiratory: Negative for cough, chest tightness and shortness of breath  Cardiovascular: Negative for chest pain, palpitations and leg swelling  Gastrointestinal: Negative for blood in stool, constipation, diarrhea, nausea and vomiting  Endocrine: Negative for cold intolerance, heat intolerance, polydipsia, polyphagia and polyuria  Genitourinary: Negative for dysuria, enuresis, frequency and urgency  Musculoskeletal: Negative for arthralgias and myalgias  Skin: Negative for pallor, rash and wound  Allergic/Immunologic: Negative  Neurological: Negative for dizziness, tremors, weakness and numbness  Hematological: Negative  Psychiatric/Behavioral: Negative          Historical Information   Past Medical History:   Diagnosis Date    AAA (abdominal aortic aneurysm) ([facility] [address] )     Aneurysm of infrarenal abdominal aorta ([facility] 10/23/2012    Anxiety     Benign essential hypertension     CKD (chronic kidney disease) stage 2, GFR 60-89 ml/min 4/11/2018    Compression fracture of twelfth thoracic vertebra ([facility]) 1/2/2019    Coronary artery disease involving native coronary artery of native heart without angina pectoris 1/6/2014    Description: 50% distal left main, 75% proximal LAD, 90% 1st diagonal, 50% ostial circumflex, 99% proximal circumflex, 90% 2nd OM, 30 mid RCA , 90% right posterolateral - left heart catheterization December 2013   Diabetes mellitus ([facility] [address] )     DVT, lower extremity ([facility] [address] )     Dyslipidemia 9/24/2012    Essential tremor 4/2/2019    GERD (gastroesophageal reflux disease)     Hyperlipidemia     Hypertension     Stopped his Medications because of Orthostatic hypotension    NSTEMI (non-ST elevated myocardial infarction) ([facility] [address] )     Obesity 7/13/2017    Orthostatic hypotension     Prostate cancer ([facility])     Right bundle branch block (RBBB)     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    LIMA to LAD, SVG to OM1, SVG to distal RCA    Skin cancer     Type 2 diabetes mellitus with hyperglycemia, with long-term current use of insulin ([facility] [address] ) 7/3/2017    Vitamin D deficiency 2/19/2018     Past Surgical History:   Procedure Laterality Date    ANKLE ARTHROSCOPY W/ OPEN REPAIR Left     CARDIAC SURGERY      CORONARY ARTERY BYPASS GRAFT  12/11/2013    CABG x3 with LIMA to LAD, SVG to OM-1, SVG to posterior lateral, LYSIS of pericardial adhesions   NC COLONOSCOPY FLX DX W/COLLJ SPEC WHEN PFRMD N/A 8/9/2018    Procedure: COLONOSCOPY;  Surgeon: Lance Miller MD;  Location: [facility];   Service: Gastroenterology    NC REPAIR UMBILICAL ZDDQ,6+M/W,HQRMT N/A 3/10/2017    Procedure: REPAIR HERNIA UMBILICAL;  Surgeon: Akin Gavin MD;  Location: [facility];  Service: General    PROSTATECTOMY  2008  SPHINCTEROPLASTY URINARY N/A 11/6/2019    Procedure: URINARY SPHINCTEROPLASTY;  Surgeon: Amna Crowley MD;  Location: [facility];  Service: Urology     Social History   Social History     Substance and Sexual Activity   Alcohol Use Yes    Frequency: Monthly or less    Drinks per session: 1 or 2    Binge frequency: Never    Comment: rare socially     Social History     Substance and Sexual Activity   Drug Use No     Social History     Tobacco Use   Smoking Status Former Smoker    Types: Cigarettes   Smokeless Tobacco Never Used   Tobacco Comment    quit 1967     Family History:   Family History   Problem Relation Age of Onset    Crohn's disease Mother     Liver cancer Mother     Hypertension Mother     Crohn's disease Father     Liver cancer Father     Heart disease Father     Hypertension Father     Hyperlipidemia Father     Colon cancer Brother     Aortic aneurysm Brother         abdominal     Heart disease Brother         abdominal aortic aneurysm    Hypertension Brother     Hyperlipidemia Brother     Colon polyps Family     Stomach cancer Family     Hypertension Sister     Mitral valve prolapse Sister 80        valve replacment        Meds/Allergies   Current Outpatient Medications   Medication Sig Dispense Refill    ACCU-CHEK FASTCLIX LANCETS MISC Check blood sugar 4 times daily   306 each 1    ascorbic acid (VITAMIN C) 500 mg tablet Take 500 mg by mouth daily      aspirin 81 MG tablet Take 1 tablet by mouth daily       Blood Glucose Monitoring Suppl (ACCU-CHEK DENYS PLUS) w/Device KIT by Does not apply route 4 (four) times a day 1 kit 0    CALCIUM PO Take 1 capsule by mouth daily      Cholecalciferol (VITAMIN D3 PO) Take 3,000 Units by mouth daily        clopidogrel (PLAVIX) 75 mg tablet TAKE 1 TABLET DAILY (Patient taking differently: Take by mouth daily in the early morning ) 90 tablet 1    Coenzyme Q10 (CO Q 10) 100 MG CAPS Take 100 mg by mouth daily      cyanocobalamin (VITAMIN B-12) 500 mcg tablet Take 1 tablet by mouth daily      gabapentin (NEURONTIN) 100 mg capsule TAKE FOUR CAPSULES BY MOUTH THREE TIMES A DAY  START WITH 1 CAPSULE THREE TIMES DAILY AND INCREASE AS DIRECTED 360 capsule 0    glucose blood (ACCU-CHEK DENYS PLUS) test strip 1 each by Other route 4 (four) times a day Use as instructed 200 each 2    LORazepam (ATIVAN) 1 mg tablet Take 1 tablet (1 mg total) by mouth 2 (two) times a day as needed for anxiety 60 tablet 0    metFORMIN (GLUCOPHAGE) 500 mg tablet Take 2 tablets twice a day 120 tablet 3    midodrine (PROAMATINE) 2 5 mg tablet Take 1 tablet (2 5 mg total) by mouth 3 (three) times a day for 5 days After 5 days, if tolerates well, start 5mg three times a day   15 tablet 0    midodrine (PROAMATINE) 5 mg tablet Take 1 tablet (5 mg total) by mouth 3 (three) times a day 90 tablet 1    Multiple Vitamins-Minerals (CENTRUM SILVER) tablet Take 1 tablet by mouth daily      nitroglycerin (NITROSTAT) 0 4 mg SL tablet Place 1 tablet (0 4 mg total) under the tongue every 5 (five) minutes as needed for chest pain 90 tablet 3    pantoprazole (PROTONIX) 40 mg tablet TAKE 1 TABLET TWICE A DAY  30 MINUTES BEFORE BREAKFASTAND DINNER (Patient taking differently: Take 40 mg by mouth 2 (two) times a day ) 180 tablet 1    ranolazine (RANEXA) 1000 MG SR tablet Take 1 tablet (1,000 mg total) by mouth 2 (two) times a day 180 tablet 3    rosuvastatin (CRESTOR) 40 MG tablet Take 1 tablet (40 mg total) by mouth daily (Patient taking differently: Take 40 mg by mouth daily in the early morning ) 90 tablet 3    saccharomyces boulardii (FLORASTOR) 250 mg capsule Take 1 capsule (250 mg total) by mouth 2 (two) times a day 60 capsule 0    VIIBRYD 40 MG tablet TAKE 1 TABLET BY MOUTH EVERY DAY (Patient taking differently: 40 mg daily with breakfast ) 30 tablet 5    albuterol (PROAIR HFA) 90 mcg/act inhaler Inhale 2 puffs every 6 (six) hours as needed for wheezing (Patient not taking: Reported on 1/13/2020) 1 Inhaler 1    Continuous Blood Gluc Sensor (FREESTYLE POLINA 14 DAY SENSOR) MISC 1 each by Does not apply route continuous (Patient not taking: Reported on 1/13/2020) 2 each 2    glucose blood (ACCU-CHEK GUIDE) test strip Check blood sugar 4 times daily  (Patient not taking: Reported on 1/13/2020) 400 each 1    Insulin Pen Needle (NOVOFINE AUTOCOVER) 30G X 8 MM MISC The patient test his blood sugars 4 times daily  (Patient not taking: Reported on 1/13/2020) 100 each 4    vancomycin (VANCOCIN) 50mg/mL SOLN Take 2 5 mL (125 mg total) by mouth every 6 (six) hours (Patient not taking: Reported on 1/13/2020)  0     Current Facility-Administered Medications   Medication Dose Route Frequency Provider Last Rate Last Dose    levalbuterol (XOPENEX HFA) inhaler 1 puff  1 puff Inhalation Q6H PRN Radha Ala, DO         Allergies   Allergen Reactions    Adhesive [Medical Tape] Rash    Sulfa Antibiotics Other (See Comments)     Generalized redness       Objective   Vitals: Blood pressure 120/68, pulse 83, height 5' 10" (1 778 m), weight 103 kg (226 lb)  Physical Exam   Constitutional: He is oriented to person, place, and time  He appears well-developed and well-nourished  No distress  HENT:   Head: Normocephalic and atraumatic  Eyes: Pupils are equal, round, and reactive to light  EOM are normal    Neck: Normal range of motion  Neck supple  No thyromegaly present  Cardiovascular: Normal rate, regular rhythm and normal heart sounds  Pulmonary/Chest: Effort normal and breath sounds normal  No respiratory distress  Abdominal: Soft  Bowel sounds are normal    Musculoskeletal: Normal range of motion  He exhibits no edema or deformity  Neurological: He is alert and oriented to person, place, and time  Skin: Skin is warm and dry  No erythema  Psychiatric: He has a normal mood and affect  Vitals reviewed        The history was obtained from the review of the chart, patient  Lab Results:   Lab Results   Component Value Date/Time    Hemoglobin A1C 7 4 (H) 11/25/2019 07:26 AM    Hemoglobin A1C 7 2 (H) 07/19/2019 11:26 AM    Hemoglobin A1C 7 3 (H) 04/01/2019 08:06 AM    WBC 10 61 (H) 11/28/2019 04:45 AM    WBC 11 88 (H) 11/27/2019 04:31 AM    WBC 14 88 (H) 11/26/2019 05:18 AM    Hemoglobin 11 0 (L) 11/28/2019 04:45 AM    Hemoglobin 10 9 (L) 11/27/2019 04:31 AM    Hemoglobin 11 4 (L) 11/26/2019 05:18 AM    Hematocrit 34 3 (L) 11/28/2019 04:45 AM    Hematocrit 34 2 (L) 11/27/2019 04:31 AM    Hematocrit 35 0 (L) 11/26/2019 05:18 AM    MCV 98 11/28/2019 04:45 AM    MCV 99 (H) 11/27/2019 04:31 AM    MCV 98 11/26/2019 05:18 AM    Platelets 142 31/74/3868 04:45 AM    Platelets 491 35/42/4899 04:31 AM    Platelets 638 71/38/8705 05:18 AM    BUN 22 11/28/2019 04:45 AM    BUN 21 11/27/2019 04:31 AM    BUN 21 11/26/2019 05:18 AM    Potassium 3 7 11/28/2019 04:45 AM    Potassium 3 9 11/27/2019 04:31 AM    Potassium 3 8 11/26/2019 05:18 AM    Chloride 109 (H) 11/28/2019 04:45 AM    Chloride 109 (H) 11/27/2019 04:31 AM    Chloride 102 11/26/2019 05:18 AM    CO2 26 11/28/2019 04:45 AM    CO2 24 11/27/2019 04:31 AM    CO2 25 11/26/2019 05:18 AM    Creatinine 1 10 11/28/2019 04:45 AM    Creatinine 1 11 11/27/2019 04:31 AM    Creatinine 1 16 11/26/2019 05:18 AM    AST 19 11/25/2019 07:26 AM    AST 17 07/19/2019 11:26 AM    AST 15 04/29/2019 11:00 AM    ALT 27 11/25/2019 07:26 AM    ALT 19 07/19/2019 11:26 AM    ALT 21 04/29/2019 11:00 AM    Albumin 3 1 (L) 11/25/2019 07:26 AM    Albumin 3 2 (L) 07/19/2019 11:26 AM    Albumin 3 0 (L) 04/29/2019 11:00 AM    HDL, Direct 35 (L) 07/19/2019 11:26 AM    HDL, Direct 29 (L) 04/01/2019 08:06 AM    Triglycerides 127 07/19/2019 11:26 AM    Triglycerides 109 04/01/2019 08:06 AM           Imaging Studies: I have personally reviewed pertinent reports  Portions of the record may have been created with voice recognition software   Occasional wrong word or "sound a like" substitutions may have occurred due to the inherent limitations of voice recognition software  Read the chart carefully and recognize, using context, where substitutions have occurred

## 2020-01-14 NOTE — PROGRESS NOTES
Outreach TC to patient's home for CM assessment  Spoke with CG  CG reports that patient saw  hndocrinologist yesterday  HgbA1C is at goal @ 7 4  Patient was taken off of insulin and Metformin was increased to 1000 mg po 2x day  Reviewed with CG dose, schedule, purpose and side effects of Metformin with understanding verbalized  Reviewed possible complications from uncontrolled diabetes  Patient also had a CMP completed and BUN/creatinine were significantly elevated  Reviewed with CG s/sx of CKD and to avoid nephrotoxins, keep BP under control, keep hydrated and notify provider if changes to urinary system  Educated to notify provider if urine output is decreased, has unusual odr, change in color especially dark or concentrated urine  Educated CG to call provider if patient becomes more confused, lethargic or has a significant loss of appetite  CG verbalized understanding of all education  Patient is to have repeat blood work in about 3-4 weeks  Patient/CG were instructed by endocrinology to follow a low sodium/low potassium diet  CG verbalized understanding of instructions  This CM did share lab results with PCP  Reviewed home medications, s/sx to report, future appointments and how/when to report symptoms to provider vs 911  CG verbalized understanding and agrees to additional calls

## 2020-01-14 NOTE — TELEPHONE ENCOUNTER
[facility] - He does not need to be seen / has no complaints   Millicent will continue seeing him for the next 4 weeks to help with fall prevention, NAN

## 2020-01-16 NOTE — TELEPHONE ENCOUNTER
Received message from [facility] Andrea ERNST questioning what bw pt needs   Called her back at [phone] to advise her next bw due 2/13/2020 but Citlali's vm was full unable to leave vm

## 2020-01-20 NOTE — PROGRESS NOTES
[id number] [facility] Christiano, PharmD, [facility]    Patient presents for medication review and education  Basim Goldberg did present with all prescribed and OTC medications for medication review  *wife present during appt*    SUBJECTIVE                                                                                                                        Medication list reviewed with patient, reports the following discrepancies/problems:   Midodrine: feels dizziness is slightly better since starting, currently on 5mg TID   Lorazepam: takes 0 5mg BID on scheduled vs PRN   Glucagon: has not required but wife reports she is uncertain how to use rx   Cholecalciferol: currently receiving 4250 units/day from 3 sources (3000 units, 1000 units, 250 units)   Cyanocobalamin: currently receiving 1100 mcg/day from 2 sources (1000 mcg, 100 mcg)   Nitroglycerin: not requiring rx    Gabapentin: taking 800mg TID   Insulin Glargine (Lana Kang): takes 25 units if FBG > 150 as discussed with endo but reports he hardly takes rx    Albuterol: started on rx following URI, has not required rx for a few weeks but would like left on rx list for now   Vancomycin: no longer on    Reports falling more frequently but denies hitting head, will usually land on hip    Use of supportive adherence tools:Yes, describe: pill box    OBJECTIVE                                                                                                                                Lab Results   Component Value Date    SODIUM 137 01/16/2020    K 4 3 01/16/2020     01/16/2020    CO2 25 01/16/2020    BUN 29 (H) 01/16/2020    CREATININE 1 57 (H) 01/16/2020    GLUC 192 (H) 01/16/2020    CALCIUM 8 6 01/16/2020      Ref   Range 4/1/2019 08:06   Vit D, 25-Hydroxy Latest Ref Range: 30 0 - 100 0 ng/mL 44 7     Calculated CrCl (adj BW)   Baseline: 58 ml/min   Current: 44 ml/min    Serum B12: no documented level    Drug Interactions  · Metformin, ranolazine: ranolazine may increase serum concentration of metformin  Limit the metformin dose to 1700 mg/day if metformin is coadministered with ranolazine 1000 mg twice daily  · Vilazodone, lorazepam, gabapentin: increased risk for CNS depression    ASSESSMENT/PLAN                                                                                                             Patient was counseled on current active medications  Current Medication Recommendation for Alternate Medication    Clopidogrel, Aspirin As patient is experiencing more falls and last stent placed ~4-5 years ago, patient may benefit from assessment of risk/benefit of DAPT  Will reach out to cardiology to have them address during patient's next appt     Cyancobalamin No serum B12 level documented  Will reach out to PCP to see if he can stop b12 supplement as he is receiving adequate cyanocobalamin through [facility]     Gabapentin Based on current CrCl, would recommend max 1400mg/day  Will reach out to neurology; note on 5/2019 indicated possible trial with topiramate if gabapentin was ineffective     Pantoprazole As patient does not have compelling indication to continue rx, patient may benefit from taper: 40mg daily x1 week, 20mg daily x1 week then 20mg every other day x1 week  Will f/u with PCP  Probiotic Noted patient was started on rx following hospitalization in 11/2019 however no duration specified  Will f/u with PCP on duration      Coenzyme Q10 Will reach out to PCP to see if patient can stop taking supplement as patient does not have compelling indication to continue supplement  Glucagon Patient's wife educated on proper use of rx, voiced understanding     Lorazepam Discussion with patient on risks of rx given age and concomitant gabapentin and vibrazodone  Patient voiced understanding  May benefit from trial of buspirone to reduce CNS depression associated with BNZ   Will reach out to PCP Cholecalciferol As patient is receiving adequate supply from 3 sources, patient may benefit from trial off vitamin D/calcium and vitamin D supplement and continuing only [facility] to reduce pill burden  Will f/u with referring MD and PCP  Ascorbic Acid No compelling indication for continued use  Patient may benefit from trial off as he is receiving adequate ascorbic acid through [facility]  Will f/u with PCP  Metformin Noted patient's dose was increased during endo appt in 1/2020  Per review of eGFR, slight decrease since metformin dose was increased  Recommended to reduce maximum dose if used in combination with ranolazine; will f/u with endocrinology on reducing metformin dose to max of 1700mg/day  Follow-Up: pharmacist will f/u with patient/wife on medication adjustments    Demographics  Interaction Method: Face to Face  Type of Intervention: New    Topic(s) Addressed  Polypharmacy  Device Education    Intervention(s) Made  Pharmacologic: Drug Interaction, Medication Adjustment - Dose or Frequency and Medication Discontinuation    Non-Pharmacologic: Other    Tool(s) Used  Not Applicable    Time Spent in Direct Patient Care Activities and Care Coordination: 76-90 Minutes    Recommendation(s) Accepted by the Patient/Caregiver:  All Accepted

## 2020-01-21 NOTE — TELEPHONE ENCOUNTER
Please inform pt to reduce metformin to 500 mg Er, three times daily ( total dose of 1500 mg) based on his recent kidney function test     Sally Cummings MD

## 2020-01-23 NOTE — TELEPHONE ENCOUNTER
Kala from  [facility] calling in to report pt's BP:     160/82 - today  146/80 - 20th  130/82 - 16th  150/80 - 15th    Also wants to mention pt is taking advil for hip pain, she states he has DEZ and shouldn't be taking NSAIDS  Marshall Stratton Please advise on what Mervat Ferreira should do      Kala: [phone]

## 2020-01-24 NOTE — TELEPHONE ENCOUNTER
Claudeen Cone left msg yesterday but phones have been dropping calls and not dialing out  Tried to call patient back this morning but got   Left message to call back as patient did not state what he needed in VM

## 2020-01-27 NOTE — ADDENDUM NOTE
Addended by: Omero [facility] on: 1/27/2020 10:28 AM     Modules accepted: Orders

## 2020-01-29 NOTE — PROGRESS NOTES
Cardiology Follow Up    Keon Prophet  1940  [id number]  [facility] [place] [facility] [address] [id number]    1  Coronary artery disease involving native coronary artery of native heart without angina pectoris     2  S/P CABG x 3     3  Orthostatic hypotension     4  Dyslipidemia     5  Right bundle-branch block     6  Aneurysm of infrarenal abdominal aorta [facility])         Discussion/Summary:  Mr Jackson Acosta is a pleasant 25-year-old gentleman who presents to the office today for routine follow-up  His blood pressure is on the low side today  He is off all antihypertensive medications  His wife has been checking his blood pressure at home with systolic readings in the 348-887 mmHg  He did have some low readings earlier in the week  For now he will remain on his current dose of midodrine  I have asked his wife to keep checking his blood pressure at home and PT will also recheck orthostatic vital signs  I have also asked he keep hydrated with electrolyte containing drinks  If his blood pressure remains low or he remains orthostatic I will increase his midodrine  His most recent lipids from earlier this month were reviewed and reveal acceptable numbers with the exception of a low HDL  He underwent a repeat echocardiogram in 2019 revealing low-normal LV function with mild aortic stenosis      He is now under the care of vascular surgery regarding his infrarenal abdominal aortic aneurysm and at this time no intervention is necessary besides surveillance  He underwent a CT scan of chest, abdomen and pelvis for unrelated reasons in November of 2019 which noted it was stable and unchanged in size  Due to his recurrent falls I will discontinue his Plavix  He will follow-up in the office in three months or sooner if deemed necessary      Interval History:   Mr Jackson Acosta is a pleasant 25-year-old male who presents to the office for follow-up  Since his last visit he continues to struggle with his blood pressure  He continues with lightheadedness with standing  His PCP recently changed his Mestinon to midodrine and the dose was recently increased to 5 mg TID  He has not had any falls in the last few weeks  He has been receiving physical therapy at home and per his wife PT has checked orthostatic vital signs in the recent past which have been negative  He has not had any recurrent chest pain with exertion  He occasionally notes some shortness of breath with activity  He denies symptoms of heart failure including increasing lower extremity edema, paroxysmal nocturnal dyspnea, orthopnea, acute weight gain or increasing abdominal girth  He denies palpitations or symptoms of claudication  Problem List     Non-STEMI (non-ST elevated myocardial infarction) ([facility] )    Hypertensive urgency    Diabetes mellitus ([facility] ) (Chronic)    Mixed hyperlipidemia (Chronic)    Abdominal aneurysm (HCC) (Chronic)    Hx of CABG (Chronic)    Orthostasis    Hypertension (Chronic)    Obesity    Vitamin D deficiency    Subclinical iodine-deficiency hypothyroidism    Aneurysm of infrarenal abdominal aorta (HCC)    Arteriosclerosis of coronary artery    Overview Signed 2/21/2018  7:50 AM by Kevin Nam DO     Description: 50% distal left main, 75% proximal LAD, 90% 1st diagonal, 50% ostial circumflex, 99% proximal circumflex, 90% 2nd OM, 30 mid RCA , 90% right posterolateral - left heart catheterization December 2013           Dyslipidemia    Right bundle-branch block        Past Medical History:   Diagnosis Date    AAA [facility]abdominal aortic aneurysm) [facility] )     Aneurysm of infrarenal abdominal aorta (HCC) 10/23/2012    Anxiety     Benign essential hypertension     CKD (chronic kidney disease) stage 2, GFR 60-89 ml/min 4/11/2018    Compression fracture of twelfth thoracic vertebra ([facility] ) 1/2/2019    Coronary artery disease involving native coronary artery of native heart without angina pectoris 1/6/2014    Description: 50% distal left main, 75% proximal LAD, 90% 1st diagonal, 50% ostial circumflex, 99% proximal circumflex, 90% 2nd OM, 30 mid RCA , 90% right posterolateral - left heart catheterization December 2013      Diabetes mellitus ([facility] )     D[facility], lower extremity ([facility]     Dyslipidemia 9/24/2012    Essential tremor 4/2/2019    GERD (gastroesophageal reflux disease)     Hyperlipidemia     Hypertension     Stopped his Medications because of Orthostatic hypotension    NSTEMI (non-ST elevated myocardial infarction) ([facility] )     Obesity 7/13/2017    Orthostatic hypotension     Prostate cancer (HCC)     Right bundle branch block (RBBB)     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    LIMA to LAD, SVG to OM1, SVG to distal RCA    Skin cancer     Type 2 diabetes mellitus with hyperglycemia, with long-term current use of insulin ([facility] ) 7/3/2017    Vitamin D deficiency 2/19/2018     Social History     Socioeconomic History    Marital status: /Civil Union     Spouse name: Not on file    Number of children: Not on file    Years of education: Not on file    Highest education level: Not on file   Occupational History    Not on file   Social Needs    Financial resource strain: Not on file    Food insecurity:     Worry: Not on file     Inability: Not on file    Transportation needs:     Medical: Not on file     Non-medical: Not on file   Tobacco Use    Smoking status: Former Smoker     Types: Cigarettes    Smokeless tobacco: Never Used    Tobacco comment: quit 1967   Substance and Sexual Activity    Alcohol use: Yes     Frequency: Monthly or less     Drinks per session: 1 or 2     Binge frequency: Never     Comment: rare socially    Drug use: No    Sexual activity: Never   Lifestyle    Physical activity:     Days per week: Not on file     Minutes per session: Not on file    Stress: Not on file   Relationships    Social connections: Talks on phone: Not on file     Gets together: Not on file     Attends Taoist service: Not on file     Active member of club or organization: Not on file     Attends meetings of clubs or organizations: Not on file     Relationship status: Not on file    Intimate partner violence:     Fear of current or ex partner: Not on file     Emotionally abused: Not on file     Physically abused: Not on file     Forced sexual activity: Not on file   Other Topics Concern    Not on file   Social History Narrative    Not on file      Family History   Problem Relation Age of Onset    Crohn's disease Mother     Liver cancer Mother     Hypertension Mother     Crohn's disease Father     Liver cancer Father     Heart disease Father     Hypertension Father     Hyperlipidemia Father     Colon cancer Brother     Aortic aneurysm Brother         abdominal     Heart disease Brother         abdominal aortic aneurysm    Hypertension Brother     Hyperlipidemia Brother     Colon polyps Family     Stomach cancer Family     Hypertension Sister     Mitral valve prolapse Sister 80        valve replacment      Past Surgical History:   Procedure Laterality Date    ANKLE ARTHROSCOPY W/ OPEN REPAIR Left     CARDIAC SURGERY      CORONARY ARTERY BYPASS GRAFT  12/11/2013    CABG x3 with LIMA to LAD, SVG to OM-1, SVG to posterior lateral, LYSIS of pericardial adhesions   IA COLONOSCOPY FLX DX W/COLLJ SPEC WHEN PFRMD N/A 8/9/2018    Procedure: COLONOSCOPY;  Surgeon: Gerardo Silverio MD;  [facility];   Service: Gastroenterology    IA REPAIR UMBILICAL [facility],8+T/X,HSSYY N/A 3/10/2017    Procedure: REPAIR HERNIA UMBILICAL;  Surgeon: Danii Martinez MD;  [facility];  Service: General    PROSTATECTOMY  2008    SPHINCTEROPLASTY URINARY N/A 11/6/2019    Procedure: URINARY SPHINCTEROPLASTY;  Surgeon: Clark Metzger MD;  [facility];  Service: Urology       Current Outpatient Medications:     ACCU-CHEK FASTCLIX LANCETS MISC, Check blood sugar 4 times daily  , Disp: 306 each, Rfl: 1    ascorbic acid (VITAMIN C) 500 mg tablet, Take 500 mg by mouth daily, Disp: , Rfl:     aspirin 81 MG tablet, Take 1 tablet by mouth daily , Disp: , Rfl:     Calcium Citrate-Vitamin D (CALCIUM CITRATE +D) 315-250 MG-UNIT TABS, Take 1 tablet by mouth daily, Disp: , Rfl:     clopidogrel (PLAVIX) 75 mg tablet, TAKE 1 TABLET DAILY (Patient taking differently: Take by mouth daily in the early morning ), Disp: 90 tablet, Rfl: 1    Coenzyme Q10 (CO Q 10) 100 MG CAPS, Take 100 mg by mouth daily, Disp: , Rfl:     cyanocobalamin (VITAMIN B-12) 500 mcg tablet, Take 1 tablet by mouth daily, Disp: , Rfl:     gabapentin (NEURONTIN) 100 mg capsule, TAKE FOUR CAPSULES BY MOUTH THREE TIMES A DAY  START WITH 1 CAPSULE THREE TIMES DAILY AND INCREASE AS DIRECTED (Patient taking differently: Take 800 mg by mouth 3 (three) times a day ), Disp: 360 capsule, Rfl: 0    Glucagon, rDNA, (GLUCAGON EMERGENCY IJ), Inject 1 kit as directed daily as needed, Disp: , Rfl:     glucose blood (ACCU-CHEK DENYS PLUS) test strip, 1 each by Other route 4 (four) times a day Use as instructed, Disp: 200 each, Rfl: 2    Insulin Pen Needle (NOVOFINE AUTOCOVER) 30G X 8 MM MISC, The patient test his blood sugars 4 times daily  , Disp: 100 each, Rfl: 4    LORazepam (ATIVAN) 1 mg tablet, Take 1 tablet (1 mg total) by mouth 2 (two) times a day as needed for anxiety (Patient taking differently: Take 0 5 mg by mouth 2 (two) times a day ), Disp: 60 tablet, Rfl: 0    metFORMIN (GLUCOPHAGE) 500 mg tablet, Take 1 tablet (500 mg total) by mouth 3 (three) times a day, Disp: 120 tablet, Rfl: 3    midodrine (PROAMATINE) 5 mg tablet, Take 1 tablet (5 mg total) by mouth 3 (three) times a day, Disp: 90 tablet, Rfl: 1    Multiple Vitamins-Minerals (CENTRUM SILVER) tablet, Take 1 tablet by mouth daily, Disp: , Rfl:     nitroglycerin (NITROSTAT) 0 4 mg SL tablet, Place 1 tablet (0 4 mg total) under the tongue every 5 (five) minutes as needed for chest pain, Disp: 90 tablet, Rfl: 3    pantoprazole (PROTONIX) 40 mg tablet, TAKE 1 TABLET TWICE A DAY  30 MINUTES BEFORE BREAKFASTAND DINNER (Patient taking differently: Take 40 mg by mouth 2 (two) times a day ), Disp: 180 tablet, Rfl: 1    ranolazine (RANEXA) 1000 MG SR tablet, Take 1 tablet (1,000 mg total) by mouth 2 (two) times a day, Disp: 180 tablet, Rfl: 3    rosuvastatin (CRESTOR) 40 MG tablet, Take 1 tablet (40 mg total) by mouth daily (Patient taking differently: Take 40 mg by mouth daily in the early morning ), Disp: 90 tablet, Rfl: 3    saccharomyces boulardii (FLORASTOR) 250 mg capsule, Take 1 capsule (250 mg total) by mouth 2 (two) times a day, Disp: 60 capsule, Rfl: 0    VIIBRYD 40 MG tablet, TAKE 1 TABLET BY MOUTH EVERY DAY (Patient taking differently: 40 mg daily with breakfast ), Disp: 30 tablet, Rfl: 5    VITAMIN D, CHOLECALCIFEROL, PO, Take 3,000 Units by mouth daily, Disp: , Rfl:     albuterol (PROAIR HFA) 90 mcg/act inhaler, Inhale 2 puffs every 6 (six) hours as needed for wheezing (Patient not taking: Reported on 1/20/2020), Disp: 1 Inhaler, Rfl: 1    Insulin Glargine (BASAGLAR KWIKPEN SC), Inject 25 Units under the skin daily as needed (if FBG > 150), Disp: , Rfl:     Current Facility-Administered Medications:     levalbuterol (XOPENEX HFA) inhaler 1 puff, 1 puff, Inhalation, Q6H PRN, Gustavo Lye, DO  Allergies   Allergen Reactions    Adhesive [Medical Tape] Rash    Sulfa Antibiotics Other (See Comments)     Generalized redness       Labs:     Chemistry        Component Value Date/Time     10/13/2015 0823    K 4 3 01/16/2020 1200    K 4 3 10/13/2015 0823     01/16/2020 1200     10/13/2015 0823    CO2 25 01/16/2020 1200    CO2 34 (H) 10/13/2015 0823    BUN 29 (H) 01/16/2020 1200    BUN 11 10/13/2015 0823    CREATININE 1 57 (H) 01/16/2020 1200    CREATININE 1 0 10/13/2015 0823        Component Value Date/Time    CALCIUM 8 6 01/16/2020 1200    CALCIUM 9 2 10/13/2015 0823    ALKPHOS 77 01/13/2020 1100    ALKPHOS 74 10/13/2015 0823    AST 19 01/13/2020 1100    AST 19 10/13/2015 0823    ALT 21 01/13/2020 1100    ALT 25 10/13/2015 0823    BILITOT 0 4 10/13/2015 0823            Lab Results   Component Value Date    CHOL 117 (L) 10/13/2015    CHOL 121 04/11/2015    CHOL 156 04/24/2014     Lab Results   Component Value Date    HDL 35 (L) 01/20/2020    HDL 35 (L) 07/19/2019    HDL 29 (L) 04/01/2019     Lab Results   Component Value Date    LDLCALC 62 01/20/2020    LDLCALC 71 07/19/2019    LDLCALC 75 04/01/2019     Lab Results   Component Value Date    TRIG 81 01/20/2020    TRIG 127 07/19/2019    TRIG 109 04/01/2019     No results found for: CHOLHDL    Imaging: X-ray Chest 2 Views    Result Date: 1/27/2018  Narrative: CHEST - DUAL ENERGY INDICATION:  chest pain  History taken directly from the electronic ordering system  COMPARISON:  Chest x-ray performed on 7/13/2017 VIEWS:  PA (including soft tissue/bone algorithms) and lateral projections IMAGES:  4 FINDINGS:     Cardiac silhouette is top normal in size and changes of prior intervention  No focal airspace consolidation  No pleural effusion or pneumothorax  Visualized osseous structures appear within normal limits for the patient's age  Impression: No focal airspace consolidation  Workstation performed: [id number]     Us Abdominal Aorta    Result Date: 1/27/2018  Narrative: ABDOMINAL AORTIC ULTRASOUND INDICATION: Evaluate for aortic aneurysm  COMPARISON: CT performed on 7/13/2017 FINDINGS: Ultrasound of the abdominal aorta was performed in longitudinal and transverse planes with a curvilinear transducer  Signs of atherosclerotic disease  Proximal aorta:  2 19 x 2 11 cm Mid aorta:    2 39 x 2 24 cm Distal aorta:    3 5 x 3 3 cm Right common iliac origin:  1 6 x 1 4 cm Left common iliac origin:  1 6 x 1 4 cm No periaortic collections or adenopathy detected  Impression: Atherosclerotic disease  Infrarenal abdominal aorta measures 3 5 x 3 3 cm, marginally increased in caliber when compared to CT of 7/3/2017  Workstation performed: [id number]       Review of Systems   Cardiovascular: Positive for dyspnea on exertion and near-syncope  Musculoskeletal: Positive for falls  All other systems reviewed and are negative  Vitals:    01/29/20 1500   BP: (!) 88/54   Pulse: 65   SpO2: 96%     Vitals:    01/29/20 1500   Weight: 101 kg (222 lb)     Height: 5' 10" (177 8 cm)   Body mass index is 31 85 kg/m²      Physical Exam:  General:  Alert and cooperative, appears stated age  [de-identified]:  PERRLA, EOMI, no scleral icterus, no conjunctival pallor  Neck:  No lymphadenopathy, no thyromegaly, no carotid bruits, no elevated JVP  Heart:  Regular rate and rhythm, normal S1/S2, 2/6 early peaking TORRIE RUSB without radiation  Lungs:  Clear to auscultation bilaterally   Abdomen:  Soft, non-tender, positive bowel sounds, no rebound or guarding,   no organomegaly   Extremities:  No clubbing, cyanosis or edema   Vascular:  2+ pedal pulses  Skin:  No rashes or lesions on exposed skin  Neurologic:  Cranial nerves II-XII grossly intact without focal deficits

## 2020-02-04 NOTE — PROGRESS NOTES
Outreach TC to patient for CM assessment  No answer to call  Left message on voicemail requesting a return call from patient to Stacie Salazar 12, BSN; Outpatient Care Manager @ [phone]  First unanswered call to patient

## 2020-02-07 NOTE — TELEPHONE ENCOUNTER
Leonor from [address] [place] [address] Amie CARDENASA calling to advise pt will be D/C from their services as of 2/11 - FYI

## 2020-02-10 NOTE — TELEPHONE ENCOUNTER
Patient/wife returned phone call, who voiced understanding to the above information  Letter sent to home address with information per their request     Patient reports he has stopped insulin glargine (was previously taking 25 units if BG > 150; reports he stopped taking rx as A1c was controlled  Endocrinology, please f/u with patient

## 2020-02-10 NOTE — TELEPHONE ENCOUNTER
Please see message below  I did go through your last note and it seems as if patient was already off his basal insulin at that time  Just want to confirm       Liliana Mcadams PA-C

## 2020-02-10 NOTE — TELEPHONE ENCOUNTER
Call placed to patient to f/u on medication adjustments following TCM medication review:   · Stop ascorbic acid, CoQ10, probiotic, cholecalciferol  · May stop pantoprazole if no heartburn s/sx  · Metformin ER dose reduced to 500mg TID  · Unable to reach Dr Jared Hwang to advise on gabapentin dose adjustment    If patient returns phone call, please advise on the above      Patient has PCP appt on 2/18

## 2020-02-11 NOTE — TELEPHONE ENCOUNTER
Please review with pt if he is taking lantus ?    Also please ask pt to send log ( blood sugar) for review  He was asked to reduce metformin 500 mg three times daily, hold lantus during appt and send the log , I dont see log was sent to us    Please verify   Thanks   Ry Ramos MD

## 2020-02-12 NOTE — PROGRESS NOTES
Tamiko, PharmD, Gabriela Walker      The following is per review of patient's pertinent medical/medication history and phone call with wife: Wife called to verify medication changes as outlined on 2/10  Discussion with patient's wife to hold off on adjusting gabapentin until they talk to neurology and to continue gabapentin dose 800mg TID as kidney fxn appears to have normalized  Wife voiced understanding to the above  No further action required at this time    Demographics  Interaction Method: Phone  Type of Intervention: Follow-Up    Topic(s) Addressed  Polypharmacy    Intervention(s) Made      Non-Pharmacologic: Other    Tool(s) Used  Not Applicable    Time Spent in Direct Patient Care Activities and Care Coordination: 16-30 Minutes    Recommendation(s) Accepted by the Patient/Caregiver:  All Accepted

## 2020-02-13 NOTE — TELEPHONE ENCOUNTER
Reviewed blood sugar log, blood sugars are in optimal range   please inform patient to continue current regimen, metformin 500 milligram 3 times daily   please update his med list    Lab Results   Component Value Date    CREATININE 1 03 02/07/2020       Dong Ruffin MD

## 2020-02-23 NOTE — ASSESSMENT & PLAN NOTE
Hyperlipidemia controlled continue with current medical regiment recommend a low-cholesterol diet and recommend routine exercise we will continue to monitor the progress    Continue Crestor 40 once daily

## 2020-02-23 NOTE — ASSESSMENT & PLAN NOTE
Clinically stable and doing well continue the current medical regiment will continue monitor    Sees Neurology routinely

## 2020-02-23 NOTE — PROGRESS NOTES
BMI Counseling: Body mass index is 31 85 kg/m²  The BMI is above normal  Nutrition recommendations include decreasing portion sizes, moderation in carbohydrate intake and increasing intake of lean protein  Exercise recommendations include exercising 3-5 times per week  Assessment/Plan:    C  difficile diarrhea  Status post hospitalization patient is clinically stable doing well now    Type 2 diabetes mellitus with hypoglycemia, with long-term current use of insulin ([facility])    Lab Results   Component Value Date    HGBA1C 6 7 (H) 01/16/2020   I have counselled the pt to follow a healthy and balanced diet ,and recommend routine exercise  I will be ordering diabetic laboratories including comprehensive metabolic panel, hemoglobin A1c, urine microalbumin, lipid panel  Annual eye examination recommended    Essential tremor  Clinically stable and doing well continue the current medical regiment will continue monitor  Sees Neurology routinely    Anxiety  Clinically stable and doing well continue the current medical regiment will continue monitor  Continue Viibryd, Ativan 1 mg half a tablet in a m  And 1 tablet in p m  P r n  Patient requests a refill he reports me is tolerating the medication very well no alcohol, no driving after taking the medication PDMP checked  Dyslipidemia  Hyperlipidemia controlled continue with current medical regiment recommend a low-cholesterol diet and recommend routine exercise we will continue to monitor the progress  Continue Crestor 40 once daily    Obesity  Obesity -I have counseled patient following healthy and balanced diet, I would like the patient to lose weight, I would like the patient exercise routinely; we will continue monitor the patient's progress      Physical deconditioning  Start physical therapy recommend healthy/balance diet and routine exercise         Problem List Items Addressed This Visit        Digestive    C  difficile diarrhea     Status post hospitalization patient is clinically stable doing well now            Endocrine    Type 2 diabetes mellitus with hypoglycemia, with long-term current use of insulin ([facility] [address] ) - Primary       Lab Results   Component Value Date    HGBA1C 6 7 (H) 01/16/2020   I have counselled the pt to follow a healthy and balanced diet ,and recommend routine exercise  I will be ordering diabetic laboratories including comprehensive metabolic panel, hemoglobin A1c, urine microalbumin, lipid panel  Annual eye examination recommended         Relevant Orders    Comprehensive metabolic panel    Lipid Panel with Direct LDL reflex    Microalbumin / creatinine urine ratio       Nervous and Auditory    Essential tremor     Clinically stable and doing well continue the current medical regiment will continue monitor  Sees Neurology routinely            Other    Obesity     Obesity -I have counseled patient following healthy and balanced diet, I would like the patient to lose weight, I would like the patient exercise routinely; we will continue monitor the patient's progress  Dyslipidemia     Hyperlipidemia controlled continue with current medical regiment recommend a low-cholesterol diet and recommend routine exercise we will continue to monitor the progress  Continue Crestor 40 once daily         Anxiety     Clinically stable and doing well continue the current medical regiment will continue monitor  Continue Viibryd, Ativan 1 mg half a tablet in a m  And 1 tablet in p m  P r n  Patient requests a refill he reports me is tolerating the medication very well no alcohol, no driving after taking the medication PDMP checked           Relevant Medications    LORazepam (ATIVAN) 1 mg tablet    Physical deconditioning     Start physical therapy recommend healthy/balance diet and routine exercise           Other Visit Diagnoses     Muscular deconditioning        Relevant Orders    Ambulatory referral to Physical Therapy    Palpitations Relevant Orders    Holter monitor - 48 hour    Benign essential hypertension              Return to office  4 months  call if any problems  Subjective:      Patient ID: Onelia Woo is a 78 y o  male  HPI  [age]year old male coming in for a follow up visit regarding anxiety, type 2 diabetes, muscular deconditioning, intermittent palpitations, tremor, status post C diff diarrhea hyperlipidemia and obesity; The patient reports me compliant taking medications without untoward side effects the  The patient is here to review his medical condition, update me on the medical condition and the patient reports me 1 hospitalizations and 1 ER visits  He reports me 1 hospitalization for C diff diarrhea and 1 ER visit for a fall  His bowel movements have normalized now  He reports me he feels deconditioned and is requesting physical therapy which I think is very suitable  He reports me intermittent palpitations  He reports me ongoing anxiety no suicidal ideation he reports me he needs a refill of Ativan he reports me his daughter is now in halfway  The following portions of the patient's history were reviewed and updated as appropriate: allergies, current medications, past family history, past medical history, past social history, past surgical history and problem list     Review of Systems   Constitutional: Negative for activity change, appetite change and unexpected weight change  HENT: Negative for congestion and postnasal drip  Eyes: Negative for visual disturbance  Respiratory: Negative for cough and shortness of breath  Cardiovascular: Positive for palpitations  Negative for chest pain  Gastrointestinal: Negative for abdominal pain, diarrhea, nausea and vomiting  Neurological: Negative for dizziness, light-headedness and headaches  Hematological: Negative for adenopathy  Psychiatric/Behavioral: Negative for suicidal ideas  The patient is nervous/anxious            Objective:    No follow-ups on file  No results found  Allergies   Allergen Reactions    Adhesive [Medical Tape] Rash    Sulfa Antibiotics Other (See Comments)     Generalized redness       Past Medical History:   Diagnosis Date    AAA (abdominal aortic aneurysm) ([facility] [address] )     Aneurysm of infrarenal abdominal aorta (HCC) 10/23/2012    Anxiety     Benign essential hypertension     CKD (chronic kidney disease) stage 2, GFR 60-89 ml/min 4/11/2018    Compression fracture of twelfth thoracic vertebra ([facility] [address] ) 1/2/2019    Coronary artery disease involving native coronary artery of native heart without angina pectoris 1/6/2014    Description: 50% distal left main, 75% proximal LAD, 90% 1st diagonal, 50% ostial circumflex, 99% proximal circumflex, 90% 2nd OM, 30 mid RCA , 90% right posterolateral - left heart catheterization December 2013   Diabetes mellitus ([facility] [address] )     DVT, lower extremity ([facility]     Dyslipidemia 9/24/2012    Essential tremor 4/2/2019    GERD (gastroesophageal reflux disease)     Hyperlipidemia     Hypertension     Stopped his Medications because of Orthostatic hypotension    NSTEMI (non-ST elevated myocardial infarction) ([facility] [address] )     Obesity 7/13/2017    Orthostatic hypotension     Prostate cancer (HCC)     Right bundle branch block (RBBB)     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    LIMA to LAD, SVG to OM1, SVG to distal RCA    Skin cancer     Type 2 diabetes mellitus with hyperglycemia, with long-term current use of insulin ([facility] ) 7/3/2017    Vitamin D deficiency 2/19/2018     Past Surgical History:   Procedure Laterality Date    ANKLE ARTHROSCOPY W/ OPEN REPAIR Left     CARDIAC SURGERY      CORONARY ARTERY BYPASS GRAFT  12/11/2013    CABG x3 with LIMA to LAD, SVG to OM-1, SVG to posterior lateral, LYSIS of pericardial adhesions   AL COLONOSCOPY FLX DX W/COLLJ SPEC WHEN PFRMD N/A 8/9/2018    Procedure: COLONOSCOPY;  Surgeon: Cindy Stevens MD;  [facility]: [facility];   Service: Gastroenterology    AL REPAIR UMBILICAL YUHQ,1+J/Y,TQMRY N/A 3/10/2017    Procedure: REPAIR HERNIA UMBILICAL;  Surgeon: Misty Holland MD;  [facility]: [facility];  Service: General    PROSTATECTOMY  2008    SPHINCTEROPLASTY URINARY N/A 11/6/2019    Procedure: URINARY SPHINCTEROPLASTY;  Surgeon: Prudencio Dominique MD;  Location: [facility];  Service: Urology     Current Outpatient Medications on File Prior to Visit   Medication Sig Dispense Refill    ACCU-CHEK FASTCLIX LANCETS MISC Check blood sugar 4 times daily  306 each 1    ascorbic acid (VITAMIN C) 500 mg tablet Take 500 mg by mouth daily      aspirin 81 MG tablet Take 1 tablet by mouth daily       Calcium Citrate-Vitamin D (CALCIUM CITRATE +D) 315-250 MG-UNIT TABS Take 1 tablet by mouth daily      Coenzyme Q10 (CO Q 10) 100 MG CAPS Take 100 mg by mouth daily      cyanocobalamin (VITAMIN B-12) 500 mcg tablet Take 1 tablet by mouth daily      gabapentin (NEURONTIN) 100 mg capsule TAKE FOUR CAPSULES BY MOUTH THREE TIMES A DAY   START WITH 1 CAPSULE THREE TIMES DAILY AND INCREASE AS DIRECTED (Patient taking differently: Take 800 mg by mouth 3 (three) times a day ) 360 capsule 0    Glucagon, rDNA, (GLUCAGON EMERGENCY IJ) Inject 1 kit as directed daily as needed      glucose blood (ACCU-CHEK DENYS PLUS) test strip 1 each by Other route 4 (four) times a day Use as instructed 200 each 2    metFORMIN (GLUCOPHAGE) 500 mg tablet Take 1 tablet (500 mg total) by mouth 3 (three) times a day 60 tablet 2    midodrine (PROAMATINE) 5 mg tablet Take 1 tablet (5 mg total) by mouth 3 (three) times a day 90 tablet 1    Multiple Vitamins-Minerals (CENTRUM SILVER) tablet Take 1 tablet by mouth daily      nitroglycerin (NITROSTAT) 0 4 mg SL tablet Place 1 tablet (0 4 mg total) under the tongue every 5 (five) minutes as needed for chest pain 90 tablet 3    ranolazine (RANEXA) 1000 MG SR tablet Take 1 tablet (1,000 mg total) by mouth 2 (two) times a day 180 tablet 3    rosuvastatin (CRESTOR) 40 MG tablet Take 1 tablet (40 mg total) by mouth daily (Patient taking differently: Take 40 mg by mouth daily in the early morning ) 90 tablet 3    VIIBRYD 40 MG tablet TAKE 1 TABLET BY MOUTH EVERY DAY (Patient taking differently: 40 mg daily with breakfast ) 30 tablet 5    VITAMIN D, CHOLECALCIFEROL, PO Take 3,000 Units by mouth daily      albuterol (PROAIR HFA) 90 mcg/act inhaler Inhale 2 puffs every 6 (six) hours as needed for wheezing (Patient not taking: Reported on 1/20/2020) 1 Inhaler 1    Insulin Pen Needle (NOVOFINE AUTOCOVER) 30G X 8 MM MISC The patient test his blood sugars 4 times daily   (Patient not taking: Reported on 2/18/2020) 100 each 4     Current Facility-Administered Medications on File Prior to Visit   Medication Dose Route Frequency Provider Last Rate Last Dose    levalbuterol (XOPENEX HFA) inhaler 1 puff  1 puff Inhalation Q6H BRENDONN Bess Uriostegui DO         Family History   Problem Relation Age of Onset    Crohn's disease Mother     Liver cancer Mother     Hypertension Mother     Crohn's disease Father     Liver cancer Father     Heart disease Father     Hypertension Father     Hyperlipidemia Father     Colon cancer Brother     Aortic aneurysm Brother         abdominal     Heart disease Brother         abdominal aortic aneurysm    Hypertension Brother     Hyperlipidemia Brother     Colon polyps Family     Stomach cancer Family     Hypertension Sister     Mitral valve prolapse Sister 80        valve replacment      Social History     Socioeconomic History    Marital status: /Civil Union     Spouse name: Not on file    Number of children: Not on file    Years of education: Not on file    Highest education level: Not on file   Occupational History    Not on file   Social Needs    Financial resource strain: Not on file    Food insecurity:     Worry: Not on file     Inability: Not on file    Transportation needs:     Medical: Not on file     Non-medical: Not on file Tobacco Use    Smoking status: Former Smoker     Types: Cigarettes    Smokeless tobacco: Never Used    Tobacco comment: quit 1967   Substance and Sexual Activity    Alcohol use: Yes     Frequency: Monthly or less     Drinks per session: 1 or 2     Binge frequency: Never     Comment: rare socially    Drug use: No    Sexual activity: Never   Lifestyle    Physical activity:     Days per week: Not on file     Minutes per session: Not on file    Stress: Not on file   Relationships    Social connections:     Talks on phone: Not on file     Gets together: Not on file     Attends [employer] service: Not on file     Active member of club or organization: Not on file     Attends meetings of clubs or organizations: Not on file     Relationship status: Not on file    Intimate partner violence:     Fear of current or ex partner: Not on file     Emotionally abused: Not on file     Physically abused: Not on file     Forced sexual activity: Not on file   Other Topics Concern    Not on file   Social History Narrative    Not on file     Vitals:    02/18/20 1545   BP: 118/54   Pulse: 89   Temp: 98 6 °F (37 °C)   SpO2: 96%   Height: 5' 10" (1 778 m)     Results for orders placed or performed in visit on 02/10/20    Diabetes Eye Exam   Result Value Ref Range    Right Eye Diabetic Retinopathy None     Left Eye Diabetic Retinopathy None      Weight (last 2 days)     None        Body mass index is 31 85 kg/m²  BP      Temp      Pulse     Resp      SpO2      There were no vitals filed for this visit  There were no vitals filed for this visit  /54   Pulse 89   Temp 98 6 °F (37 °C)   Ht 5' 10" (1 778 m)   SpO2 96%   BMI 31 85 kg/m²          Physical Exam   Constitutional: He appears well-developed and well-nourished  No distress  HENT:   Head: Normocephalic and atraumatic     Right Ear: External ear normal    Left Ear: External ear normal    Mouth/Throat: Oropharynx is clear and moist    Eyes: Pupils are equal, round, and reactive to light  Conjunctivae are normal  Right eye exhibits no discharge  Left eye exhibits no discharge  No scleral icterus  Neck: Neck supple  Cardiovascular: Normal rate, regular rhythm and normal heart sounds  Exam reveals no gallop and no friction rub  No murmur heard  Pulmonary/Chest: No respiratory distress  He has no wheezes  He has no rales  Abdominal: Soft  Bowel sounds are normal  He exhibits no distension and no mass  There is no tenderness  There is no rebound and no guarding  Musculoskeletal: He exhibits no edema or deformity  Lymphadenopathy:     He has no cervical adenopathy  Neurological: He is alert  Skin: He is not diaphoretic  Psychiatric: His mood appears anxious  He does not exhibit a depressed mood  He expresses no suicidal ideation

## 2020-02-23 NOTE — ASSESSMENT & PLAN NOTE
Clinically stable and doing well continue the current medical regiment will continue monitor  Continue Viibryd, Ativan 1 mg half a tablet in a m  And 1 tablet in p m  P r n  Patient requests a refill he reports me is tolerating the medication very well no alcohol, no driving after taking the medication PDMP checked

## 2020-02-23 NOTE — ASSESSMENT & PLAN NOTE
Lab Results   Component Value Date    HGBA1C 6 7 (H) 01/16/2020   I have counselled the pt to follow a healthy and balanced diet ,and recommend routine exercise  I will be ordering diabetic laboratories including comprehensive metabolic panel, hemoglobin A1c, urine microalbumin, lipid panel    Annual eye examination recommended

## 2020-03-03 NOTE — TELEPHONE ENCOUNTER
Spoke with Patient's wife Giancarlo Major, as per communication consent, and was informed that Patient was able to use his urinary sphincter with no difficulty and they had no complaints at this time  Encouraged to call office with any questions or concerns and ER precautions discussed  Wife repeats back understanding and agrees with plan

## 2020-03-03 NOTE — ASSESSMENT & PLAN NOTE
The device was activated today  I reviewed how to operate the sphincter with both the patient and his wife  The wife demonstrated very good understanding  They will go home and practice  We will contact them later this afternoon to make sure there doing well  Otherwise he will follow up with me in 6 months

## 2020-03-03 NOTE — PROGRESS NOTES
Assessment/Plan:    Stress incontinence  The device was activated today  I reviewed how to operate the sphincter with both the patient and his wife  The wife demonstrated very good understanding  They will go home and practice  We will contact them later this afternoon to make sure there doing well  Otherwise he will follow up with me in 6 months  There are no diagnoses linked to this encounter  Total visit time was 25 minutes of which over 50% was spent on counseling  Subjective:     Patient ID: Poonam Fairbanks is a 78 y o  male    73-year-old male presents for follow-up after artificial urinary sphincter placement  The patient ended up with prolonged hospitalization for C diff and other medical problems and therefore never followed up for activation of the device  He denies any problems with the device  He is continuing to leak as has never been activated  He has no other complaints  The following portions of the patient's history were reviewed and updated as appropriate: allergies, current medications, past family history, past medical history, past social history, past surgical history and problem list     Review of Systems   Constitutional: Negative  HENT: Negative  Eyes: Negative  Respiratory: Negative  Cardiovascular: Negative  Gastrointestinal: Negative  Endocrine: Negative  Genitourinary:        As noted per HPI   Musculoskeletal: Negative  Skin: Negative  Allergic/Immunologic: Negative  Neurological: Negative  Hematological: Negative  Psychiatric/Behavioral: Negative  AUA SYMPTOM SCORE      Most Recent Value   AUA SYMPTOM SCORE   How often have you had a sensation of not emptying your bladder completely after you finished urinating? 3   How often have you had to urinate again less than two hours after you finished urinating? 4   How often have you found you stopped and started again several times when you urinate?   1   How often have you found it difficult to postpone urination? 4   How often have you had a weak urinary stream?  2   How often have you had to push or strain to begin urination? 0   How many times did you most typically get up to urinate from the time you went to bed at night until the time you got up in the morning? 1   Quality of Life: If you were to spend the rest of your life with your urinary condition just the way it is now, how would you feel about that?  4   AUA SYMPTOM SCORE  15              Objective:    Physical Exam   Constitutional: He is oriented to person, place, and time  He appears well-developed and well-nourished  Neck: Normal range of motion  Cardiovascular: Intact distal pulses  Pulmonary/Chest: Effort normal    Abdominal: Soft  Bowel sounds are normal  He exhibits no distension and no mass  There is no tenderness  There is no rebound and no guarding  Genitourinary:   Genitourinary Comments: Incisions well healed  Musculoskeletal: Normal range of motion  Neurological: He is alert and oriented to person, place, and time  Skin: Skin is warm and dry  Psychiatric: He has a normal mood and affect  Vitals reviewed          Results  Lab Results   Component Value Date    PSA <0 1 01/13/2020    PSA <0 1 07/19/2019     Lab Results   Component Value Date    GLUCOSE 145 (H) 10/13/2015    CALCIUM 8 9 02/07/2020     10/13/2015    K 4 9 02/07/2020    CO2 32 02/07/2020     02/07/2020    BUN 27 (H) 02/07/2020    CREATININE 1 03 02/07/2020     Lab Results   Component Value Date    WBC 10 61 (H) 11/28/2019    HGB 11 0 (L) 11/28/2019    HCT 34 3 (L) 11/28/2019    MCV 98 11/28/2019     11/28/2019       No results found for this or any previous visit (from the past 1 hour(s)) ]

## 2020-03-05 NOTE — TELEPHONE ENCOUNTER
Wife called and left a message on the script line that a new script is needed with the correct instructions since patient can take up to 800 mg of gabapentin  Pharmacy is saying that script on file is not able to be filled since it is to soon  He is running out of medicine and will need ASAP

## 2020-03-05 NOTE — TELEPHONE ENCOUNTER
Patient calling to ask for a refill of gabapentin  Informed him that Dr Yan Barbour has sent the script over earlier today

## 2020-03-07 NOTE — ED PROVIDER NOTES
History  Chief Complaint   Patient presents with    Urinary Retention     Pt reports urninary retention since tuesday when urinary sphincter implant (installed ) was activated  Pt was able to urinate a small amount during this time  No other complaints at this time  Patient is a 78year old male with inability to urinate for past 2 days with minimal urine output  Had urinary sphincter implant placed on 2019 and this was activated this past Tuesday so patient started using this  No fever  No N/V  Was last seen in this ED on 20 for fall  [facility] website checked on this patient and last Rx filled was on 20 for ativan for 30 day supply  Urologist: Dr Angel Ramos  History provided by:  Patient and spouse   used: No        Prior to Admission Medications   Prescriptions Last Dose Informant Patient Reported? Taking? ACCU-CHEK FASTCLIX LANCETS MISC  Spouse/Significant Other No No   Sig: Check blood sugar 4 times daily  Calcium Citrate-Vitamin D (CALCIUM CITRATE +D) 315-250 MG-UNIT TABS  Spouse/Significant Other Yes No   Sig: Take 1 tablet by mouth daily   Coenzyme Q10 (CO Q 10) 100 MG CAPS  Spouse/Significant Other Yes No   Sig: Take 100 mg by mouth daily   Glucagon, rDNA, (GLUCAGON EMERGENCY IJ)  Spouse/Significant Other Yes No   Sig: Inject 1 kit as directed daily as needed   Insulin Pen Needle (NOVOFINE AUTOCOVER) 30G X 8 MM MISC  Spouse/Significant Other No No   Sig: The patient test his blood sugars 4 times daily  Patient not taking: Reported on 2020   LORazepam (ATIVAN) 1 mg tablet  Spouse/Significant Other No No   Si/2 tablet by mouth in the a m  Prn   And 1 tablet p o  Q h s  p r n   Anxiety   Multiple Vitamins-Minerals (CENTRUM SILVER) tablet  Spouse/Significant Other Yes No   Sig: Take 1 tablet by mouth daily   VIIBRYD 40 MG tablet  Spouse/Significant Other No No   Sig: TAKE 1 TABLET BY MOUTH EVERY DAY   Patient taking differently: 40 mg daily with breakfast    VITAMIN D, CHOLECALCIFEROL, PO  Spouse/Significant Other Yes No   Sig: Take 3,000 Units by mouth daily   albuterol (PROAIR HFA) 90 mcg/act inhaler  Spouse/Significant Other No No   Sig: Inhale 2 puffs every 6 (six) hours as needed for wheezing   Patient not taking: Reported on 2020   ascorbic acid (VITAMIN C) 500 mg tablet  Spouse/Significant Other Yes No   Sig: Take 500 mg by mouth daily   aspirin 81 MG tablet  Spouse/Significant Other Yes No   Sig: Take 1 tablet by mouth daily    cyanocobalamin (VITAMIN B-12) 500 mcg tablet  Spouse/Significant Other Yes No   Sig: Take 1 tablet by mouth daily   gabapentin (NEURONTIN) 100 mg capsule  Spouse/Significant Other No No   Sig: TAKE FOUR CAPSULES BY MOUTH THREE TIMES A DAY   START WITH 1 CAPSULE THREE TIMES DAILY AND INCREASE AS DIRECTED   Patient taking differently: Take 800 mg by mouth 3 (three) times a day    gabapentin (NEURONTIN) 400 mg capsule   No No   Sig: Take 2 capsules (800 mg total) by mouth 3 (three) times a day   glucose blood (ACCU-CHEK DENYS PLUS) test strip  Spouse/Significant Other No No   Si each by Other route 4 (four) times a day Use as instructed   metFORMIN (GLUCOPHAGE) 500 mg tablet  Spouse/Significant Other No No   Sig: Take 1 tablet (500 mg total) by mouth 3 (three) times a day   midodrine (PROAMATINE) 5 mg tablet  Spouse/Significant Other No No   Sig: TAKE 1 TABLET BY MOUTH THREE TIMES DAILY   nitroglycerin (NITROSTAT) 0 4 mg SL tablet  Spouse/Significant Other No No   Sig: Place 1 tablet (0 4 mg total) under the tongue every 5 (five) minutes as needed for chest pain   ranolazine (RANEXA) 1000 MG SR tablet  Spouse/Significant Other No No   Sig: Take 1 tablet (1,000 mg total) by mouth 2 (two) times a day   rosuvastatin (CRESTOR) 40 MG tablet  Spouse/Significant Other No No   Sig: Take 1 tablet (40 mg total) by mouth daily   Patient taking differently: Take 40 mg by mouth daily in the early morning       Facility-Administered Medications Last Administration Doses Remaining   levalbuterol (XOPENEX HFA) inhaler 1 puff None recorded           Past Medical History:   Diagnosis Date    [facility]     Aneurysm of infrarenal abdominal aorta ([facility]) 10/23/2012    Anxiety     Benign essential hypertension     CKD (chronic kidney disease) stage 2, GFR 60-89 ml/min 4/11/2018    Compression fracture of twelfth thoracic vertebra ([facility]) 1/2/2019    Coronary artery disease involving native coronary artery of native heart without angina pectoris 1/6/2014    Description: 50% distal left main, 75% proximal LAD, 90% 1st diagonal, 50% ostial circumflex, 99% proximal circumflex, 90% 2nd OM, 30 mid RCA , 90% right posterolateral - left heart catheterization December 2013   Diabetes mellitus ([facility] )     DVT, lower extremity ([address] )     Dyslipidemia 9/24/2012    Essential tremor 4/2/2019    GERD (gastroesophageal reflux disease)     Hyperlipidemia     Hypertension     Stopped his Medications because of Orthostatic hypotension    NSTEMI (non-ST elevated myocardial infarction) ([address] )     Obesity 7/13/2017    Orthostatic hypotension     Prostate cancer ([facility]     Right bundle branch block (RBBB)     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    LIMA to LAD, SVG to OM1, SVG to distal RCA    Skin cancer     Type 2 diabetes mellitus with hyperglycemia, with long-term current use of insulin ([facility] [address] ) 7/3/2017    Vitamin D deficiency 2/19/2018       Past Surgical History:   Procedure Laterality Date    ANKLE ARTHROSCOPY W/ OPEN REPAIR Left     CARDIAC SURGERY      CORONARY ARTERY BYPASS GRAFT  12/11/2013    CABG x3 with LIMA to LAD, SVG to OM-1, SVG to posterior lateral, LYSIS of pericardial adhesions   DC COLONOSCOPY FLX DX W/COLLJ SPEC WHEN PFRMD N/A 8/9/2018    Procedure: COLONOSCOPY;  Surgeon: Leatha Christy MD;  [facility];   Service: Gastroenterology    DC REPAIR UMBILICAL ODBQ,8+W/D,QXQVU N/A 3/10/2017    Procedure: REPAIR HERNIA UMBILICAL;  Surgeon: Brittney Gutiérrez MD;  Location: [facility];  Service: General    PROSTATECTOMY  2008    SPHINCTEROPLASTY URINARY N/A 11/6/2019    Procedure: URINARY SPHINCTEROPLASTY;  Surgeon: Dangelo Kilgore MD;  Location[facility];  Service: Urology       Family History   Problem Relation Age of Onset    Crohn's disease Mother     Liver cancer Mother     Hypertension Mother     Crohn's disease Father     Liver cancer Father     Heart disease Father     Hypertension Father     Hyperlipidemia Father     Colon cancer Brother     Aortic aneurysm Brother         abdominal     Heart disease Brother         abdominal aortic aneurysm    Hypertension Brother     Hyperlipidemia Brother     Colon polyps Family     Stomach cancer Family     Hypertension Sister     Mitral valve prolapse Sister 80        valve replacment      I have reviewed and agree with the history as documented  E-Cigarette/Vaping    E-Cigarette Use Never User      E-Cigarette/Vaping Substances     Social History     Tobacco Use    Smoking status: Former Smoker     Types: Cigarettes    Smokeless tobacco: Never Used    Tobacco comment: quit 1967   Substance Use Topics    Alcohol use: Yes     Frequency: Monthly or less     Drinks per session: 1 or 2     Binge frequency: Never     Comment: rare socially    Drug use: No       Review of Systems   Constitutional: Negative for fever  Gastrointestinal: Negative for nausea and vomiting  Genitourinary: Positive for difficulty urinating  All other systems reviewed and are negative  Physical Exam  Physical Exam   Constitutional: He is oriented to person, place, and time  He appears well-developed and well-nourished  He appears distressed (moderate)  HENT:   Head: Normocephalic and atraumatic  Mucous membranes moist     Eyes: No scleral icterus  Neck: No JVD present  No tracheal deviation present  Cardiovascular: Normal rate and regular rhythm     Murmur heard   Pulmonary/Chest: Effort normal and breath sounds normal  No respiratory distress  Abdominal: Bowel sounds are normal  He exhibits distension (suprapubic)  There is tenderness (mild suprapubic)  There is no guarding  Musculoskeletal: He exhibits no edema or deformity  Neurological: He is alert and oriented to person, place, and time  Skin: Skin is warm and dry  No rash noted  Psychiatric: He has a normal mood and affect  Nursing note and vitals reviewed        Vital Signs  ED Triage Vitals [03/06/20 2149]   Temperature Pulse Respirations Blood Pressure SpO2   97 8 °F (36 6 °C) 85 18 (!) 101/49 96 %      Temp Source Heart Rate Source Patient Position - Orthostatic VS BP Location FiO2 (%)   Oral Monitor Lying Right arm --      Pain Score       No Pain           Vitals:    03/06/20 2149 03/06/20 2351   BP: (!) 101/49 110/60   Pulse: 85 80   Patient Position - Orthostatic VS: Lying Lying         Visual Acuity      ED Medications  Medications   lidocaine (URO-JET) 2 % urethral/mucosal gel 1 application (1 application Urethral Given 3/6/20 2218)       Diagnostic Studies  Results Reviewed     Procedure Component Value Units Date/Time    UA w Reflex to Microscopic w Reflex to Culture [[id number]] Collected:  03/06/20 2258    Lab Status:  Final result Specimen:  Urine, Straight Cath Updated:  03/06/20 2353     Color, UA Yellow     Clarity, UA Clear     Specific Gravity, UA 1 025     pH, UA 6 0     Leukocytes, UA Negative     Nitrite, UA Negative     Protein, UA Negative mg/dl      Glucose, UA Negative mg/dl      Ketones, UA Negative mg/dl      Urobilinogen, UA 0 2 E U /dl      Bilirubin, UA Negative     Blood, UA Negative                 No orders to display              Procedures  Procedures         ED Course  ED Course as of Mar 07 0003   Fri Mar 06, 2020   2340 Bladder scan did not show much urine in bladder but I have seen the bladder scan be quite inaccurate so garcia placed with not much urine obtained  Hale to be removed  Sat Mar 07, 2020   0001 UA d/w patient and wife with patient's permission  Nontender abdomen prior to discharge  MDM  Number of Diagnoses or Management Options  Diagnosis management comments: DDX including but not limited to: urinary retention, reaction to anesthesia, BPH, hematuria, UTI, tumor, neurologic etiology  Amount and/or Complexity of Data Reviewed  Clinical lab tests: ordered and reviewed  Decide to obtain previous medical records or to obtain history from someone other than the patient: yes  Obtain history from someone other than the patient: yes  Review and summarize past medical records: yes  Independent visualization of images, tracings, or specimens: yes          Disposition  Final diagnoses:   Difficulty urinating     Time reflects when diagnosis was documented in both MDM as applicable and the Disposition within this note     Time User Action Codes Description Comment    3/6/2020 11:41 PM Gavin Galeazzi Add [[id number]] Difficulty urinating       ED Disposition     ED Disposition Condition Date/Time Comment    Discharge Stable Sat Mar 7, 2020 12:02 AM Luis Enrique Kaufman discharge to home/self care  Follow-up Information     Follow up With Specialties Details Why Contact Info    John Perales MD Urology Call in 3 days Return sooner if increased difficulty urinating, fever, vomiting, pain, rash, weakness  [facility]   [phone]            Patient's Medications   Discharge Prescriptions    No medications on file     No discharge procedures on file      PDMP Review       Value Time User    PDMP Reviewed  Yes 3/6/2020  9:59 PM Roxene Goldmann, MD          ED Provider  Electronically Signed by           Roxene Goldmann, MD  03/07/20 7862

## 2020-03-09 NOTE — TELEPHONE ENCOUNTER
LM for Jose Rafael Mitchell that we would be able to work him in on Thursday 3/12 @ 8:00 - he is to call back and vierify

## 2020-03-09 NOTE — TELEPHONE ENCOUNTER
JESI Cruz, wondering about his ER visit - he went in for rentention, nothing was seen on bladder scan and they still inserted a catheter and noting came out, Catheter removed

## 2020-03-09 NOTE — TELEPHONE ENCOUNTER
Patient called to confirm appointment [place] 03[place]12/20 [place]8 [place] [facility] [place]  Appointment not on patient's chart

## 2020-03-09 NOTE — TELEPHONE ENCOUNTER
Call placed to patient, advised that was an error  Offered patient appointment at [address] [place] [address] 3/11 at [place], which patient confirmed

## 2020-03-09 NOTE — TELEPHONE ENCOUNTER
Patient managed by Esequiel Anthony is calling to say he was seen in emergency room for urinary retention on 3/6/20  Requesting a call back

## 2020-03-09 NOTE — TELEPHONE ENCOUNTER
Patient states the activation of sphincter some times work and sometimes it doesn't, H would like to schedule an appointment to see Dr Marilyn Delarosa again and he is leaking or nothing coming out tentatively scheduled him in [facility] for 3/26 @ 1:15   Will continue to look for appointments in [facility]

## 2020-03-10 NOTE — PROGRESS NOTES
Outreach TC to patient for CM assessment  Patient was seen in the ED at [facility] secondary to dysuria  Patient did not have UTI  Patient did have an artificial urinary sphincter implanted in 11/2019 and had the device activated on 3/3/2020  Patient did not have a large amount of urine in the bladder even after an indwelling garcia catheter was placed  Garcia was removed and patient was discharged to home  Patient reports to this CM today that he is mostly incontinent of urine  Patient denies any pain, fever, dysuria, purulent or foul smelling urine  Patient will follow up with urology tomorrow  Reviewed with patient s/sx of UTI, home medications, s/sx to report, future appointments and how/when to report symptoms to provider vs 911  Reviewed no NSAIDS and to remain well hydrated  Patient verbalizes understanding and agrees to additional calls

## 2020-03-11 NOTE — PROGRESS NOTES
PT Evaluation     Today's date: 3/11/2020  Patient name: Dorris Habermann  : 1940  MRN: [id number]  Referring provider: Nuria Melendrez DO  Dx:   Encounter Diagnosis     ICD-10-CM    1  Balance disorder R26 89    2  Muscular deconditioning R29 898 Ambulatory referral to Physical Therapy   3  Generalized weakness R53 1    4  Physical deconditioning R53 81                   Assessment  Assessment details: Patient is a 78 y o  Male who present to PT for evaluation of balance after having many falls  Patient reports that he has an intention tremor, not Parkinson's, and that he has had 30+ falls over the last 6 months  Patient reports to therapy with [facility] and displays slow danielle, forward head/forward rounded shoulders, small stride length, and decreased foot clearance  Patient displays 3+/5 for bilateral MMT  Patient completed 450 ft during the 6 minute walk test, showing decreased muscular endurance  Patient performed 10 meter walk test, walking at a speed of 0 39 m/s  Patient only able to stand for 11 seconds with FTEO and 2 66 seconds with FTEC due to poor proprioception and small WILD  Patient refused FTEO/C with foam due to fear of falling  Patient completed TUG in 36 16 seconds showing an increase in risk of falls  Patient scored 20/56 with COHEN balance scale, presenting with a high risk of falls and poor balance  Throughout evaluation, patient frequently needed to be motivated to perform tests due to increased fear of falling  Patient will benefit from skilled PT services to increase his muscular strength, endurance, improve balance, increase functional mobility, and decrease risk of falls  Impairments: abnormal gait, activity intolerance, impaired balance, impaired physical strength, lacks appropriate home exercise program, weight-bearing intolerance and poor posture   Understanding of Dx/Px/POC: fair   Prognosis: fair    Goals  STG (4 Weeks)  1  Patient will be independent with basic HEP  2  Patient will improve COHEN score from 20/56 to 26/56 to show minimal detectable change and improvement in balance  3  Patient will decrease TUG time from 36 16 seconds to 32 seconds to show minimal detectable change and a decreased risk of falls  4  Patient will increase 6 minute walk test from 450 ft to 640 ft to show minimal detectable change and increased muscular endurance  5  Patient will increase LE MMT from 3+/5 to 4-/5 to show increased muscular strength      LTG (8 Weeks)  1  Patient will be independent with complex HEP  2  Patient will transfer from floor to standing for increased functional mobility  3  Patient will ambulate outside without LOB for increased community ambulation  4  Patient will increase COHEN score from 26/56 to 32/56 to show minimal detectable change and improvement in balance  5  Patient will decrease TUG time from 32 seconds to 28 seconds to show minimal detectable change and decreased risk of falls  Plan  Patient would benefit from: skilled physical therapy  Planned therapy interventions: balance, balance/weight bearing training, gait training, graded activity, graded exercise, graded motor, home exercise program, manual therapy, neuromuscular re-education, postural training, patient education, stretching, strengthening, therapeutic activities, therapeutic exercise, therapeutic training and transfer training  Frequency: 2x week  Plan of Care beginning date: 3/11/2020  Plan of Care expiration date: 6/3/2020        Subjective Evaluation    History of Present Illness  Mechanism of injury: IPatient reports to PT for evaluation of balance with SBQC  Patient reports he has had 30+ falls over the last 6 months and has hip pain from these falls  He also states he is very afraid of falling   He currently has no dizziness but reports 8/10 bilateral hip pain due to not taking tylenol prior to therapy  Pain  Current pain ratin    Social Support  Steps to enter house: yes  Stairs in house: no Lives in: one-story house  Lives with: spouse    Employment status: not working  Hand dominance: right    Treatments  Previous treatment: physical therapy  Patient Goals  Patient goals for therapy: improved balance, increased strength and independence with ADLs/IADLs          Objective     Functional Assessment        Comments  Gross MMT: 3+/5    5xSTS: pt refused    MCTSIB:   -FTEO: 11 seconds  -FTEC: 2 66 seconds  -FTEO/FTEC: refused      TU 16 seconds      COHEN/56      6 Minute Walk Test: 450 ft    10 Meter Walk Test: 25 47 sec = 0 39 m/s             Precautions:  has a past medical history of AAA (abdominal aortic aneurysm) ([facility] ), Aneurysm of infrarenal abdominal aorta ([place] [address] ) (10/23/2012), Anxiety, Benign essential hypertension, CKD (chronic kidney disease) stage 2, GFR 60-89 ml/min (2018), Compression fracture of twelfth thoracic vertebra ([place] [address] ) (2019), Coronary artery disease involving native coronary artery of native heart without angina pectoris (2014), Diabetes mellitus ([place] [address] ), DVT, lower extremity ([place] [address] ), Dyslipidemia (2012), Essential tremor (2019), GERD (gastroesophageal reflux disease), Hyperlipidemia, Hypertension, NSTEMI (non-ST elevated myocardial infarction) ([facility] ), Obesity (2017), Orthostatic hypotension, Prostate cancer ([place] [address] ), Right bundle branch block (RBBB), S/P CABG x 3 (7/3/2017), Skin cancer, Type 2 diabetes mellitus with hyperglycemia, with long-term current use of insulin ([facility] ) (7/3/2017), and Vitamin D deficiency (2018)

## 2020-03-13 NOTE — LETTER
March 13, 2020     [address], [address] [facility] [address]    Patient: Ekaterina Tai   YOB: 1940   Date of Visit: 3/13/2020       Dear Dr Tiffani Gar: Thank you for referring Dominga Harley to me for evaluation  Below are my notes for this consultation  If you have questions, please do not hesitate to call me  I look forward to following your patient along with you  Sincerely,        Bienvenido Joy MD        CC: MD Bienvenido Duron MD  3/13/2020  3:31 PM  Sign at close encounter  3/13/2020    Ekaterina Tai  1940  [id number]        Assessment  Prostate cancer status post prostatectomy, postoperative incontinence, status post artificial urinary sphincter insertion      Discussion  I had a lengthy discussion with the patient his wife in the office today  I provided them with reassurance that I was able to cycle the device appropriately and that the artificial urinary sphincter shows no signs of infection and is working properly  We discussed that the largest barrier to appropriate cycling of the artificial urinary sphincter is the patient's dexterity and difficulties cycling the device with his tremor  Ultimately he was able to demonstrate successful cycling of the device and the patient's wife is comfortable doing this as well  I recommend that she cycle the device at least twice per day  Follow-up with Dr Allan Morgan for additional evaluation in the near future  History of Present Illness  78 y o  male with a history of prostate cancer  In 2008 the patient underwent prostatectomy at [facility]  He has been incontinent since that time  Most recently he underwent artificial urinary sphincter insertion with Dr Allan Morgan  This was performed in November 2019  The patient developed C diff colitis in the immediate postoperative period and required hospitalization  Fortunately the device did not get infected    The patient presents today as the artificial urinary sphincter was recently activated by Dr Asad Jc  He is having trouble cycling the device  He feels that he is unable to empty although his residual in the office today is only 100 cc  He is cycling the device the question is how well as he cycling the device  The patient's wife is able to do this for him as well  He reports significant essential tremor  He denies a neurologic diagnosis  PSA remains undetectable  AUA Symptom Score      Review of Systems  Review of Systems   Constitutional: Negative  HENT: Negative  Eyes: Negative  Respiratory: Negative  Cardiovascular: Negative  Gastrointestinal: Negative  Endocrine: Negative  Genitourinary:        Per HPI   Musculoskeletal: Negative  Skin: Negative  Allergic/Immunologic: Negative  Neurological: Negative  Hematological: Negative  Psychiatric/Behavioral: Negative  Past Medical History  Past Medical History:   Diagnosis Date    AAA [facility]abdominal [facility]) [facility] [address] [facility]     Aneurysm of infrarenal abdominal aorta (HCC) 10/23/2012    Anxiety     Benign essential hypertension     CKD (chronic kidney disease) stage 2, GFR 60-89 ml/min 4/11/2018    Compression fracture of twelfth thoracic vertebra (HCC) 1/2/2019    Coronary artery disease involving native coronary artery of native heart without angina pectoris 1/6/2014    Description: 50% distal left main, 75% proximal LAD, 90% 1st diagonal, 50% ostial circumflex, 99% proximal circumflex, 90% 2nd OM, 30 mid RCA , 90% right posterolateral - left heart catheterization December 2013      Diabetes mellitus ([facility] [address] )     DVT, lower extremity ([address] )     Dyslipidemia 9/24/2012    Essential tremor 4/2/2019    GERD (gastroesophageal reflux disease)     Hyperlipidemia     Hypertension     Stopped his Medications because of Orthostatic hypotension    NSTEMI (non-ST elevated myocardial infarction) ([facility] [address] )     Obesity 7/13/2017    Orthostatic hypotension     Prostate cancer (HCC)     Right bundle branch block (RBBB)     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    LIMA to LAD, SVG to OM1, SVG to distal RCA    Skin cancer     Type 2 diabetes mellitus with hyperglycemia, with long-term current use of insulin ([facility] [address] ) 7/3/2017    Vitamin D deficiency 2/19/2018       Past Social History  Past Surgical History:   Procedure Laterality Date    ANKLE ARTHROSCOPY W/ OPEN REPAIR Left     CARDIAC SURGERY      CORONARY ARTERY BYPASS GRAFT  12/11/2013    CABG x3 with LIMA to LAD, SVG to OM-1, SVG to posterior lateral, LYSIS of pericardial adhesions   MI COLONOSCOPY FLX DX W/COLLJ SPEC WHEN PFRMD N/A 8/9/2018    Procedure: COLONOSCOPY;  Surgeon: Tung Tripathi MD;  [facility];   Service: Gastroenterology    MI REPAIR UMBILICAL YQMM,5+G/U,TLIUV N/A 3/10/2017    Procedure: REPAIR HERNIA UMBILICAL;  Surgeon: Beverly Wilkinson MD;  [facility]: [facility];  Service: General    PROSTATECTOMY  2008    SPHINCTEROPLASTY URINARY N/A 11/6/2019    Procedure: URINARY SPHINCTEROPLASTY;  Surgeon: Luis Leo MD;  [facility];  Service: Urology       Past Family History  Family History   Problem Relation Age of Onset    Crohn's disease Mother     Liver cancer Mother     Hypertension Mother     Crohn's disease Father     Liver cancer Father     Heart disease Father     Hypertension Father     Hyperlipidemia Father     Colon cancer Brother     Aortic aneurysm Brother         abdominal     Heart disease Brother         abdominal aortic aneurysm    Hypertension Brother     Hyperlipidemia Brother     Colon polyps Family     Stomach cancer Family     Hypertension Sister     Mitral valve prolapse Sister 80        valve replacment        Past Social history  Social History     Socioeconomic History    Marital status: /Civil Union     Spouse name: Not on file    Number of children: Not on file    Years of education: Not on file   NPM education level: Not on file   Occupational History    Not on file   Social Needs    Financial resource strain: Not on file    Food insecurity:     Worry: Not on file     Inability: Not on file    Transportation needs:     Medical: Not on file     Non-medical: Not on file   Tobacco Use    Smoking status: Former Smoker     Types: Cigarettes    Smokeless tobacco: Never Used    Tobacco comment: quit 1967   Substance and Sexual Activity    Alcohol use: Yes     Frequency: Monthly or less     Drinks per session: 1 or 2     Binge frequency: Never     Comment: rare socially    Drug use: No    Sexual activity: Never   Lifestyle    Physical activity:     Days per week: Not on file     Minutes per session: Not on file    Stress: Not on file   Relationships    Social connections:     Talks on phone: Not on file     Gets together: Not on file     Attends Jehovah's witness service: Not on file     Active member of club or organization: Not on file     Attends meetings of clubs or organizations: Not on file     Relationship status: Not on file    Intimate partner violence:     Fear of current or ex partner: Not on file     Emotionally abused: Not on file     Physically abused: Not on file     Forced sexual activity: Not on file   Other Topics Concern    Not on file   Social History Narrative    Not on file       Current Medications  Current Outpatient Medications   Medication Sig Dispense Refill    ACCU-CHEK FASTCLIX LANCETS MISC Check blood sugar 4 times daily   306 each 1    ascorbic acid (VITAMIN C) 500 mg tablet Take 500 mg by mouth daily      aspirin 81 MG tablet Take 1 tablet by mouth daily       Calcium Citrate-Vitamin D (CALCIUM CITRATE +D) 315-250 MG-UNIT TABS Take 1 tablet by mouth daily      Coenzyme Q10 (CO Q 10) 100 MG CAPS Take 100 mg by mouth daily      cyanocobalamin (VITAMIN B-12) 500 mcg tablet Take 1 tablet by mouth daily      gabapentin (NEURONTIN) 100 mg capsule TAKE FOUR CAPSULES BY MOUTH THREE TIMES A DAY  START WITH 1 CAPSULE THREE TIMES DAILY AND INCREASE AS DIRECTED (Patient taking differently: Take 800 mg by mouth 3 (three) times a day ) 360 capsule 0    gabapentin (NEURONTIN) 400 mg capsule Take 2 capsules (800 mg total) by mouth 3 (three) times a day 540 capsule 3    Glucagon, rDNA, (GLUCAGON EMERGENCY IJ) Inject 1 kit as directed daily as needed      glucose blood (ACCU-CHEK DENYS PLUS) test strip 1 each by Other route 4 (four) times a day Use as instructed 200 each 2    LORazepam (ATIVAN) 1 mg tablet 1/2 tablet by mouth in the a m  Prn   And 1 tablet p o  Q h s  p r n  Anxiety 45 tablet 0    metFORMIN (GLUCOPHAGE) 500 mg tablet Take 1 tablet (500 mg total) by mouth 3 (three) times a day 60 tablet 2    midodrine (PROAMATINE) 5 mg tablet TAKE 1 TABLET BY MOUTH THREE TIMES DAILY 90 tablet 1    Multiple Vitamins-Minerals (CENTRUM SILVER) tablet Take 1 tablet by mouth daily      nitroglycerin (NITROSTAT) 0 4 mg SL tablet Place 1 tablet (0 4 mg total) under the tongue every 5 (five) minutes as needed for chest pain 90 tablet 3    ranolazine (RANEXA) 1000 MG SR tablet Take 1 tablet (1,000 mg total) by mouth 2 (two) times a day 180 tablet 3    rosuvastatin (CRESTOR) 40 MG tablet Take 1 tablet (40 mg total) by mouth daily (Patient taking differently: Take 40 mg by mouth daily in the early morning ) 90 tablet 3    VIIBRYD 40 MG tablet TAKE 1 TABLET BY MOUTH EVERY DAY (Patient taking differently: 40 mg daily with breakfast ) 30 tablet 5    VITAMIN D, CHOLECALCIFEROL, PO Take 3,000 Units by mouth daily      albuterol (PROAIR HFA) 90 mcg/act inhaler Inhale 2 puffs every 6 (six) hours as needed for wheezing (Patient not taking: Reported on 1/20/2020) 1 Inhaler 1    Insulin Pen Needle (NOVOFINE AUTOCOVER) 30G X 8 MM MISC The patient test his blood sugars 4 times daily   (Patient not taking: Reported on 2/18/2020) 100 each 4     Current Facility-Administered Medications   Medication Dose Route Frequency Provider Last Rate Last Dose    levalbuterol (XOPENEX HFA) inhaler 1 puff  1 puff Inhalation Q6H PRN Karol Feeler, DO           Allergies  Allergies   Allergen Reactions    Adhesive [Medical Tape] Rash    Sulfa Antibiotics Other (See Comments)     Generalized redness       Past Medical History, Social History, Family History, medications and allergies were reviewed  Vitals  Vitals:    03/13/20 1450   BP: 130/70   BP Location: Left arm   Patient Position: Sitting   Cuff Size: Adult   Pulse: 62   Weight: 101 kg (223 lb)   Height: 5' 10" (1 778 m)       Physical Exam  Physical Exam    On examination he is in no acute distress  His abdomen is soft obese nontender nondistended  There is no visualized incision  There is no sign of infection or cellulitis  Phallus, scrotum and scrotal contents are normal   A pump is located in the mid scrotum  The device cycled without difficulty and approximately 100 cc of yellow urine drained from the urethra  This is come measure at with the amount of urine on PVR  I then instructed the patient and his wife on appropriate cycling of the device  Gait is slow  Neurologic reveals a significant intention tremor  Results  Lab Results   Component Value Date    PSA <0 1 01/13/2020    PSA <0 1 07/19/2019     Lab Results   Component Value Date    GLUCOSE 145 (H) 10/13/2015    CALCIUM 8 9 02/07/2020     10/13/2015    K 4 9 02/07/2020    CO2 32 02/07/2020     02/07/2020    BUN 27 (H) 02/07/2020    CREATININE 1 03 02/07/2020     Lab Results   Component Value Date    WBC 10 61 (H) 11/28/2019    HGB 11 0 (L) 11/28/2019    HCT 34 3 (L) 11/28/2019    MCV 98 11/28/2019     11/28/2019         Office Urine Dip  Recent Results (from the past 1 hour(s))   POCT Measure PVR    Collection Time: 03/13/20  3:01 PM   Result Value Ref Range    POST-VOID RESIDUAL VOLUME, ML  mL   ]      Total visit time was 25 minutes of which over 50% was spent on counseling

## 2020-03-13 NOTE — PROGRESS NOTES
3/13/2020    Lynette Hassan  1940  [id number]        Assessment  Prostate cancer status post prostatectomy, postoperative incontinence, status post artificial urinary sphincter insertion      Discussion  I had a lengthy discussion with the patient his wife in the office today  I provided them with reassurance that I was able to cycle the device appropriately and that the artificial urinary sphincter shows no signs of infection and is working properly  We discussed that the largest barrier to appropriate cycling of the artificial urinary sphincter is the patient's dexterity and difficulties cycling the device with his tremor  Ultimately he was able to demonstrate successful cycling of the device and the patient's wife is comfortable doing this as well  I recommend that she cycle the device at least twice per day  Follow-up with Dr Malik Steve for additional evaluation in the near future  History of Present Illness  78 y o  male with a history of prostate cancer  In 2008 the patient underwent prostatectomy at [facility]  He has been incontinent since that time  Most recently he underwent artificial urinary sphincter insertion with Dr Malik Steve  This was performed in November 2019  The patient developed C diff colitis in the immediate postoperative period and required hospitalization  Fortunately the device did not get infected  The patient presents today as the artificial urinary sphincter was recently activated by Dr Malik Steve  He is having trouble cycling the device  He feels that he is unable to empty although his residual in the office today is only 100 cc  He is cycling the device the question is how well as he cycling the device  The patient's wife is able to do this for him as well  He reports significant essential tremor  He denies a neurologic diagnosis  PSA remains undetectable        AUA Symptom Score      Review of Systems  Review of Systems   Constitutional: Negative  HENT: Negative  Eyes: Negative  Respiratory: Negative  Cardiovascular: Negative  Gastrointestinal: Negative  Endocrine: Negative  Genitourinary:        Per HPI   Musculoskeletal: Negative  Skin: Negative  Allergic/Immunologic: Negative  Neurological: Negative  Hematological: Negative  Psychiatric/Behavioral: Negative  Past Medical History  Past Medical History:   Diagnosis Date    AAA (abdominal aortic aneurysm) ([facility] [address] )     Aneurysm [facility] infrarenal abdominal aorta ([facility]) 10/23/2012    Anxiety     Benign essential hypertension     CKD (chronic kidney disease) stage 2, GFR 60-89 ml/min 4/11/2018    Compression fracture of twelfth thoracic vertebra ([facility]) 1/2/2019    Coronary artery disease involving native coronary artery of native heart without angina pectoris 1/6/2014    Description: 50% distal left main, 75% proximal LAD, 90% 1st diagonal, 50% ostial circumflex, 99% proximal circumflex, 90% 2nd OM, 30 mid RCA , 90% right posterolateral - left heart catheterization December 2013      Diabetes mellitus ([facility] [address] )     DVT, lower extremity ([facility] [address] )     Dyslipidemia 9/24/2012    Essential tremor 4/2/2019    GERD (gastroesophageal reflux disease)     Hyperlipidemia     Hypertension     Stopped his Medications because of Orthostatic hypotension    NSTEMI (non-ST elevated myocardial infarction) ([address]     Obesity 7/13/2017    Orthostatic hypotension     Prostate cancer ([facility])     Right bundle branch block (RBBB)     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    LIMA to LAD, SVG to OM1, SVG to distal RCA    Skin cancer     Type 2 diabetes mellitus with hyperglycemia, with long-term current use of insulin ([facility] [address] ) 7/3/2017    Vitamin D deficiency 2/19/2018       Past Social History  Past Surgical History:   Procedure Laterality Date    ANKLE ARTHROSCOPY W/ OPEN REPAIR Left     CARDIAC SURGERY      CORONARY ARTERY BYPASS GRAFT  12/11/2013    CABG x3 with LIMA to LAD, SVG to OM-1, SVG to posterior lateral, LYSIS of pericardial adhesions   VA COLONOSCOPY FLX DX W/COLLJ SPEC WHEN PFRMD N/A 8/9/2018    Procedure: COLONOSCOPY;  Surgeon: Praveen Verma MD;  [facility];   Service: Gastroenterology    VA REPAIR UMBILICAL HQXF,8+M/P,IGSRZ N/A 3/10/2017    Procedure: REPAIR HERNIA UMBILICAL;  Surgeon: Julio César Sky MD;  [facility];  Service: General    PROSTATECTOMY  2008    SPHINCTEROPLASTY URINARY N/A 11/6/2019    Procedure: URINARY SPHINCTEROPLASTY;  Surgeon: Alvarado Landaverde MD;  [facility];  Service: Urology       Past Family History  Family History   Problem Relation Age of Onset    Crohn's disease Mother     Liver cancer Mother     Hypertension Mother     Crohn's disease Father     Liver cancer Father     Heart disease Father    [facility] Hypertension Father     Hyperlipidemia Father     Colon cancer Brother     Aortic aneurysm Brother         abdominal     Heart disease Brother         abdominal aortic aneurysm    Hypertension Brother     Hyperlipidemia Brother     Colon polyps Family     Stomach cancer Family     Hypertension Sister     Mitral valve prolapse Sister 80        valve replacment        Past Social history  Social History     Socioeconomic History    Marital status: /Civil Union     Spouse name: Not on file    Number of children: Not on file    Years of education: Not on file    Highest education level: Not on file   Occupational History    Not on file   Social Needs    Financial resource strain: Not on file    Food insecurity:     Worry: Not on file     Inability: Not on file    Transportation needs:     Medical: Not on file     Non-medical: Not on file   Tobacco Use    Smoking status: Former Smoker     Types: Cigarettes    Smokeless tobacco: Never Used    Tobacco comment: quit 1967   Substance and Sexual Activity    Alcohol use: Yes     Frequency: Monthly or less     Drinks per session: 1 or 2     Binge frequency: Never Comment: rare socially    Drug use: No    Sexual activity: Never   Lifestyle    Physical activity:     Days per week: Not on file     Minutes per session: Not on file    Stress: Not on file   Relationships    Social connections:     Talks on phone: Not on file     Gets together: Not on file     Attends Baptism service: Not on file     Active member of club or organization: Not on file     Attends meetings of clubs or organizations: Not on file     Relationship status: Not on file    Intimate partner violence:     Fear of current or ex partner: Not on file     Emotionally abused: Not on file     Physically abused: Not on file     Forced sexual activity: Not on file   Other Topics Concern    Not on file   Social History Narrative    Not on file       Current Medications  Current Outpatient Medications   Medication Sig Dispense Refill    ACCU-CHEK FASTCLIX LANCETS MISC Check blood sugar 4 times daily  306 each 1    ascorbic acid (VITAMIN C) 500 mg tablet Take 500 mg by mouth daily      aspirin 81 MG tablet Take 1 tablet by mouth daily       Calcium Citrate-Vitamin D (CALCIUM CITRATE +D) 315-250 MG-UNIT TABS Take 1 tablet by mouth daily      Coenzyme Q10 (CO Q 10) 100 MG CAPS Take 100 mg by mouth daily      cyanocobalamin (VITAMIN B-12) 500 mcg tablet Take 1 tablet by mouth daily      gabapentin (NEURONTIN) 100 mg capsule TAKE FOUR CAPSULES BY MOUTH THREE TIMES A DAY   START WITH 1 CAPSULE THREE TIMES DAILY AND INCREASE AS DIRECTED (Patient taking differently: Take 800 mg by mouth 3 (three) times a day ) 360 capsule 0    gabapentin (NEURONTIN) 400 mg capsule Take 2 capsules (800 mg total) by mouth 3 (three) times a day 540 capsule 3    Glucagon, rDNA, (GLUCAGON EMERGENCY IJ) Inject 1 kit as directed daily as needed      glucose blood (ACCU-CHEK DENYS PLUS) test strip 1 each by Other route 4 (four) times a day Use as instructed 200 each 2    LORazepam (ATIVAN) 1 mg tablet 1/2 tablet by mouth in the a m  Prn   And 1 tablet p o  Q h s  p r n  Anxiety 45 tablet 0    metFORMIN (GLUCOPHAGE) 500 mg tablet Take 1 tablet (500 mg total) by mouth 3 (three) times a day 60 tablet 2    midodrine (PROAMATINE) 5 mg tablet TAKE 1 TABLET BY MOUTH THREE TIMES DAILY 90 tablet 1    Multiple Vitamins-Minerals (CENTRUM SILVER) tablet Take 1 tablet by mouth daily      nitroglycerin (NITROSTAT) 0 4 mg SL tablet Place 1 tablet (0 4 mg total) under the tongue every 5 (five) minutes as needed for chest pain 90 tablet 3    ranolazine (RANEXA) 1000 MG SR tablet Take 1 tablet (1,000 mg total) by mouth 2 (two) times a day 180 tablet 3    rosuvastatin (CRESTOR) 40 MG tablet Take 1 tablet (40 mg total) by mouth daily (Patient taking differently: Take 40 mg by mouth daily in the early morning ) 90 tablet 3    VIIBRYD 40 MG tablet TAKE 1 TABLET BY MOUTH EVERY DAY (Patient taking differently: 40 mg daily with breakfast ) 30 tablet 5    VITAMIN D, CHOLECALCIFEROL, PO Take 3,000 Units by mouth daily      albuterol (PROAIR HFA) 90 mcg/act inhaler Inhale 2 puffs every 6 (six) hours as needed for wheezing (Patient not taking: Reported on 1/20/2020) 1 Inhaler 1    Insulin Pen Needle (NOVOFINE AUTOCOVER) 30G X 8 MM MISC The patient test his blood sugars 4 times daily  (Patient not taking: Reported on 2/18/2020) 100 each 4     Current Facility-Administered Medications   Medication Dose Route Frequency Provider Last Rate Last Dose    levalbuterol (XOPENEX HFA) inhaler 1 puff  1 puff Inhalation Q6H PRN Lizz [place], DO           Allergies  Allergies   Allergen Reactions    Adhesive [Medical Tape] Rash    Sulfa Antibiotics Other (See Comments)     Generalized redness       Past Medical History, Social History, Family History, medications and allergies were reviewed      Vitals  Vitals:    03/13/20 1450   BP: 130/70   BP Location: Left arm   Patient Position: Sitting   Cuff Size: Adult   Pulse: 62   Weight: 101 kg (223 lb)   Height: 5' 10" (1 778 m)       Physical Exam  Physical Exam    On examination he is in no acute distress  His abdomen is soft obese nontender nondistended  There is no visualized incision  There is no sign of infection or cellulitis  Phallus, scrotum and scrotal contents are normal   A pump is located in the mid scrotum  The device cycled without difficulty and approximately 100 cc of yellow urine drained from the urethra  This is come measure at with the amount of urine on PVR  I then instructed the patient and his wife on appropriate cycling of the device  Gait is slow  Neurologic reveals a significant intention tremor  Results  Lab Results   Component Value Date    PSA <0 1 01/13/2020    PSA <0 1 07/19/2019     Lab Results   Component Value Date    GLUCOSE 145 (H) 10/13/2015    CALCIUM 8 9 02/07/2020     10/13/2015    K 4 9 02/07/2020    CO2 32 02/07/2020     02/07/2020    BUN 27 (H) 02/07/2020    CREATININE 1 03 02/07/2020     Lab Results   Component Value Date    WBC 10 61 (H) 11/28/2019    HGB 11 0 (L) 11/28/2019    HCT 34 3 (L) 11/28/2019    MCV 98 11/28/2019     11/28/2019         Office Urine Dip  Recent Results (from the past 1 hour(s))   POCT Measure PVR    Collection Time: 03/13/20  3:01 PM   Result Value Ref Range    POST-VOID RESIDUAL VOLUME, ML  mL   ]      Total visit time was 25 minutes of which over 50% was spent on counseling

## 2020-03-30 NOTE — TELEPHONE ENCOUNTER
Patient returned phone call and expressed interest in e-visit using video chat to update HEP  PT to follow up this week to schedule visit and patient in good verbal understanding

## 2020-04-21 PROBLEM — I10 BENIGN ESSENTIAL HYPERTENSION: Status: ACTIVE | Noted: 2020-01-01

## 2020-07-07 NOTE — PROGRESS NOTES
Outreach TC to patient for CM assessment  Patient reports that he has much less urinary symptoms Patient reports that he wants to go to outpatient PT to help his balance  Patient has also started on Mrbetriq for urge incontinence  Patient denies any new or worsening symptoms  Patient has not had any utilization in the last 4 months  At this point patient is stable and feels that no further CM is needed  This CM is in agreement   This CM will close episode and remove self from care team

## 2020-09-15 NOTE — PROGRESS NOTES
Assessment/Plan:    Urinary incontinence  We will continue with Myrbetriq  Medication was refilled and he will follow up in 1 year  He can contact us sooner if his symptoms continue to be bothersome and we can try adding medications if needed  Diagnoses and all orders for this visit:    Urinary incontinence, unspecified type  -     POCT urine dip  -     POCT Measure PVR  -     Mirabegron ER (Myrbetriq) 50 MG TB24; Take 1 tablet (50 mg total) by mouth daily          Total visit time was 15 minutes of which over 50% was spent on counseling  Subjective:     Patient ID: Delia Ya is a 78 y o  male    78-year-old male presents for follow-up of urinary incontinence  He has noticed slight improvement with the Myrbetriq  He denies any problems with his sphincter  He has no other complaints  The following portions of the patient's history were reviewed and updated as appropriate: allergies, current medications, past family history, past medical history, past social history, past surgical history and problem list     Review of Systems   Constitutional: Negative  HENT: Negative  Eyes: Negative  Respiratory: Negative  Cardiovascular: Negative  Gastrointestinal: Negative  Endocrine: Negative  Genitourinary:        As noted per HPI   Musculoskeletal: Negative  Skin: Negative  Allergic/Immunologic: Negative  Neurological: Negative  Hematological: Negative  Psychiatric/Behavioral: Negative  AUA SYMPTOM SCORE      Most Recent Value   AUA SYMPTOM SCORE   How often have you had a sensation of not emptying your bladder completely after you finished urinating? 3   How often have you had to urinate again less than two hours after you finished urinating? 4   How often have you found you stopped and started again several times when you urinate?  0   How often have you found it difficult to postpone urination?   3   How often have you had a weak urinary stream?  3   How often have you had to push or strain to begin urination? 3   How many times did you most typically get up to urinate from the time you went to bed at night until the time you got up in the morning? 2   Quality of Life: If you were to spend the rest of your life with your urinary condition just the way it is now, how would you feel about that?  3   AUA SYMPTOM SCORE  18              Objective:    Physical Exam  Vitals signs reviewed  Constitutional:       Appearance: He is well-developed  Neck:      Musculoskeletal: Normal range of motion  Pulmonary:      Effort: Pulmonary effort is normal    Abdominal:      General: Bowel sounds are normal  There is no distension  Palpations: Abdomen is soft  There is no mass  Tenderness: There is no abdominal tenderness  There is no guarding or rebound  Musculoskeletal: Normal range of motion  Skin:     General: Skin is warm and dry  Neurological:      Mental Status: He is alert and oriented to person, place, and time             Results  Lab Results   Component Value Date    PSA <0 1 01/13/2020    PSA <0 1 07/19/2019     Lab Results   Component Value Date    GLUCOSE 145 (H) 10/13/2015    CALCIUM 8 9 02/07/2020     10/13/2015    K 4 9 02/07/2020    CO2 32 02/07/2020     02/07/2020    BUN 27 (H) 02/07/2020    CREATININE 1 03 02/07/2020     Lab Results   Component Value Date    WBC 10 61 (H) 11/28/2019    HGB 11 0 (L) 11/28/2019    HCT 34 3 (L) 11/28/2019    MCV 98 11/28/2019     11/28/2019       No results found for this or any previous visit (from the past 1 hour(s)) ]

## 2020-09-15 NOTE — ASSESSMENT & PLAN NOTE
We will continue with Myrbetriq  Medication was refilled and he will follow up in 1 year  He can contact us sooner if his symptoms continue to be bothersome and we can try adding medications if needed

## 2020-09-16 NOTE — PROGRESS NOTES
Pt DC secondary to interruption in care secondary to COVID 19  Pt does not wish to return per phone call today

## 2020-09-16 NOTE — TELEPHONE ENCOUNTER
1360: VOICE MAIL MESSAGE;  7582: VOICE MAIL MESSAGE RETRIEVED:  Pt is calling to cancel his appointment

## 2020-09-17 NOTE — PROGRESS NOTES
Assessment and Plan:     Problem List Items Addressed This Visit        Endocrine    Type 2 diabetes mellitus with hyperglycemia, without long-term current use of insulin ([facility] )       Lab Results   Component Value Date    HGBA1C 6 7 (H) 01/16/2020     Diabetes under control  Patient is eating healthy and taking his meds appropriately  No co of side effects  Relevant Orders    HEMOGLOBIN A1C W/ EAG ESTIMATION       Cardiovascular and Mediastinum    Aneurysm of infrarenal abdominal aorta (HCC)     Scan abdominal aorta for follow up of aneurysm         Relevant Orders    CBC and differential    US abdominal aorta       Genitourinary    Malignant neoplasm of prostate (HCC)    Relevant Orders    PSA, total and free       Other    Anxiety    Relevant Medications    LORazepam (ATIVAN) 1 mg tablet    Screening PSA (prostate specific antigen)    Fall     Multiple falls and unstreadiness  See neurologist asap  Start physical therapy         Relevant Orders    Ambulatory referral to Physical Therapy      Other Visit Diagnoses     Healthcare maintenance    -  Primary    Gait abnormality        Relevant Orders    Ambulatory referral to Physical Therapy    Need for vaccination        Relevant Orders    influenza vaccine, high-dose, PF 0 7 mL (FLUZONE HIGH-DOSE) (Completed)           Preventive health issues were discussed with patient, and age appropriate screening tests were ordered as noted in patient's After Visit Summary  Personalized health advice and appropriate referrals for health education or preventive services given if needed, as noted in patient's After Visit Summary  History of Present Illness:     Patient presents for Medicare Annual Wellness visit    Complains of falls and loss of balance  Has essential tremors and sees neuro  More falls recently  He uses a walker for mobility  He fell 9/8 and had to call for assistance to get up  No acute injuries      He needs a refill of ativan    Diabetes under [facility]    Patient Care Team:  Jayde Rosen DO as PCP - Juan David Carvalho MD as PCP - Endocrinology (Endocrinology)  MD Kyree Pat MD Berton Bellow, DO Delanna Mars, MD Delanna Mars, MD (Gastroenterology)  Willy Gonzales PA-C as Physician Assistant (Endocrinology)  Kim Jensen MD as Endoscopist     Problem List:     Patient Active Problem List   Diagnosis    Type 2 diabetes mellitus with hyperglycemia, without long-term current use of insulin ([place] [id number] )    S/P CABG x 3    Obesity    Vitamin D deficiency    Subclinical iodine-deficiency hypothyroidism    [facility]al abdominal [facility]    Coronary artery disease involving native coronary artery of native heart without angina pectoris    Dyslipidemia    Right bundle-branch block    Change in bowel habits    History of colon polyps    Anxiety    Skin lesion of face    Tinea pedis of both feet    Orthostatic hypotension    Essential tremor    [facility]    Weakness    Upper respiratory tract infection    Medicare annual wellness visit, subsequent    Encounter for hepatitis C screening test for low risk patient    Urinary incontinence    Screening PSA (prostate specific antigen)    Nail abnormality    Postural hypotension    Tremor    Fall    Stress incontinence    Syncope and collapse    C  difficile diarrhea    Sepsis [facility]    Physical deconditioning    Neck abrasion    Injury of finger of left hand    Polypharmacy    Benign essential hypertension      Past Medical and Surgical History:     Past Medical History:   Diagnosis Date    AAA [facility]abdominal aortic aneurysm) [facility]     Aneurysm of infrarenal abdominal aorta ([facility] ) 10/23/2012    Anxiety     Benign essential hypertension     CKD (chronic kidney disease) stage 2, GFR 60-89 ml/min 4/11/2018    Compression fracture of twelfth thoracic vertebra [facility] 1/2/2019    Coronary artery disease involving native coronary artery of native heart without angina pectoris 1/6/2014    Description: 50% distal left main, 75% proximal LAD, 90% 1st diagonal, 50% ostial circumflex, 99% proximal circumflex, 90% 2nd OM, 30 mid RCA , 90% right posterolateral - left heart catheterization December 2013   Diabetes mellitus ([facility] )     DVT, lower extremity ([facility] )     Dyslipidemia 9/24/2012    Essential tremor 4/2/2019    GERD (gastroesophageal reflux disease)     Hyperlipidemia     Hypertension     Stopped his Medications because of Orthostatic hypotension    NSTEMI (non-ST elevated myocardial infarction) ([facility] )     Obesity 7/13/2017    Orthostatic hypotension     Prostate cancer (HCC)     Right bundle branch block (RBBB)     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    LIMA to LAD, SVG to OM1, SVG to distal RCA    Skin cancer     Type 2 diabetes mellitus with hyperglycemia, with long-term current use of insulin ([facility] ) 7/3/2017    Vitamin D deficiency 2/19/2018     Past Surgical History:   Procedure Laterality Date    ANKLE ARTHROSCOPY W/ OPEN REPAIR Left     CARDIAC SURGERY      CORONARY ARTERY BYPASS GRAFT  12/11/2013    CABG x3 with LIMA to LAD, SVG to OM-1, SVG to posterior lateral, LYSIS of pericardial adhesions   OR COLONOSCOPY FLX DX W/COLLJ SPEC WHEN PFRMD N/A 8/9/2018    Procedure: COLONOSCOPY;  Surgeon: Megan White MD;  [facility];   Service: Gastroenterology    OR REPAIR UMBILICAL IHXN,1+R/Z,KBTWV N/A 3/10/2017    Procedure: REPAIR HERNIA UMBILICAL;  Surgeon: Sonja Mcclendon MD;  [facility]: [facility];  Service: General    PROSTATECTOMY  2008    SPHINCTEROPLASTY URINARY N/A 11/6/2019    Procedure: URINARY SPHINCTEROPLASTY;  Surgeon: Bree Mitchell MD;  [facility];  Service: Urology      Family History:     Family History   Problem Relation Age of Onset    Crohn's disease Mother     Liver cancer Mother     Hypertension Mother     Crohn's disease Father     Liver cancer Father     Heart disease Father  Hypertension Father     Hyperlipidemia Father     Colon cancer Brother     Aortic aneurysm Brother         abdominal     Heart disease Brother         abdominal aortic aneurysm    Hypertension Brother     Hyperlipidemia Brother     Colon polyps Family     Stomach cancer Family     Hypertension Sister     Mitral valve prolapse Sister 80        valve replacment       Social History:        Social History     Socioeconomic History    Marital status: /Civil Union     Spouse name: Not on file    Number of children: Not on file    Years of education: Not on file    Highest education level: Not on file   Occupational History    Not on file   Social Needs    Financial resource strain: Not on file    Food insecurity     Worry: Not on file     Inability: Not on file   [employer] needs     Medical: Not on file     Non-medical: Not on file   Tobacco Use    Smoking status: Former Smoker     Types: Cigarettes    Smokeless tobacco: Never Used    Tobacco comment: quit 1967   Substance and Sexual Activity    Alcohol use: Yes     Frequency: Monthly or less     Drinks per session: 1 or 2     Binge frequency: Never     Comment: rare socially    Drug use: No    Sexual activity: Never   Lifestyle    Physical activity     Days per week: Not on file     Minutes per session: Not on file    Stress: Not on file   Relationships    Social connections     Talks on phone: Not on file     Gets together: Not on file     Attends [employer] service: Not on file     Active member of club or organization: Not on file     Attends meetings of clubs or organizations: Not on file     Relationship status: Not on file    Intimate partner violence     Fear of current or ex partner: Not on file     Emotionally abused: Not on file     Physically abused: Not on file     Forced sexual activity: Not on file   Other Topics Concern    Not on file   Social History Narrative    Not on file      Medications and Allergies: Current Outpatient Medications   Medication Sig Dispense Refill    ACCU-CHEK FASTCLIX LANCETS MISC Check blood sugar 4 times daily  306 each 1    albuterol (PROAIR HFA) 90 mcg/act inhaler Inhale 2 puffs every 6 (six) hours as needed for wheezing (Patient not taking: Reported on 6/23/2020) 1 Inhaler 1    ascorbic acid (VITAMIN C) 500 mg tablet Take 500 mg by mouth daily      aspirin 81 MG tablet Take 1 tablet by mouth daily       Calcium Citrate-Vitamin D (CALCIUM CITRATE +D) 315-250 MG-UNIT TABS Take 1 tablet by mouth daily      Coenzyme Q10 (CO Q 10) 100 MG CAPS Take 100 mg by mouth daily      cyanocobalamin (VITAMIN B-12) 500 mcg tablet Take 1 tablet by mouth daily      docusate sodium (COLACE) 100 mg capsule Take 100 mg by mouth 2 (two) times a day      gabapentin (NEURONTIN) 100 mg capsule Take 1 capsule (100 mg total) by mouth 3 (three) times a day To use with 300mg doses   100 capsule 0    gabapentin (NEURONTIN) 300 mg capsule TAKE 3 CAPSULES BY MOUTH THREE TIMES A  capsule 3    Glucagon, rDNA, (GLUCAGON EMERGENCY IJ) Inject 1 kit as directed daily as needed      glucose blood (ACCU-CHEK DENYS PLUS) test strip 1 each by Other route 4 (four) times a day Use as instructed (Patient not taking: Reported on 9/21/2020) 200 each 2    LORazepam (ATIVAN) 1 mg tablet 1/2 tab morning and 1 tab qhs 45 tablet 0    metFORMIN (GLUCOPHAGE) 500 mg tablet Take 1 tablet (500 mg total) by mouth 3 (three) times a day 90 tablet 5    midodrine (PROAMATINE) 5 mg tablet TAKE 1 TABLET BY MOUTH THREE TIMES DAILY 90 tablet 1    Mirabegron ER (Myrbetriq) 50 MG TB24 Take 1 tablet (50 mg total) by mouth daily 90 tablet 3    Multiple Vitamins-Minerals (CENTRUM SILVER) tablet Take 1 tablet by mouth daily      nitroglycerin (NITROSTAT) 0 4 mg SL tablet Place 1 tablet (0 4 mg total) under the tongue every 5 (five) minutes as needed for chest pain 90 tablet 3    ranolazine (RANEXA) 1000 MG SR tablet Take 1 tablet (1,000 mg total) by mouth 2 (two) times a day 180 tablet 2    rosuvastatin (CRESTOR) 40 MG tablet TAKE 1 TABLET BY MOUTH EVERY DAY 90 tablet 3    VIIBRYD 40 MG tablet TAKE 1 TABLET BY MOUTH EVERY DAY 30 tablet 5    VITAMIN D, CHOLECALCIFEROL, PO Take 3,000 Units by mouth daily      Zinc 50 MG CAPS Take by mouth       Current Facility-Administered Medications   Medication Dose Route Frequency Provider Last Rate Last Dose    levalbuterol (XOPENEX HFA) inhaler 1 puff  1 puff Inhalation Q6H PRN Gerardo Files, DO         Allergies   Allergen Reactions    Adhesive [Medical Tape] Rash    Sulfa Antibiotics Other (See Comments)     Generalized redness      Immunizations:     Immunization History   Administered Date(s) Administered    INFLUENZA 10/13/2015, 10/31/2016, 10/05/2017    Influenza Split High Dose Preservative Free IM 10/13/2015, 10/31/2016, 10/05/2017    Influenza TIV (IM) 01/01/2011, 10/23/2012, 10/05/2014    Influenza, high dose seasonal 0 7 mL 11/26/2018, 09/13/2019, 09/17/2020    Pneumococcal Conjugate 13-Valent 12/05/2014    Pneumococcal Polysaccharide PPV23 04/23/2009    TD (adult) Preservative Free 1940    Tdap 03/06/2017      Health Maintenance:         Topic Date Due    Hepatitis C Screening  Completed     There are no preventive care reminders to display for this patient  Medicare Health Risk Assessment:     There were no vitals taken for this visit  Gurvnider Dominguez is here for his Subsequent Wellness visit  Last Medicare Wellness visit information reviewed, patient interviewed, no change since last AWV  Health Risk Assessment:   Patient rates overall health as fair  Patient feels that their physical health rating is slightly worse  Eyesight was rated as same  Hearing was rated as same  Patient feels that their emotional and mental health rating is same  Pain experienced in the last 7 days has been none   Patient states that he has experienced no weight loss or gain in last 6 months  Depression Screening:   PHQ-2 Score: 0      Fall Risk Screening: In the past year, patient has experienced: history of falling in past year    Number of falls: 2 or more  Injured during fall?: No    Feels unsteady when standing or walking?: Yes    Worried about falling?: Yes      Home Safety:  Patient does not have trouble with stairs inside or outside of their home  Patient has working smoke alarms and has working carbon monoxide detector  Home safety hazards include: none  Nutrition:   Current diet is Regular  Activities of Daily Living (ADLs)/Instrumental Activities of Daily Living (IADLs):   Walk and transfer into and out of bed and chair?: Yes  Dress and groom yourself?: Yes    Bathe or shower yourself?: Yes    Feed yourself? Yes  Do your laundry/housekeeping?: No  Manage your money, pay your bills and track your expenses?: No  Make your own meals?: No    Do your own shopping?: No    Previous Hospitalizations:   Any hospitalizations or ED visits within the last 12 months?: No      Advance Care Planning:   Living will: Yes    Durable POA for healthcare:  Yes    Advanced directive: Yes      PREVENTIVE SCREENINGS      Cardiovascular Screening:    General: Screening Not Indicated and History Lipid Disorder      Diabetes Screening:     General: Screening Not Indicated, History Diabetes, Risks and Benefits Discussed and Screening Current      Prostate Cancer Screening:    General: History Prostate Cancer and Screening Not Indicated      Abdominal Aortic Aneurysm (AAA) Screening:    Risk factors include: tobacco use        General: Screening Not Indicated and History AAA      Lung Cancer Screening:     General: Screening Not Indicated      Hepatitis C Screening:    General: Screening Current      TANISHA Cordova

## 2020-09-18 NOTE — ASSESSMENT & PLAN NOTE
Lab Results   Component Value Date    HGBA1C 6 7 (H) 01/16/2020     Diabetes under control  Patient is eating healthy and taking his meds appropriately  No co of side effects

## 2020-09-21 NOTE — PROGRESS NOTES
Virtual Regular Visit      Assessment/Plan:    Problem List Items Addressed This Visit        Cardiovascular and Mediastinum    Orthostatic hypotension       Nervous and Auditory    Essential tremor - Primary     78-year-old man presents in follow-up for tremor  Her medication choices have been limited but he has responded well to gabapentin monotherapy at relatively high doses  There has been some concern about the possibility of Parkinson's disease throughout his course  Today the patient converted his visit to virtual due to significant hypotension  His examination was limited by this as well  He was very wide-based and his gait today which is unusual for him though he was hypotensive at the time and almost fell  We discussed different options on how to proceed  Would not increase his gabapentin any further period would be hesitant to initiate any trials of dopaminergic medications given his active orthostatic hypotension  Given have significantly would impact his treatment we will acquire a EDITH scan to differentiate between essential tremor and Parkinson's disease  He did have a noncontrast head CT about 10 months ago which looked okay without any evidence of normal pressure hydrocephalus  I encouraged patient to reach out to his primary care doctor or cardiologist to discuss his pressures  See him back in follow-up in 3-4 months  Hopefully in person for more detailed examination           Relevant Medications    gabapentin (NEURONTIN) 100 mg capsule    Other Relevant Orders    NM consultation for edith scan    NM brain edith scan w/Rx      Other Visit Diagnoses     Parkinsonism, unspecified Parkinsonism type ([facility] [address] )        Relevant Medications    gabapentin (NEURONTIN) 100 mg capsule    Other Relevant Orders    NM consultation for edith scan    NM brain edith scan w/Rx               Reason for visit is   Chief Complaint   Patient presents with    Tremors    Virtual Regular Visit        Encounter provider Neri Edwards MD    Provider located at [address] [place] [id number]      Recent Visits  No visits were found meeting these conditions  Showing recent visits within past 7 days and meeting all other requirements     Today's Visits  Date Type Provider Dept   09/21/20 Telemedicine Neri Edwards MD [facility] today's visits and meeting all other requirements     Future Appointments  No visits were found meeting these conditions  Showing future appointments within next 150 days and meeting all other requirements        The patient was identified by name and date of birth  Mir Ferguson was informed that this is a telemedicine visit and that the visit is being conducted through [email or web address]  My office door was closed  No one else was in the room  He acknowledged consent and understanding of privacy and security of the video platform  The patient has agreed to participate and understands they can discontinue the visit at any time  Patient is aware this is a billable service  Subjective  HPI   had the pleasure of seeing your Thor [facility] at [facility]        To review, Fahad Ortiz is an 58-year-old man with coronary artery disease, diabetes and orthostatic hypotension who presents in follow up for essential tremor  The patient has had some tremor for as long as he can remember  He has no anosmia but does have questionable rare dream reenactment   On initial presentation the patient had a postural and kinetic tremor  He had no rest tremor  He did have slight rigidity in the left upper extremity and slight focal bradykinesia on the left      Unfortunately we can't use primidone due to drug drug interactions with his cardiac medications  We are unable to use propranolol due to his orthostatic hypotension   Patient has a sulfa allergy therefore we cannot use zonisamide      He has seen very good tremor reduction with gabapentin which has been slowly titrated up over time      Current medications and timing:  Gabapentin 900 mg 3 times daily    Interval history:  Not feeling well today  BP is running very low  On midodrine for OH  Some falls in the setting of low pressures  2 in the past couple of weeks  Thinks that the gabapenin increase was helpful again  Still has some tremor  Had been trying to cut back on salt  Past Medical History:   Diagnosis Date    AAA [facility]abdominal aortic aneurysm) ([facility] [address] )     Aneurysm of infrarenal abdominal aorta ([facility]) 10/23/2012    Anxiety     Benign essential hypertension     CKD (chronic kidney disease) stage 2, GFR 60-89 ml/min 4/11/2018    Compression fracture of twelfth thoracic vertebra ([facility]) 1/2/2019    Coronary artery disease involving native coronary artery of native heart without angina pectoris 1/6/2014    Description: 50% distal left main, 75% proximal LAD, 90% 1st diagonal, 50% ostial circumflex, 99% proximal circumflex, 90% 2nd OM, 30 mid RCA , 90% right posterolateral - left heart catheterization December 2013      Diabetes mellitus ([address] )     DVT, lower extremity ([address] )     Dyslipidemia 9/24/2012    Essential tremor 4/2/2019    GERD (gastroesophageal reflux disease)     Hyperlipidemia     Hypertension     Stopped his Medications because of Orthostatic hypotension    NSTEMI (non-ST elevated myocardial infarction) ([address] )     Obesity 7/13/2017    Orthostatic hypotension     Prostate cancer ([facility])     Right bundle branch block (RBBB)     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    LIMA to LAD, SVG to OM1, SVG to distal RCA    Skin cancer     Type 2 diabetes mellitus with hyperglycemia, with long-term current use of insulin ([address] ) 7/3/2017    Vitamin D deficiency 2/19/2018       Past Surgical History:   Procedure Laterality Date    ANKLE ARTHROSCOPY W/ OPEN REPAIR Left     CARDIAC SURGERY  CORONARY ARTERY BYPASS GRAFT  12/11/2013    CABG x3 with LIMA to LAD, SVG to OM-1, SVG to posterior lateral, LYSIS of pericardial adhesions   [place] COLONOSCOPY FLX DX W/COLLJ SPEC WHEN PFRMD N/A 8/9/2018    Procedure: COLONOSCOPY;  Surgeon: Marycruz Whalen MD;  Location: [facility]; Service: Gastroenterology    NH REPAIR UMBILICAL CKBR,0+G/N,PNCQV N/A 3/10/2017    Procedure: REPAIR HERNIA UMBILICAL;  Surgeon: Makenzie Laguna MD;  Location: [facility];  Service: General    PROSTATECTOMY  2008    SPHINCTEROPLASTY URINARY N/A 11/6/2019    Procedure: URINARY SPHINCTEROPLASTY;  Surgeon: Ilana Shine MD;  Location: [facility];  Service: Urology       Current Outpatient Medications   Medication Sig Dispense Refill    ACCU-CHEK FASTCLIX LANCETS MISC Check blood sugar 4 times daily   306 each 1    ascorbic acid (VITAMIN C) 500 mg tablet Take 500 mg by mouth daily      aspirin 81 MG tablet Take 1 tablet by mouth daily       Calcium Citrate-Vitamin D (CALCIUM CITRATE +D) 315-250 MG-UNIT TABS Take 1 tablet by mouth daily      Coenzyme Q10 (CO Q 10) 100 MG CAPS Take 100 mg by mouth daily      cyanocobalamin (VITAMIN B-12) 500 mcg tablet Take 1 tablet by mouth daily      docusate sodium (COLACE) 100 mg capsule Take 100 mg by mouth 2 (two) times a day      gabapentin (NEURONTIN) 300 mg capsule TAKE 3 CAPSULES BY MOUTH THREE TIMES A  capsule 3    LORazepam (ATIVAN) 1 mg tablet 1/2 tab morning and 1 tab qhs 45 tablet 0    metFORMIN (GLUCOPHAGE) 500 mg tablet Take 1 tablet (500 mg total) by mouth 3 (three) times a day 90 tablet 5    midodrine (PROAMATINE) 5 mg tablet TAKE 1 TABLET BY MOUTH THREE TIMES DAILY 90 tablet 1    Mirabegron ER (Myrbetriq) 50 MG TB24 Take 1 tablet (50 mg total) by mouth daily 90 tablet 3    Multiple Vitamins-Minerals (CENTRUM SILVER) tablet Take 1 tablet by mouth daily      nitroglycerin (NITROSTAT) 0 4 mg SL tablet Place 1 tablet (0 4 mg total) under the tongue every 5 (five) minutes as needed for chest pain 90 tablet 3    ranolazine (RANEXA) 1000 MG SR tablet Take 1 tablet (1,000 mg total) by mouth 2 (two) times a day 180 tablet 2    rosuvastatin (CRESTOR) 40 MG tablet TAKE 1 TABLET BY MOUTH EVERY DAY 90 tablet 3    VIIBRYD 40 MG tablet TAKE 1 TABLET BY MOUTH EVERY DAY 30 tablet 5    VITAMIN D, CHOLECALCIFEROL, PO Take 3,000 Units by mouth daily      Zinc 50 MG CAPS Take by mouth      albuterol (PROAIR HFA) 90 mcg/act inhaler Inhale 2 puffs every 6 (six) hours as needed for wheezing (Patient not taking: Reported on 6/23/2020) 1 Inhaler 1    gabapentin (NEURONTIN) 100 mg capsule Take 1 capsule (100 mg total) by mouth 3 (three) times a day To use with 300mg doses  100 capsule 0    Glucagon, rDNA, (GLUCAGON EMERGENCY IJ) Inject 1 kit as directed daily as needed      glucose blood (ACCU-CHEK DENYS PLUS) test strip 1 each by Other route 4 (four) times a day Use as instructed (Patient not taking: Reported on 9/21/2020) 200 each 2     Current Facility-Administered Medications   Medication Dose Route Frequency Provider Last Rate Last Dose    levalbuterol (XOPENEX HFA) inhaler 1 puff  1 puff Inhalation Q6H PRN Gerardo Files, DO            Allergies   Allergen Reactions    Adhesive [Medical Tape] Rash    Sulfa Antibiotics Other (See Comments)     Generalized redness       Review of Systems   Constitutional: Negative  Negative for appetite change and fever  HENT: Negative  Negative for hearing loss, tinnitus, trouble swallowing and voice change  Eyes: Negative  Negative for photophobia and pain  Respiratory: Negative  Negative for shortness of breath  Cardiovascular: Negative  Negative for palpitations  Gastrointestinal: Negative  Negative for nausea and vomiting  Endocrine: Negative  Negative for cold intolerance  Genitourinary: Negative  Negative for dysuria, frequency and urgency  Musculoskeletal: Negative  Negative for myalgias and neck pain     Skin: Negative  Negative for rash  Allergic/Immunologic: Negative  Neurological: Positive for tremors  Negative for dizziness, seizures, syncope, facial asymmetry, speech difficulty, weakness, light-headedness, numbness and headaches  Hematological: Negative  Does not bruise/bleed easily  Psychiatric/Behavioral: Negative  Negative for confusion, hallucinations and sleep disturbance  The above ROS was reviewed and updated  Belinda Red MD  Medical Director   [facility]  Movement and Memory Specialist       Video Exam    Vitals:    09/21/20 1348   BP: (!) 87/60   Patient Position: Sitting   Pulse: 92       Physical Exam   GENERAL MEDICAL EXAMINATION:  General appearance: alert, in no apparent distress  Appropriately dressed and groomed  Conversing and interacting appropriately  Eyes: Sclera are non-injected  Ears, nose, Mouth, Throat: Mucous membranes are moist    Resp: Breathing comfortably on RA   Musculoskeletal: No evidence of deformities  No contractures  Skin: No visible rashes  Well perfused  Psych: normal and appropriate affect     Mental Status:  Alert and oriented to person place and time  Able to relate history without difficulty  Attentive to conversation  Language is fluent and appropriate with normal prosody  There were no paraphasic errors  Speech was not dysarthric  Able to follow both midline and appendicular commands  General Neurology examination:   Face activates symmetrically  Normal bulk throughout  Patient moves all 4 extremities equally and antigravity  No pronator drift  Positive and negative myoclonus  Trouble maintaining amplitude for MONTY, mostly irregular rather than clear decrement or hesitations  Wide based  Almost falls during the turn  Possible brief emergence of tremor on the right             I have spent 30 minutes with Patient and family today in which greater than 50% of this time was spent in counseling/coordination of care regarding Diagnostic results, Prognosis, Risks and benefits of tx options, Intructions for management, Patient and family education and Impressions  VIRTUAL VISIT DISCLAIMER    Alden Jackson acknowledges that he has consented to an online visit or consultation  He understands that the online visit is based solely on information provided by him, and that, in the absence of a face-to-face physical evaluation by the physician, the diagnosis he receives is both limited and provisional in terms of accuracy and completeness  This is not intended to replace a full medical face-to-face evaluation by the physician  Alden Jackson understands and accepts these terms

## 2020-09-21 NOTE — ASSESSMENT & PLAN NOTE
17-year-old man presents in follow-up for tremor  Her medication choices have been limited but he has responded well to gabapentin monotherapy at relatively high doses  There has been some concern about the possibility of Parkinson's disease throughout his course  Today the patient converted his visit to virtual due to significant hypotension  His examination was limited by this as well  He was very wide-based and his gait today which is unusual for him though he was hypotensive at the time and almost fell  We discussed different options on how to proceed  Would not increase his gabapentin any further period would be hesitant to initiate any trials of dopaminergic medications given his active orthostatic hypotension  Given have significantly would impact his treatment we will acquire a JESSICA scan to differentiate between essential tremor and Parkinson's disease  He did have a noncontrast head CT about 10 months ago which looked okay without any evidence of normal pressure hydrocephalus  I encouraged patient to reach out to his primary care doctor or cardiologist to discuss his pressures  See him back in follow-up in 3-4 months  Hopefully in person for more detailed examination

## 2020-09-22 NOTE — TELEPHONE ENCOUNTER
The patient's neurologist is recommending a JESSICA ct scan for parkinson's  Could you order this? Please advise   Thank you

## 2020-09-22 NOTE — TELEPHONE ENCOUNTER
He will need to send a letter with his recommendations and we will need to schedule an appointment for this patient

## 2020-09-29 NOTE — TELEPHONE ENCOUNTER
I spoke with the pts wife, the patient fell again last night  Per the wife the patient was unable to sleep so he got out of bed and was on the computer  The patient was hungry and decided to go to the [facility]  The patient was unable to get up so his wife gave him a blanket and pillow and he fell back to sleep fr several hours  After he woke he was able to get up  Per the wife there are no injuries

## 2020-10-04 PROBLEM — R06.02 SHORTNESS OF BREATH: Status: ACTIVE | Noted: 2020-01-01

## 2020-10-04 PROBLEM — E87.1 HYPONATREMIA: Status: ACTIVE | Noted: 2020-01-01

## 2020-10-04 PROBLEM — R26.2 AMBULATORY DYSFUNCTION: Status: ACTIVE | Noted: 2019-09-15

## 2020-10-04 PROBLEM — D64.9 ANEMIA: Status: ACTIVE | Noted: 2020-01-01

## 2020-10-05 PROBLEM — I50.33 ACUTE ON CHRONIC DIASTOLIC (CONGESTIVE) HEART FAILURE (HCC): Status: ACTIVE | Noted: 2020-01-01

## 2020-10-06 PROBLEM — N18.2 CKD (CHRONIC KIDNEY DISEASE) STAGE 2, GFR 60-89 ML/MIN: Status: ACTIVE | Noted: 2020-01-01

## 2020-10-11 PROBLEM — I25.810 CORONARY ARTERY DISEASE INVOLVING CORONARY BYPASS GRAFT: Status: ACTIVE | Noted: 2020-01-01

## 2020-10-11 PROBLEM — R41.89 COGNITIVE IMPAIRMENT: Status: ACTIVE | Noted: 2020-01-01

## 2020-10-11 PROBLEM — M25.562 ACUTE PAIN OF LEFT KNEE: Status: ACTIVE | Noted: 2020-01-01

## 2020-10-12 PROBLEM — R53.81 PHYSICAL DECONDITIONING: Status: ACTIVE | Noted: 2020-01-01

## 2020-10-14 PROBLEM — R55 SYNCOPE AND COLLAPSE: Status: RESOLVED | Noted: 2019-11-25 | Resolved: 2020-01-01
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
## 2020-10-16 PROBLEM — I50.32 CHRONIC DIASTOLIC HEART FAILURE (HCC): Status: ACTIVE | Noted: 2020-01-01

## 2020-10-21 PROBLEM — J06.9 UPPER RESPIRATORY TRACT INFECTION: Status: RESOLVED | Noted: 2019-05-05 | Resolved: 2020-01-01

## 2020-10-21 PROBLEM — E87.5 HYPERKALEMIA: Status: ACTIVE | Noted: 2020-01-01

## 2020-10-21 PROBLEM — A04.72 C. DIFFICILE DIARRHEA: Status: RESOLVED | Noted: 2019-11-26 | Resolved: 2020-01-01

## 2020-10-30 PROBLEM — F32.A ANXIETY AND DEPRESSION: Status: ACTIVE | Noted: 2018-07-24

## 2020-10-30 PROBLEM — Z11.59 ENCOUNTER FOR HEPATITIS C SCREENING TEST FOR LOW RISK PATIENT: Status: RESOLVED | Noted: 2019-06-04 | Resolved: 2020-01-01

## 2020-11-03 NOTE — ASSESSMENT & PLAN NOTE
Cards have increased bb and added imdur pt with concern as he has passed out in the past not sure if bs vs hypotension as pt is not checking his blood sugars at home
Diet control  Education  ADA diet  Diabetic management
Diet control  Education  ADA diet, pt has been told he states to drink the g4 equivelant to gatorade   I have informed him he needs to monitor sodium and sugar in these drinks  Diabetic management, needs a lot of education as he is not chking his blood sugars
Medications adjusted will need close outpt follow up   See plans as per non ST wave MI and CAD 
Patient is currently on insulin sliding scale and to continue NovoLog 20 units before meals as well as insulin glargine 50 units at bedtime  Check blood sugars before meals and before bedtime  Hemoglobin A1c 
Patient is due to have aortic aneurysm reimaged      Therefore, will get abdominal ultrasound, changed from screen to order discussed with nursing
Patient is due to have aortic aneurysm reimaged  Results demonstrate: Infrarenal abdominal aorta measures 3 5 x 3 3 cm, marginally increased in caliber when compared to CT of 7/3/2017    Will need to follow with vascular as an outpt
Patient is due to have aortic aneurysm reimaged  Therefore, will get abdominal ultrasound 
Previous history of CABG  With on and off chest discomfort/angina   Elevated trops  Discussed with cards today some medications have been adjusted increased to help with the angina increased imdur and ranexa  Pt with prn nitro  Discussed with pt who seems to confuse his blood pressures with his blood sugars   Pt requested glucometer name that would calculate his carbs for him name placed on dc paper work
Previous history of CABG in current T with chest discomfort  According to patient, he is on medical management as stenting is not possible  Patient placed on heparin drip  Will trend troponins and consult Cardiology 
Pt with hx of cad sp cabg  troponins peaked at 3 92 with slight trend downward on last with accelerated hypertension, now improving  Patient previously reported dizziness  Metoprolol was increased from 25-75 mg in July of last year and lisinopril discontinued secondary to low blood pressures  Previous catheterizations per documentation per Cardiology recommending intervention patient reportedly not amenable to intervention  At this time cardiology with plan to optimize medical management  Continue aspirin, metoprolol XL 25 mg, Plavix, Crestor 40 mg  Patient has been on Imdur and Ranexa in the past again adjusted secondary to hypotension  If blood pressures increased would look to add or consider low-dose lisinopril
Pt with hx of cad sp cabg  troponins peaked at 3 92 with slight trend downward on last with accelerated hypertension, now improving  Patient previously reported dizziness  Metoprolol was increased from 25-75 mg in July of last year and lisinopril discontinued secondary to low blood pressures today metoprolol increased and imdur and ranexa increased   Previous catheterizations per documentation per Cardiology recommending intervention patient reportedly not amenable to intervention  At this time cardiology with plan to optimize medical management  Continue aspirin, metoprolol XL 25 mg increased, imdur added and ranexa increased , Plavix, Crestor 40 mg
Will continue Crestor (substituted as Lipitor due to formulary) 
Will continue Crestor (substituted as Lipitor due to formulary) 
Will continue to monitor  See plans as per non ST wave MI and CAD 
Will continue to monitor  See plans as per non ST wave MI and CAD 
Will continue to trend troponins with EKG  Also, patient is already on heparin drip at low dose for ACS  Cardiology consult 
Will continue with metoprolol 
insulin sliding scale and to continue NovoLog 20 units will decrease to 10  units before meals as well as insulin glargine 50 units at bedtime reduced to 40units  due to lunch time level of 48  a1c 7 5  Will need to monitor   Ada diet
pts lantus was reduced to 40 units at hs as pt yesterday am was 48 prior to lunch     Reduced meal time coverage to  10 units   a1c 7 5, discussed with pt he has difficulty keep focus during discussion, he states his pcp has told him to drink gatorade equivalent but seems to mix up low sugar with low blood pressure when talking to him  Wife takes care of him and recommend wife administering medications as have concern with pt and his scattered focus and reasoning,   Will need to monitor   [place]
No antibiotics or any antibiotics < 2 hrs prior to birth

## 2020-11-09 PROBLEM — Z71.89 GOALS OF CARE, COUNSELING/DISCUSSION: Status: ACTIVE | Noted: 2020-01-01

## 2020-11-09 PROBLEM — N18.31 STAGE 3A CHRONIC KIDNEY DISEASE (HCC): Status: ACTIVE | Noted: 2020-01-01

## 2020-11-09 PROBLEM — K59.00 CONSTIPATION: Status: RESOLVED | Noted: 2019-04-02 | Resolved: 2020-01-01

## 2020-11-09 PROBLEM — E87.1 HYPONATREMIA: Status: ACTIVE | Noted: 2020-01-01

## 2020-11-13 PROBLEM — K59.01 SLOW TRANSIT CONSTIPATION: Status: ACTIVE | Noted: 2019-04-02

## 2020-11-18 PROBLEM — I35.9 AORTIC VALVE DISORDER: Status: ACTIVE | Noted: 2020-01-01

## 2020-11-28 PROBLEM — A41.9 SEPSIS (HCC): Status: ACTIVE | Noted: 2020-01-01

## 2020-11-28 PROBLEM — U07.1 COVID-19 VIRUS INFECTION: Status: ACTIVE | Noted: 2020-01-01

## 2020-11-30 PROBLEM — J96.00 ACUTE RESPIRATORY FAILURE (HCC): Status: ACTIVE | Noted: 2020-01-01

## 2020-11-30 PROBLEM — I46.9 CARDIAC ARREST (HCC): Status: ACTIVE | Noted: 2020-01-01

## 2020-11-30 PROBLEM — G25.3 MYOCLONUS: Status: ACTIVE | Noted: 2020-01-01

## 2020-12-01 PROBLEM — I50.42 CHRONIC COMBINED SYSTOLIC AND DIASTOLIC HEART FAILURE (HCC): Status: ACTIVE | Noted: 2020-01-01

## 2020-12-01 PROBLEM — N17.9 AKI (ACUTE KIDNEY INJURY) (HCC): Status: ACTIVE | Noted: 2020-01-01

## 2020-12-04 VITALS
TEMPERATURE: 103.6 F | DIASTOLIC BLOOD PRESSURE: 55 MMHG | WEIGHT: 215.61 LBS | BODY MASS INDEX: 30.87 KG/M2 | OXYGEN SATURATION: 92 % | HEIGHT: 70 IN | SYSTOLIC BLOOD PRESSURE: 121 MMHG

## 2020-12-07 ENCOUNTER — EPISODE CHANGES (OUTPATIENT)
Dept: CASE MANAGEMENT | Facility: HOSPITAL | Age: 80
End: 2020-12-07

## 2020-12-18 LAB
ATRIAL RATE: 110 BPM
PR INTERVAL: 112 MS
QRS AXIS: -71 DEGREES
QRSD INTERVAL: 150 MS
QT INTERVAL: 378 MS
QTC INTERVAL: 511 MS
T WAVE AXIS: 58 DEGREES
VENTRICULAR RATE: 110 BPM

## 2021-06-23 NOTE — PATIENT INSTRUCTIONS
Hypoglycemia instructions   Anuel Rouse  1/13/2020  [id number]    Low Blood Sugar    Steps to treat low blood sugar  1  Test blood sugar if you have symptoms of low blood sugar:   Low Blood Sugar Symptoms:  o Sweaty  o Dizzy  o Rapid heartbeat  o Shaky    o Bad mood  o Hungry      2  Treat blood sugar less than 70 with 15 grams of fast-acting carbohydrate:   Examples of 15 grams Fast-Acting Carbohydrate:  o 4 oz juice  o 4 oz regular soda  o 3-4 glucose tablets (chew)  o 3-4 hard candies (chew)              3    Wait 15 minutes and test your blood sugar again           4   If blood sugar is less than 100, repeat steps 2-3       5  When your blood sugar is 100 or more, eat a snack if it will be longer than one hour until your next meal  The snack should be 15 grams of carbohydrate and a protein:   Examples of snacks:  o ½ sandwich  o 6 crackers with cheese  o Piece of fruit with cheese or peanut butter  o 6 crackers with peanut butter No

## 2023-06-13 NOTE — ASSESSMENT & PLAN NOTE
Present on admission  HR >90s, leukocytpsis, history of 3-4 days of diarrhea, C diff (+)    Now resolving  Blood cultures show no growth at 72h no

## 2023-07-27 NOTE — PROGRESS NOTES
Assessment/Plan:      There are no diagnoses linked to this encounter  Subjective:     Patient ID: Donnie Bearden is a 76 y o  male  Aguila Gonzalez accompanied by wife  Has been feeling more anxious as daughter's release date from alf approaches (2/27/18)  Daughter being treated psychiatrically in alf and doing well  Has long history of being non-compliant with treatment outside supervised settings and becomes labile, agitated, threatening and psychotic  Aguila Gonzalez and wife looking for outpatient treatment options, supervised living settings, community supports for daughter  Due to Etienne's health issues, they will have zero tolerance policy for daughter onofren she is released to live with them  Aguila Gonzalez is verbal, cooperative and well oriented  Review of Systems   Psychiatric/Behavioral: The patient is nervous/anxious  Objective:     Physical Exam   Psychiatric: His speech is normal and behavior is normal  Judgment and thought content normal  His mood appears anxious  Cognition and memory are normal    Has been feeling anxious as daughter's release date approaches  No depressive symptoms   No SI 
Detail Level: Simple

## 2024-09-09 NOTE — PROGRESS NOTES
Incoming call from patient for CM assessment  Patient reports that he is feeling well  Patient reports that his home care is ending next week  Patient reports that he is ambulatory in his home independently and outside the home uses a [address]  Patient denies any further difficulty with his L 4th finger  Patient denies any recent falls  Does not report syncope or near syncope  Patient does report occasional dizziness upon standing  Patient continues to have elevated BUN/creatinine  Metformin dose has been decreased, hydration is normal, patient denies any changes to  system  FBS range 102-140  A1C is 7 4 which is goal for patient  Hypotension still appears to be an issue for patient  Patient is currently taking Midodrine 5 mg by mouth 3 x day  CG is monitoring BP at home and states that BP systolic readings are usually 110-138  Patient will follow up with PCP on 2/18/2020  Educated patient to increase fluid intake to 6-7 glasses of water per day and 1-2 glasses of electrolyte drink per day  Encouraged patient to change positions slowly and to use assistive device for ambulation if he is feeling weak or unsteady  Patient verbalizing understanding  Reviewed home medications, s/sx to report, future appointments and how/when to report symptoms to provider vs 911  Patient verbalizes understanding and agrees to additional calls  Subjective:   Hu Arce is 3 weeks s/p left shoulder lesion and upper mid back lesion excision. He is doing well. His pain is resolving as expected.  His upper back incision opened up slightly.  No fevers or chills.    Objective:   Visit Vitals  BP (!) 176/106 (BP Location: RUE - Right upper extremity)   Pulse 80   Temp 98.1 °F (36.7 °C)   Ht 5' 8\" (1.727 m)   Wt 81.6 kg (180 lb)   BMI 27.37 kg/m²        Physical Exam  Left shoulder incision healed.  Sutures removed.  Steri-Strips placed.  Upper back incision sutures removed.  He has a small dehiscence in the midportion of the wound.  I taught him how to do dressing changes.    Assessment and Plan:   Problem List Items Addressed This Visit    None  Visit Diagnoses     Sebaceous cyst    -  Primary    Lipoma of torso        Post-operative state              He is recovering well from surgery. Dressing changes to upper mid back wound until healed.     We will see them back in 2 weeks.     Shaan Sifuentes MD

## (undated) DEVICE — SUT VICRYL 3-0 SH 27 IN J416H

## (undated) DEVICE — GLOVE INDICATOR PI UNDERGLOVE SZ 7.5 BLUE

## (undated) DEVICE — BIPOLAR CORD DISP

## (undated) DEVICE — INTENDED FOR TISSUE SEPARATION, AND OTHER PROCEDURES THAT REQUIRE A SHARP SURGICAL BLADE TO PUNCTURE OR CUT.: Brand: BARD-PARKER SAFETY BLADES SIZE 15, STERILE

## (undated) DEVICE — ACCESSORY KIT QUICK CONNECT WINDOW CONNECTORS (3 STRAIGHT, 2 RIGHT ANGLE, 1 Y), SUTURE-TIE CONNECTORS (3 STRAIGHT, 2 RIGHT ANGLE, 1 Y), 8 COLLETS, 1 COLLET HOLDER, 1 CUFF SIZER, 2 22-GAUGE BLUNT TIP NEEDLES, 2 15-GAUGE BLUNT TIP NEEDLES, 2 30 CM LENGTHS OF TUBING: Brand: AMS 800 ARTIFICIAL URINARY SPHINCTER

## (undated) DEVICE — SUT SILK 2-0 SH 30 IN K833H

## (undated) DEVICE — Device

## (undated) DEVICE — LIGHT HANDLE COVER SLEEVE DISP BLUE STELLAR

## (undated) DEVICE — CHLORAPREP HI-LITE 26ML ORANGE

## (undated) DEVICE — MEDI-VAC YANKAUER SUCTION HANDLE W/STRAIGHT TIP & CONTROL VENT: Brand: CARDINAL HEALTH

## (undated) DEVICE — BAG URINE DRAINAGE 2000ML ANTI RFLX LF

## (undated) DEVICE — SCD SEQUENTIAL COMPRESSION COMFORT SLEEVE MEDIUM KNEE LENGTH: Brand: KENDALL SCD

## (undated) DEVICE — TUBING SUCTION 5MM X 12 FT

## (undated) DEVICE — STRL UNIVERSAL MINOR GENERAL: Brand: CARDINAL HEALTH

## (undated) DEVICE — PLUMEPEN PRO 10FT

## (undated) DEVICE — PAD GROUNDING ADULT

## (undated) DEVICE — STRL ALLENTOWN HYSTEROSCOPY PK: Brand: CARDINAL HEALTH

## (undated) DEVICE — RETRACTOR PENILE/SCROTAL DEEP

## (undated) DEVICE — SUT VICRYL 2-0 REEL 54 IN J286G

## (undated) DEVICE — SUT VICRYL 2-0 SH 27 IN UNDYED J417H

## (undated) DEVICE — SUT MONOCRYL 4-0 PS-2 18 IN Y496G

## (undated) DEVICE — 3000CC GUARDIAN II: Brand: GUARDIAN

## (undated) DEVICE — ADHESIVE SKN CLSR HISTOACRYL FLEX 0.5ML LF

## (undated) DEVICE — SUT SILK 2-0 18 IN A185H

## (undated) DEVICE — REM POLYHESIVE ADULT PATIENT RETURN ELECTRODE: Brand: VALLEYLAB

## (undated) DEVICE — CONMED ACCESSORY ELECTRODE, FLAT BLADE WITH EXTENDED INSULATION: Brand: CONMED

## (undated) DEVICE — NEEDLE 22 G X 1 1/2 SAFETY

## (undated) DEVICE — SUT VICRYL 1 CT-1 27 IN J261H

## (undated) DEVICE — DRAPE SURGIKIT SADDLE BAG

## (undated) DEVICE — ADHESIVE SKIN HIGH VISCOSITY EXOFIN 1ML

## (undated) DEVICE — CATH FOLEY 12FR 5ML 2 WAY UNCOATED SILICONE

## (undated) DEVICE — GLOVE SRG BIOGEL ECLIPSE 6.5

## (undated) DEVICE — VESSEL LOOPS X-RAY DETECTABLE: Brand: DEROYAL

## (undated) DEVICE — GLOVE SRG BIOGEL ECLIPSE 7.5

## (undated) DEVICE — GLOVE SRG BIOGEL ECLIPSE 7